# Patient Record
Sex: FEMALE | Race: WHITE | NOT HISPANIC OR LATINO | Employment: OTHER | URBAN - METROPOLITAN AREA
[De-identification: names, ages, dates, MRNs, and addresses within clinical notes are randomized per-mention and may not be internally consistent; named-entity substitution may affect disease eponyms.]

---

## 2017-01-02 ENCOUNTER — RADIANT APPOINTMENT (OUTPATIENT)
Dept: GENERAL RADIOLOGY | Facility: CLINIC | Age: 47
End: 2017-01-02
Attending: FAMILY MEDICINE
Payer: COMMERCIAL

## 2017-01-02 ENCOUNTER — OFFICE VISIT (OUTPATIENT)
Dept: FAMILY MEDICINE | Facility: CLINIC | Age: 47
End: 2017-01-02
Payer: COMMERCIAL

## 2017-01-02 VITALS
TEMPERATURE: 97.5 F | BODY MASS INDEX: 42.89 KG/M2 | DIASTOLIC BLOOD PRESSURE: 88 MMHG | SYSTOLIC BLOOD PRESSURE: 126 MMHG | HEART RATE: 87 BPM | OXYGEN SATURATION: 98 % | WEIGHT: 250 LBS | RESPIRATION RATE: 16 BRPM

## 2017-01-02 DIAGNOSIS — S99.922A FOOT INJURY, LEFT, INITIAL ENCOUNTER: ICD-10-CM

## 2017-01-02 DIAGNOSIS — S99.922A FOOT INJURY, LEFT, INITIAL ENCOUNTER: Primary | ICD-10-CM

## 2017-01-02 PROCEDURE — 99214 OFFICE O/P EST MOD 30 MIN: CPT | Performed by: FAMILY MEDICINE

## 2017-01-02 PROCEDURE — 73630 X-RAY EXAM OF FOOT: CPT | Mod: LT

## 2017-01-02 RX ORDER — SULFAMETHOXAZOLE/TRIMETHOPRIM 800-160 MG
1 TABLET ORAL 2 TIMES DAILY
Qty: 20 TABLET | Refills: 0 | Status: SHIPPED | OUTPATIENT
Start: 2017-01-02 | End: 2017-06-09

## 2017-01-02 ASSESSMENT — ENCOUNTER SYMPTOMS
NEUROLOGICAL NEGATIVE: 1
CONSTITUTIONAL NEGATIVE: 1

## 2017-01-02 ASSESSMENT — PAIN SCALES - GENERAL: PAINLEVEL: MODERATE PAIN (5)

## 2017-01-02 NOTE — NURSING NOTE
"Chief Complaint   Patient presents with     Musculoskeletal Problem     LEFT FOOT     Initial /88 mmHg  Pulse 87  Temp(Src) 97.5  F (36.4  C) (Oral)  Resp 16  Wt 250 lb (113.399 kg)  SpO2 98% Estimated body mass index is 42.89 kg/(m^2) as calculated from the following:    Height as of 7/22/16: 5' 4\" (1.626 m).    Weight as of this encounter: 250 lb (113.399 kg).  BP completed using cuff size large right arm.    Lisa Magill, CMA    "

## 2017-01-02 NOTE — MR AVS SNAPSHOT
After Visit Summary   1/2/2017    Lyubov Sanchez    MRN: [id number]           Patient Information     Date Of Birth          1970        Visit Information        Provider Department      1/2/2017 8:00 AM Rafi Price MD [facility]        Today's Diagnoses     Foot injury, left, initial encounter    -  1        Follow-ups after your visit        Follow-up notes from your care team     Return in about 1 week (around 1/9/2017).      Who to contact     If you have questions or need follow up information about today's clinic visit or your schedule please contact [facility] directly at [phone].  Normal or non-critical lab and imaging results will be communicated to you by MyChart, letter or phone within 4 business days after the clinic has received the results. If you do not hear from us within 7 days, please contact the clinic through [employer] or phone. If you have a critical or abnormal lab result, we will notify you by phone as soon as possible.  Submit refill requests through [employer] or call your pharmacy and they will forward the refill request to us. Please allow 3 business days for your refill to be completed.          Additional Information About Your Visit        MyChart Information     [employer] gives you secure access to your electronic health record. If you see a primary care provider, you can also send messages to your care team and make appointments. If you have questions, please call your primary care clinic.  If you do not have a primary care provider, please call [phone] and they will assist you.        Your Vitals Were     Pulse Temperature Respirations Pulse Oximetry          87 97.5  F (36.4  C) (Oral) 16 98%         Blood Pressure from Last 3 Encounters:   01/02/17 126/88   12/22/16 116/74   11/10/16 142/94    Weight from Last 3 Encounters:   01/02/17 250 lb (113.399 kg)   12/22/16 247 lb 14.4 oz (112.447 kg)   11/10/16 250 lb  (113.399 kg)                 Today's Medication Changes          These changes are accurate as of: 1/2/17  9:02 AM.  If you have any questions, ask your nurse or doctor.               Start taking these medicines.        Dose/Directions    sulfamethoxazole-trimethoprim 800-160 MG per tablet   Commonly known as:  BACTRIM DS/SEPTRA DS   Used for:  Foot injury, left, initial encounter   Started by:  Rafi Price MD        Dose:  1 tablet   Take 1 tablet by mouth 2 times daily   Quantity:  20 tablet   Refills:  0            Where to get your medicines      These medications were sent to [facility]/pharmacy #[id number] - [place], [place] - [address]  [address], [facility] [address]     Phone:  [phone]    - sulfamethoxazole-trimethoprim 800-160 MG per tablet             Primary Care Provider Office Phone # Fax #    Yolie Delarosa [phone]       [facility] [address]   [facility] [address]        Thank you!     Thank you for choosing [facility]  for your care. Our goal is always to provide you with excellent care. Hearing back from our patients is one way we can continue to improve our services. Please take a few minutes to complete the written survey that you may receive in the mail after your visit with us. Thank you!             Your Updated Medication List - Protect others around you: Learn how to safely use, store and throw away your medicines at www.disposemymeds.org.          This list is accurate as of: 1/2/17  9:02 AM.  Always use your most recent med list.                   Brand Name Dispense Instructions for use    amLODIPine 5 MG tablet    NORVASC    90 tablet    Take 1 tablet (5 mg) by mouth daily       buPROPion 150 MG 24 hr tablet    WELLBUTRIN XL    90 tablet    Take 1 tablet (150 mg) by mouth every morning       citalopram 20 MG tablet    celeXA    90 tablet    Take 1 tablet (20 mg) by mouth daily       ketorolac 30 MG/ML  injection    TORADOL    1 mL    Inject 1 mL (30 mg) into the muscle once       levonorgestrel 20 MCG/24HR IUD    MIRENA    1 each        lisinopril-hydrochlorothiazide 20-12.5 MG per tablet    PRINZIDE/ZESTORETIC    90 tablet    Take 1 tablet by mouth daily       phentermine 37.5 MG capsule     30 capsule    Take 1 capsule (37.5 mg) by mouth every morning       sulfamethoxazole-trimethoprim 800-160 MG per tablet    BACTRIM DS/SEPTRA DS    20 tablet    Take 1 tablet by mouth 2 times daily

## 2017-01-05 ENCOUNTER — MYC MEDICAL ADVICE (OUTPATIENT)
Dept: FAMILY MEDICINE | Facility: CLINIC | Age: 47
End: 2017-01-05

## 2017-01-05 DIAGNOSIS — M25.562 PAIN IN BOTH KNEES, UNSPECIFIED CHRONICITY: Primary | ICD-10-CM

## 2017-01-05 DIAGNOSIS — M25.561 PAIN IN BOTH KNEES, UNSPECIFIED CHRONICITY: Primary | ICD-10-CM

## 2017-01-10 ENCOUNTER — RADIANT APPOINTMENT (OUTPATIENT)
Dept: GENERAL RADIOLOGY | Facility: CLINIC | Age: 47
End: 2017-01-10
Attending: FAMILY MEDICINE
Payer: COMMERCIAL

## 2017-01-10 ENCOUNTER — OFFICE VISIT (OUTPATIENT)
Dept: ORTHOPEDICS | Facility: CLINIC | Age: 47
End: 2017-01-10
Payer: COMMERCIAL

## 2017-01-10 VITALS
DIASTOLIC BLOOD PRESSURE: 88 MMHG | SYSTOLIC BLOOD PRESSURE: 138 MMHG | HEIGHT: 64 IN | WEIGHT: 250 LBS | BODY MASS INDEX: 42.68 KG/M2

## 2017-01-10 DIAGNOSIS — M22.40 CHONDROMALACIA OF PATELLA, UNSPECIFIED LATERALITY: ICD-10-CM

## 2017-01-10 DIAGNOSIS — M25.561 PAIN IN BOTH KNEES, UNSPECIFIED CHRONICITY: ICD-10-CM

## 2017-01-10 DIAGNOSIS — M17.0 PRIMARY OSTEOARTHRITIS OF BOTH KNEES: ICD-10-CM

## 2017-01-10 DIAGNOSIS — G89.29 CHRONIC PAIN OF BOTH KNEES: Primary | ICD-10-CM

## 2017-01-10 DIAGNOSIS — M25.562 PAIN IN BOTH KNEES, UNSPECIFIED CHRONICITY: ICD-10-CM

## 2017-01-10 DIAGNOSIS — M25.561 CHRONIC PAIN OF BOTH KNEES: Primary | ICD-10-CM

## 2017-01-10 DIAGNOSIS — M25.562 CHRONIC PAIN OF BOTH KNEES: Primary | ICD-10-CM

## 2017-01-10 PROCEDURE — 99203 OFFICE O/P NEW LOW 30 MIN: CPT | Performed by: FAMILY MEDICINE

## 2017-01-10 PROCEDURE — 73562 X-RAY EXAM OF KNEE 3: CPT | Mod: RT

## 2017-01-10 NOTE — MR AVS SNAPSHOT
After Visit Summary   1/10/2017    Lyubov Sanchez    MRN: [id number]           Patient Information     Date Of Birth          1970        Visit Information        Provider Department      1/10/2017 6:00 PM Juan M Kelly DO [facility]        Today's Diagnoses     Chronic pain of both knees    -  1     Chondromalacia of patella, unspecified laterality         Primary osteoarthritis of both knees           Care Instructions    Thank you for allowing us to participate in your care today.  Please find below your visit diagnosis and the plan going forward.    1. Pain in both knees, unspecified chronicity    2. Chondromalacia of patella, unspecified laterality    3. Primary osteoarthritis of both knees      Explained source / reason for pain which is related to tracking of your knee cap  Also discussed the arthritis in the big joint of your knee and the progressive nature of arthritis  Activity modification as discussed- stairs, lunges/squats and sit-to-stand will bother you until we address the biomechanics in physical therapy  Physical therapy: [facility] - [phone]  If not improving could consider cortisone injections, Synvisc or PRP.  PRP would be an out-of-pocket expense.    Discussed using Turmeric and I would recommend the brand Pure Encapsulations and the products is called CurcumaSorb 180.  Pure Encapsulations is of high quality and free of additives/allergens.  Please take 2 tablets a day between meals.    For additional information about the brand, here is their website: [email or web address]  You can also see if the brand is available at your local vitamin shop or [employer].    Follow up after 4-6 PT sessions or sooner if needed; call direct clinic number [[phone]] at any time with questions or concerns)    Juan M Kelly DO [facility]  Website: [email or web address]  Twitter:  [email or web address]          Follow-ups after your visit        Additional Services     K[facility] PT, HAND, AND CHIROPRACTIC REFERRAL       **This order will print in the [facility] Scheduling Office**    Physical Therapy, Hand Therapy and Chiropractic Care are available through:    *[facility]  *[facility]  *[facility]    Call one number to schedule at any of the above locations: [phone].    Your provider has referred you to: Physical Therapy at [facility] or [facility]    Indication/Reason for Referral: B/L knee pain - chondromalacia/OA  Onset of Illness: chronic  Therapy Orders: Evaluate and Treat  Special Programs: None  Special Request: kinesioptaping    Phillip Fisher      Additional Comments for the Therapist or Chiropractor: Formal physical therapy - exercises to include neuromuscular/proprioceptive control and VMO strengthening. Please also include pain-free closed chain/isometric/isotonic strengthening with use of modalities (including kinesioptaping) as needed/deemed appropriate with home exercise prescription.    Please be aware that coverage of these services is subject to the terms and limitations of your health insurance plan.  Call member services at your health plan with any benefit or coverage questions.      Please bring the following to your appointment:    *Your personal calendar for scheduling future appointments  *Comfortable clothing                  Who to contact     If you have questions or need follow up information about today's clinic visit or your schedule please contact [facility] directly at [phone].  Normal or non-critical lab and imaging results will be communicated to you by MyChart, letter or phone within 4 business days after the clinic has received the results. If you do not hear from us within 7 days, please contact the clinic through MyChart or phone. If you have a critical or abnormal lab result, we will notify you  "by phone as soon as possible.  Submit refill requests through [employer] or call your pharmacy and they will forward the refill request to us. Please allow 3 business days for your refill to be completed.          Additional Information About Your Visit        [employer] Information     [employer] gives you secure access to your electronic health record. If you see a primary care provider, you can also send messages to your care team and make appointments. If you have questions, please call your primary care clinic.  If you do not have a primary care provider, please call [phone] and they will assist you.        Care EveryWhere ID     This is your Care EveryWhere ID. This could be used by other organizations to access your [facility] medical records  [id number]        Your Vitals Were     Height BMI (Body Mass Index)                5' 4\" (1.626 m) 42.89 kg/m2           Blood Pressure from Last 3 Encounters:   01/10/17 138/88   01/02/17 126/88   12/22/16 116/74    Weight from Last 3 Encounters:   01/10/17 250 lb (113.399 kg)   01/02/17 250 lb (113.399 kg)   12/22/16 247 lb 14.4 oz (112.447 kg)              We Performed the Following     KARMEN PT, HAND, AND CHIROPRACTIC REFERRAL        Primary Care Provider Office Phone # Fax #    Yolie Delarosa [phone]       [place] [address]  [address]   [facility] [address]        Thank you!     Thank you for choosing [facility]  for your care. Our goal is always to provide you with excellent care. Hearing back from our patients is one way we can continue to improve our services. Please take a few minutes to complete the written survey that you may receive in the mail after your visit with us. Thank you!             Your Updated Medication List - Protect others around you: Learn how to safely use, store and throw away your medicines at www.disposemymeds.org.          This list is accurate as of: 1/10/17  6:52 PM.  Always use " your most recent med list.                   Brand Name Dispense Instructions for use    amLODIPine 5 MG tablet    NORVASC    90 tablet    Take 1 tablet (5 mg) by mouth daily       buPROPion 150 MG 24 hr tablet    WELLBUTRIN XL    90 tablet    Take 1 tablet (150 mg) by mouth every morning       citalopram 20 MG tablet    celeXA    90 tablet    Take 1 tablet (20 mg) by mouth daily       ketorolac 30 MG/ML injection    TORADOL    1 mL    Inject 1 mL (30 mg) into the muscle once       levonorgestrel 20 MCG/24HR IUD    MIRENA    1 each        lisinopril-hydrochlorothiazide 20-12.5 MG per tablet    PRINZIDE/ZESTORETIC    90 tablet    Take 1 tablet by mouth daily       phentermine 37.5 MG capsule     30 capsule    Take 1 capsule (37.5 mg) by mouth every morning       sulfamethoxazole-trimethoprim 800-160 MG per tablet    BACTRIM DS/SEPTRA DS    20 tablet    Take 1 tablet by mouth 2 times daily

## 2017-01-11 NOTE — NURSING NOTE
"Chief Complaint   Patient presents with     Musculoskeletal Problem       Initial /88 mmHg  Ht 5' 4\" (1.626 m)  Wt 250 lb (113.399 kg)  BMI 42.89 kg/m2 Estimated body mass index is 42.89 kg/(m^2) as calculated from the following:    Height as of this encounter: 5' 4\" (1.626 m).    Weight as of this encounter: 250 lb (113.399 kg).  BP completed using cuff size: amber Prasad ATC    "

## 2017-01-11 NOTE — PROGRESS NOTES
ASSESSMENT & PLAN    ICD-10-CM    1. Chronic pain of both knees M25.561 XR Knee Bilateral 3 Views    M25.562     G89.29    2. Chondromalacia of patella, unspecified laterality M22.40     bilateral   3. Primary osteoarthritis of both knees M17.0    Discussed history, exam and xray's. Etiology of pain is likely coming from retropatellar region and associated with PFPS.  Also with associated tibiofemoral OA. Explained the role of activity modification, physical therapy and use of oral NSAIDs.  We discussed pain control, correcting the body mechanics (physical therapy) and addressing the overuse (activity modification).   Activity modification as discussed  Physical therapy: [facility] - [phone]  If not improving could consider intra-articular cortisone, Synvisc vs PRP injections  Discussed using Turmeric and I would recommend the brand Pure Encapsulations    Follow up after 4-6 PT sessions or sooner if needed; call direct clinic number [[phone]] at any time with questions or concerns) Instructed to call the office if the condition evolves or worsens.    -----    SUBJECTIVE  Lyubov Sanchez is a/an 46 year old female who is seen in consultation at the request of Dr. Delarosa for evaluation of bilateral anterior knee pain just under the knee cap. The patient is seen by themselves.    Onset: 7 years(s) ago with increased pain recently. Reports insidious onset without acute precipitating event. She reports possible left leg injury playing catch with her daughter (in a squatted position). She reports a similar mechanism in the right knee (about 3 years ago).  Worsened by: sitting on knees, pressure on kneecaps  Better with: weight loss  Quality: dull, achy with sharp pain with pressure or re-injury  Pain Scale (maximum/current)/10: 7/10 / 4/10  Treatments tried: recent treatment: motrin daily, ice, heat, no recent imaging - Previously she has had an MRI of the left knee and a cortisone injection  "(good relief for about 2 months), NO PT  Orthopedic history: NO  Relevant surgical history: NO  Patient Social History: works - owns own     Patient's past medical, surgical, social, and family histories were reviewed today and no changes are noted.    REVIEW OF SYSTEMS:  10 point ROS is negative other than symptoms noted above in HPI, Past Medical History or as stated below  Constitutional: NEGATIVE for fever, chills, change in weight  Skin: NEGATIVE for worrisome rashes, moles or lesions  GI/: NEGATIVE for bowel or bladder changes  Neuro: NEGATIVE for weakness, dizziness or paresthesias    OBJECTIVE:  /88 mmHg  Ht 5' 4\" (1.626 m)  Wt 250 lb (113.399 kg)  BMI 42.89 kg/m2   General: healthy, alert and in no distress  HEENT: no scleral icterus or conjunctival erythema  Skin: no suspicious lesions or rash. No jaundice.  CV: no pedal edema  Resp: normal respiratory effort without conversational dyspnea   Psych: normal mood and affect  Gait: normal steady gait with appropriate coordination and balance  Neuro: Motor strength as noted below  MSK:  BILATERAL KNEE  Inspection:    normal alignment  Palpation:    Tender about the lateral patellar facet, medial patellar facet, inferior pole patella, lateral joint line and medial joint line. Remainder of bony and ligamentous landmarks are nontender.    Difficult to appreciate effusion given body habitus    Patellofemoral crepitus is Present  Range of Motion:     00 extension to 1350 flexion  Strength:    Quadriceps 5-/5, painful    Extensor mechanism intact  Special Tests:    Positive: Patellar grind    Negative: MCL/valgus stress (0 & 30 deg), LCL/varus stress (0 & 30 deg), anterior drawer, posterior drawer, Gregory's    Independent visualization of the below image:  Advanced medial compartment narrowing bilaterally with sclerosis and osteophytes  Patellofemoral spurring and sclerosis bilaterally  Final radiology read pending     MR LEFT LOWER EXTREMITY " JOINT WITHOUT CONTRAST  Jun 16, 2010 7:32 PM     HISTORY: Left knee pain.      IMPRESSION:  1. Subcentimeter region of full-thickness articular cartilage  fissuring if not a carlito defect in the posterior weight-bearing aspect  of the medial femoral condyle, adjacent to the posterior horn of the  medial meniscus. Grade 1-2 chondromalacia is also noted at the  inferior aspect of the femoral trochlea.  2. Mild thickening of the medial collateral ligament which could be  related to grade 1 sprain, age-indeterminate.  3. Moderate joint effusion.  4. No meniscal or cruciate ligament tear.  5. Inferior margin of an intramedullary lesion within the distal  femoral diaphysis is seen on the edge of the study. This correlates  with chondroid calcification seen on radiographs of 6/9/2010 which are  typical in appearance for a benign enchondroma. There is a probable  small several millimeter enchondroma adjacent to the proximal tibial  physis within the posterior aspect of the intramedullary space as  well.    Patient's conditions were thoroughly discussed during today's visit with greater than 50% of the visit spent counseling the patient with total time spent face-to-face with the patient being 20 minutes.    Juan M Kelly DO [facility]

## 2017-01-11 NOTE — PATIENT INSTRUCTIONS
Thank you for allowing us to participate in your care today.  Please find below your visit diagnosis and the plan going forward.    1. Pain in both knees, unspecified chronicity    2. Chondromalacia of patella, unspecified laterality    3. Primary osteoarthritis of both knees      Explained source / reason for pain which is related to tracking of your knee cap  Also discussed the arthritis in the big joint of your knee and the progressive nature of arthritis  Activity modification as discussed- stairs, lunges/squats and sit-to-stand will bother you until we address the biomechanics in physical therapy  Physical therapy: [place] [facility] - [phone]  If not improving could consider cortisone injections, Synvisc or PRP.  PRP would be an out-of-pocket expense.    Discussed using Turmeric and I would recommend the brand Pure Encapsulations and the products is called CurcumaSorb 180.  Pure Encapsulations is of high quality and free of additives/allergens.  Please take 2 tablets a day between meals.    For additional information about the brand, here is their website: http://www.pureencapsulations.com  You can also see if the brand is available at your local vitamin shop or [employer].    Follow up after 4-6 PT sessions or sooner if needed; call direct clinic number [[phone]] at any time with questions or concerns)    Juan M Kelly DO [facility]  Website: [email or web address]  Twitter: [email or web address]

## 2017-01-12 ENCOUNTER — MYC MEDICAL ADVICE (OUTPATIENT)
Dept: FAMILY MEDICINE | Facility: CLINIC | Age: 47
End: 2017-01-12

## 2017-01-13 NOTE — TELEPHONE ENCOUNTER
I recommend ES tylenol, 2 tabs , 3-4 times per day. Using heat atleast 2 times per day for 15 min will also help. Consider taking glucosamine as well. A knee brace should be used during the day.  I agree with the PHYSICAL THERAPY , this is the best first line treatment. Follow up appointment with me if not improving

## 2017-01-31 ENCOUNTER — THERAPY VISIT (OUTPATIENT)
Dept: PHYSICAL THERAPY | Facility: CLINIC | Age: 47
End: 2017-01-31
Payer: COMMERCIAL

## 2017-01-31 DIAGNOSIS — M25.562 CHRONIC PAIN OF BOTH KNEES: Primary | ICD-10-CM

## 2017-01-31 DIAGNOSIS — G89.29 CHRONIC PAIN OF BOTH KNEES: Primary | ICD-10-CM

## 2017-01-31 DIAGNOSIS — M22.40 CHONDROMALACIA OF PATELLA, UNSPECIFIED LATERALITY: ICD-10-CM

## 2017-01-31 DIAGNOSIS — M25.561 CHRONIC PAIN OF BOTH KNEES: Primary | ICD-10-CM

## 2017-01-31 PROCEDURE — 97161 PT EVAL LOW COMPLEX 20 MIN: CPT | Mod: GP | Performed by: PHYSICAL THERAPIST

## 2017-01-31 PROCEDURE — 97110 THERAPEUTIC EXERCISES: CPT | Mod: GP | Performed by: PHYSICAL THERAPIST

## 2017-01-31 ASSESSMENT — ACTIVITIES OF DAILY LIVING (ADL)
AS_A_RESULT_OF_YOUR_KNEE_INJURY,_HOW_WOULD_YOU_RATE_YOUR_CURRENT_LEVEL_OF_DAILY_ACTIVITY?: SEVERELY ABNORMAL
STAND: ACTIVITY IS VERY DIFFICULT
HOW_WOULD_YOU_RATE_THE_CURRENT_FUNCTION_OF_YOUR_KNEE_DURING_YOUR_USUAL_DAILY_ACTIVITIES_ON_A_SCALE_FROM_0_TO_100_WITH_100_BEING_YOUR_LEVEL_OF_KNEE_FUNCTION_PRIOR_TO_YOUR_INJURY_AND_0_BEING_THE_INABILITY_TO_PERFORM_ANY_OF_YOUR_USUAL_DAILY_ACTIVITIES?: 20
STIFFNESS: THE SYMPTOM AFFECTS MY ACTIVITY SEVERELY
WEAKNESS: THE SYMPTOM AFFECTS MY ACTIVITY SEVERELY
LIMPING: THE SYMPTOM AFFECTS MY ACTIVITY SEVERELY
RAW_SCORE: 26
RISE FROM A CHAIR: ACTIVITY IS MINIMALLY DIFFICULT
WALK: ACTIVITY IS VERY DIFFICULT
SWELLING: THE SYMPTOM AFFECTS MY ACTIVITY SEVERELY
GIVING WAY, BUCKLING OR SHIFTING OF KNEE: THE SYMPTOM AFFECTS MY ACTIVITY SEVERELY
GO UP STAIRS: ACTIVITY IS MINIMALLY DIFFICULT
SIT WITH YOUR KNEE BENT: ACTIVITY IS NOT DIFFICULT
KNEE_ACTIVITY_OF_DAILY_LIVING_SCORE: 37.14
KNEE_ACTIVITY_OF_DAILY_LIVING_SUM: 26
GO DOWN STAIRS: ACTIVITY IS VERY DIFFICULT
KNEEL ON THE FRONT OF YOUR KNEE: I AM UNABLE TO DO THE ACTIVITY
SQUAT: ACTIVITY IS MINIMALLY DIFFICULT
HOW_WOULD_YOU_RATE_THE_OVERALL_FUNCTION_OF_YOUR_KNEE_DURING_YOUR_USUAL_DAILY_ACTIVITIES?: SEVERELY ABNORMAL
PAIN: THE SYMPTOM AFFECTS MY ACTIVITY SEVERELY

## 2017-01-31 NOTE — PROGRESS NOTES
"Subjective:    Lyubov Sanchez is a 46 year old female with a bilateral knees condition.  Condition occurred with:  Insidious onset and degenerative joint disease.  Condition occurred: for unknown reasons.  This is a chronic condition  PT reports having bilateral anterior knee pain that has been present for \"years\".  MD appt on 1/10/17  .    Patient reports pain:  Anterior.  Radiates to:  Knee.  Pain is described as aching and is intermittent and reported as 5/10.  Associated symptoms:  Loss of motion/stiffness and loss of strength. Pain is worse during the day.  Symptoms are exacerbated by activity, weight bearing and standing and relieved by rest and ice.  Since onset symptoms are gradually worsening.  Special tests:  X-ray (patellar arthritis).      General health as reported by patient is good.  Pertinent medical history includes:  High blood pressure, overweight and sleep disorder/apnea.  Medical allergies: no.  Other surgeries include:  None reported.  Current medications:  Anti-inflammatory, anti-depressants and high blood pressure medication.  Current occupation is  provider  .  Patient is working in normal job without restrictions.  Primary job tasks include:  Prolonged standing, lifting and repetitive tasks.    Barriers include:  None as reported by the patient.    Red flags:  None as reported by the patient.                      Objective:    System                                                Knee Evaluation:  ROM:  AROM: normal  PROM: normal            Strength:     Extension:  Left: 3/5    Pain:+      Right: 3/5    Pain:+  Flexion:  Left: 3/5    Pain:+/-      Right: 3/5    Pain:+/-    Quad Set Left: Fair    Pain:   Quad Set Right: Fair    Pain:      Palpation:  Normal      Edema:  Normal    Mobility Testing:      Patellofemoral Medial:  Left: normal    Right: normal  Patellofemoral Lateral:  Left: normal    Right: normal            General     ROS    Assessment/Plan:      Patient is a 46 year " old female with both sides knee complaints.    Patient has the following significant findings with corresponding treatment plan.                Diagnosis 1:  Knee pain  Pain -  hot/cold therapy, self management, education, directional preference exercise and home program  Decreased ROM/flexibility - manual therapy and therapeutic exercise    Therapy Evaluation Codes:   1) History comprised of:   Personal factors that impact the plan of care:      None.    Comorbidity factors that impact the plan of care are:      High blood pressure and Overweight.     Medications impacting care: None.  2) Examination of Body Systems comprised of:   Body structures and functions that impact the plan of care:      Knee.   Activity limitations that impact the plan of care are:      Bending, Stairs, Standing and Walking.  3) Clinical presentation characteristics are:   Stable/Uncomplicated.  4) Decision-Making    Low complexity using standardized patient assessment instrument and/or measureable assessment of functional outcome.  Cumulative Therapy Evaluation is: Low complexity.    Previous and current functional limitations:  (See Goal Flow Sheet for this information)    Short term and Long term goals: (See Goal Flow Sheet for this information)     Communication ability:  Patient appears to be able to clearly communicate and understand verbal and written communication and follow directions correctly.  Treatment Explanation - The following has been discussed with the patient:   RX ordered/plan of care  Anticipated outcomes  Possible risks and side effects  This patient would benefit from PT intervention to resume normal activities.   Rehab potential is fair.    Frequency:  1 X week, once daily  Duration:  for 6 weeks  Discharge Plan:  Achieve all LTG.  Independent in home treatment program.  Reach maximal therapeutic benefit.    Please refer to the daily flowsheet for treatment today, total treatment time and time spent performing 1:1  timed codes.

## 2017-02-02 PROBLEM — M25.562 CHRONIC PAIN OF BOTH KNEES: Status: ACTIVE | Noted: 2017-02-02

## 2017-02-02 PROBLEM — G89.29 CHRONIC PAIN OF BOTH KNEES: Status: ACTIVE | Noted: 2017-02-02

## 2017-02-02 PROBLEM — M25.561 CHRONIC PAIN OF BOTH KNEES: Status: ACTIVE | Noted: 2017-02-02

## 2017-02-07 ENCOUNTER — THERAPY VISIT (OUTPATIENT)
Dept: PHYSICAL THERAPY | Facility: CLINIC | Age: 47
End: 2017-02-07
Payer: COMMERCIAL

## 2017-02-07 DIAGNOSIS — G89.29 CHRONIC PAIN OF BOTH KNEES: Primary | ICD-10-CM

## 2017-02-07 DIAGNOSIS — M22.40 CHONDROMALACIA OF PATELLA, UNSPECIFIED LATERALITY: ICD-10-CM

## 2017-02-07 DIAGNOSIS — M25.562 CHRONIC PAIN OF BOTH KNEES: Primary | ICD-10-CM

## 2017-02-07 DIAGNOSIS — M25.561 CHRONIC PAIN OF BOTH KNEES: Primary | ICD-10-CM

## 2017-02-07 PROCEDURE — 29530 STRAPPING OF KNEE: CPT | Mod: GP | Performed by: PHYSICAL THERAPIST

## 2017-02-07 PROCEDURE — 97112 NEUROMUSCULAR REEDUCATION: CPT | Mod: GP | Performed by: PHYSICAL THERAPIST

## 2017-02-07 PROCEDURE — 97110 THERAPEUTIC EXERCISES: CPT | Mod: GP | Performed by: PHYSICAL THERAPIST

## 2017-02-20 DIAGNOSIS — F33.0 MAJOR DEPRESSIVE DISORDER, RECURRENT EPISODE, MILD (H): ICD-10-CM

## 2017-02-20 RX ORDER — CITALOPRAM HYDROBROMIDE 20 MG/1
20 TABLET ORAL DAILY
Qty: 90 TABLET | Refills: 0 | Status: SHIPPED | OUTPATIENT
Start: 2017-02-20 | End: 2017-06-09

## 2017-02-20 NOTE — TELEPHONE ENCOUNTER
citalopram (CELEXA) 20 MG tablet     Last Written Prescription Date: 12/21/2016  Last Fill Quantity: 90, # refills: 0  Last Office Visit with G primary care provider:  12/22/2016        Last PHQ-9 score on record=   PHQ-9 SCORE 12/22/2016   Total Score -   Total Score 0

## 2017-02-28 ENCOUNTER — THERAPY VISIT (OUTPATIENT)
Dept: PHYSICAL THERAPY | Facility: CLINIC | Age: 47
End: 2017-02-28
Payer: COMMERCIAL

## 2017-02-28 DIAGNOSIS — M25.562 CHRONIC PAIN OF BOTH KNEES: ICD-10-CM

## 2017-02-28 DIAGNOSIS — M22.40 CHONDROMALACIA OF PATELLA, UNSPECIFIED LATERALITY: ICD-10-CM

## 2017-02-28 DIAGNOSIS — G89.29 CHRONIC PAIN OF BOTH KNEES: ICD-10-CM

## 2017-02-28 DIAGNOSIS — M25.561 CHRONIC PAIN OF BOTH KNEES: ICD-10-CM

## 2017-02-28 PROCEDURE — 29530 STRAPPING OF KNEE: CPT | Mod: GP | Performed by: PHYSICAL THERAPIST

## 2017-02-28 PROCEDURE — 97112 NEUROMUSCULAR REEDUCATION: CPT | Mod: GP | Performed by: PHYSICAL THERAPIST

## 2017-02-28 PROCEDURE — 97110 THERAPEUTIC EXERCISES: CPT | Mod: GP | Performed by: PHYSICAL THERAPIST

## 2017-02-28 NOTE — MR AVS SNAPSHOT
After Visit Summary   2/28/2017    Lyubov Sanchez    MRN: [id number]           Patient Information     Date Of Birth          1970        Visit Information        Provider Department      2/28/2017 5:00 PM Enrique Khan, SIENNA [facility] Physical Therapy        Today's Diagnoses     Chronic pain of both knees        Chondromalacia of patella, unspecified laterality           Follow-ups after your visit        Your next 10 appointments already scheduled     Apr 11, 2017  5:00 PM CDT   KARMEN Extremity with Enrique Khan PT   [facility] Physical Therapy ([facility])    [address]  [facility] [id number] [phone]              Who to contact     If you have questions or need follow up information about today's clinic visit or your schedule please contact [facility] ATHLETIC [facility] PHYSICAL THERAPY directly at [phone].  Normal or non-critical lab and imaging results will be communicated to you by Slantrangehart, letter or phone within 4 business days after the clinic has received the results. If you do not hear from us within 7 days, please contact the clinic through Slantrangehart or phone. If you have a critical or abnormal lab result, we will notify you by phone as soon as possible.  Submit refill requests through Asseta or call your pharmacy and they will forward the refill request to us. Please allow 3 business days for your refill to be completed.          Additional Information About Your Visit        MyChart Information     Asseta gives you secure access to your electronic health record. If you see a primary care provider, you can also send messages to your care team and make appointments. If you have questions, please call your primary care clinic.  If you do not have a primary care provider, please call [phone] and they will assist you.        Care EveryWhere ID     This is  your Care EveryWhere ID. This could be used by other organizations to access your [facility] medical records  [id number]         Blood Pressure from Last 3 Encounters:   01/10/17 138/88   01/02/17 126/88   12/22/16 116/74    Weight from Last 3 Encounters:   01/10/17 113.4 kg (250 lb)   01/02/17 113.4 kg (250 lb)   12/22/16 112.4 kg (247 lb 14.4 oz)              We Performed the Following     NEUROMUSCULAR RE-EDUCATION     Strapping Knee     THERAPEUTIC EXERCISES        Primary Care Provider Office Phone # Fax #    Yoile Nidhi Delarosa [phone]       [place] [address]  [address]   [facility] [address]        Thank you!     Thank you for choosing [facility]LETIC [facility] PHYSICAL THERAPY  for your care. Our goal is always to provide you with excellent care. Hearing back from our patients is one way we can continue to improve our services. Please take a few minutes to complete the written survey that you may receive in the mail after your visit with us. Thank you!             Your Updated Medication List - Protect others around you: Learn how to safely use, store and throw away your medicines at www.disposemymeds.org.          This list is accurate as of: 2/28/17 11:59 PM.  Always use your most recent med list.                   Brand Name Dispense Instructions for use    amLODIPine 5 MG tablet    NORVASC    90 tablet    Take 1 tablet (5 mg) by mouth daily       buPROPion 150 MG 24 hr tablet    WELLBUTRIN XL    90 tablet    Take 1 tablet (150 mg) by mouth every morning       citalopram 20 MG tablet    celeXA    90 tablet    Take 1 tablet (20 mg) by mouth daily       ketorolac 30 MG/ML injection    TORADOL    1 mL    Inject 1 mL (30 mg) into the muscle once       levonorgestrel 20 MCG/24HR IUD    MIRENA    1 each        lisinopril-hydrochlorothiazide 20-12.5 MG per tablet    PRINZIDE/ZESTORETIC    90 tablet    Take 1 tablet by mouth daily       phentermine 37.5 MG  capsule     30 capsule    Take 1 capsule (37.5 mg) by mouth every morning       sulfamethoxazole-trimethoprim 800-160 MG per tablet    BACTRIM DS/SEPTRA DS    20 tablet    Take 1 tablet by mouth 2 times daily

## 2017-03-17 DIAGNOSIS — E66.01 MORBID OBESITY DUE TO EXCESS CALORIES (H): ICD-10-CM

## 2017-03-17 DIAGNOSIS — F41.9 ANXIETY: ICD-10-CM

## 2017-03-17 DIAGNOSIS — I10 ESSENTIAL HYPERTENSION, BENIGN: ICD-10-CM

## 2017-03-17 RX ORDER — AMLODIPINE BESYLATE 5 MG/1
5 TABLET ORAL DAILY
Qty: 90 TABLET | Refills: 0 | Status: SHIPPED | OUTPATIENT
Start: 2017-03-17 | End: 2017-06-09

## 2017-03-17 RX ORDER — BUPROPION HYDROCHLORIDE 150 MG/1
150 TABLET ORAL EVERY MORNING
Qty: 90 TABLET | Refills: 1 | Status: SHIPPED | OUTPATIENT
Start: 2017-03-17 | End: 2017-06-09

## 2017-03-17 RX ORDER — LISINOPRIL AND HYDROCHLOROTHIAZIDE 12.5; 2 MG/1; MG/1
1 TABLET ORAL DAILY
Qty: 90 TABLET | Refills: 0 | Status: SHIPPED | OUTPATIENT
Start: 2017-03-17 | End: 2017-06-09

## 2017-03-17 NOTE — TELEPHONE ENCOUNTER
amLODIPine (NORVASC) 5 MG tablet      Last Written Prescription Date: 7/22/16  Last Fill Quantity: 90, # refills: 1  Last Office Visit with [facility] or  Health prescribing provider: 1/2/2017     lisinopril-hydrochlorothiazide (PRINZIDE,ZESTORETIC) 20-12.5 MG per tablet  Last Written Prescription Date: 7/22/16  Last Fill Quantity: 90,  # refills: 1   Last Office Visit with [facility] or  Health prescribing provider:  1/2/2017              Potassium   Date Value Ref Range Status   07/22/2016 4.0 3.4 - 5.3 mmol/L Final     Creatinine   Date Value Ref Range Status   07/22/2016 0.65 0.52 - 1.04 mg/dL Final     BP Readings from Last 3 Encounters:   01/10/17 138/88   01/02/17 126/88   12/22/16 116/74     ________________________________________________________________________________  buPROPion (WELLBUTRIN XL) 150 MG 24 hr tablet       Last Written Prescription Date: 9/8/16  Last Fill Quantity: 90; # refills: 1  Last Office Visit with [facility] or  Health prescribing provider:  1/2/2017        Last PHQ-9 score on record=   PHQ-9 SCORE 12/22/2016   Total Score -   Total Score 0       Lab Results   Component Value Date    AST 20 07/22/2016     Lab Results   Component Value Date    ALT 34 07/22/2016         JOSE Wagner

## 2017-03-17 NOTE — LETTER
[address]  March 21, 2017      [place] [address]           Phone :  [phone]          Fax:  [phone]  Lyubovstacy Sanchez  [address] [facility] [address]      Dear Lyubov,    We recently received a call from your pharmacy requesting a refill of your medication.    A review of your chart indicates that an appointment is required with your provider for medication check. Please call the clinic at [phone] to schedule your appointment.    We have authorized one refill of your medication to allow time for you to schedule your appointment.    Taking care of your health is important to us, and ongoing visits with your provider are vital to your care.  We look forward to seeing you in the near future.        Sincerely,        Yolie Delarosa MD / Lexus Oliveros RN

## 2017-03-17 NOTE — TELEPHONE ENCOUNTER
Routing refill request to provider for review/approval because:  Labs not current:  Pt due for Cr and K+, do you want to do any other labs?  Most were done last July.  Can she wait until July to do them all?

## 2017-04-14 PROBLEM — M25.561 CHRONIC PAIN OF BOTH KNEES: Status: RESOLVED | Noted: 2017-02-02 | Resolved: 2017-04-14

## 2017-04-14 PROBLEM — G89.29 CHRONIC PAIN OF BOTH KNEES: Status: RESOLVED | Noted: 2017-02-02 | Resolved: 2017-04-14

## 2017-04-14 PROBLEM — M25.562 CHRONIC PAIN OF BOTH KNEES: Status: RESOLVED | Noted: 2017-02-02 | Resolved: 2017-04-14

## 2017-04-14 NOTE — PROGRESS NOTES
Discharge Note    Progress reporting period is from initial eval to Feb 28, 2017.     Lyubov failed to return for next follow up visit and current status is unknown.  Please see information below for last relevant information on current status.  Patient seen for Rxs Used: 3 visits.  SUBJECTIVE  Subjective changes noted by patient:  Subjective: Fair compliance with HEP.  Slight decrease in sx's.  Much improvemet with trial of tape  .  Current pain level is Current Pain level: 4/10.     Previous pain level was  Initial Pain level: 5/10.   Changes in function:  Yes (See Goal flowsheet attached for changes in current functional level)  Adverse reaction to treatment or activity: None    OBJECTIVE  Changes noted in objective findings: Objective: Hip abd and ext strength 3+/5 bilaterally. fair quad set     ASSESSMENT/PLAN  Diagnosis: bilateral knee pain   DIAGP:  Diagnoses of Chronic pain of both knees and Chondromalacia of patella, unspecified laterality were pertinent to this visit.  Updated problem list and treatment plan:   Decreased strength - HEP  STG/LTGs have been met or progress has been made towards goals:  Yes, please see goal flowsheet for most current information  Assessment of Progress: current status is unknown.  Last current status: Assessment of progress: Pt is progressing as expected   Self Management Plans:  HEP  I have re-evaluated this patient and find that the nature, scope, duration and intensity of the therapy is appropriate for the medical condition of the patient.  Lyubov continues to require the following intervention to meet STG and LTG's:  HEP.    Recommendations:  Discharge with current home program.  Patient to follow up with MD as needed.    Please refer to the daily flowsheet for treatment today, total treatment time and time spent performing 1:1 timed codes.

## 2017-06-05 ENCOUNTER — TELEPHONE (OUTPATIENT)
Dept: FAMILY MEDICINE | Facility: CLINIC | Age: 47
End: 2017-06-05

## 2017-06-05 DIAGNOSIS — F33.0 MAJOR DEPRESSIVE DISORDER, RECURRENT EPISODE, MILD (H): ICD-10-CM

## 2017-06-05 RX ORDER — CITALOPRAM HYDROBROMIDE 20 MG/1
20 TABLET ORAL DAILY
Qty: 90 TABLET | Refills: 0 | Status: CANCELLED | OUTPATIENT
Start: 2017-06-05

## 2017-06-05 NOTE — TELEPHONE ENCOUNTER
CITALOPRAM     Last Written Prescription Date: 02/20/17  Last Fill Quantity: 90, # refills: 0  Last Office Visit with [facility] primary care provider:  01/02/17   Next 5 appointments (look out 90 days)     Jun 09, 2017  1:40 PM CDT   SHORT with Jeremiah Muhammad PA-C   [facility]    [id number] [place] [address], [address] [address] [phone]                   Last PHQ-9 score on record=   PHQ-9 SCORE 12/22/2016   Total Score -   Total Score 0

## 2017-06-07 NOTE — TELEPHONE ENCOUNTER
Routing refill request to provider for review/approval because:  Please advise of refill request. Diagnosis is associated with Depression. Is PHQ-9 needed for this? Please consider updating if this is used for Anxiety.    Sherie Lopez RN, BSN, PHN

## 2017-06-09 ENCOUNTER — VIRTUAL VISIT (OUTPATIENT)
Dept: FAMILY MEDICINE | Facility: CLINIC | Age: 47
End: 2017-06-09
Payer: COMMERCIAL

## 2017-06-09 DIAGNOSIS — E66.01 MORBID OBESITY DUE TO EXCESS CALORIES (H): ICD-10-CM

## 2017-06-09 DIAGNOSIS — F41.9 ANXIETY: ICD-10-CM

## 2017-06-09 DIAGNOSIS — I10 ESSENTIAL HYPERTENSION, BENIGN: ICD-10-CM

## 2017-06-09 DIAGNOSIS — F33.0 MAJOR DEPRESSIVE DISORDER, RECURRENT EPISODE, MILD (H): ICD-10-CM

## 2017-06-09 PROCEDURE — 98966 PH1 ASSMT&MGMT NQHP 5-10: CPT | Performed by: PHYSICIAN ASSISTANT

## 2017-06-09 RX ORDER — AMLODIPINE BESYLATE 5 MG/1
5 TABLET ORAL DAILY
Qty: 90 TABLET | Refills: 0 | Status: SHIPPED | OUTPATIENT
Start: 2017-06-09 | End: 2017-11-02 | Stop reason: ALTCHOICE

## 2017-06-09 RX ORDER — CITALOPRAM HYDROBROMIDE 20 MG/1
20 TABLET ORAL DAILY
Qty: 90 TABLET | Refills: 1 | Status: SHIPPED | OUTPATIENT
Start: 2017-06-09 | End: 2018-08-06

## 2017-06-09 RX ORDER — BUPROPION HYDROCHLORIDE 150 MG/1
150 TABLET ORAL EVERY MORNING
Qty: 90 TABLET | Refills: 1 | Status: SHIPPED | OUTPATIENT
Start: 2017-06-09 | End: 2017-12-12

## 2017-06-09 RX ORDER — LISINOPRIL AND HYDROCHLOROTHIAZIDE 12.5; 2 MG/1; MG/1
1 TABLET ORAL DAILY
Qty: 90 TABLET | Refills: 0 | Status: SHIPPED | OUTPATIENT
Start: 2017-06-09 | End: 2017-10-27

## 2017-06-09 ASSESSMENT — ANXIETY QUESTIONNAIRES
5. BEING SO RESTLESS THAT IT IS HARD TO SIT STILL: NOT AT ALL
2. NOT BEING ABLE TO STOP OR CONTROL WORRYING: NOT AT ALL
3. WORRYING TOO MUCH ABOUT DIFFERENT THINGS: NOT AT ALL
GAD7 TOTAL SCORE: 0
6. BECOMING EASILY ANNOYED OR IRRITABLE: NOT AT ALL
IF YOU CHECKED OFF ANY PROBLEMS ON THIS QUESTIONNAIRE, HOW DIFFICULT HAVE THESE PROBLEMS MADE IT FOR YOU TO DO YOUR WORK, TAKE CARE OF THINGS AT HOME, OR GET ALONG WITH OTHER PEOPLE: NOT DIFFICULT AT ALL
1. FEELING NERVOUS, ANXIOUS, OR ON EDGE: NOT AT ALL
7. FEELING AFRAID AS IF SOMETHING AWFUL MIGHT HAPPEN: NOT AT ALL

## 2017-06-09 ASSESSMENT — PATIENT HEALTH QUESTIONNAIRE - PHQ9: 5. POOR APPETITE OR OVEREATING: NOT AT ALL

## 2017-06-09 NOTE — PROGRESS NOTES
"Lyubov Sanchez is a 46 year old female who is being evaluated via a telephone visit.      The patient has been notified of following:     \"This telephone visit will be conducted via a call between you and your physician/provider. We have found that certain health care needs can be provided without the need for a physical exam.  This service lets us provide the care you need with a short phone conversation.  If a prescription is necessary we can send it directly to your pharmacy.  If lab work is needed we can place an order for that and you can then stop by our lab to have the test done at a later time.    We will bill your insurance company for this service.  Please check with your medical insurance if this type of visit is covered. You may be responsible for the cost of this type of visit if insurance coverage is denied.  The typical cost is $30 (10min), $59 (11-20min) and $85 (21-30min).  Most often these visits are shorter than 10 minutes.    If during the course of the call the physician/provider feels a telephone visit is not appropriate, you will not be charged for this service.\"       Consent has been obtained for this service by 2 care team members: yes. See the scanned image in the medical record.    Lyubov Sanchez complains of  Telephone; Recheck Medication; and Depression      I have reviewed and updated the patient's Past Medical History, Social History, Family History and Medication List.    ALLERGIES  Ibuprofen and Penicillins    Naomi Hummel MA   (MA signature)    Additional provider notes: Lyubov is calling for phone visit follow up today of blood pressure meds and depression meds.  Check BP's at home which run about 120/75-88.  Today's PHQ and PINKY are both at zero.  Mood is very well controlled with Celexa and Wellbutrin.  She has not been in to the clinic for about 6 months for recheck on either of these issues.  Last labs done were last summer.  PAP up to date.  Needs tdap update.    BP Readings " from Last 6 Encounters:   01/10/17 138/88   01/02/17 126/88   12/22/16 116/74   11/10/16 (!) 142/94   09/08/16 116/80   07/22/16 124/80     Wt Readings from Last 4 Encounters:   01/10/17 250 lb (113.4 kg)   01/02/17 250 lb (113.4 kg)   12/22/16 247 lb 14.4 oz (112.4 kg)   11/10/16 250 lb (113.4 kg)     PHQ-9 SCORE 12/21/2016 12/22/2016 6/9/2017   Total Score - - -   Total Score 0 0 0     PINKY-7 SCORE 6/3/2016 12/22/2016 6/9/2017   Total Score - - -   Total Score 6 10 0       Assessment/Plan:  1. Major depressive disorder, recurrent episode, mild (H)  Refilled x 6 months.  - citalopram (CELEXA) 20 MG tablet; Take 1 tablet (20 mg) by mouth daily  Dispense: 90 tablet; Refill: 1    2. Essential hypertension, benign  Refilled.  She will make a lab appointment for labs.  - amLODIPine (NORVASC) 5 MG tablet; Take 1 tablet (5 mg) by mouth daily  Dispense: 90 tablet; Refill: 0  - lisinopril-hydrochlorothiazide (PRINZIDE/ZESTORETIC) 20-12.5 MG per tablet; Take 1 tablet by mouth daily  Dispense: 90 tablet; Refill: 0  - **Lipid panel reflex to direct LDL FUTURE 2mo; Future  - **Basic metabolic panel FUTURE 2mo; Future  - **TSH with free T4 reflex FUTURE 2mo; Future    3. Morbid obesity due to excess calories (H)  Refilled she will make a lab appointment for labs.  - buPROPion (WELLBUTRIN XL) 150 MG 24 hr tablet; Take 1 tablet (150 mg) by mouth every morning  Dispense: 90 tablet; Refill: 1  - **Lipid panel reflex to direct LDL FUTURE 2mo; Future  - **A1C FUTURE anytime; Future  - **TSH with free T4 reflex FUTURE 2mo; Future    4. Anxiety  Refilled for 6 months.  - buPROPion (WELLBUTRIN XL) 150 MG 24 hr tablet; Take 1 tablet (150 mg) by mouth every morning  Dispense: 90 tablet; Refill: 1      *she will also make appointment to have Tdap updated.      I have reviewed the note as documented above.  This accurately captures the substance of my conversation with the patient,  Jeremiah Muhammad PA-C    Total time of call between  patient and provider was 10 minutes

## 2017-06-09 NOTE — MR AVS SNAPSHOT
After Visit Summary   6/9/2017    Lyubov Sanchez    MRN: [id number]           Patient Information     Date Of Birth          1970        Visit Information        Provider Department      6/9/2017 1:40 PM Jeremiah Muhammad PA-C [facility]        Today's Diagnoses     Major depressive disorder, recurrent episode, mild (H)        Essential hypertension, benign        Morbid obesity due to excess calories (H)        Anxiety           Follow-ups after your visit        Follow-up notes from your care team     Return in about 1 month (around 7/9/2017) for Lab Work.      Future tests that were ordered for you today     Open Future Orders        Priority Expected Expires Ordered    **Lipid panel reflex to direct LDL FUTURE 2mo Routine 8/8/2017 10/7/2017 6/9/2017    **Basic metabolic panel FUTURE 2mo Routine 8/8/2017 10/7/2017 6/9/2017    **A1C FUTURE anytime Routine 6/9/2017 6/9/2018 6/9/2017    **TSH with free T4 reflex FUTURE 2mo Routine 8/8/2017 10/7/2017 6/9/2017            Who to contact     If you have questions or need follow up information about today's clinic visit or your schedule please contact [facility] directly at [phone].  Normal or non-critical lab and imaging results will be communicated to you by Lanyrdhart, letter or phone within 4 business days after the clinic has received the results. If you do not hear from us within 7 days, please contact the clinic through Lanyrdhart or phone. If you have a critical or abnormal lab result, we will notify you by phone as soon as possible.  Submit refill requests through Unified Office or call your pharmacy and they will forward the refill request to us. Please allow 3 business days for your refill to be completed.          Additional Information About Your Visit        MyChart Information     Unified Office gives you secure access to your electronic health record. If you see a primary care provider, you can also send messages  to your care team and make appointments. If you have questions, please call your primary care clinic.  If you do not have a primary care provider, please call [phone] and they will assist you.        Care EveryWhere ID     This is your Care EveryWhere ID. This could be used by other organizations to access your [facility] medical records  [id number]         Blood Pressure from Last 3 Encounters:   01/10/17 138/88   01/02/17 126/88   12/22/16 116/74    Weight from Last 3 Encounters:   01/10/17 250 lb (113.4 kg)   01/02/17 250 lb (113.4 kg)   12/22/16 247 lb 14.4 oz (112.4 kg)                 Where to get your medicines      These medications were sent to [facility]pharmacy #[id number] - [place], [place] - [address]  [address]  [address] [facility] [address]     Phone:  [phone]     amLODIPine 5 MG tablet    buPROPion 150 MG 24 hr tablet    citalopram 20 MG tablet    lisinopril-hydrochlorothiazide 20-12.5 MG per tablet          Primary Care Provider Office Phone # Fax #    Yolie Delarosa [phone]       [facility] [address]  [address]   [facility] [address]        Thank you!     Thank you for choosing [facility]  for your care. Our goal is always to provide you with excellent care. Hearing back from our patients is one way we can continue to improve our services. Please take a few minutes to complete the written survey that you may receive in the mail after your visit with us. Thank you!             Your Updated Medication List - Protect others around you: Learn how to safely use, store and throw away your medicines at www.disposemymeds.org.          This list is accurate as of: 6/9/17  1:56 PM.  Always use your most recent med list.                   Brand Name Dispense Instructions for use    amLODIPine 5 MG tablet    NORVASC    90 tablet    Take 1 tablet (5 mg) by mouth daily       buPROPion 150 MG 24 hr tablet    WELLBUTRIN XL    90 tablet    Take 1 tablet  (150 mg) by mouth every morning       citalopram 20 MG tablet    celeXA    90 tablet    Take 1 tablet (20 mg) by mouth daily       ketorolac 30 MG/ML injection    TORADOL    1 mL    Inject 1 mL (30 mg) into the muscle once       levonorgestrel 20 MCG/24HR IUD    MIRENA    1 each        lisinopril-hydrochlorothiazide 20-12.5 MG per tablet    PRINZIDE/ZESTORETIC    90 tablet    Take 1 tablet by mouth daily

## 2017-06-10 ASSESSMENT — ANXIETY QUESTIONNAIRES: GAD7 TOTAL SCORE: 0

## 2017-06-10 ASSESSMENT — PATIENT HEALTH QUESTIONNAIRE - PHQ9: SUM OF ALL RESPONSES TO PHQ QUESTIONS 1-9: 0

## 2017-07-05 ENCOUNTER — OFFICE VISIT (OUTPATIENT)
Dept: FAMILY MEDICINE | Facility: CLINIC | Age: 47
End: 2017-07-05
Payer: COMMERCIAL

## 2017-07-05 VITALS
SYSTOLIC BLOOD PRESSURE: 118 MMHG | TEMPERATURE: 97.7 F | RESPIRATION RATE: 18 BRPM | BODY MASS INDEX: 44.97 KG/M2 | DIASTOLIC BLOOD PRESSURE: 74 MMHG | HEART RATE: 86 BPM | WEIGHT: 262 LBS

## 2017-07-05 DIAGNOSIS — I10 ESSENTIAL HYPERTENSION WITH GOAL BLOOD PRESSURE LESS THAN 140/90: ICD-10-CM

## 2017-07-05 DIAGNOSIS — E66.01 MORBID OBESITY DUE TO EXCESS CALORIES (H): Primary | ICD-10-CM

## 2017-07-05 LAB
ANION GAP SERPL CALCULATED.3IONS-SCNC: 7 MMOL/L (ref 3–14)
BUN SERPL-MCNC: 13 MG/DL (ref 7–30)
CALCIUM SERPL-MCNC: 8.8 MG/DL (ref 8.5–10.1)
CHLORIDE SERPL-SCNC: 104 MMOL/L (ref 94–109)
CHOLEST SERPL-MCNC: 155 MG/DL
CO2 SERPL-SCNC: 28 MMOL/L (ref 20–32)
CREAT SERPL-MCNC: 0.63 MG/DL (ref 0.52–1.04)
GFR SERPL CREATININE-BSD FRML MDRD: ABNORMAL ML/MIN/1.7M2
GLUCOSE SERPL-MCNC: 103 MG/DL (ref 70–99)
HBA1C MFR BLD: 5.2 % (ref 4.3–6)
HDLC SERPL-MCNC: 36 MG/DL
LDLC SERPL CALC-MCNC: 68 MG/DL
NONHDLC SERPL-MCNC: 119 MG/DL
POTASSIUM SERPL-SCNC: 3.9 MMOL/L (ref 3.4–5.3)
SODIUM SERPL-SCNC: 139 MMOL/L (ref 133–144)
TRIGL SERPL-MCNC: 256 MG/DL
TSH SERPL DL<=0.005 MIU/L-ACNC: 0.75 MU/L (ref 0.4–4)

## 2017-07-05 PROCEDURE — 36415 COLL VENOUS BLD VENIPUNCTURE: CPT | Performed by: PHYSICIAN ASSISTANT

## 2017-07-05 PROCEDURE — 83036 HEMOGLOBIN GLYCOSYLATED A1C: CPT | Performed by: PHYSICIAN ASSISTANT

## 2017-07-05 PROCEDURE — 84443 ASSAY THYROID STIM HORMONE: CPT | Performed by: PHYSICIAN ASSISTANT

## 2017-07-05 PROCEDURE — 99213 OFFICE O/P EST LOW 20 MIN: CPT | Performed by: FAMILY MEDICINE

## 2017-07-05 PROCEDURE — 80061 LIPID PANEL: CPT | Performed by: PHYSICIAN ASSISTANT

## 2017-07-05 PROCEDURE — 80048 BASIC METABOLIC PNL TOTAL CA: CPT | Performed by: PHYSICIAN ASSISTANT

## 2017-07-05 RX ORDER — PHENTERMINE HYDROCHLORIDE 37.5 MG/1
37.5 CAPSULE ORAL EVERY MORNING
Qty: 30 CAPSULE | Refills: 0 | Status: SHIPPED | OUTPATIENT
Start: 2017-07-05 | End: 2017-11-02

## 2017-07-05 NOTE — NURSING NOTE
"Chief Complaint   Patient presents with     Medication Request     phentermine       Initial /88 (BP Location: Right arm, Cuff Size: Adult Large)  Pulse 86  Temp 97.7  F (36.5  C) (Oral)  Resp 18  Wt 262 lb (118.8 kg)  BMI 44.97 kg/m2 Estimated body mass index is 44.97 kg/(m^2) as calculated from the following:    Height as of 1/10/17: 5' 4\" (1.626 m).    Weight as of this encounter: 262 lb (118.8 kg).  Medication Reconciliation: complete   Marleni Connors MA    "

## 2017-07-05 NOTE — PROGRESS NOTES
HPI    SUBJECTIVE:                                                    Lyubov Sanchez is a 46 year old female who presents to clinic today for the following health issues:      Patient states she use to take phentermine last Fall for weight loss. She would like to start taking it again.     She felt increased energy when taking phenteramine and did achieve weight loss, but she was unable to manage the monthly BP checks, so she quit after a month.  She did notice some constipation with the medication in the past, but she has increased her fruit and vegetable consumption and feels this would not be a problem at present    She feels she would have greater success at present because her  is also starting the diet and they will support each other.  Also she recently got 2 puppies and will need to walk the dogs,  Motivating her to walk more than usual         Past Medical History:   Diagnosis Date     Depressive disorder      DYSPLASIA OF CERVIX 3/6/2003     Dysplasia of cervix (uteri) 2003    Rx cryotherapy Nov 03, and 2005     Hypertension      Migraine      Plantar fasciitis     bilat     Unspecified sleep apnea     CPAP       ALLERGIES:  Ibuprofen and Penicillins      Current Outpatient Prescriptions on File Prior to Visit:  citalopram (CELEXA) 20 MG tablet Take 1 tablet (20 mg) by mouth daily   amLODIPine (NORVASC) 5 MG tablet Take 1 tablet (5 mg) by mouth daily   buPROPion (WELLBUTRIN XL) 150 MG 24 hr tablet Take 1 tablet (150 mg) by mouth every morning   lisinopril-hydrochlorothiazide (PRINZIDE/ZESTORETIC) 20-12.5 MG per tablet Take 1 tablet by mouth daily   ketorolac (TORADOL) 30 MG/ML injection Inject 1 mL (30 mg) into the muscle once   levonorgestrel (MIRENA) 20 MCG/24HR IUD      No current facility-administered medications on file prior to visit.     Social History   Substance Use Topics     Smoking status: Former Smoker     Packs/day: 0.50     Years: 5.00     Types: Cigarettes     Quit date: 4/1/2007      Smokeless tobacco: Never Used     Alcohol use Yes      Comment: occ       Family History   Problem Relation Age of Onset     Adopted: Yes     Unknown/Adopted Other      pt is adopted and has no access to health history     Unknown/Adopted Mother      Unknown/Adopted Father      Unknown/Adopted Maternal Grandmother      Unknown/Adopted Maternal Grandfather      Unknown/Adopted Paternal Grandmother          ROS:  INTEGUMENTARY/SKIN: NEGATIVE for worrisome rashes, moles or lesions  EYES: NEGATIVE for vision changes or irritation  ENT/MOUTH: NEGATIVE for ear, mouth and throat problems    OBJECTIVE:  /74  Pulse 86  Temp 97.7  F (36.5  C) (Oral)  Resp 18  Wt 262 lb (118.8 kg)  BMI 44.97 kg/m2  GENERAL APPEARANCE: healthy, alert and no distress  NEURO: Normal strength and tone, sensory exam grossly normal,  normal speech and mentation  SKIN: no suspicious lesions or rashes        Results for orders placed or performed in visit on 07/05/17   **A1C FUTURE anytime   Result Value Ref Range    Hemoglobin A1C 5.2 4.3 - 6.0 %     BP Readings from Last 6 Encounters:   07/05/17 118/74   01/10/17 138/88   01/02/17 126/88   12/22/16 116/74   11/10/16 (!) 142/94   09/08/16 116/80       Wt Readings from Last 3 Encounters:   07/05/17 262 lb (118.8 kg)   01/10/17 250 lb (113.4 kg)   01/02/17 250 lb (113.4 kg)        Labs:  Had annual screening labs done previously ordered by primary care    Results for orders placed or performed in visit on 07/05/17   **A1C FUTURE anytime   Result Value Ref Range    Hemoglobin A1C 5.2 4.3 - 6.0 %             ASSESSMENT:  Morbid obesity due to excess calories (H)     - phentermine 37.5 MG capsule; Take 1 capsule (37.5 mg) by mouth every morning  - **Lipid panel reflex to direct LDL FUTURE 2mo  - **A1C FUTURE anytime  - **TSH with free T4 reflex FUTURE 2mo    Return in 1 month for re-check  We discussed that the Wellbutrin she is taking is sometimes used as an adjunct in weight loss, though at  higher dose-  A dose increase is something that may be discussed with primary care in the future- although increase BP could also occur.    Essential hypertension with goal blood pressure less than 140/90    Today has blood pressure under good control  Taking  Lisinopril- HCTZ   20-12.5  Will return for the BP monitoring on phentermine          .    ROS      Physical Exam

## 2017-07-05 NOTE — MR AVS SNAPSHOT
After Visit Summary   7/5/2017    Lyubov Sanchez    MRN: [id number]           Patient Information     Date Of Birth          1970        Visit Information        Provider Department      7/5/2017 9:15 AM Emilia Donnelly MD [facility]        Today's Diagnoses     Morbid obesity due to excess calories (H)    -  1    Essential hypertension with goal blood pressure less than 140/90           Follow-ups after your visit        Who to contact     If you have questions or need follow up information about today's clinic visit or your schedule please contact [facility] directly at [phone].  Normal or non-critical lab and imaging results will be communicated to you by MyChart, letter or phone within 4 business days after the clinic has received the results. If you do not hear from us within 7 days, please contact the clinic through [employer]ity[employer] or phone. If you have a critical or abnormal lab result, we will notify you by phone as soon as possible.  Submit refill requests through MeUnd[employer] or call your pharmacy and they will forward the refill request to us. Please allow 3 business days for your refill to be completed.          Additional Information About Your Visit        MyChart Information     MeUndies gives you secure access to your electronic health record. If you see a primary care provider, you can also send messages to your care team and make appointments. If you have questions, please call your primary care clinic.  If you do not have a primary care provider, please call [phone] and they will assist you.        Care EveryWhere ID     This is your Care EveryWhere ID. This could be used by other organizations to access your [place] medical records  [id number]        Your Vitals Were     Pulse Temperature Respirations BMI (Body Mass Index)          86 97.7  F (36.5  C) (Oral) 18 44.97 kg/m2         Blood Pressure from Last 3 Encounters:   07/05/17 118/74    01/10/17 138/88   01/02/17 126/88    Weight from Last 3 Encounters:   07/05/17 262 lb (118.8 kg)   01/10/17 250 lb (113.4 kg)   01/02/17 250 lb (113.4 kg)              We Performed the Following     **A1C FUTURE anytime     **Basic metabolic panel FUTURE 2mo     **Lipid panel reflex to direct LDL FUTURE 2mo     **TSH with free T4 reflex FUTURE 2mo          Today's Medication Changes          These changes are accurate as of: 7/5/17 10:44 AM.  If you have any questions, ask your nurse or doctor.               Start taking these medicines.        Dose/Directions    phentermine 37.5 MG capsule   Used for:  Morbid obesity due to excess calories (H)   Started by:  Emilia Donnelly MD        Dose:  37.5 mg   Take 1 capsule (37.5 mg) by mouth every morning   Quantity:  30 capsule   Refills:  0            Where to get your medicines      Some of these will need a paper prescription and others can be bought over the counter.  Ask your nurse if you have questions.     Bring a paper prescription for each of these medications     phentermine 37.5 MG capsule                Primary Care Provider Office Phone # Fax #    Yolie Nidhi Delarosa [phone]       [facility] [address]  [address]   [facility] [address]        Equal Access to Services     MAGEN CARRILLO AH: Hadii dirk ku hadasho Soomaali, waaxda luqadaha, qaybta kaalmada adeegyada, pippa hahnin hayalexandran paige lr. So [facility] [phone].    ATENCIÓN: Si habla español, tiene a grimes disposición servicios gratuitos de asistencia lingüística. Llame al [phone].    We comply with applicable federal civil rights laws and [place] laws. We do not discriminate on the basis of race, color, national origin, age, disability sex, sexual orientation or gender identity.            Thank you!     Thank you for choosing [facility]  for your care. Our goal is always to provide you with excellent care. Hearing back from our patients  is one way we can continue to improve our services. Please take a few minutes to complete the written survey that you may receive in the mail after your visit with us. Thank you!             Your Updated Medication List - Protect others around you: Learn how to safely use, store and throw away your medicines at www.disposemymeds.org.          This list is accurate as of: 7/5/17 10:44 AM.  Always use your most recent med list.                   Brand Name Dispense Instructions for use Diagnosis    amLODIPine 5 MG tablet    NORVASC    90 tablet    Take 1 tablet (5 mg) by mouth daily    Essential hypertension, benign       buPROPion 150 MG 24 hr tablet    WELLBUTRIN XL    90 tablet    Take 1 tablet (150 mg) by mouth every morning    Morbid obesity due to excess calories (H), Anxiety       citalopram 20 MG tablet    celeXA    90 tablet    Take 1 tablet (20 mg) by mouth daily    Major depressive disorder, recurrent episode, mild (H)       ketorolac 30 MG/ML injection    TORADOL    1 mL    Inject 1 mL (30 mg) into the muscle once    Headache(784.0), Intractable migraine without status migrainosus, unspecified migraine type       levonorgestrel 20 MCG/24HR IUD    MIRENA    1 each     Contraception       lisinopril-hydrochlorothiazide 20-12.5 MG per tablet    PRINZIDE/ZESTORETIC    90 tablet    Take 1 tablet by mouth daily    Essential hypertension, benign       phentermine 37.5 MG capsule     30 capsule    Take 1 capsule (37.5 mg) by mouth every morning    Morbid obesity due to excess calories (H)

## 2017-07-05 NOTE — LETTER
Clinics-[address]  July 12, 2017       [place] [address]           Phone :  [phone]          Fax:  [phone]  Lyubov WALLER Daniel  [address] [facility] [address]          Kin Mcarthur- thank you for coming in to get your labs done.  I have been away on vacation and unable to comment on them for you.      Thyroid test normal.  Liver, kidney, electrolyte tests are normal.  Your blood sugar was a few points high but the A1C test for diabetes was normal.    Your lipid panel is showing elevated triglycerides. Exercise will help and some dietary tips:  Limit your intake of fatty red meat, poultry skin, butter, lard, high-fat dairy products and shellfish.  Limit simple carbohydrates, such as sugar and foods made with white flour.  Limit alcohol, even small amounts of alcohol can raise triglyceride levels.  Use olive, peanut and canola oils.  Substitute fish high in omega-3 fatty acids - such as mackerel and salmon.      Can you please come back in one month so we can check your blood pressure and weight again after starting on phentermine.    Thanks!    Jeremiah Muhammad PA-C/maryuri

## 2017-08-02 DIAGNOSIS — I10 ESSENTIAL HYPERTENSION, BENIGN: ICD-10-CM

## 2017-08-02 RX ORDER — AMLODIPINE BESYLATE 5 MG/1
5 TABLET ORAL DAILY
Qty: 90 TABLET | Refills: 0 | Status: SHIPPED | OUTPATIENT
Start: 2017-08-02 | End: 2017-11-02

## 2017-08-02 NOTE — TELEPHONE ENCOUNTER
Prescription approved per [facility] Refill Protocol  Rafia Santos RN BS

## 2017-08-02 NOTE — TELEPHONE ENCOUNTER
amLODIPine (NORVASC) 5 MG tablet      Last Written Prescription Date: 6/9/17  Last Fill Quantity: 90, # refills: 0  Last Office Visit with G, P or [facility] prescribing provider: 7/5/2017         Potassium   Date Value Ref Range Status   07/05/2017 3.9 3.4 - 5.3 mmol/L Final     Creatinine   Date Value Ref Range Status   07/05/2017 0.63 0.52 - 1.04 mg/dL Final     BP Readings from Last 3 Encounters:   07/05/17 118/74   01/10/17 138/88   01/02/17 126/88         JOSE Wagner  August 2, 2017  8:27 AM

## 2017-10-12 ENCOUNTER — HOSPITAL ENCOUNTER (OUTPATIENT)
Dept: LAB | Facility: CLINIC | Age: 47
End: 2017-10-12
Attending: PATHOLOGY
Payer: COMMERCIAL

## 2017-10-12 ENCOUNTER — HOSPITAL LABORATORY (OUTPATIENT)
Dept: OTHER | Facility: CLINIC | Age: 47
End: 2017-10-12

## 2017-10-12 LAB — HGB BLD-MCNC: 14.7 G/DL (ref 11.7–15.7)

## 2017-10-13 ENCOUNTER — TRANSFERRED RECORDS (OUTPATIENT)
Dept: HEALTH INFORMATION MANAGEMENT | Facility: CLINIC | Age: 47
End: 2017-10-13

## 2017-10-27 DIAGNOSIS — I10 ESSENTIAL HYPERTENSION, BENIGN: ICD-10-CM

## 2017-10-27 RX ORDER — LISINOPRIL AND HYDROCHLOROTHIAZIDE 12.5; 2 MG/1; MG/1
TABLET ORAL
Qty: 90 TABLET | Refills: 2 | Status: SHIPPED | OUTPATIENT
Start: 2017-10-27 | End: 2018-06-11

## 2017-10-27 NOTE — TELEPHONE ENCOUNTER
Last Office Visit with [facility], [facility] or [facility] prescribing provider: 7/5/2017    Next 5 appointments (look out 90 days)     Nov 02, 2017  3:40 PM CDT   Hao Arizmendi with Yolie Delarosa MD   [facility]    [address] [place] [address], [address] [address] [phone]

## 2017-11-02 ENCOUNTER — OFFICE VISIT (OUTPATIENT)
Dept: FAMILY MEDICINE | Facility: CLINIC | Age: 47
End: 2017-11-02
Payer: COMMERCIAL

## 2017-11-02 VITALS
OXYGEN SATURATION: 97 % | HEART RATE: 89 BPM | DIASTOLIC BLOOD PRESSURE: 88 MMHG | SYSTOLIC BLOOD PRESSURE: 130 MMHG | BODY MASS INDEX: 46.07 KG/M2 | TEMPERATURE: 97.8 F | RESPIRATION RATE: 20 BRPM | WEIGHT: 268.4 LBS

## 2017-11-02 DIAGNOSIS — M17.11 OSTEOARTHRITIS OF RIGHT KNEE, UNSPECIFIED OSTEOARTHRITIS TYPE: ICD-10-CM

## 2017-11-02 DIAGNOSIS — G47.33 OSA (OBSTRUCTIVE SLEEP APNEA): ICD-10-CM

## 2017-11-02 DIAGNOSIS — I10 HTN, GOAL BELOW 140/90: ICD-10-CM

## 2017-11-02 DIAGNOSIS — Z01.818 PREOP GENERAL PHYSICAL EXAM: Primary | ICD-10-CM

## 2017-11-02 DIAGNOSIS — Z23 NEED FOR PROPHYLACTIC VACCINATION AND INOCULATION AGAINST INFLUENZA: ICD-10-CM

## 2017-11-02 DIAGNOSIS — Z23 NEED FOR PROPHYLACTIC VACCINATION WITH TETANUS-DIPHTHERIA (TD): ICD-10-CM

## 2017-11-02 PROCEDURE — 99214 OFFICE O/P EST MOD 30 MIN: CPT | Mod: 25 | Performed by: FAMILY MEDICINE

## 2017-11-02 PROCEDURE — 90471 IMMUNIZATION ADMIN: CPT | Performed by: FAMILY MEDICINE

## 2017-11-02 PROCEDURE — 90686 IIV4 VACC NO PRSV 0.5 ML IM: CPT | Performed by: FAMILY MEDICINE

## 2017-11-02 PROCEDURE — 90472 IMMUNIZATION ADMIN EACH ADD: CPT | Performed by: FAMILY MEDICINE

## 2017-11-02 PROCEDURE — 93000 ELECTROCARDIOGRAM COMPLETE: CPT | Performed by: FAMILY MEDICINE

## 2017-11-02 PROCEDURE — 90715 TDAP VACCINE 7 YRS/> IM: CPT | Performed by: FAMILY MEDICINE

## 2017-11-02 RX ORDER — AMLODIPINE BESYLATE 5 MG/1
5 TABLET ORAL DAILY
Qty: 90 TABLET | Refills: 1 | Status: SHIPPED | OUTPATIENT
Start: 2017-11-02 | End: 2018-06-11 | Stop reason: ALTCHOICE

## 2017-11-02 RX ORDER — TRAMADOL HYDROCHLORIDE 50 MG/1
50-100 TABLET ORAL EVERY 6 HOURS PRN
Qty: 20 TABLET | Refills: 0 | Status: SHIPPED | OUTPATIENT
Start: 2017-11-02 | End: 2018-02-26

## 2017-11-02 ASSESSMENT — ANXIETY QUESTIONNAIRES
5. BEING SO RESTLESS THAT IT IS HARD TO SIT STILL: NOT AT ALL
6. BECOMING EASILY ANNOYED OR IRRITABLE: SEVERAL DAYS
3. WORRYING TOO MUCH ABOUT DIFFERENT THINGS: SEVERAL DAYS
2. NOT BEING ABLE TO STOP OR CONTROL WORRYING: SEVERAL DAYS
1. FEELING NERVOUS, ANXIOUS, OR ON EDGE: MORE THAN HALF THE DAYS
7. FEELING AFRAID AS IF SOMETHING AWFUL MIGHT HAPPEN: NOT AT ALL
GAD7 TOTAL SCORE: 7

## 2017-11-02 ASSESSMENT — PATIENT HEALTH QUESTIONNAIRE - PHQ9
SUM OF ALL RESPONSES TO PHQ QUESTIONS 1-9: 6
5. POOR APPETITE OR OVEREATING: MORE THAN HALF THE DAYS

## 2017-11-02 ASSESSMENT — PAIN SCALES - GENERAL: PAINLEVEL: NO PAIN (0)

## 2017-11-02 NOTE — PROGRESS NOTES
[address], [address]  [facility] [address]  [phone]  Dept: [phone]    PRE-OP EVALUATION:  Today's date: 2017    Lyubov Sanchez (: 1970) presents for pre-operative evaluation assessment as requested by Dr. Hatch.  She requires evaluation and anesthesia risk assessment prior to undergoing surgery/procedure for treatment of right knee pain/rubbing bone on bone.    Proposed procedure: right knee replacement     Date of Surgery/ Procedure: 17  Time of Surgery/ Procedure: 7:30am  Hospital/Surgical Facility: [facility]   Primary Physician: Yolie Delarosa  Type of Anesthesia Anticipated: to be determined    Patient has a Health Care Directive or Living Will:  NO    Preop Questions 2017   1.  Do you have a history of heart attack, stroke, stent, bypass or surgery on an artery in the head, neck, heart or legs? No   2.  Do you ever have any pain or discomfort in your chest? No   3.  Do you have a history of  Heart Failure? No   4.   Are you troubled by shortness of breath when:  walking on a level surface, or up a slight hill, or at night? No   5.  Do you currently have a cold, bronchitis or other respiratory infection? No   6.  Do you have a cough, shortness of breath, or wheezing? No   7.  Do you sometimes get pains in the calves of your legs when you walk? No   8. Do you or anyone in your family have previous history of blood clots? No   9.  Do you or does anyone in your family have a serious bleeding problem such as prolonged bleeding following surgeries or cuts? No   10. Have you ever had problems with anemia or been told to take iron pills? No   11. Have you had any abnormal blood loss such as black, tarry or bloody stools, or abnormal vaginal bleeding? No   12. Have you ever had a blood transfusion? No   13. Have you or any of your relatives ever had problems with anesthesia? No   14. Do you have sleep apnea, excessive  snoring or daytime drowsiness? YES - SLEEP APNEA   15. Do you have any prosthetic heart valves? No   16. Do you have prosthetic joints? No   17. Is there any chance that you may be pregnant? No     HPI:                                                      Brief HPI related to upcoming procedure: right total arthroplasty for treatment of right knee pain     Patient saw Dr. Kelly from sports medicine who diagnosed her with osteoarthritis in the knees bilaterally. She states that the osteoarthritis is actually worse in the left knee but reports that she experiences more right knee pain. She has had a steroid injection in the past that did not alleviate pain. Dr. Lee will perform the surgery. She was given tramadol a month ago for knee pain but is running low. Requested Rx.     Sleep apnea  Patient reports that the surgeon suggested she have her CPAP checked and she did just recently. She will be stay overnight for 2-3 days after the surgery.     Patient reports that her adopted father  after a major heart surgery. He had complications with anestehsia due to sleep apnea. Patient admits that she is nervous about the surgery.     Hypertension  She has only been taking prinzide because she forgot about amlodipine. Her BP was slightly elevated today, 130/88.     Weight loss  She started phentermine again but reports that it made her very jittery this time around. Patient admits that she does drink a lot of caffeine.     Other problems to add on..  -Patient reports that her allergies are better controlled now. They were bad before it got very cold.     HYPERTENSION - Patient has longstanding history of mod-severe HTN , currently denies any symptoms referable to elevated blood pressure. Specifically denies chest pain, palpitations, dyspnea, orthopnea, PND or peripheral edema. Blood pressure readings have been in normal range. Current medication regimen is as listed below. Patient denies any side effects of  medication.                                                                                                                                                                                          .    MEDICAL HISTORY:                                                    Patient Active Problem List    Diagnosis Date Noted     KRISTYN (obstructive sleep apnea) 2017     Priority: Medium     Morbid obesity due to excess calories (H) 2016     Priority: Medium     Anxiety 2016     Priority: Medium     Health Care Home 2015     Priority: Medium        Status:  Closed   Care Coordinator:  Caroline Snow [phone]   See Letters for [facility] Emergency Care Plan  Date:  July 3, 2015               Migraine without aura 2015     Priority: Medium     Problem list name updated by automated process. Provider to review       HTN, goal below 140/90 10/21/2014     Priority: Medium     Fibroid uterus 2014     Priority: Medium     Insertion of mirena IUD 14     Priority: Medium     Remove 2019       Migraine 2012     Priority: Medium     CARDIOVASCULAR SCREENING; LDL GOAL LESS THAN 160 10/31/2010     Priority: Medium      Past Medical History:   Diagnosis Date     Depressive disorder      DYSPLASIA OF CERVIX 3/6/2003     Dysplasia of cervix (uteri)     Rx cryotherapy , and      Hypertension      Migraine      Plantar fasciitis     bilat     Unspecified sleep apnea     CPAP     Past Surgical History:   Procedure Laterality Date     C  DELIVERY ONLY  ,    , Low Cervical     ENT SURGERY       HC COLP VAGINA W CERVIX IF PRES W BIOPSY      [facility] for ASCUS     Current Outpatient Prescriptions   Medication Sig Dispense Refill     amLODIPine (NORVASC) 5 MG tablet Take 1 tablet (5 mg) by mouth daily 90 tablet 1     traMADol (ULTRAM) 50 MG tablet Take 1-2 tablets ( mg) by mouth every 6 hours as needed for pain 20 tablet 0      lisinopril-hydrochlorothiazide (PRINZIDE/ZESTORETIC) 20-12.5 MG per tablet TAKE 1 TABLET BY MOUTH DAILY 90 tablet 2     citalopram (CELEXA) 20 MG tablet Take 1 tablet (20 mg) by mouth daily 90 tablet 1     buPROPion (WELLBUTRIN XL) 150 MG 24 hr tablet Take 1 tablet (150 mg) by mouth every morning 90 tablet 1     levonorgestrel (MIRENA) 20 MCG/24HR IUD  1 each 0     [DISCONTINUED] amLODIPine (NORVASC) 5 MG tablet Take 1 tablet (5 mg) by mouth daily (Patient not taking: Reported on 11/2/2017) 90 tablet 0     [DISCONTINUED] amLODIPine (NORVASC) 5 MG tablet Take 1 tablet (5 mg) by mouth daily 90 tablet 0     OTC products: None, except as noted above    Allergies   Allergen Reactions     Ibuprofen Other (See Comments)     Aches. No problems with Toradol.         Penicillins Hives     hives      Latex Allergy: NO    Social History   Substance Use Topics     Smoking status: Former Smoker     Packs/day: 0.50     Years: 5.00     Types: Cigarettes     Quit date: 4/1/2007     Smokeless tobacco: Never Used     Alcohol use Yes      Comment: occ     History   Drug Use No     REVIEW OF SYSTEMS:                                                    C: NEGATIVE for fever, chills, change in weight  I: NEGATIVE for worrisome rashes, moles or lesions  E: NEGATIVE for vision changes or irritation  E/M: NEGATIVE for ear, mouth and throat problems  R: NEGATIVE for significant cough or SOB  B: NEGATIVE for masses, tenderness or discharge  CV: NEGATIVE for chest pain, palpitations or peripheral edema  GI: NEGATIVE for nausea, abdominal pain, heartburn, or change in bowel habits  : NEGATIVE for frequency, dysuria, or hematuria  M: POSITIVE for bilateral knee pain NEGATIVE for significant arthralgias or myalgia  N: NEGATIVE for weakness, dizziness or paresthesias  E: NEGATIVE for temperature intolerance, skin/hair changes  H: NEGATIVE for bleeding problems  P: NEGATIVE for changes in mood or affect    This document serves as a record of the  services and decisions personally performed and made by Yolie Delarosa MD. It was created on her behalf by Nadege Rand, a trained medical scribe. The creation of this document is based on the provider's statements to the medical scribe.  Nadege Rand November 2, 2017 3:59 PM     EXAM:                                                    /88 (BP Location: Right arm, Patient Position: Chair, Cuff Size: Adult Large)  Pulse 89  Temp 97.8  F (36.6  C) (Oral)  Resp 20  Wt 121.7 kg (268 lb 6.4 oz)  SpO2 97%  BMI 46.07 kg/m2    GENERAL APPEARANCE: healthy, alert and no distress     EYES: EOMI, PERRL     HENT: ear canals and TM's normal and nose and mouth without ulcers or lesions     NECK: no adenopathy, no asymmetry, masses, or scars and thyroid normal to palpation     RESP: lungs clear to auscultation - no rales, rhonchi or wheezes     CV: regular rates and rhythm, normal S1 S2, no S3 or S4 and no murmur, click or rub     ABDOMEN:  soft, nontender, no HSM or masses and bowel sounds normal     MS: extremities normal- no gross deformities noted, no evidence of inflammation in joints, FROM in all extremities.     SKIN: no suspicious lesions or rashes     NEURO: Normal strength and tone, sensory exam grossly normal, mentation intact and speech normal     PSYCH: mentation appears normal. and affect normal/bright     LYMPHATICS: No axillary, cervical, or supraclavicular nodes    DIAGNOSTICS:                                                    EKG: appears normal, NSR, normal axis, normal intervals, no acute ST/T changes c/w ischemia, no LVH by voltage criteria, nonspecific intraventricular conduction delay, unchanged from previous tracings    Recent Labs   Lab Test  10/12/17   1444  07/05/17   0945  07/22/16   0814  06/21/15   1335   03/26/12   1845   HGB  14.7   --   14.3  13.9   < >  14.1   PLT   --    --   176  177   < >  192   INR   --    --    --   1.00   --   1.71*   NA   --   139  137  136   < >  142    POTASSIUM   --   3.9  4.0  3.8   < >  4.3   CR   --   0.63  0.65  0.75   < >  0.65   A1C   --   5.2  5.8   --    --    --     < > = values in this interval not displayed.      IMPRESSION:                                                    Reason for surgery/procedure: right total knee arthroplasty   Diagnosis/reason for consult: Clearance for surgery    The proposed surgical procedure is considered LOW risk.    REVISED CARDIAC RISK INDEX  The patient has the following serious cardiovascular risks for perioperative complications such as (MI, PE, VFib and 3  AV Block):  No serious cardiac risks  INTERPRETATION: 0 risks: Class I (very low risk - 0.4% complication rate)    The patient has the following additional risks for perioperative complications:  No identified additional risks      ICD-10-CM    1. Preop general physical exam Z01.818 EKG 12-lead complete w/read - Clinics   2. Osteoarthritis of right knee, unspecified osteoarthritis type M17.11 traMADol (ULTRAM) 50 MG tablet   3. HTN, goal below 140/90 I10    4. KRISTYN (obstructive sleep apnea) G47.33    5. Need for prophylactic vaccination and inoculation against influenza Z23 HC FLU VAC PRESRV FREE QUAD SPLIT VIR 3+YRS IM  [60773]          ADMIN VACCINE, FIRST [79405]          ADMIN VACCINE, ADDL [34874]   6. Need for prophylactic vaccination with tetanus-diphtheria (TD) Z23 TDAP VACCINE (ADACEL)       RECOMMENDATIONS:                                                          --Patient is to take all scheduled medications on the day of surgery EXCEPT for modifications listed below.  Stop lisinopril the day before    APPROVAL GIVEN to proceed with proposed procedure, without further diagnostic evaluation    No lisinopril day before or day of surgery. Okay to take all other medications day before including amlodipine. Okay to take tylenol- no Aleve or ibuprofen. Suggested weight loss clinic or another weight loss medication for weight loss. Suggested avoiding  phentermine due to side effects and BP.      The information in this document, created by the medical scribe for me, accurately reflects the services I personally performed and the decisions made by me. I have reviewed and approved this document for accuracy prior to leaving the patient care area.  November 2, 2017 3:58 PM    Signed Electronically by: Yolie Delarosa MD    Copy of this evaluation report is provided to requesting physician.    [place] Preop Guidelines  Injectable Influenza Immunization Documentation    1.  Is the person to be vaccinated sick today?   No    2. Does the person to be vaccinated have an allergy to a component   of the vaccine?   No  Egg Allergy Algorithm Link    3. Has the person to be vaccinated ever had a serious reaction   to influenza vaccine in the past?   No    4. Has the person to be vaccinated ever had Guillain-Barré syndrome?   No    Form completed by patient.

## 2017-11-02 NOTE — PATIENT INSTRUCTIONS
Before Your Surgery      Call your surgeon if there is any change in your health. This includes signs of a cold or flu (such as a sore throat, runny nose, cough, rash or fever).    Do not smoke, drink alcohol or take over the counter medicine (unless your surgeon or primary care doctor tells you to) for the 24 hours before and after surgery.    If you take prescribed drugs: Follow your doctor s orders about which medicines to take and which to stop until after surgery.    Eating and drinking prior to surgery: follow the instructions from your surgeon    Take a shower or bath the night before surgery. Use the soap your surgeon gave you to gently clean your skin. If you do not have soap from your surgeon, use your regular soap. Do not shave or scrub the surgery site.  Wear clean pajamas and have clean sheets on your bed.     STOP lisinopril day before surgery.

## 2017-11-02 NOTE — NURSING NOTE
"Chief Complaint   Patient presents with     Pre-Op Exam     Flu Shot     Imm/Inj     Tdap     Health Maintenance     PHQ-9     Initial /88 (BP Location: Right arm, Patient Position: Chair, Cuff Size: Adult Large)  Pulse 89  Temp 97.8  F (36.6  C) (Oral)  Resp 20  Wt 268 lb 6.4 oz (121.7 kg)  SpO2 97%  BMI 46.07 kg/m2 Estimated body mass index is 46.07 kg/(m^2) as calculated from the following:    Height as of 1/10/17: 5' 4\" (1.626 m).    Weight as of this encounter: 268 lb 6.4 oz (121.7 kg).  BP completed using cuff size large RIGHT arm.    Lisa Magill, CMA    "

## 2017-11-02 NOTE — PROGRESS NOTES
1.  Has the patient received the information for the influenza vaccine?  ***  2.  Does the patient have any of the following contraindications?  ***       Allergy to eggs?  ***       Allergic reaction to previous influenza vaccines?  ***       Paralyzed by Guillain-Poway syndrome?  ***       Currently have moderate or severe illness?  ***       Allergy to contact lens solution/thimerosol?  ***  3.  The vaccine has been administered and the patient was instructed to        wait 15 minutes before leaving the building in the event of an allergic        reaction:  ***    Vaccination given by Lisa Magill, CMA

## 2017-11-02 NOTE — PROGRESS NOTES
Screening Questionnaire for Adult Immunization    Are you sick today?   No   Do you have allergies to medications, food, a vaccine component or latex?   No   Have you ever had a serious reaction after receiving a vaccination?   No   Do you have a long-term health problem with heart disease, lung disease, asthma, kidney disease, metabolic disease (e.g. diabetes), anemia, or other blood disorder?   No   Do you have cancer, leukemia, HIV/AIDS, or any other immune system problem?   No   In the past 3 months, have you taken medications that affect  your immune system, such as prednisone, other steroids, or anticancer drugs; drugs for the treatment of rheumatoid arthritis, Crohn s disease, or psoriasis; or have you had radiation treatments?   No   Have you had a seizure, or a brain or other nervous system problem?   No   During the past year, have you received a transfusion of blood or blood     products, or been given immune (gamma) globulin or antiviral drug?   No   For women: Are you pregnant or is there a chance you could become        pregnant during the next month?   No   Have you received any vaccinations in the past 4 weeks?   No     Immunization questionnaire answers were all negative.        Per orders of Dr. Delarosa, injection of Tdap given by Lisa A. Magill. Patient instructed to remain in clinic for 15 minutes afterwards, and to report any adverse reaction to me immediately.       Screening performed by Lisa A. Magill on 11/2/2017 at 4:47 PM.

## 2017-11-02 NOTE — MR AVS SNAPSHOT
After Visit Summary   11/2/2017    Lyubov Sanchez    MRN: [id number]           Patient Information     Date Of Birth          1970        Visit Information        Provider Department      11/2/2017 3:40 PM Yolie Delarosa MD [facility]        Today's Diagnoses     Preop general physical exam    -  1    Osteoarthritis of right knee, unspecified osteoarthritis type        HTN, goal below 140/90        KRISTYN (obstructive sleep apnea)        Need for prophylactic vaccination and inoculation against influenza        Need for prophylactic vaccination with tetanus-diphtheria (TD)          Care Instructions      Before Your Surgery      Call your surgeon if there is any change in your health. This includes signs of a cold or flu (such as a sore throat, runny nose, cough, rash or fever).    Do not smoke, drink alcohol or take over the counter medicine (unless your surgeon or primary care doctor tells you to) for the 24 hours before and after surgery.    If you take prescribed drugs: Follow your doctor s orders about which medicines to take and which to stop until after surgery.    Eating and drinking prior to surgery: follow the instructions from your surgeon    Take a shower or bath the night before surgery. Use the soap your surgeon gave you to gently clean your skin. If you do not have soap from your surgeon, use your regular soap. Do not shave or scrub the surgery site.  Wear clean pajamas and have clean sheets on your bed.     STOP lisinopril day before surgery.          Follow-ups after your visit        Your next 10 appointments already scheduled     Nov 17, 2017   Procedure with Hebetr Lee MD   [facility] (--)    [address] [facility] [id number] [phone]              Who to contact     If you have questions or need follow up information about today's clinic visit or your schedule please contact [facility]  directly at [phone].  Normal or non-critical lab and imaging results will be communicated to you by MyChart, letter or phone within 4 business days after the clinic has received the results. If you do not hear from us within 7 days, please contact the clinic through [employer] or phone. If you have a critical or abnormal lab result, we will notify you by phone as soon as possible.  Submit refill requests through [employer] or call your pharmacy and they will forward the refill request to us. Please allow 3 business days for your refill to be completed.          Additional Information About Your Visit        [employer] Information     [employer] gives you secure access to your electronic health record. If you see a primary care provider, you can also send messages to your care team and make appointments. If you have questions, please call your primary care clinic.  If you do not have a primary care provider, please call [phone] and they will assist you.        Care EveryWhere ID     This is your Care EveryWhere ID. This could be used by other organizations to access your [place] medical records  [id number]        Your Vitals Were     Pulse Temperature Respirations Pulse Oximetry BMI (Body Mass Index)       89 97.8  F (36.6  C) (Oral) 20 97% 46.07 kg/m2        Blood Pressure from Last 3 Encounters:   11/02/17 130/88   07/05/17 118/74   01/10/17 138/88    Weight from Last 3 Encounters:   11/02/17 268 lb 6.4 oz (121.7 kg)   07/05/17 262 lb (118.8 kg)   01/10/17 250 lb (113.4 kg)              We Performed the Following          ADMIN VACCINE, ADDL [34646]          ADMIN VACCINE, FIRST [86837]     EKG 12-lead complete w/read - Clinics     HC FLU VAC PRESRV FREE QUAD SPLIT VIR 3+YRS IM  [66517]     TDAP VACCINE (ADACEL)          Today's Medication Changes          These changes are accurate as of: 11/2/17  4:13 PM.  If you have any questions, ask your nurse or doctor.               Start taking these medicines.         Dose/Directions    traMADol 50 MG tablet   Commonly known as:  ULTRAM   Used for:  Osteoarthritis of right knee, unspecified osteoarthritis type   Started by:  Yolie Delarosa MD        Dose:   mg   Take 1-2 tablets ( mg) by mouth every 6 hours as needed for pain   Quantity:  20 tablet   Refills:  0         These medicines have changed or have updated prescriptions.        Dose/Directions    amLODIPine 5 MG tablet   Commonly known as:  NORVASC   This may have changed:  Another medication with the same name was removed. Continue taking this medication, and follow the directions you see here.   Changed by:  Yolie Delarosa MD        Dose:  5 mg   Take 1 tablet (5 mg) by mouth daily   Quantity:  90 tablet   Refills:  1            Where to get your medicines      These medications were sent to [facility]/pharmacy #[id number] - [place], [place] - [address]  [address], [facility] [address]     Phone:  [phone]     amLODIPine 5 MG tablet         Some of these will need a paper prescription and others can be bought over the counter.  Ask your nurse if you have questions.     Bring a paper prescription for each of these medications     traMADol 50 MG tablet                Primary Care Provider Office Phone # Fax #    Yolie Delarosa [phone]       [id number]   [facility] [address]        Equal Access to Services     MAGEN CARRILLO AH: Hadii dirk del castillo hadasho Soomaali, waaxda luqadaha, qaybta kaalmada adeegyada, waxay idiin hayaan paige khkeri lr. So [facility] [phone].    ATENCIÓN: Si habla español, tiene a grimes disposición servicios gratuitos de asistencia lingüística. Llame al [phone].    We comply with applicable federal civil rights laws and [place] laws. We do not discriminate on the basis of race, color, national origin, age, disability, sex, sexual orientation, or gender identity.            Thank you!     Thank you for choosing [facility]  for your care. Our goal is always to provide you with excellent care. Hearing back from our patients is one way we can continue to improve our services. Please take a few minutes to complete the written survey that you may receive in the mail after your visit with us. Thank you!             Your Updated Medication List - Protect others around you: Learn how to safely use, store and throw away your medicines at www.disposemymeds.org.          This list is accurate as of: 11/2/17  4:13 PM.  Always use your most recent med list.                   Brand Name Dispense Instructions for use Diagnosis    amLODIPine 5 MG tablet    NORVASC    90 tablet    Take 1 tablet (5 mg) by mouth daily        buPROPion 150 MG 24 hr tablet    WELLBUTRIN XL    90 tablet    Take 1 tablet (150 mg) by mouth every morning    Morbid obesity due to excess calories (H), Anxiety       citalopram 20 MG tablet    celeXA    90 tablet    Take 1 tablet (20 mg) by mouth daily    Major depressive disorder, recurrent episode, mild (H)       levonorgestrel 20 MCG/24HR IUD    MIRENA    1 each     Contraception       lisinopril-hydrochlorothiazide 20-12.5 MG per tablet    PRINZIDE/ZESTORETIC    90 tablet    TAKE 1 TABLET BY MOUTH DAILY    Essential hypertension, benign       traMADol 50 MG tablet    ULTRAM    20 tablet    Take 1-2 tablets ( mg) by mouth every 6 hours as needed for pain    Osteoarthritis of right knee, unspecified osteoarthritis type

## 2017-11-03 ASSESSMENT — ANXIETY QUESTIONNAIRES: GAD7 TOTAL SCORE: 7

## 2017-11-07 ENCOUNTER — HOSPITAL ENCOUNTER (OUTPATIENT)
Dept: LAB | Facility: CLINIC | Age: 47
Discharge: HOME | End: 2017-11-07
Attending: ORTHOPAEDIC SURGERY | Admitting: SURGERY
Payer: COMMERCIAL

## 2017-11-07 DIAGNOSIS — Z01.812 PRE-OPERATIVE LABORATORY EXAMINATION: ICD-10-CM

## 2017-11-07 LAB
MRSA DNA SPEC QL NAA+PROBE: NEGATIVE
SPECIMEN SOURCE: NORMAL

## 2017-11-07 PROCEDURE — 87641 MR-STAPH DNA AMP PROBE: CPT | Performed by: SURGERY

## 2017-11-07 PROCEDURE — 40000829 ZZHCL STATISTIC STAPH AUREUS SUSCEPT SCREEN PCR: Performed by: SURGERY

## 2017-11-07 PROCEDURE — 87640 STAPH A DNA AMP PROBE: CPT | Performed by: SURGERY

## 2017-11-07 PROCEDURE — 40000830 ZZHCL STATISTIC STAPH AUREUS METH RESIST SCREEN PCR: Performed by: SURGERY

## 2017-11-08 NOTE — PROVIDER NOTIFICATION
Dr Lee notified that pt had positive Nasal screen for MSSA, and Staph Screening protocol instituted.

## 2017-11-08 NOTE — PHARMACY-ADMISSION MEDICATION HISTORY
Admission medication history interview status for this patient is complete. See Psychiatric admission navigator for allergy information, prior to admission medications and immunization status.     PTA med list completed by pre-admitting nurse,   Jazmin Spears RN Mon Nov 6, 2017  2:29 PM       Prior to Admission medications    Medication Sig Last Dose Taking? Auth Provider   Naproxen Sodium (ALEVE PO) Take 220 mg by mouth 2 times daily as needed for moderate pain Will stop `1 week pre op per primary Dr.  Yes Reported, Patient   amLODIPine (NORVASC) 5 MG tablet Take 1 tablet (5 mg) by mouth daily  Yes Yolie Delarosa MD   traMADol (ULTRAM) 50 MG tablet Take 1-2 tablets ( mg) by mouth every 6 hours as needed for pain  Yes Yolie Delarosa MD   lisinopril-hydrochlorothiazide (PRINZIDE/ZESTORETIC) 20-12.5 MG per tablet TAKE 1 TABLET BY MOUTH DAILY  Yes Jeremiah Muhammad PA-C   citalopram (CELEXA) 20 MG tablet Take 1 tablet (20 mg) by mouth daily  Yes Jeremiah Muhammad PA-C   buPROPion (WELLBUTRIN XL) 150 MG 24 hr tablet Take 1 tablet (150 mg) by mouth every morning  Yes Jeremiah Muhammad PA-C   levonorgestrel (MIRENA) 20 MCG/24HR IUD   Yes Yolie Delarosa MD

## 2017-11-10 ENCOUNTER — VIRTUAL VISIT (OUTPATIENT)
Dept: FAMILY MEDICINE | Facility: CLINIC | Age: 47
End: 2017-11-10
Payer: COMMERCIAL

## 2017-11-10 DIAGNOSIS — I10 HTN, GOAL BELOW 140/90: Primary | ICD-10-CM

## 2017-11-10 DIAGNOSIS — I45.10 INCOMPLETE RIGHT BUNDLE BRANCH BLOCK: ICD-10-CM

## 2017-11-10 PROCEDURE — 99441 ZZC PHYSICIAN TELEPHONE EVALUATION 5-10 MIN: CPT | Performed by: FAMILY MEDICINE

## 2017-11-10 NOTE — MR AVS SNAPSHOT
After Visit Summary   11/10/2017    Lyubov Sanchez    MRN: [id number]           Patient Information     Date Of Birth          1970        Visit Information        Provider Department      11/10/2017 9:00 AM Yolie Delarosa MD [facility]        Today's Diagnoses     HTN, goal below 140/90    -  1    Incomplete right bundle branch block           Follow-ups after your visit        Your next 10 appointments already scheduled     Nov 17, 2017   Procedure with Hebert Lee MD   [facility] PeriOp Services (--)    [address] [facility] [address] [phone]              Who to contact     If you have questions or need follow up information about today's clinic visit or your schedule please contact [facility] directly at [phone].  Normal or non-critical lab and imaging results will be communicated to you by MyChart, letter or phone within 4 business days after the clinic has received the results. If you do not hear from us within 7 days, please contact the clinic through MyChart or phone. If you have a critical or abnormal lab result, we will notify you by phone as soon as possible.  Submit refill requests through [employer] or call your pharmacy and they will forward the refill request to us. Please allow 3 business days for your refill to be completed.          Additional Information About Your Visit        MyChart Information     [employer] gives you secure access to your electronic health record. If you see a primary care provider, you can also send messages to your care team and make appointments. If you have questions, please call your primary care clinic.  If you do not have a primary care provider, please call [phone] and they will assist you.        Care EveryWhere ID     This is your Care EveryWhere ID. This could be used by other organizations to access your [place] medical records  [id number]         Blood  Pressure from Last 3 Encounters:   11/02/17 130/88   07/05/17 118/74   01/10/17 138/88    Weight from Last 3 Encounters:   11/02/17 268 lb 6.4 oz (121.7 kg)   11/06/17 265 lb (120.2 kg)   07/05/17 262 lb (118.8 kg)              Today, you had the following     No orders found for display       Primary Care Provider Office Phone # Fax #    Yolie Nidhi Delarosa [phone] [id number]   [facility] [address]        Equal Access to Services     [facility]: Hadii dirk del castillo hadasho Sodeanna, waaxda luqadaha, qaybta kaalmada adeegyada, pippa olmos . So [facility] [phone].    ATENCIÓN: Si habla español, tiene a grimes disposición servicios gratuitos de asistencia lingüística. [facility] [place] [facility] [phone].    We comply with applicable federal civil rights laws and [place] laws. We do not discriminate on the basis of race, color, national origin, age, disability, sex, sexual orientation, or gender identity.            Thank you!     Thank you for choosing [facility]  for your care. Our goal is always to provide you with excellent care. Hearing back from our patients is one way we can continue to improve our services. Please take a few minutes to complete the written survey that you may receive in the mail after your visit with us. Thank you!             Your Updated Medication List - Protect others around you: Learn how to safely use, store and throw away your medicines at www.disposemymeds.org.          This list is accurate as of: 11/10/17  9:15 AM.  Always use your most recent med list.                   Brand Name Dispense Instructions for use Diagnosis    ALEVE PO      Take 220 mg by mouth 2 times daily as needed for moderate pain Will stop `1 week pre op per primary DrMary Lou        amLODIPine 5 MG tablet    NORVASC    90 tablet    Take 1 tablet (5 mg) by mouth daily        buPROPion 150 MG 24 hr tablet    WELLBUTRIN XL    90 tablet    Take 1 tablet (150  mg) by mouth every morning    Morbid obesity due to excess calories (H), Anxiety       citalopram 20 MG tablet    celeXA    90 tablet    Take 1 tablet (20 mg) by mouth daily    Major depressive disorder, recurrent episode, mild (H)       levonorgestrel 20 MCG/24HR IUD    MIRENA    1 each     Contraception       lisinopril-hydrochlorothiazide 20-12.5 MG per tablet    PRINZIDE/ZESTORETIC    90 tablet    TAKE 1 TABLET BY MOUTH DAILY    Essential hypertension, benign       mupirocin 2 % nasal ointment    BACTROBAN     Apply 1 g into each nare 2 times daily Apply small amount top each nostril twice daily for 7 days prior to surgery.        traMADol 50 MG tablet    ULTRAM    20 tablet    Take 1-2 tablets ( mg) by mouth every 6 hours as needed for pain    Osteoarthritis of right knee, unspecified osteoarthritis type

## 2017-11-10 NOTE — PROGRESS NOTES
"Lyubov Sanchez is a 47 year old female who is being evaluated via a telephone visit.      The patient has been notified of following:     \"This telephone visit will be conducted via a call between you and your physician/provider. We have found that certain health care needs can be provided without the need for a physical exam.  This service lets us provide the care you need with a short phone conversation.  If a prescription is necessary we can send it directly to your pharmacy.  If lab work is needed we can place an order for that and you can then stop by our lab to have the test done at a later time.    We will bill your insurance company for this service.  Please check with your medical insurance if this type of visit is covered. You may be responsible for the cost of this type of visit if insurance coverage is denied.  The typical cost is $30 (10min), $59 (11-20min) and $85 (21-30min).  Most often these visits are shorter than 10 minutes.    If during the course of the call the physician/provider feels a telephone visit is not appropriate, you will not be charged for this service.\"       Consent has been obtained for this service by 2 care team members: yes. See the scanned image in the medical record.    Lyubov Sanchez complains of  Results (Discuss EKG results)      I have reviewed and updated the patient's Past Medical History, Social History, Family History and Medication List.    ALLERGIES  Penicillins    Lisa Magill, CMA  (MA signature)    Additional provider notes: discussed BP and minor EKG findings that have not changed since 2008 when reviewed. Discuss incomplete RBBB is not concerning unless she already had heart issues. Pt is having no sx and she asked good questions. This does not increase her risk for surgery. Her goal is to keep her BP well controlled with medication, weight loss, and exercise. Pt understands  Recent norvas as added to her med list and she is taking it without side effects , "     Assessment/Plan:  (I10) HTN, goal below 140/90  (primary encounter diagnosis)  Comment: cont same, needs Nurse only BP check  Plan: pt will come in next week    (I45.10) Incomplete right bundle branch block  Comment: reassurance given to patient.  Plan: no signs or sx of heart disease.         I have reviewed the note as documented above.  This accurately captures the substance of my conversation with the patient,  [facility], MD          Total time of call between patient and provider was 10 minutes

## 2017-11-16 NOTE — H&P (VIEW-ONLY)
[address], [address]  [facility] [address]  [phone]  Dept: [phone]    PRE-OP EVALUATION:  Today's date: 2017    Lyubov Sanchez (: 1970) presents for pre-operative evaluation assessment as requested by Dr. Hatch.  She requires evaluation and anesthesia risk assessment prior to undergoing surgery/procedure for treatment of right knee pain/rubbing bone on bone.    Proposed procedure: right knee replacement     Date of Surgery/ Procedure: 17  Time of Surgery/ Procedure: 7:30am  Hospital/Surgical Facility: [facility]   Primary Physician: Yolie Delarosa  Type of Anesthesia Anticipated: to be determined    Patient has a Health Care Directive or Living Will:  NO    Preop Questions 2017   1.  Do you have a history of heart attack, stroke, stent, bypass or surgery on an artery in the head, neck, heart or legs? No   2.  Do you ever have any pain or discomfort in your chest? No   3.  Do you have a history of  Heart Failure? No   4.   Are you troubled by shortness of breath when:  walking on a level surface, or up a slight hill, or at night? No   5.  Do you currently have a cold, bronchitis or other respiratory infection? No   6.  Do you have a cough, shortness of breath, or wheezing? No   7.  Do you sometimes get pains in the calves of your legs when you walk? No   8. Do you or anyone in your family have previous history of blood clots? No   9.  Do you or does anyone in your family have a serious bleeding problem such as prolonged bleeding following surgeries or cuts? No   10. Have you ever had problems with anemia or been told to take iron pills? No   11. Have you had any abnormal blood loss such as black, tarry or bloody stools, or abnormal vaginal bleeding? No   12. Have you ever had a blood transfusion? No   13. Have you or any of your relatives ever had problems with anesthesia? No   14. Do you have sleep apnea, excessive  snoring or daytime drowsiness? YES - SLEEP APNEA   15. Do you have any prosthetic heart valves? No   16. Do you have prosthetic joints? No   17. Is there any chance that you may be pregnant? No     HPI:                                                      Brief HPI related to upcoming procedure: right total arthroplasty for treatment of right knee pain     Patient saw Dr. Kelly from sports medicine who diagnosed her with osteoarthritis in the knees bilaterally. She states that the osteoarthritis is actually worse in the left knee but reports that she experiences more right knee pain. She has had a steroid injection in the past that did not alleviate pain. Dr. Lee will perform the surgery. She was given tramadol a month ago for knee pain but is running low. Requested Rx.     Sleep apnea  Patient reports that the surgeon suggested she have her CPAP checked and she did just recently. She will be stay overnight for 2-3 days after the surgery.     Patient reports that her adopted father  after a major heart surgery. He had complications with anestehsia due to sleep apnea. Patient admits that she is nervous about the surgery.     Hypertension  She has only been taking prinzide because she forgot about amlodipine. Her BP was slightly elevated today, 130/88.     Weight loss  She started phentermine again but reports that it made her very jittery this time around. Patient admits that she does drink a lot of caffeine.     Other problems to add on..  -Patient reports that her allergies are better controlled now. They were bad before it got very cold.     HYPERTENSION - Patient has longstanding history of mod-severe HTN , currently denies any symptoms referable to elevated blood pressure. Specifically denies chest pain, palpitations, dyspnea, orthopnea, PND or peripheral edema. Blood pressure readings have been in normal range. Current medication regimen is as listed below. Patient denies any side effects of  medication.                                                                                                                                                                                          .    MEDICAL HISTORY:                                                    Patient Active Problem List    Diagnosis Date Noted     KRISTYN (obstructive sleep apnea) 2017     Priority: Medium     Morbid obesity due to excess calories (H) 2016     Priority: Medium     Anxiety 2016     Priority: Medium     Health Care Home 2015     Priority: Medium        Status:  Closed   Care Coordinator:  Caroline Snow [phone]   See Letters for [facility] Emergency Care Plan  Date:  July 3, 2015               Migraine without aura 2015     Priority: Medium     Problem list name updated by automated process. Provider to review       HTN, goal below 140/90 10/21/2014     Priority: Medium     Fibroid uterus 2014     Priority: Medium     Insertion of mirena IUD 14     Priority: Medium     Remove 2019       Migraine 2012     Priority: Medium     CARDIOVASCULAR SCREENING; LDL GOAL LESS THAN 160 10/31/2010     Priority: Medium      Past Medical History:   Diagnosis Date     Depressive disorder      DYSPLASIA OF CERVIX 3/6/2003     Dysplasia of cervix (uteri)     Rx cryotherapy , and      Hypertension      Migraine      Plantar fasciitis     bilat     Unspecified sleep apnea     CPAP     Past Surgical History:   Procedure Laterality Date     C  DELIVERY ONLY  ,    , Low Cervical     ENT SURGERY       HC COLP VAGINA W CERVIX IF PRES W BIOPSY      [facility] for ASCUS     Current Outpatient Prescriptions   Medication Sig Dispense Refill     amLODIPine (NORVASC) 5 MG tablet Take 1 tablet (5 mg) by mouth daily 90 tablet 1     traMADol (ULTRAM) 50 MG tablet Take 1-2 tablets ( mg) by mouth every 6 hours as needed for pain 20 tablet 0      lisinopril-hydrochlorothiazide (PRINZIDE/ZESTORETIC) 20-12.5 MG per tablet TAKE 1 TABLET BY MOUTH DAILY 90 tablet 2     citalopram (CELEXA) 20 MG tablet Take 1 tablet (20 mg) by mouth daily 90 tablet 1     buPROPion (WELLBUTRIN XL) 150 MG 24 hr tablet Take 1 tablet (150 mg) by mouth every morning 90 tablet 1     levonorgestrel (MIRENA) 20 MCG/24HR IUD  1 each 0     [DISCONTINUED] amLODIPine (NORVASC) 5 MG tablet Take 1 tablet (5 mg) by mouth daily (Patient not taking: Reported on 11/2/2017) 90 tablet 0     [DISCONTINUED] amLODIPine (NORVASC) 5 MG tablet Take 1 tablet (5 mg) by mouth daily 90 tablet 0     OTC products: None, except as noted above    Allergies   Allergen Reactions     Ibuprofen Other (See Comments)     Aches. No problems with Toradol.         Penicillins Hives     hives      Latex Allergy: NO    Social History   Substance Use Topics     Smoking status: Former Smoker     Packs/day: 0.50     Years: 5.00     Types: Cigarettes     Quit date: 4/1/2007     Smokeless tobacco: Never Used     Alcohol use Yes      Comment: occ     History   Drug Use No     REVIEW OF SYSTEMS:                                                    C: NEGATIVE for fever, chills, change in weight  I: NEGATIVE for worrisome rashes, moles or lesions  E: NEGATIVE for vision changes or irritation  E/M: NEGATIVE for ear, mouth and throat problems  R: NEGATIVE for significant cough or SOB  B: NEGATIVE for masses, tenderness or discharge  CV: NEGATIVE for chest pain, palpitations or peripheral edema  GI: NEGATIVE for nausea, abdominal pain, heartburn, or change in bowel habits  : NEGATIVE for frequency, dysuria, or hematuria  M: POSITIVE for bilateral knee pain NEGATIVE for significant arthralgias or myalgia  N: NEGATIVE for weakness, dizziness or paresthesias  E: NEGATIVE for temperature intolerance, skin/hair changes  H: NEGATIVE for bleeding problems  P: NEGATIVE for changes in mood or affect    This document serves as a record of the  services and decisions personally performed and made by Yolie Delarosa MD. It was created on her behalf by Nadege Rand, a trained medical scribe. The creation of this document is based on the provider's statements to the medical scribe.  Nadege Rand November 2, 2017 3:59 PM     EXAM:                                                    /88 (BP Location: Right arm, Patient Position: Chair, Cuff Size: Adult Large)  Pulse 89  Temp 97.8  F (36.6  C) (Oral)  Resp 20  Wt 121.7 kg (268 lb 6.4 oz)  SpO2 97%  BMI 46.07 kg/m2    GENERAL APPEARANCE: healthy, alert and no distress     EYES: EOMI, PERRL     HENT: ear canals and TM's normal and nose and mouth without ulcers or lesions     NECK: no adenopathy, no asymmetry, masses, or scars and thyroid normal to palpation     RESP: lungs clear to auscultation - no rales, rhonchi or wheezes     CV: regular rates and rhythm, normal S1 S2, no S3 or S4 and no murmur, click or rub     ABDOMEN:  soft, nontender, no HSM or masses and bowel sounds normal     MS: extremities normal- no gross deformities noted, no evidence of inflammation in joints, FROM in all extremities.     SKIN: no suspicious lesions or rashes     NEURO: Normal strength and tone, sensory exam grossly normal, mentation intact and speech normal     PSYCH: mentation appears normal. and affect normal/bright     LYMPHATICS: No axillary, cervical, or supraclavicular nodes    DIAGNOSTICS:                                                    EKG: appears normal, NSR, normal axis, normal intervals, no acute ST/T changes c/w ischemia, no LVH by voltage criteria, nonspecific intraventricular conduction delay, unchanged from previous tracings    Recent Labs   Lab Test  10/12/17   1444  07/05/17   0945  07/22/16   0814  06/21/15   1335   03/26/12   1845   HGB  14.7   --   14.3  13.9   < >  14.1   PLT   --    --   176  177   < >  192   INR   --    --    --   1.00   --   1.71*   NA   --   139  137  136   < >  142    POTASSIUM   --   3.9  4.0  3.8   < >  4.3   CR   --   0.63  0.65  0.75   < >  0.65   A1C   --   5.2  5.8   --    --    --     < > = values in this interval not displayed.      IMPRESSION:                                                    Reason for surgery/procedure: right total knee arthroplasty   Diagnosis/reason for consult: Clearance for surgery    The proposed surgical procedure is considered LOW risk.    REVISED CARDIAC RISK INDEX  The patient has the following serious cardiovascular risks for perioperative complications such as (MI, PE, VFib and 3  AV Block):  No serious cardiac risks  INTERPRETATION: 0 risks: Class I (very low risk - 0.4% complication rate)    The patient has the following additional risks for perioperative complications:  No identified additional risks      ICD-10-CM    1. Preop general physical exam Z01.818 EKG 12-lead complete w/read - Clinics   2. Osteoarthritis of right knee, unspecified osteoarthritis type M17.11 traMADol (ULTRAM) 50 MG tablet   3. HTN, goal below 140/90 I10    4. KRISTYN (obstructive sleep apnea) G47.33    5. Need for prophylactic vaccination and inoculation against influenza Z23 HC FLU VAC PRESRV FREE QUAD SPLIT VIR 3+YRS IM  [69384]          ADMIN VACCINE, FIRST [95990]          ADMIN VACCINE, ADDL [74749]   6. Need for prophylactic vaccination with tetanus-diphtheria (TD) Z23 TDAP VACCINE (ADACEL)       RECOMMENDATIONS:                                                          --Patient is to take all scheduled medications on the day of surgery EXCEPT for modifications listed below.  Stop lisinopril the day before    APPROVAL GIVEN to proceed with proposed procedure, without further diagnostic evaluation    No lisinopril day before or day of surgery. Okay to take all other medications day before including amlodipine. Okay to take tylenol- no Aleve or ibuprofen. Suggested weight loss clinic or another weight loss medication for weight loss. Suggested avoiding  phentermine due to side effects and BP.      The information in this document, created by the medical scribe for me, accurately reflects the services I personally performed and the decisions made by me. I have reviewed and approved this document for accuracy prior to leaving the patient care area.  November 2, 2017 3:58 PM    Signed Electronically by: Yolie Delarosa MD    Copy of this evaluation report is provided to requesting physician.    [facility] Preop Guidelines  Injectable Influenza Immunization Documentation    1.  Is the person to be vaccinated sick today?   No    2. Does the person to be vaccinated have an allergy to a component   of the vaccine?   No  Egg Allergy Algorithm Link    3. Has the person to be vaccinated ever had a serious reaction   to influenza vaccine in the past?   No    4. Has the person to be vaccinated ever had Guillain-Barré syndrome?   No    Form completed by patient.

## 2017-11-17 ENCOUNTER — HOSPITAL ENCOUNTER (INPATIENT)
Facility: CLINIC | Age: 47
LOS: 2 days | Discharge: HOME | DRG: 470 | End: 2017-11-19
Attending: ORTHOPAEDIC SURGERY | Admitting: ORTHOPAEDIC SURGERY
Payer: COMMERCIAL

## 2017-11-17 ENCOUNTER — ANESTHESIA (OUTPATIENT)
Dept: SURGERY | Facility: CLINIC | Age: 47
DRG: 470 | End: 2017-11-17
Payer: COMMERCIAL

## 2017-11-17 ENCOUNTER — APPOINTMENT (OUTPATIENT)
Dept: PHYSICAL THERAPY | Facility: CLINIC | Age: 47
DRG: 470 | End: 2017-11-17
Attending: ORTHOPAEDIC SURGERY
Payer: COMMERCIAL

## 2017-11-17 ENCOUNTER — APPOINTMENT (OUTPATIENT)
Dept: GENERAL RADIOLOGY | Facility: CLINIC | Age: 47
DRG: 470 | End: 2017-11-17
Attending: ORTHOPAEDIC SURGERY
Payer: COMMERCIAL

## 2017-11-17 ENCOUNTER — ANESTHESIA EVENT (OUTPATIENT)
Dept: SURGERY | Facility: CLINIC | Age: 47
DRG: 470 | End: 2017-11-17
Payer: COMMERCIAL

## 2017-11-17 DIAGNOSIS — Z96.651 S/P TOTAL KNEE ARTHROPLASTY, RIGHT: Primary | ICD-10-CM

## 2017-11-17 LAB
ABO + RH BLD: NORMAL
ABO + RH BLD: NORMAL
BLD GP AB SCN SERPL QL: NORMAL
BLOOD BANK CMNT PATIENT-IMP: NORMAL
CREAT SERPL-MCNC: 0.7 MG/DL (ref 0.52–1.04)
CREAT SERPL-MCNC: 0.71 MG/DL (ref 0.52–1.04)
GFR SERPL CREATININE-BSD FRML MDRD: 88 ML/MIN/1.7M2
GFR SERPL CREATININE-BSD FRML MDRD: >90 ML/MIN/1.7M2
HCG UR QL: NEGATIVE
HGB BLD-MCNC: 13.9 G/DL (ref 11.7–15.7)
PLATELET # BLD AUTO: 184 10E9/L (ref 150–450)
POTASSIUM SERPL-SCNC: 3.7 MMOL/L (ref 3.4–5.3)
SPECIMEN EXP DATE BLD: NORMAL

## 2017-11-17 PROCEDURE — 25000128 H RX IP 250 OP 636: Performed by: PHYSICIAN ASSISTANT

## 2017-11-17 PROCEDURE — 36415 COLL VENOUS BLD VENIPUNCTURE: CPT | Performed by: ORTHOPAEDIC SURGERY

## 2017-11-17 PROCEDURE — 97116 GAIT TRAINING THERAPY: CPT | Mod: GP | Performed by: PHYSICAL THERAPIST

## 2017-11-17 PROCEDURE — 37000008 ZZH ANESTHESIA TECHNICAL FEE, 1ST 30 MIN: Performed by: ORTHOPAEDIC SURGERY

## 2017-11-17 PROCEDURE — 0SRC0J9 REPLACEMENT OF RIGHT KNEE JOINT WITH SYNTHETIC SUBSTITUTE, CEMENTED, OPEN APPROACH: ICD-10-PCS | Performed by: ORTHOPAEDIC SURGERY

## 2017-11-17 PROCEDURE — 82565 ASSAY OF CREATININE: CPT | Performed by: ANESTHESIOLOGY

## 2017-11-17 PROCEDURE — 81025 URINE PREGNANCY TEST: CPT | Performed by: ANESTHESIOLOGY

## 2017-11-17 PROCEDURE — C1776 JOINT DEVICE (IMPLANTABLE): HCPCS | Performed by: ORTHOPAEDIC SURGERY

## 2017-11-17 PROCEDURE — 25000128 H RX IP 250 OP 636: Performed by: ANESTHESIOLOGY

## 2017-11-17 PROCEDURE — 84132 ASSAY OF SERUM POTASSIUM: CPT | Performed by: ANESTHESIOLOGY

## 2017-11-17 PROCEDURE — 86901 BLOOD TYPING SEROLOGIC RH(D): CPT | Performed by: ANESTHESIOLOGY

## 2017-11-17 PROCEDURE — 36000093 ZZH SURGERY LEVEL 4 1ST 30 MIN: Performed by: ORTHOPAEDIC SURGERY

## 2017-11-17 PROCEDURE — 25000125 ZZHC RX 250: Performed by: PHYSICIAN ASSISTANT

## 2017-11-17 PROCEDURE — 40000985 XR KNEE PORT RT 1/2 VW: Mod: RT

## 2017-11-17 PROCEDURE — 25000128 H RX IP 250 OP 636: Performed by: NURSE ANESTHETIST, CERTIFIED REGISTERED

## 2017-11-17 PROCEDURE — 25000132 ZZH RX MED GY IP 250 OP 250 PS 637: Performed by: ORTHOPAEDIC SURGERY

## 2017-11-17 PROCEDURE — 97530 THERAPEUTIC ACTIVITIES: CPT | Mod: GP | Performed by: PHYSICAL THERAPIST

## 2017-11-17 PROCEDURE — 36415 COLL VENOUS BLD VENIPUNCTURE: CPT | Performed by: ANESTHESIOLOGY

## 2017-11-17 PROCEDURE — 97110 THERAPEUTIC EXERCISES: CPT | Mod: GP | Performed by: PHYSICAL THERAPIST

## 2017-11-17 PROCEDURE — 71000013 ZZH RECOVERY PHASE 1 LEVEL 1 EA ADDTL HR: Performed by: ORTHOPAEDIC SURGERY

## 2017-11-17 PROCEDURE — C1713 ANCHOR/SCREW BN/BN,TIS/BN: HCPCS | Performed by: ORTHOPAEDIC SURGERY

## 2017-11-17 PROCEDURE — 25000132 ZZH RX MED GY IP 250 OP 250 PS 637: Performed by: PHYSICIAN ASSISTANT

## 2017-11-17 PROCEDURE — 36000063 ZZH SURGERY LEVEL 4 EA 15 ADDTL MIN: Performed by: ORTHOPAEDIC SURGERY

## 2017-11-17 PROCEDURE — 97161 PT EVAL LOW COMPLEX 20 MIN: CPT | Mod: GP | Performed by: PHYSICAL THERAPIST

## 2017-11-17 PROCEDURE — 12000000 ZZH R&B MED SURG/OB

## 2017-11-17 PROCEDURE — 27210794 ZZH OR GENERAL SUPPLY STERILE: Performed by: ORTHOPAEDIC SURGERY

## 2017-11-17 PROCEDURE — 40000193 ZZH STATISTIC PT WARD VISIT: Performed by: PHYSICAL THERAPIST

## 2017-11-17 PROCEDURE — 85049 AUTOMATED PLATELET COUNT: CPT | Performed by: ORTHOPAEDIC SURGERY

## 2017-11-17 PROCEDURE — 82565 ASSAY OF CREATININE: CPT | Performed by: ORTHOPAEDIC SURGERY

## 2017-11-17 PROCEDURE — 71000012 ZZH RECOVERY PHASE 1 LEVEL 1 FIRST HR: Performed by: ORTHOPAEDIC SURGERY

## 2017-11-17 PROCEDURE — 25800025 ZZH RX 258: Performed by: ORTHOPAEDIC SURGERY

## 2017-11-17 PROCEDURE — 27810169 ZZH OR IMPLANT GENERAL: Performed by: ORTHOPAEDIC SURGERY

## 2017-11-17 PROCEDURE — 86900 BLOOD TYPING SEROLOGIC ABO: CPT | Performed by: ANESTHESIOLOGY

## 2017-11-17 PROCEDURE — 37000009 ZZH ANESTHESIA TECHNICAL FEE, EACH ADDTL 15 MIN: Performed by: ORTHOPAEDIC SURGERY

## 2017-11-17 PROCEDURE — 25000128 H RX IP 250 OP 636: Performed by: ORTHOPAEDIC SURGERY

## 2017-11-17 PROCEDURE — 40000171 ZZH STATISTIC PRE-PROCEDURE ASSESSMENT III: Performed by: ORTHOPAEDIC SURGERY

## 2017-11-17 PROCEDURE — 86850 RBC ANTIBODY SCREEN: CPT | Performed by: ANESTHESIOLOGY

## 2017-11-17 PROCEDURE — 85018 HEMOGLOBIN: CPT | Performed by: ANESTHESIOLOGY

## 2017-11-17 PROCEDURE — 25000566 ZZH SEVOFLURANE, EA 15 MIN: Performed by: ORTHOPAEDIC SURGERY

## 2017-11-17 DEVICE — IMP COMP TKA IBALANCE MOD TIBIAL TRAY SZ 3 AR-513-T3: Type: IMPLANTABLE DEVICE | Site: KNEE | Status: FUNCTIONAL

## 2017-11-17 DEVICE — IMPLANTABLE DEVICE: Type: IMPLANTABLE DEVICE | Site: KNEE | Status: FUNCTIONAL

## 2017-11-17 DEVICE — BONE CEMENT SIMPLEX W/TOBRAMYCIN 6197-9-001: Type: IMPLANTABLE DEVICE | Site: KNEE | Status: FUNCTIONAL

## 2017-11-17 RX ORDER — ACETAMINOPHEN 325 MG/1
650 TABLET ORAL EVERY 4 HOURS PRN
Status: DISCONTINUED | OUTPATIENT
Start: 2017-11-20 | End: 2017-11-17

## 2017-11-17 RX ORDER — ONDANSETRON 2 MG/ML
4 INJECTION INTRAMUSCULAR; INTRAVENOUS EVERY 30 MIN PRN
Status: DISCONTINUED | OUTPATIENT
Start: 2017-11-17 | End: 2017-11-17 | Stop reason: HOSPADM

## 2017-11-17 RX ORDER — ACETAMINOPHEN 325 MG/1
650 TABLET ORAL EVERY 4 HOURS PRN
Status: DISCONTINUED | OUTPATIENT
Start: 2017-11-20 | End: 2017-11-19 | Stop reason: HOSPADM

## 2017-11-17 RX ORDER — CELECOXIB 200 MG/1
400 CAPSULE ORAL ONCE
Status: COMPLETED | OUTPATIENT
Start: 2017-11-17 | End: 2017-11-17

## 2017-11-17 RX ORDER — OXYCODONE HYDROCHLORIDE 5 MG/1
5-10 TABLET ORAL
Status: DISCONTINUED | OUTPATIENT
Start: 2017-11-17 | End: 2017-11-17

## 2017-11-17 RX ORDER — SODIUM CHLORIDE, SODIUM LACTATE, POTASSIUM CHLORIDE, CALCIUM CHLORIDE 600; 310; 30; 20 MG/100ML; MG/100ML; MG/100ML; MG/100ML
INJECTION, SOLUTION INTRAVENOUS CONTINUOUS
Status: DISCONTINUED | OUTPATIENT
Start: 2017-11-17 | End: 2017-11-17 | Stop reason: HOSPADM

## 2017-11-17 RX ORDER — HYDROMORPHONE HYDROCHLORIDE 1 MG/ML
.3-.5 INJECTION, SOLUTION INTRAMUSCULAR; INTRAVENOUS; SUBCUTANEOUS
Status: DISCONTINUED | OUTPATIENT
Start: 2017-11-17 | End: 2017-11-19 | Stop reason: HOSPADM

## 2017-11-17 RX ORDER — ONDANSETRON 2 MG/ML
4 INJECTION INTRAMUSCULAR; INTRAVENOUS EVERY 6 HOURS PRN
Status: DISCONTINUED | OUTPATIENT
Start: 2017-11-17 | End: 2017-11-19 | Stop reason: HOSPADM

## 2017-11-17 RX ORDER — HYDROCODONE BITARTRATE AND ACETAMINOPHEN 5; 325 MG/1; MG/1
1-2 TABLET ORAL EVERY 4 HOURS PRN
Status: DISCONTINUED | OUTPATIENT
Start: 2017-11-17 | End: 2017-11-19 | Stop reason: HOSPADM

## 2017-11-17 RX ORDER — LISINOPRIL AND HYDROCHLOROTHIAZIDE 12.5; 2 MG/1; MG/1
1 TABLET ORAL DAILY
Status: DISCONTINUED | OUTPATIENT
Start: 2017-11-17 | End: 2017-11-19 | Stop reason: HOSPADM

## 2017-11-17 RX ORDER — CEFAZOLIN SODIUM 1 G/50ML
2 SOLUTION INTRAVENOUS
Status: DISCONTINUED | OUTPATIENT
Start: 2017-11-17 | End: 2017-11-17

## 2017-11-17 RX ORDER — ACETAMINOPHEN 325 MG/1
975 TABLET ORAL EVERY 8 HOURS
Status: DISCONTINUED | OUTPATIENT
Start: 2017-11-17 | End: 2017-11-17

## 2017-11-17 RX ORDER — LABETALOL HYDROCHLORIDE 5 MG/ML
10 INJECTION, SOLUTION INTRAVENOUS
Status: DISCONTINUED | OUTPATIENT
Start: 2017-11-17 | End: 2017-11-17 | Stop reason: HOSPADM

## 2017-11-17 RX ORDER — HYDRALAZINE HYDROCHLORIDE 20 MG/ML
2.5-5 INJECTION INTRAMUSCULAR; INTRAVENOUS EVERY 10 MIN PRN
Status: DISCONTINUED | OUTPATIENT
Start: 2017-11-17 | End: 2017-11-17 | Stop reason: HOSPADM

## 2017-11-17 RX ORDER — ZOLPIDEM TARTRATE 5 MG/1
5 TABLET ORAL
Status: DISCONTINUED | OUTPATIENT
Start: 2017-11-18 | End: 2017-11-19 | Stop reason: HOSPADM

## 2017-11-17 RX ORDER — AMLODIPINE BESYLATE 5 MG/1
5 TABLET ORAL DAILY
Status: DISCONTINUED | OUTPATIENT
Start: 2017-11-18 | End: 2017-11-19 | Stop reason: HOSPADM

## 2017-11-17 RX ORDER — HYDROMORPHONE HYDROCHLORIDE 1 MG/ML
.3-.5 INJECTION, SOLUTION INTRAMUSCULAR; INTRAVENOUS; SUBCUTANEOUS EVERY 5 MIN PRN
Status: DISCONTINUED | OUTPATIENT
Start: 2017-11-17 | End: 2017-11-17 | Stop reason: HOSPADM

## 2017-11-17 RX ORDER — FENTANYL CITRATE 50 UG/ML
INJECTION, SOLUTION INTRAMUSCULAR; INTRAVENOUS PRN
Status: DISCONTINUED | OUTPATIENT
Start: 2017-11-17 | End: 2017-11-17

## 2017-11-17 RX ORDER — ONDANSETRON 4 MG/1
4 TABLET, ORALLY DISINTEGRATING ORAL EVERY 30 MIN PRN
Status: DISCONTINUED | OUTPATIENT
Start: 2017-11-17 | End: 2017-11-17 | Stop reason: HOSPADM

## 2017-11-17 RX ORDER — CEFAZOLIN SODIUM 1 G/50ML
3 SOLUTION INTRAVENOUS
Status: DISCONTINUED | OUTPATIENT
Start: 2017-11-17 | End: 2017-11-17

## 2017-11-17 RX ORDER — ONDANSETRON 2 MG/ML
INJECTION INTRAMUSCULAR; INTRAVENOUS PRN
Status: DISCONTINUED | OUTPATIENT
Start: 2017-11-17 | End: 2017-11-17

## 2017-11-17 RX ORDER — CEFAZOLIN SODIUM 1 G/50ML
3 SOLUTION INTRAVENOUS
Status: COMPLETED | OUTPATIENT
Start: 2017-11-17 | End: 2017-11-17

## 2017-11-17 RX ORDER — ONDANSETRON 4 MG/1
4 TABLET, ORALLY DISINTEGRATING ORAL EVERY 6 HOURS PRN
Status: DISCONTINUED | OUTPATIENT
Start: 2017-11-17 | End: 2017-11-19 | Stop reason: HOSPADM

## 2017-11-17 RX ORDER — NALOXONE HYDROCHLORIDE 0.4 MG/ML
.1-.4 INJECTION, SOLUTION INTRAMUSCULAR; INTRAVENOUS; SUBCUTANEOUS
Status: DISCONTINUED | OUTPATIENT
Start: 2017-11-17 | End: 2017-11-19 | Stop reason: HOSPADM

## 2017-11-17 RX ORDER — FENTANYL CITRATE 50 UG/ML
25-50 INJECTION, SOLUTION INTRAMUSCULAR; INTRAVENOUS
Status: DISCONTINUED | OUTPATIENT
Start: 2017-11-17 | End: 2017-11-17 | Stop reason: HOSPADM

## 2017-11-17 RX ORDER — ONDANSETRON 2 MG/ML
4 INJECTION INTRAMUSCULAR; INTRAVENOUS EVERY 6 HOURS
Status: DISPENSED | OUTPATIENT
Start: 2017-11-17 | End: 2017-11-18

## 2017-11-17 RX ORDER — CITALOPRAM HYDROBROMIDE 20 MG/1
20 TABLET ORAL DAILY
Status: DISCONTINUED | OUTPATIENT
Start: 2017-11-17 | End: 2017-11-19 | Stop reason: HOSPADM

## 2017-11-17 RX ORDER — SODIUM CHLORIDE 9 MG/ML
INJECTION, SOLUTION INTRAVENOUS CONTINUOUS
Status: ACTIVE | OUTPATIENT
Start: 2017-11-17 | End: 2017-11-18

## 2017-11-17 RX ORDER — ACETAMINOPHEN 325 MG/1
975 TABLET ORAL EVERY 8 HOURS
Status: DISCONTINUED | OUTPATIENT
Start: 2017-11-17 | End: 2017-11-19 | Stop reason: HOSPADM

## 2017-11-17 RX ORDER — LIDOCAINE 40 MG/G
CREAM TOPICAL
Status: DISCONTINUED | OUTPATIENT
Start: 2017-11-17 | End: 2017-11-17 | Stop reason: HOSPADM

## 2017-11-17 RX ORDER — CEFAZOLIN SODIUM 2 G/100ML
2 INJECTION, SOLUTION INTRAVENOUS EVERY 8 HOURS
Status: COMPLETED | OUTPATIENT
Start: 2017-11-17 | End: 2017-11-18

## 2017-11-17 RX ORDER — AMOXICILLIN 250 MG
1-2 CAPSULE ORAL 2 TIMES DAILY
Status: DISCONTINUED | OUTPATIENT
Start: 2017-11-17 | End: 2017-11-19 | Stop reason: HOSPADM

## 2017-11-17 RX ORDER — PANTOPRAZOLE SODIUM 40 MG/1
40 TABLET, DELAYED RELEASE ORAL ONCE
Status: COMPLETED | OUTPATIENT
Start: 2017-11-17 | End: 2017-11-17

## 2017-11-17 RX ORDER — LIDOCAINE 40 MG/G
CREAM TOPICAL
Status: DISCONTINUED | OUTPATIENT
Start: 2017-11-17 | End: 2017-11-17

## 2017-11-17 RX ORDER — GLYCINE 1.5 G/100ML
SOLUTION IRRIGATION PRN
Status: DISCONTINUED | OUTPATIENT
Start: 2017-11-17 | End: 2017-11-17 | Stop reason: HOSPADM

## 2017-11-17 RX ORDER — BUPROPION HYDROCHLORIDE 150 MG/1
150 TABLET ORAL EVERY MORNING
Status: DISCONTINUED | OUTPATIENT
Start: 2017-11-18 | End: 2017-11-19 | Stop reason: HOSPADM

## 2017-11-17 RX ORDER — PROPOFOL 10 MG/ML
INJECTION, EMULSION INTRAVENOUS PRN
Status: DISCONTINUED | OUTPATIENT
Start: 2017-11-17 | End: 2017-11-17

## 2017-11-17 RX ORDER — OXYCODONE AND ACETAMINOPHEN 5; 325 MG/1; MG/1
1-2 TABLET ORAL EVERY 4 HOURS PRN
Qty: 75 TABLET | Refills: 0 | Status: SHIPPED | OUTPATIENT
Start: 2017-11-17 | End: 2017-11-19

## 2017-11-17 RX ORDER — CELECOXIB 200 MG/1
200 CAPSULE ORAL 2 TIMES DAILY
Status: DISCONTINUED | OUTPATIENT
Start: 2017-11-17 | End: 2017-11-19 | Stop reason: HOSPADM

## 2017-11-17 RX ORDER — HYDROXYZINE HYDROCHLORIDE 25 MG/1
25 TABLET, FILM COATED ORAL EVERY 6 HOURS PRN
Status: DISCONTINUED | OUTPATIENT
Start: 2017-11-17 | End: 2017-11-19

## 2017-11-17 RX ORDER — PROCHLORPERAZINE MALEATE 5 MG
10 TABLET ORAL EVERY 6 HOURS PRN
Status: DISCONTINUED | OUTPATIENT
Start: 2017-11-17 | End: 2017-11-19 | Stop reason: HOSPADM

## 2017-11-17 RX ORDER — DEXAMETHASONE SODIUM PHOSPHATE 4 MG/ML
INJECTION, SOLUTION INTRA-ARTICULAR; INTRALESIONAL; INTRAMUSCULAR; INTRAVENOUS; SOFT TISSUE PRN
Status: DISCONTINUED | OUTPATIENT
Start: 2017-11-17 | End: 2017-11-17

## 2017-11-17 RX ORDER — HYDROXYZINE HYDROCHLORIDE 25 MG/1
25 TABLET, FILM COATED ORAL 3 TIMES DAILY PRN
Status: DISCONTINUED | OUTPATIENT
Start: 2017-11-17 | End: 2017-11-18

## 2017-11-17 RX ORDER — CEFAZOLIN SODIUM 1 G/3ML
1 INJECTION, POWDER, FOR SOLUTION INTRAMUSCULAR; INTRAVENOUS SEE ADMIN INSTRUCTIONS
Status: DISCONTINUED | OUTPATIENT
Start: 2017-11-17 | End: 2017-11-17 | Stop reason: HOSPADM

## 2017-11-17 RX ADMIN — DEXAMETHASONE SODIUM PHOSPHATE 4 MG: 4 INJECTION, SOLUTION INTRA-ARTICULAR; INTRALESIONAL; INTRAMUSCULAR; INTRAVENOUS; SOFT TISSUE at 07:41

## 2017-11-17 RX ADMIN — SODIUM CHLORIDE: 9 INJECTION, SOLUTION INTRAVENOUS at 14:54

## 2017-11-17 RX ADMIN — ONDANSETRON 4 MG: 2 INJECTION INTRAMUSCULAR; INTRAVENOUS at 09:18

## 2017-11-17 RX ADMIN — HYDROMORPHONE HYDROCHLORIDE 0.3 MG: 1 INJECTION, SOLUTION INTRAMUSCULAR; INTRAVENOUS; SUBCUTANEOUS at 11:24

## 2017-11-17 RX ADMIN — CEFAZOLIN SODIUM 2 G: 2 INJECTION, SOLUTION INTRAVENOUS at 16:46

## 2017-11-17 RX ADMIN — SODIUM CHLORIDE, POTASSIUM CHLORIDE, SODIUM LACTATE AND CALCIUM CHLORIDE: 600; 310; 30; 20 INJECTION, SOLUTION INTRAVENOUS at 08:07

## 2017-11-17 RX ADMIN — ACETAMINOPHEN 975 MG: 325 TABLET, FILM COATED ORAL at 13:09

## 2017-11-17 RX ADMIN — SENNOSIDES AND DOCUSATE SODIUM 1 TABLET: 8.6; 5 TABLET ORAL at 20:00

## 2017-11-17 RX ADMIN — HYDROMORPHONE HYDROCHLORIDE 1 MG: 1 INJECTION, SOLUTION INTRAMUSCULAR; INTRAVENOUS; SUBCUTANEOUS at 09:35

## 2017-11-17 RX ADMIN — PROPOFOL 200 MG: 10 INJECTION, EMULSION INTRAVENOUS at 07:41

## 2017-11-17 RX ADMIN — Medication 1 G: at 07:47

## 2017-11-17 RX ADMIN — Medication 3 G: at 07:36

## 2017-11-17 RX ADMIN — HYDROCODONE BITARTRATE AND ACETAMINOPHEN 1 TABLET: 5; 325 TABLET ORAL at 20:01

## 2017-11-17 RX ADMIN — PHENYLEPHRINE HYDROCHLORIDE 100 MCG: 10 INJECTION, SOLUTION INTRAMUSCULAR; INTRAVENOUS; SUBCUTANEOUS at 08:12

## 2017-11-17 RX ADMIN — CELECOXIB 400 MG: 200 CAPSULE ORAL at 06:35

## 2017-11-17 RX ADMIN — SODIUM CHLORIDE 1000 ML: 9 INJECTION, SOLUTION INTRAVENOUS at 12:44

## 2017-11-17 RX ADMIN — ONDANSETRON 4 MG: 2 INJECTION INTRAMUSCULAR; INTRAVENOUS at 20:00

## 2017-11-17 RX ADMIN — SODIUM CHLORIDE, POTASSIUM CHLORIDE, SODIUM LACTATE AND CALCIUM CHLORIDE: 600; 310; 30; 20 INJECTION, SOLUTION INTRAVENOUS at 09:30

## 2017-11-17 RX ADMIN — ROCURONIUM BROMIDE 50 MG: 10 INJECTION INTRAVENOUS at 07:41

## 2017-11-17 RX ADMIN — FENTANYL CITRATE 100 MCG: 50 INJECTION, SOLUTION INTRAMUSCULAR; INTRAVENOUS at 08:05

## 2017-11-17 RX ADMIN — RANITIDINE HYDROCHLORIDE 150 MG: 150 TABLET, FILM COATED ORAL at 20:00

## 2017-11-17 RX ADMIN — PANTOPRAZOLE SODIUM 40 MG: 40 TABLET, DELAYED RELEASE ORAL at 06:35

## 2017-11-17 RX ADMIN — HYDROXYZINE HYDROCHLORIDE 25 MG: 25 TABLET ORAL at 15:07

## 2017-11-17 RX ADMIN — ACETAMINOPHEN 975 MG: 325 TABLET, FILM COATED ORAL at 21:32

## 2017-11-17 RX ADMIN — LISINOPRIL AND HYDROCHLOROTHIAZIDE 1 TABLET: 12.5; 2 TABLET ORAL at 13:09

## 2017-11-17 RX ADMIN — MIDAZOLAM HYDROCHLORIDE 2 MG: 1 INJECTION, SOLUTION INTRAMUSCULAR; INTRAVENOUS at 07:35

## 2017-11-17 RX ADMIN — HYDROCODONE BITARTRATE AND ACETAMINOPHEN 1 TABLET: 5; 325 TABLET ORAL at 15:35

## 2017-11-17 RX ADMIN — Medication 1 G: at 09:41

## 2017-11-17 RX ADMIN — ONDANSETRON 4 MG: 2 INJECTION INTRAMUSCULAR; INTRAVENOUS at 13:10

## 2017-11-17 RX ADMIN — FENTANYL CITRATE 150 MCG: 50 INJECTION, SOLUTION INTRAMUSCULAR; INTRAVENOUS at 07:41

## 2017-11-17 RX ADMIN — SODIUM CHLORIDE, POTASSIUM CHLORIDE, SODIUM LACTATE AND CALCIUM CHLORIDE: 600; 310; 30; 20 INJECTION, SOLUTION INTRAVENOUS at 06:38

## 2017-11-17 RX ADMIN — CELECOXIB 200 MG: 200 CAPSULE ORAL at 20:00

## 2017-11-17 ASSESSMENT — LIFESTYLE VARIABLES: TOBACCO_USE: 1

## 2017-11-17 NOTE — ANESTHESIA PREPROCEDURE EVALUATION
Anesthesia Evaluation     . Pt has had prior anesthetic.     No history of anesthetic complications          ROS/MED HX    ENT/Pulmonary:     (+)sleep apnea, tobacco use, Past use uses CPAP , . .    Neurologic:     (+)migraines,     Cardiovascular:     (+) hypertension----. : . . . :. .       METS/Exercise Tolerance:     Hematologic:         Musculoskeletal:   (+) arthritis, , , -       GI/Hepatic:         Renal/Genitourinary:         Endo:     (+) Obesity, .      Psychiatric:     (+) psychiatric history anxiety      Infectious Disease:         Malignancy:         Other:                     Physical Exam  Normal systems: cardiovascular, pulmonary and dental    Airway   Mallampati: II  TM distance: >3 FB  Neck ROM: full    Dental     Cardiovascular       Pulmonary                     Anesthesia Plan      History & Physical Review  History and physical reviewed and following examination; no interval change.    ASA Status:  3 .    NPO Status:  > 8 hours    Plan for General, ETT and Periph. Nerve Block for postop pain with Intravenous and Propofol induction. Maintenance will be Balanced.    PONV prophylaxis:  Ondansetron (or other 5HT-3) and Dexamethasone or Solumedrol       Postoperative Care  Postoperative pain management:  IV analgesics and Peripheral nerve block (Single Shot).      Consents  Anesthetic plan, risks, benefits and alternatives discussed with:  Patient.  Use of blood products discussed: Yes.   Use of blood products discussed with Patient.  Consented to blood products.  .                          .

## 2017-11-17 NOTE — IP AVS SNAPSHOT
MRN:[id number]                      After Visit Summary   11/17/2017    Lyubov Sanchez    MRN: [id number]           Thank you!     Thank you for choosing [facility] for your care. Our goal is always to provide you with excellent care. Hearing back from our patients is one way we can continue to improve our services. Please take a few minutes to complete the written survey that you may receive in the mail after you visit. If you would like to speak to someone directly about your visit please contact Patient Relations at [phone]. Thank you!          Patient Information     Date Of Birth          1970        Designated Caregiver       Most Recent Value    Caregiver    Will someone help with your care after discharge? yes    Name of designated caregiver -Vlad    Phone number of caregiver [phone]    Caregiver address [place], [place]      About your hospital stay     You were admitted on:  November 17, 2017 You last received care in the:  [facility]    You were discharged on:  November 19, 2017        Reason for your hospital stay       S/p R TKA                  Who to Call     For medical emergencies, please call 911.  For non-urgent questions about your medical care, please call your primary care provider or clinic, [phone]  For questions related to your surgery, please call your surgery clinic        Attending Provider     Provider Specialty    Hebert Lee MD Orthopaedic Surgery       Primary Care Provider Office Phone # Fax #    Yolie Delarosa [phone]      After Care Instructions     Activity       Your activity upon discharge: activity as tolerated, but no driving until off of daytime narcotics, and able to slam on brake pedal.            Diet       Follow this diet upon discharge: regular            Wound care and dressings       Instructions to care for your wound at home: Leave aquacel dressing in  "place until post-op follow-up.  If it becomes loose or soiled then remove and replace with daily dry dressings.                  Follow-up Appointments     Follow-up and recommended labs and tests        Follow up with Bernadette Patrick PA-C within 12-16 days from surgery.  If you do not already have a follow up appointment scheduled, please call our [place] office: [phone].  No follow up labs or test are needed.                  Additional Services     Physical Therapy Referral       *This therapy referral will be filtered to a centralized scheduling office at [facility] and the patient will receive a call to schedule an appointment at a [facility] location most convenient for them. *     [facility] provides Physical Therapy evaluation and treatment and many specialty services across the [facility] system.  If requesting a specialty program, please choose from the list below.    If you have not heard from the scheduling office within 2 business days, please call [phone] for all locations, with the exception of [place], please call [phone].  Treatment: Evaluation & Treatment  Special Instructions/Modalities: Knee ROM, quad strengthening, gait training  Special Programs: None    Please be aware that coverage of these services is subject to the terms and limitations of your health insurance plan.  Call member services at your health plan with any benefit or coverage questions.      **Note to Provider:  If you are referring outside of [facility] for the therapy appointment, please list the name of the location in the \"special instructions\" above, print the referral and give to the patient to schedule the appointment.                  Further instructions from your care team         Call Md ([phone]) before or after your follow up appointment if:  1. Increased/persistent temperature chills and/or sweats  2. Increased/uncontrolled pain despite pain medication, " sedated   3. Increased/persistent bleeding, redness, swelling, bruising, drainage (yellow/odorous) or incision would pull apart  4. Calf pain, swollen/hard/warm area, chest pain or shortness of breath  5. Weakness in operative leg, knee buckling, numbness and/or tingling. Or if you Fall  6. Nausea, vomiting, constipation and/or diarrhea  7. Too sleepy  8. Trouble voiding or signs and symptoms of urinary tract infection.  9. Constipation unrelieved by stool softeners (see Other instructions below)    Activity instructions:  1. Do exercises at home as instructed by therapist twice a day. Continue outpatient therapy as ordered by your doctor.  2. Ice knee after exercises and therapy or 20 minutes 3-4 times a day to reduce pain and swelling  3. If your doctor orders a cpm use as tolerated 0-90 degrees   4. Slowly increase activity back to baseline    Diet Information  Drink plenty of fluids  Eat a regular balanced diet    Dressing information:   May shower (leave dressing on ),dressing is water proof. examine around incision daily  for any signs and symptoms of infection.     Wash hands before touching skin surrounding incision  DO NOT change dressing daily leave on until follow up apt.  Inspect surrounding skin daily for s/s of infections.   Do not soak in tub or pool.   If dressing loosens wash hands, tape down edge and call surgeon.  If the dressing comes off completely then place sterile gauze over incision and tape around all edges and call surgeon for further direction.    Other instructions:  1. Notify your dentist of your implant so you can get antibiotics before any dental work or before any invasive procedure  2. Remove anti-embolism stockings (Teds) 2 times a day  3. Take an over the counter stool softener (mirilax or senna) as directed while on narcotics to ensure bowel movements are regular.  Take a laxative (milk of magnesia and/or a suppository )as directed if no bm in 2 days.  4. Keep track of when you  "take all medication, especially pain medication. See bottles for instructions and side effects  5. Use incentive spirometry hourly until you are back to normal activity (helps prevent pneumonia)  6. See medication handouts for medication information and side effects    Follow up information  Call Md to make a follow up appointment with Dr. Lee @ [facility] [phone] 10-14 days from the day of surgery    Thank you for choosing [facility]         Pending Results     No orders found from 11/15/2017 to 11/18/2017.            Statement of Approval     Ordered          11/19/17 1142  I have reviewed and agree with all the recommendations and orders detailed in this document.  EFFECTIVE NOW     Approved and electronically signed by:  Louann Montilla PA-C             Admission Information     Date & Time Provider Department Dept. Phone    11/17/2017 Hebert Lee MD [facility] [phone]      Your Vitals Were     Blood Pressure Pulse Temperature Respirations Height Weight    150/76 101 96.1  F (35.6  C) 16 1.626 m (5' 4\") 121.1 kg (267 lb)    Pulse Oximetry BMI (Body Mass Index)                93% 45.83 kg/m2          MyChart Information     [employer] gives you secure access to your electronic health record. If you see a primary care provider, you can also send messages to your care team and make appointments. If you have questions, please call your primary care clinic.  If you do not have a primary care provider, please call [phone] and they will assist you.        Care EveryWhere ID     This is your Care EveryWhere ID. This could be used by other organizations to access your [place] medical records  [id number]        Equal Access to Services     [facility] : Hadii dirk Bahena, waaxda luqadaha, qaybta kapippa sharma. So [facility] [phone].    ATENCIÓN: Si habla español, tiene a grimes " disposición servicios gratuitos de asistencia lingüística. Chetna gallegos [phone].    We comply with applicable federal civil rights laws and [place] laws. We do not discriminate on the basis of race, color, national origin, age, disability, sex, sexual orientation, or gender identity.               Review of your medicines      UNREVIEWED medicines. Ask your doctor about these medicines        Dose / Directions    mupirocin 2 % nasal ointment   Commonly known as:  BACTROBAN   Ask about: Should I take this medication?        Dose:  1 g   Apply 1 g into each nare 2 times daily Apply small amount top each nostril twice daily for 7 days prior to surgery.   Refills:  0         START taking        Dose / Directions    aspirin  MG EC tablet   Used for:  S/P total knee arthroplasty, right        Take one Aspirin tab twice daily for 5 weeks.   Quantity:  70 tablet   Refills:  0       HYDROcodone-acetaminophen 5-325 MG per tablet   Commonly known as:  NORCO   Used for:  S/P total knee arthroplasty, right   Notes to Patient:  Take if tylenol is not enough to control pain. Take 1 pill for pain 1-4 and 2 pills for pain 5-10.  Take with food to help prevent nausea.  DO NOT DRIVE while taking narcotic pain medications.  Will make drowsy.    DO NOT exceed more than 4000mg of acetaminophen per 24 hours        Dose:  1-2 tablet   Take 1-2 tablets by mouth every 4 hours as needed for moderate to severe pain   Quantity:  50 tablet   Refills:  0       hydrOXYzine 25 MG tablet   Commonly known as:  ATARAX   Used for:  S/P total knee arthroplasty, right        Dose:  25 mg   Take 1 tablet (25 mg) by mouth every 6 hours as needed for other (adjuvant pain)   Quantity:  45 tablet   Refills:  0         CONTINUE these medicines which have NOT CHANGED        Dose / Directions    ALEVE PO   Notes to Patient:  DO NOT take while you are taking aspirin.        Dose:  220 mg   Take 220 mg by mouth 2 times daily as needed for moderate pain  Will stop `1 week pre op per primary DrMary Lou   Refills:  0       amLODIPine 5 MG tablet   Commonly known as:  NORVASC        Dose:  5 mg   Take 1 tablet (5 mg) by mouth daily   Quantity:  90 tablet   Refills:  1       buPROPion 150 MG 24 hr tablet   Commonly known as:  WELLBUTRIN XL   Used for:  Morbid obesity due to excess calories (H), Anxiety        Dose:  150 mg   Take 1 tablet (150 mg) by mouth every morning   Quantity:  90 tablet   Refills:  1       citalopram 20 MG tablet   Commonly known as:  celeXA   Used for:  Major depressive disorder, recurrent episode, mild (H)        Dose:  20 mg   Take 1 tablet (20 mg) by mouth daily   Quantity:  90 tablet   Refills:  1       levonorgestrel 20 MCG/24HR IUD   Commonly known as:  MIRENA   Used for:  Contraception        Quantity:  1 each   Refills:  0       lisinopril-hydrochlorothiazide 20-12.5 MG per tablet   Commonly known as:  PRINZIDE/ZESTORETIC   Used for:  Essential hypertension, benign        TAKE 1 TABLET BY MOUTH DAILY   Quantity:  90 tablet   Refills:  2       traMADol 50 MG tablet   Commonly known as:  ULTRAM   Used for:  Osteoarthritis of right knee, unspecified osteoarthritis type        Dose:   mg   Take 1-2 tablets ( mg) by mouth every 6 hours as needed for pain   Quantity:  20 tablet   Refills:  0            Where to get your medicines      These medications were sent to [facility] [address] [address]     Phone:  [phone]     aspirin  MG EC tablet    hydrOXYzine 25 MG tablet         Some of these will need a paper prescription and others can be bought over the counter. Ask your nurse if you have questions.     Bring a paper prescription for each of these medications     HYDROcodone-acetaminophen 5-325 MG per tablet                Protect others around you: Learn how to safely use, store and throw away your medicines at www.disposemymeds.org.              Medication List: This is a list of all your medications and when to take them. Check marks below indicate your daily home schedule. Keep this list as a reference.      Medications           Morning Afternoon Evening Bedtime As Needed    ALEVE PO   Take 220 mg by mouth 2 times daily as needed for moderate pain Will stop `1 week pre op per primary DrMary Lou   Notes to Patient:  DO NOT take while you are taking aspirin.                                amLODIPine 5 MG tablet   Commonly known as:  NORVASC   Take 1 tablet (5 mg) by mouth daily   Last time this was given:  5 mg on 11/19/2017  8:05 AM   Next Dose Due:  Monday Morning                                   aspirin  MG EC tablet   Take one Aspirin tab twice daily for 5 weeks.   Next Dose Due:  Monday Morning                                      buPROPion 150 MG 24 hr tablet   Commonly known as:  WELLBUTRIN XL   Take 1 tablet (150 mg) by mouth every morning   Last time this was given:  150 mg on 11/19/2017  8:06 AM   Next Dose Due:  Monday Morning                                   citalopram 20 MG tablet   Commonly known as:  celeXA   Take 1 tablet (20 mg) by mouth daily   Last time this was given:  20 mg on 11/19/2017  8:06 AM   Next Dose Due:  Monday Morning                                   HYDROcodone-acetaminophen 5-325 MG per tablet   Commonly known as:  NORCO   Take 1-2 tablets by mouth every 4 hours as needed for moderate to severe pain   Last time this was given:  2 tablets on 11/19/2017  9:16 AM   Next Dose Due:  Today at 1:15pm   Notes to Patient:  Take if tylenol is not enough to control pain. Take 1 pill for pain 1-4 and 2 pills for pain 5-10.  Take with food to help prevent nausea.  DO NOT DRIVE while taking narcotic pain medications.  Will make drowsy.    DO NOT exceed more than 4000mg of acetaminophen per 24 hours                                   hydrOXYzine 25 MG tablet   Commonly known as:  ATARAX   Take 1 tablet (25 mg) by mouth every 6 hours  as needed for other (adjuvant pain)   Last time this was given:  25 mg on 11/19/2017 11:55 AM   Next Dose Due:  Today at 6pm                                   levonorgestrel 20 MCG/24HR IUD   Commonly known as:  MIRENA                                lisinopril-hydrochlorothiazide 20-12.5 MG per tablet   Commonly known as:  PRINZIDE/ZESTORETIC   TAKE 1 TABLET BY MOUTH DAILY   Last time this was given:  1 tablet on 11/19/2017  8:05 AM   Next Dose Due:  Monday Morning                                   traMADol 50 MG tablet   Commonly known as:  ULTRAM   Take 1-2 tablets ( mg) by mouth every 6 hours as needed for pain                                     ASK your doctor about these medications           Morning Afternoon Evening Bedtime As Needed    mupirocin 2 % nasal ointment   Commonly known as:  BACTROBAN   Apply 1 g into each nare 2 times daily Apply small amount top each nostril twice daily for 7 days prior to surgery.   Ask about: Should I take this medication?

## 2017-11-17 NOTE — PLAN OF CARE
Problem: Patient Care Overview  Goal: Plan of Care/Patient Progress Review  PT: Orders received. Evaluation completed and treatment initiated. Pt is POD0 R TKA. Lives in a house with spouse and two dependent children, 1 step to enter and 17 steps to bedroom and bathroom on upper level. Pt has a basement but does not need to access. 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 Pt is currently borrowing a FWW for home use.    Discharge Planner PT   Patient plan for discharge: Home with assist of spouse and dependent children  Current status: Pt completes supine<>sit SBA with use of bedrail, performs sit<>stand CGA and FWW. Amb in room with CGA and FWW. Due to ability to consistently perform R SLR no KI needed. Pt able to complete all R LE supine exercises, tolerates well.   Barriers to return to prior living situation: Stairs, anticipate with practice with PT pt will be able to complete.   Recommendations for discharge: TBD POD2  Rationale for recommendations: TBD POD2       Entered by: Evonne El 11/17/2017 5:15 PM

## 2017-11-17 NOTE — PROGRESS NOTES
11/17/17 1654   Quick Adds   Type of Visit Initial PT Evaluation   Living Environment   Lives With spouse;child(ben), dependent  (2 children)   Living Arrangements house   Home Accessibility bed and bath on same level;bed and bath are not on the first floor;stairs within home;stairs to enter home;stairs (1 railing present)   Number of Stairs to Enter Home 1   Number of Stairs Within Home 17  (to bedroom/bathroom, has basement but does not need to acces)   Stair Railings at Home inside, present on left side  (no railing outside)   Transportation Available family or friend will provide   Living Environment Comment 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 , 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   Self-Care   Dominant Hand left   Usual Activity Tolerance moderate   Current Activity Tolerance fair   Regular Exercise no   Equipment Currently Used at Home none   Activity/Exercise/Self-Care Comment Pt is borrowing a FWW   Functional Level Prior   Ambulation 0-->independent   Transferring 0-->independent   Toileting 0-->independent   Bathing 0-->independent   Dressing 0-->independent   Eating 0-->independent   Communication 0-->understands/communicates without difficulty   Swallowing 0-->swallows foods/liquids without difficulty   Cognition 0 - no cognition issues reported   Fall history within last six months no   Which of the above functional risks had a recent onset or change? ambulation;transferring;toileting;bathing;dressing   General Information   Onset of Illness/Injury or Date of Surgery - Date 11/17/17   Referring Physician Hebert Lee MD   Patient/Family Goals Statement Pt would like to return home   Pertinent History of Current Problem (include personal factors and/or comorbidities that impact the POC) Pt is POD0 R TKA   Precautions/Limitations fall precautions   Weight-Bearing Status - LLE full weight-bearing   Weight-Bearing Status - RLE weight-bearing as tolerated   General Observations Pt is supine in  bed upon PT arrival, agreeable to session.   Cognitive Status Examination   Orientation orientation to person, place and time   Level of Consciousness alert   Follows Commands and Answers Questions 100% of the time   Personal Safety and Judgment intact   Memory intact   Pain Assessment   Patient Currently in Pain Yes, see Vital Sign flowsheet   Integumentary/Edema   Integumentary/Edema Comments ACE bandage intact, unable to visualize dressing at this time   Posture    Posture Forward head position;Protracted shoulders   Range of Motion (ROM)   ROM Comment Decreased R knee ROM secondary to TKA   Strength   Strength Comments Pt able to perform R SLR   Bed Mobility   Bed Mobility Comments supine<>sit SBA   Transfer Skills   Transfer Comments sit<>stand CGA with FWW   Gait   Gait Comments CGA and FWW   Balance   Balance Comments not assessed   Sensory Examination   Sensory Perception Comments Pt denies numbness/tingling at this time, cannot feel coolness of ice pack   Coordination   Coordination no deficits were identified   Muscle Tone   Muscle Tone no deficits were identified   Modality Interventions   Planned Modality Interventions Cryotherapy   General Therapy Interventions   Planned Therapy Interventions bed mobility training;gait training;ROM;strengthening;stretching;transfer training;progressive activity/exercise;home program guidelines   Clinical Impression   Criteria for Skilled Therapeutic Intervention yes, treatment indicated   PT Diagnosis impaired mobility   Influenced by the following impairments pain, decreased ROM secondary to TKA, weakness, deconditioning   Functional limitations due to impairments bed mobility, transfers, amb, stairs   Clinical Presentation Stable/Uncomplicated   Clinical Presentation Rationale Pt is medically stable at this time.   Clinical Decision Making (Complexity) Low complexity   Therapy Frequency` 2 times/day   Predicted Duration of Therapy Intervention (days/wks) 3 days  "  Anticipated Discharge Disposition Home with Assist;Home with Outpatient Therapy   Risk & Benefits of therapy have been explained Yes   Patient, Family & other staff in agreement with plan of care Yes   [facility] TM \"6 Clicks\"   2016, Trustees of [facility], under license to [employer].  All rights reserved.   6 Clicks Short Forms Basic Mobility Inpatient Short Form   [facility] AM-PAC  \"6 Clicks\" V.2 Basic Mobility Inpatient Short Form   1. Turning from your back to your side while in a flat bed without using bedrails? 3 - A Little   2. Moving from lying on your back to sitting on the side of a flat bed without using bedrails? 3 - A Little   3. Moving to and from a bed to a chair (including a wheelchair)? 3 - A Little   4. Standing up from a chair using your arms (e.g., wheelchair, or bedside chair)? 3 - A Little   5. To walk in hospital room? 3 - A Little   6. Climbing 3-5 steps with a railing? 3 - A Little   Basic Mobility Raw Score (Score out of 24.Lower scores equate to lower levels of function) 18   Total Evaluation Time   Total Evaluation Time (Minutes) 10     "

## 2017-11-17 NOTE — BRIEF OP NOTE
[facility] Brief Operative Note    Pre-operative diagnosis: DJD   Post-operative diagnosis same   Procedure: Procedure(s):  Right Total knee Arthroplasty    - Wound Class: I-Clean   Surgeon(s): Surgeon(s) and Role:     * Hebert Lee MD - Primary     * Bernadette Patrick PA-C - Assisting   Estimated blood loss: 7 mL    Specimens: * No specimens in log *   Findings: See dictation

## 2017-11-17 NOTE — ANESTHESIA CARE TRANSFER NOTE
Patient: Lyubov Sanchez    Procedure(s):  Right Total knee Arthroplasty    - Wound Class: I-Clean    Diagnosis: DJD  Diagnosis Additional Information: No value filed.    Anesthesia Type:   General, ETT, Periph. Nerve Block for postop pain     Note:    Patient transferred to:PACU  Comments: Pt spont resps ETT removed to PACU VSS Report to RNHandoff Report: Identifed the Patient, Identified the Reponsible Provider, Reviewed the pertinent medical history, Discussed the surgical course, Reviewed Intra-OP anesthesia mangement and issues during anesthesia, Set expectations for post-procedure period and Allowed opportunity for questions and acknowledgement of understanding      Vitals: (Last set prior to Anesthesia Care Transfer)    CRNA VITALS  11/17/2017 0915 - 11/17/2017 0949      11/17/2017             Pulse: 104    SpO2: (!)  88 %    Resp Rate (observed): 8                Electronically Signed By: BARRINGTON Chaudhry CRNA  November 17, 2017  9:49 AM

## 2017-11-17 NOTE — IP AVS SNAPSHOT
[facility]    [address] [facility] [address]    Phone:  [phone]    Fax:  [phone]                                       After Visit Summary   11/17/2017    Lyubov Sanchez    MRN: [id number]           After Visit Summary Signature Page     I have received my discharge instructions, and my questions have been answered. I have discussed any challenges I see with this plan with the nurse or doctor.    ..........................................................................................................................................  Patient/Patient Representative Signature      ..........................................................................................................................................  Patient Representative Print Name and Relationship to Patient    ..................................................               ................................................  Date                                            Time    ..........................................................................................................................................  Reviewed by Signature/Title    ...................................................              ..............................................  Date                                                            Time

## 2017-11-17 NOTE — ANESTHESIA POSTPROCEDURE EVALUATION
Patient: Lyubov Sanchez    Procedure(s):  Right Total knee Arthroplasty    - Wound Class: I-Clean    Diagnosis:DJD  Diagnosis Additional Information: DJD    Anesthesia Type:  General, ETT, Peripheral Nerve Block for post-op pain at surgeon's request    Note:  Anesthesia Post Evaluation    Patient location during evaluation: PACU  Patient participation: Able to fully participate in evaluation  Level of consciousness: awake and alert  Pain management: adequate  Airway patency: patent  Cardiovascular status: acceptable  Respiratory status: acceptable  Hydration status: acceptable  PONV: none     Anesthetic complications: None          Last vitals:  Vitals:    11/17/17 0720 11/17/17 0725 11/17/17 0947   BP: (!) 153/97 158/86 137/72   Pulse:      Resp: 21 21 21   Temp:   99  F (37.2  C)   SpO2: 94% 93% 97%         Electronically Signed By: Ulises Arredondo MD  November 17, 2017  9:57 AM

## 2017-11-17 NOTE — OP NOTE
DATE OF PROCEDURE:  11/17/2017       PREOPERATIVE DIAGNOSIS:  Right knee varus osteoarthritis.      POSTOPERATIVE DIAGNOSIS:  Right knee varus osteoarthritis.      PROCEDURE:  Right total knee arthroplasty using the Arthrex iBalance total knee system.      SURGEON:  Hebert Lee MD      FIRST ASSISTANT:  Bernadette Patrick PA-C      INDICATIONS:  Ms. Lyubov Sanchez is a very pleasant 47-year-old female with ongoing bilateral knee pain, worse on the right than left.  She has failed conservative management with oral analgesics, activity modifications and injections, and now wishes to proceed with operative intervention.  We had a long discussion of the risks, benefits and alternatives of total knee arthroplasty.  She understands these and wishes to proceed with surgery.      DESCRIPTION OF PROCEDURE:  After thorough preoperative evaluation and proper identification of the patient and extremity to be operated on, Ms. Sanchez was taken to the operating room, where she underwent a general anesthetic, placed supine on the operating table, and her right leg was prepped and draped in the usual sterile manner.  After appropriate surgical pause to confirm the patient's extremity to be operated on and 30 minutes after the patient received 3 grams of Ancef, her right leg was exsanguinated and tourniquet was raised to 300 mmHg on right upper thigh.  We approached her right knee through a midline incision centered over the patella.  Skin and soft tissue were sharply dissected down to the patella.  A medial parapatellar arthrotomy was performed for a pretty sizable medial release in accordance with our preoperative templating.  We everted the patella and removed the infrapatellar fat pad.  We flexed the knee, removed the osteophytes from the distal femur, drilled and placed the intramedullary guide for our distal femoral resection.  We resected the distal femur at 5 degrees of physiologic valgus to the femur, resecting 9 mm of  distal femoral bone.  Once this was done, we then sized the distal femur, sized it to fit a size 3 Arthrex iBalance femoral component.  We pinned our cutting block in 3 degrees external rotation to the posterior condyles in line with the epicondylar axis, perpendicular to Whitesides line.  We then made our anterior, posterior and chamfer resections.  We then proceeded to the tibia.  Here, we resected this perpendicular to the long axis of the tibia using extramedullary guides.  Once this was done, we removed the excess soft tissues from the knee, mainly both cruciate ligaments and both menisci.  Once this was done, we made our box cut for a posterior stabilized femoral component and placed our trial implants.  We used a size 3 femur, size 3 tibia with a 13 mm thick polyethylene insert.  This gave us good stability, full range of motion, and the patella tracked nicely.  At this point, we evaluated the patella.  It measured 20 mm in thickness prior to resection.  We resected this down to 12 mm and placed an 8 mm thick x 30 mm in diameter round tri-peg patellar button in position.  With the patella and button in position, it measured 20 mm and tracked quite nicely.  At this point, we marked our tibial rotation.  We punched for the tibial keel, thoroughly irrigated the knee and prepared to cement in our final components.  Using one batch of Simplex cement with tobramycin, we cemented in a size 3 femur, size 3 tibia and a 30 round tri-peg patellar button.  Once the cement had cured, we removed all the excess cement from the knee.  We thoroughly irrigated the knee with both saline and IrriSept solution and then retrialed our polyethylene inserts and found that a 13 mm PS insert gave us the best stability and full range of motion.  This was then impacted into position.  Once this was done, we closed the arthrotomy with #1 and 0 Vicryl sutures, closed the skin and soft tissue with absorbable sutures and staples in the skin.   The patient was placed in a well-padded postoperative dressing and taken to the recovery room in stable condition.  She tolerated the procedure without difficulty.         PORTILLO VALLADARES MD             D: 2017 10:11   T: 2017 12:21   MT: LEFTY#[id number]      Name:     URMILA MATIAS   MRN:      [id number]        Account:        [id number]   :      1970           Procedure Date: 2017      Document: [id number]

## 2017-11-17 NOTE — PLAN OF CARE
Problem: Knee Arthroplasty (Total, Partial) (Adult)  Goal: Signs and Symptoms of Listed Potential Problems Will be Absent, Minimized or Managed (Knee Arthroplasty)  Signs and symptoms of listed potential problems will be absent, minimized or managed by discharge/transition of care (reference Knee Arthroplasty (Total, Partial) (Adult) CPG).   Outcome: Improving  Day RN  Pt arrived from PACU at 1230 via cart, tranferred to bed via hovermat.  A/O.  VSS, afebrile.  CPAP infusing with 4L oxygen.  Pain managed with scheduled tylenol.  CMS intact.  Acewrap CDI.  Martell patent, draining.  Hemovac in place.  PT scheduled for 1600.  Will continue to monitor.

## 2017-11-17 NOTE — ANESTHESIA PROCEDURE NOTES
Peripheral nerve/Neuraxial procedure note : Femoral (via adductor canal)  Pre-Procedure  Performed by ERICKSON DOYLE  Referred by [facility]  Location: pre-op    Procedure Times:11/17/2017 7:12 AM and 11/17/2017 7:22 AM  Pre-Anesthestic Checklist: patient identified, IV checked, site marked, risks and benefits discussed, informed consent, monitors and equipment checked, pre-op evaluation, at physician/surgeon's request and post-op pain management    Timeout  Correct Patient: Yes   Correct Procedure: Yes   Correct Site: Yes   Correct Laterality: Yes   Correct Position: Yes   Site Marked: Yes   .   Procedure Documentation    .    Procedure:    Femoral (via adductor canal).     Ultrasound used to identify targeted nerve, plexus, or vascular marker and placed a needle adjacent to it., Ultrasound was used to visualize the spread of the anesthetic in close proximity to the above stated nerve.   Patient Prep;mask, sterile gloves, povidone-iodine 7.5% surgical scrub.  .  Needle: insulated Needle Gauge: 22.  Insertion Method: Single Shot.   Catheter: 19 G . .   .  .   .    Assessment/Narrative  Paresthesias: No.  Injection made incrementally with aspirations every 5 mL..  The placement was negative for: blood aspirated, painful injection and site bleeding.  Bolus given via needle..   Secured via.   Complications: none. Test dose of mL at. Test dose negative for signs of intravascular, subdural or intrathecal injection. Comments:  The surgeon has given a verbal order transferring care of this patient to me for the performance of a regional analgesia block for post-op pain control. It is requested of me because I am uniquely trained and qualified to perform this block and the surgeon is neither trained nor qualified to perform this procedure.    15 ml 0.75% ropivacaine and 15 ml 2% lidocaine, with 1:200k epi

## 2017-11-18 ENCOUNTER — APPOINTMENT (OUTPATIENT)
Dept: OCCUPATIONAL THERAPY | Facility: CLINIC | Age: 47
DRG: 470 | End: 2017-11-18
Attending: ORTHOPAEDIC SURGERY
Payer: COMMERCIAL

## 2017-11-18 ENCOUNTER — APPOINTMENT (OUTPATIENT)
Dept: PHYSICAL THERAPY | Facility: CLINIC | Age: 47
DRG: 470 | End: 2017-11-18
Attending: ORTHOPAEDIC SURGERY
Payer: COMMERCIAL

## 2017-11-18 LAB
GLUCOSE SERPL-MCNC: 118 MG/DL (ref 70–99)
HGB BLD-MCNC: 13.1 G/DL (ref 11.7–15.7)

## 2017-11-18 PROCEDURE — 97535 SELF CARE MNGMENT TRAINING: CPT | Mod: GO | Performed by: OCCUPATIONAL THERAPIST

## 2017-11-18 PROCEDURE — 97116 GAIT TRAINING THERAPY: CPT | Mod: GP | Performed by: PHYSICAL THERAPIST

## 2017-11-18 PROCEDURE — 82947 ASSAY GLUCOSE BLOOD QUANT: CPT | Performed by: ORTHOPAEDIC SURGERY

## 2017-11-18 PROCEDURE — 99207 ZZC CONSULT E&M CHANGED TO INITIAL LEVEL: CPT | Performed by: INTERNAL MEDICINE

## 2017-11-18 PROCEDURE — 25000132 ZZH RX MED GY IP 250 OP 250 PS 637: Performed by: PHYSICIAN ASSISTANT

## 2017-11-18 PROCEDURE — 25000128 H RX IP 250 OP 636: Performed by: ORTHOPAEDIC SURGERY

## 2017-11-18 PROCEDURE — 25000128 H RX IP 250 OP 636: Performed by: PHYSICIAN ASSISTANT

## 2017-11-18 PROCEDURE — 97166 OT EVAL MOD COMPLEX 45 MIN: CPT | Mod: GO | Performed by: OCCUPATIONAL THERAPIST

## 2017-11-18 PROCEDURE — 25000132 ZZH RX MED GY IP 250 OP 250 PS 637: Performed by: ORTHOPAEDIC SURGERY

## 2017-11-18 PROCEDURE — 36415 COLL VENOUS BLD VENIPUNCTURE: CPT | Performed by: ORTHOPAEDIC SURGERY

## 2017-11-18 PROCEDURE — 40000133 ZZH STATISTIC OT WARD VISIT: Performed by: OCCUPATIONAL THERAPIST

## 2017-11-18 PROCEDURE — 12000000 ZZH R&B MED SURG/OB

## 2017-11-18 PROCEDURE — 85018 HEMOGLOBIN: CPT | Performed by: ORTHOPAEDIC SURGERY

## 2017-11-18 PROCEDURE — 97530 THERAPEUTIC ACTIVITIES: CPT | Mod: GP | Performed by: PHYSICAL THERAPIST

## 2017-11-18 PROCEDURE — 40000193 ZZH STATISTIC PT WARD VISIT: Performed by: PHYSICAL THERAPIST

## 2017-11-18 PROCEDURE — 99222 1ST HOSP IP/OBS MODERATE 55: CPT | Mod: AI | Performed by: INTERNAL MEDICINE

## 2017-11-18 PROCEDURE — 97110 THERAPEUTIC EXERCISES: CPT | Mod: GP | Performed by: PHYSICAL THERAPIST

## 2017-11-18 RX ADMIN — SODIUM CHLORIDE: 9 INJECTION, SOLUTION INTRAVENOUS at 05:38

## 2017-11-18 RX ADMIN — HYDROCODONE BITARTRATE AND ACETAMINOPHEN 2 TABLET: 5; 325 TABLET ORAL at 23:07

## 2017-11-18 RX ADMIN — RANITIDINE HYDROCHLORIDE 150 MG: 150 TABLET, FILM COATED ORAL at 08:09

## 2017-11-18 RX ADMIN — HYDROCODONE BITARTRATE AND ACETAMINOPHEN 2 TABLET: 5; 325 TABLET ORAL at 10:19

## 2017-11-18 RX ADMIN — AMLODIPINE BESYLATE 5 MG: 5 TABLET ORAL at 08:10

## 2017-11-18 RX ADMIN — CELECOXIB 200 MG: 200 CAPSULE ORAL at 08:09

## 2017-11-18 RX ADMIN — BUPROPION HYDROCHLORIDE 150 MG: 150 TABLET, FILM COATED, EXTENDED RELEASE ORAL at 08:09

## 2017-11-18 RX ADMIN — HYDROCODONE BITARTRATE AND ACETAMINOPHEN 2 TABLET: 5; 325 TABLET ORAL at 19:02

## 2017-11-18 RX ADMIN — RANITIDINE HYDROCHLORIDE 150 MG: 150 TABLET, FILM COATED ORAL at 20:52

## 2017-11-18 RX ADMIN — ENOXAPARIN SODIUM 40 MG: 40 INJECTION SUBCUTANEOUS at 05:40

## 2017-11-18 RX ADMIN — CITALOPRAM HYDROBROMIDE 20 MG: 20 TABLET ORAL at 08:09

## 2017-11-18 RX ADMIN — HYDROXYZINE HYDROCHLORIDE 25 MG: 25 TABLET ORAL at 08:09

## 2017-11-18 RX ADMIN — CEFAZOLIN SODIUM 2 G: 2 INJECTION, SOLUTION INTRAVENOUS at 00:29

## 2017-11-18 RX ADMIN — SENNOSIDES AND DOCUSATE SODIUM 2 TABLET: 8.6; 5 TABLET ORAL at 20:52

## 2017-11-18 RX ADMIN — LISINOPRIL AND HYDROCHLOROTHIAZIDE 1 TABLET: 12.5; 2 TABLET ORAL at 08:09

## 2017-11-18 RX ADMIN — HYDROXYZINE HYDROCHLORIDE 25 MG: 25 TABLET ORAL at 14:30

## 2017-11-18 RX ADMIN — HYDROCODONE BITARTRATE AND ACETAMINOPHEN 1 TABLET: 5; 325 TABLET ORAL at 06:24

## 2017-11-18 RX ADMIN — CELECOXIB 200 MG: 200 CAPSULE ORAL at 20:52

## 2017-11-18 RX ADMIN — HYDROXYZINE HYDROCHLORIDE 25 MG: 25 TABLET ORAL at 20:52

## 2017-11-18 RX ADMIN — HYDROCODONE BITARTRATE AND ACETAMINOPHEN 1 TABLET: 5; 325 TABLET ORAL at 05:39

## 2017-11-18 RX ADMIN — SENNOSIDES AND DOCUSATE SODIUM 2 TABLET: 8.6; 5 TABLET ORAL at 08:09

## 2017-11-18 RX ADMIN — HYDROCODONE BITARTRATE AND ACETAMINOPHEN 2 TABLET: 5; 325 TABLET ORAL at 14:30

## 2017-11-18 ASSESSMENT — PAIN DESCRIPTION - DESCRIPTORS: DESCRIPTORS: OTHER (COMMENT);SORE

## 2017-11-18 ASSESSMENT — ACTIVITIES OF DAILY LIVING (ADL)
PREVIOUS_RESPONSIBILITIES: MEAL PREP;HOUSEKEEPING;LAUNDRY;SHOPPING;MEDICATION MANAGEMENT;FINANCES;DRIVING;WORK;CHILD CARE

## 2017-11-18 NOTE — PLAN OF CARE
Problem: Knee Arthroplasty (Total, Partial) (Adult)  Goal: Signs and Symptoms of Listed Potential Problems Will be Absent, Minimized or Managed (Knee Arthroplasty)  Signs and symptoms of listed potential problems will be absent, minimized or managed by discharge/transition of care (reference Knee Arthroplasty (Total, Partial) (Adult) CPG).   Outcome: Improving  Day RN  A/O.  VSS, afebrile.  Pain managed with Norco and vistaril.  CMS intact.  Acewrap CDI.  Up with A1 and WW, no KI ambulating in room.  Hemovac drain DC'd.  Martell catheter DC'd, pt has voided.  Tolerating regular diet, minimal appetite.  Possible DC to home tomorrow.  Will continue to monitor.

## 2017-11-18 NOTE — PLAN OF CARE
Problem: Patient Care Overview  Goal: Plan of Care/Patient Progress Review  Outcome: Improving  Clear liquid diet, denies nausea vomiting.  Pain managed with PRN norco and scheduled tylenol.  Martell output 1500ml. Hemovac output 10ml. Patient using home CPAP.

## 2017-11-18 NOTE — PROGRESS NOTES
[facility]  Daily Orthopedic Post-Op Note         Assessment and Plan:    Assessment:   Post-operative day #1  Procedure: right TKA   No immediate surgical complications identified.  Pain well-controlled.  Tolerating physical therapy and rehabilitation well.  Pain: 6/10  Reports low apetite but no nausea      Plan:   -Ambulate  -Continue supportive and symptomatic treatment  -Start or continue physical therapy  -Pain control measures  Physical Therapy       Assessment:  Stable post op Right TKA  Plan:  Mobilize  Disposition: Home  Anticipated date of discharge: 11/19/17 or 11/20/17         Interval History:   Doing well.  Continues to improve.  Pain is reasonably-controlled for POD 1. No fevers.                     Physical Exam:   152/83, 97.7, 100, 16  Dressing clean, dry and intact  Calves soft and non tender  Distal neurovascular exam normal           Data:      Hemoglobin: Hemoglobin   Date Value Ref Range Status   11/18/2017 13.1 11.7 - 15.7 g/dL Final   11/17/2017 13.9 11.7 - 15.7 g/dL Final       Louann Montlila PA-C   [phone]

## 2017-11-18 NOTE — PROGRESS NOTES
11/18/17 1255   Quick Adds   Type of Visit Initial Occupational Therapy Evaluation   Living Environment   Lives With spouse;child(ben), dependent   Living Arrangements house   Home Accessibility bed and bath on same level;bed and bath are not on the first floor;stairs within home;stairs to enter home;stairs (1 railing present)   Number of Stairs to Enter Home 1   Number of Stairs Within Home 17   Stair Railings at Home inside, present on left side   Transportation Available family or friend will provide   Living Environment Comment Family to assist with work and setup for IADLs and ADLs   Self-Care   Dominant Hand left   Usual Activity Tolerance moderate   Current Activity Tolerance fair   Regular Exercise no   Equipment Currently Used at Home none   Activity/Exercise/Self-Care Comment Pt. independent at baseline   Functional Level Prior   Ambulation 0-->independent   Transferring 0-->independent   Toileting 0-->independent   Bathing 0-->independent   Dressing 0-->independent   Eating 0-->independent   Communication 0-->understands/communicates without difficulty   Swallowing 0-->swallows foods/liquids without difficulty   Cognition 0 - no cognition issues reported   Fall history within last six months no   Which of the above functional risks had a recent onset or change? ambulation;transferring   General Information   Onset of Illness/Injury or Date of Surgery - Date 11/17/17   Referring Physician [facility]   Patient/Family Goals Statement increase independence with ADLs and IADLs and reduce pain   Additional Occupational Profile Info/Pertinent History of Current Problem R TKA   Precautions/Limitations fall precautions   Weight-Bearing Status - RLE weight-bearing as tolerated   General Observations Pt. seated up in chair and noticing fatigue   Cognitive Status Examination   Orientation orientation to person, place and time   Level of Consciousness alert   Able to Follow Commands WNL/WFL   Personal Safety  (Cognitive) WNL/WFL   Memory intact   Attention No deficits were identified   Organization/Problem Solving No deficits were identified   Executive Function No deficits were identified   Visual Perception   Visual Perception No deficits were identified   Sensory Examination   Sensory Comments no concerns per pt.   Pain Assessment   Patient Currently in Pain Yes, see Vital Sign flowsheet   Range of Motion (ROM)   ROM Comment WFL   Strength   Strength Comments WFL   Mobility   Bed Mobility Comments independent   Transfer Skills   Transfer Comments SBA   Transfer Skill: Toilet Transfer   Level of [place]: Toilet stand-by assist   Physical Assist/Nonphysical Assist: Toilet set-up required   Weight-Bearing Restrictions: Toilet weight-bearing as tolerated   Assistive Device seat riser;grab bars;standard walker   Upper Body Dressing   Level of [place]: Dress Upper Body independent   Toileting   Level of [place]: Toilet independent   Grooming   Level of [place]: Grooming stand-by assist   Eating/Self Feeding   Level of [place]: Eating independent   Instrumental Activities of Daily Living (IADL)   Previous Responsibilities meal prep;housekeeping;laundry;shopping;medication management;finances;driving;work;   Activities of Daily Living Analysis   Impairments Contributing to Impaired Activities of Daily Living pain;post surgical precautions;ROM decreased;strength decreased   General Therapy Interventions   Planned Therapy Interventions ADL retraining;transfer training;home program guidelines;risk factor education   Clinical Impression   Criteria for Skilled Therapeutic Interventions Met yes, treatment indicated   OT Diagnosis impaired independence with ADLs   Influenced by the following impairments see above   Assessment of Occupational Performance 3-5 Performance Deficits   Identified Performance Deficits decreased bending and reaching, decreased standing tolerance, decreased functional  "transfers   Clinical Decision Making (Complexity) Moderate complexity   Therapy Frequency daily   Predicted Duration of Therapy Intervention (days/wks) 3 days   Anticipated Equipment Needs at Discharge shower chair;raised toilet seat;reacher   Anticipated Discharge Disposition Home with Assist   Risks and Benefits of Treatment have been explained. Yes   Patient, Family & other staff in agreement with plan of care Yes   Clinical Impression Comments Pt. is a 46 yo female s/p R TKA who presents with fatigue, pain, and decreased ROM that impairs independence and tolerance for ADLs.  Skilled IP OT necessary for ADL training, functional transfer training, and risk factor education for safe discharge home.   [facility] [employer] TM \"6 Clicks\"   2016, Trustees of [facility], under license to [employer].  All rights reserved.   6 Clicks Short Forms Daily Activity Inpatient Short Form   [facility] AM-PAC  \"6 Clicks\" Daily Activity Inpatient Short Form   1. Putting on and taking off regular lower body clothing? 3 - A Little   2. Bathing (including washing, rinsing, drying)? 3 - A Little   3. Toileting, which includes using toilet, bedpan or urinal? 3 - A Little   4. Putting on and taking off regular upper body clothing? 4 - None   5. Taking care of personal grooming such as brushing teeth? 4 - None   6. Eating meals? 4 - None   Daily Activity Raw Score (Score out of 24.Lower scores equate to lower levels of function) 21   Total Evaluation Time   Total Evaluation Time (Minutes) 10     "

## 2017-11-18 NOTE — PLAN OF CARE
Discharge Planner PT   Patient plan for discharge: Home with assist of spouse and dependent children  Current status: Pt completes supine<>sit SBA with use of bedrail, performs sit<>stand CGA and FWW. Vitals taken, BP slightly elevated, SaO2 WNL. Pt performs lateral weightshift in standing, reports legs feel strong. Amb in hallway with CGA and FWW, no KI needed. Pt performs all supine R LE exercises, tolerates well.  Barriers to return to prior living situation: Stairs, anticipate with practice with PT pt will be able to complete.   Recommendations for discharge: TBD POD2  Rationale for recommendations: TBD POD2       Entered by: Evonne El 11/18/2017 9:58 AM         PM: Pt navigates 4 steps x1 with bilat handrails and CGA. R knee extension/flexion ROM 0-16-75.

## 2017-11-18 NOTE — CONSULTS
[facility]    Hospitalist Consultation    Date of Admission:  11/17/2017  Date of Consult (When I saw the patient): 11/18/17    Assessment & Plan   Lyubov Sanchez is a 47 year old female patient with past medical history of depression, hypertension, arthritis, who underwent right total arthroplasty on 11/17/2017.     1. S/P right total arthroplasty on 11/17/2017. Pain management, DVT prophylaxis, PT/OT per primary team    2. Hypertension: Will continue amlodipine, lisinopril-hydrochlorothiazide. Will monitor blood pressure    3. Depression: Will continue Wellbutrin    DVT Prophylaxis: Defer to primary service  Code Status: Full Code    Disposition: Expected discharge per primary team    Cassie Victor MD    Reason for Consult   Reason for consult: for post-op medical management    Primary Care Physician   Yolie Delarosa    Chief Complaint   Postop medical management    History is obtained from the patient    History of Present Illness   Lyubov Sanchez is a 47 year old female patient with past medical history of depression, hypertension, arthritis, was admitted for elective surgery. She underwent right total arthroplasty on 11/17/2017. She tolerated the procedure well. Patient claims that she has no cough or shortness of breath. She also denies chest pain. She has no fever.   She denies urinary symptoms.     Past Medical History    I have reviewed this patient's medical history and updated it with pertinent information if needed.   Past Medical History:   Diagnosis Date     Arthritis      Depressive disorder      DYSPLASIA OF CERVIX 3/6/2003     Dysplasia of cervix (uteri) 2003    Rx cryotherapy Nov 03, and 2005     Hypertension     NO cardiology     Incomplete right bundle branch block 11/10/2017     Migraine      Other chronic pain     Knee pain for 8 years     Plantar fasciitis     bilat     PONV (postoperative nausea and vomiting)      Unspecified sleep apnea     CPAP will bring on the day  of surgery.       Past Surgical History   I have reviewed this patient's surgical history and updated it with pertinent information if needed.  Past Surgical History:   Procedure Laterality Date     C  DELIVERY ONLY  ,    , Low Cervical     ENT SURGERY       HC COLP VAGINA W CERVIX IF PRES W BIOPSY      [place] for ASCUS     TONSILLECTOMY & ADENOIDECTOMY         Prior to Admission Medications   Prior to Admission Medications   Prescriptions Last Dose Informant Patient Reported? Taking?   Naproxen Sodium (ALEVE PO) Past Week at Unknown time  Yes Yes   Sig: Take 220 mg by mouth 2 times daily as needed for moderate pain Will stop `1 week pre op per primary DrMary Lou   amLODIPine (NORVASC) 5 MG tablet 2017 at Unknown time  No Yes   Sig: Take 1 tablet (5 mg) by mouth daily   buPROPion (WELLBUTRIN XL) 150 MG 24 hr tablet 2017 at Unknown time  No Yes   Sig: Take 1 tablet (150 mg) by mouth every morning   citalopram (CELEXA) 20 MG tablet 2017 at Unknown time  No Yes   Sig: Take 1 tablet (20 mg) by mouth daily   levonorgestrel (MIRENA) 20 MCG/24HR IUD More than a month at Unknown time  Yes No   lisinopril-hydrochlorothiazide (PRINZIDE/ZESTORETIC) 20-12.5 MG per tablet Past Week at Unknown time  No Yes   Sig: TAKE 1 TABLET BY MOUTH DAILY   mupirocin (BACTROBAN) 2 % nasal ointment 2017 at Unknown time  Yes Yes   Sig: Apply 1 g into each nare 2 times daily Apply small amount top each nostril twice daily for 7 days prior to surgery.   traMADol (ULTRAM) 50 MG tablet 2017 at Unknown time  No Yes   Sig: Take 1-2 tablets ( mg) by mouth every 6 hours as needed for pain      Facility-Administered Medications: None     Allergies   Allergies   Allergen Reactions     Penicillins Hives     hives       Social History   I have reviewed this patient's social history and updated it with pertinent information if needed. Lyubov Sanchez  reports that she quit smoking about 10 years ago.  Her smoking use included Cigarettes. She has a 2.50 pack-year smoking history. She has never used smokeless tobacco. She reports that she drinks alcohol. She reports that she does not use illicit drugs.    Family History   I have reviewed this patient's family history and updated it with pertinent information if needed.   Family History   Problem Relation Age of Onset     Adopted: Yes     Unknown/Adopted Other      pt is adopted and has no access to health history     Unknown/Adopted Mother      Unknown/Adopted Father      Unknown/Adopted Maternal Grandmother      Unknown/Adopted Maternal Grandfather      Unknown/Adopted Paternal Grandmother      Unknown/Adopted Other        Review of Systems   The 10 point Review of Systems is negative other than noted in the HPI or here.     Physical Exam   Temp: 97.2  F (36.2  C) Temp src: Oral BP: 153/76   Heart Rate: 94 Resp: 16 SpO2: 96 % O2 Device: None (Room air) Oxygen Delivery: 4 LPM  Vital Signs with Ranges  Temp:  [96.2  F (35.7  C)-98.3  F (36.8  C)] 97.2  F (36.2  C)  Heart Rate:  [88-98] 94  Resp:  [16] 16  BP: (120-153)/(68-96) 153/76  SpO2:  [92 %-99 %] 96 %  267 lbs 0 oz    GEN:  Alert, oriented x 3, appears comfortable, NAD.  HEENT:  Normocephalic/atraumatic, no scleral icterus, no nasal discharge, mouth moist.  CV:  Regular rate and rhythm, no murmur or JVD.  S1 + S2 noted, no S3 or S4.  LUNGS:  Clear to auscultation bilaterally without rales/rhonchi/wheezing/retractions.  Symmetric chest rise on inhalation noted.  ABD:  Active bowel sounds, soft, non-tender/non-distended.  No rebound/guarding/rigidity.  EXT:  No edema or cyanosis.  Hands/feet warm to touch with good signs of peripheral perfusion.  No joint synovitis noted.  SKIN:  Dry to touch, no exanthems noted in the visualized areas.  NEURO:  Symmetric muscle strength, sensation to touch grossly intact.  No new focal deficits appreciated.    Data   -Data reviewed today: All pertinent laboratory and imaging  results from this encounter were reviewed.     Recent Labs  Lab 11/18/17  0734 11/17/17  1356 11/17/17  0630   HGB 13.1  --  13.9   PLT  --  184  --    POTASSIUM  --   --  3.7   CR  --  0.71 0.70   *  --   --        No results found for this or any previous visit (from the past 24 hour(s)).

## 2017-11-18 NOTE — PLAN OF CARE
Problem: Patient Care Overview  Goal: Plan of Care/Patient Progress Review  A/Ox4, slept well with C-pap  Vitals:WNL  Mobility: Ax2 with walker and gait belt.,  Get up from bed, stoop up for 2 minutes and went back to bed.  LS: Clear  BS: audible and clear all quadrant.   CMS: intact  Drsg:CDI  : Martell output 1700ml. Hemovac output 130 ml  Pain: Pain managed with Narco.

## 2017-11-18 NOTE — PLAN OF CARE
Problem: Patient Care Overview  Goal: Plan of Care/Patient Progress Review  OT order received, chart reviewed, and eval completed.  Pt. Is 48 yo female s/p R TKA who presents with pain, decreased ROM, and fatigue impairing independence and tolerance for ADLs.  Pt. Lives in multiple floor home with stairs and has family to assist with  work.    Discharge Planner OT   Patient plan for discharge: return to home with assist  Current status: SBA with toilet and bed transfer, independent with bed mobility, and pt. Able to verbalize EC/WS to assist at home, pt. Unsure about stairs and impact on ADLs  Barriers to return to prior living situation: stairs, fatigue, LE ROM  Recommendations for discharge: discharge home with family to assist  Rationale for recommendations: Pt. Motivated to participate and active with therapies.  Skilled IP OT necessary for further ADL training, DME/AE training, and EC/WS to assist with safe discharge home.       Entered by: Hilda Muhammad 11/18/2017 1:12 PM

## 2017-11-19 ENCOUNTER — APPOINTMENT (OUTPATIENT)
Dept: PHYSICAL THERAPY | Facility: CLINIC | Age: 47
DRG: 470 | End: 2017-11-19
Attending: ORTHOPAEDIC SURGERY
Payer: COMMERCIAL

## 2017-11-19 VITALS
RESPIRATION RATE: 16 BRPM | BODY MASS INDEX: 45.58 KG/M2 | WEIGHT: 267 LBS | SYSTOLIC BLOOD PRESSURE: 150 MMHG | OXYGEN SATURATION: 93 % | HEART RATE: 101 BPM | TEMPERATURE: 96.1 F | DIASTOLIC BLOOD PRESSURE: 76 MMHG | HEIGHT: 64 IN

## 2017-11-19 LAB
GLUCOSE SERPL-MCNC: 112 MG/DL (ref 70–99)
HGB BLD-MCNC: 12.7 G/DL (ref 11.7–15.7)

## 2017-11-19 PROCEDURE — 40000193 ZZH STATISTIC PT WARD VISIT

## 2017-11-19 PROCEDURE — 97116 GAIT TRAINING THERAPY: CPT | Mod: GP

## 2017-11-19 PROCEDURE — 97530 THERAPEUTIC ACTIVITIES: CPT | Mod: GP

## 2017-11-19 PROCEDURE — 25000132 ZZH RX MED GY IP 250 OP 250 PS 637: Performed by: PHYSICIAN ASSISTANT

## 2017-11-19 PROCEDURE — 25000128 H RX IP 250 OP 636: Performed by: ORTHOPAEDIC SURGERY

## 2017-11-19 PROCEDURE — 85018 HEMOGLOBIN: CPT | Performed by: ORTHOPAEDIC SURGERY

## 2017-11-19 PROCEDURE — 36415 COLL VENOUS BLD VENIPUNCTURE: CPT | Performed by: ORTHOPAEDIC SURGERY

## 2017-11-19 PROCEDURE — 82947 ASSAY GLUCOSE BLOOD QUANT: CPT | Performed by: ORTHOPAEDIC SURGERY

## 2017-11-19 PROCEDURE — 97110 THERAPEUTIC EXERCISES: CPT | Mod: GP

## 2017-11-19 PROCEDURE — 25000132 ZZH RX MED GY IP 250 OP 250 PS 637: Performed by: ORTHOPAEDIC SURGERY

## 2017-11-19 RX ORDER — HYDROXYZINE HYDROCHLORIDE 25 MG/1
25 TABLET, FILM COATED ORAL EVERY 6 HOURS PRN
Qty: 45 TABLET | Refills: 0 | Status: SHIPPED | OUTPATIENT
Start: 2017-11-19 | End: 2018-02-26

## 2017-11-19 RX ORDER — HYDROXYZINE HYDROCHLORIDE 25 MG/1
25 TABLET, FILM COATED ORAL EVERY 6 HOURS PRN
Status: DISCONTINUED | OUTPATIENT
Start: 2017-11-19 | End: 2017-11-19 | Stop reason: HOSPADM

## 2017-11-19 RX ORDER — HYDROCODONE BITARTRATE AND ACETAMINOPHEN 5; 325 MG/1; MG/1
1-2 TABLET ORAL EVERY 4 HOURS PRN
Qty: 50 TABLET | Refills: 0 | Status: SHIPPED | OUTPATIENT
Start: 2017-11-19 | End: 2018-02-26

## 2017-11-19 RX ADMIN — HYDROXYZINE HYDROCHLORIDE 25 MG: 25 TABLET ORAL at 05:31

## 2017-11-19 RX ADMIN — RANITIDINE HYDROCHLORIDE 150 MG: 150 TABLET, FILM COATED ORAL at 08:06

## 2017-11-19 RX ADMIN — HYDROCODONE BITARTRATE AND ACETAMINOPHEN 2 TABLET: 5; 325 TABLET ORAL at 05:27

## 2017-11-19 RX ADMIN — SENNOSIDES AND DOCUSATE SODIUM 1 TABLET: 8.6; 5 TABLET ORAL at 08:06

## 2017-11-19 RX ADMIN — CITALOPRAM HYDROBROMIDE 20 MG: 20 TABLET ORAL at 08:06

## 2017-11-19 RX ADMIN — BUPROPION HYDROCHLORIDE 150 MG: 150 TABLET, FILM COATED, EXTENDED RELEASE ORAL at 08:06

## 2017-11-19 RX ADMIN — HYDROCODONE BITARTRATE AND ACETAMINOPHEN 2 TABLET: 5; 325 TABLET ORAL at 09:16

## 2017-11-19 RX ADMIN — ENOXAPARIN SODIUM 40 MG: 40 INJECTION SUBCUTANEOUS at 05:27

## 2017-11-19 RX ADMIN — LISINOPRIL AND HYDROCHLOROTHIAZIDE 1 TABLET: 12.5; 2 TABLET ORAL at 08:05

## 2017-11-19 RX ADMIN — HYDROXYZINE HYDROCHLORIDE 25 MG: 25 TABLET ORAL at 11:55

## 2017-11-19 RX ADMIN — AMLODIPINE BESYLATE 5 MG: 5 TABLET ORAL at 08:05

## 2017-11-19 RX ADMIN — CELECOXIB 200 MG: 200 CAPSULE ORAL at 08:05

## 2017-11-19 NOTE — PLAN OF CARE
Problem: Patient Care Overview  Goal: Plan of Care/Patient Progress Review    A/O, Vitals:HRs tachy 90-100s, asymptomatic.  Mobility: Standbyx1 with walker&gaitbelt  LS: clear all fields.  BS:Hpoactive and constipated, passing flatus.  CMS:Intact  Drsg:CDI  :voiding   Pain:Managed with prn Norco,scheduled Tylenol   Plan: to DC home Today.

## 2017-11-19 NOTE — DISCHARGE INSTRUCTIONS
Call Md ([phone]) before or after your follow up appointment if:  1. Increased/persistent temperature chills and/or sweats  2. Increased/uncontrolled pain despite pain medication, sedated   3. Increased/persistent bleeding, redness, swelling, bruising, drainage (yellow/odorous) or incision would pull apart  4. Calf pain, swollen/hard/warm area, chest pain or shortness of breath  5. Weakness in operative leg, knee buckling, numbness and/or tingling. Or if you Fall  6. Nausea, vomiting, constipation and/or diarrhea  7. Too sleepy  8. Trouble voiding or signs and symptoms of urinary tract infection.  9. Constipation unrelieved by stool softeners (see Other instructions below)    Activity instructions:  1. Do exercises at home as instructed by therapist twice a day. Continue outpatient therapy as ordered by your doctor.  2. Ice knee after exercises and therapy or 20 minutes 3-4 times a day to reduce pain and swelling  3. If your doctor orders a cpm use as tolerated 0-90 degrees   4. Slowly increase activity back to baseline    Diet Information  Drink plenty of fluids  Eat a regular balanced diet    Dressing information:   May shower (leave dressing on ),dressing is water proof. examine around incision daily  for any signs and symptoms of infection.     Wash hands before touching skin surrounding incision  DO NOT change dressing daily leave on until follow up apt.  Inspect surrounding skin daily for s/s of infections.   Do not soak in tub or pool.   If dressing loosens wash hands, tape down edge and call surgeon.  If the dressing comes off completely then place sterile gauze over incision and tape around all edges and call surgeon for further direction.    Other instructions:  1. Notify your dentist of your implant so you can get antibiotics before any dental work or before any invasive procedure  2. Remove anti-embolism stockings (Teds) 2 times a day  3. Take an over the counter stool softener (mirilax or senna)  as directed while on narcotics to ensure bowel movements are regular.  Take a laxative (milk of magnesia and/or a suppository )as directed if no bm in 2 days.  4. Keep track of when you take all medication, especially pain medication. See bottles for instructions and side effects  5. Use incentive spirometry hourly until you are back to normal activity (helps prevent pneumonia)  6. See medication handouts for medication information and side effects    Follow up information  Call Md to make a follow up appointment with Dr. Lee @ [facility] [phone] 10-14 days from the day of surgery    Thank you for choosing [facility]

## 2017-11-19 NOTE — PLAN OF CARE
Problem: Knee Arthroplasty (Total, Partial) (Adult)  Goal: Signs and Symptoms of Listed Potential Problems Will be Absent, Minimized or Managed (Knee Arthroplasty)  Signs and symptoms of listed potential problems will be absent, minimized or managed by discharge/transition of care (reference Knee Arthroplasty (Total, Partial) (Adult) CPG).   Outcome: Improving  Day RN  A/O.  VSS, afebrile.  Pain managed with Norco and vistaril.  CMS Intact.  Dressing CDI, acewrap removed.  Up with A1 and WW ambulating in room and leigh.  Voiding adequately.  Tolerating diet.  Reviewed discharge instructions and medications with patient and spouse, answered questions.  Pt sent home with filled Rx's of aspirin, norco and vistaril.  Walked out by NST.

## 2017-11-19 NOTE — PROGRESS NOTES
[facility]  Daily Orthopedic Post-Op Note         Assessment and Plan:    Assessment:   Post-operative day #2  Procedure: right TKA   Doing well.  No immediate surgical complications identified.  Pain well-controlled.  Tolerating physical therapy and rehabilitation well.  Pain: 4/10      Plan:   -Ambulate  -Continue supportive and symptomatic treatment  -Start or continue physical therapy  -Pain control measures  Physical Therapy       Assessment:  Stable post op Right TKA  Plan:  Mobilize  Disposition: Home  Anticipated date of discharge: today         Interval History:   Doing well.  Continues to improve.  Pain is well-controlled.  No fevers.                     Physical Exam:   150/76, 96.1, 101, 16  Dressing clean, dry and intact  Calves soft and non tender  Distal neurovascular exam normal           Data:      Hemoglobin: Hemoglobin   Date Value Ref Range Status   11/19/2017 12.7 11.7 - 15.7 g/dL Final   11/18/2017 13.1 11.7 - 15.7 g/dL Final       Louann Montilla PA-C   [phone]

## 2017-11-19 NOTE — PLAN OF CARE
Problem: Patient Care Overview  Goal: Plan of Care/Patient Progress Review  Discharge Planner PT   Patient plan for discharge: Home  Current status: Pt completing all mobility with SBA, including stairs  Barriers to return to prior living situation: None  Recommendations for discharge: Home with family assist  Rationale for recommendations: Pt is SBA for all mobility with use of WW, able to ascend/descend stairs to access bedroom or pt does verbalize ability to stay on main floor of home as needed while recovering.       Entered by: Tony Terrazas 11/19/2017 11:51 AM     Physical Therapy Discharge Summary    Reason for therapy discharge:    Discharged to home.    Progress towards therapy goal(s). See goals on Care Plan in [facility] electronic health record for goal details.  Goals not met, pt requires SBA for all mobility with WW vs modified independent.   able to provide SBA upon discharge to home.    Therapy recommendation(s):    Continued therapy is recommended.  Rationale/Recommendations:  Home exercise program and outpatient PT.

## 2017-11-19 NOTE — PLAN OF CARE
Problem: Patient Care Overview  Goal: Plan of Care/Patient Progress Review     Occupational Therapy Discharge Summary    Reason for therapy discharge:    Discharged to home.    Progress towards therapy goal(s). See goals on Care Plan in [facility] electronic health record for goal details.  Goals met    Therapy recommendation(s):    No further therapy is recommended.     OT met with pt. Prior to discharge.  Pt. Verbalized understanding of ADL training for toilet, shower, and dressing and declined any needs at this time.

## 2017-11-19 NOTE — PLAN OF CARE
Problem: Knee Arthroplasty (Total, Partial) (Adult)  Goal: Signs and Symptoms of Listed Potential Problems Will be Absent, Minimized or Managed (Knee Arthroplasty)  Signs and symptoms of listed potential problems will be absent, minimized or managed by discharge/transition of care (reference Knee Arthroplasty (Total, Partial) (Adult) CPG).   Outcome: Improving  HRs tachy 90-100s, asymptomatic.  Pain managed with oral pain meds + cold.  No nausea.  CMS+.  Drsg CDI.  Up with SBA belt walker, ambulated room multiple times & hallway.  Voiding.  CPAP on sleeping.  Anticipated DC home tomorrow.

## 2017-11-20 ENCOUNTER — TELEPHONE (OUTPATIENT)
Dept: FAMILY MEDICINE | Facility: CLINIC | Age: 47
End: 2017-11-20

## 2017-11-20 NOTE — TELEPHONE ENCOUNTER
"Hospital/TCU/ED for chronic condition Discharge Protocol    \"Hi, my name is Lexus Oliveros, a registered nurse, and I am calling from [facility].  I am calling to follow up and see how things are going for you after your recent emergency visit/hospital/TCU stay.\"    Tell me how you are doing now that you are home?\" Doing good.  Just getting back from therapy.  Taking medications as prescribed.  Pain under control.      Discharge Instructions    \"Let's review your discharge instructions.  What is/are the follow-up recommendations?  Pt. Response:  f/u with PCP if no improvement or worsening.      \"Has an appointment with your primary care provider been scheduled?\"   Patient has a f/u appt with surgeon.  Will call for f/u with PCP if any problems.    \"When you see the provider, I would recommend that you bring your medications with you.\"    Medications    \"Tell me what changed about your medicines when you discharged?\"    Changes to chronic meds?    0-1    \"What questions do you have about your medications?\"    None     New diagnoses of heart failure, COPD, diabetes, or MI?    No                  Medication reconciliation completed? Yes  Was MTM referral placed (*Make sure to put transitions as reason for referral)?   No    Call Summary    \"What questions or concerns do you have about your recent visit and your follow-up care?\"     none    \"If you have questions or things don't continue to improve, we encourage you contact us through the main clinic number (give number).  Even if the clinic is not open, triage nurses are available 24/7 to help you.     We would like you to know that our clinic has extended hours (provide information).  We also have urgent care (provide details on closest location and hours/contact info)\"      \"Thank you for your time and take care!\"    Lexus Oliveros RN    "

## 2017-11-30 ENCOUNTER — TRANSFERRED RECORDS (OUTPATIENT)
Dept: HEALTH INFORMATION MANAGEMENT | Facility: CLINIC | Age: 47
End: 2017-11-30

## 2017-12-11 NOTE — DISCHARGE SUMMARY
"Discharge Summary    Lyubov Sanchez MRN# [id number]   YOB: 1970 Age: 47 year old     Date of Admission: 11/17/2017    Date of Discharge: 11/19/2017    Reason for Admission: Lyubov Sanchez is an 47 year old female who was admitted to the hospital following surgery with Dr. Hebert Lee.    Preoperative Diagnosis: DJD    Postoperative Diagnosis: DJD    Procedure Completed: right total knee arthroplasty     Hospital Course:  Ms. Sanchez was admitted and underwent the above procedure. The patient tolerated the procedure well. There were no complications. Following surgery she was admitted to the floor.  During her stay she did not require any blood transfusions.  Her last hemoglobin was noted to be   Lab Results   Component Value Date    HGB 12.7 11/19/2017   .  Her pain was controlled with oral pain medication.  During her stay she progressed well in physical therapy and all the therapy goals were met.     Discharge Physical Exam:  /76  Pulse 101  Temp 96.1  F (35.6  C)  Resp 16  Ht 1.626 m (5' 4\")  Wt 121.1 kg (267 lb)  SpO2 93%  BMI 45.83 kg/m2  Neurovascularly intact, distal pulses present bilaterally.  Calves are negative bilaterally, both soft and nontender.  The wound looks good with minimal erythema of the surrounding skin.    Assessment: Ms. Sanchez is stable and doing well status post right total knee arthroplasty on POD #2.    Plan: We will discharge her home on analgesics and deep venous thrombosis prophylaxis.  She will follow-up with Bernadette Patrick PA-C or Dr. Hebert Lee approximately 2 weeks from surgery.  She may call [phone] to schedule an appointment.    Discharge Instructions:  Discharge diet: Regular   Discharge activity: Weight bear as tolerated.  Advance from the walker to a cane as tolerated.  Discontinue the use of cane when comfortable.   Physical therapy: Work on therapy exercises given in the hospital.     Discharge follow-up: Follow up with Bernadette " FIDELIA Patrick in 10-14 days.   Anticoagulation Asprin 325 mg twice daily for 5 weeks.   Wound care: Leave aquacel dressing in place until post-op follow-up.  If it becomes loose or soiled then remove and replace with daily dry dressings.  Keep wound clean and dry.  May get incision area wet in shower but do not soak or scrub.         Meds:   Lyubov Sanchez CHRISTIN   Home Medication Instructions CANDICE:[id number]    Printed on:12/11/17 4521   Medication Information                      amLODIPine (NORVASC) 5 MG tablet  Take 1 tablet (5 mg) by mouth daily             aspirin  MG EC tablet  Take one Aspirin tab twice daily for 5 weeks.             buPROPion (WELLBUTRIN XL) 150 MG 24 hr tablet  Take 1 tablet (150 mg) by mouth every morning             citalopram (CELEXA) 20 MG tablet  Take 1 tablet (20 mg) by mouth daily             HYDROcodone-acetaminophen (NORCO) 5-325 MG per tablet  Take 1-2 tablets by mouth every 4 hours as needed for moderate to severe pain             hydrOXYzine (ATARAX) 25 MG tablet  Take 1 tablet (25 mg) by mouth every 6 hours as needed for other (adjuvant pain)             levonorgestrel (MIRENA) 20 MCG/24HR IUD               lisinopril-hydrochlorothiazide (PRINZIDE/ZESTORETIC) 20-12.5 MG per tablet  TAKE 1 TABLET BY MOUTH DAILY             Naproxen Sodium (ALEVE PO)  Take 220 mg by mouth 2 times daily as needed for moderate pain Will stop `1 week pre op per primary DrMary Lou             traMADol (ULTRAM) 50 MG tablet  Take 1-2 tablets ( mg) by mouth every 6 hours as needed for pain                     Bernadette Patrick PA-C  O: [phone]

## 2017-12-18 ENCOUNTER — TRANSFERRED RECORDS (OUTPATIENT)
Dept: HEALTH INFORMATION MANAGEMENT | Facility: CLINIC | Age: 47
End: 2017-12-18

## 2018-01-29 ENCOUNTER — TRANSFERRED RECORDS (OUTPATIENT)
Dept: HEALTH INFORMATION MANAGEMENT | Facility: CLINIC | Age: 48
End: 2018-01-29

## 2018-02-21 DIAGNOSIS — F41.9 ANXIETY: ICD-10-CM

## 2018-02-21 DIAGNOSIS — E66.01 MORBID OBESITY DUE TO EXCESS CALORIES (H): ICD-10-CM

## 2018-02-21 RX ORDER — BUPROPION HYDROCHLORIDE 150 MG/1
TABLET ORAL
Qty: 90 TABLET | Refills: 1 | Status: SHIPPED | OUTPATIENT
Start: 2018-02-21 | End: 2018-09-04

## 2018-02-21 NOTE — TELEPHONE ENCOUNTER
Prescription approved per [facility] Refill Protocol.  Lexus Oliveros RN

## 2018-02-21 NOTE — TELEPHONE ENCOUNTER
"Requested Prescriptions   Pending Prescriptions Disp Refills     buPROPion (WELLBUTRIN XL) 150 MG 24 hr tablet [Pharmacy Med Name: BUPROPION HCL  MG TABLET] 90 tablet 1    Last Written Prescription Date:  12/12/17  Last Fill Quantity: 90,  # refills: 1   Last Office Visit: 11/10/2017   Future Office Visit:      Sig: TAKE 1 TABLET (150 MG) BY MOUTH EVERY MORNING    SSRIs Protocol Passed    2/21/2018  8:21 AM       Passed - Recent or future visit with authorizing provider    Patient had office visit in the last year or has a visit in the next 30 days with authorizing provider.  See \"Patient Info\" tab in inbasket, or \"Choose Columns\" in Meds & Orders section of the refill encounter.            Passed - Medication is Bupropion    If the medication is Bupropion (Wellbutrin), and the patient is taking for smoking cessation; OK to refill.         Passed - Patient is age 18 or older       Passed - No active pregnancy on record       Passed - No positive pregnancy test in last 12 months          "

## 2018-02-26 ENCOUNTER — OFFICE VISIT (OUTPATIENT)
Dept: FAMILY MEDICINE | Facility: CLINIC | Age: 48
End: 2018-02-26
Payer: COMMERCIAL

## 2018-02-26 VITALS
TEMPERATURE: 98.3 F | BODY MASS INDEX: 45.14 KG/M2 | RESPIRATION RATE: 16 BRPM | SYSTOLIC BLOOD PRESSURE: 118 MMHG | HEART RATE: 88 BPM | WEIGHT: 263 LBS | DIASTOLIC BLOOD PRESSURE: 70 MMHG

## 2018-02-26 DIAGNOSIS — Z01.818 PREOP GENERAL PHYSICAL EXAM: Primary | ICD-10-CM

## 2018-02-26 DIAGNOSIS — M25.562 CHRONIC PAIN OF LEFT KNEE: ICD-10-CM

## 2018-02-26 DIAGNOSIS — I10 HTN, GOAL BELOW 140/90: ICD-10-CM

## 2018-02-26 DIAGNOSIS — G89.29 CHRONIC PAIN OF LEFT KNEE: ICD-10-CM

## 2018-02-26 LAB — HGB BLD-MCNC: 14 G/DL (ref 11.7–15.7)

## 2018-02-26 PROCEDURE — 99214 OFFICE O/P EST MOD 30 MIN: CPT | Performed by: PHYSICIAN ASSISTANT

## 2018-02-26 PROCEDURE — 80048 BASIC METABOLIC PNL TOTAL CA: CPT | Performed by: PHYSICIAN ASSISTANT

## 2018-02-26 PROCEDURE — 84443 ASSAY THYROID STIM HORMONE: CPT | Performed by: PHYSICIAN ASSISTANT

## 2018-02-26 PROCEDURE — 85018 HEMOGLOBIN: CPT | Performed by: PHYSICIAN ASSISTANT

## 2018-02-26 PROCEDURE — 36415 COLL VENOUS BLD VENIPUNCTURE: CPT | Performed by: PHYSICIAN ASSISTANT

## 2018-02-26 RX ORDER — TRAMADOL HYDROCHLORIDE 50 MG/1
50-100 TABLET ORAL EVERY 6 HOURS PRN
Qty: 20 TABLET | Refills: 0 | Status: ON HOLD | OUTPATIENT
Start: 2018-02-26 | End: 2018-03-20

## 2018-02-26 NOTE — MR AVS SNAPSHOT
After Visit Summary   2/26/2018    Lyubov Sanchez    MRN: [id number]           Patient Information     Date Of Birth          1970        Visit Information        Provider Department      2/26/2018 3:40 PM Jeremiah Muhammad PA-C [facility]        Today's Diagnoses     Preop general physical exam    -  1    Chronic pain of left knee        HTN, goal below 140/90          Care Instructions      Before Your Surgery      Call your surgeon if there is any change in your health. This includes signs of a cold or flu (such as a sore throat, runny nose, cough, rash or fever).    Do not smoke, drink alcohol or take over the counter medicine (unless your surgeon or primary care doctor tells you to) for the 24 hours before and after surgery.    If you take prescribed drugs: Follow your doctor s orders about which medicines to take and which to stop until after surgery.    Eating and drinking prior to surgery: follow the instructions from your surgeon    Take a shower or bath the night before surgery. Use the soap your surgeon gave you to gently clean your skin. If you do not have soap from your surgeon, use your regular soap. Do not shave or scrub the surgery site.  Wear clean pajamas and have clean sheets on your bed.       Don't take Lisinopril/HCTZ morning of surgery.          Follow-ups after your visit        Your next 10 appointments already scheduled     Mar 19, 2018   Procedure with Hebert Lee MD   [facility] (--)    [address] [facility] [id number] [phone]              Who to contact     If you have questions or need follow up information about today's clinic visit or your schedule please contact [facility] directly at [phone].  Normal or non-critical lab and imaging results will be communicated to you by MyChart, letter or phone within 4 business days after the clinic has received the results. If you  do not hear from us within 7 days, please contact the clinic through Lynk or phone. If you have a critical or abnormal lab result, we will notify you by phone as soon as possible.  Submit refill requests through Lynk or call your pharmacy and they will forward the refill request to us. Please allow 3 business days for your refill to be completed.          Additional Information About Your Visit        BringMeThatharOversi Information     Lynk gives you secure access to your electronic health record. If you see a primary care provider, you can also send messages to your care team and make appointments. If you have questions, please call your primary care clinic.  If you do not have a primary care provider, please call [phone] and they will assist you.        Care EveryWhere ID     This is your Care EveryWhere ID. This could be used by other organizations to access your [facility] medical records  [id number]        Your Vitals Were     Pulse Temperature Respirations BMI (Body Mass Index)          88 98.3  F (36.8  C) (Oral) 16 45.14 kg/m2         Blood Pressure from Last 3 Encounters:   02/26/18 118/70   11/19/17 150/76   11/02/17 130/88    Weight from Last 3 Encounters:   02/26/18 263 lb (119.3 kg)   11/17/17 267 lb (121.1 kg)   11/02/17 268 lb 6.4 oz (121.7 kg)              We Performed the Following     Basic metabolic panel     Hemoglobin     TSH with free T4 reflex          Where to get your medicines      Some of these will need a paper prescription and others can be bought over the counter.  Ask your nurse if you have questions.     Bring a paper prescription for each of these medications     traMADol 50 MG tablet          Primary Care Provider Office Phone # Fax #    Yolie Delarosa [phone]       [id number]   [facility] [address]        Equal Access to Services     MAGEN CARRILLO AH: sky Mccabe, pippa ho  moises glynnkeri lanadiamiguel ah. So [facility] [phone].    ATENCIÓN: Si aricla marycruz, tiene a grimes disposición servicios gratuitos de asistencia lingüística. Chetna gallegos [phone].    We comply with applicable federal civil rights laws and [place] laws. We do not discriminate on the basis of race, color, national origin, age, disability, sex, sexual orientation, or gender identity.            Thank you!     Thank you for choosing [facility]  for your care. Our goal is always to provide you with excellent care. Hearing back from our patients is one way we can continue to improve our services. Please take a few minutes to complete the written survey that you may receive in the mail after your visit with us. Thank you!             Your Updated Medication List - Protect others around you: Learn how to safely use, store and throw away your medicines at www.disposemymeds.org.          This list is accurate as of 2/26/18  4:22 PM.  Always use your most recent med list.                   Brand Name Dispense Instructions for use Diagnosis    ALEVE PO      Take 220 mg by mouth 2 times daily as needed for moderate pain Will stop `1 week pre op per primary DrMary Lou        amLODIPine 5 MG tablet    NORVASC    90 tablet    Take 1 tablet (5 mg) by mouth daily        buPROPion 150 MG 24 hr tablet    WELLBUTRIN XL    90 tablet    TAKE 1 TABLET (150 MG) BY MOUTH EVERY MORNING    Morbid obesity due to excess calories (H), Anxiety       citalopram 20 MG tablet    celeXA    90 tablet    Take 1 tablet (20 mg) by mouth daily    Major depressive disorder, recurrent episode, mild (H)       levonorgestrel 20 MCG/24HR IUD    MIRENA    1 each     Contraception       lisinopril-hydrochlorothiazide 20-12.5 MG per tablet    PRINZIDE/ZESTORETIC    90 tablet    TAKE 1 TABLET BY MOUTH DAILY    Essential hypertension, benign       traMADol 50 MG tablet    ULTRAM    20 tablet    Take 1-2 tablets ( mg) by mouth every 6 hours as needed  for pain    Chronic pain of left knee

## 2018-02-26 NOTE — PROGRESS NOTES
[address], [address]  [facility] [address]  [phone]  Dept: [phone]    PRE-OP EVALUATION:  Today's date: 2018    Lyubov Sanchez (: 1970) presents for pre-operative evaluation assessment as requested by Dr. Hebert Lee.  She requires evaluation and anesthesia risk assessment prior to undergoing surgery/procedure for treatment of Arthroplasty of the left knee .    Fax number for surgical facility:   Primary Physician: Yolie Delarosa  Type of Anesthesia Anticipated: Choice    Patient has a Health Care Directive or Living Will:  NO    Preop Questions 2018   Who is doing your surgery? dr daigle   What are you having done? knee replacement   Date of Surgery/Procedure: 3-19-18   Facility or Hospital where procedure/surgery will be performed: [facility]   1.  Do you have a history of Heart attack, stroke, stent, coronary bypass surgery, or other heart surgery? No   2.  Do you ever have any pain or discomfort in your chest? No   3.  Do you have a history of  Heart Failure? No   4.   Are you troubled by shortness of breath when:  walking on a level surface, or up a slight hill, or at night? No   5.  Do you currently have a cold, bronchitis or other respiratory infection? No   6.  Do you have a cough, shortness of breath, or wheezing? No   7.  Do you sometimes get pains in the calves of your legs when you walk? No   8. Do you or anyone in your family have previous history of blood clots? UNKNOWN - adopted   9.  Do you or does anyone in your family have a serious bleeding problem such as prolonged bleeding following surgeries or cuts? UNKNOWN - adopted   10. Have you ever had problems with anemia or been told to take iron pills? No   11. Have you had any abnormal blood loss such as black, tarry or bloody stools, or abnormal vaginal bleeding? No   12. Have you ever had a blood transfusion? No   13. Have you or any of your relatives ever  had problems with anesthesia? UNKNOWN - adopted   14. Do you have sleep apnea, excessive snoring or daytime drowsiness? YES - cpap   15. Do you have any prosthetic heart valves? No   16. Do you have prosthetic joints? YES - right knee   17. Is there any chance that you may be pregnant? No         HPI:     HPI related to upcoming procedure: Patient saw Dr. Kelly from sports medicine who diagnosed her with osteoarthritis in the knees bilaterally. She states that the osteoarthritis is worse in the left knee but reports that she also was experiencing right knee pain. She has had a steroid injection in the past that did not alleviate pain. Dr. Lee will perform the surgery on March 19, 2018.  She had past surgery in November on the other knee which went very well.       HYPERTENSION - Patient has longstanding history of HTN, currently denies any symptoms referable to elevated blood pressure. Specifically denies chest pain, palpitations, dyspnea, orthopnea, PND or peripheral edema. Blood pressure readings have been in normal range. Current medication regimen is as listed below. Patient denies any side effects of medication.                                                                                                                                                                                          .  SLEEP PROBLEM - Patient has a longstanding history of snoring of moderate severity. Patient has tried OTC medications with limited success.                                                                                                                                         .    MEDICAL HISTORY:     Patient Active Problem List    Diagnosis Date Noted     S/P total knee arthroplasty 11/17/2017     Priority: Medium     Incomplete right bundle branch block 11/10/2017     Priority: Medium     KRISTYN (obstructive sleep apnea) 11/02/2017     Priority: Medium     Morbid obesity due to excess calories (H) 07/22/2016      Priority: Medium     Anxiety 2016     Priority: Medium     Health Care Home 2015     Priority: Medium        Status:  Closed   Care Coordinator:  Caroline Snow [phone]   See Letters for [facility] Care Plan  Date:  July 3, 2015               Migraine without aura 2015     Priority: Medium     Problem list name updated by automated process. Provider to review       HTN, goal below 140/90 10/21/2014     Priority: Medium     Fibroid uterus 2014     Priority: Medium     Insertion of mirena IUD 14     Priority: Medium     Remove 2019       Migraine 2012     Priority: Medium     CARDIOVASCULAR SCREENING; LDL GOAL LESS THAN 160 10/31/2010     Priority: Medium      Past Medical History:   Diagnosis Date     Arthritis      Depressive disorder      DYSPLASIA OF CERVIX 3/6/2003     Dysplasia of cervix (uteri)     Rx cryotherapy , and      Hypertension     NO cardiology     Incomplete right bundle branch block 11/10/2017     Migraine      Other chronic pain     Knee pain for 8 years     Plantar fasciitis     bilat     PONV (postoperative nausea and vomiting)      Unspecified sleep apnea     CPAP will bring on the day of surgery.     Past Surgical History:   Procedure Laterality Date     ARTHROPLASTY KNEE Right 2017    Procedure: ARTHROPLASTY KNEE;  Right total knee arthroplasty using the [employer] total knee system;  Surgeon: Hebert Lee MD;  Location: [facility] OR     C  DELIVERY ONLY  ,    , Low Cervical     ENT SURGERY       HC COLP VAGINA W CERVIX IF PRES W BIOPSY      [place] for ASCUS     TONSILLECTOMY & ADENOIDECTOMY       Current Outpatient Prescriptions   Medication Sig Dispense Refill     buPROPion (WELLBUTRIN XL) 150 MG 24 hr tablet TAKE 1 TABLET (150 MG) BY MOUTH EVERY MORNING 90 tablet 1     Naproxen Sodium (ALEVE PO) Take 220 mg by mouth 2 times daily as needed for moderate pain Will  stop `1 week pre op per primary        amLODIPine (NORVASC) 5 MG tablet Take 1 tablet (5 mg) by mouth daily 90 tablet 1     lisinopril-hydrochlorothiazide (PRINZIDE/ZESTORETIC) 20-12.5 MG per tablet TAKE 1 TABLET BY MOUTH DAILY 90 tablet 2     citalopram (CELEXA) 20 MG tablet Take 1 tablet (20 mg) by mouth daily 90 tablet 1     levonorgestrel (MIRENA) 20 MCG/24HR IUD  1 each 0     [DISCONTINUED] buPROPion (WELLBUTRIN XL) 150 MG 24 hr tablet TAKE 1 TABLET (150 MG) BY MOUTH EVERY MORNING 90 tablet 1     traMADol (ULTRAM) 50 MG tablet Take 1-2 tablets ( mg) by mouth every 6 hours as needed for pain 20 tablet 0     OTC products: None, except as noted above- will replace Aleve with Tramadol for pain within a week of surgery    Allergies   Allergen Reactions     Penicillins Hives     hives      Latex Allergy: NO    Social History   Substance Use Topics     Smoking status: Former Smoker     Packs/day: 0.50     Years: 5.00     Types: Cigarettes     Quit date: 4/1/2007     Smokeless tobacco: Never Used     Alcohol use Yes      Comment: 3-4 drinks weekly     History   Drug Use No       REVIEW OF SYSTEMS:   Constitutional, HEENT, cardiovascular, pulmonary, gi and gu systems are negative, except as otherwise noted.    EXAM:   There were no vitals taken for this visit.    GENERAL APPEARANCE: healthy, alert and no distress     EYES: EOMI, PERRL     HENT: ear canals and TM's normal and nose and mouth without ulcers or lesions     NECK: no adenopathy, no asymmetry, masses, or scars and thyroid normal to palpation     RESP: lungs clear to auscultation - no rales, rhonchi or wheezes     CV: regular rates and rhythm, normal S1 S2, no S3 or S4 and no murmur, click or rub     ABDOMEN:  soft, nontender, no HSM or masses and bowel sounds normal     MS: extremities normal- no gross deformities noted, no evidence of inflammation in joints, FROM in all extremities.     SKIN: no suspicious lesions or rashes     NEURO: Normal strength  and tone, sensory exam grossly normal, mentation intact and speech normal     PSYCH: mentation appears normal. and affect normal/bright     LYMPHATICS: No cervical adenopathy    DIAGNOSTICS:     Labs Drawn and in Process:   Unresulted Labs Ordered in the Past 30 Days of this Admission     No orders found from 12/28/2017 to 2/27/2018.          Recent Labs   Lab Test  11/19/17   0636  11/18/17   0734  11/17/17   1356  11/17/17   0630   07/05/17   0945  07/22/16   0814  06/21/15   1335   03/26/12   1845   HGB  12.7  13.1   --   13.9   < >   --   14.3  13.9   < >  14.1   PLT   --    --   184   --    --    --   176  177   < >  192   INR   --    --    --    --    --    --    --   1.00   --   1.71*   NA   --    --    --    --    --   139  137  136   < >  142   POTASSIUM   --    --    --   3.7   --   3.9  4.0  3.8   < >  4.3   CR   --    --   0.71  0.70   --   0.63  0.65  0.75   < >  0.65   A1C   --    --    --    --    --   5.2  5.8   --    --    --     < > = values in this interval not displayed.        IMPRESSION:   Reason for surgery/procedure: right knee pain  Diagnosis/reason for consult: pre op exam    The proposed surgical procedure is considered INTERMEDIATE risk.    REVISED CARDIAC RISK INDEX  The patient has the following serious cardiovascular risks for perioperative complications such as (MI, PE, VFib and 3  AV Block):  No serious cardiac risks  INTERPRETATION: 0 risks: Class I (very low risk - 0.4% complication rate)    The patient has the following additional risks for perioperative complications:  No identified additional risks      ICD-10-CM    1. Preop general physical exam Z01.818    2. Osteoarthritis of right knee, unspecified osteoarthritis type M17.11 traMADol (ULTRAM) 50 MG tablet       RECOMMENDATIONS:   --Patient is to take all scheduled medications on the day of surgery EXCEPT for modifications listed below.    ACE Inhibitor or Angiotensin Receptor Blocker (ARB) Use  Ace inhibitor or Angiotensin  Receptor Blocker (ARB) and should HOLD this medication for the 24 hours prior to surgery.  Lisinopril/HCTZ      APPROVAL GIVEN to proceed with proposed procedure, without further diagnostic evaluation       Signed Electronically by: Jeremiah Muhammad PA-C    Copy of this evaluation report is provided to requesting physician.    [place] Preop Guidelines

## 2018-02-26 NOTE — PATIENT INSTRUCTIONS
Before Your Surgery      Call your surgeon if there is any change in your health. This includes signs of a cold or flu (such as a sore throat, runny nose, cough, rash or fever).    Do not smoke, drink alcohol or take over the counter medicine (unless your surgeon or primary care doctor tells you to) for the 24 hours before and after surgery.    If you take prescribed drugs: Follow your doctor s orders about which medicines to take and which to stop until after surgery.    Eating and drinking prior to surgery: follow the instructions from your surgeon    Take a shower or bath the night before surgery. Use the soap your surgeon gave you to gently clean your skin. If you do not have soap from your surgeon, use your regular soap. Do not shave or scrub the surgery site.  Wear clean pajamas and have clean sheets on your bed.       Don't take Lisinopril/HCTZ morning of surgery.

## 2018-02-27 LAB
ANION GAP SERPL CALCULATED.3IONS-SCNC: 7 MMOL/L (ref 3–14)
BUN SERPL-MCNC: 12 MG/DL (ref 7–30)
CALCIUM SERPL-MCNC: 9 MG/DL (ref 8.5–10.1)
CHLORIDE SERPL-SCNC: 103 MMOL/L (ref 94–109)
CO2 SERPL-SCNC: 28 MMOL/L (ref 20–32)
CREAT SERPL-MCNC: 0.66 MG/DL (ref 0.52–1.04)
GFR SERPL CREATININE-BSD FRML MDRD: >90 ML/MIN/1.7M2
GLUCOSE SERPL-MCNC: 122 MG/DL (ref 70–99)
POTASSIUM SERPL-SCNC: 3.7 MMOL/L (ref 3.4–5.3)
SODIUM SERPL-SCNC: 138 MMOL/L (ref 133–144)
TSH SERPL DL<=0.005 MIU/L-ACNC: 0.79 MU/L (ref 0.4–4)

## 2018-03-01 ENCOUNTER — ALLIED HEALTH/NURSE VISIT (OUTPATIENT)
Dept: NURSING | Facility: CLINIC | Age: 48
End: 2018-03-01
Payer: COMMERCIAL

## 2018-03-01 DIAGNOSIS — Z01.812 PRE-OPERATIVE LABORATORY EXAMINATION: ICD-10-CM

## 2018-03-01 PROCEDURE — 87640 STAPH A DNA AMP PROBE: CPT | Mod: 59 | Performed by: ORTHOPAEDIC SURGERY

## 2018-03-01 PROCEDURE — 99207 ZZC NO CHARGE NURSE ONLY: CPT

## 2018-03-01 PROCEDURE — 87641 MR-STAPH DNA AMP PROBE: CPT | Performed by: ORTHOPAEDIC SURGERY

## 2018-03-02 LAB
MRSA DNA SPEC QL NAA+PROBE: NEGATIVE
SPECIMEN SOURCE: NORMAL

## 2018-03-13 NOTE — PHARMACY-ADMISSION MEDICATION HISTORY
Pharmacy reviewed prior to admission med list from pre-admitting rn, BRENDA Junior.        Prior to Admission medications    Medication Sig Last Dose Taking? Auth Provider   traMADol (ULTRAM) 50 MG tablet Take 1-2 tablets ( mg) by mouth every 6 hours as needed for pain  Yes Jeremiah Muhammad PA-C   buPROPion (WELLBUTRIN XL) 150 MG 24 hr tablet TAKE 1 TABLET (150 MG) BY MOUTH EVERY MORNING  Yes Jeremiah Muhammad PA-C   Naproxen Sodium (ALEVE PO) Take 220 mg by mouth 2 times daily as needed for moderate pain Will stop `1 week pre op per primary DrMary Lou  Yes Reported, Patient   amLODIPine (NORVASC) 5 MG tablet Take 1 tablet (5 mg) by mouth daily  Yes Yolie Delarosa MD   lisinopril-hydrochlorothiazide (PRINZIDE/ZESTORETIC) 20-12.5 MG per tablet TAKE 1 TABLET BY MOUTH DAILY  Yes Jeremiah Muhammad PA-C   citalopram (CELEXA) 20 MG tablet Take 1 tablet (20 mg) by mouth daily  Yes Jeremiah Muhammad PA-C   levonorgestrel (MIRENA) 20 MCG/24HR IUD   Yes Yolie Delarosa MD

## 2018-03-14 NOTE — H&P (VIEW-ONLY)
[address], [address]  [facility] [address]  [phone]  Dept: [phone]    PRE-OP EVALUATION:  Today's date: 2018    Lyubov Sanchez (: 1970) presents for pre-operative evaluation assessment as requested by Dr. Hebert Lee.  She requires evaluation and anesthesia risk assessment prior to undergoing surgery/procedure for treatment of Arthroplasty of the left knee .    Fax number for surgical facility:   Primary Physician: Yolie Delarosa  Type of Anesthesia Anticipated: Choice    Patient has a Health Care Directive or Living Will:  NO    Preop Questions 2018   Who is doing your surgery? dr daigle   What are you having done? knee replacement   Date of Surgery/Procedure: 3-19-18   Facility or Hospital where procedure/surgery will be performed: [facility]   1.  Do you have a history of Heart attack, stroke, stent, coronary bypass surgery, or other heart surgery? No   2.  Do you ever have any pain or discomfort in your chest? No   3.  Do you have a history of  Heart Failure? No   4.   Are you troubled by shortness of breath when:  walking on a level surface, or up a slight hill, or at night? No   5.  Do you currently have a cold, bronchitis or other respiratory infection? No   6.  Do you have a cough, shortness of breath, or wheezing? No   7.  Do you sometimes get pains in the calves of your legs when you walk? No   8. Do you or anyone in your family have previous history of blood clots? UNKNOWN - adopted   9.  Do you or does anyone in your family have a serious bleeding problem such as prolonged bleeding following surgeries or cuts? UNKNOWN - adopted   10. Have you ever had problems with anemia or been told to take iron pills? No   11. Have you had any abnormal blood loss such as black, tarry or bloody stools, or abnormal vaginal bleeding? No   12. Have you ever had a blood transfusion? No   13. Have you or any of your relatives ever  had problems with anesthesia? UNKNOWN - adopted   14. Do you have sleep apnea, excessive snoring or daytime drowsiness? YES - cpap   15. Do you have any prosthetic heart valves? No   16. Do you have prosthetic joints? YES - right knee   17. Is there any chance that you may be pregnant? No         HPI:     HPI related to upcoming procedure: Patient saw Dr. Kelly from sports medicine who diagnosed her with osteoarthritis in the knees bilaterally. She states that the osteoarthritis is worse in the left knee but reports that she also was experiencing right knee pain. She has had a steroid injection in the past that did not alleviate pain. Dr. Lee will perform the surgery on March 19, 2018.  She had past surgery in November on the other knee which went very well.       HYPERTENSION - Patient has longstanding history of HTN, currently denies any symptoms referable to elevated blood pressure. Specifically denies chest pain, palpitations, dyspnea, orthopnea, PND or peripheral edema. Blood pressure readings have been in normal range. Current medication regimen is as listed below. Patient denies any side effects of medication.                                                                                                                                                                                          .  SLEEP PROBLEM - Patient has a longstanding history of snoring of moderate severity. Patient has tried OTC medications with limited success.                                                                                                                                         .    MEDICAL HISTORY:     Patient Active Problem List    Diagnosis Date Noted     S/P total knee arthroplasty 11/17/2017     Priority: Medium     Incomplete right bundle branch block 11/10/2017     Priority: Medium     KRISTYN (obstructive sleep apnea) 11/02/2017     Priority: Medium     Morbid obesity due to excess calories (H) 07/22/2016      Priority: Medium     Anxiety 2016     Priority: Medium     Health Care Home 2015     Priority: Medium        Status:  Closed   Care Coordinator:  Caroline Snow [phone]   See Letters for [facility] Emergency Care Plan  Date:  July 3, 2015               Migraine without aura 2015     Priority: Medium     Problem list name updated by automated process. Provider to review       HTN, goal below 140/90 10/21/2014     Priority: Medium     Fibroid uterus 2014     Priority: Medium     Insertion of mirena IUD 14     Priority: Medium     Remove 2019       Migraine 2012     Priority: Medium     CARDIOVASCULAR SCREENING; LDL GOAL LESS THAN 160 10/31/2010     Priority: Medium      Past Medical History:   Diagnosis Date     Arthritis      Depressive disorder      DYSPLASIA OF CERVIX 3/6/2003     Dysplasia of cervix (uteri)     Rx cryotherapy , and      Hypertension     NO cardiology     Incomplete right bundle branch block 11/10/2017     Migraine      Other chronic pain     Knee pain for 8 years     Plantar fasciitis     bilat     PONV (postoperative nausea and vomiting)      Unspecified sleep apnea     CPAP will bring on the day of surgery.     Past Surgical History:   Procedure Laterality Date     ARTHROPLASTY KNEE Right 2017    Procedure: ARTHROPLASTY KNEE;  Right total knee arthroplasty using the [employer] total knee system;  Surgeon: Hebert Lee MD;  Location: [facility] OR     C  DELIVERY ONLY  ,    , Low Cervical     ENT SURGERY       HC COLP VAGINA W CERVIX IF PRES W BIOPSY      [place] for ASCUS     TONSILLECTOMY & ADENOIDECTOMY       Current Outpatient Prescriptions   Medication Sig Dispense Refill     buPROPion (WELLBUTRIN XL) 150 MG 24 hr tablet TAKE 1 TABLET (150 MG) BY MOUTH EVERY MORNING 90 tablet 1     Naproxen Sodium (ALEVE PO) Take 220 mg by mouth 2 times daily as needed for moderate pain Will  stop `1 week pre op per primary        amLODIPine (NORVASC) 5 MG tablet Take 1 tablet (5 mg) by mouth daily 90 tablet 1     lisinopril-hydrochlorothiazide (PRINZIDE/ZESTORETIC) 20-12.5 MG per tablet TAKE 1 TABLET BY MOUTH DAILY 90 tablet 2     citalopram (CELEXA) 20 MG tablet Take 1 tablet (20 mg) by mouth daily 90 tablet 1     levonorgestrel (MIRENA) 20 MCG/24HR IUD  1 each 0     [DISCONTINUED] buPROPion (WELLBUTRIN XL) 150 MG 24 hr tablet TAKE 1 TABLET (150 MG) BY MOUTH EVERY MORNING 90 tablet 1     traMADol (ULTRAM) 50 MG tablet Take 1-2 tablets ( mg) by mouth every 6 hours as needed for pain 20 tablet 0     OTC products: None, except as noted above- will replace Aleve with Tramadol for pain within a week of surgery    Allergies   Allergen Reactions     Penicillins Hives     hives      Latex Allergy: NO    Social History   Substance Use Topics     Smoking status: Former Smoker     Packs/day: 0.50     Years: 5.00     Types: Cigarettes     Quit date: 4/1/2007     Smokeless tobacco: Never Used     Alcohol use Yes      Comment: 3-4 drinks weekly     History   Drug Use No       REVIEW OF SYSTEMS:   Constitutional, HEENT, cardiovascular, pulmonary, gi and gu systems are negative, except as otherwise noted.    EXAM:   There were no vitals taken for this visit.    GENERAL APPEARANCE: healthy, alert and no distress     EYES: EOMI, PERRL     HENT: ear canals and TM's normal and nose and mouth without ulcers or lesions     NECK: no adenopathy, no asymmetry, masses, or scars and thyroid normal to palpation     RESP: lungs clear to auscultation - no rales, rhonchi or wheezes     CV: regular rates and rhythm, normal S1 S2, no S3 or S4 and no murmur, click or rub     ABDOMEN:  soft, nontender, no HSM or masses and bowel sounds normal     MS: extremities normal- no gross deformities noted, no evidence of inflammation in joints, FROM in all extremities.     SKIN: no suspicious lesions or rashes     NEURO: Normal strength  and tone, sensory exam grossly normal, mentation intact and speech normal     PSYCH: mentation appears normal. and affect normal/bright     LYMPHATICS: No cervical adenopathy    DIAGNOSTICS:     Labs Drawn and in Process:   Unresulted Labs Ordered in the Past 30 Days of this Admission     No orders found from 12/28/2017 to 2/27/2018.          Recent Labs   Lab Test  11/19/17   0636  11/18/17   0734  11/17/17   1356  11/17/17   0630   07/05/17   0945  07/22/16   0814  06/21/15   1335   03/26/12   1845   HGB  12.7  13.1   --   13.9   < >   --   14.3  13.9   < >  14.1   PLT   --    --   184   --    --    --   176  177   < >  192   INR   --    --    --    --    --    --    --   1.00   --   1.71*   NA   --    --    --    --    --   139  137  136   < >  142   POTASSIUM   --    --    --   3.7   --   3.9  4.0  3.8   < >  4.3   CR   --    --   0.71  0.70   --   0.63  0.65  0.75   < >  0.65   A1C   --    --    --    --    --   5.2  5.8   --    --    --     < > = values in this interval not displayed.        IMPRESSION:   Reason for surgery/procedure: right knee pain  Diagnosis/reason for consult: pre op exam    The proposed surgical procedure is considered INTERMEDIATE risk.    REVISED CARDIAC RISK INDEX  The patient has the following serious cardiovascular risks for perioperative complications such as (MI, PE, VFib and 3  AV Block):  No serious cardiac risks  INTERPRETATION: 0 risks: Class I (very low risk - 0.4% complication rate)    The patient has the following additional risks for perioperative complications:  No identified additional risks      ICD-10-CM    1. Preop general physical exam Z01.818    2. Osteoarthritis of right knee, unspecified osteoarthritis type M17.11 traMADol (ULTRAM) 50 MG tablet       RECOMMENDATIONS:   --Patient is to take all scheduled medications on the day of surgery EXCEPT for modifications listed below.    ACE Inhibitor or Angiotensin Receptor Blocker (ARB) Use  Ace inhibitor or Angiotensin  Receptor Blocker (ARB) and should HOLD this medication for the 24 hours prior to surgery.  Lisinopril/HCTZ      APPROVAL GIVEN to proceed with proposed procedure, without further diagnostic evaluation       Signed Electronically by: Jeremiah Muhammad PA-C    Copy of this evaluation report is provided to requesting physician.    [place] Preop Guidelines

## 2018-03-19 ENCOUNTER — APPOINTMENT (OUTPATIENT)
Dept: GENERAL RADIOLOGY | Facility: CLINIC | Age: 48
DRG: 470 | End: 2018-03-19
Attending: ORTHOPAEDIC SURGERY
Payer: COMMERCIAL

## 2018-03-19 ENCOUNTER — ANESTHESIA EVENT (OUTPATIENT)
Dept: SURGERY | Facility: CLINIC | Age: 48
DRG: 470 | End: 2018-03-19
Payer: COMMERCIAL

## 2018-03-19 ENCOUNTER — ANESTHESIA (OUTPATIENT)
Dept: SURGERY | Facility: CLINIC | Age: 48
DRG: 470 | End: 2018-03-19
Payer: COMMERCIAL

## 2018-03-19 ENCOUNTER — HOSPITAL ENCOUNTER (INPATIENT)
Facility: CLINIC | Age: 48
LOS: 2 days | Discharge: HOME | DRG: 470 | End: 2018-03-21
Attending: ORTHOPAEDIC SURGERY | Admitting: ORTHOPAEDIC SURGERY
Payer: COMMERCIAL

## 2018-03-19 DIAGNOSIS — Z96.652 STATUS POST TOTAL LEFT KNEE REPLACEMENT: Primary | ICD-10-CM

## 2018-03-19 PROBLEM — Z96.659 S/P TOTAL KNEE ARTHROPLASTY: Status: ACTIVE | Noted: 2017-11-17

## 2018-03-19 LAB
CREAT SERPL-MCNC: 0.55 MG/DL (ref 0.52–1.04)
GFR SERPL CREATININE-BSD FRML MDRD: >90 ML/MIN/1.7M2
HCG UR QL: NEGATIVE
PLATELET # BLD AUTO: 179 10E9/L (ref 150–450)

## 2018-03-19 PROCEDURE — 40000171 ZZH STATISTIC PRE-PROCEDURE ASSESSMENT III: Performed by: ORTHOPAEDIC SURGERY

## 2018-03-19 PROCEDURE — C1776 JOINT DEVICE (IMPLANTABLE): HCPCS | Performed by: ORTHOPAEDIC SURGERY

## 2018-03-19 PROCEDURE — 25000125 ZZHC RX 250: Performed by: NURSE ANESTHETIST, CERTIFIED REGISTERED

## 2018-03-19 PROCEDURE — 25000566 ZZH SEVOFLURANE, EA 15 MIN: Performed by: ORTHOPAEDIC SURGERY

## 2018-03-19 PROCEDURE — 27210794 ZZH OR GENERAL SUPPLY STERILE: Performed by: ORTHOPAEDIC SURGERY

## 2018-03-19 PROCEDURE — 81025 URINE PREGNANCY TEST: CPT | Performed by: ANESTHESIOLOGY

## 2018-03-19 PROCEDURE — 37000008 ZZH ANESTHESIA TECHNICAL FEE, 1ST 30 MIN: Performed by: ORTHOPAEDIC SURGERY

## 2018-03-19 PROCEDURE — 82565 ASSAY OF CREATININE: CPT | Performed by: ORTHOPAEDIC SURGERY

## 2018-03-19 PROCEDURE — 25000125 ZZHC RX 250: Performed by: PHYSICIAN ASSISTANT

## 2018-03-19 PROCEDURE — 25800025 ZZH RX 258: Performed by: ORTHOPAEDIC SURGERY

## 2018-03-19 PROCEDURE — 25000132 ZZH RX MED GY IP 250 OP 250 PS 637: Performed by: PHYSICIAN ASSISTANT

## 2018-03-19 PROCEDURE — 37000009 ZZH ANESTHESIA TECHNICAL FEE, EACH ADDTL 15 MIN: Performed by: ORTHOPAEDIC SURGERY

## 2018-03-19 PROCEDURE — 25000128 H RX IP 250 OP 636: Performed by: PHYSICIAN ASSISTANT

## 2018-03-19 PROCEDURE — 85049 AUTOMATED PLATELET COUNT: CPT | Performed by: ORTHOPAEDIC SURGERY

## 2018-03-19 PROCEDURE — 12000007 ZZH R&B INTERMEDIATE

## 2018-03-19 PROCEDURE — 25000132 ZZH RX MED GY IP 250 OP 250 PS 637: Performed by: ORTHOPAEDIC SURGERY

## 2018-03-19 PROCEDURE — 25000128 H RX IP 250 OP 636: Performed by: ANESTHESIOLOGY

## 2018-03-19 PROCEDURE — 25000125 ZZHC RX 250: Performed by: ANESTHESIOLOGY

## 2018-03-19 PROCEDURE — 73560 X-RAY EXAM OF KNEE 1 OR 2: CPT | Mod: LT

## 2018-03-19 PROCEDURE — 36000093 ZZH SURGERY LEVEL 4 1ST 30 MIN: Performed by: ORTHOPAEDIC SURGERY

## 2018-03-19 PROCEDURE — 71000012 ZZH RECOVERY PHASE 1 LEVEL 1 FIRST HR: Performed by: ORTHOPAEDIC SURGERY

## 2018-03-19 PROCEDURE — 25000128 H RX IP 250 OP 636: Performed by: NURSE ANESTHETIST, CERTIFIED REGISTERED

## 2018-03-19 PROCEDURE — 36415 COLL VENOUS BLD VENIPUNCTURE: CPT | Performed by: ORTHOPAEDIC SURGERY

## 2018-03-19 PROCEDURE — 25000128 H RX IP 250 OP 636: Performed by: ORTHOPAEDIC SURGERY

## 2018-03-19 PROCEDURE — 36000063 ZZH SURGERY LEVEL 4 EA 15 ADDTL MIN: Performed by: ORTHOPAEDIC SURGERY

## 2018-03-19 PROCEDURE — 27810169 ZZH OR IMPLANT GENERAL: Performed by: ORTHOPAEDIC SURGERY

## 2018-03-19 PROCEDURE — 0SRD0J9 REPLACEMENT OF LEFT KNEE JOINT WITH SYNTHETIC SUBSTITUTE, CEMENTED, OPEN APPROACH: ICD-10-PCS | Performed by: ORTHOPAEDIC SURGERY

## 2018-03-19 DEVICE — IMPLANTABLE DEVICE: Type: IMPLANTABLE DEVICE | Site: KNEE | Status: FUNCTIONAL

## 2018-03-19 DEVICE — BONE CEMENT SIMPLEX W/TOBRAMYCIN 6197-9-001: Type: IMPLANTABLE DEVICE | Site: KNEE | Status: FUNCTIONAL

## 2018-03-19 DEVICE — IMP COMP TKA IBALANCE MOD TIBIAL TRAY SZ 3 AR-513-T3: Type: IMPLANTABLE DEVICE | Site: KNEE | Status: FUNCTIONAL

## 2018-03-19 RX ORDER — BUPROPION HYDROCHLORIDE 150 MG/1
150 TABLET ORAL DAILY
Status: DISCONTINUED | OUTPATIENT
Start: 2018-03-20 | End: 2018-03-21 | Stop reason: HOSPADM

## 2018-03-19 RX ORDER — SODIUM CHLORIDE 9 MG/ML
INJECTION, SOLUTION INTRAVENOUS CONTINUOUS
Status: DISCONTINUED | OUTPATIENT
Start: 2018-03-19 | End: 2018-03-21 | Stop reason: HOSPADM

## 2018-03-19 RX ORDER — PROPOFOL 10 MG/ML
INJECTION, EMULSION INTRAVENOUS CONTINUOUS PRN
Status: DISCONTINUED | OUTPATIENT
Start: 2018-03-19 | End: 2018-03-19

## 2018-03-19 RX ORDER — DIMENHYDRINATE 50 MG/ML
25 INJECTION, SOLUTION INTRAMUSCULAR; INTRAVENOUS
Status: DISCONTINUED | OUTPATIENT
Start: 2018-03-19 | End: 2018-03-19 | Stop reason: HOSPADM

## 2018-03-19 RX ORDER — CEFAZOLIN SODIUM 2 G/100ML
2 INJECTION, SOLUTION INTRAVENOUS
Status: COMPLETED | OUTPATIENT
Start: 2018-03-19 | End: 2018-03-19

## 2018-03-19 RX ORDER — AMOXICILLIN 250 MG
1 CAPSULE ORAL 2 TIMES DAILY
Status: DISCONTINUED | OUTPATIENT
Start: 2018-03-19 | End: 2018-03-21 | Stop reason: HOSPADM

## 2018-03-19 RX ORDER — AMLODIPINE BESYLATE 5 MG/1
5 TABLET ORAL DAILY
Status: DISCONTINUED | OUTPATIENT
Start: 2018-03-20 | End: 2018-03-21 | Stop reason: HOSPADM

## 2018-03-19 RX ORDER — NEOSTIGMINE METHYLSULFATE 1 MG/ML
VIAL (ML) INJECTION PRN
Status: DISCONTINUED | OUTPATIENT
Start: 2018-03-19 | End: 2018-03-19

## 2018-03-19 RX ORDER — HYDROMORPHONE HYDROCHLORIDE 1 MG/ML
.3-.5 INJECTION, SOLUTION INTRAMUSCULAR; INTRAVENOUS; SUBCUTANEOUS EVERY 5 MIN PRN
Status: DISCONTINUED | OUTPATIENT
Start: 2018-03-19 | End: 2018-03-19 | Stop reason: HOSPADM

## 2018-03-19 RX ORDER — BUPIVACAINE HYDROCHLORIDE AND EPINEPHRINE 5; 5 MG/ML; UG/ML
INJECTION, SOLUTION PERINEURAL PRN
Status: DISCONTINUED | OUTPATIENT
Start: 2018-03-19 | End: 2018-03-19

## 2018-03-19 RX ORDER — OXYCODONE HYDROCHLORIDE 5 MG/1
5-10 TABLET ORAL
Status: DISCONTINUED | OUTPATIENT
Start: 2018-03-19 | End: 2018-03-20

## 2018-03-19 RX ORDER — GLYCOPYRROLATE 0.2 MG/ML
INJECTION, SOLUTION INTRAMUSCULAR; INTRAVENOUS PRN
Status: DISCONTINUED | OUTPATIENT
Start: 2018-03-19 | End: 2018-03-19

## 2018-03-19 RX ORDER — DEXAMETHASONE SODIUM PHOSPHATE 4 MG/ML
INJECTION, SOLUTION INTRA-ARTICULAR; INTRALESIONAL; INTRAMUSCULAR; INTRAVENOUS; SOFT TISSUE PRN
Status: DISCONTINUED | OUTPATIENT
Start: 2018-03-19 | End: 2018-03-19

## 2018-03-19 RX ORDER — GLYCINE 1.5 G/100ML
SOLUTION IRRIGATION PRN
Status: DISCONTINUED | OUTPATIENT
Start: 2018-03-19 | End: 2018-03-19 | Stop reason: HOSPADM

## 2018-03-19 RX ORDER — PROPOFOL 10 MG/ML
INJECTION, EMULSION INTRAVENOUS PRN
Status: DISCONTINUED | OUTPATIENT
Start: 2018-03-19 | End: 2018-03-19

## 2018-03-19 RX ORDER — ALBUTEROL SULFATE 0.83 MG/ML
2.5 SOLUTION RESPIRATORY (INHALATION) EVERY 4 HOURS PRN
Status: DISCONTINUED | OUTPATIENT
Start: 2018-03-19 | End: 2018-03-19 | Stop reason: HOSPADM

## 2018-03-19 RX ORDER — CEFAZOLIN SODIUM 1 G/3ML
1 INJECTION, POWDER, FOR SOLUTION INTRAMUSCULAR; INTRAVENOUS SEE ADMIN INSTRUCTIONS
Status: DISCONTINUED | OUTPATIENT
Start: 2018-03-19 | End: 2018-03-19 | Stop reason: HOSPADM

## 2018-03-19 RX ORDER — HYDROMORPHONE HYDROCHLORIDE 1 MG/ML
.3-.5 INJECTION, SOLUTION INTRAMUSCULAR; INTRAVENOUS; SUBCUTANEOUS
Status: DISCONTINUED | OUTPATIENT
Start: 2018-03-19 | End: 2018-03-21 | Stop reason: HOSPADM

## 2018-03-19 RX ORDER — ZOLPIDEM TARTRATE 5 MG/1
5 TABLET ORAL
Status: DISCONTINUED | OUTPATIENT
Start: 2018-03-20 | End: 2018-03-21 | Stop reason: HOSPADM

## 2018-03-19 RX ORDER — PANTOPRAZOLE SODIUM 40 MG/1
40 TABLET, DELAYED RELEASE ORAL ONCE
Status: COMPLETED | OUTPATIENT
Start: 2018-03-19 | End: 2018-03-19

## 2018-03-19 RX ORDER — DIAZEPAM 10 MG/2ML
2.5 INJECTION, SOLUTION INTRAMUSCULAR; INTRAVENOUS
Status: DISCONTINUED | OUTPATIENT
Start: 2018-03-19 | End: 2018-03-19 | Stop reason: HOSPADM

## 2018-03-19 RX ORDER — ONDANSETRON 4 MG/1
4 TABLET, ORALLY DISINTEGRATING ORAL EVERY 30 MIN PRN
Status: DISCONTINUED | OUTPATIENT
Start: 2018-03-19 | End: 2018-03-19 | Stop reason: HOSPADM

## 2018-03-19 RX ORDER — LIDOCAINE 40 MG/G
CREAM TOPICAL
Status: DISCONTINUED | OUTPATIENT
Start: 2018-03-19 | End: 2018-03-21 | Stop reason: HOSPADM

## 2018-03-19 RX ORDER — CITALOPRAM HYDROBROMIDE 20 MG/1
20 TABLET ORAL DAILY
Status: DISCONTINUED | OUTPATIENT
Start: 2018-03-20 | End: 2018-03-21 | Stop reason: HOSPADM

## 2018-03-19 RX ORDER — ONDANSETRON 2 MG/ML
INJECTION INTRAMUSCULAR; INTRAVENOUS PRN
Status: DISCONTINUED | OUTPATIENT
Start: 2018-03-19 | End: 2018-03-19

## 2018-03-19 RX ORDER — ONDANSETRON 2 MG/ML
4 INJECTION INTRAMUSCULAR; INTRAVENOUS EVERY 30 MIN PRN
Status: DISCONTINUED | OUTPATIENT
Start: 2018-03-19 | End: 2018-03-19 | Stop reason: HOSPADM

## 2018-03-19 RX ORDER — ACETAMINOPHEN 325 MG/1
650 TABLET ORAL EVERY 4 HOURS PRN
Status: DISCONTINUED | OUTPATIENT
Start: 2018-03-22 | End: 2018-03-21 | Stop reason: HOSPADM

## 2018-03-19 RX ORDER — NALOXONE HYDROCHLORIDE 0.4 MG/ML
.1-.4 INJECTION, SOLUTION INTRAMUSCULAR; INTRAVENOUS; SUBCUTANEOUS
Status: DISCONTINUED | OUTPATIENT
Start: 2018-03-19 | End: 2018-03-21 | Stop reason: HOSPADM

## 2018-03-19 RX ORDER — HYDROCODONE BITARTRATE AND ACETAMINOPHEN 5; 325 MG/1; MG/1
1-2 TABLET ORAL EVERY 4 HOURS PRN
Status: DISCONTINUED | OUTPATIENT
Start: 2018-03-19 | End: 2018-03-19

## 2018-03-19 RX ORDER — CELECOXIB 200 MG/1
200 CAPSULE ORAL 2 TIMES DAILY
Status: COMPLETED | OUTPATIENT
Start: 2018-03-19 | End: 2018-03-21

## 2018-03-19 RX ORDER — LIDOCAINE 40 MG/G
CREAM TOPICAL
Status: DISCONTINUED | OUTPATIENT
Start: 2018-03-19 | End: 2018-03-19 | Stop reason: HOSPADM

## 2018-03-19 RX ORDER — NALOXONE HYDROCHLORIDE 0.4 MG/ML
.1-.4 INJECTION, SOLUTION INTRAMUSCULAR; INTRAVENOUS; SUBCUTANEOUS
Status: ACTIVE | OUTPATIENT
Start: 2018-03-19 | End: 2018-03-20

## 2018-03-19 RX ORDER — LABETALOL HYDROCHLORIDE 5 MG/ML
10 INJECTION, SOLUTION INTRAVENOUS
Status: DISCONTINUED | OUTPATIENT
Start: 2018-03-19 | End: 2018-03-19 | Stop reason: HOSPADM

## 2018-03-19 RX ORDER — HYDROXYZINE HYDROCHLORIDE 25 MG/1
25 TABLET, FILM COATED ORAL 3 TIMES DAILY PRN
Status: DISCONTINUED | OUTPATIENT
Start: 2018-03-19 | End: 2018-03-19

## 2018-03-19 RX ORDER — LISINOPRIL AND HYDROCHLOROTHIAZIDE 12.5; 2 MG/1; MG/1
1 TABLET ORAL DAILY
Status: DISCONTINUED | OUTPATIENT
Start: 2018-03-19 | End: 2018-03-21 | Stop reason: HOSPADM

## 2018-03-19 RX ORDER — ONDANSETRON 4 MG/1
4 TABLET, ORALLY DISINTEGRATING ORAL EVERY 6 HOURS PRN
Status: DISCONTINUED | OUTPATIENT
Start: 2018-03-19 | End: 2018-03-21 | Stop reason: HOSPADM

## 2018-03-19 RX ORDER — METHYLPREDNISOLONE ACETATE 80 MG/ML
INJECTION, SUSPENSION INTRA-ARTICULAR; INTRALESIONAL; INTRAMUSCULAR; SOFT TISSUE PRN
Status: DISCONTINUED | OUTPATIENT
Start: 2018-03-19 | End: 2018-03-19

## 2018-03-19 RX ORDER — ACETAMINOPHEN 10 MG/ML
1000 INJECTION, SOLUTION INTRAVENOUS
Status: COMPLETED | OUTPATIENT
Start: 2018-03-19 | End: 2018-03-19

## 2018-03-19 RX ORDER — ONDANSETRON 2 MG/ML
4 INJECTION INTRAMUSCULAR; INTRAVENOUS EVERY 6 HOURS
Status: DISPENSED | OUTPATIENT
Start: 2018-03-19 | End: 2018-03-20

## 2018-03-19 RX ORDER — FENTANYL CITRATE 50 UG/ML
25-50 INJECTION, SOLUTION INTRAMUSCULAR; INTRAVENOUS
Status: DISCONTINUED | OUTPATIENT
Start: 2018-03-19 | End: 2018-03-19 | Stop reason: HOSPADM

## 2018-03-19 RX ORDER — MEPERIDINE HYDROCHLORIDE 50 MG/ML
12.5 INJECTION INTRAMUSCULAR; INTRAVENOUS; SUBCUTANEOUS EVERY 5 MIN PRN
Status: DISCONTINUED | OUTPATIENT
Start: 2018-03-19 | End: 2018-03-19 | Stop reason: HOSPADM

## 2018-03-19 RX ORDER — ONDANSETRON 2 MG/ML
4 INJECTION INTRAMUSCULAR; INTRAVENOUS EVERY 6 HOURS PRN
Status: DISCONTINUED | OUTPATIENT
Start: 2018-03-19 | End: 2018-03-21 | Stop reason: HOSPADM

## 2018-03-19 RX ORDER — PROCHLORPERAZINE MALEATE 10 MG
10 TABLET ORAL EVERY 6 HOURS PRN
Status: DISCONTINUED | OUTPATIENT
Start: 2018-03-19 | End: 2018-03-21 | Stop reason: HOSPADM

## 2018-03-19 RX ORDER — SODIUM CHLORIDE, SODIUM LACTATE, POTASSIUM CHLORIDE, CALCIUM CHLORIDE 600; 310; 30; 20 MG/100ML; MG/100ML; MG/100ML; MG/100ML
INJECTION, SOLUTION INTRAVENOUS CONTINUOUS
Status: DISCONTINUED | OUTPATIENT
Start: 2018-03-19 | End: 2018-03-19 | Stop reason: HOSPADM

## 2018-03-19 RX ORDER — KETOROLAC TROMETHAMINE 30 MG/ML
INJECTION, SOLUTION INTRAMUSCULAR; INTRAVENOUS PRN
Status: DISCONTINUED | OUTPATIENT
Start: 2018-03-19 | End: 2018-03-19

## 2018-03-19 RX ORDER — FENTANYL CITRATE 50 UG/ML
INJECTION, SOLUTION INTRAMUSCULAR; INTRAVENOUS PRN
Status: DISCONTINUED | OUTPATIENT
Start: 2018-03-19 | End: 2018-03-19

## 2018-03-19 RX ORDER — ACETAMINOPHEN 325 MG/1
975 TABLET ORAL EVERY 8 HOURS
Status: DISCONTINUED | OUTPATIENT
Start: 2018-03-19 | End: 2018-03-20

## 2018-03-19 RX ORDER — HYDROXYZINE HYDROCHLORIDE 25 MG/1
25 TABLET, FILM COATED ORAL EVERY 6 HOURS PRN
Status: DISCONTINUED | OUTPATIENT
Start: 2018-03-19 | End: 2018-03-21 | Stop reason: HOSPADM

## 2018-03-19 RX ORDER — AMOXICILLIN 250 MG
2 CAPSULE ORAL 2 TIMES DAILY
Status: DISCONTINUED | OUTPATIENT
Start: 2018-03-19 | End: 2018-03-21 | Stop reason: HOSPADM

## 2018-03-19 RX ORDER — CEFAZOLIN SODIUM 2 G/100ML
2 INJECTION, SOLUTION INTRAVENOUS EVERY 8 HOURS
Status: COMPLETED | OUTPATIENT
Start: 2018-03-19 | End: 2018-03-20

## 2018-03-19 RX ORDER — CELECOXIB 200 MG/1
400 CAPSULE ORAL ONCE
Status: COMPLETED | OUTPATIENT
Start: 2018-03-19 | End: 2018-03-19

## 2018-03-19 RX ADMIN — ACETAMINOPHEN 1000 MG: 10 INJECTION, SOLUTION INTRAVENOUS at 12:58

## 2018-03-19 RX ADMIN — FENTANYL CITRATE 50 MCG: 50 INJECTION INTRAMUSCULAR; INTRAVENOUS at 13:48

## 2018-03-19 RX ADMIN — MIDAZOLAM 2 MG: 1 INJECTION INTRAMUSCULAR; INTRAVENOUS at 09:08

## 2018-03-19 RX ADMIN — Medication 0.5 MG: at 15:40

## 2018-03-19 RX ADMIN — SODIUM CHLORIDE: 9 INJECTION, SOLUTION INTRAVENOUS at 19:58

## 2018-03-19 RX ADMIN — DEXAMETHASONE SODIUM PHOSPHATE 8 MG: 4 INJECTION, SOLUTION INTRA-ARTICULAR; INTRALESIONAL; INTRAMUSCULAR; INTRAVENOUS; SOFT TISSUE at 10:05

## 2018-03-19 RX ADMIN — PROPOFOL 50 MCG/KG/MIN: 10 INJECTION, EMULSION INTRAVENOUS at 10:29

## 2018-03-19 RX ADMIN — LISINOPRIL AND HYDROCHLOROTHIAZIDE 1 TABLET: 12.5; 2 TABLET ORAL at 20:03

## 2018-03-19 RX ADMIN — FENTANYL CITRATE 50 MCG: 50 INJECTION INTRAMUSCULAR; INTRAVENOUS at 15:02

## 2018-03-19 RX ADMIN — Medication 0.3 MG: at 15:23

## 2018-03-19 RX ADMIN — ONDANSETRON 4 MG: 2 INJECTION INTRAMUSCULAR; INTRAVENOUS at 10:05

## 2018-03-19 RX ADMIN — BUPIVACAINE HYDROCHLORIDE AND EPINEPHRINE BITARTRATE 40 ML: 5; .005 INJECTION, SOLUTION EPIDURAL; INTRACAUDAL; PERINEURAL at 09:08

## 2018-03-19 RX ADMIN — CEFAZOLIN 1 G: 1 INJECTION, POWDER, FOR SOLUTION INTRAMUSCULAR; INTRAVENOUS at 12:14

## 2018-03-19 RX ADMIN — Medication 30 MG: at 10:05

## 2018-03-19 RX ADMIN — CELECOXIB 200 MG: 200 CAPSULE ORAL at 20:03

## 2018-03-19 RX ADMIN — ACETAMINOPHEN 975 MG: 325 TABLET, FILM COATED ORAL at 20:11

## 2018-03-19 RX ADMIN — ONDANSETRON 4 MG: 2 INJECTION INTRAMUSCULAR; INTRAVENOUS at 18:02

## 2018-03-19 RX ADMIN — CEFAZOLIN SODIUM 2 G: 2 INJECTION, SOLUTION INTRAVENOUS at 10:00

## 2018-03-19 RX ADMIN — CELECOXIB 400 MG: 200 CAPSULE ORAL at 08:45

## 2018-03-19 RX ADMIN — SENNOSIDES AND DOCUSATE SODIUM 1 TABLET: 8.6; 5 TABLET ORAL at 20:03

## 2018-03-19 RX ADMIN — ROCURONIUM BROMIDE 30 MG: 10 INJECTION INTRAVENOUS at 10:05

## 2018-03-19 RX ADMIN — GLYCOPYRROLATE 0.2 MG: 0.2 INJECTION, SOLUTION INTRAMUSCULAR; INTRAVENOUS at 12:14

## 2018-03-19 RX ADMIN — Medication 1 G: at 10:14

## 2018-03-19 RX ADMIN — HYDROMORPHONE HYDROCHLORIDE 1 MG: 1 INJECTION, SOLUTION INTRAMUSCULAR; INTRAVENOUS; SUBCUTANEOUS at 10:05

## 2018-03-19 RX ADMIN — Medication 2 MG: at 12:14

## 2018-03-19 RX ADMIN — Medication 0.5 MG: at 20:04

## 2018-03-19 RX ADMIN — KETOROLAC TROMETHAMINE 30 MG: 30 INJECTION, SOLUTION INTRAMUSCULAR at 10:05

## 2018-03-19 RX ADMIN — FENTANYL CITRATE 50 MCG: 50 INJECTION INTRAMUSCULAR; INTRAVENOUS at 16:19

## 2018-03-19 RX ADMIN — PANTOPRAZOLE SODIUM 40 MG: 40 TABLET, DELAYED RELEASE ORAL at 08:46

## 2018-03-19 RX ADMIN — METHYLPREDNISOLONE ACETATE 80 MG: 80 INJECTION, SUSPENSION INTRA-ARTICULAR; INTRALESIONAL; INTRAMUSCULAR; SOFT TISSUE at 09:08

## 2018-03-19 RX ADMIN — Medication 1 G: at 12:08

## 2018-03-19 RX ADMIN — PROPOFOL 200 MG: 10 INJECTION, EMULSION INTRAVENOUS at 10:05

## 2018-03-19 RX ADMIN — OXYCODONE HYDROCHLORIDE 5 MG: 5 TABLET ORAL at 23:01

## 2018-03-19 RX ADMIN — PHENYLEPHRINE HYDROCHLORIDE 100 MCG: 10 INJECTION, SOLUTION INTRAMUSCULAR; INTRAVENOUS; SUBCUTANEOUS at 10:14

## 2018-03-19 RX ADMIN — FENTANYL CITRATE 50 MCG: 50 INJECTION INTRAMUSCULAR; INTRAVENOUS at 14:50

## 2018-03-19 RX ADMIN — SODIUM CHLORIDE, POTASSIUM CHLORIDE, SODIUM LACTATE AND CALCIUM CHLORIDE: 600; 310; 30; 20 INJECTION, SOLUTION INTRAVENOUS at 11:03

## 2018-03-19 RX ADMIN — SODIUM CHLORIDE, POTASSIUM CHLORIDE, SODIUM LACTATE AND CALCIUM CHLORIDE: 600; 310; 30; 20 INJECTION, SOLUTION INTRAVENOUS at 09:07

## 2018-03-19 RX ADMIN — FENTANYL CITRATE 100 MCG: 50 INJECTION, SOLUTION INTRAMUSCULAR; INTRAVENOUS at 09:08

## 2018-03-19 RX ADMIN — FENTANYL CITRATE 100 MCG: 50 INJECTION, SOLUTION INTRAMUSCULAR; INTRAVENOUS at 10:05

## 2018-03-19 RX ADMIN — FENTANYL CITRATE 100 MCG: 50 INJECTION, SOLUTION INTRAMUSCULAR; INTRAVENOUS at 10:37

## 2018-03-19 RX ADMIN — CEFAZOLIN SODIUM 2 G: 2 INJECTION, SOLUTION INTRAVENOUS at 20:00

## 2018-03-19 RX ADMIN — MIDAZOLAM 1 MG: 1 INJECTION INTRAMUSCULAR; INTRAVENOUS at 10:01

## 2018-03-19 RX ADMIN — SODIUM CHLORIDE 1000 ML: 9 INJECTION, SOLUTION INTRAVENOUS at 17:50

## 2018-03-19 ASSESSMENT — LIFESTYLE VARIABLES: TOBACCO_USE: 1

## 2018-03-19 NOTE — IP AVS SNAPSHOT
MRN:[id number]                      After Visit Summary   3/19/2018    Lyubov Sanchez    MRN: [id number]           Thank you!     Thank you for choosing [facility] for your care. Our goal is always to provide you with excellent care. Hearing back from our patients is one way we can continue to improve our services. Please take a few minutes to complete the written survey that you may receive in the mail after you visit. If you would like to speak to someone directly about your visit please contact Patient Relations at [phone]. Thank you!          Patient Information     Date Of Birth          1970        Designated Caregiver       Most Recent Value    Caregiver    Will someone help with your care after discharge? yes    Name of designated caregiver  Vlad Sanchez    Phone number of caregiver [phone]    Caregiver address same      About your hospital stay     You were admitted on:  March 19, 2018 You last received care in the:  [facility]    You were discharged on:  March 21, 2018        Reason for your hospital stay       Following left total knee arthroplasty                  Who to Call     For medical emergencies, please call 911.  For non-urgent questions about your medical care, please call your primary care provider or clinic, [phone]  For questions related to your surgery, please call your surgery clinic        Attending Provider     Provider Specialty    Hebert Lee MD Orthopaedic Surgery       Primary Care Provider Office Phone # Fax #    Yolie Delarosa [phone]      After Care Instructions     Activity       Your activity upon discharge: activity as tolerated but no driving until off of daytime narcotics.            Diet       Follow this diet upon discharge: regular            Wound care and dressings       Instructions to care for your wound at home: Leave aquacel dressing in place until  "post-op follow-up.  If it becomes loose or soiled then remove and replace with daily dry dressings.                  Follow-up Appointments     Follow-up and recommended labs and tests        Follow up with Bernadette Patrick PA-C within 12-16 days from surgery.  If you do not already have a follow up appointment scheduled, please call our [place] office: [phone].  No follow up labs or test are needed.                  Additional Services     Physical Therapy Referral       *This therapy referral will be filtered to a centralized scheduling office at [facility] and the patient will receive a call to schedule an appointment at a [place] location most convenient for them. *     [facility] provides Physical Therapy evaluation and treatment and many specialty services across the [place] system.  If requesting a specialty program, please choose from the list below.    If you have not heard from the scheduling office within 2 business days, please call [phone] for all locations, with the exception of [place], please call [phone] and [facility], please call [phone]  Treatment: Evaluation & Treatment  Special Instructions/Modalities: knee ROM, quad strengthening and gait training  Special Programs: None    Please be aware that coverage of these services is subject to the terms and limitations of your health insurance plan.  Call member services at your health plan with any benefit or coverage questions.      **Note to Provider:  If you are referring outside of [place] for the therapy appointment, please list the name of the location in the \"special instructions\" above, print the referral and give to the patient to schedule the appointment.                  Further instructions from your care team       Call Md ([phone]) before or after your follow up appointment if:  1. Increased/persistent temperature chills and/or sweats  2. Increased/uncontrolled pain " despite pain medication, sedated   3. Increased/persistent bleeding, redness, swelling, bruising, drainage (yellow/odorous) or incision would pull apart  4. Calf pain, swollen/hard/warm area, chest pain or shortness of breath  5. Weakness in operative leg, knee buckling, numbness and/or tingling. Or if you Fall  6. Nausea, vomiting, constipation and/or diarrhea  7. Too sleepy  8. Trouble voiding or signs and symptoms of urinary tract infection.  9. Constipation unrelieved by stool softeners (see Other instructions below)    Activity instructions:  1. Do exercises at home as instructed by therapist twice a day. Continue outpatient therapy as ordered by your doctor.  2. Ice knee after exercises and therapy or 20 minutes 3-4 times a day to reduce pain and swelling  3. If your doctor orders a cpm use as tolerated 0-90 degrees   4. Slowly increase activity back to baseline    Diet Information  Drink plenty of fluids  Eat a regular balanced diet    Dressing information:   May shower (leave dressing on ),dressing is water proof. examine around incision daily  for any signs and symptoms of infection.     Wash hands before touching skin surrounding incision  DO NOT change dressing daily leave on until follow up apt.  Inspect surrounding skin daily for s/s of infections.   Do not soak in tub or pool.   If dressing loosens wash hands, tape down edge and call surgeon.  If the dressing comes off completely then place sterile gauze over incision and tape around all edges and call surgeon for further direction.    Other instructions:  1. Notify your dentist of your implant so you can get antibiotics before any dental work or before any invasive procedure  2. Take an over the counter stool softener (mirilax or senna) as directed while on narcotics to ensure bowel movements are regular.  Take a laxative (milk of magnesia and/or a suppository )as directed if no bm in 2 days.  3. Keep track of when you take all medication, especially  "pain medication. See bottles for instructions and side effects  4. Use incentive spirometry hourly until you are back to normal activity (helps prevent pneumonia)  6. See medication handouts for medication information and side effects    Follow up information  Call Md to make a follow up apt. @ [facility] [phone] 10-14 days from the day of surgery    Thank you for choosing [facility]         Pending Results     No orders found from 3/17/2018 to 3/20/2018.            Statement of Approval     Ordered          03/20/18 0732  I have reviewed and agree with all the recommendations and orders detailed in this document.  EFFECTIVE NOW     Approved and electronically signed by:  Bernadette Patrick PA-C             Admission Information     Date & Time Provider Department Dept. Phone    3/19/2018 Hebert Lee MD [facility] [phone]      Your Vitals Were     Blood Pressure Temperature Respirations Height Weight Pulse Oximetry    146/81 (BP Location: Left arm) 97.2  F (36.2  C) (Oral) 16 1.626 m (5' 4.02\") 117 kg (258 lb) 96%    BMI (Body Mass Index)                   44.26 kg/m2           MyChart Information     [employer] gives you secure access to your electronic health record. If you see a primary care provider, you can also send messages to your care team and make appointments. If you have questions, please call your primary care clinic.  If you do not have a primary care provider, please call [phone] and they will assist you.        Care EveryWhere ID     This is your Care EveryWhere ID. This could be used by other organizations to access your [place] medical records  [id number]        Equal Access to Services     SHEKHAR CARRILLO : Hadromina Bahena, wacarolinada luqadaha, qaybta kaalpippa miguel. So [facility] [phone].    ATENCIÓN: Si habla español, tiene a grimes disposición servicios gratuitos de asistencia " lingüísticaMary Lou Basilio al [phone].    We comply with applicable federal civil rights laws and [place] laws. We do not discriminate on the basis of race, color, national origin, age, disability, sex, sexual orientation, or gender identity.               Review of your medicines      START taking        Dose / Directions    aspirin  MG EC tablet        Take one Aspirin tab twice daily for 5 weeks.   Quantity:  70 tablet   Refills:  0       HYDROcodone-acetaminophen 5-325 MG per tablet   Commonly known as:  NORCO   Notes to Patient:  Next available today at  Do NOT take additional Tylenol         Dose:  1-2 tablet   Take 1-2 tablets by mouth every 4 hours as needed for moderate to severe pain   Quantity:  75 tablet   Refills:  0       hydrOXYzine 25 MG tablet   Commonly known as:  ATARAX   Notes to Patient:  Next available today at 2:45 PM        Dose:  25-50 mg   Take 1-2 tablets (25-50 mg) by mouth every 6 hours as needed for itching   Quantity:  60 tablet   Refills:  0         CONTINUE these medicines which have NOT CHANGED        Dose / Directions    ALEVE PO   Notes to Patient:  Do NOT take until cleared by Dr Lee        Dose:  220 mg   Take 220 mg by mouth 2 times daily as needed for moderate pain Will stop `1 week pre op per primary    Refills:  0       amLODIPine 5 MG tablet   Commonly known as:  NORVASC        Dose:  5 mg   Take 1 tablet (5 mg) by mouth daily   Quantity:  90 tablet   Refills:  1       buPROPion 150 MG 24 hr tablet   Commonly known as:  WELLBUTRIN XL   Used for:  Morbid obesity due to excess calories (H), Anxiety        TAKE 1 TABLET (150 MG) BY MOUTH EVERY MORNING   Quantity:  90 tablet   Refills:  1       citalopram 20 MG tablet   Commonly known as:  celeXA   Used for:  Major depressive disorder, recurrent episode, mild (H)        Dose:  20 mg   Take 1 tablet (20 mg) by mouth daily   Quantity:  90 tablet   Refills:  1       levonorgestrel 20 MCG/24HR IUD   Commonly known as:   MIRENA   Used for:  Contraception   Notes to Patient:  Resume per home schedule        Quantity:  1 each   Refills:  0       lisinopril-hydrochlorothiazide 20-12.5 MG per tablet   Commonly known as:  PRINZIDE/ZESTORETIC   Used for:  Essential hypertension, benign        TAKE 1 TABLET BY MOUTH DAILY   Quantity:  90 tablet   Refills:  2         STOP taking     traMADol 50 MG tablet   Commonly known as:  ULTRAM                Where to get your medicines      These medications were sent to [place], [place] - [address] [address] [facility]  [address] [address] [facility] [address]     Phone:  [phone]     aspirin  MG EC tablet    hydrOXYzine 25 MG tablet         Some of these will need a paper prescription and others can be bought over the counter. Ask your nurse if you have questions.     Bring a paper prescription for each of these medications     HYDROcodone-acetaminophen 5-325 MG per tablet                Protect others around you: Learn how to safely use, store and throw away your medicines at www.disposemymeds.org.        Information about OPIOIDS     PRESCRIPTION OPIOIDS: WHAT YOU NEED TO KNOW    Prescription opioids can be used to help relieve moderate to severe pain and are often prescribed following a surgery or injury, or for certain health conditions. These medications can be an important part of treatment but also come with serious risks. It is important to work with your health care provider to make sure you are getting the safest, most effective care.    WHAT ARE THE RISKS AND SIDE EFFECTS OF OPIOID USE?  Prescription opioids carry serious risks of addiction and overdose, especially with prolonged use. An opioid overdose, often marked by slowed breathing can cause sudden death. The use of prescription opioids can have a number of side effects as well, even when taken as directed:      Tolerance - meaning you might need to take more of a medication for the same pain  relief    Physical dependence - meaning you have symptoms of withdrawal when a medication is stopped    Increased sensitivity to pain    Constipation    Nausea, vomiting, and dry mouth    Sleepiness and dizziness    Confusion    Depression    Low levels of testosterone that can result in lower sex drive, energy, and strength    Itching and sweating    RISKS ARE GREATER WITH:    History of drug misuse, substance use disorder, or overdose    Mental health conditions (such as depression or anxiety)    Sleep apnea    Older age (65 years or older)    Pregnancy    Avoid alcohol while taking prescription opioids.   Also, unless specifically advised by your health care provider, medications to avoid include:    Benzodiazepines (such as Xanax or Valium)    Muscle relaxants (such as Soma or Flexeril)    Hypnotics (such as Ambien or Lunesta)    Other prescription opioids    KNOW YOUR OPTIONS:  Talk to your health care provider about ways to manage your pain that do not involve prescription opioids. Some of these options may actually work better and have fewer risks and side effects:    Pain relievers such as acetaminophen, ibuprofen, and naproxen    Some medications that are also used for depression or seizures    Physical therapy and exercise    Cognitive behavioral therapy, a psychological, goal-directed approach, in which patients learn how to modify physical, behavioral, and emotional triggers of pain and stress    IF YOU ARE PRESCRIBED OPIOIDS FOR PAIN:    Never take opioids in greater amounts or more often than prescribed    Follow up with your primary health care provider and work together to create a plan on how to manage your pain.    Talk about ways to help manage your pain that do not involve prescription opioids    Talk about all concerns and side effects    Help prevent misuse and abuse    Never sell or share prescription opioids    Never use another person's prescription opioids    Store prescription opioids in a  secure place and out of reach of others (this may include visitors, children, friends, and family)    Visit www.cdc.gov/drugoverdose to learn about risks of opioid abuse and overdose    If you believe you may be struggling with addiction, tell your health care provider and ask for guidance or call [facility]'s National Helpline at 4-932-203-HELP    LEARN MORE / www.cdc.gov/drugoverdose/prescribing/guideline.html    Safely dispose of unused prescription opioids: Find your local drug take-back programs and more information about the importance of safe disposal at www.doseofreality.[place].gov             Medication List: This is a list of all your medications and when to take them. Check marks below indicate your daily home schedule. Keep this list as a reference.      Medications           Morning Afternoon Evening Bedtime As Needed    ALEVE PO   Take 220 mg by mouth 2 times daily as needed for moderate pain Will stop `1 week pre op per primary DrMary Lou   Notes to Patient:  Do NOT take until cleared by Dr Lee                                amLODIPine 5 MG tablet   Commonly known as:  NORVASC   Take 1 tablet (5 mg) by mouth daily   Last time this was given:  5 mg on 3/21/2018  8:39 AM   Next Dose Due:  Thursday                                    aspirin  MG EC tablet   Take one Aspirin tab twice daily for 5 weeks.   Next Dose Due:  Thursday morning                                      buPROPion 150 MG 24 hr tablet   Commonly known as:  WELLBUTRIN XL   TAKE 1 TABLET (150 MG) BY MOUTH EVERY MORNING   Last time this was given:  150 mg on 3/21/2018  8:39 AM   Next Dose Due:  Thursday                                    citalopram 20 MG tablet   Commonly known as:  celeXA   Take 1 tablet (20 mg) by mouth daily   Last time this was given:  20 mg on 3/21/2018  8:39 AM   Next Dose Due:  Thursday                                    HYDROcodone-acetaminophen 5-325 MG per tablet   Commonly known as:  NORCO   Take 1-2 tablets by mouth  every 4 hours as needed for moderate to severe pain   Last time this was given:  2 tablets on 3/21/2018  8:39 AM   Notes to Patient:  Next available today at  Do NOT take additional Tylenol                                    hydrOXYzine 25 MG tablet   Commonly known as:  ATARAX   Take 1-2 tablets (25-50 mg) by mouth every 6 hours as needed for itching   Last time this was given:  25 mg on 3/21/2018  8:44 AM   Notes to Patient:  Next available today at 2:45 PM                                   levonorgestrel 20 MCG/24HR IUD   Commonly known as:  MIRENA   Notes to Patient:  Resume per home schedule                                lisinopril-hydrochlorothiazide 20-12.5 MG per tablet   Commonly known as:  PRINZIDE/ZESTORETIC   TAKE 1 TABLET BY MOUTH DAILY   Last time this was given:  1 tablet on 3/21/2018  8:39 AM   Next Dose Due:  Thursday

## 2018-03-19 NOTE — ANESTHESIA PREPROCEDURE EVALUATION
Anesthesia Evaluation     . Pt has had prior anesthetic. Type: General    History of anesthetic complications   - PONV        ROS/MED HX    ENT/Pulmonary:     (+)sleep apnea, tobacco use, Past use uses CPAP , . .    Neurologic:     (+)migraines,     Cardiovascular:     (+) Dyslipidemia, hypertension----. : . . . :. .       METS/Exercise Tolerance:     Hematologic:  - neg hematologic  ROS       Musculoskeletal:   (+) arthritis, , , -       GI/Hepatic:  - neg GI/hepatic ROS       Renal/Genitourinary:  - ROS Renal section negative       Endo: Comment: Class 3+ obesity    (+) Obesity, .      Psychiatric:     (+) psychiatric history anxiety and depression      Infectious Disease:  - neg infectious disease ROS       Malignancy:      - no malignancy   Other:    (+) H/O Chronic Pain,                   Physical Exam  Normal systems: cardiovascular, pulmonary and dental    Airway   Mallampati: III  TM distance: >3 FB  Neck ROM: full    Dental     Cardiovascular   Rhythm and rate: regular and normal      Pulmonary    breath sounds clear to auscultation    Other findings: Lab Test        02/26/18 11/19/17 11/18/17 11/17/17      --          07/22/16     06/21/15     06/19/15      --          03/26/12                       1619          0636          0734          1356           --           0814          1335          1500           --           1845          WBC           --           --           --           --           --          7.1          13.1*        8.4            < >        8.4           HGB          14.0         12.7         13.1          --            < >        14.3         13.9         14.4           < >        14.1          MCV           --           --           --           --           --          90           90           89             < >        92            PLT           --           --           --          184           --          176          177          209            < >        192            INR           --           --           --           --           --           --          1.00          --           --          1.71*          < > = values in this interval not displayed.                  Lab Test        02/26/18 11/19/17 11/18/17 11/17/17 11/17/17 07/05/17 07/22/16                       1619          0636          0734          1356          0630          0945          0814          NA           138           --           --           --           --          139          137           POTASSIUM    3.7           --           --           --          3.7          3.9          4.0           CHLORIDE     103           --           --           --           --          104          102           CO2          28            --           --           --           --          28           27            BUN          12            --           --           --           --          13           12            CR           0.66          --           --          0.71         0.70         0.63         0.65          ANIONGAP     7             --           --           --           --          7            8             CARROL          9.0           --           --           --           --          8.8          9.2           GLC          122*         112*         118*          --           --          103*         102*                   ECG  Sinus  Rhythm   -Incomplete right bundle branch block and left axis -anterior fascicular block.                 Anesthesia Plan      History & Physical Review  History and physical reviewed and following examination; no interval change.    ASA Status:  3 .    NPO Status:  > 8 hours    Plan for General, LMA, ETT and Periph. Nerve Block for postop pain with Intravenous induction. Maintenance will be Balanced.    PONV prophylaxis:  Ondansetron (or other 5HT-3) and Dexamethasone or Solumedrol       Postoperative Care  Postoperative pain management:   IV analgesics, Oral pain medications, Multi-modal analgesia and Peripheral nerve block (Single Shot).      Consents  Anesthetic plan, risks, benefits and alternatives discussed with:  Patient.  Use of blood products discussed: No .   .                          .

## 2018-03-19 NOTE — ANESTHESIA PROCEDURE NOTES
Peripheral nerve/Neuraxial procedure note : Femoral  Pre-Procedure  Performed by DEMETRIO SHETTY  Referred by [facility]  Location: pre-op      Pre-Anesthestic Checklist: patient identified, IV checked, site marked, risks and benefits discussed, informed consent, monitors and equipment checked, pre-op evaluation, at physician/surgeon's request and post-op pain management    Timeout  Correct Patient: Yes   Correct Procedure: Yes   Correct Site: Yes   Correct Laterality: Yes   Correct Position: Yes   Site Marked: Yes   .   Procedure Documentation    .    Procedure:  left  Femoral.  Local skin infiltrated with mL of 1% lidocaine.     Ultrasound used to identify targeted nerve, plexus, or vascular marker and placed a needle adjacent to it., Ultrasound was used to visualize the spread of the anesthetic in close proximity to the above stated nerve.   Patient Prep;sterile gloves, povidone-iodine 7.5% surgical scrub.  Nerve Stim: Initial Level 1 mA.  Lowest motor response 0.5 mA..  Needle: insulated, short bevel Needle Gauge: 22.    Needle Length (Inches) 3.13  Insertion Method: Single Shot.       Assessment/Narrative  Paresthesias: No.  Injection made incrementally with aspirations every 5 mL..  The placement was negative for: blood aspirated, painful injection and site bleeding.  Bolus given via needle..   Secured via.   Complications: none. Test dose of 3 mL at. Test dose negative for signs of intravascular, subdural or intrathecal injection. Comments:  30 cc  The surgeon has given a verbal order transferring care of this patient to me for the performance of a regional analgesia block for post-op pain control. It is requested of me because I am uniquely trained and qualified to perform this block and the surgeon is neither trained nor qualified to perform this procedure.

## 2018-03-19 NOTE — PLAN OF CARE
Problem: Patient Care Overview  Goal: Plan of Care/Patient Progress Review  PT: Patient not to floor at appointment time. Will continue to follow.

## 2018-03-19 NOTE — OP NOTE
Procedure Date: 03/19/2018      DATE OF SURGERY:  03/19/2018      PREOPERATIVE DIAGNOSIS:  Primary varus osteoarthritis, left knee.      POSTOPERATIVE DIAGNOSIS:  Primary varus osteoarthritis, left knee.      PROCEDURE:  Left total knee arthroplasty using an Arthrex iBalance total knee system.      SURGEON:  Hebert Lee MD.        ASSISTANT:  Bernadette Patrick PA-C.        INDICATIONS FOR SURGERY:  Ms. Sanchez is a very pleasant 47-year-old female who has had ongoing left knee pain for some time now, failed conservative management with oral analgesics, activity modifications and injections and now wishes to proceed with operative intervention.  Of note, she has previously undergone a right total knee arthroplasty and done well with this.  She understands the risks, benefits and alternatives to total knee arthroplasty and wishes to proceed with surgery.      NARRATIVE OF EVENTS:  After thorough preoperative evaluation and proper identification of patient's extremity to be operated on, Ms. Sanchez was taken to the operating room where she underwent a general anesthetic, placed supine on the operating table and her left leg was prepped and draped in the usual sterile manner.  After appropriate surgical pause to confirm the patient's extremity to be operated on, and that patient received 2 grams of Ancef, her left leg was exsanguinated and tourniquet was raised to 300 mmHg on the left upper thigh.  We approached her left knee through a midline incision centered over the patella.  Skin and soft tissue were sharply dissected down to the patella where a median parapatellar arthrotomy was performed.  I performed a moderate medial release in accordance with our preoperative templating.  Everted the patella and removed the infrapatellar fat pad, flexed the knee, removed the osteophytes from the distal femur, drilled and placed our intramedullary guide for our distal femoral resection.  We resected the distal femur at 5  degrees physiologic valgus of femur resecting 9 mm of distal femoral bone.  Once this was done, we then sized the distal femur, sized to fit a size 3 Arthrex iBalance femoral component.  We pinned our 4 in 1 cutting block at 3 degrees of external rotation and posterior condyles in line with the epicondylar axis perpendicular to the Whites[place] line.  We made our anterior and posterior and chamfer resections.  We then proceeded with the tibia.  Here we resected this perpendicular to the long axis of the tibia using extramedullary guides.  Once this was done, we then removed the excess soft tissues in the knee, mainly both cruciate ligaments and both meniscus.  We then made our box cut for a posterior stabilized femoral component and placed our trial implants into position, a size 3 femur, a size 3 tibia with a 13 mm thick PS polyethylene insert.  We had good stability, full range of motion and the patella tracked quite nicely.  At this point in time, we then paid our attention to the patella which measured 20-1/2 mm in thickness prior to resection.  We resected this down to 12 mm and placed an 8 mm thick x 30 mm in diameter round tri-peg patellar button in position.  With the patella and button in position, this measured 20.5 mm in thickness and tracked quite nicely.  At this point, we marked our tibial rotation.  We punched for our tibial keel.  We thoroughly irrigated the knee and prepared to cement in our final components.  Using one batch of Simplex SpeedSet cement,  we cemented in a size 3 femur, size 3 tibia, and a 30 round tri-peg patellar button.  Once the cement had cured, we trialed our polyethylene inserts and found that a 13 mm PS insert gave us the best stability and full range of motion.  So after removing all the excess cement from the knee, we thoroughly irrigated with both saline and Irrisept solution.  We implanted the size 13 PS vitamin E polyethylene insert into position.  Once this was done, we  then closed the joint capsule with interrupted and running 0 Vicryl sutures.  We close the skin and soft tissue with absorbable sutures and staples in the skin.  The patient was placed in a well-padded postoperative dressing and taken to the recovery room in stable condition.  She tolerated the procedure without difficulty.         PORTILLO VALLADARES MD             D: 2018   T: 2018   MT: AV      Name:     URMILA MATIAS   MRN:      [id number]        Account:        [id number]   :      1970           Procedure Date: 2018      Document: [id number]

## 2018-03-19 NOTE — ANESTHESIA PROCEDURE NOTES
Peripheral nerve/Neuraxial procedure note : Sciatic  Pre-Procedure  Performed by DEMETRIO SHETTY  Referred by [facility]  Location: pre-op      Pre-Anesthestic Checklist: patient identified, IV checked, site marked, risks and benefits discussed, informed consent, monitors and equipment checked, pre-op evaluation, at physician/surgeon's request and post-op pain management    Timeout  Correct Patient: Yes   Correct Procedure: Yes   Correct Site: Yes   Correct Laterality: Yes   Correct Position: Yes   Site Marked: Yes   .   Procedure Documentation    .    Procedure:  left  Sciatic.  Local skin infiltrated with mL of 1% lidocaine.     Ultrasound used to identify targeted nerve, plexus, or vascular marker and placed a needle adjacent to it., Ultrasound was used to visualize the spread of the anesthetic in close proximity to the above stated nerve.   Patient Prep;sterile gloves, povidone-iodine 7.5% surgical scrub.  .  Needle: insulated, short bevel Needle Gauge: 22.    Needle Length (Inches) 3.13  Insertion Method: Single Shot.       Assessment/Narrative  Paresthesias: No.  Injection made incrementally with aspirations every 3 mL..  The placement was negative for: blood aspirated, painful injection and site bleeding.  Bolus given via needle..   Secured via.   Complications: none. Test dose of 3 mL at. Test dose negative for signs of intravascular, subdural or intrathecal injection. Comments:  10 cc  The surgeon has given a verbal order transferring care of this patient to me for the performance of a regional analgesia block for post-op pain control. It is requested of me because I am uniquely trained and qualified to perform this block and the surgeon is neither trained nor qualified to perform this procedure.

## 2018-03-19 NOTE — ANESTHESIA POSTPROCEDURE EVALUATION
Patient: Lyubov Sanchez    Procedure(s):  Left Total Knee Arthroplasty - Wound Class: I-Clean    Diagnosis:DJD  Diagnosis Additional Information: End stage arthritis of left knee  Dr. Lee    Anesthesia Type:  General, LMA, Periph. Nerve Block for postop pain    Note:  Anesthesia Post Evaluation    Patient location during evaluation: PACU  Patient participation: Able to participate in evaluation but full recovery from regional anesthesia has not yet ocurrred but is anticipated to occur within 48 hours  Level of consciousness: awake  Pain management: adequate  Airway patency: patent  Cardiovascular status: acceptable  Respiratory status: acceptable  Hydration status: acceptable  PONV: none             Last vitals:  Vitals:    03/19/18 1235 03/19/18 1240 03/19/18 1250   BP: (!) 138/91 (!) 145/91 (!) 141/96   Resp: 14  17   Temp:      SpO2: 93%  95%         Electronically Signed By: Teja Sky MD  March 19, 2018  12:53 PM

## 2018-03-19 NOTE — ANESTHESIA CARE TRANSFER NOTE
Patient: Lyubov Sanchez    Procedure(s):  Left Total Knee Arthroplasty - Wound Class: I-Clean    Diagnosis: DJD  Diagnosis Additional Information: End stage arthritis of left knee  Dr. Lee    Anesthesia Type:   General, LMA, Periph. Nerve Block for postop pain     Note:  Airway :Face Mask  Patient transferred to:PACU  Comments: Cosme Report: Identifed the Patient, Identified the Reponsible Provider, Reviewed the pertinent medical history, Discussed the surgical course, Reviewed Intra-OP anesthesia mangement and issues during anesthesia, Set expectations for post-procedure period and Allowed opportunity for questions and acknowledgement of understanding      Vitals: (Last set prior to Anesthesia Care Transfer)    CRNA VITALS  3/19/2018 1149 - 3/19/2018 1228      3/19/2018             Pulse: 111    SpO2: 98 %    Resp Rate (observed): 15                Electronically Signed By: BARRINGTON Scherer CRNA  March 19, 2018  12:28 PM

## 2018-03-19 NOTE — IP AVS SNAPSHOT
[facility]    [address] [facility] [address]    Phone:  [phone]    Fax:  [phone]                                       After Visit Summary   3/19/2018    Lyubov Sanchez    MRN: [id number]           After Visit Summary Signature Page     I have received my discharge instructions, and my questions have been answered. I have discussed any challenges I see with this plan with the nurse or doctor.    ..........................................................................................................................................  Patient/Patient Representative Signature      ..........................................................................................................................................  Patient Representative Print Name and Relationship to Patient    ..................................................               ................................................  Date                                            Time    ..........................................................................................................................................  Reviewed by Signature/Title    ...................................................              ..............................................  Date                                                            Time

## 2018-03-20 ENCOUNTER — APPOINTMENT (OUTPATIENT)
Dept: OCCUPATIONAL THERAPY | Facility: CLINIC | Age: 48
DRG: 470 | End: 2018-03-20
Attending: ORTHOPAEDIC SURGERY
Payer: COMMERCIAL

## 2018-03-20 ENCOUNTER — APPOINTMENT (OUTPATIENT)
Dept: PHYSICAL THERAPY | Facility: CLINIC | Age: 48
DRG: 470 | End: 2018-03-20
Attending: ORTHOPAEDIC SURGERY
Payer: COMMERCIAL

## 2018-03-20 LAB
GLUCOSE SERPL-MCNC: 157 MG/DL (ref 70–99)
HGB BLD-MCNC: 13.4 G/DL (ref 11.7–15.7)

## 2018-03-20 PROCEDURE — 40000133 ZZH STATISTIC OT WARD VISIT

## 2018-03-20 PROCEDURE — 97161 PT EVAL LOW COMPLEX 20 MIN: CPT | Mod: GP

## 2018-03-20 PROCEDURE — 97530 THERAPEUTIC ACTIVITIES: CPT | Mod: GP

## 2018-03-20 PROCEDURE — 82947 ASSAY GLUCOSE BLOOD QUANT: CPT | Performed by: ORTHOPAEDIC SURGERY

## 2018-03-20 PROCEDURE — 25000128 H RX IP 250 OP 636: Performed by: ORTHOPAEDIC SURGERY

## 2018-03-20 PROCEDURE — 99222 1ST HOSP IP/OBS MODERATE 55: CPT | Performed by: HOSPITALIST

## 2018-03-20 PROCEDURE — 97110 THERAPEUTIC EXERCISES: CPT | Mod: GP

## 2018-03-20 PROCEDURE — 85018 HEMOGLOBIN: CPT | Performed by: ORTHOPAEDIC SURGERY

## 2018-03-20 PROCEDURE — 97116 GAIT TRAINING THERAPY: CPT | Mod: GP

## 2018-03-20 PROCEDURE — 25000132 ZZH RX MED GY IP 250 OP 250 PS 637: Performed by: PHYSICIAN ASSISTANT

## 2018-03-20 PROCEDURE — 25000132 ZZH RX MED GY IP 250 OP 250 PS 637: Performed by: ORTHOPAEDIC SURGERY

## 2018-03-20 PROCEDURE — 97165 OT EVAL LOW COMPLEX 30 MIN: CPT | Mod: GO

## 2018-03-20 PROCEDURE — 99207 ZZC CONSULT E&M CHANGED TO INITIAL LEVEL: CPT | Performed by: HOSPITALIST

## 2018-03-20 PROCEDURE — 97535 SELF CARE MNGMENT TRAINING: CPT | Mod: GO

## 2018-03-20 PROCEDURE — 40000193 ZZH STATISTIC PT WARD VISIT

## 2018-03-20 PROCEDURE — 36415 COLL VENOUS BLD VENIPUNCTURE: CPT | Performed by: ORTHOPAEDIC SURGERY

## 2018-03-20 PROCEDURE — 12000000 ZZH R&B MED SURG/OB

## 2018-03-20 RX ORDER — HYDROCODONE BITARTRATE AND ACETAMINOPHEN 5; 325 MG/1; MG/1
1-2 TABLET ORAL EVERY 4 HOURS PRN
Status: DISCONTINUED | OUTPATIENT
Start: 2018-03-20 | End: 2018-03-21 | Stop reason: HOSPADM

## 2018-03-20 RX ORDER — HYDROCODONE BITARTRATE AND ACETAMINOPHEN 5; 325 MG/1; MG/1
1-2 TABLET ORAL EVERY 4 HOURS PRN
Qty: 75 TABLET | Refills: 0 | Status: SHIPPED | OUTPATIENT
Start: 2018-03-20 | End: 2018-06-11

## 2018-03-20 RX ORDER — HYDROCODONE BITARTRATE AND ACETAMINOPHEN 5; 325 MG/1; MG/1
1 TABLET ORAL EVERY 6 HOURS PRN
Status: DISCONTINUED | OUTPATIENT
Start: 2018-03-20 | End: 2018-03-20

## 2018-03-20 RX ADMIN — ONDANSETRON 4 MG: 2 INJECTION INTRAMUSCULAR; INTRAVENOUS at 00:56

## 2018-03-20 RX ADMIN — CELECOXIB 200 MG: 200 CAPSULE ORAL at 20:35

## 2018-03-20 RX ADMIN — LISINOPRIL AND HYDROCHLOROTHIAZIDE 1 TABLET: 12.5; 2 TABLET ORAL at 08:31

## 2018-03-20 RX ADMIN — OXYCODONE HYDROCHLORIDE 10 MG: 5 TABLET ORAL at 04:00

## 2018-03-20 RX ADMIN — AMLODIPINE BESYLATE 5 MG: 5 TABLET ORAL at 08:31

## 2018-03-20 RX ADMIN — HYDROXYZINE HYDROCHLORIDE 25 MG: 25 TABLET ORAL at 12:40

## 2018-03-20 RX ADMIN — CITALOPRAM HYDROBROMIDE 20 MG: 20 TABLET ORAL at 08:31

## 2018-03-20 RX ADMIN — SENNOSIDES AND DOCUSATE SODIUM 2 TABLET: 8.6; 5 TABLET ORAL at 20:35

## 2018-03-20 RX ADMIN — SENNOSIDES AND DOCUSATE SODIUM 2 TABLET: 8.6; 5 TABLET ORAL at 08:32

## 2018-03-20 RX ADMIN — CEFAZOLIN SODIUM 2 G: 2 INJECTION, SOLUTION INTRAVENOUS at 03:58

## 2018-03-20 RX ADMIN — ENOXAPARIN SODIUM 40 MG: 40 INJECTION SUBCUTANEOUS at 11:40

## 2018-03-20 RX ADMIN — CELECOXIB 200 MG: 200 CAPSULE ORAL at 08:32

## 2018-03-20 RX ADMIN — ACETAMINOPHEN 975 MG: 325 TABLET, FILM COATED ORAL at 04:00

## 2018-03-20 RX ADMIN — RANITIDINE 150 MG: 150 TABLET ORAL at 20:35

## 2018-03-20 RX ADMIN — Medication 0.5 MG: at 00:57

## 2018-03-20 RX ADMIN — HYDROXYZINE HYDROCHLORIDE 25 MG: 25 TABLET ORAL at 06:53

## 2018-03-20 RX ADMIN — RANITIDINE 150 MG: 150 TABLET ORAL at 08:31

## 2018-03-20 RX ADMIN — HYDROXYZINE HYDROCHLORIDE 25 MG: 25 TABLET ORAL at 18:57

## 2018-03-20 RX ADMIN — BUPROPION HYDROCHLORIDE 150 MG: 150 TABLET, FILM COATED, EXTENDED RELEASE ORAL at 08:31

## 2018-03-20 RX ADMIN — HYDROCODONE BITARTRATE AND ACETAMINOPHEN 2 TABLET: 5; 325 TABLET ORAL at 22:34

## 2018-03-20 RX ADMIN — HYDROCODONE BITARTRATE AND ACETAMINOPHEN 2 TABLET: 5; 325 TABLET ORAL at 09:21

## 2018-03-20 RX ADMIN — HYDROCODONE BITARTRATE AND ACETAMINOPHEN 2 TABLET: 5; 325 TABLET ORAL at 13:24

## 2018-03-20 RX ADMIN — HYDROCODONE BITARTRATE AND ACETAMINOPHEN 2 TABLET: 5; 325 TABLET ORAL at 18:56

## 2018-03-20 NOTE — PLAN OF CARE
Discharge Planner PT     PT: Orders received and PT eval completed. Pt is 48 yo female POD #1 following L TKA. Pt reports having her R TKA done in November 2017. Pt lives in a house with her  and two teenage girls with 2 steps to enter and 15 steps to the upper level bedroom. She states she plans to stay on the main floor for the first few days. She was independent with all mobility at baseline.     Patient plan for discharge: Home with OP PT and assist from family  Current status: Pt performs supine to sit with CGA and cues for improved technique. Pt transfers sit to/from stand with CGA and cues for safe hand placement. Pt ambulates with fww and CGA with occ cues for improved upright posture and progressing to step-through gait pattern. Vitals stable throughout. SLR is independent, no KI needed.   Barriers to return to prior living situation: Stairs to access home, will trial with PT  Recommendations for discharge: Home with OP PT and assist if needed  Rationale for recommendations: Pt is below their functional baseline and would benefit from continued skilled PT intervention to address their functional limitations and restrictions prior to returning home to maximize their functional potential.       Entered by: Thu Kapoor 03/20/2018 11:00 AM

## 2018-03-20 NOTE — PROGRESS NOTES
"Orthopedic Surgery  3/20/2018  POD#: 1    S: Patient voices no complaints today. Denies calf pain, dizziness, nausea, SOB.    O: Blood pressure 139/74, temperature 97.5  F (36.4  C), temperature source Oral, resp. rate 16, height 1.626 m (5' 4.02\"), weight 117 kg (258 lb), SpO2 94 %, not currently breastfeeding.  Lab Results   Component Value Date    HGB 13.4 03/20/2018     Lab Results   Component Value Date    INR 1.00 06/21/2015        I/O last 3 completed shifts:  In: 4770 [P.O.:700; I.V.:4070]  Out: 4200 [Urine:3950; Drains:240; Blood:10]    Neurovascularly intact.  Calves are negative bilaterally, both soft and nontender.  The wound is C/D/I.  The wound looks good with minimal erythema of the surrounding skin.    A: Ms. Sanchez is doing well status post Procedure(s):  L TKA    P: Hemovac pulled this morning without difficulty.  1. Mobilize and continue physical therapy.   2. Anticoagulation - dc on ASA  3. Pain management - pt prefers norco, had this following her previous TKA  4. Anticipate discharge to home tomorrow.      Bernadette Patrick PA-C  O: [phone]    "

## 2018-03-20 NOTE — PLAN OF CARE
Problem: Patient Care Overview  Goal: Plan of Care/Patient Progress Review  Outcome: Improving  Day RN  VS monitored, cpap when sleeping, tachycardia (low 100's but asymptomatic), drsg C/D/I, CMS+ ex edema L foot, mitchell removed 1200, DTV, up w/SBA and ww, Vistaril/Norco for pain, home tomorrow w/assist from family, will cont to monitor.

## 2018-03-20 NOTE — PLAN OF CARE
Problem: Patient Care Overview  Goal: Plan of Care/Patient Progress Review  Pt arrived on floor at 1730.  A&O, VSS on 2L O2 and capnography.  Capno removed and cpap placed for sleep.  IV infusing 100ml/hr, mitchell patent, hemovac drain in place on L knee with 60cc output this shift.  Pt on scheduled zofran, denies nausea this shift. Pain controlled with scheduled tylenol and prn IV dilaudid.  On IV ancef.  Tolerating water and ice chips, no appetite otherwise.  CMS intact.  Ace wrap to L knee CDI.  Ice applied and pt resting comfortably this shift.

## 2018-03-20 NOTE — PLAN OF CARE
Problem: Patient Care Overview  Goal: Individualization & Mutuality  Outcome: Improving  Pt alert and oriented. Vs 97.6, 18, 112, 139/74, 93% c-pap/ 2lts. Lung sounds clear and bowel sounds present x4 quads.  Pt reports no flatus at this time. Pain 6, given ivp dilaudid and zofran. Pt appears to sleep between cares. Will continue to monitor.

## 2018-03-20 NOTE — PROGRESS NOTES
03/20/18 1233   Quick Adds   Type of Visit Initial Occupational Therapy Evaluation   Living Environment   Lives With spouse   Living Arrangements house   Home Accessibility stairs within home;stairs to enter home;stairs (1 railing present)   Number of Stairs to Enter Home 2   Number of Stairs Within Home 15   Stair Railings at Home inside, present on left side   Transportation Available family or friend will provide   Living Environment Comment Pt will stay on main floor for a few days until able to do stairs to bedroom easily.    Self-Care   Dominant Hand left   Usual Activity Tolerance good   Current Activity Tolerance moderate   Regular Exercise no   Equipment Currently Used at Home none   Functional Level Prior   Ambulation 0-->independent   Transferring 0-->independent   Toileting 0-->independent   Bathing 0-->independent   Dressing 0-->independent   Eating 0-->independent   Communication 0-->understands/communicates without difficulty   Swallowing 0-->swallows foods/liquids without difficulty   Cognition 0 - no cognition issues reported   Fall history within last six months no   Which of the above functional risks had a recent onset or change? 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   General Information   Onset of Illness/Injury or Date of Surgery - Date 03/19/18   Referring Physician Dr. Hebert Lee   Patient/Family Goals Statement return home tomorrow   Additional Occupational Profile Info/Pertinent History of Current Problem POD#1 L TKA (R TKA done Nov. 2017)   Precautions/Limitations no known precautions/limitations   Weight-Bearing Status - LLE weight-bearing as tolerated   Cognitive Status Examination   Orientation orientation to person, place and time   Level of Consciousness alert   Able to Follow Commands WNL/WFL   Personal Safety (Cognitive) WNL/WFL   Memory intact   Visual  Perception   Visual Perception No deficits were identified   Pain Assessment   Patient Currently in Pain Yes, see Vital Sign flowsheet   Range of Motion (ROM)   ROM Quick Adds No deficits were identified   Strength   Manual Muscle Testing Quick Adds No deficits were identified   Mobility   Bed Mobility Comments CGA provided at L knee   Transfer Skills   Transfer Comments SBA with FWW   Transfer Skill: Sit to Stand   Level of [place]: Sit/Stand stand-by assist   Physical Assist/Nonphysical Assist: Sit/Stand supervision;verbal cues   Transfer Skill: Sit to Stand weight-bearing as tolerated   Assistive Device for Transfer: Sit/Stand rolling walker   Transfer Skill: Toilet Transfer   Level of [place]: Toilet stand-by assist   Physical Assist/Nonphysical Assist: Toilet supervision;verbal cues   Weight-Bearing Restrictions: Toilet weight-bearing as tolerated   Assistive Device rolling walker   Upper Body Dressing   Level of [place]: Dress Upper Body independent   Physical Assist/Nonphysical Assist: Dress Upper Body set-up required   Lower Body Dressing   Level of [place]: Dress Lower Body (Indep all except shoes and socks, states spouse will assist)   Toileting   Level of [place]: Toilet stand-by assist   Physical Assist/Nonphysical Assist: Toilet supervision   Instrumental Activities of Daily Living (IADL)   IADL Comments Pt indep all IADL, however, her spouse and two teen daughters will assist with all household tasks   General Therapy Interventions   Planned Therapy Interventions ADL retraining;IADL retraining;transfer training   Clinical Impression   Criteria for Skilled Therapeutic Interventions Met yes, treatment indicated   OT Diagnosis decreased ADL/IADLperformance   Influenced by the following impairments post-op R knee pain, decreased ROM and strength, balance   Assessment of Occupational Performance 1-3 Performance Deficits   Identified Performance Deficits functional mobility,  "household mgmt   Clinical Decision Making (Complexity) Low complexity   Predicted Duration of Therapy Intervention (days/wks) eval and one treatment only   Anticipated Discharge Disposition Home with Assist   Risks and Benefits of Treatment have been explained. Yes   Patient, Family & other staff in agreement with plan of care Yes   [facility] AM-PAC TM \"6 Clicks\"   2016, Trustees of [facility], under license to [employer].  All rights reserved.   6 Clicks Short Forms Daily Activity Inpatient Short Form   Total Evaluation Time   Total Evaluation Time (Minutes) 15     "

## 2018-03-20 NOTE — PROGRESS NOTES
03/20/18 1042   Quick Adds   Type of Visit Initial PT Evaluation   Living Environment   Lives With spouse   Living Arrangements house   Home Accessibility stairs within home;stairs to enter home;stairs (1 railing present)   Number of Stairs to Enter Home 2   Number of Stairs Within Home 15   Stair Railings at Home inside, present on left side   Transportation Available family or friend will provide   Living Environment Comment Pt lives in a house with her  and two teenage girls with 2 steps to enter and 15 steps to the upper level bedroom. She states she plans to stay on the main floor for the first few days. She was independent with all mobility at baseline.    Self-Care   Dominant Hand left   Usual Activity Tolerance good   Current Activity Tolerance moderate   Regular Exercise no   Equipment Currently Used at Home none   Functional Level Prior   Ambulation 0-->independent   Transferring 0-->independent   Toileting 0-->independent   Bathing 0-->independent   Dressing 0-->independent   Eating 0-->independent   Communication 0-->understands/communicates without difficulty   Swallowing 0-->swallows foods/liquids without difficulty   Cognition 0 - no cognition issues reported   Fall history within last six months no   Which of the above functional risks had a recent onset or change? ambulation;transferring   General Information   Onset of Illness/Injury or Date of Surgery - Date 03/19/18   Referring Physician Dr. Hebert Lee   Patient/Family Goals Statement to return home tomorrow   Pertinent History of Current Problem (include personal factors and/or comorbidities that impact the POC) Pt is 46 yo female POD #1 following L TKA. Pt reports having her R TKA done in November 2017.    Precautions/Limitations no known precautions/limitations   Weight-Bearing Status - LLE weight-bearing as tolerated   Weight-Bearing Status - RLE full weight-bearing   General Observations Pt is pleasant and motivated   Cognitive  Status Examination   Orientation orientation to person, place and time   Level of Consciousness alert   Follows Commands and Answers Questions 100% of the time;able to follow multistep instructions   Personal Safety and Judgment intact   Memory intact   Pain Assessment   Patient Currently in Pain Yes, see Vital Sign flowsheet   Integumentary/Edema   Integumentary/Edema Comments Unable to visualize incision due to dressing   Posture    Posture Forward head position   Range of Motion (ROM)   ROM Comment Generally WFL, L knee approx 5-80 degrees   Strength   Strength Comments Generally WFL, L knee full AROM against gravity   Bed Mobility   Bed Mobility Comments Pt performs with CGA   Transfer Skills   Transfer Comments Pt transfers with SBA   Gait   Gait Comments Pt ambulates with fww and CGA with step though gait pattern and good upright posture   Balance   Balance Comments good seated and standing    Sensory Examination   Sensory Perception no deficits were identified   Coordination   Coordination no deficits were identified   Muscle Tone   Muscle Tone no deficits were identified   Modality Interventions   Planned Modality Interventions Cryotherapy   General Therapy Interventions   Planned Therapy Interventions bed mobility training;gait training;neuromuscular re-education;ROM;strengthening;stretching;transfer training;home program guidelines;progressive activity/exercise   Clinical Impression   Criteria for Skilled Therapeutic Intervention yes, treatment indicated   PT Diagnosis Difficulty with gait    Influenced by the following impairments Pt presents with increased L knee pain and weakness, decreased L knee ROM, impaired balance, and decreased functional capacity   Functional limitations due to impairments Pt demonstrates increased difficulty with bed mobility, transfers, and ambulation    Clinical Presentation Stable/Uncomplicated   Clinical Presentation Rationale clinical observation    Clinical Decision Making  "(Complexity) Low complexity   Therapy Frequency` 2 times/day   Predicted Duration of Therapy Intervention (days/wks) 3 days   Anticipated Discharge Disposition Home with Outpatient Therapy;Home with Assist   Risk & Benefits of therapy have been explained Yes   Patient, Family & other staff in agreement with plan of care Yes   [facility] TM \"6 Clicks\"   2016, Trustees of [facility], under license to [employer].  All rights reserved.   6 Clicks Short Forms Basic Mobility Inpatient Short Form   [facility]  \"6 Clicks\" V.2 Basic Mobility Inpatient Short Form   1. Turning from your back to your side while in a flat bed without using bedrails? 4 - None   2. Moving from lying on your back to sitting on the side of a flat bed without using bedrails? 3 - A Little   3. Moving to and from a bed to a chair (including a wheelchair)? 3 - A Little   4. Standing up from a chair using your arms (e.g., wheelchair, or bedside chair)? 3 - A Little   5. To walk in hospital room? 3 - A Little   6. Climbing 3-5 steps with a railing? 3 - A Little   Basic Mobility Raw Score (Score out of 24.Lower scores equate to lower levels of function) 19   Total Evaluation Time   Total Evaluation Time (Minutes) 10     "

## 2018-03-20 NOTE — PLAN OF CARE
Problem: Patient Care Overview  Goal: Plan of Care/Patient Progress Review  Discharge Planner OT   Patient plan for discharge: home w/family assist  Current status: OT eval and treatment completed POD#1 L TKA. Pt demonstrated indep with total body dressing except required assist with L shoe and sock which spouse can provide. Performed toileting task all components safe manner, amb w/FWW SBA and followed cues to perform safe retrieval of ADL supplies. OT goals met and OT discharged.   Barriers to return to prior living situation: stairs (PT to address)  Recommendations for discharge: home w/family assist  Rationale for recommendations: Pt reports that her spouse and two teen daughters are available to assist, will do all household tasks during her recovery. 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        Entered by: Kimani Dumont 03/20/2018 2:49 PM

## 2018-03-20 NOTE — CONSULTS
Hospitalist Consultation      Lyubov Sanchez MRN# [id number]   YOB: 1970 Age: 47 year old   Date of Admission: 3/19/2018     Requesting Physician:  Dr. Lee  Reason for consult: Post-op medical management           Assessment and Plan:   This patient is a 47 year old female who is POD #1 s/p left TKA.  Overall doing well, no complaints. Pain controlled.    1. left TKA - pain, prophylaxis, diet and PT/OT per ortho. On Lovenox  2. KRISTYN - uses CPAP, continue  3. HTN - on amlodipine and lisinopril/HCTZ, resume with parameters.   4. Depression - resume Wellbutrin and Celexa               History of Present Illness:   This patient is a 47 year old female who is POD #1 s/p left TKA. She has severe DJD due to osteoarthritis of the left knee and has failed conservative outpatient management so presents here for elective left TKA. She tolerated the surgery well, pain is well controlled, has had adequate I&Os, and she is tolerating PO intake. Martell remains in place, she is not passing gas or had a BM yet. She denies fever, chills, chest pain, SOB, cough, abdominal pain, nausea, vomiting, or diarrhea. She also has a PMHx of HTN, KRISTYN and depression.              Past Medical History:     Past Medical History:   Diagnosis Date     Arthritis      Depressive disorder      DYSPLASIA OF CERVIX 3/6/2003     Dysplasia of cervix (uteri) 2003    Rx cryotherapy Nov 03, and 2005     Hypertension     NO cardiology     Incomplete right bundle branch block 11/10/2017     Migraine      Other chronic pain     Knee pain for 8 years     Plantar fasciitis     bilat     PONV (postoperative nausea and vomiting)      Unspecified sleep apnea     CPAP will bring on the day of surgery.               Past Surgical History:     Past Surgical History:   Procedure Laterality Date     ARTHROPLASTY KNEE Right 11/17/2017    Procedure: ARTHROPLASTY KNEE;  Right total knee arthroplasty using the Arthrex iBalance total knee system;  Surgeon:  Hebert Lee MD;  Location: [facility] OR     C  DELIVERY ONLY  ,    , Low Cervical     ENT SURGERY       HC COLP VAGINA W CERVIX IF PRES W BIOPSY      [place] for ASCUS     TONSILLECTOMY & ADENOIDECTOMY                   Social History:     Social History   Substance Use Topics     Smoking status: Former Smoker     Packs/day: 0.50     Years: 5.00     Types: Cigarettes     Quit date: 2007     Smokeless tobacco: Never Used     Alcohol use Yes      Comment: 3-4 drinks weekly                 Family History:   I have reviewed this patient's family history  family history includes Unknown/Adopted in her father, maternal grandfather, maternal grandmother, mother, paternal grandmother, and other family members. She was adopted.            Allergies:     Allergies   Allergen Reactions     Penicillins Hives     hives             Medications:     Prior to Admission medications    Medication Sig Last Dose Taking? Auth Provider   aspirin  MG EC tablet Take one Aspirin tab twice daily for 5 weeks.  Yes Hebert Lee MD   HYDROcodone-acetaminophen (NORCO) 5-325 MG per tablet Take 1-2 tablets by mouth every 4 hours as needed for moderate to severe pain  Yes Hebert Lee MD   buPROPion (WELLBUTRIN XL) 150 MG 24 hr tablet TAKE 1 TABLET (150 MG) BY MOUTH EVERY MORNING 3/19/2018 at Unknown time Yes Jeremiah Muhammad PA-C   Naproxen Sodium (ALEVE PO) Take 220 mg by mouth 2 times daily as needed for moderate pain Will stop `1 week pre op per primary DrMary Lou Past Month at Unknown time Yes Reported, Patient   amLODIPine (NORVASC) 5 MG tablet Take 1 tablet (5 mg) by mouth daily 3/19/2018 at Unknown time Yes Yolie Delarosa MD   lisinopril-hydrochlorothiazide (PRINZIDE/ZESTORETIC) 20-12.5 MG per tablet TAKE 1 TABLET BY MOUTH DAILY 3/18/2018 at Unknown time Yes Jeremiah Muhammad PA-C   citalopram (CELEXA) 20 MG tablet Take 1 tablet (20 mg) by mouth daily  "3/19/2018 at Unknown time Yes Jeremiah Muhammad PA-C   levonorgestrel (MIRENA) 20 MCG/24HR IUD   Yes Yolie Delarosa MD               Review of Systems:   A comprehensive greater than 10 system review of systems was carried out.  Pertinent positives and negatives are noted above.  Otherwise negative for contributory info.            Physical Exam:   Vitals were reviewed  Blood pressure 126/71, temperature 98.1  F (36.7  C), temperature source Oral, resp. rate 16, height 1.626 m (5' 4.02\"), weight 117 kg (258 lb), SpO2 92 %, not currently breastfeeding.  Exam:    GENERAL:  Comfortable.  PSYCH: pleasant, oriented, No acute distress.  HEENT:  Normal conjunctiva, normal hearing, nasal mucosa and Oropharynx are normal.  NECK:  Supple, no neck vein distention  HEART:  Normal S1, S2 with no murmur, no pericardial rub, S3 or S4.  LUNGS:  Clear to auscultation, normal Respiratory effort.  GI:  Soft, normal bowel sounds.  EXTREMITIES:  No pedal edema, +2 pulses bilateral and equal.  Left knee dressing c/d/i  SKIN:  Dry to touch, No rash, wound or ulcerations.  NEUROLOGIC:  Nonfocal with normal cranial nerve and motor power and sensation.            Data:   Past 24 hours labs, studies, and imaging were reviewed.    Recent Labs  Lab 03/20/18  0700 03/19/18  1808   HGB 13.4  --    PLT  --  179       Recent Labs  Lab 03/20/18  0700 03/19/18  1808   *  --    CR  --  0.55   GFRESTIMATED  --  >90   GFRESTBLACK  --  >90       Deirdre Hazel PA-C    Pt discussed with Dr. Bedolla who agrees with the care as discussed above.     "

## 2018-03-21 ENCOUNTER — APPOINTMENT (OUTPATIENT)
Dept: PHYSICAL THERAPY | Facility: CLINIC | Age: 48
DRG: 470 | End: 2018-03-21
Attending: ORTHOPAEDIC SURGERY
Payer: COMMERCIAL

## 2018-03-21 VITALS
HEIGHT: 64 IN | DIASTOLIC BLOOD PRESSURE: 81 MMHG | OXYGEN SATURATION: 96 % | RESPIRATION RATE: 16 BRPM | WEIGHT: 258 LBS | SYSTOLIC BLOOD PRESSURE: 146 MMHG | TEMPERATURE: 97.2 F | BODY MASS INDEX: 44.05 KG/M2

## 2018-03-21 LAB
GLUCOSE SERPL-MCNC: 119 MG/DL (ref 70–99)
HGB BLD-MCNC: 13.5 G/DL (ref 11.7–15.7)

## 2018-03-21 PROCEDURE — 85018 HEMOGLOBIN: CPT | Performed by: ORTHOPAEDIC SURGERY

## 2018-03-21 PROCEDURE — 25000132 ZZH RX MED GY IP 250 OP 250 PS 637: Performed by: PHYSICIAN ASSISTANT

## 2018-03-21 PROCEDURE — 82947 ASSAY GLUCOSE BLOOD QUANT: CPT | Performed by: ORTHOPAEDIC SURGERY

## 2018-03-21 PROCEDURE — 97110 THERAPEUTIC EXERCISES: CPT | Mod: GP

## 2018-03-21 PROCEDURE — 25000128 H RX IP 250 OP 636: Performed by: ORTHOPAEDIC SURGERY

## 2018-03-21 PROCEDURE — 25000132 ZZH RX MED GY IP 250 OP 250 PS 637: Performed by: ORTHOPAEDIC SURGERY

## 2018-03-21 PROCEDURE — 97116 GAIT TRAINING THERAPY: CPT | Mod: GP

## 2018-03-21 PROCEDURE — 97530 THERAPEUTIC ACTIVITIES: CPT | Mod: GP

## 2018-03-21 PROCEDURE — 40000193 ZZH STATISTIC PT WARD VISIT

## 2018-03-21 PROCEDURE — 36415 COLL VENOUS BLD VENIPUNCTURE: CPT | Performed by: ORTHOPAEDIC SURGERY

## 2018-03-21 RX ORDER — HYDROXYZINE HYDROCHLORIDE 25 MG/1
25-50 TABLET, FILM COATED ORAL EVERY 6 HOURS PRN
Qty: 60 TABLET | Refills: 0 | Status: SHIPPED | OUTPATIENT
Start: 2018-03-21 | End: 2018-06-11

## 2018-03-21 RX ADMIN — CELECOXIB 200 MG: 200 CAPSULE ORAL at 08:39

## 2018-03-21 RX ADMIN — LISINOPRIL AND HYDROCHLOROTHIAZIDE 1 TABLET: 12.5; 2 TABLET ORAL at 08:39

## 2018-03-21 RX ADMIN — HYDROCODONE BITARTRATE AND ACETAMINOPHEN 2 TABLET: 5; 325 TABLET ORAL at 13:07

## 2018-03-21 RX ADMIN — RANITIDINE 150 MG: 150 TABLET ORAL at 08:39

## 2018-03-21 RX ADMIN — AMLODIPINE BESYLATE 5 MG: 5 TABLET ORAL at 08:39

## 2018-03-21 RX ADMIN — CITALOPRAM HYDROBROMIDE 20 MG: 20 TABLET ORAL at 08:39

## 2018-03-21 RX ADMIN — HYDROCODONE BITARTRATE AND ACETAMINOPHEN 2 TABLET: 5; 325 TABLET ORAL at 08:39

## 2018-03-21 RX ADMIN — HYDROXYZINE HYDROCHLORIDE 25 MG: 25 TABLET ORAL at 08:44

## 2018-03-21 RX ADMIN — HYDROXYZINE HYDROCHLORIDE 25 MG: 25 TABLET ORAL at 02:57

## 2018-03-21 RX ADMIN — HYDROCODONE BITARTRATE AND ACETAMINOPHEN 2 TABLET: 5; 325 TABLET ORAL at 02:54

## 2018-03-21 RX ADMIN — BUPROPION HYDROCHLORIDE 150 MG: 150 TABLET, FILM COATED, EXTENDED RELEASE ORAL at 08:39

## 2018-03-21 RX ADMIN — SENNOSIDES AND DOCUSATE SODIUM 1 TABLET: 8.6; 5 TABLET ORAL at 08:39

## 2018-03-21 RX ADMIN — ENOXAPARIN SODIUM 40 MG: 40 INJECTION SUBCUTANEOUS at 11:03

## 2018-03-21 NOTE — PROGRESS NOTES
Reviewed discharge instructions and medications with patient and Vlad. Questions answered. Patient discharged to home with spouse driving, discharge instructions, medications (Norco/Aspirin/Atarax), and belongings at this time.

## 2018-03-21 NOTE — PLAN OF CARE
Problem: Patient Care Overview  Goal: Plan of Care/Patient Progress Review  Discharge Planner PT   Patient plan for discharge: Home with OP PT  Current status: Supine to/from sit with mod I. Sit to/from stand from bed, mat with mod I. Ambulates 75' x 2 reps with mod I. Performs 4 stairs with CGA and bilateral rail, an additional 4 with unilateral rail and SBA. Issued HEP.   Barriers to return to prior living situation: None  Recommendations for discharge: Home with OP PT  Rationale for recommendations: IP PT goals met. Appropriate for DC from PT services. Appropriate to continue with OP PT to address L LE strength/AROM deficits.        Entered by: Tamika Zheng 03/21/2018 11:06 AM         Physical Therapy Discharge Summary    Reason for therapy discharge:    All goals and outcomes met, no further needs identified.    Progress towards therapy goal(s). See goals on Care Plan in [facility] electronic health record for goal details.  Goals met    Therapy recommendation(s):    Continued therapy is recommended.  Rationale/Recommendations:  See rationale above.  Continue home exercise program.

## 2018-03-21 NOTE — DISCHARGE INSTRUCTIONS
Call Md ([phone]) before or after your follow up appointment if:  1. Increased/persistent temperature chills and/or sweats  2. Increased/uncontrolled pain despite pain medication, sedated   3. Increased/persistent bleeding, redness, swelling, bruising, drainage (yellow/odorous) or incision would pull apart  4. Calf pain, swollen/hard/warm area, chest pain or shortness of breath  5. Weakness in operative leg, knee buckling, numbness and/or tingling. Or if you Fall  6. Nausea, vomiting, constipation and/or diarrhea  7. Too sleepy  8. Trouble voiding or signs and symptoms of urinary tract infection.  9. Constipation unrelieved by stool softeners (see Other instructions below)    Activity instructions:  1. Do exercises at home as instructed by therapist twice a day. Continue outpatient therapy as ordered by your doctor.  2. Ice knee after exercises and therapy or 20 minutes 3-4 times a day to reduce pain and swelling  3. If your doctor orders a cpm use as tolerated 0-90 degrees   4. Slowly increase activity back to baseline    Diet Information  Drink plenty of fluids  Eat a regular balanced diet    Dressing information:   May shower (leave dressing on ),dressing is water proof. examine around incision daily  for any signs and symptoms of infection.     Wash hands before touching skin surrounding incision  DO NOT change dressing daily leave on until follow up apt.  Inspect surrounding skin daily for s/s of infections.   Do not soak in tub or pool.   If dressing loosens wash hands, tape down edge and call surgeon.  If the dressing comes off completely then place sterile gauze over incision and tape around all edges and call surgeon for further direction.    Other instructions:  1. Notify your dentist of your implant so you can get antibiotics before any dental work or before any invasive procedure  2. Take an over the counter stool softener (mirilax or senna) as directed while on narcotics to ensure bowel movements  are regular.  Take a laxative (milk of magnesia and/or a suppository )as directed if no bm in 2 days.  3. Keep track of when you take all medication, especially pain medication. See bottles for instructions and side effects  4. Use incentive spirometry hourly until you are back to normal activity (helps prevent pneumonia)  6. See medication handouts for medication information and side effects    Follow up information  Call Md to make a follow up apt. @ [facility] [phone] 10-14 days from the day of surgery    Thank you for choosing [facility]

## 2018-03-21 NOTE — PLAN OF CARE
Problem: Patient Care Overview  Goal: Plan of Care/Patient Progress Review  Patient A&Ox4. VSS. LS diminished, using CPAP. +BS/gas. Voiding adequatly Tolerating regular diet. Ambulates SBA w/ walker. Dressing CDI. CMS intact. Will continue to monitor.

## 2018-03-21 NOTE — PLAN OF CARE
Problem: Patient Care Overview  Goal: Plan of Care/Patient Progress Review  Outcome: Improving  [facility] Progress Note       Assessment   Assessment:  Post-operative day #2  Total knee arthoplasty (Left)     CMS: is intact. Calves non-tender bilaterally.  Lungs: are clear, encouraged to use incentive spirometer w/a.  BS: active, + flatus   Urine: voiding adequately    Surgical Site: Dressing is clean dry intact.   Pain: controlled with po pain meds. Ice to incisional area.   Activity: BRPs w/SBA/walker  Slept well.      Joesph Leon RN

## 2018-03-21 NOTE — PROGRESS NOTES
"Orthopedic Surgery  3/21/2018  POD#: 2    S: Patient voices no complaints today. Pain well controlled, and denies calf pain, dizziness, nausea, SOB.    O: Blood pressure 146/81, temperature 97.2  F (36.2  C), temperature source Oral, resp. rate 16, height 1.626 m (5' 4.02\"), weight 117 kg (258 lb), SpO2 96 %, not currently breastfeeding.  Lab Results   Component Value Date    HGB 13.5 03/21/2018     Lab Results   Component Value Date    INR 1.00 06/21/2015        I/O last 3 completed shifts:  In: 1340 [P.O.:1340]  Out: 4330 [Urine:4330]    Neurovascularly intact.  Calves are negative bilaterally, both soft and nontender.  The wound is C/D/I.  The wound looks good with minimal erythema of the surrounding skin.    A: Ms. Sanchez is doing well status post Procedure(s):  L TKA    P:   1. Mobilize and continue physical therapy as outpatient.   2. Anticoagulation - dc on ASA  3. Pain management - doing well with norco and hydroxyzine  4. Anticipate discharge to home today.      Bernadette Patrick PA-C  O: [phone]  "

## 2018-03-22 ENCOUNTER — TELEPHONE (OUTPATIENT)
Dept: FAMILY MEDICINE | Facility: CLINIC | Age: 48
End: 2018-03-22

## 2018-03-23 NOTE — TELEPHONE ENCOUNTER
.ED / Discharge Outreach Protocol    Patient Contact    Attempt # 1    Was call answered?  No.  Left message on voicemail with information to call me back.    Lexus Oliveros RN

## 2018-03-26 NOTE — TELEPHONE ENCOUNTER
"Hospital/TCU/ED for chronic condition Discharge Protocol    \"Hi, my name is Sherie Lopez, a registered nurse, and I am calling from [facility].  I am calling to follow up and see how things are going for you after your recent emergency visit/hospital/TCU stay.\"    Tell me how you are doing now that you are home?\" Doing well. Some pain but getting around ok. Started PT and it is going ok. Incision and bandage looks good      Discharge Instructions    \"Let's review your discharge instructions.  What is/are the follow-up recommendations?  Pt. Response: f/u with Bernadette Patrick PA-C within 12-16 days.     \"Has an appointment with your primary care provider been scheduled?\"   No, patient will f/u with [facility] first    \"When you see the provider, I would recommend that you bring your medications with you.\"    Medications    \"Tell me what changed about your medicines when you discharged?\"    Changes to chronic meds?    0-1    \"What questions do you have about your medications?\"    None     New diagnoses of heart failure, COPD, diabetes, or MI?    No              Medication reconciliation completed? Yes  Was MTM referral placed (*Make sure to put transitions as reason for referral)?   No    Call Summary    \"What questions or concerns do you have about your recent visit and your follow-up care?\"     none    \"If you have questions or things don't continue to improve, we encourage you contact us through the main clinic number (give number).  Even if the clinic is not open, triage nurses are available 24/7 to help you.     We would like you to know that our clinic has extended hours (provide information).  We also have urgent care (provide details on closest location and hours/contact info)\"      \"Thank you for your time and take care!\"         Sherie WATTS RN, BSN, PHN  [facility] RN      "

## 2018-03-30 NOTE — DISCHARGE SUMMARY
"Discharge Summary    Lyubov Sanchez MRN# [id number]   YOB: 1970 Age: 47 year old     Date of Admission: 3/19/2018    Date of Discharge: 3/21/2018    Reason for Admission: Lyubov Sanchez is an 47 year old female who was admitted to the hospital following surgery with Dr. Hebert Lee.    Preoperative Diagnosis: DJD    Postoperative Diagnosis: DJD    Procedure Completed: left total knee arthroplasty     Hospital Course:  Ms. Sanchez was admitted and underwent the above procedure. The patient tolerated the procedure well. There were no complications. Following surgery she was admitted to the floor.  During her stay she did not require any blood transfusions.  Her last hemoglobin was noted to be   Lab Results   Component Value Date    HGB 13.5 03/21/2018   .  Her pain was controlled with oral pain medication.  During her stay she progressed well in physical therapy and all the therapy goals were met.     Discharge Physical Exam:  /81 (BP Location: Left arm)  Temp 97.2  F (36.2  C) (Oral)  Resp 16  Ht 1.626 m (5' 4.02\")  Wt 117 kg (258 lb)  SpO2 96%  BMI 44.26 kg/m2  Neurovascularly intact, distal pulses present bilaterally.  Calves are negative bilaterally, both soft and nontender.  The wound looks good with minimal erythema of the surrounding skin.    Assessment: Ms. Sanchez is stable and doing well status post left total knee arthroplasty on POD #2.    Plan: We will discharge her home on analgesics and deep venous thrombosis prophylaxis.  She will follow-up with Bernadette Patrick PA-C or Dr. Hebert Lee approximately 2 weeks from surgery.  She may call [phone] to schedule an appointment.    Discharge Instructions:  Discharge diet: Regular   Discharge activity: Weight bear as tolerated.  Advance from the walker to a cane as tolerated.  Discontinue the use of cane when comfortable.   Physical therapy: Work on therapy exercises given in the hospital.    Discharge follow-up: Follow up " with Bernadette Patrick PA-C in 10-14 days.   Anticoagulation Asprin 325 mg twice daily for 5 weeks.   Wound care: Leave aquacel dressing in place until post-op follow-up.  If it becomes loose or soiled then remove and replace with daily dry dressings.  Keep wound clean and dry.  May get incision area wet in shower but do not soak or scrub.         Meds:   Lyubov Sanchez   Home Medication Instructions CANDICE:[id number]    Printed on:03/30/18 0711   Medication Information                      amLODIPine (NORVASC) 5 MG tablet  Take 1 tablet (5 mg) by mouth daily             aspirin  MG EC tablet  Take one Aspirin tab twice daily for 5 weeks.             buPROPion (WELLBUTRIN XL) 150 MG 24 hr tablet  TAKE 1 TABLET (150 MG) BY MOUTH EVERY MORNING             citalopram (CELEXA) 20 MG tablet  Take 1 tablet (20 mg) by mouth daily             HYDROcodone-acetaminophen (NORCO) 5-325 MG per tablet  Take 1-2 tablets by mouth every 4 hours as needed for moderate to severe pain             hydrOXYzine (ATARAX) 25 MG tablet  Take 1-2 tablets (25-50 mg) by mouth every 6 hours as needed for itching             levonorgestrel (MIRENA) 20 MCG/24HR IUD               lisinopril-hydrochlorothiazide (PRINZIDE/ZESTORETIC) 20-12.5 MG per tablet  TAKE 1 TABLET BY MOUTH DAILY             Naproxen Sodium (ALEVE PO)  Take 220 mg by mouth 2 times daily as needed for moderate pain Will stop `1 week pre op per primary Dr. Bernadette Patrick PA-C  O: [phone]

## 2018-04-02 ENCOUNTER — TRANSFERRED RECORDS (OUTPATIENT)
Dept: HEALTH INFORMATION MANAGEMENT | Facility: CLINIC | Age: 48
End: 2018-04-02

## 2018-04-30 DIAGNOSIS — Z12.31 VISIT FOR SCREENING MAMMOGRAM: ICD-10-CM

## 2018-04-30 PROCEDURE — 77067 SCR MAMMO BI INCL CAD: CPT | Mod: TC

## 2018-05-07 ENCOUNTER — HOSPITAL ENCOUNTER (OUTPATIENT)
Dept: ULTRASOUND IMAGING | Facility: CLINIC | Age: 48
End: 2018-05-07
Attending: FAMILY MEDICINE
Payer: COMMERCIAL

## 2018-05-07 ENCOUNTER — HOSPITAL ENCOUNTER (OUTPATIENT)
Dept: MAMMOGRAPHY | Facility: CLINIC | Age: 48
Discharge: HOME | End: 2018-05-07
Attending: FAMILY MEDICINE | Admitting: FAMILY MEDICINE
Payer: COMMERCIAL

## 2018-05-07 ENCOUNTER — TRANSFERRED RECORDS (OUTPATIENT)
Dept: HEALTH INFORMATION MANAGEMENT | Facility: CLINIC | Age: 48
End: 2018-05-07

## 2018-05-07 DIAGNOSIS — R92.8 ABNORMAL MAMMOGRAM: ICD-10-CM

## 2018-05-07 PROCEDURE — G0279 TOMOSYNTHESIS, MAMMO: HCPCS

## 2018-05-07 PROCEDURE — 76642 ULTRASOUND BREAST LIMITED: CPT | Mod: LT

## 2018-06-05 DIAGNOSIS — I10 ESSENTIAL HYPERTENSION, BENIGN: ICD-10-CM

## 2018-06-05 NOTE — TELEPHONE ENCOUNTER
"Requested Prescriptions   Pending Prescriptions Disp Refills     amLODIPine (NORVASC) 5 MG tablet [Pharmacy Med Name: AMLODIPINE BESYLATE 5 MG TAB] 90 tablet 0    Last Written Prescription Date:  11/2/17  Last Fill Quantity: 90,  # refills: 1   Last Office Visit: 2/26/2018   Future Office Visit:      Sig: TAKE 1 TABLET (5 MG) BY MOUTH DAILY    Calcium Channel Blockers Protocol  Failed    6/5/2018  8:29 AM       Failed - Blood pressure under 140/90 in past 12 months    BP Readings from Last 3 Encounters:   03/21/18 146/81   02/26/18 118/70   11/19/17 150/76                Passed - Recent (12 mo) or future (30 days) visit within the authorizing provider's specialty    Patient had office visit in the last 12 months or has a visit in the next 30 days with authorizing provider or within the authorizing provider's specialty.  See \"Patient Info\" tab in inbasket, or \"Choose Columns\" in Meds & Orders section of the refill encounter.           Passed - Patient is age 18 or older       Passed - No active pregnancy on record       Passed - Normal serum creatinine on file in past 12 months    Recent Labs   Lab Test  03/19/18   1808   CR  0.55            Passed - No positive pregnancy test in past 12 months          "

## 2018-06-06 RX ORDER — AMLODIPINE BESYLATE 5 MG/1
TABLET ORAL
Qty: 30 TABLET | Refills: 0 | Status: SHIPPED | OUTPATIENT
Start: 2018-06-06 | End: 2018-06-11

## 2018-06-06 NOTE — TELEPHONE ENCOUNTER
Routing refill request to provider for review/approval because:  BP not at goal. Patient also due for annual physical in July.    Ok for limited?    Sherie WATTS RN, BSN, PHN  [employer] RN

## 2018-06-06 NOTE — TELEPHONE ENCOUNTER
Pt. Informed BP visit scheduled. She will call back to schedule annual physical.    Sherie WATTS RN, BSN, PHN  [employer] RN

## 2018-06-06 NOTE — TELEPHONE ENCOUNTER
Will refill, if pt can come in this week for nurse only BP check  Please schedule [facility] appt as well for July, thank you

## 2018-06-11 ENCOUNTER — ALLIED HEALTH/NURSE VISIT (OUTPATIENT)
Dept: NURSING | Facility: CLINIC | Age: 48
End: 2018-06-11
Payer: COMMERCIAL

## 2018-06-11 VITALS — DIASTOLIC BLOOD PRESSURE: 74 MMHG | HEART RATE: 96 BPM | SYSTOLIC BLOOD PRESSURE: 116 MMHG

## 2018-06-11 DIAGNOSIS — I10 ESSENTIAL HYPERTENSION, BENIGN: ICD-10-CM

## 2018-06-11 DIAGNOSIS — I10 HTN, GOAL BELOW 140/90: Primary | ICD-10-CM

## 2018-06-11 PROCEDURE — 99207 ZZC NO CHARGE NURSE ONLY: CPT

## 2018-06-11 RX ORDER — AMLODIPINE BESYLATE 5 MG/1
TABLET ORAL
Qty: 30 TABLET | Refills: 0 | Status: CANCELLED | OUTPATIENT
Start: 2018-06-11

## 2018-06-11 RX ORDER — LISINOPRIL AND HYDROCHLOROTHIAZIDE 12.5; 2 MG/1; MG/1
1 TABLET ORAL DAILY
Qty: 90 TABLET | Refills: 2 | Status: CANCELLED | OUTPATIENT
Start: 2018-06-11

## 2018-06-11 ASSESSMENT — ANXIETY QUESTIONNAIRES
3. WORRYING TOO MUCH ABOUT DIFFERENT THINGS: SEVERAL DAYS
5. BEING SO RESTLESS THAT IT IS HARD TO SIT STILL: NOT AT ALL
GAD7 TOTAL SCORE: 4
2. NOT BEING ABLE TO STOP OR CONTROL WORRYING: NOT AT ALL
1. FEELING NERVOUS, ANXIOUS, OR ON EDGE: SEVERAL DAYS
7. FEELING AFRAID AS IF SOMETHING AWFUL MIGHT HAPPEN: NOT AT ALL
6. BECOMING EASILY ANNOYED OR IRRITABLE: SEVERAL DAYS

## 2018-06-11 ASSESSMENT — PATIENT HEALTH QUESTIONNAIRE - PHQ9: 5. POOR APPETITE OR OVEREATING: SEVERAL DAYS

## 2018-06-11 NOTE — TELEPHONE ENCOUNTER
Patient came in today for a NURSE ONLY B/P check.  BP Readings from Last 3 Encounters:   06/11/18 116/74   03/21/18 146/81   02/26/18 118/70     Patient requesting refills on NORVASC 5 mg AND LISINOPRIL-HCTZ 20-12.5 mg.     Lisa Magill, CMA

## 2018-06-11 NOTE — PROGRESS NOTES
Lyubov Sanchez is a 47 year old patient who comes in today for a Blood Pressure check.  Initial BP:  /74 (BP Location: Right arm, Patient Position: Chair, Cuff Size: Adult Large)  Pulse 96     Disposition: results routed to provider    Lisa Magill, CMA

## 2018-06-11 NOTE — MR AVS SNAPSHOT
After Visit Summary   6/11/2018    Lyubov Sanchez    MRN: [id number]           Patient Information     Date Of Birth          1970        Visit Information        Provider Department      6/11/2018 2:00 PM FM MA/LPN [facility]        Today's Diagnoses     HTN, goal below 140/90    -  1    Essential hypertension, benign           Follow-ups after your visit        Who to contact     If you have questions or need follow up information about today's clinic visit or your schedule please contact [facility] directly at [phone].  Normal or non-critical lab and imaging results will be communicated to you by [employer], letter or phone within 4 business days after the clinic has received the results. If you do not hear from us within 7 days, please contact the clinic through [employer] or phone. If you have a critical or abnormal lab result, we will notify you by phone as soon as possible.  Submit refill requests through [employer] or call your pharmacy and they will forward the refill request to us. Please allow 3 business days for your refill to be completed.          Additional Information About Your Visit        MyChart Information     [employer] gives you secure access to your electronic health record. If you see a primary care provider, you can also send messages to your care team and make appointments. If you have questions, please call your primary care clinic.  If you do not have a primary care provider, please call [phone] and they will assist you.        Care EveryWhere ID     This is your Care EveryWhere ID. This could be used by other organizations to access your [facility] medical records  [id number]        Your Vitals Were     Pulse                   96            Blood Pressure from Last 3 Encounters:   06/11/18 116/74   03/21/18 146/81   02/26/18 118/70    Weight from Last 3 Encounters:   03/19/18 258 lb (117 kg)   02/26/18 263 lb (119.3 kg)   11/17/17 267  lb (121.1 kg)              Today, you had the following     No orders found for display         Today's Medication Changes          These changes are accurate as of 6/11/18  2:19 PM.  If you have any questions, ask your nurse or doctor.               These medicines have changed or have updated prescriptions.        Dose/Directions    amLODIPine 5 MG tablet   Commonly known as:  NORVASC   This may have changed:  Another medication with the same name was removed. Continue taking this medication, and follow the directions you see here.   Used for:  Essential hypertension, benign        TAKE 1 TABLET (5 MG) BY MOUTH DAILY   Quantity:  30 tablet   Refills:  0                Primary Care Provider Office Phone # Fax #    Yolie Nidhi Delarosa [phone]   [facility] [address]        Equal Access to Services     MAGEN CARRILLO : Alyssa borjaso Soomaali, waaxda luqadaha, qaybta kaalmada adeegyada, pippa olmos . So [facility] [phone].    ATENCIÓN: Si habla español, tiene a grimes disposición servicios gratuitos de asistencia lingüística. Llame al [phone].    We comply with applicable federal civil rights laws and [place] laws. We do not discriminate on the basis of race, color, national origin, age, disability, sex, sexual orientation, or gender identity.            Thank you!     Thank you for choosing [facility]  for your care. Our goal is always to provide you with excellent care. Hearing back from our patients is one way we can continue to improve our services. Please take a few minutes to complete the written survey that you may receive in the mail after your visit with us. Thank you!             Your Updated Medication List - Protect others around you: Learn how to safely use, store and throw away your medicines at www.disposemymeds.org.          This list is accurate as of 6/11/18  2:19 PM.  Always use your most recent  med list.                   Brand Name Dispense Instructions for use Diagnosis    ALEVE PO      Take 220 mg by mouth 2 times daily as needed for moderate pain Will stop `1 week pre op per primary DrMary Lou        amLODIPine 5 MG tablet    NORVASC    30 tablet    TAKE 1 TABLET (5 MG) BY MOUTH DAILY    Essential hypertension, benign       buPROPion 150 MG 24 hr tablet    WELLBUTRIN XL    90 tablet    TAKE 1 TABLET (150 MG) BY MOUTH EVERY MORNING    Morbid obesity due to excess calories (H), Anxiety       citalopram 20 MG tablet    celeXA    90 tablet    Take 1 tablet (20 mg) by mouth daily    Major depressive disorder, recurrent episode, mild (H)       levonorgestrel 20 MCG/24HR IUD    MIRENA    1 each     Contraception       lisinopril-hydrochlorothiazide 20-12.5 MG per tablet    PRINZIDE/ZESTORETIC    90 tablet    TAKE 1 TABLET BY MOUTH DAILY    Essential hypertension, benign

## 2018-06-12 RX ORDER — AMLODIPINE BESYLATE 5 MG/1
TABLET ORAL
Qty: 90 TABLET | Refills: 1 | Status: SHIPPED | OUTPATIENT
Start: 2018-06-12 | End: 2018-09-05

## 2018-06-12 RX ORDER — LISINOPRIL AND HYDROCHLOROTHIAZIDE 12.5; 2 MG/1; MG/1
1 TABLET ORAL DAILY
Qty: 90 TABLET | Refills: 1 | Status: SHIPPED | OUTPATIENT
Start: 2018-06-12 | End: 2019-01-22

## 2018-06-12 ASSESSMENT — ANXIETY QUESTIONNAIRES: GAD7 TOTAL SCORE: 4

## 2018-06-12 ASSESSMENT — PATIENT HEALTH QUESTIONNAIRE - PHQ9: SUM OF ALL RESPONSES TO PHQ QUESTIONS 1-9: 4

## 2018-06-12 NOTE — TELEPHONE ENCOUNTER
"Pharm is requesting a 90 day supply.    Requested Prescriptions   Pending Prescriptions Disp Refills     lisinopril-hydrochlorothiazide (PRINZIDE/ZESTORETIC) 20-12.5 MG per tablet 90 tablet 2    Last Written Prescription Date:  10/27/17  Last Fill Quantity: 90,  # refills: 2   Last Office Visit: 2/26/2018   Future Office Visit:      Sig: Take 1 tablet by mouth daily    Diuretics (Including Combos) Protocol Passed    6/11/2018  4:43 PM       Passed - Blood pressure under 140/90 in past 12 months    BP Readings from Last 3 Encounters:   06/11/18 116/74   03/21/18 146/81   02/26/18 118/70                Passed - Recent (12 mo) or future (30 days) visit within the authorizing provider's specialty    Patient had office visit in the last 12 months or has a visit in the next 30 days with authorizing provider or within the authorizing provider's specialty.  See \"Patient Info\" tab in inbasket, or \"Choose Columns\" in Meds & Orders section of the refill encounter.           Passed - Patient is age 18 or older       Passed - No active pregancy on record       Passed - Normal serum creatinine on file in past 12 months    Recent Labs   Lab Test  03/19/18   1808   CR  0.55             Passed - Normal serum potassium on file in past 12 months    Recent Labs   Lab Test  02/26/18   1619   POTASSIUM  3.7                   Passed - Normal serum sodium on file in past 12 months    Recent Labs   Lab Test  02/26/18   1619   NA  138             Passed - No positive pregnancy test in past 12 months   _________________________________________________________________________________________________________________________       amLODIPine (NORVASC) 5 MG tablet  Last Written Prescription Date:  6/6/18  Last Fill Quantity: 30,  # refills: 0   Last Office Visit: 2/26/2018   Future Office Visit:      30 tablet 0    Calcium Channel Blockers Protocol  Passed    6/11/2018  4:43 PM       Passed - Blood pressure under 140/90 in past 12 months    BP " "Readings from Last 3 Encounters:   06/11/18 116/74   03/21/18 146/81   02/26/18 118/70                Passed - Recent (12 mo) or future (30 days) visit within the authorizing provider's specialty    Patient had office visit in the last 12 months or has a visit in the next 30 days with authorizing provider or within the authorizing provider's specialty.  See \"Patient Info\" tab in inbasket, or \"Choose Columns\" in Meds & Orders section of the refill encounter.           Passed - Patient is age 18 or older       Passed - No active pregnancy on record       Passed - Normal serum creatinine on file in past 12 months    Recent Labs   Lab Test  03/19/18   1808   CR  0.55            Passed - No positive pregnancy test in past 12 months          "

## 2018-06-25 ENCOUNTER — TRANSFERRED RECORDS (OUTPATIENT)
Dept: HEALTH INFORMATION MANAGEMENT | Facility: CLINIC | Age: 48
End: 2018-06-25

## 2018-07-04 ENCOUNTER — HOSPITAL ENCOUNTER (EMERGENCY)
Facility: CLINIC | Age: 48
Discharge: HOME | End: 2018-07-05
Attending: EMERGENCY MEDICINE | Admitting: EMERGENCY MEDICINE
Payer: COMMERCIAL

## 2018-07-04 DIAGNOSIS — I71.21 ANEURYSM, ASCENDING AORTA (H): ICD-10-CM

## 2018-07-04 DIAGNOSIS — R07.9 ACUTE CHEST PAIN: ICD-10-CM

## 2018-07-04 DIAGNOSIS — R06.00 DYSPNEA, UNSPECIFIED TYPE: ICD-10-CM

## 2018-07-04 DIAGNOSIS — I10 ESSENTIAL HYPERTENSION: ICD-10-CM

## 2018-07-04 LAB
ANION GAP SERPL CALCULATED.3IONS-SCNC: 6 MMOL/L (ref 3–14)
BASOPHILS # BLD AUTO: 0.1 10E9/L (ref 0–0.2)
BASOPHILS NFR BLD AUTO: 0.7 %
BUN SERPL-MCNC: 10 MG/DL (ref 7–30)
CALCIUM SERPL-MCNC: 8.7 MG/DL (ref 8.5–10.1)
CHLORIDE SERPL-SCNC: 105 MMOL/L (ref 94–109)
CO2 SERPL-SCNC: 29 MMOL/L (ref 20–32)
CREAT SERPL-MCNC: 0.64 MG/DL (ref 0.52–1.04)
D DIMER PPP FEU-MCNC: 0.8 UG/ML FEU (ref 0–0.5)
DIFFERENTIAL METHOD BLD: NORMAL
EOSINOPHIL # BLD AUTO: 0.4 10E9/L (ref 0–0.7)
EOSINOPHIL NFR BLD AUTO: 4.4 %
ERYTHROCYTE [DISTWIDTH] IN BLOOD BY AUTOMATED COUNT: 13 % (ref 10–15)
GFR SERPL CREATININE-BSD FRML MDRD: >90 ML/MIN/1.7M2
GLUCOSE SERPL-MCNC: 101 MG/DL (ref 70–99)
HCT VFR BLD AUTO: 40.8 % (ref 35–47)
HGB BLD-MCNC: 13.7 G/DL (ref 11.7–15.7)
IMM GRANULOCYTES # BLD: 0.1 10E9/L (ref 0–0.4)
IMM GRANULOCYTES NFR BLD: 0.7 %
LYMPHOCYTES # BLD AUTO: 3.1 10E9/L (ref 0.8–5.3)
LYMPHOCYTES NFR BLD AUTO: 35.2 %
MCH RBC QN AUTO: 30 PG (ref 26.5–33)
MCHC RBC AUTO-ENTMCNC: 33.6 G/DL (ref 31.5–36.5)
MCV RBC AUTO: 89 FL (ref 78–100)
MONOCYTES # BLD AUTO: 0.6 10E9/L (ref 0–1.3)
MONOCYTES NFR BLD AUTO: 6.5 %
NEUTROPHILS # BLD AUTO: 4.6 10E9/L (ref 1.6–8.3)
NEUTROPHILS NFR BLD AUTO: 52.5 %
NRBC # BLD AUTO: 0 10*3/UL
NRBC BLD AUTO-RTO: 0 /100
PLATELET # BLD AUTO: 182 10E9/L (ref 150–450)
POTASSIUM SERPL-SCNC: 3.6 MMOL/L (ref 3.4–5.3)
RBC # BLD AUTO: 4.57 10E12/L (ref 3.8–5.2)
SODIUM SERPL-SCNC: 140 MMOL/L (ref 133–144)
TROPONIN I SERPL-MCNC: <0.015 UG/L (ref 0–0.04)
WBC # BLD AUTO: 8.7 10E9/L (ref 4–11)

## 2018-07-04 PROCEDURE — 85025 COMPLETE CBC W/AUTO DIFF WBC: CPT | Performed by: EMERGENCY MEDICINE

## 2018-07-04 PROCEDURE — 99285 EMERGENCY DEPT VISIT HI MDM: CPT | Mod: 25

## 2018-07-04 PROCEDURE — 80048 BASIC METABOLIC PNL TOTAL CA: CPT | Performed by: EMERGENCY MEDICINE

## 2018-07-04 PROCEDURE — 84484 ASSAY OF TROPONIN QUANT: CPT | Performed by: EMERGENCY MEDICINE

## 2018-07-04 PROCEDURE — 93005 ELECTROCARDIOGRAM TRACING: CPT

## 2018-07-04 PROCEDURE — 85379 FIBRIN DEGRADATION QUANT: CPT | Performed by: EMERGENCY MEDICINE

## 2018-07-04 ASSESSMENT — ENCOUNTER SYMPTOMS
COUGH: 0
FEVER: 0
CHEST TIGHTNESS: 1
SHORTNESS OF BREATH: 1

## 2018-07-04 NOTE — ED AVS SNAPSHOT
[facility] Emergency Department    [address]    [facility] [address]    Phone:  [phone]    Fax:  [phone]                                       Lyubov Sanchez   MRN: [id number]    Department:  [facility] Emergency Department   Date of Visit:  7/4/2018           After Visit Summary Signature Page     I have received my discharge instructions, and my questions have been answered. I have discussed any challenges I see with this plan with the nurse or doctor.    ..........................................................................................................................................  Patient/Patient Representative Signature      ..........................................................................................................................................  Patient Representative Print Name and Relationship to Patient    ..................................................               ................................................  Date                                            Time    ..........................................................................................................................................  Reviewed by Signature/Title    ...................................................              ..............................................  Date                                                            Time

## 2018-07-04 NOTE — ED AVS SNAPSHOT
[facility] Emergency Department    [address]    [facility] [address]    Phone:  [phone]    Fax:  [phone]                                       Lyubov Sanchez   MRN: [id number]    Department:  [facility] Emergency Department   Date of Visit:  7/4/2018           Patient Information     Date Of Birth          1970        Your diagnoses for this visit were:     Essential hypertension     Acute chest pain     Dyspnea, unspecified type     Aneurysm, ascending aorta 4.1cm        You were seen by Rafi Soliz MD.      Follow-up Information     Follow up with Yolie Delarosa MD In 2 days.    Specialty:  Family Practice    Why:  for a recheck and discuss your aortic enlargement on CT    Contact information:    [id number]   [facility] [address]  [phone]          Discharge Instructions           Established High Blood Pressure    High blood pressure (hypertension) is a chronic disease. Often, healthcare providers don t know what causes it. But it can be caused by certain health conditions and medicines.  If you have high blood pressure, you may not have any symptoms. If you do have symptoms, they may include headache, dizziness, changes in your vision, chest pain, and shortness of breath. But even without symptoms, high blood pressure that s not treated raises your risk for heart attack, heart failure, and stroke. High blood pressure is a serious health risk and shouldn t be ignored.  Blood pressure measurements are given as 2 numbers. Systolic blood pressure is the upper number. This is the pressure when the heart contracts. Diastolic blood pressure is the lower number. This is the pressure when the heart relaxes between beats. You will see your blood pressure readings written together. For example, a person with a systolic pressure of 118 and a diastolic pressure of 78 will have 118/78 written in the medical record.  Blood pressure is  categorized as normal, elevated, or stage 1 or stage 2 high blood pressure:    Normal blood pressure is systolic of less than 120 and diastolic of less than 80 (120/80)    Elevated blood pressure is systolic of 120 to 129 and diastolic less than 80    Stage 1 high blood pressure is systolic is 130 to 139 or diastolic between 80 to 89    Stage 2 high blood pressure is when systolic is 140 or higher or the diastolic is 90 or higher  Home care  If you have high blood pressure, follow these home care guidelines to help lower your blood pressure. If you are taking medicines for high blood pressure, these methods may reduce or end your need for medicines in the future.    Start a weight-loss program if you are overweight.    Cut back on how much salt you get in your diet. Here s how to do this:  ? Don t eat foods that have a lot of salt. These include olives, pickles, smoked meats, and salted potato chips.  ? Don t add salt to your food at the table.  ? Use only small amounts of salt when cooking.    Start an exercise program. Talk with your healthcare provider about the type of exercise program that would be best for you. It doesn't have to be hard. Even brisk walking for 20 minutes 3 times a week is a good form of exercise.    Don t take medicines that stimulate the heart. This includes many over-the-counter cold and sinus decongestant pills and sprays, as well as diet pills. Check the warnings about high blood pressure on the label. Before buying any over-the-counter medicines or supplements, always ask the pharmacist about the product's potential interaction with your high blood pressure and your high blood pressure medicines.    Stimulants such as amphetamine or cocaine could be deadly for someone with high blood pressure. Never take these.    Limit how much caffeine you get in your diet. Switch to caffeine-free products.    Stop smoking. If you are a long-time smoker, this can be hard. Talk to your healthcare  provider about medicines and nicotine replacement options to help you. Also, enroll in a stop-smoking program to make it more likely that you will quit for good.    Learn how to handle stress. This is an important part of any program to lower blood pressure. Learn about relaxation methods like meditation, yoga, or biofeedback.    If your provider prescribed medicines, take them exactly as directed. Missing doses may cause your blood pressure get out of control.    If you miss a dose or doses, check with your healthcare provider or pharmacist about what to do.    Consider buying an automatic blood pressure machine to check your blood pressure at home. Ask your provider for a recommendation. You can get one of these at most pharmacies.     The American Heart Association recommends the following guidelines for home blood pressure monitoring:    Don't smoke or drink coffee for 30 minutes before taking your blood pressure.    Go to the bathroom before the test.    Relax for 5 minutes before taking the measurement.    Sit with your back supported (don't sit on a couch or soft chair); keep your feet on the floor uncrossed. Place your arm on a solid flat surface (like a table) with the upper part of the arm at heart level. Place the middle of the cuff directly above the bend of the elbow. Check the monitor's instruction manual for an illustration.    Take multiple readings. When you measure, take 2 to 3 readings one minute apart and record all of the results.    Take your blood pressure at the same time every day, or as your healthcare provider recommends.    Record the date, time, and blood pressure reading.    Take the record with you to your next medical appointment. If your blood pressure monitor has a built-in memory, simply take the monitor with you to your next appointment.    Call your provider if you have several high readings. Don't be frightened by a single high blood pressure reading, but if you get several high  readings, check in with your healthcare provider.    Note: When blood pressure reaches a systolic (top number) of 180 or higher OR diastolic (bottom number) of 110 or higher, seek emergency medical treatment.  Follow-up care  You will need to see your healthcare provider regularly. This is to check your blood pressure and to make changes to your medicines. Make a follow-up appointment as directed. Bring the record of your home blood pressure readings to the appointment.  When to seek medical advice  Call your healthcare provider right away if any of these occur:    Blood pressure reaches a systolic (upper number) of 180 or higher OR a diastolic (bottom number) of 110 or higher    Chest pain or shortness of breath    Severe headache    Throbbing or rushing sound in the ears    Nosebleed    Sudden severe pain in your belly (abdomen)    Extreme drowsiness, confusion, or fainting    Dizziness or spinning sensation (vertigo)    Weakness of an arm or leg or one side of the face    You have problems speaking or seeing   Date Last Reviewed: 12/1/2016 2000-2017 [employer]. [address] [place] [address]. All rights reserved. This information is not intended as a substitute for professional medical care. Always follow your healthcare professional's instructions.      Discharge Instructions  Chest Pain    You have been seen today for chest pain or discomfort.  At this time, your provider has found no signs that your chest pain is due to a serious or life-threatening condition, (or you have declined more testing and/or admission to the hospital). However, sometimes there is a serious problem that does not show up right away. Your evaluation today may not be complete and you may need further testing and evaluation.     Generally, every Emergency Department visit should have a follow-up clinic visit with either a primary or a specialty clinic/provider. Please follow-up as instructed by your emergency  provider today.  Return to the Emergency Department if:    Your chest pain changes, gets worse, starts to happen more often, or comes with less activity.    You are newly short of breath.    You get very weak or tired.    You pass out or faint.    You have any new symptoms, like fever, cough, numb legs, or you cough up blood.    You have anything else that worries you.    Until you follow-up with your regular provider, please do the following:    Take one aspirin daily unless you have an allergy or are told not to by your provider.    If a stress test appointment has been made, go to the appointment.    If you have questions, contact your regular provider.    Follow-up with your regular provider/clinic as directed; this is very important.    If you were given a prescription for medicine here today, be sure to read all of the information (including the package insert) that comes with your prescription.  This will include important information about the medicine, its side effects, and any warnings that you need to know about.  The pharmacist who fills the prescription can provide more information and answer questions you may have about the medicine.  If you have questions or concerns that the pharmacist cannot address, please call or return to the Emergency Department.       Remember that you can always come back to the Emergency Department if you are not able to see your regular provider in the amount of time listed above, if you get any new symptoms, or if there is anything that worries you.      24 Hour Appointment Hotline       To make an appointment at any [facility], call [phone] ([phone]). If you don't have a family doctor or clinic, we will help you find one. [facility] are conveniently located to serve the needs of you and your family.             Review of your medicines      Our records show that you are taking the medicines listed below. If these are incorrect, please call your  family doctor or clinic.        Dose / Directions Last dose taken    ALEVE PO   Dose:  220 mg        Take 220 mg by mouth 2 times daily as needed for moderate pain Will stop `1 week pre op per primary DrMary Lou   Refills:  0        amLODIPine 5 MG tablet   Commonly known as:  NORVASC   Quantity:  90 tablet        TAKE 1 TABLET (5 MG) BY MOUTH DAILY   Refills:  1        buPROPion 150 MG 24 hr tablet   Commonly known as:  WELLBUTRIN XL   Quantity:  90 tablet        TAKE 1 TABLET (150 MG) BY MOUTH EVERY MORNING   Refills:  1        citalopram 20 MG tablet   Commonly known as:  celeXA   Dose:  20 mg   Quantity:  90 tablet        Take 1 tablet (20 mg) by mouth daily   Refills:  1        levonorgestrel 20 MCG/24HR IUD   Commonly known as:  MIRENA   Quantity:  1 each        Refills:  0        lisinopril-hydrochlorothiazide 20-12.5 MG per tablet   Commonly known as:  PRINZIDE/ZESTORETIC   Dose:  1 tablet   Quantity:  90 tablet        Take 1 tablet by mouth daily   Refills:  1                Procedures and tests performed during your visit     Basic metabolic panel    CBC with platelets differential    CT Chest Pulmonary Embolism w Contrast    D dimer quantitative    EKG 12 lead    Troponin I      Orders Needing Specimen Collection     None      Pending Results     Date and Time Order Name Status Description    7/4/2018 2255 CT Chest Pulmonary Embolism w Contrast Preliminary     7/4/2018 2118 EKG 12 lead Preliminary             Pending Culture Results     No orders found for last 3 day(s).            Pending Results Instructions     If you had any lab results that were not finalized at the time of your Discharge, you can call the ED Lab Result RN at [phone]. You will be contacted by this team for any positive Lab results or changes in treatment. The nurses are available 7 days a week from 10A to 6:30P.  You can leave a message 24 hours per day and they will return your call.        Test Results From Your Hospital Stay         7/4/2018 11:07 PM      Component Results     Component Value Ref Range & Units Status    Troponin I ES <0.015 0.000 - 0.045 ug/L Final    The 99th percentile for upper reference range is 0.045 ug/L.  Troponin values   in the range of 0.045 - 0.120 ug/L may be associated with risks of adverse   clinical events.           7/4/2018 10:34 PM      Component Results     Component Value Ref Range & Units Status    WBC 8.7 4.0 - 11.0 10e9/L Final    RBC Count 4.57 3.8 - 5.2 10e12/L Final    Hemoglobin 13.7 11.7 - 15.7 g/dL Final    Hematocrit 40.8 35.0 - 47.0 % Final    MCV 89 78 - 100 fl Final    MCH 30.0 26.5 - 33.0 pg Final    MCHC 33.6 31.5 - 36.5 g/dL Final    RDW 13.0 10.0 - 15.0 % Final    Platelet Count 182 150 - 450 10e9/L Final    Diff Method Automated Method  Final    % Neutrophils 52.5 % Final    % Lymphocytes 35.2 % Final    % Monocytes 6.5 % Final    % Eosinophils 4.4 % Final    % Basophils 0.7 % Final    % Immature Granulocytes 0.7 % Final    Nucleated RBCs 0 0 /100 Final    Absolute Neutrophil 4.6 1.6 - 8.3 10e9/L Final    Absolute Lymphocytes 3.1 0.8 - 5.3 10e9/L Final    Absolute Monocytes 0.6 0.0 - 1.3 10e9/L Final    Absolute Eosinophils 0.4 0.0 - 0.7 10e9/L Final    Absolute Basophils 0.1 0.0 - 0.2 10e9/L Final    Abs Immature Granulocytes 0.1 0 - 0.4 10e9/L Final    Absolute Nucleated RBC 0.0  Final         7/4/2018 10:47 PM      Component Results     Component Value Ref Range & Units Status    D Dimer 0.8 (H) 0.0 - 0.50 ug/ml FEU Final    This D-dimer assay is intended for use in conjunction with a clinical pretest   probability assessment model to exclude pulmonary embolism (PE) and deep   venous thrombosis (DVT) in outpatients suspected of PE or DVT. The cut-off   value is 0.5 ug/mL FEU.           7/4/2018 10:56 PM      Component Results     Component Value Ref Range & Units Status    Sodium 140 133 - 144 mmol/L Final    Potassium 3.6 3.4 - 5.3 mmol/L Final    Chloride 105 94 - 109 mmol/L Final     Carbon Dioxide 29 20 - 32 mmol/L Final    Anion Gap 6 3 - 14 mmol/L Final    Glucose 101 (H) 70 - 99 mg/dL Final    Urea Nitrogen 10 7 - 30 mg/dL Final    Creatinine 0.64 0.52 - 1.04 mg/dL Final    GFR Estimate >90 >60 mL/min/1.7m2 Final    Non  GFR Calc    GFR Estimate If Black >90 >60 mL/min/1.7m2 Final    African American GFR Calc    Calcium 8.7 8.5 - 10.1 mg/dL Final         7/5/2018 12:54 AM      Narrative     CT CHEST PULMONARY EMBOLISM W CONTRAST  7/5/2018 12:28 AM     HISTORY: Chest pain/shortness of breath, evaluate for pulmonary  embolus, dyspnea.     TECHNIQUE: Volumetric acquisition of the chest after the  administration of 81 mL Isovue-370 IV contrast. Radiation dose for  this scan was reduced using automated exposure control, adjustment of  the mA and/or kV according to patient size, or iterative  reconstruction technique.     COMPARISON: None.    FINDINGS: No visualized pulmonary embolism. Slightly aneurysmal  ascending aorta measuring 4.1 cm. Borderline cardiomegaly. A few small  areas of patchy lucency in the lung which may be due to air trapping.  Minimal atelectasis in the middle lobe and lingula. No consolidative  infiltrates, pleural effusions or pneumothorax. No enlarged  mediastinal or hilar lymph nodes.        Impression     IMPRESSION:  1. No visualized pulmonary embolism or other acute findings.  2. Slightly aneurysmal ascending aorta, 4.1 cm.                Clinical Quality Measure: Blood Pressure Screening     Your blood pressure was checked while you were in the emergency department today. The last reading we obtained was  BP: 119/72 . Please read the guidelines below about what these numbers mean and what you should do about them.  If your systolic blood pressure (the top number) is less than 120 and your diastolic blood pressure (the bottom number) is less than 80, then your blood pressure is normal. There is nothing more that you need to do about it.  If your systolic  blood pressure (the top number) is 120-139 or your diastolic blood pressure (the bottom number) is 80-89, your blood pressure may be higher than it should be. You should have your blood pressure rechecked within a year by a primary care provider.  If your systolic blood pressure (the top number) is 140 or greater or your diastolic blood pressure (the bottom number) is 90 or greater, you may have high blood pressure. High blood pressure is treatable, but if left untreated over time it can put you at risk for heart attack, stroke, or kidney failure. You should have your blood pressure rechecked by a primary care provider within the next 4 weeks.  If your provider in the emergency department today gave you specific instructions to follow-up with your doctor or provider even sooner than that, you should follow that instruction and not wait for up to 4 weeks for your follow-up visit.        Thank you for choosing [facility]       Thank you for choosing [facility] for your care. Our goal is always to provide you with excellent care. Hearing back from our patients is one way we can continue to improve our services. Please take a few minutes to complete the written survey that you may receive in the mail after you visit with us. Thank you!        [employer] Information     SMR SITE gives you secure access to your electronic health record. If you see a primary care provider, you can also send messages to your care team and make appointments. If you have questions, please call your primary care clinic.  If you do not have a primary care provider, please call [phone] and they will assist you.        Care EveryWhere ID     This is your Care EveryWhere ID. This could be used by other organizations to access your [facility] medical records  [id number]        Equal Access to Services     MAGEN CARRILLO : Alyssa aBhena, sky tyson, pippa ho. So [facility]  [phone].    ATENCIÓN: Si habla español, tiene a grimes disposición servicios gratuitos de asistencia lingüística. Llame al [phone].    We comply with applicable federal civil rights laws and [place] laws. We do not discriminate on the basis of race, color, national origin, age, disability, sex, sexual orientation, or gender identity.            After Visit Summary       This is your record. Keep this with you and show to your community pharmacist(s) and doctor(s) at your next visit.

## 2018-07-05 ENCOUNTER — APPOINTMENT (OUTPATIENT)
Dept: CT IMAGING | Facility: CLINIC | Age: 48
End: 2018-07-05
Attending: EMERGENCY MEDICINE
Payer: COMMERCIAL

## 2018-07-05 VITALS
HEART RATE: 82 BPM | DIASTOLIC BLOOD PRESSURE: 72 MMHG | TEMPERATURE: 97.6 F | BODY MASS INDEX: 45.17 KG/M2 | HEIGHT: 64 IN | SYSTOLIC BLOOD PRESSURE: 125 MMHG | RESPIRATION RATE: 16 BRPM | WEIGHT: 264.55 LBS | OXYGEN SATURATION: 99 %

## 2018-07-05 LAB — INTERPRETATION ECG - MUSE: NORMAL

## 2018-07-05 PROCEDURE — 25000128 H RX IP 250 OP 636: Performed by: EMERGENCY MEDICINE

## 2018-07-05 PROCEDURE — 71260 CT THORAX DX C+: CPT

## 2018-07-05 RX ORDER — IOPAMIDOL 755 MG/ML
500 INJECTION, SOLUTION INTRAVASCULAR ONCE
Status: COMPLETED | OUTPATIENT
Start: 2018-07-05 | End: 2018-07-05

## 2018-07-05 RX ADMIN — IOPAMIDOL 81 ML: 755 INJECTION, SOLUTION INTRAVENOUS at 00:27

## 2018-07-05 RX ADMIN — SODIUM CHLORIDE 90 ML: 900 INJECTION, SOLUTION INTRAVENOUS at 00:27

## 2018-07-05 NOTE — ED TRIAGE NOTES
Pt w/recent high BP reading at home (200+ systolic), associated difficulty w/deep inspiration ('feels like I cant take a deep breath').  Denies CP, denies N/V.

## 2018-07-05 NOTE — ED PROVIDER NOTES
History     Chief Complaint:  Hypertension    HPI   Lyubov Sanchez is a 47 year old female with a history of hypertension and hyperlipidemia who presents to the ED for evaluation of hypertension. The patient reports that about 24 hours ago she developed an onset of intermittent shortness of breath and chest tightness, which worsened with deep breaths. She also noticed increased bilateral leg swelling. The last time she had these symptoms, she had high blood pressure. With this history, she checked her blood pressure with her at home cuff which read 210/110. She states that this cuff is usually fairly accurate. She is asymptomatic here in the ED aside from bilateral leg swelling. She notes a fever last week but that she does work in a  and thus is exposed to germs daily. She denies any recent cough, congestion, sore throat, or any other symptoms. She feels well at this time. Of note, patient had knee replacement surgery in March but denies any more recent surgery.    CARDIAC RISK FACTORS:  Sex:    Female  Tobacco:   Former  Hypertension:   Yes  Hyperlipidemia:  Yes  Diabetes:   No  Family History:  No    PE/DVT RISK FACTORS:  Sex:    Female  Hormones:   No  Tobacco:   Former  Cancer:   No  Travel:   No  Surgery:   No  Other immobilization: No  Personal history:  No  Family history:  No    Allergies:  Penicillins     Medications:    amLODIPine (NORVASC) 5 MG tablet  buPROPion (WELLBUTRIN XL) 150 MG 24 hr tablet  citalopram (CELEXA) 20 MG tablet  levonorgestrel (MIRENA) 20 MCG/24HR IUD  lisinopril-hydrochlorothiazide (PRINZIDE/ZESTORETIC) 20-12.5 MG per tablet  Naproxen Sodium (ALEVE PO)     Past Medical History:    Arthritis   Anxiety  Morbid obesity  KRISTYN  Incomplete right bundle branch block  Depressive disorder   Dysplasia of cervix (uteri)   Hypertension   Incomplete right bundle branch block   Migraine without aura   Other chronic pain   Plantar fasciitis   PONV   Unspecified sleep  "apnea   Hyperlipidemia  Insertion of mirena IUD  Fibroid uterus    Past Surgical History:    Arthroplasty knee, right  Arthroplasty knee, left    ENT surgery  HC colp for ASCUS  T&A    Family History:    History reviewed. No pertinent family history.     Social History:  Smoking status: Former  Alcohol use: Yes  Marital Status:       Review of Systems   Constitutional: Negative for fever.   HENT: Negative.    Respiratory: Positive for chest tightness (resolved) and shortness of breath (resolved). Negative for cough.    Cardiovascular: Positive for leg swelling. Negative for chest pain.   All other systems reviewed and are negative.    Physical Exam   Patient Vitals for the past 24 hrs:   BP Temp Temp src Pulse Heart Rate Resp SpO2 Height Weight   18 2345 - - - - 71 18 - - -   18 2330 - - - - 71 23 - - -   18 2300 - - - - 70 25 - - -   18 2245 - - - - 75 17 - - -   18 2230 - - - - 72 24 - - -   18 2215 - - - - 73 21 96 % - -   18 2200 130/74 - - - 74 (!) 32 96 % - -   18 2145 121/68 - - - 74 28 97 % - -   18 2130 111/58 - - - 75 20 96 % - -   18 2115 - - - - - - 97 % - -   18 150/86 97.6  F (36.4  C) Temporal 82 - 16 95 % 1.626 m (5' 4\") 120 kg (264 lb 8.8 oz)     Physical Exam  General: Patient is alert and interactive when I enter the room  Head:  The scalp, face, and head appear normal  Eyes:  The pupils are equal, round, and reactive to light    Conjunctivae and sclerae are normal  ENT:    External acoustic canals are normal    The oropharynx is normal without erythema.     Uvula is in the midline  Neck:  Normal range of motion  CV:  Regular rate. S1/S2. No murmurs.   Resp:  Lungs are clear without wheezes or rales. No distress  GI:  Abdomen is soft, no rigidity, guarding, or rebound    No distension. No tenderness to palpation in any quadrant.     MS:  Normal tone. Joints grossly normal without effusions.     Mild Bilateral " leg swelling.    No asymmetric leg swelling, calf or thigh tenderness.      Normal motor assessment of all extremities.  Skin:  No rash or lesions noted. Normal capillary refill noted  Neuro:  Speech is normal and fluent. Face is symmetric.     Moving all extremities well.   Psych:  Awake. Alert.  Normal affect.  Appropriate interactions.  Lymph: No anterior cervical lymphadenopathy noted    Emergency Department Course   ECG (21:22:41):  Rate 75 bpm. NC interval 192. QRS duration 98. QT/QTc 428/477. P-R-T axes -3 268 52. Normal sinus rhythm. Right superior axis deviation. Incomplete right bundle branch block. Abnormal ECG. Interpreted at 2123 by Rafi Soliz MD.    Imaging:  Radiographic findings were communicated with the patient who voiced understanding of the findings.    CT-scan Chest Pulmonary Embolism w/ contrast:  IMPRESSION:  1. No visualized pulmonary embolism or other acute findings.  2. Slightly aneurysmal ascending aorta, 4.1 cm.  Result per radiology.     Laboratory:  2217 - Troponin: <0.015  D-dimer: 0.8 (H)  CBC: WNL (WBC 8.7, HGB 13.7, )  BMP: Glucose 101 (H) o/w WNL (Creatinine 0.64)    Emergency Department Course:  Past medical records, nursing notes, and vitals reviewed.  2135: I performed an exam of the patient and obtained history, as documented above. GCS 15.    IV inserted and blood drawn.    2256: I rechecked the patient. Explained findings to patient.    The patient was sent for a CT-scan while in the emergency department, findings above.    0057: I rechecked the patient. Findings and plan explained to the Patient. Patient discharged home with instructions regarding supportive care, medications, and reasons to return. The importance of close follow-up was reviewed.     Impression & Plan      Medical Decision Making:  Lyubov Sanchez is a 47 year old female who presents with chest pain.  The work up in the Emergency Department is negative.  I considered a broad differential  diagnosis in this patient including life-threatening etiologies such as acute coronary syndrome, myocardial infarction, pulmonary embolism, acute aortic dissection, myocarditis, pericarditis, acute valvular insufficiency amongst others.  Other causes considered for this patient included pneumonia, pneumothorax, chest wall source, pericarditis, pleurisy, esophageal spasm, etc.  No serious etiology for the chest pain were detected today during this visit.  Close follow up with primary care is indicated should the pain continue, as further work up may be performed; this was made clear to the patient, who understands. Was hypertensive at home, not now. CT of the chest did not show evidence of a pulmonary embolism, however it did show a slightly aneurysmal ascending aorta measuring 4.1 cm. I discussed with her the importance of follow up regarding this result.    Critical Care time:  none    Diagnosis:    ICD-10-CM   1. Essential hypertension I10   2. Acute chest pain R07.9   3. Dyspnea, unspecified type R06.00   4. Aneurysm, ascending aorta 4.1cm I71.2       Disposition:  discharged to home.      Lissy Singleton  7/4/2018   [facility] EMERGENCY DEPARTMENT  I, Lissy Singleton, am serving as a scribe at 9:35 PM on 7/4/2018 to document services personally performed by Rafi Soliz MD based on my observations and the provider's statements to me.        Rafi Soliz MD  07/05/18 0101

## 2018-07-05 NOTE — DISCHARGE INSTRUCTIONS
Established High Blood Pressure    High blood pressure (hypertension) is a chronic disease. Often, healthcare providers don t know what causes it. But it can be caused by certain health conditions and medicines.  If you have high blood pressure, you may not have any symptoms. If you do have symptoms, they may include headache, dizziness, changes in your vision, chest pain, and shortness of breath. But even without symptoms, high blood pressure that s not treated raises your risk for heart attack, heart failure, and stroke. High blood pressure is a serious health risk and shouldn t be ignored.  Blood pressure measurements are given as 2 numbers. Systolic blood pressure is the upper number. This is the pressure when the heart contracts. Diastolic blood pressure is the lower number. This is the pressure when the heart relaxes between beats. You will see your blood pressure readings written together. For example, a person with a systolic pressure of 118 and a diastolic pressure of 78 will have 118/78 written in the medical record.  Blood pressure is categorized as normal, elevated, or stage 1 or stage 2 high blood pressure:    Normal blood pressure is systolic of less than 120 and diastolic of less than 80 (120/80)    Elevated blood pressure is systolic of 120 to 129 and diastolic less than 80    Stage 1 high blood pressure is systolic is 130 to 139 or diastolic between 80 to 89    Stage 2 high blood pressure is when systolic is 140 or higher or the diastolic is 90 or higher  Home care  If you have high blood pressure, follow these home care guidelines to help lower your blood pressure. If you are taking medicines for high blood pressure, these methods may reduce or end your need for medicines in the future.    Start a weight-loss program if you are overweight.    Cut back on how much salt you get in your diet. Here s how to do this:  ? Don t eat foods that have a lot of salt. These include olives, pickles, smoked  meats, and salted potato chips.  ? Don t add salt to your food at the table.  ? Use only small amounts of salt when cooking.    Start an exercise program. Talk with your healthcare provider about the type of exercise program that would be best for you. It doesn't have to be hard. Even brisk walking for 20 minutes 3 times a week is a good form of exercise.    Don t take medicines that stimulate the heart. This includes many over-the-counter cold and sinus decongestant pills and sprays, as well as diet pills. Check the warnings about high blood pressure on the label. Before buying any over-the-counter medicines or supplements, always ask the pharmacist about the product's potential interaction with your high blood pressure and your high blood pressure medicines.    Stimulants such as amphetamine or cocaine could be deadly for someone with high blood pressure. Never take these.    Limit how much caffeine you get in your diet. Switch to caffeine-free products.    Stop smoking. If you are a long-time smoker, this can be hard. Talk to your healthcare provider about medicines and nicotine replacement options to help you. Also, enroll in a stop-smoking program to make it more likely that you will quit for good.    Learn how to handle stress. This is an important part of any program to lower blood pressure. Learn about relaxation methods like meditation, yoga, or biofeedback.    If your provider prescribed medicines, take them exactly as directed. Missing doses may cause your blood pressure get out of control.    If you miss a dose or doses, check with your healthcare provider or pharmacist about what to do.    Consider buying an automatic blood pressure machine to check your blood pressure at home. Ask your provider for a recommendation. You can get one of these at most pharmacies.     The American Heart Association recommends the following guidelines for home blood pressure monitoring:    Don't smoke or drink coffee for 30  minutes before taking your blood pressure.    Go to the bathroom before the test.    Relax for 5 minutes before taking the measurement.    Sit with your back supported (don't sit on a couch or soft chair); keep your feet on the floor uncrossed. Place your arm on a solid flat surface (like a table) with the upper part of the arm at heart level. Place the middle of the cuff directly above the bend of the elbow. Check the monitor's instruction manual for an illustration.    Take multiple readings. When you measure, take 2 to 3 readings one minute apart and record all of the results.    Take your blood pressure at the same time every day, or as your healthcare provider recommends.    Record the date, time, and blood pressure reading.    Take the record with you to your next medical appointment. If your blood pressure monitor has a built-in memory, simply take the monitor with you to your next appointment.    Call your provider if you have several high readings. Don't be frightened by a single high blood pressure reading, but if you get several high readings, check in with your healthcare provider.    Note: When blood pressure reaches a systolic (top number) of 180 or higher OR diastolic (bottom number) of 110 or higher, seek emergency medical treatment.  Follow-up care  You will need to see your healthcare provider regularly. This is to check your blood pressure and to make changes to your medicines. Make a follow-up appointment as directed. Bring the record of your home blood pressure readings to the appointment.  When to seek medical advice  Call your healthcare provider right away if any of these occur:    Blood pressure reaches a systolic (upper number) of 180 or higher OR a diastolic (bottom number) of 110 or higher    Chest pain or shortness of breath    Severe headache    Throbbing or rushing sound in the ears    Nosebleed    Sudden severe pain in your belly (abdomen)    Extreme drowsiness, confusion, or  fainting    Dizziness or spinning sensation (vertigo)    Weakness of an arm or leg or one side of the face    You have problems speaking or seeing   Date Last Reviewed: 12/1/2016 2000-2017 [employer]. [address] [place] [address], [place] [address]. All rights reserved. This information is not intended as a substitute for professional medical care. Always follow your healthcare professional's instructions.      Discharge Instructions  Chest Pain    You have been seen today for chest pain or discomfort.  At this time, your provider has found no signs that your chest pain is due to a serious or life-threatening condition, (or you have declined more testing and/or admission to the hospital). However, sometimes there is a serious problem that does not show up right away. Your evaluation today may not be complete and you may need further testing and evaluation.     Generally, every Emergency Department visit should have a follow-up clinic visit with either a primary or a specialty clinic/provider. Please follow-up as instructed by your emergency provider today.  Return to the Emergency Department if:    Your chest pain changes, gets worse, starts to happen more often, or comes with less activity.    You are newly short of breath.    You get very weak or tired.    You pass out or faint.    You have any new symptoms, like fever, cough, numb legs, or you cough up blood.    You have anything else that worries you.    Until you follow-up with your regular provider, please do the following:    Take one aspirin daily unless you have an allergy or are told not to by your provider.    If a stress test appointment has been made, go to the appointment.    If you have questions, contact your regular provider.    Follow-up with your regular provider/clinic as directed; this is very important.    If you were given a prescription for medicine here today, be sure to read all of the information (including the package insert)  that comes with your prescription.  This will include important information about the medicine, its side effects, and any warnings that you need to know about.  The pharmacist who fills the prescription can provide more information and answer questions you may have about the medicine.  If you have questions or concerns that the pharmacist cannot address, please call or return to the Emergency Department.       Remember that you can always come back to the Emergency Department if you are not able to see your regular provider in the amount of time listed above, if you get any new symptoms, or if there is anything that worries you.

## 2018-08-06 ENCOUNTER — TRANSFERRED RECORDS (OUTPATIENT)
Dept: HEALTH INFORMATION MANAGEMENT | Facility: CLINIC | Age: 48
End: 2018-08-06

## 2018-08-06 DIAGNOSIS — F33.0 MAJOR DEPRESSIVE DISORDER, RECURRENT EPISODE, MILD (H): ICD-10-CM

## 2018-08-06 NOTE — TELEPHONE ENCOUNTER
"Requested Prescriptions   Pending Prescriptions Disp Refills     citalopram (CELEXA) 20 MG tablet [Pharmacy Med Name: CITALOPRAM HBR 20 MG TABLET] 90 tablet 1    Last Written Prescription Date:  6/9/17  Last Fill Quantity: 90,  # refills: 1   Last Office Visit: 2/26/2018   Future Office Visit:      Sig: TAKE 1 TABLET (20 MG) BY MOUTH DAILY    SSRIs Protocol Passed    8/6/2018  2:07 PM       Passed - PHQ-9 score less than 5 in past 6 months    PHQ-9 SCORE 6/9/2017 11/2/2017 6/11/2018   Total Score - - -   Total Score 0 6 4     PINKY-7 SCORE 6/9/2017 11/2/2017 6/11/2018   Total Score - - -   Total Score 0 7 4              Passed - Patient is age 18 or older       Passed - No active pregnancy on record       Passed - No positive pregnancy test in last 12 months       Passed - Recent (6 mo) or future (30 days) visit within the authorizing provider's specialty    Patient had office visit in the last 6 months or has a visit in the next 30 days with authorizing provider or within the authorizing provider's specialty.  See \"Patient Info\" tab in inbasket, or \"Choose Columns\" in Meds & Orders section of the refill encounter.              "

## 2018-08-08 RX ORDER — CITALOPRAM HYDROBROMIDE 20 MG/1
TABLET ORAL
Qty: 30 TABLET | Refills: 0 | Status: SHIPPED | OUTPATIENT
Start: 2018-08-08 | End: 2018-09-04

## 2018-08-08 NOTE — TELEPHONE ENCOUNTER
Pt notified of refill & informed that she will need at least a phone visit for next refill. No questions/conserns.    Sachin Caldwell   08/08/18 10:51 AM

## 2018-08-08 NOTE — TELEPHONE ENCOUNTER
Was previously given jovanni refill.  Will refill for one month.  Needs to schedule mental health follow up appt for additional refills.  PINKY-7 SCORE 6/9/2017 11/2/2017 6/11/2018   Total Score - - -   Total Score 0 7 4     PHQ-9 SCORE 6/9/2017 11/2/2017 6/11/2018   Total Score - - -   Total Score 0 6 4     Marta Cline CNP

## 2018-08-08 NOTE — TELEPHONE ENCOUNTER
Routing refill request to provider for review/approval because:  Drug interaction warning- New drug interactions with use of NSAIDS.    Patient needing refill today as she is going out of town. She is aware she is due for a medication recheck and annual physical.    Please review and sign if appropriate    Sherie WATTS RN, BSN, PHN  [facility] RN

## 2018-08-11 DIAGNOSIS — I10 ESSENTIAL HYPERTENSION, BENIGN: ICD-10-CM

## 2018-08-13 NOTE — TELEPHONE ENCOUNTER
"Requested Prescriptions   Pending Prescriptions Disp Refills     amLODIPine (NORVASC) 5 MG tablet [Pharmacy Med Name: AMLODIPINE BESYLATE 5 MG TAB] 30 tablet 0    Last Written Prescription Date:  6/12/18  Last Fill Quantity: 90,  # refills: 1   Last Office Visit: 2/26/2018   Future Office Visit:      Sig: TAKE 1 TABLET BY MOUTH EVERY DAY    Calcium Channel Blockers Protocol  Passed    8/11/2018  1:10 AM       Passed - Blood pressure under 140/90 in past 12 months    BP Readings from Last 3 Encounters:   07/05/18 125/72   06/11/18 116/74   03/21/18 146/81                Passed - Recent (12 mo) or future (30 days) visit within the authorizing provider's specialty    Patient had office visit in the last 12 months or has a visit in the next 30 days with authorizing provider or within the authorizing provider's specialty.  See \"Patient Info\" tab in inbasket, or \"Choose Columns\" in Meds & Orders section of the refill encounter.           Passed - Patient is age 18 or older       Passed - No active pregnancy on record       Passed - Normal serum creatinine on file in past 12 months    Recent Labs   Lab Test  07/04/18   2217   CR  0.64            Passed - No positive pregnancy test in past 12 months          "

## 2018-08-14 RX ORDER — AMLODIPINE BESYLATE 5 MG/1
TABLET ORAL
Qty: 30 TABLET | Refills: 0 | OUTPATIENT
Start: 2018-08-14

## 2018-08-14 NOTE — TELEPHONE ENCOUNTER
Duplicate  E-Prescribing Status: Receipt confirmed by pharmacy (6/12/2018  8:44 AM CDT)  Raymundo Hoffmann RN, BSN

## 2018-09-04 DIAGNOSIS — F33.0 MAJOR DEPRESSIVE DISORDER, RECURRENT EPISODE, MILD (H): ICD-10-CM

## 2018-09-04 RX ORDER — CITALOPRAM HYDROBROMIDE 20 MG/1
TABLET ORAL
Qty: 30 TABLET | Refills: 0 | Status: CANCELLED | OUTPATIENT
Start: 2018-09-04

## 2018-09-04 NOTE — TELEPHONE ENCOUNTER
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

## 2018-09-04 NOTE — TELEPHONE ENCOUNTER
"Requested Prescriptions   Pending Prescriptions Disp Refills     citalopram (CELEXA) 20 MG tablet [Pharmacy Med Name: CITALOPRAM HBR 20 MG TABLET] 30 tablet 0    Last Written Prescription Date:  8/8/18  Last Fill Quantity: 30,  # refills: 0   Last Office Visit: 2/26/2018   Future Office Visit:      Sig: TAKE 1 TABLET BY MOUTH EVERY DAY    SSRIs Protocol Failed    9/4/2018  1:20 AM       Failed - Recent (6 mo) or future (30 days) visit within the authorizing provider's specialty    Patient had office visit in the last 6 months or has a visit in the next 30 days with authorizing provider or within the authorizing provider's specialty.  See \"Patient Info\" tab in inbasket, or \"Choose Columns\" in Meds & Orders section of the refill encounter.           Passed - PHQ-9 score less than 5 in past 6 months    PHQ-9 SCORE 6/9/2017 11/2/2017 6/11/2018   Total Score - - -   Total Score 0 6 4     PINKY-7 SCORE 6/9/2017 11/2/2017 6/11/2018   Total Score - - -   Total Score 0 7 4              Passed - Patient is age 18 or older       Passed - No active pregnancy on record       Passed - No positive pregnancy test in last 12 months          "

## 2018-09-05 ENCOUNTER — VIRTUAL VISIT (OUTPATIENT)
Dept: FAMILY MEDICINE | Facility: CLINIC | Age: 48
End: 2018-09-05
Payer: COMMERCIAL

## 2018-09-05 DIAGNOSIS — F33.0 MAJOR DEPRESSIVE DISORDER, RECURRENT EPISODE, MILD (H): ICD-10-CM

## 2018-09-05 DIAGNOSIS — F41.9 ANXIETY: ICD-10-CM

## 2018-09-05 PROCEDURE — 98966 PH1 ASSMT&MGMT NQHP 5-10: CPT | Performed by: NURSE PRACTITIONER

## 2018-09-05 RX ORDER — CITALOPRAM HYDROBROMIDE 20 MG/1
20 TABLET ORAL DAILY
Qty: 90 TABLET | Refills: 1 | Status: SHIPPED | OUTPATIENT
Start: 2018-09-05 | End: 2019-03-02

## 2018-09-05 RX ORDER — BUPROPION HYDROCHLORIDE 150 MG/1
150 TABLET ORAL EVERY MORNING
Qty: 90 TABLET | Refills: 1 | Status: SHIPPED | OUTPATIENT
Start: 2018-09-05 | End: 2019-04-24

## 2018-09-05 ASSESSMENT — PATIENT HEALTH QUESTIONNAIRE - PHQ9: 5. POOR APPETITE OR OVEREATING: NOT AT ALL

## 2018-09-05 ASSESSMENT — ANXIETY QUESTIONNAIRES
6. BECOMING EASILY ANNOYED OR IRRITABLE: SEVERAL DAYS
2. NOT BEING ABLE TO STOP OR CONTROL WORRYING: NOT AT ALL
3. WORRYING TOO MUCH ABOUT DIFFERENT THINGS: NOT AT ALL
IF YOU CHECKED OFF ANY PROBLEMS ON THIS QUESTIONNAIRE, HOW DIFFICULT HAVE THESE PROBLEMS MADE IT FOR YOU TO DO YOUR WORK, TAKE CARE OF THINGS AT HOME, OR GET ALONG WITH OTHER PEOPLE: NOT DIFFICULT AT ALL
7. FEELING AFRAID AS IF SOMETHING AWFUL MIGHT HAPPEN: NOT AT ALL
GAD7 TOTAL SCORE: 1
5. BEING SO RESTLESS THAT IT IS HARD TO SIT STILL: NOT AT ALL
1. FEELING NERVOUS, ANXIOUS, OR ON EDGE: NOT AT ALL

## 2018-09-05 NOTE — MR AVS SNAPSHOT
After Visit Summary   9/5/2018    Lyubov Sanchez    MRN: [id number]           Patient Information     Date Of Birth          1970        Visit Information        Provider Department      9/5/2018 1:40 PM Marta Cline APRN CNP [facility]        Today's Diagnoses     Major depressive disorder, recurrent episode, mild (H)        Anxiety           Follow-ups after your visit        Follow-up notes from your care team     Return in about 6 months (around 3/5/2019) for Mental Health Follow up.      Who to contact     If you have questions or need follow up information about today's clinic visit or your schedule please contact [facility] directly at [phone].  Normal or non-critical lab and imaging results will be communicated to you by X-1hart, letter or phone within 4 business days after the clinic has received the results. If you do not hear from us within 7 days, please contact the clinic through X-1hart or phone. If you have a critical or abnormal lab result, we will notify you by phone as soon as possible.  Submit refill requests through EventTool or call your pharmacy and they will forward the refill request to us. Please allow 3 business days for your refill to be completed.          Additional Information About Your Visit        MyChart Information     EventTool gives you secure access to your electronic health record. If you see a primary care provider, you can also send messages to your care team and make appointments. If you have questions, please call your primary care clinic.  If you do not have a primary care provider, please call [phone] and they will assist you.        Care EveryWhere ID     This is your Care EveryWhere ID. This could be used by other organizations to access your [facility] medical records  [id number]         Blood Pressure from Last 3 Encounters:   07/05/18 125/72   06/11/18 116/74   03/21/18 146/81    Weight from Last 3  Encounters:   07/04/18 264 lb 8.8 oz (120 kg)   03/19/18 258 lb (117 kg)   02/26/18 263 lb (119.3 kg)              Today, you had the following     No orders found for display         Where to get your medicines      These medications were sent to [facility]/pharmacy #[id number] - [place], [place] - [address]  [address], [facility] [address]     Phone:  [phone]     buPROPion 150 MG 24 hr tablet    citalopram 20 MG tablet          Primary Care Provider Office Phone # Fax #    Yolie Delarosa [phone]       [id number]   [facility] [address]        Equal Access to Services     SHEKHAR CARRILLO : Hadii dirk del castillo hadasho Sogurinderali, waaxda luqadaha, qaybta kaalmada adeegyada, pippa olmos . So [facility] [phone].    ATENCIÓN: Si habla español, tiene a grimes disposición servicios gratuitos de asistencia lingüística. Llame al [phone].    We comply with applicable federal civil rights laws and [place] laws. We do not discriminate on the basis of race, color, national origin, age, disability, sex, sexual orientation, or gender identity.            Thank you!     Thank you for choosing [facility]  for your care. Our goal is always to provide you with excellent care. Hearing back from our patients is one way we can continue to improve our services. Please take a few minutes to complete the written survey that you may receive in the mail after your visit with us. Thank you!             Your Updated Medication List - Protect others around you: Learn how to safely use, store and throw away your medicines at www.disposemymeds.org.          This list is accurate as of 9/5/18  2:32 PM.  Always use your most recent med list.                   Brand Name Dispense Instructions for use Diagnosis    amLODIPine 5 MG tablet    NORVASC    90 tablet    TAKE 1 TABLET BY MOUTH EVERY DAY    Essential hypertension, benign       buPROPion 150 MG 24 hr tablet     WELLBUTRIN XL    90 tablet    Take 1 tablet (150 mg) by mouth every morning    Anxiety       citalopram 20 MG tablet    celeXA    90 tablet    Take 1 tablet (20 mg) by mouth daily    Major depressive disorder, recurrent episode, mild (H)       levonorgestrel 20 MCG/24HR IUD    MIRENA    1 each     Contraception       lisinopril-hydrochlorothiazide 20-12.5 MG per tablet    PRINZIDE/ZESTORETIC    90 tablet    Take 1 tablet by mouth daily    Essential hypertension, benign

## 2018-09-05 NOTE — LETTER
My Depression Action Plan  Name: Lyubov Sanchez   Date of Birth 1970  Date: 9/5/2018    My doctor: Yolie Delarosa   My clinic: [address], [address]  [facility] [address] [phone]          GREEN    ZONE   Good Control    What it looks like:     Things are going generally well. You have normal up s and down s. You may even feel depressed from time to time, but bad moods usually last less than a day.   What you need to do:  1. Continue to care for yourself (see self care plan)  2. Check your depression survival kit and update it as needed  3. Follow your physician s recommendations including any medication.  4. Do not stop taking medication unless you consult with your physician first.           YELLOW         ZONE Getting Worse    What it looks like:     Depression is starting to interfere with your life.     It may be hard to get out of bed; you may be starting to isolate yourself from others.    Symptoms of depression are starting to last most all day and this has happened for several days.     You may have suicidal thoughts but they are not constant.   What you need to do:     1. Call your care team, your response to treatment will improve if you keep your care team informed of your progress. Yellow periods are signs an adjustment may need to be made.     2. Continue your self-care, even if you have to fake it!    3. Talk to someone in your support network    4. Open up your depression survival kit           RED    ZONE Medical Alert - Get Help    What it looks like:     Depression is seriously interfering with your life.     You may experience these or other symptoms: You can t get out of bed most days, can t work or engage in other necessary activities, you have trouble taking care of basic hygiene, or basic responsibilities, thoughts of suicide or death that will not go away, self-injurious behavior.     What you need to do:  1. Call  your care team and request a same-day appointment. If they are not available (weekends or after hours) call your local crisis line, emergency room or 911.            Depression Self Care Plan / Survival Kit    Self-Care for Depression  Here s the deal. Your body and mind are really not as separate as most people think.  What you do and think affects how you feel and how you feel influences what you do and think. This means if you do things that people who feel good do, it will help you feel better.  Sometimes this is all it takes.  There is also a place for medication and therapy depending on how severe your depression is, so be sure to consult with your medical provider and/ or Behavioral Health Consultant if your symptoms are worsening or not improving.     In order to better manage my stress, I will:    Exercise  Get some form of exercise, every day. This will help reduce pain and release endorphins, the  feel good  chemicals in your brain. This is almost as good as taking antidepressants!  This is not the same as joining a gym and then never going! (they count on that by the way ) It can be as simple as just going for a walk or doing some gardening, anything that will get you moving.      Hygiene   Maintain good hygiene (Get out of bed in the morning, Make your bed, Brush your teeth, Take a shower, and Get dressed like you were going to work, even if you are unemployed).  If your clothes don't fit try to get ones that do.    Diet  I will strive to eat foods that are good for me, drink plenty of water, and avoid excessive sugar, caffeine, alcohol, and other mood-altering substances.  Some foods that are helpful in depression are: complex carbohydrates, B vitamins, flaxseed, fish or fish oil, fresh fruits and vegetables.    Psychotherapy  I agree to participate in Individual Therapy (if recommended).    Medication  If prescribed medications, I agree to take them.  Missing doses can result in serious side effects.   I understand that drinking alcohol, or other illicit drug use, may cause potential side effects.  I will not stop my medication abruptly without first discussing it with my provider.    Staying Connected With Others  I will stay in touch with my friends, family members, and my primary care provider/team.    Use your imagination  Be creative.  We all have a creative side; it doesn t matter if it s oil painting, sand castles, or mud pies! This will also kick up the endorphins.    Witness Beauty  (AKA stop and smell the roses) Take a look outside, even in mid-winter. Notice colors, textures. Watch the squirrels and birds.     Service to others  Be of service to others.  There is always someone else in need.  By helping others we can  get out of ourselves  and remember the really important things.  This also provides opportunities for practicing all the other parts of the program.    Humor  Laugh and be silly!  Adjust your TV habits for less news and crime-drama and more comedy.    Control your stress  Try breathing deep, massage therapy, biofeedback, and meditation. Find time to relax each day.     My support system    Clinic Contact:  Phone number:    Contact 1:  Phone number:    Contact 2:  Phone number:    Scientologist/:  Phone number:    Therapist:  Phone number:    [facility] crisis center:    Phone number:    Other community support:  Phone number:

## 2018-09-05 NOTE — LETTER
My Depression Action Plan  Name: Lyubov Sanchez   Date of Birth 1970  Date: 9/5/2018    My doctor: Yolie Delarosa   My clinic: [address], [address]  [facility] [address] [phone]          GREEN    ZONE   Good Control    What it looks like:     Things are going generally well. You have normal up s and down s. You may even feel depressed from time to time, but bad moods usually last less than a day.   What you need to do:  1. Continue to care for yourself (see self care plan)  2. Check your depression survival kit and update it as needed  3. Follow your physician s recommendations including any medication.  4. Do not stop taking medication unless you consult with your physician first.           YELLOW         ZONE Getting Worse    What it looks like:     Depression is starting to interfere with your life.     It may be hard to get out of bed; you may be starting to isolate yourself from others.    Symptoms of depression are starting to last most all day and this has happened for several days.     You may have suicidal thoughts but they are not constant.   What you need to do:     1. Call your care team, your response to treatment will improve if you keep your care team informed of your progress. Yellow periods are signs an adjustment may need to be made.     2. Continue your self-care, even if you have to fake it!    3. Talk to someone in your support network    4. Open up your depression survival kit           RED    ZONE Medical Alert - Get Help    What it looks like:     Depression is seriously interfering with your life.     You may experience these or other symptoms: You can t get out of bed most days, can t work or engage in other necessary activities, you have trouble taking care of basic hygiene, or basic responsibilities, thoughts of suicide or death that will not go away, self-injurious behavior.     What you need to do:  1. Call  your care team and request a same-day appointment. If they are not available (weekends or after hours) call your local crisis line, emergency room or 911.            Depression Self Care Plan / Survival Kit    Self-Care for Depression  Here s the deal. Your body and mind are really not as separate as most people think.  What you do and think affects how you feel and how you feel influences what you do and think. This means if you do things that people who feel good do, it will help you feel better.  Sometimes this is all it takes.  There is also a place for medication and therapy depending on how severe your depression is, so be sure to consult with your medical provider and/ or Behavioral Health Consultant if your symptoms are worsening or not improving.     In order to better manage my stress, I will:    Exercise  Get some form of exercise, every day. This will help reduce pain and release endorphins, the  feel good  chemicals in your brain. This is almost as good as taking antidepressants!  This is not the same as joining a gym and then never going! (they count on that by the way ) It can be as simple as just going for a walk or doing some gardening, anything that will get you moving.      Hygiene   Maintain good hygiene (Get out of bed in the morning, Make your bed, Brush your teeth, Take a shower, and Get dressed like you were going to work, even if you are unemployed).  If your clothes don't fit try to get ones that do.    Diet  I will strive to eat foods that are good for me, drink plenty of water, and avoid excessive sugar, caffeine, alcohol, and other mood-altering substances.  Some foods that are helpful in depression are: complex carbohydrates, B vitamins, flaxseed, fish or fish oil, fresh fruits and vegetables.    Psychotherapy  I agree to participate in Individual Therapy (if recommended).    Medication  If prescribed medications, I agree to take them.  Missing doses can result in serious side effects.   I understand that drinking alcohol, or other illicit drug use, may cause potential side effects.  I will not stop my medication abruptly without first discussing it with my provider.    Staying Connected With Others  I will stay in touch with my friends, family members, and my primary care provider/team.    Use your imagination  Be creative.  We all have a creative side; it doesn t matter if it s oil painting, sand castles, or mud pies! This will also kick up the endorphins.    Witness Beauty  (AKA stop and smell the roses) Take a look outside, even in mid-winter. Notice colors, textures. Watch the squirrels and birds.     Service to others  Be of service to others.  There is always someone else in need.  By helping others we can  get out of ourselves  and remember the really important things.  This also provides opportunities for practicing all the other parts of the program.    Humor  Laugh and be silly!  Adjust your TV habits for less news and crime-drama and more comedy.    Control your stress  Try breathing deep, massage therapy, biofeedback, and meditation. Find time to relax each day.     My support system    Clinic Contact:  Phone number:    Contact 1:  Phone number:    Contact 2:  Phone number:    Church/:  Phone number:    Therapist:  Phone number:    [facility] crisis center:    Phone number:    Other community support:  Phone number:

## 2018-09-05 NOTE — TELEPHONE ENCOUNTER
"Requested Prescriptions   Pending Prescriptions Disp Refills     citalopram (CELEXA) 20 MG tablet [Pharmacy Med Name: CITALOPRAM HBR 20 MG TABLET] 30 tablet 0    Last Written Prescription Date:  8/8/18  Last Fill Quantity: 30,  # refills: 0   Last Office Visit: 2/26/2018   Future Office Visit:    Next 5 appointments (look out 90 days)     Sep 05, 2018  1:40 PM CDT   Telephone Visit with BARRINGTON Velasco CNP   [facility]    [address] [place] [address], [address]  [facility] [place] [place] [address] [phone]                  Sig: TAKE 1 TABLET BY MOUTH EVERY DAY    SSRIs Protocol Passed    9/5/2018 11:04 AM       Passed - PHQ-9 score less than 5 in past 6 months    PHQ-9 SCORE 6/9/2017 11/2/2017 6/11/2018   Total Score - - -   Total Score 0 6 4     PINKY-7 SCORE 6/9/2017 11/2/2017 6/11/2018   Total Score - - -   Total Score 0 7 4          Passed - Patient is age 18 or older       Passed - No active pregnancy on record       Passed - No positive pregnancy test in last 12 months       Passed - Recent (6 mo) or future (30 days) visit within the authorizing provider's specialty    Patient had office visit in the last 6 months or has a visit in the next 30 days with authorizing provider or within the authorizing provider's specialty.  See \"Patient Info\" tab in inbasket, or \"Choose Columns\" in Meds & Orders section of the refill encounter.              "

## 2018-09-05 NOTE — PROGRESS NOTES
"Lyubov Sanchez is a 48 year old female who is being evaluated via a telephone visit.      The patient has been notified of following:     \"This telephone visit will be conducted via a call between you and your physician/provider. We have found that certain health care needs can be provided without the need for a physical exam.  This service lets us provide the care you need with a short phone conversation.  If a prescription is necessary we can send it directly to your pharmacy.  If lab work is needed we can place an order for that and you can then stop by our lab to have the test done at a later time.    We will bill your insurance company for this service.  Please check with your medical insurance if this type of visit is covered. You may be responsible for the cost of this type of visit if insurance coverage is denied.  The typical cost is $30 (10min), $59 (11-20min) and $85 (21-30min).  Most often these visits are shorter than 10 minutes.    If during the course of the call the physician/provider feels a telephone visit is not appropriate, you will not be charged for this service.\"       Consent has been obtained for this service by care team member: yes.   See the scanned image in the medical record.    Lyubov Sanchez complains of  Telephone; Anxiety; and Depression      I have reviewed and updated the patient's Past Medical History, Social History, Family History and Medication List.    ALLERGIES  Penicillins    Naomi Hummel MA   (MA signature)    PHQ-9 SCORE 11/2/2017 6/11/2018 9/5/2018   Total Score - - -   Total Score 6 4 2     PINKY-7 SCORE 11/2/2017 6/11/2018 9/5/2018   Total Score - - -   Total Score 7 4 1         Additional provider notes: Anxiety and depression are well controlled on medications.  No SI or substance abuse.  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  Reports overall she has been doing well.  No concerns with sleep.      Assessment/Plan:  (F33.0) Major " depressive disorder, recurrent episode, mild (H)  Comment: Well controlled on medication.  Refilled x 6 mos.   Plan: citalopram (CELEXA) 20 MG tablet            (F41.9) Anxiety  Comment: Well controlled on medicaiton.  Refilled x 6 mos.   Plan: buPROPion (WELLBUTRIN XL) 150 MG 24 hr tablet              I have reviewed the note as documented above.  This accurately captures the substance of my conversation with the patient,  Marta Cline CNP      Total time of call between patient and provider was 5 minutes

## 2018-09-06 RX ORDER — CITALOPRAM HYDROBROMIDE 20 MG/1
TABLET ORAL
Qty: 30 TABLET | Refills: 0 | OUTPATIENT
Start: 2018-09-06

## 2018-09-06 ASSESSMENT — ANXIETY QUESTIONNAIRES: GAD7 TOTAL SCORE: 1

## 2018-09-06 ASSESSMENT — PATIENT HEALTH QUESTIONNAIRE - PHQ9: SUM OF ALL RESPONSES TO PHQ QUESTIONS 1-9: 2

## 2018-10-29 ENCOUNTER — TRANSFERRED RECORDS (OUTPATIENT)
Dept: HEALTH INFORMATION MANAGEMENT | Facility: CLINIC | Age: 48
End: 2018-10-29

## 2019-03-02 DIAGNOSIS — F33.0 MAJOR DEPRESSIVE DISORDER, RECURRENT EPISODE, MILD (H): ICD-10-CM

## 2019-03-04 NOTE — TELEPHONE ENCOUNTER
"Requested Prescriptions   Pending Prescriptions Disp Refills     citalopram (CELEXA) 20 MG tablet [Pharmacy Med Name: CITALOPRAM HBR 20 MG TABLET] 90 tablet 1    Last Written Prescription Date:  09/05/2018  Last Fill Quantity: 90 tablet,  # refills: 1   Last office visit: 9/5/2018 with prescribing provider:  09/05/2018   Future Office Visit:     Sig: TAKE 1 TABLET BY MOUTH EVERY DAY    SSRIs Protocol Passed - 3/2/2019  8:19 AM       Passed - PHQ-9 score less than 5 in past 6 months    Please review last PHQ-9 score.          Passed - Medication is active on med list       Passed - Patient is age 18 or older       Passed - No active pregnancy on record       Passed - No positive pregnancy test in last 12 months       Passed - Recent (6 mo) or future (30 days) visit within the authorizing provider's specialty    Patient had office visit in the last 6 months or has a visit in the next 30 days with authorizing provider or within the authorizing provider's specialty.  See \"Patient Info\" tab in inbasket, or \"Choose Columns\" in Meds & Orders section of the refill encounter.            Teja Tang XRT  "

## 2019-03-05 RX ORDER — CITALOPRAM HYDROBROMIDE 20 MG/1
20 TABLET ORAL DAILY
Qty: 90 TABLET | Refills: 0 | Status: SHIPPED | OUTPATIENT
Start: 2019-03-05 | End: 2019-04-24

## 2019-03-05 NOTE — TELEPHONE ENCOUNTER
Prescription approved per [facility] Refill Protocol.  Given 1x refill with 90 day supply.   Sent [employer] message for appointment to be scheduled.     Jessica Wang RN Flex

## 2019-04-01 ENCOUNTER — OFFICE VISIT (OUTPATIENT)
Dept: URGENT CARE | Facility: URGENT CARE | Age: 49
End: 2019-04-01
Payer: COMMERCIAL

## 2019-04-01 VITALS
TEMPERATURE: 99.5 F | HEART RATE: 107 BPM | DIASTOLIC BLOOD PRESSURE: 80 MMHG | OXYGEN SATURATION: 95 % | SYSTOLIC BLOOD PRESSURE: 122 MMHG | WEIGHT: 272 LBS | RESPIRATION RATE: 16 BRPM | HEIGHT: 64 IN | BODY MASS INDEX: 46.44 KG/M2

## 2019-04-01 DIAGNOSIS — J22 LOWER RESP. TRACT INFECTION: Primary | ICD-10-CM

## 2019-04-01 DIAGNOSIS — R50.9 FEVER, UNSPECIFIED FEVER CAUSE: ICD-10-CM

## 2019-04-01 DIAGNOSIS — I10 HTN, GOAL BELOW 140/90: ICD-10-CM

## 2019-04-01 LAB
DEPRECATED S PYO AG THROAT QL EIA: NORMAL
FLUAV+FLUBV AG SPEC QL: NEGATIVE
FLUAV+FLUBV AG SPEC QL: NEGATIVE
SPECIMEN SOURCE: NORMAL
SPECIMEN SOURCE: NORMAL

## 2019-04-01 PROCEDURE — 99214 OFFICE O/P EST MOD 30 MIN: CPT | Performed by: PHYSICIAN ASSISTANT

## 2019-04-01 PROCEDURE — 87804 INFLUENZA ASSAY W/OPTIC: CPT | Performed by: PHYSICIAN ASSISTANT

## 2019-04-01 PROCEDURE — 87880 STREP A ASSAY W/OPTIC: CPT | Performed by: PHYSICIAN ASSISTANT

## 2019-04-01 PROCEDURE — 87081 CULTURE SCREEN ONLY: CPT | Performed by: PHYSICIAN ASSISTANT

## 2019-04-01 RX ORDER — PREDNISONE 20 MG/1
40 TABLET ORAL DAILY
Qty: 10 TABLET | Refills: 0 | Status: SHIPPED | OUTPATIENT
Start: 2019-04-01 | End: 2019-05-10

## 2019-04-01 RX ORDER — FLUCONAZOLE 150 MG/1
150 TABLET ORAL
Qty: 2 TABLET | Refills: 0 | Status: SHIPPED | OUTPATIENT
Start: 2019-04-01 | End: 2019-04-24

## 2019-04-01 RX ORDER — DOXYCYCLINE 100 MG/1
100 CAPSULE ORAL 2 TIMES DAILY
Qty: 20 CAPSULE | Refills: 0 | Status: SHIPPED | OUTPATIENT
Start: 2019-04-01 | End: 2019-05-10

## 2019-04-01 ASSESSMENT — MIFFLIN-ST. JEOR: SCORE: 1853.78

## 2019-04-01 NOTE — PROGRESS NOTES
SUBJECTIVE:   Lyubov Sanchez is a 48 year old female presenting with a chief complaint of fever up to 101.3 cough and chest congestion and sore from coughing.  Also with a ST and nasal congestion.  Cough is productive.  No SOB or chest pain.  Exposed to flu as runs a .  Hx of bronchitis and pneumonia . Did not have flu shot.    Onset of symptoms was 5 day(s) ago.  Felt like started as a cold   Course of illness is worsening.    Severity moderate  Current and Associated symptoms: negative other than stated above  Treatment measures tried include Tylenol/Ibuprofen, Fluids and Rest.  Predisposing factors include as above.    Past Medical History:   Diagnosis Date     Arthritis      Depressive disorder      DYSPLASIA OF CERVIX 3/6/2003     Dysplasia of cervix (uteri)     Rx cryotherapy , and      Hypertension     NO cardiology     Incomplete right bundle branch block 11/10/2017     Migraine      Other chronic pain     Knee pain for 8 years     Plantar fasciitis     bilat     PONV (postoperative nausea and vomiting)      Unspecified sleep apnea     CPAP will bring on the day of surgery.     Current Outpatient Medications   Medication Sig Dispense Refill     amLODIPine (NORVASC) 5 MG tablet TAKE 1 TABLET BY MOUTH EVERY DAY 90 tablet 1     buPROPion (WELLBUTRIN XL) 150 MG 24 hr tablet Take 1 tablet (150 mg) by mouth every morning 90 tablet 1     citalopram (CELEXA) 20 MG tablet Take 1 tablet (20 mg) by mouth daily * needs appointment before next refill 90 tablet 0     levonorgestrel (MIRENA) 20 MCG/24HR IUD  1 each 0     lisinopril-hydrochlorothiazide (PRINZIDE/ZESTORETIC) 20-12.5 MG tablet TAKE 1 TABLET BY MOUTH EVERY DAY 90 tablet 0     Social History     Tobacco Use     Smoking status: Former Smoker     Packs/day: 0.50     Years: 5.00     Pack years: 2.50     Types: Cigarettes     Last attempt to quit: 2007     Years since quittin.0     Smokeless tobacco: Never Used   Substance Use Topics  "    Alcohol use: Yes     Comment: 3-4 drinks weekly       ROS:  Review of systems negative except as stated above.    OBJECTIVE:  /80 (BP Location: Right arm, Patient Position: Sitting, Cuff Size: Adult Large)   Pulse 107   Temp 99.5  F (37.5  C) (Oral)   Resp 16   Ht 1.626 m (5' 4\")   Wt 123.4 kg (272 lb)   SpO2 95%   Breastfeeding? No   BMI 46.69 kg/m    GENERAL APPEARANCE: healthy, alert and no distress  EYES: EOMI,  PERRL, conjunctiva clear  HENT: ear canals and TM's normal.  Nose and mouth without ulcers, erythema or lesions  NECK: supple, nontender, no lymphadenopathy  RESP: crackles RLL.  Very mild  Wheezing late expiratory phase. Normal effort.  CV: regular rates and rhythm, normal S1 S2, no murmur noted  NEURO: Normal strength and tone, sensory exam grossly normal,  normal speech and mentation  SKIN: no suspicious lesions or rashes    Results for orders placed or performed in visit on 04/01/19   Influenza A/B antigen   Result Value Ref Range    Influenza A/B Agn Specimen Nasal     Influenza A Negative NEG^Negative    Influenza B Negative NEG^Negative   Rapid strep screen   Result Value Ref Range    Specimen Description Throat     Rapid Strep A Screen       NEGATIVE: No Group A streptococcal antigen detected by immunoassay, await culture report.   Beta strep group A culture   Result Value Ref Range    Specimen Description Throat     Culture Micro No beta hemolytic Streptococcus Group A isolated        assessment/plan:  (J22) Lower resp. tract infection  (primary encounter diagnosis)  Comment:   Plan: doxycycline hyclate (VIBRAMYCIN) 100 MG         capsule, predniSONE (DELTASONE) 20 MG tablet,         fluconazole (DIFLUCAN) 150 MG tablet          Will treat based on exam findings and clinical concern for early pneumonia.  Hold x-ray for now and Doxy as directed and small course of Prednisone.   OTC med for sx relief.  To Follow-up with PCP as needed if sx worsen or new sx develop/  Red flag " signs discussed .     (I10) HTN, goal below 140/90  Comment: =  Plan: controlled in clinic and continue to monitor

## 2019-04-02 LAB
BACTERIA SPEC CULT: NORMAL
SPECIMEN SOURCE: NORMAL

## 2019-04-03 DIAGNOSIS — R05.9 COUGH: Primary | ICD-10-CM

## 2019-04-03 RX ORDER — BENZONATATE 200 MG/1
200 CAPSULE ORAL 3 TIMES DAILY PRN
Qty: 21 CAPSULE | Refills: 0 | Status: SHIPPED | OUTPATIENT
Start: 2019-04-03 | End: 2019-04-24

## 2019-04-04 NOTE — PROGRESS NOTES
Patient brought her daughter into [place] urgent care today for evaluation of cough.  She reports she was seen at the urgent care in [place] 3 days ago 4/1/2019 and diagnosed with pneumonia. She is currently on antibiotic and prednisone.  She is requesting a Rx cough medication.  Tessalon Perles sent to the pharmacy for her.

## 2019-04-24 ENCOUNTER — OFFICE VISIT (OUTPATIENT)
Dept: FAMILY MEDICINE | Facility: CLINIC | Age: 49
End: 2019-04-24
Payer: COMMERCIAL

## 2019-04-24 VITALS
TEMPERATURE: 97.9 F | HEART RATE: 70 BPM | WEIGHT: 261 LBS | SYSTOLIC BLOOD PRESSURE: 118 MMHG | BODY MASS INDEX: 44.8 KG/M2 | DIASTOLIC BLOOD PRESSURE: 78 MMHG | RESPIRATION RATE: 16 BRPM

## 2019-04-24 DIAGNOSIS — H35.61 RETINAL HEMORRHAGE OF RIGHT EYE: Primary | ICD-10-CM

## 2019-04-24 DIAGNOSIS — I10 ESSENTIAL HYPERTENSION, BENIGN: ICD-10-CM

## 2019-04-24 DIAGNOSIS — F41.9 ANXIETY: ICD-10-CM

## 2019-04-24 DIAGNOSIS — R73.9 ELEVATED BLOOD SUGAR: ICD-10-CM

## 2019-04-24 DIAGNOSIS — E66.01 MORBID OBESITY DUE TO EXCESS CALORIES (H): ICD-10-CM

## 2019-04-24 DIAGNOSIS — F33.0 MAJOR DEPRESSIVE DISORDER, RECURRENT EPISODE, MILD (H): ICD-10-CM

## 2019-04-24 LAB
ERYTHROCYTE [DISTWIDTH] IN BLOOD BY AUTOMATED COUNT: 12.8 % (ref 10–15)
HBA1C MFR BLD: 5.1 % (ref 0–5.6)
HCT VFR BLD AUTO: 43.4 % (ref 35–47)
HGB BLD-MCNC: 15.1 G/DL (ref 11.7–15.7)
MCH RBC QN AUTO: 31.9 PG (ref 26.5–33)
MCHC RBC AUTO-ENTMCNC: 34.8 G/DL (ref 31.5–36.5)
MCV RBC AUTO: 92 FL (ref 78–100)
PLATELET # BLD AUTO: 180 10E9/L (ref 150–450)
RBC # BLD AUTO: 4.74 10E12/L (ref 3.8–5.2)
WBC # BLD AUTO: 5.7 10E9/L (ref 4–11)

## 2019-04-24 PROCEDURE — 85027 COMPLETE CBC AUTOMATED: CPT | Performed by: PHYSICIAN ASSISTANT

## 2019-04-24 PROCEDURE — 99214 OFFICE O/P EST MOD 30 MIN: CPT | Performed by: PHYSICIAN ASSISTANT

## 2019-04-24 PROCEDURE — 83036 HEMOGLOBIN GLYCOSYLATED A1C: CPT | Performed by: PHYSICIAN ASSISTANT

## 2019-04-24 PROCEDURE — 36415 COLL VENOUS BLD VENIPUNCTURE: CPT | Performed by: PHYSICIAN ASSISTANT

## 2019-04-24 RX ORDER — LISINOPRIL AND HYDROCHLOROTHIAZIDE 12.5; 2 MG/1; MG/1
1 TABLET ORAL DAILY
Qty: 90 TABLET | Refills: 1 | Status: SHIPPED | OUTPATIENT
Start: 2019-04-24 | End: 2019-12-03

## 2019-04-24 RX ORDER — AMLODIPINE BESYLATE 5 MG/1
5 TABLET ORAL DAILY
Qty: 90 TABLET | Refills: 1 | Status: SHIPPED | OUTPATIENT
Start: 2019-04-24 | End: 2020-03-16

## 2019-04-24 RX ORDER — CITALOPRAM HYDROBROMIDE 20 MG/1
20 TABLET ORAL DAILY
Qty: 90 TABLET | Refills: 1 | Status: SHIPPED | OUTPATIENT
Start: 2019-04-24 | End: 2019-12-06

## 2019-04-24 RX ORDER — BUPROPION HYDROCHLORIDE 300 MG/1
300 TABLET ORAL EVERY MORNING
Qty: 90 TABLET | Refills: 1 | Status: SHIPPED | OUTPATIENT
Start: 2019-04-24 | End: 2020-01-24

## 2019-04-24 ASSESSMENT — ANXIETY QUESTIONNAIRES
1. FEELING NERVOUS, ANXIOUS, OR ON EDGE: SEVERAL DAYS
7. FEELING AFRAID AS IF SOMETHING AWFUL MIGHT HAPPEN: NOT AT ALL
GAD7 TOTAL SCORE: 3
6. BECOMING EASILY ANNOYED OR IRRITABLE: SEVERAL DAYS
5. BEING SO RESTLESS THAT IT IS HARD TO SIT STILL: NOT AT ALL
2. NOT BEING ABLE TO STOP OR CONTROL WORRYING: SEVERAL DAYS
3. WORRYING TOO MUCH ABOUT DIFFERENT THINGS: NOT AT ALL

## 2019-04-24 ASSESSMENT — PATIENT HEALTH QUESTIONNAIRE - PHQ9
5. POOR APPETITE OR OVEREATING: NOT AT ALL
SUM OF ALL RESPONSES TO PHQ QUESTIONS 1-9: 7

## 2019-04-24 NOTE — PROGRESS NOTES
"  SUBJECTIVE:   Lyubov Sanchez is a 48 year old female who presents to clinic today for the following   health issues:    Patient states that she see eye doc recently and he noted a hemorrhage behind the right eye so he suggested her see PCP and have some testing done. CBC, and A1C.     The following are the notes from the [facility] visit in March 2019:  \"Your blood pressure was too high today at  BP Readings from Last 1 Encounters:   03/21/19   142/79   Please see your primary care provider to discuss this within 1-2 weeks.    Routine eye evaluations are an important part of good medical care. It is recommended that you be seen for routine care at 1-2 year intervals or as advised by your eye doctor. Wear sun protection when outdoors and safety glasses when engaging in at risk activities.    I gave a prescription for glasses. I gave you the option of bifocals or just reading glasses.    You have some retinal hemorrhage in the right eye. I would like you to get a CBC an A1c testing done for this.     We should recheck this in a month.\"      Adopted so not sure of family history  Joined weight watchers after the eye doctor told her this (she looked up that the hemorrhage could mean diabetes) and has lost 15lbs already so far.  The eye exam was a routine exam, no eye symptoms.      Depression Followup    Status since last visit: mood stable- notes some anxiety and also noted libido issues    See PHQ-9 for current symptoms.  Other associated symptoms: None    Complicating factors:   Significant life event:  Yes-  Daughter graduating HS   Current substance abuse:  None  Anxiety or Panic symptoms:  Yes-  Mild, no panic    PHQ 11/2/2017 6/11/2018 9/5/2018   PHQ-9 Total Score 6 4 2   Q9: Thoughts of better off dead/self-harm past 2 weeks Not at all Not at all Not at all       PHQ-9  English  PHQ-9   Any Language  Suicide Assessment Five-step Evaluation and Treatment (SAFE-T)    Mood stable, daughter graduating HS so little " increase in anxiety. No sex drive.      Also mentions the need for upcoming PAP and IUD replacement.  Thinks she is on 5 years now, just got a period for the first time in about 5 years.    HTN-     BP Readings from Last 6 Encounters:   04/24/19 118/78   04/01/19 122/80   07/05/18 125/72   06/11/18 116/74   03/21/18 146/81   02/26/18 118/70       Additional history: as documented    Reviewed  and updated as needed this visit by clinical staff  Tobacco  Allergies  Meds  Med Hx  Surg Hx  Fam Hx  Soc Hx        Reviewed and updated as needed this visit by Provider         Labs reviewed in EPIC    ROS:  Constitutional, HEENT, cardiovascular, pulmonary, gi and gu systems are negative, except as otherwise noted.    OBJECTIVE:     /78 (BP Location: Right arm, Patient Position: Sitting, Cuff Size: Adult Large)   Pulse 70   Temp 97.9  F (36.6  C) (Oral)   Resp 16   Wt 118.4 kg (261 lb)   BMI 44.80 kg/m    Body mass index is 44.8 kg/m .  GENERAL: healthy, alert and no distress  MS: no gross musculoskeletal defects noted, no edema  SKIN: no suspicious lesions or rashes  NEURO: Normal strength and tone, mentation intact and speech normal  PSYCH: mentation appears normal, affect normal/bright    Diagnostic Test Results:  Results for orders placed or performed in visit on 04/24/19 (from the past 24 hour(s))   Hemoglobin A1c   Result Value Ref Range    Hemoglobin A1C 5.1 0 - 5.6 %   CBC with platelets   Result Value Ref Range    WBC 5.7 4.0 - 11.0 10e9/L    RBC Count 4.74 3.8 - 5.2 10e12/L    Hemoglobin 15.1 11.7 - 15.7 g/dL    Hematocrit 43.4 35.0 - 47.0 %    MCV 92 78 - 100 fl    MCH 31.9 26.5 - 33.0 pg    MCHC 34.8 31.5 - 36.5 g/dL    RDW 12.8 10.0 - 15.0 %    Platelet Count 180 150 - 450 10e9/L       ASSESSMENT/PLAN:   1. Retinal hemorrhage of right eye  Perhaps HTN related.  She will return to see her Optometrist.  - Hemoglobin A1c  - CBC with platelets    2. Elevated blood sugar  It is curious with her many  elevated glucose readings that her A1C remains down in the lower 5 range.  Will need to keep a close eye on this.  - Hemoglobin A1c    3. Morbid obesity due to excess calories (H)  - she has joined weight watchers and is doing well.    4. Major depressive disorder, recurrent episode, mild (H)  Refilled today.  Could be causing some low libido.  It she stablizes on increase in Wellbutrin we can consider 10mg Celexa.  - citalopram (CELEXA) 20 MG tablet; Take 1 tablet (20 mg) by mouth daily  Dispense: 90 tablet; Refill: 1    5. Essential hypertension, benign  She does note sometimes forgetting to take her meds.  I stressed how important it is to keep taking these daily.  - amLODIPine (NORVASC) 5 MG tablet; Take 1 tablet (5 mg) by mouth daily  Dispense: 90 tablet; Refill: 1  - lisinopril-hydrochlorothiazide (PRINZIDE/ZESTORETIC) 20-12.5 MG tablet; Take 1 tablet by mouth daily  Dispense: 90 tablet; Refill: 1    6. Anxiety  Refilled and will increase dose to 300mg.  - buPROPion (WELLBUTRIN XL) 300 MG 24 hr tablet; Take 1 tablet (300 mg) by mouth every morning  Dispense: 90 tablet; Refill: 1        Jeremiah Muhammad PA-C  [facility]

## 2019-04-25 ASSESSMENT — ANXIETY QUESTIONNAIRES: GAD7 TOTAL SCORE: 3

## 2019-05-10 ENCOUNTER — OFFICE VISIT (OUTPATIENT)
Dept: FAMILY MEDICINE | Facility: CLINIC | Age: 49
End: 2019-05-10
Payer: COMMERCIAL

## 2019-05-10 VITALS
SYSTOLIC BLOOD PRESSURE: 108 MMHG | BODY MASS INDEX: 43.6 KG/M2 | DIASTOLIC BLOOD PRESSURE: 70 MMHG | RESPIRATION RATE: 16 BRPM | WEIGHT: 254 LBS | HEART RATE: 76 BPM | TEMPERATURE: 98.5 F

## 2019-05-10 DIAGNOSIS — L91.8 SKIN TAG: Primary | ICD-10-CM

## 2019-05-10 PROCEDURE — 17110 DESTRUCTION B9 LES UP TO 14: CPT | Performed by: PHYSICIAN ASSISTANT

## 2019-05-10 PROCEDURE — 99207 ZZC DROP WITH A PROCEDURE: CPT | Performed by: PHYSICIAN ASSISTANT

## 2019-05-10 NOTE — PROGRESS NOTES
Subjective: Lyubov Sanchez a 48 year old female who presents today for lesion removal. The lesion(s) is/are located on the face and neck, number 5 and measures 0.1cm to 0.4cm in size.  She The patient reports the lesion is asymptomatic but the larger one on her neck is getting caught on clothing and necklaces and denies other significant symptoms on ROS. Medications reviewed.    Pause for the cause has been completed prior to the prceedure.   1. Lyubov was identified by both name and date of birth   2. The correct site was identified   3. Site was marked by provider    4. Written informed consent correct and signed or verbal authorization  to proceed was obtained   5. Verifed necessary supplies, equipment, and diagnostics are available    6. Time out was performed immediately prior to procedure    Objective: The lesion(s) is/are of the above mentioned size and location and is/are typical. The area was prepped and appropriately anesthetized. Using the usual technique, cryotherapy with liquid nitrogen x 3 on larger tag on neck and small lesion on face, and removal with forceps and scissors on 3 smaller skin tags was performed. An appropriate dressing was applied. The procedure was well tolerated and without complications.     Assessment: skin tag    Plan: Follow up: The patient may return prn.. Wound care instructions provided.  Patient was instructed to be alert for any signs of cutaneous infection.

## 2019-06-08 ENCOUNTER — OFFICE VISIT (OUTPATIENT)
Dept: URGENT CARE | Facility: URGENT CARE | Age: 49
End: 2019-06-08
Payer: COMMERCIAL

## 2019-06-08 VITALS
BODY MASS INDEX: 41.66 KG/M2 | TEMPERATURE: 98.6 F | WEIGHT: 244 LBS | HEIGHT: 64 IN | DIASTOLIC BLOOD PRESSURE: 67 MMHG | HEART RATE: 92 BPM | SYSTOLIC BLOOD PRESSURE: 103 MMHG | OXYGEN SATURATION: 95 %

## 2019-06-08 DIAGNOSIS — H65.92 OME (OTITIS MEDIA WITH EFFUSION), LEFT: Primary | ICD-10-CM

## 2019-06-08 PROCEDURE — 99213 OFFICE O/P EST LOW 20 MIN: CPT | Performed by: PHYSICIAN ASSISTANT

## 2019-06-08 RX ORDER — AZITHROMYCIN 250 MG/1
TABLET, FILM COATED ORAL
Qty: 6 TABLET | Refills: 0 | Status: SHIPPED | OUTPATIENT
Start: 2019-06-08 | End: 2019-12-02

## 2019-06-08 ASSESSMENT — MIFFLIN-ST. JEOR: SCORE: 1726.78

## 2019-06-09 NOTE — PROGRESS NOTES
SUBJECTIVE:   Lyubov Sanchez is a 48 year old female presenting with a chief complaint of cough and cold sx with nasal congestion that is getting worse and now with left ear pain and pressure.  No fevers that aware of.  Did have some mild GI sx but improved.  .  Onset of symptoms was 3 week(s) ago.  Course of illness is worsening.    Severity moderate  Current and Associated symptoms: no ST, fevers or rashes noted  Treatment measures tried include Tylenol/Ibuprofen, Fluids and OTC med.  Predisposing factors include None.    Past Medical History:   Diagnosis Date     Arthritis      Depressive disorder      DYSPLASIA OF CERVIX 3/6/2003     Dysplasia of cervix (uteri)     Rx cryotherapy , and      Hypertension     NO cardiology     Incomplete right bundle branch block 11/10/2017     Migraine      Other chronic pain     Knee pain for 8 years     Plantar fasciitis     bilat     PONV (postoperative nausea and vomiting)      Unspecified sleep apnea     CPAP will bring on the day of surgery.     Current Outpatient Medications   Medication Sig Dispense Refill     amLODIPine (NORVASC) 5 MG tablet Take 1 tablet (5 mg) by mouth daily 90 tablet 1     buPROPion (WELLBUTRIN XL) 300 MG 24 hr tablet Take 1 tablet (300 mg) by mouth every morning 90 tablet 1     citalopram (CELEXA) 20 MG tablet Take 1 tablet (20 mg) by mouth daily 90 tablet 1     levonorgestrel (MIRENA) 20 MCG/24HR IUD  1 each 0     lisinopril-hydrochlorothiazide (PRINZIDE/ZESTORETIC) 20-12.5 MG tablet Take 1 tablet by mouth daily 90 tablet 1     Social History     Tobacco Use     Smoking status: Former Smoker     Packs/day: 0.50     Years: 5.00     Pack years: 2.50     Types: Cigarettes     Last attempt to quit: 2007     Years since quittin.1     Smokeless tobacco: Never Used   Substance Use Topics     Alcohol use: Yes     Comment: 3-4 drinks weekly       ROS:  Review of systems negative except as stated above.    OBJECTIVE:  /67 (BP  "Location: Right arm, Cuff Size: Adult Large)   Pulse 92   Temp 98.6  F (37  C) (Oral)   Ht 1.626 m (5' 4\")   Wt 110.7 kg (244 lb)   SpO2 95%   BMI 41.88 kg/m    GENERAL APPEARANCE: healthy, alert and no distress  EYES: EOMI,  PERRL, conjunctiva clear  HENT: nasal congestion and mild sinus pressure.  Right TM clear.  Left TM erythematous and bulging.  Canals clear. Oral mucosa moist  NECK: supple, nontender, no lymphadenopathy  RESP: lungs clear to auscultation - no rales, rhonchi or wheezes  CV: regular rates and rhythm, normal S1 S2, no murmur noted  NEURO: Normal strength and tone, sensory exam grossly normal,  normal speech and mentation  SKIN: no suspicious lesions or rashes    assessment/plan:  (H65.92) OME (otitis media with effusion), left  (primary encounter diagnosis)  Comment:   Plan: azithromycin (ZITHROMAX) 250 MG tablet         Med as directed and OTC med for sx relief and to Follow-up with PCP as needed if sx not improved       "

## 2019-06-14 ENCOUNTER — ANCILLARY PROCEDURE (OUTPATIENT)
Dept: MAMMOGRAPHY | Facility: CLINIC | Age: 49
End: 2019-06-14
Attending: FAMILY MEDICINE
Payer: COMMERCIAL

## 2019-06-14 DIAGNOSIS — Z12.31 VISIT FOR SCREENING MAMMOGRAM: ICD-10-CM

## 2019-06-14 PROCEDURE — 77067 SCR MAMMO BI INCL CAD: CPT | Mod: TC

## 2019-09-19 ENCOUNTER — ALLIED HEALTH/NURSE VISIT (OUTPATIENT)
Dept: NURSING | Facility: CLINIC | Age: 49
End: 2019-09-19
Payer: COMMERCIAL

## 2019-09-19 DIAGNOSIS — Z23 NEED FOR PROPHYLACTIC VACCINATION AND INOCULATION AGAINST INFLUENZA: Primary | ICD-10-CM

## 2019-09-19 PROCEDURE — 90471 IMMUNIZATION ADMIN: CPT

## 2019-09-19 PROCEDURE — 99207 ZZC NO CHARGE NURSE ONLY: CPT

## 2019-09-19 PROCEDURE — 90686 IIV4 VACC NO PRSV 0.5 ML IM: CPT

## 2019-10-31 ENCOUNTER — OFFICE VISIT (OUTPATIENT)
Dept: FAMILY MEDICINE | Facility: CLINIC | Age: 49
End: 2019-10-31
Payer: COMMERCIAL

## 2019-10-31 VITALS
HEART RATE: 69 BPM | BODY MASS INDEX: 37.73 KG/M2 | TEMPERATURE: 97.5 F | SYSTOLIC BLOOD PRESSURE: 110 MMHG | HEIGHT: 64 IN | DIASTOLIC BLOOD PRESSURE: 70 MMHG | WEIGHT: 221 LBS | RESPIRATION RATE: 14 BRPM

## 2019-10-31 DIAGNOSIS — Z00.00 ROUTINE GENERAL MEDICAL EXAMINATION AT A HEALTH CARE FACILITY: Primary | ICD-10-CM

## 2019-10-31 DIAGNOSIS — Z30.431 SURVEILLANCE OF INTRAUTERINE CONTRACEPTIVE DEVICE: ICD-10-CM

## 2019-10-31 DIAGNOSIS — I71.21 ANEURYSM, ASCENDING AORTA (H): ICD-10-CM

## 2019-10-31 DIAGNOSIS — E66.01 MORBID OBESITY DUE TO EXCESS CALORIES (H): ICD-10-CM

## 2019-10-31 DIAGNOSIS — I10 HTN, GOAL BELOW 140/90: ICD-10-CM

## 2019-10-31 DIAGNOSIS — N92.1 IRREGULAR INTERMENSTRUAL BLEEDING: ICD-10-CM

## 2019-10-31 LAB — HCG UR QL: NEGATIVE

## 2019-10-31 PROCEDURE — 81025 URINE PREGNANCY TEST: CPT | Performed by: FAMILY MEDICINE

## 2019-10-31 PROCEDURE — 99396 PREV VISIT EST AGE 40-64: CPT | Performed by: FAMILY MEDICINE

## 2019-10-31 PROCEDURE — G0145 SCR C/V CYTO,THINLAYER,RESCR: HCPCS | Performed by: FAMILY MEDICINE

## 2019-10-31 PROCEDURE — 87624 HPV HI-RISK TYP POOLED RSLT: CPT | Performed by: FAMILY MEDICINE

## 2019-10-31 ASSESSMENT — ENCOUNTER SYMPTOMS
FEVER: 0
HEARTBURN: 0
SORE THROAT: 0
EYE PAIN: 0
HEMATOCHEZIA: 0
WEAKNESS: 0
CHILLS: 0
DIARRHEA: 0
DYSURIA: 0
ARTHRALGIAS: 0
ABDOMINAL PAIN: 1
COUGH: 0
PARESTHESIAS: 0
HEADACHES: 0
NERVOUS/ANXIOUS: 0
MYALGIAS: 0
BREAST MASS: 0
FREQUENCY: 0
SHORTNESS OF BREATH: 0
DIZZINESS: 0
NAUSEA: 0
HEMATURIA: 0
JOINT SWELLING: 0
PALPITATIONS: 0
CONSTIPATION: 0

## 2019-10-31 ASSESSMENT — MIFFLIN-ST. JEOR: SCORE: 1613.48

## 2019-10-31 NOTE — PROGRESS NOTES
SUBJECTIVE:   CC: Lyubov Sanchez is an 49 year old woman who presents for preventive health visit.     Healthy Habits:     Getting at least 3 servings of Calcium per day:  Yes    Bi-annual eye exam:  Yes    Dental care twice a year:  Yes    Sleep apnea or symptoms of sleep apnea:  Daytime drowsiness, Excessive snoring and Sleep apnea    Diet:  Regular (no restrictions)    Frequency of exercise:  4-5 days/week    Duration of exercise:  30-45 minutes    Taking medications regularly:  Yes    Medication side effects:  Not applicable    PHQ-2 Total Score: 0    Additional concerns today:  No    Contraceptives  IUD mirena was inserted 1/23/2014. She would like to have it removed and replaced today. Notes that she's started to develop pain in the pelvic region (over ovaries, right worse than left) on a monthly basis. She did not have periods with the IUD until April, then she was having them monthly. However, over the past few weeks she's been bleeding on a weekly basis.     Hypertension    BP Readings from Last 3 Encounters:   10/31/19 110/70   06/08/19 103/67   05/10/19 108/70     BP stable with amlodipine 5 mg and lisinopril 20 mg+hydrochlorothiazide 12.5 mg. Denies any light headedness or dizziness.     Weight    Wt Readings from Last 3 Encounters:   10/31/19 100.2 kg (221 lb)   06/08/19 110.7 kg (244 lb)   05/10/19 115.2 kg (254 lb)     She's proud she's lost weight. She's joined [employer] and is going to the gym 3-4x a week.         Today's PHQ-2 Score:   PHQ-2 ( 1999 [employer]) 10/31/2019   Q1: Little interest or pleasure in doing things 0   Q2: Feeling down, depressed or hopeless 0   PHQ-2 Score 0   Q1: Little interest or pleasure in doing things Not at all   Q2: Feeling down, depressed or hopeless Not at all   PHQ-2 Score 0     Abuse: Current or Past(Physical, Sexual or Emotional)- No  Do you feel safe in your environment? Yes    Social History     Tobacco Use     Smoking status: Former Smoker     Packs/day:  0.50     Years: 5.00     Pack years: 2.50     Types: Cigarettes     Last attempt to quit: 2007     Years since quittin.5     Smokeless tobacco: Never Used   Substance Use Topics     Alcohol use: Yes     Comment: 0-1 weekly       Alcohol Use 10/31/2019   Prescreen: >3 drinks/day or >7 drinks/week? No   Prescreen: >3 drinks/day or >7 drinks/week? -     Reviewed orders with patient.  Reviewed health maintenance and updated orders accordingly - Yes  Labs reviewed in EPIC  BP Readings from Last 3 Encounters:   10/31/19 110/70   19 103/67   05/10/19 108/70    Wt Readings from Last 3 Encounters:   10/31/19 100.2 kg (221 lb)   19 110.7 kg (244 lb)   05/10/19 115.2 kg (254 lb)          Recent Labs   Lab Test 19  0813 18  2217 18  1808 18  1619  17  0945 16  0814 06/21/15  1335  10/06/14  1136  11  1737   A1C 5.1  --   --   --   --  5.2 5.8  --   --   --   --   --    LDL  --   --   --   --   --  68 87  --   --   --   --  118   HDL  --   --   --   --   --  36* 32*  --   --   --   --  36*   TRIG  --   --   --   --   --  256* 261*  --   --   --   --   --    ALT  --   --   --   --   --   --  34 38  --  47   < > 42   CR  --  0.64 0.55 0.66   < > 0.63 0.65 0.75   < > 0.65   < > 0.67   GFRESTIMATED  --  >90 >90 >90   < > >90  Non  GFR Calc   >90  Non  GFR Calc   84   < > >90  Non  GFR Calc     < > >90   GFRESTBLACK  --  >90 >90 >90   < > >90   GFR Calc   >90   GFR Calc   >90   GFR Calc     < > >90   GFR Calc     < > >90   POTASSIUM  --  3.6  --  3.7   < > 3.9 4.0 3.8   < > 4.1   < > 4.4   TSH  --   --   --  0.79  --  0.75 1.00  --   --   --    < > 0.72    < > = values in this interval not displayed.      Mammogram Screening: Patient under age 50, mutual decision reflected in health maintenance. Last mammogram was on 2019--negative.       Pertinent  mammograms are reviewed under the imaging tab.  History of abnormal Pap smear: NO - age 30- 65 PAP every 3 years recommended  PAP / HPV Latest Ref Rng & Units 7/22/2016 2/24/2015 1/23/2014   PAP - NIL OTHER-NIL, See Result NIL   HPV 16 DNA NEG Negative - -   HPV 18 DNA NEG Negative - -   OTHER HR HPV NEG Negative - -     Reviewed and updated as needed this visit by clinical staff  Tobacco  Allergies  Meds  Med Hx  Surg Hx  Fam Hx  Soc Hx        Reviewed and updated as needed this visit by Provider          Review of Systems   Constitutional: Negative for chills and fever.   HENT: Negative for congestion, ear pain, hearing loss and sore throat.    Eyes: Negative for pain and visual disturbance.   Respiratory: Negative for cough and shortness of breath.    Cardiovascular: Negative for chest pain, palpitations and peripheral edema.   Gastrointestinal: Positive for abdominal pain. Negative for constipation, diarrhea, heartburn, hematochezia and nausea.   Breasts:  Negative for tenderness, breast mass and discharge.   Genitourinary: Positive for pelvic pain, vaginal bleeding and vaginal discharge. Negative for dysuria, frequency, genital sores, hematuria and urgency.   Musculoskeletal: Negative for arthralgias, joint swelling and myalgias.   Skin: Negative for rash.   Neurological: Negative for dizziness, weakness, headaches and paresthesias.   Psychiatric/Behavioral: Negative for mood changes. The patient is not nervous/anxious.      This document serves as a record of the services and decisions personally performed and made by Yolie Delarosa MD. It was created on her behalf by Nadege Rand, a trained medical scribe. The creation of this document is based on the provider's statements to the medical scribe.  Nadege Rand October 31, 2019 4:16 PM      OBJECTIVE:   /70 (BP Location: Right arm, Patient Position: Sitting, Cuff Size: Adult Regular)   Pulse 69   Temp 97.5  F (36.4  C) (Oral)   Resp 14   Ht  "1.619 m (5' 3.75\")   Wt 100.2 kg (221 lb)   BMI 38.23 kg/m       Physical Exam  GENERAL: healthy, alert and no distress  EYES: Eyes grossly normal to inspection, and conjunctivae and sclerae normal  HENT: ear canals and TM's normal, nose and mouth without ulcers or lesions  NECK: no adenopathy, no asymmetry, masses, or scars and thyroid normal to palpation  RESP: lungs clear to auscultation - no rales, rhonchi or wheezes  BREAST: normal without masses, tenderness or nipple discharge and no palpable axillary masses or adenopathy  CV: regular rate and rhythm, normal S1 S2, no S3 or S4, no murmur, click or rub, no peripheral edema and peripheral pulses strong  ABDOMEN: soft, nontender, no hepatosplenomegaly, no masses and bowel sounds normal   (female): normal female external genitalia, normal urethral meatus, vaginal mucosa pink, moist, well rugated, and normal cervix/adnexa/uterus without masses or discharge. IUD strings not visualized  10 min spent with endocervical speculum , IUD hook and was unsuccesfull in removing or ever seeing the iud or strings  MS: no gross musculoskeletal defects noted, no edema  SKIN: no suspicious lesions or rashes  NEURO: Normal strength and tone, mentation intact and speech normal  PSYCH: mentation appears normal, affect normal/bright    Diagnostic Test Results:  Labs reviewed in Epic  Results for orders placed or performed in visit on 10/31/19 (from the past 24 hour(s))   HCG Qual, Urine ([id number])   Result Value Ref Range    HCG Qual Urine Negative NEG^Negative       ASSESSMENT/PLAN:   (Z00.00) Routine general medical examination at a health care facility  (primary encounter diagnosis)  Comment: Fasting labs today  Plan: Lipid panel reflex to direct LDL Fasting,         **Comprehensive metabolic panel FUTURE anytime,        **CBC with platelets FUTURE anytime          (Z30.431) Surveillance of intrauterine contraceptive device  Comment: IUD removal not successful today, IUD strings " "were not visualized. Pelvic US ordered and referred patient to OBGYN.   Plan: HCG Qual, Urine ([id number]), US Pelvic Complete w        Transvaginal          (I10) HTN, goal below 140/90  Comment: BP stable and she's been losing weight. Decrease lisinopril+HCTZ to half a tab, continue amlodipine. Patient will let me know if experiencing any light headedness or dizziness with further weight loss.     (E66.01) Morbid obesity due to excess calories (H)  Comment: She's been losing weight. Continue with weight watchers and regular exercise.   Plan: Lipid panel reflex to direct LDL Fasting          (N92.1) Irregular intermenstrual bleeding  Comment: Could not replace IUD today, see above      COUNSELING:  Reviewed preventive health counseling, as reflected in patient instructions       Regular exercise       Healthy diet/nutrition       Contraception    Estimated body mass index is 38.23 kg/m  as calculated from the following:    Height as of this encounter: 1.619 m (5' 3.75\").    Weight as of this encounter: 100.2 kg (221 lb).  Weight management plan: Discussed healthy diet and exercise guidelines   reports that she quit smoking about 12 years ago. Her smoking use included cigarettes. She has a 2.50 pack-year smoking history. She has never used smokeless tobacco.      Counseling Resources:  ATP IV Guidelines  Pooled Cohorts Equation Calculator  Breast Cancer Risk Calculator  FRAX Risk Assessment  ICSI Preventive Guidelines  Dietary Guidelines for Americans, 2010  USDA's MyPlate  ASA Prophylaxis  Lung CA Screening      The information in this document, created by the medical scribe for me, accurately reflects the services I personally performed and the decisions made by me. I have reviewed and approved this document for accuracy prior to leaving the patient care area.  October 31, 2019 4:25 PM    Yolie Delarosa MD  [facility]  "

## 2019-11-05 LAB
COPATH REPORT: NORMAL
PAP: NORMAL

## 2019-11-07 ENCOUNTER — TELEPHONE (OUTPATIENT)
Dept: FAMILY MEDICINE | Facility: CLINIC | Age: 49
End: 2019-11-07

## 2019-11-07 ENCOUNTER — ANCILLARY PROCEDURE (OUTPATIENT)
Dept: ULTRASOUND IMAGING | Facility: CLINIC | Age: 49
End: 2019-11-07
Attending: FAMILY MEDICINE
Payer: COMMERCIAL

## 2019-11-07 DIAGNOSIS — D25.9 UTERINE LEIOMYOMA, UNSPECIFIED LOCATION: ICD-10-CM

## 2019-11-07 DIAGNOSIS — Z30.431 SURVEILLANCE OF INTRAUTERINE CONTRACEPTIVE DEVICE: Primary | ICD-10-CM

## 2019-11-07 DIAGNOSIS — Z30.431 SURVEILLANCE OF INTRAUTERINE CONTRACEPTIVE DEVICE: ICD-10-CM

## 2019-11-07 LAB
FINAL DIAGNOSIS: NORMAL
HPV HR 12 DNA CVX QL NAA+PROBE: NEGATIVE
HPV16 DNA SPEC QL NAA+PROBE: NEGATIVE
HPV18 DNA SPEC QL NAA+PROBE: NEGATIVE
SPECIMEN DESCRIPTION: NORMAL
SPECIMEN SOURCE CVX/VAG CYTO: NORMAL

## 2019-11-07 PROCEDURE — 76856 US EXAM PELVIC COMPLETE: CPT | Performed by: OBSTETRICS & GYNECOLOGY

## 2019-11-07 PROCEDURE — 76830 TRANSVAGINAL US NON-OB: CPT | Performed by: OBSTETRICS & GYNECOLOGY

## 2019-11-07 NOTE — TELEPHONE ENCOUNTER
Called pt with large fibroid and IUD located in uterus. String was not seen at last exam and she is due for IUD removal.  Will place referral to GYN, pt prefers [facility]

## 2019-12-02 ENCOUNTER — OFFICE VISIT (OUTPATIENT)
Dept: OBGYN | Facility: CLINIC | Age: 49
End: 2019-12-02
Payer: COMMERCIAL

## 2019-12-02 VITALS — WEIGHT: 218.3 LBS | SYSTOLIC BLOOD PRESSURE: 114 MMHG | BODY MASS INDEX: 37.77 KG/M2 | DIASTOLIC BLOOD PRESSURE: 60 MMHG

## 2019-12-02 DIAGNOSIS — Z11.3 SCREEN FOR STD (SEXUALLY TRANSMITTED DISEASE): ICD-10-CM

## 2019-12-02 DIAGNOSIS — N92.1 MENORRHAGIA WITH IRREGULAR CYCLE: ICD-10-CM

## 2019-12-02 DIAGNOSIS — T83.32XA INTRAUTERINE CONTRACEPTIVE DEVICE THREADS LOST, INITIAL ENCOUNTER: Primary | ICD-10-CM

## 2019-12-02 DIAGNOSIS — K42.9 UMBILICAL HERNIA WITHOUT OBSTRUCTION AND WITHOUT GANGRENE: ICD-10-CM

## 2019-12-02 DIAGNOSIS — D25.1 INTRAMURAL LEIOMYOMA OF UTERUS: ICD-10-CM

## 2019-12-02 LAB
ERYTHROCYTE [DISTWIDTH] IN BLOOD BY AUTOMATED COUNT: 13 % (ref 10–15)
HCT VFR BLD AUTO: 42.8 % (ref 35–47)
HGB BLD-MCNC: 14.2 G/DL (ref 11.7–15.7)
MCH RBC QN AUTO: 30.9 PG (ref 26.5–33)
MCHC RBC AUTO-ENTMCNC: 33.2 G/DL (ref 31.5–36.5)
MCV RBC AUTO: 93 FL (ref 78–100)
PLATELET # BLD AUTO: 161 10E9/L (ref 150–450)
RBC # BLD AUTO: 4.59 10E12/L (ref 3.8–5.2)
WBC # BLD AUTO: 7.1 10E9/L (ref 4–11)

## 2019-12-02 PROCEDURE — 99204 OFFICE O/P NEW MOD 45 MIN: CPT | Performed by: OBSTETRICS & GYNECOLOGY

## 2019-12-02 PROCEDURE — 87491 CHLMYD TRACH DNA AMP PROBE: CPT | Performed by: OBSTETRICS & GYNECOLOGY

## 2019-12-02 PROCEDURE — 36415 COLL VENOUS BLD VENIPUNCTURE: CPT | Performed by: OBSTETRICS & GYNECOLOGY

## 2019-12-02 PROCEDURE — 87591 N.GONORRHOEAE DNA AMP PROB: CPT | Performed by: OBSTETRICS & GYNECOLOGY

## 2019-12-02 PROCEDURE — 82728 ASSAY OF FERRITIN: CPT | Performed by: OBSTETRICS & GYNECOLOGY

## 2019-12-02 PROCEDURE — 85027 COMPLETE CBC AUTOMATED: CPT | Performed by: OBSTETRICS & GYNECOLOGY

## 2019-12-02 ASSESSMENT — ENCOUNTER SYMPTOMS
ABDOMINAL PAIN: 0
LIGHT-HEADEDNESS: 0
NAUSEA: 0
DYSURIA: 0
CONSTIPATION: 0
UNEXPECTED WEIGHT CHANGE: 0
VOMITING: 0
DIARRHEA: 0
CHILLS: 0
FREQUENCY: 0
FEVER: 0
DIZZINESS: 0

## 2019-12-02 NOTE — NURSING NOTE
"Chief Complaint   Patient presents with     Consult     for fibroid and IUD removal. Patient tried to have IUD removed and they were unable to remove due to fibroids.  Pt is here to discuss the next step.        Initial /60 (BP Location: Right arm, Patient Position: Chair, Cuff Size: Adult Large)   Wt 99 kg (218 lb 4.8 oz)   BMI 37.77 kg/m   Estimated body mass index is 37.77 kg/m  as calculated from the following:    Height as of 10/31/19: 1.619 m (5' 3.75\").    Weight as of this encounter: 99 kg (218 lb 4.8 oz).  BP completed using cuff size: large    Questioned patient about current smoking habits.  Pt. quit smoking some time ago.          The following HM Due: NONE      Timothy Chan CMA             "

## 2019-12-02 NOTE — PROGRESS NOTES
SUBJECTIVE:                                                   Lyubov Sanchez is a 49 year old female who presents to clinic today with the Chief Complaint of:  Patient presents with:  Consult: for fibroid and IUD removal. Patient tried to have IUD removed and they were unable to remove due to fibroids.  Pt is here to discuss the next step.     I was asked to see this Pt in consultation by Yolie Delarosa MD for lost IUD strings and large myoma in uterus.      Concern - Lost  IUD strings and was unsure about plan to replace with new one  Onset: 10/31/19 at annual exam    Description:   Had routine annual exam w/ PCP Dr. Delarosa, and has an  Mirena IUD that was originally placed 14 to assist with menorrhagia because Pt's  has had a vasectomy.  Pt had good results with amenorrhea until 3/2019 when menses returned and has since been an issue (see below).  Dr. Delarosa was going to remove the IUD and replace it with a new one but the strings were not visible and not retrievable.  A pelvic U/S done 19 showed an enlarged uterus with at least two 7.3 cm myomas, and the IUD appears in the correct position in the uterine cavity.  However it was not certain if this would affect the IUD removal and/or reinsertion.    Intensity: No pain    Progression of Symptoms:  same    Accompanying Signs & Symptoms:  Heavy and irregular menses (see below)    Previous history of similar problem:   No prior IUD strings lost    Precipitating factors:   Worsened by: Nothing    Alleviating factors:  Improved by: Nothing    Therapies Tried and outcome: None      Vaginal Bleeding (Menometrorrhagia)  Onset: 3/2019  Description: Periods are worsening and progressively heavier  Duration of bleeding episodes: Almost daily  Frequency between periods:  Almost daily  Describe bleeding/flow: Flooding  Clots: YES  Number of hours between maxipads/supertampons: 1  Cramping: None    Accompanying Signs & Symptoms:  Weakness:  no  Lightheadedness: no  Hot flashes: no  Nosebleeds/Easy bruising: no  Vaginal Discharge: no    History:  No LMP recorded. (Menstrual status: IUD).  Previous normal periods: no - had been amenorrheic with a Mirena IUD in place  Contraceptive use: vasectomy--current partner  Possibility of Pregnancy: no  Any bleeding after intercourse: no  Abnormal PAP Smears: no - Pap WNL, HPV Neg 10/31/19    Precipitating factors:   None  Alleviating factors:  None    Therapies Tried and outcome: Mirena IUD worked until last March as it approached the end of its effectiveness.      Review of pertinent old records reveals:  Had prior U/S in 2014 which showed uterus w/ 4.6 cm myoma    No LMP recorded. (Menstrual status: IUD)..   Patient is sexually active, .  Using vasectomy for contraception.    reports that she quit smoking about 12 years ago. Her smoking use included cigarettes. She has a 2.50 pack-year smoking history. She has never used smokeless tobacco.    Problem list and histories reviewed & adjusted, as indicated.  Additional history: as documented.    Patient Active Problem List   Diagnosis     CARDIOVASCULAR SCREENING; LDL GOAL LESS THAN 160     Migraine     Insertion of mirena IUD 14     Fibroid uterus     HTN, goal below 140/90     Migraine without aura     Health Care Home     Anxiety     Morbid obesity due to excess calories (H)     KRISTYN (obstructive sleep apnea)     Incomplete right bundle branch block     S/P total knee arthroplasty     Major depressive disorder, recurrent episode, mild (H)     Surveillance of intrauterine contraceptive device     Aneurysm, ascending aorta (H)     Menorrhagia with irregular cycle     Umbilical hernia without obstruction and without gangrene     Past Surgical History:   Procedure Laterality Date     ARTHROPLASTY KNEE Right 2017    Procedure: ARTHROPLASTY KNEE;  Right total knee arthroplasty using the Arthrex iBalance total knee system;  Surgeon: Hebert Lee  MD Fernando;  Location: [facility] OR     ARTHROPLASTY KNEE Left 3/19/2018    Procedure: ARTHROPLASTY KNEE;  Left total knee arthroplasty using an Arthrex Syncloguelance total knee system;  Surgeon: Hebert Lee MD;  Location: [facility] OR     C  DELIVERY ONLY  ,    , Low Cervical     ENT SURGERY       HC COLP VAGINA W CERVIX IF PRES W BIOPSY      [place] for ASCUS     TONSILLECTOMY & ADENOIDECTOMY        Social History     Tobacco Use     Smoking status: Former Smoker     Packs/day: 0.50     Years: 5.00     Pack years: 2.50     Types: Cigarettes     Last attempt to quit: 2007     Years since quittin.6     Smokeless tobacco: Never Used   Substance Use Topics     Alcohol use: Yes     Comment: 0-1 weekly      *Patient is Adopted       Problem (# of Occurrences) Relation (Name,Age of Onset)    Unknown/Adopted (7) Other: pt is adopted and has no access to health history, Mother (n/a), Father (N/a), Maternal Grandmother (n/a), Maternal Grandfather (n/a), Paternal Grandmother (n/a), Other (n/a)            Current Outpatient Medications   Medication Sig     amLODIPine (NORVASC) 5 MG tablet Take 1 tablet (5 mg) by mouth daily     buPROPion (WELLBUTRIN XL) 300 MG 24 hr tablet Take 1 tablet (300 mg) by mouth every morning     citalopram (CELEXA) 20 MG tablet Take 1 tablet (20 mg) by mouth daily     levonorgestrel (MIRENA) 20 MCG/24HR IUD      lisinopril-hydrochlorothiazide (PRINZIDE/ZESTORETIC) 20-12.5 MG tablet Take 1 tablet by mouth daily     No current facility-administered medications for this visit.      Allergies   Allergen Reactions     Penicillins Hives     hives       ROS:  Review of Systems   Constitutional: Negative for chills, fever and unexpected weight change.   Gastrointestinal: Negative for abdominal pain, constipation, diarrhea, nausea and vomiting.   Genitourinary: Positive for menstrual problem and vaginal bleeding. Negative for dysuria, frequency, pelvic pain and vaginal  discharge.   Neurological: Negative for dizziness and light-headedness.   All other systems reviewed and are negative.        OBJECTIVE:     /60 (BP Location: Right arm, Patient Position: Chair, Cuff Size: Adult Large)   Wt 99 kg (218 lb 4.8 oz)   BMI 37.77 kg/m    Body mass index is 37.77 kg/m .    Exam:  Physical Exam  Constitutional:       General: She is not in acute distress.     Appearance: She is obese. She is not ill-appearing.   HENT:      Head: Normocephalic.   Pulmonary:      Effort: Pulmonary effort is normal.   Skin:     General: Skin is warm.   Neurological:      Mental Status: She is alert.   Psychiatric:         Mood and Affect: Mood normal.     Abdomen- Abdomen soft, non-tender. BS normal. No masses, organomegaly, positive findings: umbilical hernia noted, obese  Pelvic- Exam chaperoned by nurse, External genitalia normal, Bartholin's glands normal, [place]'s glands normal, Urethral meatus normal, Urethra normal, Bladder normal, Vagina with normal rugae, no abnormal lesions, no abnormal discharge, Normal cervix without lesions or mucopus, no cervical motion tenderness, IUD strings not seen at os and unable to be retrieved with cytobrush nor Valentino stone forceps, Uterus 14 weeks-size, irreg shape and contour c/w large myomas, non-tender, Adnexa difficult to palpate but without overt masses or tenderness bilaterally, Anus normal, Pelvic exam limited by obesity, GC/Chlam probe was Done      Prior Pertinent Radiology Images Visualized by Me Today:  Results for orders placed or performed in visit on 11/07/19   US Pelvic Complete w Transvaginal    Narrative    [facility]  Obstetrics & Gynecology  [address] [facility]  [place] [address]  Tel: [phone]     ULTRASOUND - PELVIC GYN     Referring MD: Yolie Delarosa  Primary Clinic: [facility]     ===================================     CLINICAL INFORMATION     Indications for ultrasound: Check IUD     LMP: NA     Hormones: IUD     Measurements:  Uterus: 13.4 x 10.2 x 10.7 cm.     Position is anteverted.  Contour is irreg w myomata:   1) mid 7.3 x 5.4 cm, 2) mid 3.5 x 3.2 cm,   3) fundal 7.3 x 6.0 cm.     Endo cav: 12.1 mm         Distorted  Cervix: Wnl     Right ovary: Nv  Left ovary: Nv     Cul de sac: no free fluid     ===================================  Complete pelvic ultrasound using realtime   transabdominal and transvaginal scanning.  Bladder appears normal.    Uterine fibroids, which are affecting the uterine cavity  No free fluid in the cul de sac   The IUD appears to be within the endometrial cavity, but visualization is   limited by the largest, anterior fibroid, which displaces the endometrium   and the IUD posteriorly.    Consider gynecologic consultation regarding these ultrasound findings. The   patient may need hysteroscopic examination for IUD removal.    Tyra Kimble MD   Obstetrics and Gynecology  [facility]            ASSESSMENT:                                                      Encounter Diagnoses   Name Primary?     Intrauterine contraceptive device threads lost, initial encounter Yes     Intramural leiomyoma of uterus      Menorrhagia with irregular cycle      Screen for STD (sexually transmitted disease)      Umbilical hernia without obstruction and without gangrene          PLAN:                                                      1)  CBC, Ferritin to r/o anemia due to menometrorrhagia  2)  GC/Chlam sent  3)  CT Abd/pelvis to assess uterus and reconfirm IUD in cavity, as well as to assess umbilical hernia for evidence of abd contents protruding through it and possible risk of adhesions that may interfere with attempt at robotic-assisted laparoscopic hysterectomy.  4)  Gen surgery consult for umbilical hernia.  5)  I reviewed options given her situation.  Since her myoma is enlarged compared to 5 years ago significantly, I do think she is a candidate for hysterectomy.  Ideally, a  robotic-assisted TLH, Bilateral salpingectomy could be done, with powered morcellation in the Pneumoliner bag, however I am concerned that umbilical hernia may contain abd viscera that would be adherent and obscure the operative field for a hysterectomy.  Therefore if Gen surgery thinks a hernia repair would be beneficial, I would encourage Pt to undergo that first, and instead we can attempt (either well-before or well-after such a hernia repair) an office hysteroscopy to remove the  IUD, and replace it with a new Mirena to hopefully make her menometrorrhagia better.  6)  If Gen surgery doesn't think the hernia would not pose a hindrance to a robotic hysterectomy, then she would prefer to get scheduled for this outright.  The IUD would be removed with the uterus.  She understands that it may require conversion to an open AUDREY if significant adhesions or the size of the uterus dictated this.  Will await Gen surgery opinion on the umb hernia before proceeding.      Venancio Bartlett MD  [facility]

## 2019-12-03 DIAGNOSIS — I10 ESSENTIAL HYPERTENSION, BENIGN: ICD-10-CM

## 2019-12-03 LAB — FERRITIN SERPL-MCNC: 71 NG/ML (ref 8–252)

## 2019-12-03 NOTE — TELEPHONE ENCOUNTER
"Requested Prescriptions   Pending Prescriptions Disp Refills     lisinopril-hydrochlorothiazide (PRINZIDE/ZESTORETIC) 20-12.5 MG tablet [Pharmacy Med Name: LISINOPRIL-HCTZ 20-12.5 MG TAB]    Last Written Prescription Date:  4/24/19  Last Fill Quantity: 90 tablet,  # refills: 1   Last office visit: 10/31/2019 with prescribing provider:  Isaura     Future Office Visit:     90 tablet 1     Sig: TAKE 1 TABLET BY MOUTH EVERY DAY       Diuretics (Including Combos) Protocol Failed - 12/3/2019  1:43 AM        Failed - Normal serum creatinine on file in past 12 months     Recent Labs   Lab Test 07/04/18  2217   CR 0.64              Failed - Normal serum potassium on file in past 12 months     Recent Labs   Lab Test 07/04/18  2217   POTASSIUM 3.6                    Failed - Normal serum sodium on file in past 12 months     Recent Labs   Lab Test 07/04/18  2217                 Passed - Blood pressure under 140/90 in past 12 months     BP Readings from Last 3 Encounters:   12/02/19 114/60   10/31/19 110/70   06/08/19 103/67                 Passed - Recent (12 mo) or future (30 days) visit within the authorizing provider's specialty     Patient has had an office visit with the authorizing provider or a provider within the authorizing providers department within the previous 12 mos or has a future within next 30 days. See \"Patient Info\" tab in inbasket, or \"Choose Columns\" in Meds & Orders section of the refill encounter.              Passed - Medication is active on med list        Passed - Patient is age 18 or older        Passed - No active pregancy on record        Passed - No positive pregnancy test in past 12 months        "

## 2019-12-03 NOTE — LETTER
[address], [address]  [facility] [address]  Phone: [phone]  Fax: [phone]       December 4, 2019         Lyubov Sanchez  [address] [facility] [address]            Dear Lyubov:    We are concerned about your health care.  We recently provided you with medication refills.  Many medications require routine follow-up with your doctor. You are due for fasting labs.     Your prescription(s) have been refilled for 30 DAYS so you may have time for the above noted follow-up. Please call to schedule soon so we can assure you have an appointment before your next refills are needed.    Thank you,      Yolie Delarosa MD

## 2019-12-04 LAB
C TRACH DNA SPEC QL NAA+PROBE: NEGATIVE
N GONORRHOEA DNA SPEC QL NAA+PROBE: NEGATIVE
SPECIMEN SOURCE: NORMAL
SPECIMEN SOURCE: NORMAL

## 2019-12-04 RX ORDER — LISINOPRIL AND HYDROCHLOROTHIAZIDE 12.5; 2 MG/1; MG/1
TABLET ORAL
Qty: 90 TABLET | Refills: 0 | Status: SHIPPED | OUTPATIENT
Start: 2019-12-04 | End: 2020-01-24

## 2019-12-04 NOTE — TELEPHONE ENCOUNTER
Medication is being filled for 1 time refill only due to:  Future labs ordered fasting lipid, CBC, CMP.  Letter mailed

## 2019-12-05 ENCOUNTER — TELEPHONE (OUTPATIENT)
Dept: FAMILY MEDICINE | Facility: CLINIC | Age: 49
End: 2019-12-05

## 2019-12-05 ENCOUNTER — HOSPITAL ENCOUNTER (OUTPATIENT)
Dept: CT IMAGING | Facility: CLINIC | Age: 49
Discharge: HOME | End: 2019-12-05
Attending: OBSTETRICS & GYNECOLOGY | Admitting: OBSTETRICS & GYNECOLOGY
Payer: COMMERCIAL

## 2019-12-05 DIAGNOSIS — K42.9 UMBILICAL HERNIA WITHOUT OBSTRUCTION AND WITHOUT GANGRENE: ICD-10-CM

## 2019-12-05 DIAGNOSIS — D25.1 INTRAMURAL LEIOMYOMA OF UTERUS: ICD-10-CM

## 2019-12-05 PROCEDURE — 25000125 ZZHC RX 250: Performed by: OBSTETRICS & GYNECOLOGY

## 2019-12-05 PROCEDURE — 74177 CT ABD & PELVIS W/CONTRAST: CPT

## 2019-12-05 PROCEDURE — 25000128 H RX IP 250 OP 636: Performed by: OBSTETRICS & GYNECOLOGY

## 2019-12-05 RX ORDER — IOPAMIDOL 755 MG/ML
500 INJECTION, SOLUTION INTRAVASCULAR ONCE
Status: COMPLETED | OUTPATIENT
Start: 2019-12-05 | End: 2019-12-05

## 2019-12-05 RX ADMIN — IOPAMIDOL 100 ML: 755 INJECTION, SOLUTION INTRAVENOUS at 14:25

## 2019-12-05 RX ADMIN — SODIUM CHLORIDE 65 ML: 9 INJECTION, SOLUTION INTRAVENOUS at 14:25

## 2019-12-05 NOTE — TELEPHONE ENCOUNTER
Patient was looking through her MyChart and ran across an order for an Echocardiogram and was unaware of this or anything about an aortic aneurysm.    I reviewed patients chart and it looks like it was ordered by Dr. Delarosa at her last visit on 10/31/2019 but there are no office visit notes related to the order.      I do see that on 7/4/2018 patient was seen in the ED and had a CT scan of the chest and it was noted that the patient had:    Slightly aneurysmal ascending aorta, 4.1 cm.    It states in the ED report that this was discussed with the patient and the importance of follow up but the patient states that she was never informed of it.    Pt is now wondering the importance of the follow up of this is because she is now in the process of scheduling surgery for a hysterectomy soon.  Will this interfere with her getting this done.    Please advise.    Lexus Oliveros RN

## 2019-12-06 NOTE — TELEPHONE ENCOUNTER
It shouldn't.  Normal is up to 4 cm, so this is only very mild.  More of a long-term management issue.  However, this does put her in a higher risk cardiovascular category and we may need to review her labs and medications soon.    Rafi Price MD

## 2019-12-06 NOTE — TELEPHONE ENCOUNTER
Patient notified.  She would like to get the echocardiogram still done sometime soon.    I am trying to contact [facility] ([phone]) to see why nobody contacted her to get this done after the order was placed 10/31/2019.  Is this the correct order?  If not, what is the correct order?    Tried calling [facility] again (for the 12th time) and still no answer-want me to leave a message.  I finally left a message and asked them to call me back.    Lexus Oliveros RN

## 2019-12-09 NOTE — PROGRESS NOTES
[facility]  New Patient Office Visit    Assessment:   Lyubov Sanchez is a 49 year old female with primary, reducible umbilical hernia .    Plan:    We will schedule an open umbilical hernia repair with mesh at the patient's convenience.  She would like to undergo her hysterectomy first since her fibroids are quite symptomatic. I have discussed her with Dr. Bartlett, who would be performing her hysterectomy, and it sound like his port sites will be well away from the hernia. The hernia should not impede her hysterectomy.     Will need preop H&P by her PCP (Yolie Delarosa MD).    We have discussed observation, reduction techniques and importance, incarceration and strangulation signs, symptoms and importance as well as need to seek emergency treatment.      We have had a detailed discussion regarding the nature of umbilical hernias, and that watchful waiting for small asymptomatic hernias is acceptable, but that in general they do tend to enlarge or become symptomatic over time. The patient has been experiencing bothersome symptoms and wishes to proceed with repair eventually. She would like to proceed with hysterectomy first.  Surgery, indications, alternatives, risks, benefits, incisions, scarring, anesthesia, recovery, mesh, infection, bleeding, numbness, hernia recurrence, lifting and activity limitations after surgery.  All questions have been answered to the best of my ability.    Recommended time off work postop:  1 wks  Recommended time off lifting 20 lb:   4 wks  She has been given literature to review.     She is seen in consultation for umbilical hernia, at the request of Venancio Bartlett MD.    HPI:  Lyubov Sanchez is a 49 year old female who presents for evaluation of a lump in the umbilical region.   She first noticed it many ago. It is not typically painful for her. She does feel some pressure in the umbilical area if she is very active/lifting things.     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     Past Medical History:  Past Medical History:   Diagnosis Date     Arthritis      Depressive disorder      DYSPLASIA OF CERVIX 3/6/2003     Dysplasia of cervix (uteri)     Rx cryotherapy , and      Hypertension     NO cardiology     Incomplete right bundle branch block 11/10/2017     Migraine      Other chronic pain     Knee pain for 8 years     Plantar fasciitis     bilat     PONV (postoperative nausea and vomiting)      Unspecified sleep apnea     CPAP will bring on the day of surgery.       Current Outpatient Medications   Medication     amLODIPine (NORVASC) 5 MG tablet     buPROPion (WELLBUTRIN XL) 300 MG 24 hr tablet     citalopram (CELEXA) 20 MG tablet     levonorgestrel (MIRENA) 20 MCG/24HR IUD     lisinopril-hydrochlorothiazide (PRINZIDE/ZESTORETIC) 20-12.5 MG tablet     No current facility-administered medications for this visit.         Past Surgical History:  Past Surgical History:   Procedure Laterality Date     ARTHROPLASTY KNEE Right 2017    Procedure: ARTHROPLASTY KNEE;  Right total knee arthroplasty using the Arthrex iBalance total knee system;  Surgeon: Hebert Lee MD;  Location: [facility] OR     ARTHROPLASTY KNEE Left 3/19/2018    Procedure: ARTHROPLASTY KNEE;  Left total knee arthroplasty using an Arthrex iBalance total knee system;  Surgeon: Hebert Lee MD;  Location: [facility] OR     C  DELIVERY ONLY  ,    , Low Cervical     ENT SURGERY       HC COLP VAGINA W CERVIX IF PRES W BIOPSY      [place] for ASCUS     TONSILLECTOMY & ADENOIDECTOMY          Social History:  Social History     Tobacco Use     Smoking status: Former Smoker     Packs/day: 0.50     Years: 5.00     Pack years: 2.50     Types:  "Cigarettes     Last attempt to quit: 2007     Years since quittin.7     Smokeless tobacco: Never Used   Substance Use Topics     Alcohol use: Yes     Comment: 0-1 weekly     Drug use: No      Lives in [place] with her . 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     Family History:  Family History   Adopted: Yes   Problem Relation Age of Onset     Unknown/Adopted Other         pt is adopted and has no access to health history     Unknown/Adopted Mother      Unknown/Adopted Father      Unknown/Adopted Maternal Grandmother      Unknown/Adopted Maternal Grandfather      Unknown/Adopted Paternal Grandmother      Unknown/Adopted Other      No Family history of bleeding or clotting disorders or reactions to anesthesia    ROS:  The 10 point review of systems is negative other than noted in the HPI and above.    PE:    Vitals - /74   Pulse 55   Resp 16   Ht 1.619 m (5' 3.75\")   Wt 99 kg (218 lb 4.8 oz)   SpO2 98%   BMI 37.77 kg/m    BMI - Body mass index is 37.77 kg/m .  General - Well-developed, obese patient able to get up on table without difficulty.  HEENT - Mucous membranes moist.  Sclera are nonicteric.  Lymph -  No inguinal lymphadenopathy or masses   Respiratory - regular and non labored  CV - regular pulse  Abdomen - abdomen is soft without significant tenderness, masses, organomegaly or guarding,   Hernia - Upon standing there is an obvious bulge in the umbilical region which is present spontaneously. The hernia is manually reducible. There is no overlying skin change. The defect feels to be ~ 1.5 cm.  Extremities - without edema  Psych - mood and affect are appropriate  Neurologic - alert, speech is clear, moves all extremities with good strength  Integument - without lesions, rashes, or jaundice    This note may have been created using voice recognition software. Undetected word substitutions or other errors may have occurred.     Time spent with the patient with greater that 50% of the time in " discussion was 25 minutes.     Violetta Lazaro MD  12/09/19 3:25 PM     Please route or send letter to:  Primary Care Provider (PCP) and Referring Provider

## 2019-12-10 ENCOUNTER — PREP FOR PROCEDURE (OUTPATIENT)
Dept: SURGERY | Facility: CLINIC | Age: 49
End: 2019-12-10

## 2019-12-10 ENCOUNTER — TELEPHONE (OUTPATIENT)
Dept: OBGYN | Facility: CLINIC | Age: 49
End: 2019-12-10

## 2019-12-10 ENCOUNTER — OFFICE VISIT (OUTPATIENT)
Dept: SURGERY | Facility: CLINIC | Age: 49
End: 2019-12-10
Payer: COMMERCIAL

## 2019-12-10 VITALS
OXYGEN SATURATION: 98 % | BODY MASS INDEX: 37.27 KG/M2 | SYSTOLIC BLOOD PRESSURE: 112 MMHG | HEART RATE: 55 BPM | WEIGHT: 218.3 LBS | RESPIRATION RATE: 16 BRPM | HEIGHT: 64 IN | DIASTOLIC BLOOD PRESSURE: 74 MMHG

## 2019-12-10 DIAGNOSIS — K42.9 UMBILICAL HERNIA WITHOUT OBSTRUCTION AND WITHOUT GANGRENE: Primary | ICD-10-CM

## 2019-12-10 DIAGNOSIS — D25.1 INTRAMURAL, SUBMUCOUS, AND SUBSEROUS LEIOMYOMA OF UTERUS: ICD-10-CM

## 2019-12-10 DIAGNOSIS — D25.2 INTRAMURAL, SUBMUCOUS, AND SUBSEROUS LEIOMYOMA OF UTERUS: ICD-10-CM

## 2019-12-10 DIAGNOSIS — N92.1 MENORRHAGIA WITH IRREGULAR CYCLE: Primary | ICD-10-CM

## 2019-12-10 DIAGNOSIS — D25.0 INTRAMURAL, SUBMUCOUS, AND SUBSEROUS LEIOMYOMA OF UTERUS: ICD-10-CM

## 2019-12-10 PROCEDURE — 99204 OFFICE O/P NEW MOD 45 MIN: CPT | Performed by: SURGERY

## 2019-12-10 ASSESSMENT — MIFFLIN-ST. JEOR: SCORE: 1601.23

## 2019-12-10 NOTE — LETTER
December 10, 2019       Re: Lyubov Sanchez - 1970    Lyubov Sanchez is a 49 year old female with primary, reducible umbilical hernia .     Plan:    We will schedule an open umbilical hernia repair with mesh at the patient's convenience.  She would like to undergo her hysterectomy first since her fibroids are quite symptomatic. I have discussed her with Dr. Bartlett, who would be performing her hysterectomy, and it sound like his port sites will be well away from the hernia. The hernia should not impede her hysterectomy.      Will need preop H&P by her PCP (Yolie Delarosa MD).     We have discussed observation, reduction techniques and importance, incarceration and strangulation signs, symptoms and importance as well as need to seek emergency treatment.       We have had a detailed discussion regarding the nature of umbilical hernias, and that watchful waiting for small asymptomatic hernias is acceptable, but that in general they do tend to enlarge or become symptomatic over time. The patient has been experiencing bothersome symptoms and wishes to proceed with repair eventually. She would like to proceed with hysterectomy first.  Surgery, indications, alternatives, risks, benefits, incisions, scarring, anesthesia, recovery, mesh, infection, bleeding, numbness, hernia recurrence, lifting and activity limitations after surgery.  All questions have been answered to the best of my ability.     Recommended time off work postop:  1 wks  Recommended time off lifting 20 lb:   4 wks  She has been given literature to review.      She is seen in consultation for umbilical hernia, at the request of Venancio Bartlett MD.     HPI:  Lyubov Sanchez is a 49 year old female who presents for evaluation of a lump in the umbilical region. She first noticed it many ago. It is not typically painful for her. She does feel some pressure in the umbilical area if she is very active/lifting things.      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   No Family history of bleeding or clotting disorders or reactions to anesthesia     ROS:  The 10 point review of systems is negative other than noted in the HPI and above.     PE:    Vitals - /74   Pulse 55   Resp 16   Ht 1.619 m (5' 3.75\")   Wt 99 kg (218 lb 4.8 oz)   SpO2 98%   BMI 37.77 kg/m    BMI - Body mass index is 37.77 kg/m .  General - Well-developed, obese patient able to get up on table without difficulty.  HEENT - Mucous membranes moist.  Sclera are nonicteric.  Lymph -  No inguinal lymphadenopathy or masses   Respiratory - regular and non labored  CV - regular pulse  Abdomen - abdomen is soft without significant tenderness, masses, organomegaly or guarding,   Hernia - Upon standing there is an obvious bulge in the umbilical region which is present spontaneously. The hernia is manually reducible. There is no overlying skin change. The defect feels to be ~ 1.5 cm.  Extremities - without edema  Psych - mood and affect are appropriate  Neurologic - alert, speech is clear, moves all extremities with good strength  Integument - without lesions, rashes, or jaundice         Violetta Lazaro MD    "

## 2019-12-10 NOTE — TELEPHONE ENCOUNTER
Please order echo, I believe you have ordered those in the past for me, please help schedule.  Thank you

## 2019-12-10 NOTE — TELEPHONE ENCOUNTER
Spoke to cardio team ([phone]) and the order that was placed on 10/31/19 is sufficient and can be used for patient at this time.     Cardiology is going to call patient now and schedule. Next available appointment is 2nd week in January.     Jessica Wang RN Flex

## 2019-12-10 NOTE — TELEPHONE ENCOUNTER
Patient met with Gen Surg today and they ok'd her to schedule hyst before hernia surg.  Please review OV 12/10 and send orders so we can get her scheduled.  Patient states she is open any Tuesday-she just wants the soonest available.  No scheduling restrictions.  Advised more than likely looking into the first of the year.

## 2019-12-11 ENCOUNTER — PREP FOR PROCEDURE (OUTPATIENT)
Dept: OBGYN | Facility: CLINIC | Age: 49
End: 2019-12-11

## 2019-12-11 DIAGNOSIS — D25.1 INTRAMURAL LEIOMYOMA OF UTERUS: ICD-10-CM

## 2019-12-11 DIAGNOSIS — Z30.432 ENCOUNTER FOR REMOVAL OF INTRAUTERINE CONTRACEPTIVE DEVICE: ICD-10-CM

## 2019-12-11 DIAGNOSIS — N92.1 MENOMETRORRHAGIA: Primary | ICD-10-CM

## 2019-12-11 RX ORDER — CLINDAMYCIN PHOSPHATE 900 MG/50ML
900 INJECTION, SOLUTION INTRAVENOUS
Status: CANCELLED | OUTPATIENT
Start: 2019-12-11

## 2019-12-11 RX ORDER — CLINDAMYCIN PHOSPHATE 900 MG/50ML
900 INJECTION, SOLUTION INTRAVENOUS SEE ADMIN INSTRUCTIONS
Status: CANCELLED | OUTPATIENT
Start: 2019-12-11

## 2019-12-11 NOTE — TELEPHONE ENCOUNTER
Question Answer Comment   Procedure name(s) - multi select DaVinci robotic-assisted total laparoscopic hysterectomy, Bilateral salpingectomy    Is this a multi surgeon case? Yes    Comments (who/departments/details) Can have Rupal Kim assist (preferred), or else Dr. Katz or Christa    Laterality Bilateral    Reason for procedure Menometrorrhagia, Uterine fibroids, Retained intrauterine device    Urgency of Surgery Patient Choice/Next Available    Location of Case: [facility]    Surgeon Procedure Time (incision to closure) in minutes (per procedure as applicable) 180    Note:  Surgical Case Time Needed (in minutes)   Patient Class (for admit prior to surgery, specify number of days in comments): Same day (hospital outpatient)    Why can t this outpatient surgery be done at the [facility] or  ASC? Not applicable    Anesthesia General    H&P To Be Completed By: PCP    Post-Op Appointment 6 weeks    Procedure will be performed or supervised by: Dhruv    Surgical Assistant Rupal Kim (preferred) or else Dr. aKtz or Christa    Vendor Needed? No

## 2019-12-11 NOTE — TELEPHONE ENCOUNTER
Type of surgery: DaVinci robotic-assisted total robotic hysterectomy, bilateral salpingectomy  Location of surgery: [facility]  Date and time of surgery: 1/31/20 @ 745  Surgeon: Dr. Venancio Bartlett  Pre-Op Appt Date: 1/24-Isaura  Post-Op Appt Date: 3/27 (8 wks, sched conflict)   Packet sent out: Yes  Pre-cert/Authorization completed:  Not Applicable  Date: 12/11

## 2019-12-18 ENCOUNTER — TELEPHONE (OUTPATIENT)
Dept: OBGYN | Facility: CLINIC | Age: 49
End: 2019-12-18

## 2019-12-18 NOTE — TELEPHONE ENCOUNTER
Form received from: Pts Spouse for pt; Lyubov Sanchez     Form requesting following info/need: FMLA for [facility] [employer] [place] [employer]     LORENE needed?: NA     Location of form: Basket above Sara's desk     When completed the route for return: Per spouse pt would like us to call her to  forms once completed

## 2019-12-21 ENCOUNTER — OFFICE VISIT (OUTPATIENT)
Dept: URGENT CARE | Facility: URGENT CARE | Age: 49
End: 2019-12-21
Payer: COMMERCIAL

## 2019-12-21 VITALS
RESPIRATION RATE: 16 BRPM | TEMPERATURE: 98.4 F | HEART RATE: 60 BPM | OXYGEN SATURATION: 97 % | WEIGHT: 218 LBS | DIASTOLIC BLOOD PRESSURE: 78 MMHG | BODY MASS INDEX: 37.71 KG/M2 | SYSTOLIC BLOOD PRESSURE: 116 MMHG

## 2019-12-21 DIAGNOSIS — H66.91 RIGHT ACUTE OTITIS MEDIA: Primary | ICD-10-CM

## 2019-12-21 DIAGNOSIS — J01.90 ACUTE SINUSITIS WITH SYMPTOMS > 10 DAYS: ICD-10-CM

## 2019-12-21 PROCEDURE — 99213 OFFICE O/P EST LOW 20 MIN: CPT | Performed by: PHYSICIAN ASSISTANT

## 2019-12-21 RX ORDER — CEFDINIR 300 MG/1
300 CAPSULE ORAL 2 TIMES DAILY
Qty: 20 CAPSULE | Refills: 0 | Status: ON HOLD | OUTPATIENT
Start: 2019-12-21 | End: 2020-01-31

## 2019-12-21 ASSESSMENT — ENCOUNTER SYMPTOMS
VOMITING: 0
NAUSEA: 0
SINUS PAIN: 1
HEADACHES: 0
FOCAL WEAKNESS: 0
DIARRHEA: 0
SHORTNESS OF BREATH: 0
CHILLS: 0
FEVER: 0
COUGH: 1
ABDOMINAL PAIN: 0

## 2019-12-21 NOTE — PROGRESS NOTES
HPI  2019    HPI: Lyubov Sanchez is a 49 year old female who complains of moderate R ear pain onset last night. Pt has had dry cough, congestion, and sinus pressure for 2 weeks. Symptoms are constant in duration. No treatments tried. Denies fever/chills, HA, CP, SOB, abd pain, N/V/D, rash, or any other symptoms. Patient denies sick contacts.    Past Medical History:   Diagnosis Date     Arthritis      Depressive disorder      DYSPLASIA OF CERVIX 3/6/2003     Dysplasia of cervix (uteri)     Rx cryotherapy , and      Hypertension     NO cardiology     Incomplete right bundle branch block 11/10/2017     Migraine      Other chronic pain     Knee pain for 8 years     Plantar fasciitis     bilat     PONV (postoperative nausea and vomiting)      Unspecified sleep apnea     CPAP will bring on the day of surgery.     Past Surgical History:   Procedure Laterality Date     ARTHROPLASTY KNEE Right 2017    Procedure: ARTHROPLASTY KNEE;  Right total knee arthroplasty using the Arthrex iBalance total knee system;  Surgeon: Hebert Lee MD;  Location: [facility] OR     ARTHROPLASTY KNEE Left 3/19/2018    Procedure: ARTHROPLASTY KNEE;  Left total knee arthroplasty using an Arthrex iBalance total knee system;  Surgeon: Hebert Lee MD;  Location: [facility] OR     C  DELIVERY ONLY  ,    , Low Cervical     ENT SURGERY       HC COLP VAGINA W CERVIX IF PRES W BIOPSY      [place] for ASCUS     TONSILLECTOMY & ADENOIDECTOMY       Social History     Tobacco Use     Smoking status: Former Smoker     Packs/day: 0.50     Years: 5.00     Pack years: 2.50     Types: Cigarettes     Last attempt to quit: 2007     Years since quittin.7     Smokeless tobacco: Never Used   Substance Use Topics     Alcohol use: Yes     Comment: 0-1 weekly     Drug use: No     Patient Active Problem List   Diagnosis     Hyperlipidemia LDL goal <100     Migraine     Insertion of mirena  IUD 01/23/14     Fibroid uterus     HTN, goal below 140/90     Migraine without aura     Health Care Home     Anxiety     Morbid obesity due to excess calories (H)     KRISTYN (obstructive sleep apnea)     Incomplete right bundle branch block     S/P total knee arthroplasty     Major depressive disorder, recurrent episode, mild (H)     Surveillance of intrauterine contraceptive device     Aneurysm, ascending aorta (H)     Menorrhagia with irregular cycle     Umbilical hernia without obstruction and without gangrene     Menometrorrhagia     Intramural leiomyoma of uterus     Encounter for removal of intrauterine contraceptive device     Family History   Adopted: Yes   Problem Relation Age of Onset     Unknown/Adopted Other         pt is adopted and has no access to health history     Unknown/Adopted Mother      Unknown/Adopted Father      Unknown/Adopted Maternal Grandmother      Unknown/Adopted Maternal Grandfather      Unknown/Adopted Paternal Grandmother      Unknown/Adopted Other         Problem list, Medication list, Allergies, and Medical/Social/Surgical histories reviewed in [facility] and updated as appropriate.      Review of Systems   Constitutional: Negative for chills and fever.   HENT: Positive for congestion, ear pain and sinus pain.    Respiratory: Positive for cough. Negative for shortness of breath.    Cardiovascular: Negative for chest pain.   Gastrointestinal: Negative for abdominal pain, diarrhea, nausea and vomiting.   Skin: Negative for rash.   Neurological: Negative for focal weakness and headaches.   All other systems reviewed and are negative.        Physical Exam  Vitals signs and nursing note reviewed.   HENT:      Head: Normocephalic and atraumatic.      Right Ear: External ear normal. No mastoid tenderness. Tympanic membrane is erythematous.      Left Ear: Tympanic membrane and external ear normal.      Nose: Mucosal edema present.      Comments: No sinus tenderness     Mouth/Throat:      Mouth:  Mucous membranes are moist.      Pharynx: Oropharynx is clear.   Cardiovascular:      Rate and Rhythm: Normal rate and regular rhythm.      Heart sounds: Normal heart sounds.   Pulmonary:      Effort: Pulmonary effort is normal.      Breath sounds: Normal breath sounds.   Musculoskeletal: Normal range of motion.   Skin:     General: Skin is warm and dry.   Neurological:      Mental Status: She is alert and oriented to person, place, and time.       Vital Signs  /78   Pulse 60   Temp 98.4  F (36.9  C) (Oral)   Resp 16   Wt 98.9 kg (218 lb)   SpO2 97%   BMI 37.71 kg/m       Diagnostic Test Results:  none     ASSESSMENT/PLAN      ICD-10-CM    1. Right acute otitis media H66.91 cefdinir (OMNICEF) 300 MG capsule   2. Acute sinusitis with symptoms > 10 days J01.90 cefdinir (OMNICEF) 300 MG capsule        Rx cefdinir for right AOM and acute sinusitis. Supportive measures discussed.    I have discussed any lab or imaging results, the patient's diagnosis, and my plan of treatment with the patient and/or family. Patient is aware to come back in if with worsening symptoms or if no relief despite treatment plan.  Patient voiced understanding and had no further questions.       Follow Up: Return in about 2 weeks (around 1/4/2020) for Follow up w/ primary care provider if not better.    KEVIN Ann, PAKostasC  [facility]

## 2019-12-27 NOTE — TELEPHONE ENCOUNTER
Sent fax, notified patient. Patient wishes to  forms.    Edis Diego [facility]

## 2020-01-06 ENCOUNTER — HOSPITAL ENCOUNTER (OUTPATIENT)
Dept: CARDIOLOGY | Facility: CLINIC | Age: 50
Discharge: HOME | End: 2020-01-06
Attending: FAMILY MEDICINE | Admitting: FAMILY MEDICINE
Payer: COMMERCIAL

## 2020-01-06 DIAGNOSIS — I71.21 ANEURYSM, ASCENDING AORTA (H): ICD-10-CM

## 2020-01-06 PROCEDURE — 40000264 ECHOCARDIOGRAM COMPLETE

## 2020-01-06 PROCEDURE — 25500064 ZZH RX 255 OP 636: Performed by: FAMILY MEDICINE

## 2020-01-06 PROCEDURE — 93306 TTE W/DOPPLER COMPLETE: CPT | Mod: 26 | Performed by: INTERNAL MEDICINE

## 2020-01-06 RX ADMIN — HUMAN ALBUMIN MICROSPHERES AND PERFLUTREN 3 ML: 10; .22 INJECTION, SOLUTION INTRAVENOUS at 08:18

## 2020-01-09 ENCOUNTER — TELEPHONE (OUTPATIENT)
Dept: FAMILY MEDICINE | Facility: CLINIC | Age: 50
End: 2020-01-09

## 2020-01-09 DIAGNOSIS — Q21.12 PATENT FORAMEN OVALE: ICD-10-CM

## 2020-01-09 DIAGNOSIS — I71.21 ANEURYSM, ASCENDING AORTA (H): Primary | ICD-10-CM

## 2020-01-09 DIAGNOSIS — I27.20 PULMONARY HYPERTENSION (H): ICD-10-CM

## 2020-01-09 NOTE — TELEPHONE ENCOUNTER
Kin Mcarthur  Here is the echo that we discussed on the phone this morning. You have borderline ascending aorta dilation, it was 4.1cm on the chest CT on 7/18 and now it is 3.9cm on this echo, so this is not changing or getting worse which is good news.  There is this small shunt and the finding of mild pulmonary HTN.  Due to these findings, which are all small or borderline changes, I will refer you to cardiology. This will give us an idea of what we need to do now, if anything, and how often you will need to have this rechecked.    Please call the clinic for more information if you have any questions.  [facility], MD    Discussed findings and placed cardiology referral. Pt understands we need to keep her BP well controlled. This will not likely change her timing of hysterectomy at the end of this month. She will see cardiology and call us back if they do not call her to schedule an appt

## 2020-01-22 ENCOUNTER — OFFICE VISIT (OUTPATIENT)
Dept: CARDIOLOGY | Facility: CLINIC | Age: 50
End: 2020-01-22
Attending: FAMILY MEDICINE
Payer: COMMERCIAL

## 2020-01-22 VITALS
SYSTOLIC BLOOD PRESSURE: 126 MMHG | HEIGHT: 64 IN | BODY MASS INDEX: 37.22 KG/M2 | HEART RATE: 54 BPM | DIASTOLIC BLOOD PRESSURE: 84 MMHG | WEIGHT: 218 LBS

## 2020-01-22 DIAGNOSIS — R00.2 PALPITATIONS: Primary | ICD-10-CM

## 2020-01-22 PROCEDURE — 99203 OFFICE O/P NEW LOW 30 MIN: CPT | Performed by: INTERNAL MEDICINE

## 2020-01-22 PROCEDURE — 93000 ELECTROCARDIOGRAM COMPLETE: CPT | Performed by: INTERNAL MEDICINE

## 2020-01-22 ASSESSMENT — MIFFLIN-ST. JEOR: SCORE: 1599.71

## 2020-01-22 NOTE — LETTER
1/22/2020    Yolie Delarosa MD  [address]  [facility] [address]    RE: Lyubov Sanchez       Dear Colleague,    I had the pleasure of seeing Lyubov Sanchez in the [facility].      Cardiology Consultation     Assessment & Plan     1.  Possible PFO or small ASD noted on transthoracic echocardiogram, no bubble study was performed by color Doppler flow turbulence  2.  Obesity  3.  Hyperlipidemia  4.  Obstructive sleep apnea  5.  Mildly dilated proximal ascending aorta  6.  Mild pulmonary hypertension  7.  Patient is scheduled for hysterectomy  8.  HTN      Recommendations    1.  Possible PFO or small ASD: We will get a CONCEPCION in 3 months time to allow sufficient recovery for her hysterectomy which she will have next week.  Patient has no contraindications to anesthesia and has full neck mobility with no loose teeth.  We have also advised her that this certainly could be artifact and it is not clearly seen on the transthoracic echocardiogram.  If she has a PFO this can be managed conservatively, however if there is an ASD we will need to discuss potential closure.    2.  Proximal ascending aortic dilatation: Stable we will recommend surveillance echocardiogram in 2 to 3 years.  We will also get measurement on the CONCEPCION as well    3.  Hypertension: Stable    4.  Patient return to clinic in approximately 3 to 4 months to go over findings of the CONCEPCION.    Corby Hodges MD      History of present illness    Patient is a pleasant 49-year-old female who is accompanied by her .  She has a cardiac history notable for mildly dilated proximal ascending aorta of 4.1 cm on a chest CT.  A recent echocardiogram was performed to follow-up on this and it demonstrated a dimension of 3.9 cm.  There appeared to be some turbulence across the interatrial septum only one view was obtained and no bubble study was performed.  There is a suggestion of either a PFO or small ASD with  left-to-right shunt possibly noted.  This is not clearly seen on the study.  She has mild pulmonary hypertension which has been typically attributable to her obstructive sleep apnea and body habitus.  She does not report of any cardiac specific symptoms.  She reports of no chest pain, PND orthopnea palpitations syncope or any other cardiac related concerns.  She is going to have a hysterectomy performed next week.  Here to go over findings of her echocardiogram and plan for surveillance and other testing if appropriate.  She also carries a diagnosis of hypertension and is on a regimen of Prinzide as well as amlodipine.  She is losing weight and is hopeful to decrease the amount of blood pressure medications in the future.    Primary Care Physician   Yolie Delarosa      Patient Active Problem List   Diagnosis     Hyperlipidemia LDL goal <100     Migraine     Insertion of mirena IUD 01/23/14     Fibroid uterus     HTN, goal below 140/90     Migraine without aura     Health Care Home     Anxiety     Morbid obesity due to excess calories (H)     KRISTYN (obstructive sleep apnea)     Incomplete right bundle branch block     S/P total knee arthroplasty     Major depressive disorder, recurrent episode, mild (H)     Surveillance of intrauterine contraceptive device     Aneurysm, ascending aorta (H)     Menorrhagia with irregular cycle     Umbilical hernia without obstruction and without gangrene     Menometrorrhagia     Intramural leiomyoma of uterus     Encounter for removal of intrauterine contraceptive device     Patent foramen ovale     Pulmonary hypertension (H)       Past Medical History   I have reviewed this patient's medical history and updated it with pertinent information if needed.   Past Medical History:   Diagnosis Date     Arthritis      Depressive disorder      DYSPLASIA OF CERVIX 3/6/2003     Dysplasia of cervix (uteri) 2003    Rx cryotherapy Nov 03, and 2005     Hypertension     NO cardiology      Incomplete right bundle branch block 11/10/2017     Migraine      Other chronic pain     Knee pain for 8 years     Plantar fasciitis     bilat     PONV (postoperative nausea and vomiting)      Unspecified sleep apnea     CPAP will bring on the day of surgery.       Past Surgical History   I have reviewed this patient's surgical history and updated it with pertinent information if needed.  Past Surgical History:   Procedure Laterality Date     ARTHROPLASTY KNEE Right 2017    Procedure: ARTHROPLASTY KNEE;  Right total knee arthroplasty using the Arthrex iBalance total knee system;  Surgeon: Hebert Lee MD;  Location: [facility] OR     ARTHROPLASTY KNEE Left 3/19/2018    Procedure: ARTHROPLASTY KNEE;  Left total knee arthroplasty using an Arthrex iBalance total knee system;  Surgeon: Hebert Lee MD;  Location: [facility] OR     C  DELIVERY ONLY  ,    , Low Cervical     ENT SURGERY       HC COLP VAGINA W CERVIX IF PRES W BIOPSY      [place] for ASCUS     TONSILLECTOMY & ADENOIDECTOMY         Prior to Admission Medications   Cannot display prior to admission medications because the patient has not been admitted in this contact.     [unfilled]  [unfilled]  Allergies   Allergies   Allergen Reactions     Penicillins Hives     hives       Social History    reports that she quit smoking about 12 years ago. Her smoking use included cigarettes. She has a 2.50 pack-year smoking history. She has never used smokeless tobacco. She reports current alcohol use. She reports that she does not use drugs.    Family History   Family History   Adopted: Yes   Problem Relation Age of Onset     Unknown/Adopted Other         pt is adopted and has no access to health history     Unknown/Adopted Mother      Unknown/Adopted Father      Unknown/Adopted Maternal Grandmother      Unknown/Adopted Maternal Grandfather      Unknown/Adopted Paternal Grandmother      Unknown/Adopted Other        Review  "of Systems   The comprehensive 10 point Review of Systems is negative other than noted in the HPI or here.     Physical Exam   Vital Signs with Ranges     Wt Readings from Last 4 Encounters:   12/21/19 98.9 kg (218 lb)   12/10/19 99 kg (218 lb 4.8 oz)   12/02/19 99 kg (218 lb 4.8 oz)   10/31/19 100.2 kg (221 lb)     [unfilled]      Vitals:  Blood pressure 126/84, pulse 54, height 1.619 m (5' 3.74\"), weight 98.9 kg (218 lb), not currently breastfeeding.    Constitutional:   awake, alert, cooperative, no apparent distress, and appears stated age     Neck:   Supple, symmetrical, trachea midline, no adenopathy, thyroid symmetric, not enlarged and no tenderness, skin normal     Back:   Symmetric, no curvature, spinous processes are non-tender on palpation, paraspinous muscles are non-tender on palpation, no costal vertebral tenderness     Lungs:   No increased work of breathing, good air exchange, clear to auscultation bilaterally, no crackles or wheezing     Cardiovascular:   Normal apical impulse, regular rate and rhythm, normal S1 and S2, no S3 or S4, and no murmur noted       Neurologic:   Awake, alert, oriented to name, place and time.  Cranial nerves II-XII are grossly intact.  Motor is 5 out of 5 bilaterally.  Cerebellar finger to nose, heel to shin intact.  Sensory is intact.  Babinski down going, Romberg negative, and gait is normal.       @LABRCNTIPR(tropi:5,troponinies:5)@    @LABRCNTIPR(wbc:3,hgb:3,mcv:3,plt:3,inr:3,na:3,potassium:3,chloride:3,co2:3,bun:3,cr:3,gfrestimated:3,gfrestblack:3,aniongap:3,bob:3,glc:3,albumin:2,prottotal:2,bilitotal:2,alkphos:2,alt:2,ast:2,lipase:2,tropi:3)@  Recent Labs   Lab Test 07/05/17  0945 07/22/16  0814   CHOL 155 171   HDL 36* 32*   LDL 68 87   TRIG 256* 261* "     @LABRCNTIP(wbc:3,hgb:3,hct:3,mcv:3,plt:3,iron:3,ironsat:3,reticabsct:3,retp:3,feb:3,sigifredo:3,b12:3,folic:3,epoe:3,morph:3)@  @LABRCNTIP(PH:3,PHV:3,PO2:3,PO2V:3,sat:3,PCO2:3,PCO2V:3,HCO3:3,HCO3V:3)@  @LABRCNTIP(NTBNPI:3,NTBNP:3)@  @LABRCNTIP(DD:1)@  @LABRCNTIP(sed:3,crp:3)@  @LABRCNTIP(PLT:3)@  @LABRCNTIP(TSH:3)@  @LABRCNTIP(color:1,appearance:1,urineg,urinebili:1,urineketone:1,s,ubld:1,urineph:1,protein:1,urobilinogen:1,nitrite:1,leukest:1,rbcu:1,wbcu:1)@    Imaging:  No results found for this or any previous visit (from the past 48 hour(s)).    Echo:  No results found for this or any previous visit (from the past 4320 hour(s)).         Thank you for allowing me to participate in the care of your patient.    Sincerely,     Corby Hodges MD     [facility]

## 2020-01-22 NOTE — PROGRESS NOTES
Cardiology Consultation     Assessment & Plan     1.  Possible PFO or small ASD noted on transthoracic echocardiogram, no bubble study was performed by color Doppler flow turbulence  2.  Obesity  3.  Hyperlipidemia  4.  Obstructive sleep apnea  5.  Mildly dilated proximal ascending aorta  6.  Mild pulmonary hypertension  7.  Patient is scheduled for hysterectomy  8.  HTN      Recommendations    1.  Possible PFO or small ASD: We will get a CONCEPCION in 3 months time to allow sufficient recovery for her hysterectomy which she will have next week.  Patient has no contraindications to anesthesia and has full neck mobility with no loose teeth.  We have also advised her that this certainly could be artifact and it is not clearly seen on the transthoracic echocardiogram.  If she has a PFO this can be managed conservatively, however if there is an ASD we will need to discuss potential closure.    2.  Proximal ascending aortic dilatation: Stable we will recommend surveillance echocardiogram in 2 to 3 years.  We will also get measurement on the CONCEPCION as well    3.  Hypertension: Stable    4.  Patient return to clinic in approximately 3 to 4 months to go over findings of the CONCEPCION.    Corby Hogdes MD      History of present illness    Patient is a pleasant 49-year-old female who is accompanied by her .  She has a cardiac history notable for mildly dilated proximal ascending aorta of 4.1 cm on a chest CT.  A recent echocardiogram was performed to follow-up on this and it demonstrated a dimension of 3.9 cm.  There appeared to be some turbulence across the interatrial septum only one view was obtained and no bubble study was performed.  There is a suggestion of either a PFO or small ASD with left-to-right shunt possibly noted.  This is not clearly seen on the study.  She has mild pulmonary hypertension which has been typically attributable to her obstructive sleep apnea and body habitus.  She does not report of any  cardiac specific symptoms.  She reports of no chest pain, PND orthopnea palpitations syncope or any other cardiac related concerns.  She is going to have a hysterectomy performed next week.  Here to go over findings of her echocardiogram and plan for surveillance and other testing if appropriate.  She also carries a diagnosis of hypertension and is on a regimen of Prinzide as well as amlodipine.  She is losing weight and is hopeful to decrease the amount of blood pressure medications in the future.    Primary Care Physician   Yolie Delarosa      Patient Active Problem List   Diagnosis     Hyperlipidemia LDL goal <100     Migraine     Insertion of mirena IUD 01/23/14     Fibroid uterus     HTN, goal below 140/90     Migraine without aura     Health Care Home     Anxiety     Morbid obesity due to excess calories (H)     KRISTYN (obstructive sleep apnea)     Incomplete right bundle branch block     S/P total knee arthroplasty     Major depressive disorder, recurrent episode, mild (H)     Surveillance of intrauterine contraceptive device     Aneurysm, ascending aorta (H)     Menorrhagia with irregular cycle     Umbilical hernia without obstruction and without gangrene     Menometrorrhagia     Intramural leiomyoma of uterus     Encounter for removal of intrauterine contraceptive device     Patent foramen ovale     Pulmonary hypertension (H)       Past Medical History   I have reviewed this patient's medical history and updated it with pertinent information if needed.   Past Medical History:   Diagnosis Date     Arthritis      Depressive disorder      DYSPLASIA OF CERVIX 3/6/2003     Dysplasia of cervix (uteri) 2003    Rx cryotherapy Nov 03, and 2005     Hypertension     NO cardiology     Incomplete right bundle branch block 11/10/2017     Migraine      Other chronic pain     Knee pain for 8 years     Plantar fasciitis     bilat     PONV (postoperative nausea and vomiting)      Unspecified sleep apnea     CPAP will  bring on the day of surgery.       Past Surgical History   I have reviewed this patient's surgical history and updated it with pertinent information if needed.  Past Surgical History:   Procedure Laterality Date     ARTHROPLASTY KNEE Right 2017    Procedure: ARTHROPLASTY KNEE;  Right total knee arthroplasty using the Arthrex iBalance total knee system;  Surgeon: Hebert Lee MD;  Location: [facility] OR     ARTHROPLASTY KNEE Left 3/19/2018    Procedure: ARTHROPLASTY KNEE;  Left total knee arthroplasty using an Arthrex iBalance total knee system;  Surgeon: Hebert Lee MD;  Location: [facility] OR     C  DELIVERY ONLY  ,    , Low Cervical     ENT SURGERY       HC COLP VAGINA W CERVIX IF PRES W BIOPSY      [place] for ASCUS     TONSILLECTOMY & ADENOIDECTOMY         Prior to Admission Medications   Cannot display prior to admission medications because the patient has not been admitted in this contact.     [unfilled]  [unfilled]  Allergies   Allergies   Allergen Reactions     Penicillins Hives     hives       Social History    reports that she quit smoking about 12 years ago. Her smoking use included cigarettes. She has a 2.50 pack-year smoking history. She has never used smokeless tobacco. She reports current alcohol use. She reports that she does not use drugs.    Family History   Family History   Adopted: Yes   Problem Relation Age of Onset     Unknown/Adopted Other         pt is adopted and has no access to health history     Unknown/Adopted Mother      Unknown/Adopted Father      Unknown/Adopted Maternal Grandmother      Unknown/Adopted Maternal Grandfather      Unknown/Adopted Paternal Grandmother      Unknown/Adopted Other        Review of Systems   The comprehensive 10 point Review of Systems is negative other than noted in the HPI or here.     Physical Exam   Vital Signs with Ranges     Wt Readings from Last 4 Encounters:   19 98.9 kg (218 lb)   12/10/19  "99 kg (218 lb 4.8 oz)   12/02/19 99 kg (218 lb 4.8 oz)   10/31/19 100.2 kg (221 lb)     [unfilled]      Vitals:  Blood pressure 126/84, pulse 54, height 1.619 m (5' 3.74\"), weight 98.9 kg (218 lb), not currently breastfeeding.    Constitutional:   awake, alert, cooperative, no apparent distress, and appears stated age     Neck:   Supple, symmetrical, trachea midline, no adenopathy, thyroid symmetric, not enlarged and no tenderness, skin normal     Back:   Symmetric, no curvature, spinous processes are non-tender on palpation, paraspinous muscles are non-tender on palpation, no costal vertebral tenderness     Lungs:   No increased work of breathing, good air exchange, clear to auscultation bilaterally, no crackles or wheezing     Cardiovascular:   Normal apical impulse, regular rate and rhythm, normal S1 and S2, no S3 or S4, and no murmur noted       Neurologic:   Awake, alert, oriented to name, place and time.  Cranial nerves II-XII are grossly intact.  Motor is 5 out of 5 bilaterally.  Cerebellar finger to nose, heel to shin intact.  Sensory is intact.  Babinski down going, Romberg negative, and gait is normal.       @LABRCNTIPR(tropi:5,troponinies:5)@    @LABRCNTIPR(wbc:3,hgb:3,mcv:3,plt:3,inr:3,na:3,potassium:3,chloride:3,co2:3,bun:3,cr:3,gfrestimated:3,gfrestblack:3,aniongap:3,bob:3,glc:3,albumin:2,prottotal:2,bilitotal:2,alkphos:2,alt:2,ast:2,lipase:2,tropi:3)@  Recent Labs   Lab Test 07/05/17  0945 07/22/16  0814   CHOL 155 171   HDL 36* 32*   LDL 68 87   TRIG 256* 261* "     @LABRCNTIP(wbc:3,hgb:3,hct:3,mcv:3,plt:3,iron:3,ironsat:3,reticabsct:3,retp:3,feb:3,sigifredo:3,b12:3,folic:3,epoe:3,morph:3)@  @LABRCNTIP(PH:3,PHV:3,PO2:3,PO2V:3,sat:3,PCO2:3,PCO2V:3,HCO3:3,HCO3V:3)@  @LABRCNTIP(NTBNPI:3,NTBNP:3)@  @LABRCNTIP(DD:1)@  @LABRCNTIP(sed:3,crp:3)@  @LABRCNTIP(PLT:3)@  @LABRCNTIP(TSH:3)@  @LABRCNTIP(color:1,appearance:1,urineg,urinebili:1,urineketone:1,s,ubld:1,urineph:1,protein:1,urobilinogen:1,nitrite:1,leukest:1,rbcu:1,wbcu:1)@    Imaging:  No results found for this or any previous visit (from the past 48 hour(s)).    Echo:  No results found for this or any previous visit (from the past 4320 hour(s)).

## 2020-01-24 ENCOUNTER — OFFICE VISIT (OUTPATIENT)
Dept: FAMILY MEDICINE | Facility: CLINIC | Age: 50
End: 2020-01-24
Payer: COMMERCIAL

## 2020-01-24 ENCOUNTER — OFFICE VISIT (OUTPATIENT)
Dept: OBGYN | Facility: CLINIC | Age: 50
End: 2020-01-24
Payer: COMMERCIAL

## 2020-01-24 VITALS — SYSTOLIC BLOOD PRESSURE: 116 MMHG | DIASTOLIC BLOOD PRESSURE: 76 MMHG | WEIGHT: 219 LBS | BODY MASS INDEX: 37.9 KG/M2

## 2020-01-24 VITALS
WEIGHT: 216 LBS | HEIGHT: 64 IN | HEART RATE: 57 BPM | TEMPERATURE: 97.3 F | BODY MASS INDEX: 36.88 KG/M2 | RESPIRATION RATE: 16 BRPM | OXYGEN SATURATION: 99 % | DIASTOLIC BLOOD PRESSURE: 74 MMHG | SYSTOLIC BLOOD PRESSURE: 110 MMHG

## 2020-01-24 DIAGNOSIS — N92.1 MENORRHAGIA WITH IRREGULAR CYCLE: Primary | ICD-10-CM

## 2020-01-24 DIAGNOSIS — D25.1 INTRAMURAL LEIOMYOMA OF UTERUS: ICD-10-CM

## 2020-01-24 DIAGNOSIS — F41.9 ANXIETY: ICD-10-CM

## 2020-01-24 DIAGNOSIS — D25.2 INTRAMURAL, SUBMUCOUS, AND SUBSEROUS LEIOMYOMA OF UTERUS: ICD-10-CM

## 2020-01-24 DIAGNOSIS — Q21.12 PATENT FORAMEN OVALE: ICD-10-CM

## 2020-01-24 DIAGNOSIS — I71.21 ANEURYSM, ASCENDING AORTA (H): ICD-10-CM

## 2020-01-24 DIAGNOSIS — I27.20 PULMONARY HYPERTENSION (H): ICD-10-CM

## 2020-01-24 DIAGNOSIS — Z00.00 ROUTINE GENERAL MEDICAL EXAMINATION AT A HEALTH CARE FACILITY: ICD-10-CM

## 2020-01-24 DIAGNOSIS — D25.0 INTRAMURAL, SUBMUCOUS, AND SUBSEROUS LEIOMYOMA OF UTERUS: ICD-10-CM

## 2020-01-24 DIAGNOSIS — F33.0 MAJOR DEPRESSIVE DISORDER, RECURRENT EPISODE, MILD (H): ICD-10-CM

## 2020-01-24 DIAGNOSIS — Z79.2 PROPHYLACTIC ANTIBIOTIC: ICD-10-CM

## 2020-01-24 DIAGNOSIS — N92.1 MENOMETRORRHAGIA: ICD-10-CM

## 2020-01-24 DIAGNOSIS — D25.1 INTRAMURAL, SUBMUCOUS, AND SUBSEROUS LEIOMYOMA OF UTERUS: ICD-10-CM

## 2020-01-24 DIAGNOSIS — Z01.818 PREOP GENERAL PHYSICAL EXAM: Primary | ICD-10-CM

## 2020-01-24 DIAGNOSIS — E66.01 MORBID OBESITY DUE TO EXCESS CALORIES (H): ICD-10-CM

## 2020-01-24 DIAGNOSIS — G47.30 SLEEP APNEA, UNSPECIFIED TYPE: ICD-10-CM

## 2020-01-24 DIAGNOSIS — I10 HTN, GOAL BELOW 140/90: ICD-10-CM

## 2020-01-24 LAB
ALBUMIN SERPL-MCNC: 4.1 G/DL (ref 3.4–5)
ALP SERPL-CCNC: 44 U/L (ref 40–150)
ALT SERPL W P-5'-P-CCNC: 29 U/L (ref 0–50)
ANION GAP SERPL CALCULATED.3IONS-SCNC: 4 MMOL/L (ref 3–14)
AST SERPL W P-5'-P-CCNC: 17 U/L (ref 0–45)
BILIRUB SERPL-MCNC: 1.8 MG/DL (ref 0.2–1.3)
BUN SERPL-MCNC: 16 MG/DL (ref 7–30)
CALCIUM SERPL-MCNC: 9.3 MG/DL (ref 8.5–10.1)
CHLORIDE SERPL-SCNC: 105 MMOL/L (ref 94–109)
CHOLEST SERPL-MCNC: 182 MG/DL
CO2 SERPL-SCNC: 30 MMOL/L (ref 20–32)
CREAT SERPL-MCNC: 0.64 MG/DL (ref 0.52–1.04)
ERYTHROCYTE [DISTWIDTH] IN BLOOD BY AUTOMATED COUNT: 12.8 % (ref 10–15)
GFR SERPL CREATININE-BSD FRML MDRD: >90 ML/MIN/{1.73_M2}
GLUCOSE SERPL-MCNC: 85 MG/DL (ref 70–99)
HCT VFR BLD AUTO: 44 % (ref 35–47)
HDLC SERPL-MCNC: 41 MG/DL
HGB BLD-MCNC: 14.5 G/DL (ref 11.7–15.7)
LDLC SERPL CALC-MCNC: 117 MG/DL
MCH RBC QN AUTO: 30.4 PG (ref 26.5–33)
MCHC RBC AUTO-ENTMCNC: 33 G/DL (ref 31.5–36.5)
MCV RBC AUTO: 92 FL (ref 78–100)
NONHDLC SERPL-MCNC: 141 MG/DL
PLATELET # BLD AUTO: 188 10E9/L (ref 150–450)
POTASSIUM SERPL-SCNC: 4.2 MMOL/L (ref 3.4–5.3)
PROT SERPL-MCNC: 7.2 G/DL (ref 6.8–8.8)
RBC # BLD AUTO: 4.77 10E12/L (ref 3.8–5.2)
SODIUM SERPL-SCNC: 139 MMOL/L (ref 133–144)
TRIGL SERPL-MCNC: 119 MG/DL
WBC # BLD AUTO: 7.4 10E9/L (ref 4–11)

## 2020-01-24 PROCEDURE — 99213 OFFICE O/P EST LOW 20 MIN: CPT | Performed by: OBSTETRICS & GYNECOLOGY

## 2020-01-24 PROCEDURE — 80061 LIPID PANEL: CPT | Performed by: FAMILY MEDICINE

## 2020-01-24 PROCEDURE — 80053 COMPREHEN METABOLIC PANEL: CPT | Performed by: FAMILY MEDICINE

## 2020-01-24 PROCEDURE — 99215 OFFICE O/P EST HI 40 MIN: CPT | Performed by: FAMILY MEDICINE

## 2020-01-24 PROCEDURE — 36415 COLL VENOUS BLD VENIPUNCTURE: CPT | Performed by: FAMILY MEDICINE

## 2020-01-24 PROCEDURE — 85027 COMPLETE CBC AUTOMATED: CPT | Performed by: FAMILY MEDICINE

## 2020-01-24 RX ORDER — CLINDAMYCIN PHOSPHATE 20 MG/G
1 CREAM VAGINAL AT BEDTIME
Qty: 40 G | Refills: 0 | Status: ON HOLD | OUTPATIENT
Start: 2020-01-24 | End: 2020-01-31

## 2020-01-24 RX ORDER — LISINOPRIL AND HYDROCHLOROTHIAZIDE 12.5; 2 MG/1; MG/1
TABLET ORAL DAILY
COMMUNITY
Start: 2019-11-29 | End: 2020-04-29

## 2020-01-24 ASSESSMENT — ANXIETY QUESTIONNAIRES
2. NOT BEING ABLE TO STOP OR CONTROL WORRYING: NOT AT ALL
6. BECOMING EASILY ANNOYED OR IRRITABLE: SEVERAL DAYS
5. BEING SO RESTLESS THAT IT IS HARD TO SIT STILL: NOT AT ALL
7. FEELING AFRAID AS IF SOMETHING AWFUL MIGHT HAPPEN: NOT AT ALL
GAD7 TOTAL SCORE: 4
7. FEELING AFRAID AS IF SOMETHING AWFUL MIGHT HAPPEN: NOT AT ALL
GAD7 TOTAL SCORE: 4
4. TROUBLE RELAXING: SEVERAL DAYS
GAD7 TOTAL SCORE: 4
3. WORRYING TOO MUCH ABOUT DIFFERENT THINGS: SEVERAL DAYS
1. FEELING NERVOUS, ANXIOUS, OR ON EDGE: SEVERAL DAYS

## 2020-01-24 ASSESSMENT — ENCOUNTER SYMPTOMS
DIARRHEA: 0
NAUSEA: 0
DYSURIA: 0
CONSTIPATION: 0
FREQUENCY: 0
VOMITING: 0
CHILLS: 0
FEVER: 0

## 2020-01-24 ASSESSMENT — PATIENT HEALTH QUESTIONNAIRE - PHQ9
SUM OF ALL RESPONSES TO PHQ QUESTIONS 1-9: 3
SUM OF ALL RESPONSES TO PHQ QUESTIONS 1-9: 3
10. IF YOU CHECKED OFF ANY PROBLEMS, HOW DIFFICULT HAVE THESE PROBLEMS MADE IT FOR YOU TO DO YOUR WORK, TAKE CARE OF THINGS AT HOME, OR GET ALONG WITH OTHER PEOPLE: NOT DIFFICULT AT ALL

## 2020-01-24 ASSESSMENT — MIFFLIN-ST. JEOR: SCORE: 1590.64

## 2020-01-24 NOTE — PROGRESS NOTES
[address], [address]  [facility] [address]  [phone]  Dept: [phone]    PRE-OP EVALUATION:  Today's date: 2020    Lyubov Sanchez (: 1970) presents for pre-operative evaluation assessment as requested by Dr. Venancio Stone.  She requires evaluation and anesthesia risk assessment prior to undergoing surgery/procedure for treatment of menometrorrhagia .      Fax number for surgical facility: available electronically  Primary Physician: Yolie Delarosa  Type of Anesthesia Anticipated: General    Patient has a Health Care Directive or Living Will:  NO    Preop Questions 2020   Who is doing your surgery? dr. stone   What are you having done? hysterectomy   Date of Surgery/Procedure: 20   Facility or Hospital where procedure/surgery will be performed: [facility]   1.  Do you have a history of Heart attack, stroke, stent, coronary bypass surgery, or other heart surgery? no   2.  Do you ever have any pain or discomfort in your chest? no   3.  Do you have a history of  Heart Failure? no   4.   Are you troubled by shortness of breath when:  walking on a level surface, or up a slight hill, or at night? no   5.  Do you currently have a cold, bronchitis or other respiratory infection? no   6.  Do you have a cough, shortness of breath, or wheezing? no   7.  Do you sometimes get pains in the calves of your legs when you walk? no   8. Do you or anyone in your family have previous history of blood clots? unknown   9.  Do you or does anyone in your family have a serious bleeding problem such as prolonged bleeding following surgeries or cuts? unknown   10. Have you ever had problems with anemia or been told to take iron pills? no   11. Have you had any abnormal blood loss such as black, tarry or bloody stools, or abnormal vaginal bleeding? no   12. Have you ever had a blood transfusion? no   13. Have you or any of your relatives ever had  problems with anesthesia? unknown   14. Do you have sleep apnea, excessive snoring or daytime drowsiness? Yes - Hx of sleep apnea. She wears the CPAP nightly.    15. Do you have any prosthetic heart valves? no   16. Do you have prosthetic joints? Yes - Bilateral Knees    17. Is there any chance that you may be pregnant? no       Answers for HPI/ROS submitted by the patient on 1/24/2020   If you checked off any problems, how difficult have these problems made it for you to do your work, take care of things at home, or get along with other people?: Not difficult at all  PHQ9 TOTAL SCORE: 3    HPI:     HPI related to upcoming procedure: Patient undergoing DaVinci robotic assisted total laparoscopic hysterectomy and bilateral salpingectomy due to menometrorrhagia and intramural leiomyoma of uterus.     DEPRESSION - Patient has a long history of Depression of moderate severity requiring medication for control with recent symptoms being gradually improving..Current symptoms of depression include none.     HYPERLIPIDEMIA - Patient has a long history of significant Hyperlipidemia requiring medication for treatment with recent good control. Patient reports no problems or side effects with the medication.     HYPERTENSION - Patient has longstanding history of HTN , currently denies any symptoms referable to elevated blood pressure. Specifically denies chest pain, palpitations, dyspnea, orthopnea, PND or peripheral edema. Blood pressure readings have been in normal range. Current medication regimen is as listed below. Patient denies any side effects of medication.     MEDICAL HISTORY:     Patient Active Problem List    Diagnosis Date Noted     Patent foramen ovale 01/09/2020     Priority: Medium     Pulmonary hypertension (H) 01/09/2020     Priority: Medium     Menometrorrhagia 12/11/2019     Priority: Medium     Added automatically from request for surgery [id number]       Intramural leiomyoma of uterus 12/11/2019     Priority:  Medium     Added automatically from request for surgery [id number]       Encounter for removal of intrauterine contraceptive device 12/11/2019     Priority: Medium     Added automatically from request for surgery [id number]       Menorrhagia with irregular cycle 12/02/2019     Priority: Medium     Umbilical hernia without obstruction and without gangrene 12/02/2019     Priority: Medium     Surveillance of intrauterine contraceptive device 10/31/2019     Priority: Medium     Aneurysm, ascending aorta (H) 10/31/2019     Priority: Medium     Major depressive disorder, recurrent episode, mild (H) 04/24/2019     Priority: Medium     S/P total knee arthroplasty 11/17/2017     Priority: Medium     Incomplete right bundle branch block 11/10/2017     Priority: Medium     KRISTYN (obstructive sleep apnea) 11/02/2017     Priority: Medium     Morbid obesity due to excess calories (H) 07/22/2016     Priority: Medium     Anxiety 06/03/2016     Priority: Medium     Health Care Home 06/23/2015     Priority: Medium        Status:  Closed   Care Coordinator:  Caroline Snow [phone]   See Letters for [facility] Emergency Care Plan  Date:  July 3, 2015               Migraine without aura 03/13/2015     Priority: Medium     Problem list name updated by automated process. Provider to review       HTN, goal below 140/90 10/21/2014     Priority: Medium     Fibroid uterus 06/03/2014     Priority: Medium     Insertion of mirena IUD 01/23/14 05/30/2014     Priority: Medium     Remove 01/23/2019       Migraine 01/13/2012     Priority: Medium     Hyperlipidemia LDL goal <100 10/31/2010     Priority: Medium      Past Medical History:   Diagnosis Date     Arthritis      Depressive disorder      DYSPLASIA OF CERVIX 3/6/2003     Dysplasia of cervix (uteri) 2003    Rx cryotherapy Nov 03, and 2005     Hypertension     NO cardiology     Incomplete right bundle branch block 11/10/2017     Migraine      Other chronic pain     Knee pain for 8 years     Plantar  fasciitis     bilat     PONV (postoperative nausea and vomiting)      Unspecified sleep apnea     CPAP will bring on the day of surgery.     Past Surgical History:   Procedure Laterality Date     ARTHROPLASTY KNEE Right 2017    Procedure: ARTHROPLASTY KNEE;  Right total knee arthroplasty using the Arthrex iBalance total knee system;  Surgeon: Hebert Lee MD;  Location: [facility] OR     ARTHROPLASTY KNEE Left 3/19/2018    Procedure: ARTHROPLASTY KNEE;  Left total knee arthroplasty using an Arthrex iBalance total knee system;  Surgeon: Hebert Lee MD;  Location: [facility] OR     C  DELIVERY ONLY  ,    , Low Cervical     ENT SURGERY       HC COLP VAGINA W CERVIX IF PRES W BIOPSY      [place] for ASCUS     TONSILLECTOMY & ADENOIDECTOMY       Current Outpatient Medications   Medication Sig Dispense Refill     amLODIPine (NORVASC) 5 MG tablet Take 1 tablet (5 mg) by mouth daily 90 tablet 1     citalopram (CELEXA) 20 MG tablet TAKE 1 TABLET BY MOUTH EVERY DAY 90 tablet 0     levonorgestrel (MIRENA) 20 MCG/24HR IUD  1 each 0     OTC products: None    Allergies   Allergen Reactions     Penicillins Hives     hives      Latex Allergy: NO    Social History     Tobacco Use     Smoking status: Former Smoker     Packs/day: 0.50     Years: 5.00     Pack years: 2.50     Types: Cigarettes     Last attempt to quit: 2007     Years since quittin.8     Smokeless tobacco: Never Used   Substance Use Topics     Alcohol use: Yes     Comment: 0-1 weekly     History   Drug Use No       REVIEW OF SYSTEMS:   Constitutional, HEENT, cardiovascular, pulmonary, gi and gu systems are negative, except as otherwise noted.    This document serves as a record of the services and decisions personally performed and made by Yolie Delarosa MD. It was created on her behalf by Lissy Desir, a trained medical scribe. The creation of this document is based on the provider's statements to the medical  "palma  Lissy Desir 9:43 AM January 24, 2020    EXAM:   /74 (BP Location: Right arm, Patient Position: Sitting, Cuff Size: Adult Large)   Pulse 57   Temp 97.3  F (36.3  C) (Oral)   Resp 16   Ht 1.619 m (5' 3.74\")   Wt 98 kg (216 lb)   SpO2 99%   BMI 37.38 kg/m      GENERAL APPEARANCE: healthy, alert and no distress     EYES: EOMI, PERRL     HENT: ear canals and TM's normal and nose and mouth without ulcers or lesions     NECK: no adenopathy, no asymmetry, masses, or scars and thyroid normal to palpation     RESP: lungs clear to auscultation - no rales, rhonchi or wheezes     CV: regular rates and rhythm, normal S1 S2, no S3 or S4 and no murmur, click or rub     ABDOMEN:  soft, nontender, no HSM or masses and bowel sounds normal     MS: extremities normal- no gross deformities noted, no evidence of inflammation in joints, FROM in all extremities.     SKIN: no suspicious lesions or rashes     NEURO: Normal strength and tone, sensory exam grossly normal, mentation intact and speech normal     PSYCH: mentation appears normal. and affect normal/bright     LYMPHATICS: No cervical adenopathy    DIAGNOSTICS:     EKG: appears normal, NSR, normal axis, normal intervals, no acute ST/T changes c/w ischemia, no LVH by voltage criteria, nonspecific ST-T changes, see tracing, was not ordered in this clinic  Hemoglobin (indicated for history of anemia or procedure with significant blood loss such as tonsillectomy, major intraperitoneal surgery, vascular surgery, major spine surgery, total joint replacement)  Labs Drawn and in Process:   Unresulted Labs Ordered in the Past 30 Days of this Admission     Date and Time Order Name Status Description    1/24/2020 0907 LIPID REFLEX TO DIRECT LDL PANEL In process     1/24/2020 0907 COMPREHENSIVE METABOLIC PANEL In process           Recent Labs   Lab Test 12/02/19  1633 04/24/19  0813 07/04/18  2217  03/19/18  1808 02/26/18  1619  07/05/17  0945  06/21/15  1335  " 03/26/12  1845   HGB 14.2 15.1 13.7   < >  --  14.0   < >  --    < > 13.9   < > 14.1    180 182  --  179  --    < >  --    < > 177   < > 192   INR  --   --   --   --   --   --   --   --   --  1.00  --  1.71*   NA  --   --  140  --   --  138  --  139   < > 136   < > 142   POTASSIUM  --   --  3.6  --   --  3.7   < > 3.9   < > 3.8   < > 4.3   CR  --   --  0.64  --  0.55 0.66   < > 0.63   < > 0.75   < > 0.65   A1C  --  5.1  --   --   --   --   --  5.2   < >  --   --   --     < > = values in this interval not displayed.     IMPRESSION:   Reason for surgery/procedure: Menometrorrhagia and intramural leiomyoma of uterus  Diagnosis/reason for consult: preoperative evaluation for assessment of cardiovascular and respiratory disease as well as overall risk assessment and perioperative medical management.    The proposed surgical procedure is considered INTERMEDIATE risk.    REVISED CARDIAC RISK INDEX  The patient has the following serious cardiovascular risks for perioperative complications such as (MI, PE, VFib and 3  AV Block):  No serious cardiac risks  INTERPRETATION: 0 risks: Class I (very low risk - 0.4% complication rate)    The patient has the following additional risks for perioperative complications:  Morbid obesity      ICD-10-CM    1. Preop general physical exam Z01.818    2. Intramural, submucous, and subserous leiomyoma of uterus D25.1     D25.0     D25.2    3. Menometrorrhagia N92.1    4. Major depressive disorder, recurrent episode, mild (H) F33.0    5. Pulmonary hypertension (H) I27.20    6. Morbid obesity due to excess calories (H) E66.01    7. HTN, goal below 140/90 I10    8. Anxiety F41.9    9. Sleep apnea, unspecified type G47.30    10. Aneurysm, ascending aorta (H) I71.2    11. Patent foramen ovale Q21.1        RECOMMENDATIONS:       Cardiovascular Risk  Performs 4 METs exercise without symptoms (Climb a flight of stairs and Walk on level ground at 15 minutes per mile (4 miles/hour)) .        Obstructive Sleep Apnea (or suspected sleep apnea)  Patient is to bring their home CPAP with them on the day of surgery      Anemia  Hx of anemia and does not require treatment prior to surgery.  Monitor Hemoglobin postoperatively.    --Patient is to take all scheduled medications on the day of surgery EXCEPT for modifications listed below.    ACE Inhibitor or Angiotensin Receptor Blocker (ARB) Use  Ace inhibitor or Angiotensin Receptor Blocker (ARB) and should HOLD this medication for the 24 hours prior to surgery.      APPROVAL GIVEN to proceed with proposed procedure, without further diagnostic evaluation     The information in this document, created by the medical scribe for me, accurately reflects the services I personally performed and the decisions made by me. I have reviewed and approved this document for accuracy prior to leaving the patient care area.  January 24, 2020 9:59 AM    Signed Electronically by: Yolie Delarosa MD    Copy of this evaluation report is provided to requesting physician.    [facility] Preop Guidelines    Revised Cardiac Risk Index

## 2020-01-24 NOTE — NURSING NOTE
"Chief Complaint   Patient presents with     Consult     check in prior to hysterectomy 20       Initial /76 (BP Location: Right arm, Cuff Size: Adult Regular)   Wt 99.3 kg (219 lb)   BMI 37.90 kg/m   Estimated body mass index is 37.9 kg/m  as calculated from the following:    Height as of an earlier encounter on 20: 1.619 m (5' 3.74\").    Weight as of this encounter: 99.3 kg (219 lb).  BP completed using cuff size: regular long    Questioned patient about current smoking habits.  Pt. quit smoking some time ago.          The following HM Due: NONE  Katelyn Lobo CMA         "

## 2020-01-24 NOTE — PROGRESS NOTES
SUBJECTIVE:                                                   Lyubov Sanchez is a 49 year old female who presents to clinic today with the Chief Complaint of:  Patient presents with:  Consult: check in prior to hysterectomy 20      Pt here to discuss upcoming Robotic TLH, Bilat salpingectomy for menometrorrhagia despite Mirena IUD, prob due to large myomatous uterus, which is scheduled for 2020.  Preop done today and was normal.      No LMP recorded. (Menstrual status: IUD)..   Patient is sexually active, .  Using IUD for contraception.    reports that she quit smoking about 12 years ago. Her smoking use included cigarettes. She has a 2.50 pack-year smoking history. She has never used smokeless tobacco.    Problem list and histories reviewed & adjusted, as indicated.  Additional history: as documented.    Patient Active Problem List   Diagnosis     Hyperlipidemia LDL goal <100     Migraine     Insertion of mirena IUD 14     Fibroid uterus     HTN, goal below 140/90     Migraine without aura     Health Care Home     Anxiety     Morbid obesity due to excess calories (H)     KRISTYN (obstructive sleep apnea)     Incomplete right bundle branch block     S/P total knee arthroplasty     Major depressive disorder, recurrent episode, mild (H)     Surveillance of intrauterine contraceptive device     Aneurysm, ascending aorta (H)     Menorrhagia with irregular cycle     Umbilical hernia without obstruction and without gangrene     Menometrorrhagia     Intramural leiomyoma of uterus     Encounter for removal of intrauterine contraceptive device     Patent foramen ovale     Pulmonary hypertension (H)     Past Surgical History:   Procedure Laterality Date     ARTHROPLASTY KNEE Right 2017    Procedure: ARTHROPLASTY KNEE;  Right total knee arthroplasty using the Arthrex [employer] total knee system;  Surgeon: Hebert Lee MD;  Location: [facility] OR     ARTHROPLASTY KNEE Left 3/19/2018    Procedure:  ARTHROPLASTY KNEE;  Left total knee arthroplasty using an Arthrex [employer]ance total knee system;  Surgeon: Hebert Lee MD;  Location: [facility] OR     C  DELIVERY ONLY  ,    , Low Cervical     ENT SURGERY       HC COLP VAGINA W CERVIX IF PRES W BIOPSY      [place] for ASCUS     TONSILLECTOMY & ADENOIDECTOMY        Social History     Tobacco Use     Smoking status: Former Smoker     Packs/day: 0.50     Years: 5.00     Pack years: 2.50     Types: Cigarettes     Last attempt to quit: 2007     Years since quittin.8     Smokeless tobacco: Never Used   Substance Use Topics     Alcohol use: Yes     Comment: 0-1 weekly      *Patient is Adopted       Problem (# of Occurrences) Relation (Name,Age of Onset)    Unknown/Adopted (7) Other: pt is adopted and has no access to health history, Mother (n/a), Father (N/a), Maternal Grandmother (n/a), Maternal Grandfather (n/a), Paternal Grandmother (n/a), Other (n/a)            Current Outpatient Medications   Medication Sig     amLODIPine (NORVASC) 5 MG tablet Take 1 tablet (5 mg) by mouth daily     citalopram (CELEXA) 20 MG tablet TAKE 1 TABLET BY MOUTH EVERY DAY     clindamycin (CLEOCIN) 2 % vaginal cream Place 1 applicator vaginally At Bedtime for 5 days Use for 5 nights prior to surgery date.     levonorgestrel (MIRENA) 20 MCG/24HR IUD      lisinopril-hydrochlorothiazide (PRINZIDE/ZESTORETIC) 20-12.5 MG tablet      No current facility-administered medications for this visit.      Allergies   Allergen Reactions     Penicillins Hives     hives       ROS:  Review of Systems   Constitutional: Negative for chills and fever.   Gastrointestinal: Negative for constipation, diarrhea, nausea and vomiting.   Genitourinary: Positive for menstrual problem. Negative for dysuria, frequency, urgency and vaginal discharge.         OBJECTIVE:     /76 (BP Location: Right arm, Cuff Size: Adult Regular)   Wt 99.3 kg (219 lb)   BMI 37.90 kg/m    Body  mass index is 37.9 kg/m .    Exam:  Physical Exam  Constitutional:       General: She is not in acute distress.     Appearance: She is obese. She is not ill-appearing.   HENT:      Head: Normocephalic.   Pulmonary:      Effort: Pulmonary effort is normal.   Abdominal:      General: There is no distension.      Palpations: Abdomen is soft. There is no mass.      Tenderness: There is no abdominal tenderness. There is no guarding or rebound.      Hernia: A hernia is present.      Comments: A small umbilical hernia is noted that will be repaired by Gen Surgery at a later date after hysterectomy recovery is complete.   Neurological:      Mental Status: She is alert.            In-Clinic Test Results:  Results for orders placed or performed in visit on 01/24/20 (from the past 24 hour(s))   **CBC with platelets FUTURE anytime   Result Value Ref Range    WBC 7.4 4.0 - 11.0 10e9/L    RBC Count 4.77 3.8 - 5.2 10e12/L    Hemoglobin 14.5 11.7 - 15.7 g/dL    Hematocrit 44.0 35.0 - 47.0 %    MCV 92 78 - 100 fl    MCH 30.4 26.5 - 33.0 pg    MCHC 33.0 31.5 - 36.5 g/dL    RDW 12.8 10.0 - 15.0 %    Platelet Count 188 150 - 450 10e9/L           ASSESSMENT:                                                      Encounter Diagnoses   Name Primary?     Menorrhagia with irregular cycle Yes     Intramural leiomyoma of uterus      Prophylactic antibiotic          PLAN:                                                      1)  Will proceed with Robotic TLH, Bilat Salpingectomy. Will possibly require powered morcellation in Pneumoliner bag due to enlarged uterine fibroid.  Will retain ovaries if normal, but if any cyst noted may perform cystectomy or aspiration, as well as oophorectomy if needed.  She understands the indications/risks/benefits/alternatives of the proposed procedure and has given informed consent.    2)  Rx Clinda cream given.    3)  Bowel prep info given.    4)  I spent most of today's visit counseling Pt re: the above issues  and plan, as well as medication management.    5)  I spent 15 minutes counseling time out of a total visit time of 15 minutes in direct face-to-face counseling and discussion of the above issues with the patient.        Venancio Bartlett MD  [facility]

## 2020-01-25 ASSESSMENT — ANXIETY QUESTIONNAIRES: GAD7 TOTAL SCORE: 4

## 2020-01-30 ENCOUNTER — HOSPITAL ENCOUNTER (OUTPATIENT)
Dept: LAB | Facility: CLINIC | Age: 50
Discharge: HOME | End: 2020-01-30
Attending: OBSTETRICS & GYNECOLOGY | Admitting: OBSTETRICS & GYNECOLOGY
Payer: COMMERCIAL

## 2020-01-30 DIAGNOSIS — Z01.812 PRE-OPERATIVE LABORATORY EXAMINATION: ICD-10-CM

## 2020-01-30 LAB
ABO + RH BLD: NORMAL
ABO + RH BLD: NORMAL
BLD GP AB SCN SERPL QL: NORMAL
BLOOD BANK CMNT PATIENT-IMP: NORMAL
SPECIMEN EXP DATE BLD: NORMAL

## 2020-01-30 PROCEDURE — 86901 BLOOD TYPING SEROLOGIC RH(D): CPT | Performed by: OBSTETRICS & GYNECOLOGY

## 2020-01-30 PROCEDURE — 86900 BLOOD TYPING SEROLOGIC ABO: CPT | Performed by: OBSTETRICS & GYNECOLOGY

## 2020-01-30 PROCEDURE — 36415 COLL VENOUS BLD VENIPUNCTURE: CPT | Performed by: OBSTETRICS & GYNECOLOGY

## 2020-01-30 PROCEDURE — 86850 RBC ANTIBODY SCREEN: CPT | Performed by: OBSTETRICS & GYNECOLOGY

## 2020-01-31 ENCOUNTER — HOSPITAL ENCOUNTER (OUTPATIENT)
Facility: CLINIC | Age: 50
Discharge: HOME | End: 2020-01-31
Attending: OBSTETRICS & GYNECOLOGY | Admitting: OBSTETRICS & GYNECOLOGY
Payer: COMMERCIAL

## 2020-01-31 ENCOUNTER — ANESTHESIA (OUTPATIENT)
Dept: SURGERY | Facility: CLINIC | Age: 50
End: 2020-01-31
Payer: COMMERCIAL

## 2020-01-31 ENCOUNTER — ANESTHESIA EVENT (OUTPATIENT)
Dept: SURGERY | Facility: CLINIC | Age: 50
End: 2020-01-31
Payer: COMMERCIAL

## 2020-01-31 VITALS
BODY MASS INDEX: 36.54 KG/M2 | RESPIRATION RATE: 16 BRPM | OXYGEN SATURATION: 94 % | WEIGHT: 214 LBS | HEIGHT: 64 IN | DIASTOLIC BLOOD PRESSURE: 78 MMHG | SYSTOLIC BLOOD PRESSURE: 129 MMHG | TEMPERATURE: 98.7 F | HEART RATE: 68 BPM

## 2020-01-31 DIAGNOSIS — N92.1 MENOMETRORRHAGIA: ICD-10-CM

## 2020-01-31 DIAGNOSIS — Z30.432 ENCOUNTER FOR REMOVAL OF INTRAUTERINE CONTRACEPTIVE DEVICE: ICD-10-CM

## 2020-01-31 DIAGNOSIS — D25.1 INTRAMURAL LEIOMYOMA OF UTERUS: Primary | ICD-10-CM

## 2020-01-31 DIAGNOSIS — D25.1 INTRAMURAL LEIOMYOMA OF UTERUS: ICD-10-CM

## 2020-01-31 DIAGNOSIS — G89.18 POSTOPERATIVE PAIN: ICD-10-CM

## 2020-01-31 LAB — HCG UR QL: NEGATIVE

## 2020-01-31 PROCEDURE — 25800030 ZZH RX IP 258 OP 636: Performed by: NURSE ANESTHETIST, CERTIFIED REGISTERED

## 2020-01-31 PROCEDURE — 25000128 H RX IP 250 OP 636: Performed by: ANESTHESIOLOGY

## 2020-01-31 PROCEDURE — 71000027 ZZH RECOVERY PHASE 2 EACH 15 MINS: Performed by: OBSTETRICS & GYNECOLOGY

## 2020-01-31 PROCEDURE — 25000125 ZZHC RX 250: Performed by: ANESTHESIOLOGY

## 2020-01-31 PROCEDURE — 25800030 ZZH RX IP 258 OP 636: Performed by: ANESTHESIOLOGY

## 2020-01-31 PROCEDURE — 37000008 ZZH ANESTHESIA TECHNICAL FEE, 1ST 30 MIN: Performed by: OBSTETRICS & GYNECOLOGY

## 2020-01-31 PROCEDURE — 88307 TISSUE EXAM BY PATHOLOGIST: CPT | Mod: 26 | Performed by: OBSTETRICS & GYNECOLOGY

## 2020-01-31 PROCEDURE — 25000128 H RX IP 250 OP 636: Performed by: OBSTETRICS & GYNECOLOGY

## 2020-01-31 PROCEDURE — 81025 URINE PREGNANCY TEST: CPT | Performed by: OBSTETRICS & GYNECOLOGY

## 2020-01-31 PROCEDURE — 25000125 ZZHC RX 250: Performed by: NURSE ANESTHETIST, CERTIFIED REGISTERED

## 2020-01-31 PROCEDURE — 25800025 ZZH RX 258: Performed by: OBSTETRICS & GYNECOLOGY

## 2020-01-31 PROCEDURE — 71000014 ZZH RECOVERY PHASE 1 LEVEL 2 FIRST HR: Performed by: OBSTETRICS & GYNECOLOGY

## 2020-01-31 PROCEDURE — 36000087 ZZH SURGERY LEVEL 8 EA 15 ADDTL MIN: Performed by: OBSTETRICS & GYNECOLOGY

## 2020-01-31 PROCEDURE — 58573 TLH W/T/O UTERUS OVER 250 G: CPT | Performed by: OBSTETRICS & GYNECOLOGY

## 2020-01-31 PROCEDURE — 25000128 H RX IP 250 OP 636: Performed by: NURSE ANESTHETIST, CERTIFIED REGISTERED

## 2020-01-31 PROCEDURE — 37000009 ZZH ANESTHESIA TECHNICAL FEE, EACH ADDTL 15 MIN: Performed by: OBSTETRICS & GYNECOLOGY

## 2020-01-31 PROCEDURE — 36000085 ZZH SURGERY LEVEL 8 1ST 30 MIN: Performed by: OBSTETRICS & GYNECOLOGY

## 2020-01-31 PROCEDURE — 25000132 ZZH RX MED GY IP 250 OP 250 PS 637: Performed by: OBSTETRICS & GYNECOLOGY

## 2020-01-31 PROCEDURE — 88307 TISSUE EXAM BY PATHOLOGIST: CPT | Performed by: OBSTETRICS & GYNECOLOGY

## 2020-01-31 PROCEDURE — C1765 ADHESION BARRIER: HCPCS | Performed by: OBSTETRICS & GYNECOLOGY

## 2020-01-31 PROCEDURE — 40000305 ZZH STATISTIC PRE PROC ASSESS I: Performed by: OBSTETRICS & GYNECOLOGY

## 2020-01-31 PROCEDURE — 27210794 ZZH OR GENERAL SUPPLY STERILE: Performed by: OBSTETRICS & GYNECOLOGY

## 2020-01-31 RX ORDER — NEOSTIGMINE METHYLSULFATE 1 MG/ML
VIAL (ML) INJECTION PRN
Status: DISCONTINUED | OUTPATIENT
Start: 2020-01-31 | End: 2020-01-31

## 2020-01-31 RX ORDER — PROPOFOL 10 MG/ML
INJECTION, EMULSION INTRAVENOUS PRN
Status: DISCONTINUED | OUTPATIENT
Start: 2020-01-31 | End: 2020-01-31

## 2020-01-31 RX ORDER — EPHEDRINE SULFATE 50 MG/ML
INJECTION, SOLUTION INTRAMUSCULAR; INTRAVENOUS; SUBCUTANEOUS PRN
Status: DISCONTINUED | OUTPATIENT
Start: 2020-01-31 | End: 2020-01-31

## 2020-01-31 RX ORDER — GLYCOPYRROLATE 0.2 MG/ML
INJECTION, SOLUTION INTRAMUSCULAR; INTRAVENOUS PRN
Status: DISCONTINUED | OUTPATIENT
Start: 2020-01-31 | End: 2020-01-31

## 2020-01-31 RX ORDER — ACETAMINOPHEN 325 MG/1
975 TABLET ORAL ONCE
Status: DISCONTINUED | OUTPATIENT
Start: 2020-01-31 | End: 2020-01-31 | Stop reason: HOSPADM

## 2020-01-31 RX ORDER — CEFAZOLIN SODIUM 2 G/100ML
2 INJECTION, SOLUTION INTRAVENOUS
Status: COMPLETED | OUTPATIENT
Start: 2020-01-31 | End: 2020-01-31

## 2020-01-31 RX ORDER — ONDANSETRON 2 MG/ML
INJECTION INTRAMUSCULAR; INTRAVENOUS PRN
Status: DISCONTINUED | OUTPATIENT
Start: 2020-01-31 | End: 2020-01-31

## 2020-01-31 RX ORDER — IBUPROFEN 600 MG/1
600 TABLET, FILM COATED ORAL
Status: DISCONTINUED | OUTPATIENT
Start: 2020-01-31 | End: 2020-01-31 | Stop reason: HOSPADM

## 2020-01-31 RX ORDER — OXYCODONE HYDROCHLORIDE 5 MG/1
2.5-5 TABLET ORAL EVERY 6 HOURS PRN
Qty: 12 TABLET | Refills: 0 | Status: SHIPPED | OUTPATIENT
Start: 2020-01-31 | End: 2020-03-30

## 2020-01-31 RX ORDER — SODIUM CHLORIDE, SODIUM LACTATE, POTASSIUM CHLORIDE, CALCIUM CHLORIDE 600; 310; 30; 20 MG/100ML; MG/100ML; MG/100ML; MG/100ML
INJECTION, SOLUTION INTRAVENOUS CONTINUOUS
Status: DISCONTINUED | OUTPATIENT
Start: 2020-01-31 | End: 2020-01-31 | Stop reason: HOSPADM

## 2020-01-31 RX ORDER — BUPIVACAINE HYDROCHLORIDE 5 MG/ML
INJECTION, SOLUTION EPIDURAL; INTRACAUDAL PRN
Status: DISCONTINUED | OUTPATIENT
Start: 2020-01-31 | End: 2020-01-31 | Stop reason: HOSPADM

## 2020-01-31 RX ORDER — IBUPROFEN 600 MG/1
600 TABLET, FILM COATED ORAL EVERY 6 HOURS PRN
Qty: 30 TABLET | Refills: 0 | Status: SHIPPED | OUTPATIENT
Start: 2020-01-31 | End: 2020-03-30

## 2020-01-31 RX ORDER — MEPERIDINE HYDROCHLORIDE 25 MG/ML
12.5 INJECTION INTRAMUSCULAR; INTRAVENOUS; SUBCUTANEOUS
Status: DISCONTINUED | OUTPATIENT
Start: 2020-01-31 | End: 2020-01-31 | Stop reason: HOSPADM

## 2020-01-31 RX ORDER — ONDANSETRON 4 MG/1
4 TABLET, ORALLY DISINTEGRATING ORAL
Status: DISCONTINUED | OUTPATIENT
Start: 2020-01-31 | End: 2020-01-31 | Stop reason: HOSPADM

## 2020-01-31 RX ORDER — NALOXONE HYDROCHLORIDE 0.4 MG/ML
.1-.4 INJECTION, SOLUTION INTRAMUSCULAR; INTRAVENOUS; SUBCUTANEOUS
Status: DISCONTINUED | OUTPATIENT
Start: 2020-01-31 | End: 2020-01-31 | Stop reason: HOSPADM

## 2020-01-31 RX ORDER — FENTANYL CITRATE 50 UG/ML
INJECTION, SOLUTION INTRAMUSCULAR; INTRAVENOUS PRN
Status: DISCONTINUED | OUTPATIENT
Start: 2020-01-31 | End: 2020-01-31

## 2020-01-31 RX ORDER — ACETAMINOPHEN 325 MG/1
975 TABLET ORAL ONCE
Status: COMPLETED | OUTPATIENT
Start: 2020-01-31 | End: 2020-01-31

## 2020-01-31 RX ORDER — KETOROLAC TROMETHAMINE 30 MG/ML
INJECTION, SOLUTION INTRAMUSCULAR; INTRAVENOUS PRN
Status: DISCONTINUED | OUTPATIENT
Start: 2020-01-31 | End: 2020-01-31

## 2020-01-31 RX ORDER — DEXAMETHASONE SODIUM PHOSPHATE 4 MG/ML
INJECTION, SOLUTION INTRA-ARTICULAR; INTRALESIONAL; INTRAMUSCULAR; INTRAVENOUS; SOFT TISSUE PRN
Status: DISCONTINUED | OUTPATIENT
Start: 2020-01-31 | End: 2020-01-31

## 2020-01-31 RX ORDER — PROPOFOL 10 MG/ML
INJECTION, EMULSION INTRAVENOUS CONTINUOUS PRN
Status: DISCONTINUED | OUTPATIENT
Start: 2020-01-31 | End: 2020-01-31

## 2020-01-31 RX ORDER — CEFAZOLIN SODIUM 1 G/3ML
1 INJECTION, POWDER, FOR SOLUTION INTRAMUSCULAR; INTRAVENOUS SEE ADMIN INSTRUCTIONS
Status: DISCONTINUED | OUTPATIENT
Start: 2020-01-31 | End: 2020-01-31 | Stop reason: HOSPADM

## 2020-01-31 RX ORDER — ONDANSETRON 2 MG/ML
4 INJECTION INTRAMUSCULAR; INTRAVENOUS EVERY 30 MIN PRN
Status: DISCONTINUED | OUTPATIENT
Start: 2020-01-31 | End: 2020-01-31 | Stop reason: HOSPADM

## 2020-01-31 RX ORDER — FENTANYL CITRATE 50 UG/ML
25-50 INJECTION, SOLUTION INTRAMUSCULAR; INTRAVENOUS
Status: DISCONTINUED | OUTPATIENT
Start: 2020-01-31 | End: 2020-01-31 | Stop reason: HOSPADM

## 2020-01-31 RX ORDER — OXYCODONE HYDROCHLORIDE 5 MG/1
5 TABLET ORAL
Status: COMPLETED | OUTPATIENT
Start: 2020-01-31 | End: 2020-01-31

## 2020-01-31 RX ORDER — ONDANSETRON 4 MG/1
4 TABLET, ORALLY DISINTEGRATING ORAL EVERY 30 MIN PRN
Status: DISCONTINUED | OUTPATIENT
Start: 2020-01-31 | End: 2020-01-31 | Stop reason: HOSPADM

## 2020-01-31 RX ORDER — PHENAZOPYRIDINE HYDROCHLORIDE 100 MG/1
100 TABLET, FILM COATED ORAL ONCE
Status: COMPLETED | OUTPATIENT
Start: 2020-01-31 | End: 2020-01-31

## 2020-01-31 RX ORDER — SCOLOPAMINE TRANSDERMAL SYSTEM 1 MG/1
1 PATCH, EXTENDED RELEASE TRANSDERMAL
Status: DISCONTINUED | OUTPATIENT
Start: 2020-01-31 | End: 2020-01-31 | Stop reason: HOSPADM

## 2020-01-31 RX ORDER — LIDOCAINE 40 MG/G
CREAM TOPICAL
Status: DISCONTINUED | OUTPATIENT
Start: 2020-01-31 | End: 2020-01-31 | Stop reason: HOSPADM

## 2020-01-31 RX ORDER — HYDROXYZINE HYDROCHLORIDE 25 MG/1
25 TABLET, FILM COATED ORAL
Status: DISCONTINUED | OUTPATIENT
Start: 2020-01-31 | End: 2020-01-31 | Stop reason: HOSPADM

## 2020-01-31 RX ORDER — KETAMINE HYDROCHLORIDE 10 MG/ML
INJECTION, SOLUTION INTRAMUSCULAR; INTRAVENOUS PRN
Status: DISCONTINUED | OUTPATIENT
Start: 2020-01-31 | End: 2020-01-31

## 2020-01-31 RX ADMIN — SODIUM CHLORIDE, POTASSIUM CHLORIDE, SODIUM LACTATE AND CALCIUM CHLORIDE: 600; 310; 30; 20 INJECTION, SOLUTION INTRAVENOUS at 09:14

## 2020-01-31 RX ADMIN — ACETAMINOPHEN 975 MG: 325 TABLET, FILM COATED ORAL at 07:12

## 2020-01-31 RX ADMIN — PROPOFOL 200 MG: 10 INJECTION, EMULSION INTRAVENOUS at 07:50

## 2020-01-31 RX ADMIN — PHENYLEPHRINE HYDROCHLORIDE 200 MCG: 10 INJECTION INTRAVENOUS at 08:01

## 2020-01-31 RX ADMIN — PHENYLEPHRINE HYDROCHLORIDE 100 MCG: 10 INJECTION INTRAVENOUS at 11:38

## 2020-01-31 RX ADMIN — PHENAZOPYRIDINE HYDROCHLORIDE 100 MG: 100 TABLET ORAL at 07:12

## 2020-01-31 RX ADMIN — ROCURONIUM BROMIDE 10 MG: 10 INJECTION INTRAVENOUS at 09:32

## 2020-01-31 RX ADMIN — Medication 4 MG: at 11:35

## 2020-01-31 RX ADMIN — KETOROLAC TROMETHAMINE 30 MG: 30 INJECTION, SOLUTION INTRAMUSCULAR at 11:37

## 2020-01-31 RX ADMIN — FENTANYL CITRATE 50 MCG: 50 INJECTION, SOLUTION INTRAMUSCULAR; INTRAVENOUS at 10:56

## 2020-01-31 RX ADMIN — CEFAZOLIN SODIUM 1 G: 2 INJECTION, SOLUTION INTRAVENOUS at 10:00

## 2020-01-31 RX ADMIN — KETAMINE HYDROCHLORIDE 50 MG: 10 INJECTION, SOLUTION INTRAMUSCULAR; INTRAVENOUS at 08:16

## 2020-01-31 RX ADMIN — HYDROMORPHONE HYDROCHLORIDE 1 MG: 1 INJECTION, SOLUTION INTRAMUSCULAR; INTRAVENOUS; SUBCUTANEOUS at 08:17

## 2020-01-31 RX ADMIN — Medication 1 MG: at 11:54

## 2020-01-31 RX ADMIN — FENTANYL CITRATE 100 MCG: 50 INJECTION, SOLUTION INTRAMUSCULAR; INTRAVENOUS at 07:50

## 2020-01-31 RX ADMIN — FENTANYL CITRATE 50 MCG: 50 INJECTION, SOLUTION INTRAMUSCULAR; INTRAVENOUS at 08:21

## 2020-01-31 RX ADMIN — SODIUM CHLORIDE, POTASSIUM CHLORIDE, SODIUM LACTATE AND CALCIUM CHLORIDE: 600; 310; 30; 20 INJECTION, SOLUTION INTRAVENOUS at 07:42

## 2020-01-31 RX ADMIN — HYDROMORPHONE HYDROCHLORIDE 0.5 MG: 1 INJECTION, SOLUTION INTRAMUSCULAR; INTRAVENOUS; SUBCUTANEOUS at 12:41

## 2020-01-31 RX ADMIN — CEFAZOLIN SODIUM 2 G: 2 INJECTION, SOLUTION INTRAVENOUS at 08:07

## 2020-01-31 RX ADMIN — ONDANSETRON HYDROCHLORIDE 4 MG: 2 INJECTION, SOLUTION INTRAVENOUS at 11:35

## 2020-01-31 RX ADMIN — MIDAZOLAM 2 MG: 1 INJECTION INTRAMUSCULAR; INTRAVENOUS at 07:44

## 2020-01-31 RX ADMIN — OXYCODONE HYDROCHLORIDE 5 MG: 5 TABLET ORAL at 12:46

## 2020-01-31 RX ADMIN — PHENYLEPHRINE HYDROCHLORIDE 200 MCG: 10 INJECTION INTRAVENOUS at 08:07

## 2020-01-31 RX ADMIN — ROCURONIUM BROMIDE 10 MG: 10 INJECTION INTRAVENOUS at 10:24

## 2020-01-31 RX ADMIN — ROCURONIUM BROMIDE 25 MG: 10 INJECTION INTRAVENOUS at 08:43

## 2020-01-31 RX ADMIN — LIDOCAINE HYDROCHLORIDE 30 MG: 10 INJECTION, SOLUTION EPIDURAL; INFILTRATION; INTRACAUDAL; PERINEURAL at 07:50

## 2020-01-31 RX ADMIN — Medication 10 MG: at 08:12

## 2020-01-31 RX ADMIN — ROCURONIUM BROMIDE 15 MG: 10 INJECTION INTRAVENOUS at 09:05

## 2020-01-31 RX ADMIN — GLYCOPYRROLATE 0.6 MG: 0.2 INJECTION, SOLUTION INTRAMUSCULAR; INTRAVENOUS at 11:35

## 2020-01-31 RX ADMIN — ROCURONIUM BROMIDE 10 MG: 10 INJECTION INTRAVENOUS at 10:04

## 2020-01-31 RX ADMIN — GLYCOPYRROLATE 0.2 MG: 0.2 INJECTION, SOLUTION INTRAMUSCULAR; INTRAVENOUS at 07:50

## 2020-01-31 RX ADMIN — DEXAMETHASONE SODIUM PHOSPHATE 4 MG: 4 INJECTION, SOLUTION INTRA-ARTICULAR; INTRALESIONAL; INTRAMUSCULAR; INTRAVENOUS; SOFT TISSUE at 07:51

## 2020-01-31 RX ADMIN — ROCURONIUM BROMIDE 50 MG: 10 INJECTION INTRAVENOUS at 07:51

## 2020-01-31 RX ADMIN — PHENYLEPHRINE HYDROCHLORIDE 100 MCG: 10 INJECTION INTRAVENOUS at 08:30

## 2020-01-31 RX ADMIN — SCOPALAMINE 1 PATCH: 1 PATCH, EXTENDED RELEASE TRANSDERMAL at 07:14

## 2020-01-31 RX ADMIN — PROPOFOL 50 MCG/KG/MIN: 10 INJECTION, EMULSION INTRAVENOUS at 07:54

## 2020-01-31 RX ADMIN — GLYCOPYRROLATE 0.2 MG: 0.2 INJECTION, SOLUTION INTRAMUSCULAR; INTRAVENOUS at 11:54

## 2020-01-31 ASSESSMENT — MIFFLIN-ST. JEOR: SCORE: 1585.7

## 2020-01-31 NOTE — ANESTHESIA CARE TRANSFER NOTE
Patient: Lyubov Sanchez    Procedure(s):  DaVinci robotic-assisted total laparoscopic hysterectomy, bilateral salpingectomy    Diagnosis: Menometrorrhagia [N92.1]  Intramural leiomyoma of uterus [D25.1]  Encounter for removal of intrauterine contraceptive device [Z30.432]  Diagnosis Additional Information: No value filed.    Anesthesia Type:   General, ETT     Note:  Airway :Face Mask  Patient transferred to:PACU  Comments: Pt transferred to PACU; VSS; Report to RN; No questions or complaints at this time. Handoff Report: Identifed the Patient, Identified the Reponsible Provider, Reviewed the pertinent medical history, Discussed the surgical course, Reviewed Intra-OP anesthesia mangement and issues during anesthesia, Set expectations for post-procedure period and Allowed opportunity for questions and acknowledgement of understanding      Vitals: (Last set prior to Anesthesia Care Transfer)    CRNA VITALS  1/31/2020 1126 - 1/31/2020 1203      1/31/2020             Pulse:  87    SpO2:  97 %    Resp Rate (observed):  20                Electronically Signed By: BARRINGTON Wan CRNA  January 31, 2020  12:03 PM

## 2020-01-31 NOTE — ANESTHESIA POSTPROCEDURE EVALUATION
Patient: Lyubov Sanchez    Procedure(s):  DaVinci robotic-assisted total laparoscopic hysterectomy, bilateral salpingectomy    Diagnosis:Menometrorrhagia [N92.1]  Intramural leiomyoma of uterus [D25.1]  Encounter for removal of intrauterine contraceptive device [Z30.432]  Diagnosis Additional Information: No value filed.    Anesthesia Type:  General, ETT    Note:  Anesthesia Post Evaluation    Patient location during evaluation: PACU  Patient participation: Able to fully participate in evaluation  Level of consciousness: awake and alert  Pain management: adequate  multimodal analgesia used between 6 hours prior to anesthesia start to PACU dischargeAirway patency: patent  Cardiovascular status: acceptable  Respiratory status: acceptable  two or more mitigation strategies used for obstructive sleep apneaHydration status: acceptable  PONV: none     Anesthetic complications: None          Last vitals:  Vitals:    01/31/20 1300 01/31/20 1328 01/31/20 1349   BP: 121/70 127/78 129/78   Pulse:      Resp: 18 18 16   Temp: 97.9  F (36.6  C) 97.3  F (36.3  C) 98.7  F (37.1  C)   SpO2: 94% 93% 94%         Electronically Signed By: Ulises Arredondo MD  January 31, 2020  4:17 PM

## 2020-01-31 NOTE — ANESTHESIA PREPROCEDURE EVALUATION
Anesthesia Pre-Procedure Evaluation    Patient: Lyubov Sanchez   MRN: [id number] : 1970          Preoperative Diagnosis: Menometrorrhagia [N92.1]  Intramural leiomyoma of uterus [D25.1]  Encounter for removal of intrauterine contraceptive device [Z30.432]    Procedure(s):  DaVinci robotic-assisted total laparoscopic hysterectomy, bilateral salpingectomy    Past Medical History:   Diagnosis Date     Arthritis      Depressive disorder      DYSPLASIA OF CERVIX 3/6/2003     Dysplasia of cervix (uteri)     Rx cryotherapy , and      Hypertension     NO cardiology     Incomplete right bundle branch block 11/10/2017     Migraine      Other chronic pain     Knee pain for 8 years     Plantar fasciitis     bilat     Unspecified sleep apnea     CPAP will bring on the day of surgery.     Past Surgical History:   Procedure Laterality Date     ARTHROPLASTY KNEE Right 2017    Procedure: ARTHROPLASTY KNEE;  Right total knee arthroplasty using the Arthrex iBalance total knee system;  Surgeon: Hebert Lee MD;  Location: [facility] OR     ARTHROPLASTY KNEE Left 3/19/2018    Procedure: ARTHROPLASTY KNEE;  Left total knee arthroplasty using an Arthrex iBalance total knee system;  Surgeon: Hebert Lee MD;  Location: [facility] OR     C  DELIVERY ONLY  ,    , Low Cervical     ENT SURGERY       HC COLP VAGINA W CERVIX IF PRES W BIOPSY      [place] for ASCUS     TONSILLECTOMY & ADENOIDECTOMY       Anesthesia Evaluation     . Pt has had prior anesthetic. Type: General    No history of anesthetic complications          ROS/MED HX    ENT/Pulmonary:     (+)sleep apnea, uses CPAP , . .    Neurologic:  - neg neurologic ROS     Cardiovascular: Comment: PFO vs. ASD vs. artifact    (+) hypertension----. : . . . :. . pulmonary hypertension, Previous cardiac testing Echodate:results:Left ventricular systolic function is normal.  The visual ejection fraction is estimated at  55-60%.  There is mild (1+) tricuspid regurgitation.  Right ventricular systolic pressure is elevated, consistent with mild  pulmonary hypertension.  The right ventricular systolic pressure is approximated at 36mmHg plus the  right atrial pressure.  The ascending aorta is Borderline dilated.(3.9cm)  Left to right atrial shunt, small. This suggests small patent foramane ovale  vs small atrial septal defect.  The study was technically adequate. There is no comparison study available.date: results: date: results: date: results:          METS/Exercise Tolerance:  4 - Raking leaves, gardening   Hematologic:  - neg hematologic  ROS       Musculoskeletal:  - neg musculoskeletal ROS       GI/Hepatic:  - neg GI/hepatic ROS       Renal/Genitourinary:  - ROS Renal section negative       Endo:     (+) Obesity, .      Psychiatric:  - neg psychiatric ROS       Infectious Disease:  - neg infectious disease ROS       Malignancy:         Other:    (+) H/O Chronic Pain,                        Physical Exam  Normal systems: cardiovascular, pulmonary and dental    Airway   Mallampati: II  TM distance: >3 FB  Neck ROM: full    Dental     Cardiovascular       Pulmonary             Lab Results   Component Value Date    WBC 7.4 01/24/2020    HGB 14.5 01/24/2020    HCT 44.0 01/24/2020     01/24/2020    SED 6 10/06/2014     01/24/2020    POTASSIUM 4.2 01/24/2020    CHLORIDE 105 01/24/2020    CO2 30 01/24/2020    BUN 16 01/24/2020    CR 0.64 01/24/2020    GLC 85 01/24/2020    CARROL 9.3 01/24/2020    MAG 1.4 (L) 06/21/2015    ALBUMIN 4.1 01/24/2020    PROTTOTAL 7.2 01/24/2020    ALT 29 01/24/2020    AST 17 01/24/2020    ALKPHOS 44 01/24/2020    BILITOTAL 1.8 (H) 01/24/2020    INR 1.00 06/21/2015    TSH 0.79 02/26/2018    HCG Negative 10/31/2019    HCGS Negative 06/19/2015       Preop Vitals  BP Readings from Last 3 Encounters:   01/31/20 127/83   01/24/20 116/76   01/24/20 110/74    Pulse Readings from Last 3 Encounters:   01/24/20  "57   01/22/20 54   12/21/19 60      Resp Readings from Last 3 Encounters:   01/31/20 17   01/24/20 16   12/21/19 16    SpO2 Readings from Last 3 Encounters:   01/31/20 95%   01/24/20 99%   12/21/19 97%      Temp Readings from Last 1 Encounters:   01/31/20 98  F (36.7  C) (Temporal)    Ht Readings from Last 1 Encounters:   01/31/20 1.626 m (5' 4\")      Wt Readings from Last 1 Encounters:   01/31/20 97.1 kg (214 lb)    Estimated body mass index is 36.73 kg/m  as calculated from the following:    Height as of this encounter: 1.626 m (5' 4\").    Weight as of this encounter: 97.1 kg (214 lb).       Anesthesia Plan      History & Physical Review  History and physical reviewed and following examination; no interval change.    ASA Status:  3 .    NPO Status:  > 8 hours    Plan for General and ETT with Intravenous induction. Maintenance will be Balanced.    PONV prophylaxis:  Ondansetron (or other 5HT-3), Dexamethasone or Solumedrol and Scopolamine patch       Postoperative Care  Postoperative pain management:  Oral pain medications, IV analgesics and Multi-modal analgesia.      Consents  Anesthetic plan, risks, benefits and alternatives discussed with:  Patient..                 Ulises Arredondo MD                    .  "

## 2020-01-31 NOTE — DISCHARGE INSTRUCTIONS
DR. NAYELY SHERIFF M.D.   CLINIC PHONE NUMBER [phone].  [facility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aximum acetaminophen (Tylenol) dose from all sources should not exceed 4 grams (4000 mg) per day.  You received 975 mg of tylenol at 0715 this am today.      Transderm Scop (Scopolamine) Patch    The Transderm Scop patch placed behind your ear helps to prevent nausea and vomiting associated with the use of anesthesia and certain analgesics used during or after many types surgery.  You will need to remove this patch after 3 days.  However, it may be removed sooner if you are no longer concerned about nausea or vomiting.      The most common side effects:  ?  dryness of the mouth  ?  drowsiness  ?  blurred vision  ?  dilation of pupils  ?  disorientation, memory disturbances and/or confusion  ?  dizziness  ?  restlessness  ?  hallucinations  ?  difficulty urinating  ?  skin rash  ?  red or painful eyes    Avoid drinking alcohol while using Transderm Scop patch.  Be careful about driving or operating any machinery while using this medication since it may make you drowsy.  In the unlikely event that you experience pain in the eye, which may be accompanied by widening of the pupil, eye redness and blurred vision, remove the Transderm Scop Patch immediately and consult your doctor.    Removal of Transderm Scop Patch:  To remove the patch simply peel it off your skin.  Be sure to wash your hands and the area behind your ear thoroughly with soap and water.  The Transderm Scop Patch will continue to have some active ingredient after use.  Therefore, to avoid accidental contact or ingestion by children or pets, fold the used patch in half with the sticky side together and dispose in the trash out of reach of children and pets.  If you wear contact lens, be sure to wash your hands first before handling contact lens.      Removal date:_____2/3/2020  Monday_________________.      GENERAL ANESTHESIA OR SEDATION ADULT DISCHARGE INSTRUCTIONS   SPECIAL PRECAUTIONS FOR 24 HOURS AFTER  SURGERY    IT IS NOT UNUSUAL TO FEEL LIGHT-HEADED OR FAINT, UP TO 24 HOURS AFTER SURGERY OR WHILE TAKING PAIN MEDICATION.  IF YOU HAVE THESE SYMPTOMS; SIT FOR A FEW MINUTES BEFORE STANDING AND HAVE SOMEONE ASSIST YOU WHEN YOU GET UP TO WALK OR USE THE BATHROOM.    YOU SHOULD REST AND RELAX FOR THE NEXT 24 HOURS AND YOU MUST MAKE ARRANGEMENTS TO HAVE SOMEONE STAY WITH YOU FOR AT LEAST 24 HOURS AFTER YOUR DISCHARGE.  AVOID HAZARDOUS AND STRENUOUS ACTIVITIES.  DO NOT MAKE IMPORTANT DECISIONS FOR 24 HOURS.    DO NOT DRIVE ANY VEHICLE OR OPERATE MECHANICAL EQUIPMENT FOR 24 HOURS FOLLOWING THE END OF YOUR SURGERY.  EVEN THOUGH YOU MAY FEEL NORMAL, YOUR REACTIONS MAY BE AFFECTED BY THE MEDICATION YOU HAVE RECEIVED.    DO NOT DRINK ALCOHOLIC BEVERAGES FOR 24 HOURS FOLLOWING YOUR SURGERY.    DRINK CLEAR LIQUIDS (APPLE JUICE, GINGER ALE, 7-UP, BROTH, ETC.).  PROGRESS TO YOUR REGULAR DIET AS YOU FEEL ABLE.    YOU MAY HAVE A DRY MOUTH, A SORE THROAT, MUSCLES ACHES OR TROUBLE SLEEPING.  THESE SHOULD GO AWAY AFTER 24 HOURS.    CALL YOUR DOCTOR FOR ANY OF THE FOLLOWING:  SIGNS OF INFECTION (FEVER, GROWING TENDERNESS AT THE SURGERY SITE, A LARGE AMOUNT OF DRAINAGE OR BLEEDING, SEVERE PAIN, FOUL-SMELLING DRAINAGE, REDNESS OR SWELLING.    IT HAS BEEN OVER 8 TO 10 HOURS SINCE SURGERY AND YOU ARE STILL NOT ABLE TO URINATE (PASS WATER).       You received Toradol, an IV form of ibuprofen (Motrin) at 11:30.  Do not take any ibuprofen products until  5:30.

## 2020-01-31 NOTE — OP NOTE
Operative Report    PREOPERATIVE DIAGNOSIS:  Menometrorrhagia, Uterine fibroids, Intrauterine device removal   POSTOPERATIVE DIAGNOSES:  Same  PROCEDURE: Robotic-assisted laparoscopic total hysterectomy, Bilateral salpingectomy, Removal of intrauterine device in situ  SURGEON: Venancio Bartlett MD   ASSISTANT: Audrey Bravo [facility]   ANESTHESIA: General endotracheal anesthesia.   FLUIDS: 1900 cc of lactated Ringers, Ancef 2 gram(s)  IV preop, plus 1 gram x 1 intraop.   ESTIMATED BLOOD LOSS: 50 cc.   DRAINS: None.   INDICATIONS FOR PROCEDURE:  Patient  is a 49 year old woman , who has had menometrorrhagia due to a large fibroids uterus that has not improved despite a Mirena intrauterine device.  Therefore she desires definitive therapy via hysterectomy.  As to the route of the procedure, she wants the robotic-assisted approach as given her enlarged uterus and limited descent due to the uterine size.  This will maximize her chances of avoiding abdominal hysterectomy.  To reduce the risk of possible tubal sources of ovarian cancers, a bilateral salpingectomy will also be done.  The ovaries will be preserved assuming they are normal. The intrauterine device will be removed in the process of the hysterectomy or just prior to the commencement of the surgery if strings are visible at the os. She also understands that due to the large fibroid uterus, the uterus and tubes will be removed via powered morcellation in a Pneumoliner bag.   She understands that the risks of the procedure includes bleeding, infection, injury to the bowel, bladder, ureters, ovaries, nerves, blood vessels, and any other intra-abdominal or pelvic organs and the risks of general anesthesia including aspiration, malignant hypothermia and death.  She accepts all these risks and factors in the decisionmaking process of proceeding with this procedure and she has given informed consent.   FINDINGS: On pelvic exam under anesthesia, the uterus is  approximately 14-16 weeks size irregular globular contour due to large fundal myomata. The adnexa are without masses. The IUD strings were not seen at the external os. On robotic-assisted laparoscopy, the uterus was noted to be 14-16 weeks size with a large 8 cm intramural fundal myoma and a 5 cm right cornual subserosal myoma.  The ovaries and tubes appeared normal bilaterally. The cul-de-sac appeared normal.  There were 2 small omental adhesions to the anterior abdominal wall.  There was no endometriosis.  The upper abdomen was normal.  During morcellation of the uterus in the bag, the Mirena IUD was found in the bag and removed intact. After the uterus was detached from the vagina and after the cuff was closed, a small 3 cm rent was noted in the colonic mesenteric peritoneum which did not enter the colon itself and was hemostatic.  It was felt to possibly have been due to a laparoscopic instrument, possibly the suction , during its use as a retractor to move the colon out of the pelvis.  Dr. Timmy Delong of General Surgery was able to provide intraoperative evaluation to confirm that no further intervention was needed for this small peritoneal defect.  SPECIMENS: Uterus and cervix, both tubes.   Total weight was 684 g.  PROCEDURE IN DETAIL:   After proper patient identification and consent for procedure was obtained, the patient was taken to the operating room where adequate general endotracheal anesthesia was obtained. The patient was placed in dorsal lithotomy position with legs in William stirrups and positioned appropriately for robotic-assisted hysterectomy. She was prepped and draped in the usual sterile manner for this procedure.   A Martell catheter was placed. A single arm speculum was placed in the vagina to visualize the cervix.  The IUD strings were not visible at the os and had retracted into the uterine cavity. The cervix was then grasped at 12 o'clock with a single-tooth tenaculum for  downward traction of the uterus. The cervix was gently dilated up to a #8 Hegar dilator. The large V-Care was inserted through the endocervical canal into the uterine cavity, and the balloon was inflated with saline. The tenaculum and speculum were removed.  The V-Care cup was advanced to the fornices, and the locking cup was advanced to lock the cup in place.  Attention was then turned to the abdomen.   With abdominal wall tenting, a Veress needle was inserted through the umbilicus into the peritoneal cavity.  Low flow insufflation with CO2 was begun with opening pressure of 5 mmHg.  After 1 liter was insufflated with low flow, high flow insufflation completed the process for a total of 2.5 L,  The Veress needle was removed.  A 5 mm skin incision was made with the knife in the right upper quadrant for the assistant port.   A 5 mm AirSeal port was inserted under direct visualization with the laparoscope without trauma to underlying viscera.  The trocar was removed and the scope reinserted confirming atraumatic placement.  The initial assessment revealed the above noted findings.  Another incision was placed approximately 3 cm above the umbilicus in the midline transversely and then a 8-mm robotic camera port was inserted under direct visualization with the 5 mm laparoscope into the peritoneal cavity without difficulty. There were no intraabdominal adhesions underneath this port site as well as no injury to the abdominal viscera.  The scope was placed through the camera port.  The left and right 8 mm robotic ports were then placed after determining proper position for each port. An 8 mm skin incision was made with a knife bilaterally and the ports were placed under direct visualization with the laparoscope.  Once all ports were placed correctly as per the [employer] specifications, the [employer] surgical robot was then moved into position and docked to the camera and robotic arm ports. The camera was then placed into the  central port and a fenestrated bipolar grasper was placed in the left port and a monopolar scissor was placed in the right port.   At this point I assumed my position at the console, and the ureters were identified bilaterally and avoided during the procedure.  The right mesosalpinx was sealed and divided using the fenestrated bipolar and monopolar scissors. The uterus was always elevated cranially with one of the assistants elevating the V-Care to keep the ureters out of harm's way.  Attention was then turned to the contralateral side where a similar process was performed sealing and dividing the mesosalpinx. The round ligaments were likewise sealed and divided bilaterally.  This allowed opening of the broad ligament and access to the retroperitoneal space.  The utero-ovarian ligaments were sealed and divided.  The anterior leaf of the broad ligament was then divided by combination of the fenestrated bipolar and/or monopolar cautery with the scissors to the level of the bladder flap. The 2 sides were connected in the central portion and the bladder flap was developed with sharp dissection and also using monopolar cautery keeping it well away from the actual bladder.   Once the bladder was well below the V-Care cup assuring that the bladder and ureters were out of harm's way, the uterine vessels were then sealed and divided along the uterine sidewall. This was done on both sides using the fenestrated bipolar and monopolar scissors.  A colpotomy was then performed using scissors on pure cutting current posteriorly using the V-Care cup as a guide, and the vaginal cuff was incised bilaterally until near the uterine vessels, still preserving some minimal vaginal bleeding from the cuff edges to maintain good vascularization of the tissues.  The anterior colpotomy was likewise performed.  These transection sites were merged to the anterior and posterior colpotomies resulting in amputation of the cervix from the vaginal  vault.  The uterus and tubes were brought out by the assistant into the vagina and removed.  A packing was placed in the vagina to maintain pneumoperitoneum.   The vaginal cuff was then closed using a 2-0 V-Loc 180 suture in a running horizontal manner starting from the right apex and going across to the left apex and over-sewing it back re-approximating the endopelvic fascial layer the full width of the cuff to make a full 2-layer closure.  The stitch continued back again for 2 throws to anchor it.  The pelvis was irrigated with normal saline and cleared of any clots and debris. All pedicles and the vaginal cuff were noted to be hemostatic after depressurizing the abdomen to 8 mmHg. A swatch of Interceed was placed over the vaginal cuff, covering the barbed suture line as well as surrounding tissues.   At this point, all pedicles were still hemostatic and so all the robot ports were undocked, and the robot was moved away from the operative field.   The central camera port was removed and the incision enlarged to 4 cm to allow for the TriPort.  The subcutaneous tissues and the fascial puncture site were enlarged with the bovie.  Two O-Vicryl stay-sutures were placed into the fascia and tagged for fascial closure at the end.  The TriPort sleeve was introduced.  The boot was placed and the pneumoperitoneum was reinsufflated.  The scope was inserted and the Pneumoliner was also deployed into the abdomen.  Using graspers, the specimen was guided into the bag and the wire was drawn up cinching the bag edges and retaining the specimen in it.  The boot was removed and the bag edges were opened.  The boot was replaced.  The other ports and instruments were removed and the Pneumoliner was insufflated.  The scope was re-inserted and the specimen was confirmed in the bag, and the bag was intact.  Using the Deirdre power morcellator, the specimen was morcellated and removed in pieces until the remnants were small enough to remove  in the bag. During morcellation, the IUD was identified and removed intact and discarded. The boot was then removed, and the bag withdrawn.  The specimen was sent to pathology.  The O-Vicryl fascial stay sutures were tied, closing the fascia.  The pneumoperitoneum was released from the abdomen and all ports were removed. All skin incisions were reapproximated with 4-0 Vicryl subcuticular simple sutures. 0.5% Marcaine was infiltrated into all port sites for postop analgesia. Steri-Strips were placed.   The patient tolerated the procedure well. Sponge, instrument and needle counts were correct x3. The patient was taken to the recovery room and extubated in stable condition.     Venancio Bartlett MD

## 2020-02-03 LAB — COPATH REPORT: NORMAL

## 2020-02-04 ENCOUNTER — VIRTUAL VISIT (OUTPATIENT)
Dept: FAMILY MEDICINE | Facility: OTHER | Age: 50
End: 2020-02-04

## 2020-02-04 ENCOUNTER — TELEPHONE (OUTPATIENT)
Dept: OBGYN | Facility: CLINIC | Age: 50
End: 2020-02-04

## 2020-02-04 DIAGNOSIS — B37.31 YEAST INFECTION OF THE VAGINA: Primary | ICD-10-CM

## 2020-02-04 RX ORDER — FLUCONAZOLE 150 MG/1
TABLET ORAL
Qty: 2 TABLET | Refills: 0 | Status: SHIPPED | OUTPATIENT
Start: 2020-02-04 | End: 2020-03-30

## 2020-02-04 NOTE — PROGRESS NOTES
"Date: 2020 10:50:14  Clinician: Bertrand Rivera  Clinician NPI: [id number]  Patient: Lyubov Sanchez  Patient : 1970  Patient Address: [address] [place] [id number]  Patient Phone: [phone]  Visit Protocol: Yeast infection  Patient Summary:  Lyubov is a 49 year old ( : 1970 ) female who initiated a Visit for a presumed vaginal yeast infection. When asked the question \"Please sign me up to receive news, health information and promotions from [employer].\", Lyubov responded \"Yes\".    Lyubov began noticing vaginal burning, vaginal irritation, and vaginal pruritus today.   She denies having abdominal pain, blisters, open sores, and vaginal discharge. She also denies feeling feverish.   She has not tried to treat her current symptoms with any medication.   Precipitating events  Lyubov denies having a sexually transmitted disease.   Pertinent medical history  Lyubov has a history of vaginal yeast infections. She has had zero (0) occurrences in the past year and the current symptoms are similar to previous yeast infections. She has used fluconazole (Diflucan) to treat previous yeast infections. 2 doses of fluconazole (Diflucan) has typically been needed for symptoms to resolve in the past.  She prefers a pill. She is currently taking or has taken antibiotics in the past 2 weeks.   She does NOT smoke or use smokeless tobacco.   She denies pregnancy and denies breastfeeding. She has menstruated in the past month.      MEDICATIONS: amlodipine besylate (bulk), lisinopril-hydrochlorothiazide oral, citalopram oral, ALLERGIES: Penicillins  Clinician Response:  Dear Lyubov,  Based on the information you have provided, you likely have a vaginal yeast infection which is a common infection of the vagina caused by a fungus. Yeast are a type of fungus.  The most common symptom of a yeast infection is itching in and around the vagina. Other signs and symptoms include burning, redness, or a thick, white vaginal " discharge that looks like cottage cheese and does not have a bad smell.  Medication information  I am prescribing:     Terconazole 80 mg vaginal suppository. Insert 1 suppository into vagina 1 time per day at bedtime for 3 days. There are no refills with this prescription.   Self care  Steps you can take to prevent symptoms of future vaginal yeast infection:     Avoid irritants such as scented bath products, tampons, pads, or vaginal sprays and powders    Avoid douching    Wear cotton underwear and if you are comfortable doing so, do not wear underwear to bed    Avoid hot tubs and whirlpool spas     When to seek care  Most women notice improvement in their symptoms within 1-2 days after starting treatment with complete clearing in 5-7 days.  Please make an appointment to be seen in a clinic or urgent care if any of the following occur:     Your symptoms have not improved in 3 days or not resolved in 10 days    You develop new symptoms or your symptoms become worse    If you think you may be at risk for an STD      Diagnosis: Candida vulvovaginitis  Diagnosis ICD: B37.3  Prescription: terconazole 80 mg vaginal suppository 1 3 suppository box, 3 days supply. Insert 1 suppository into vagina 1 time per day at bedtime for 3 days. Refills: 0, Refill as needed: no, Allow substitutions: yes  Addendum created: February 04 11:06:51, 2020 created by: Bertrand Rivera body: Unfortunately, do to drug to drug interactions with her current medications, on line care cannot prescribe diflucan .  She would need to get this from her GYN or PCP

## 2020-02-17 ENCOUNTER — TELEPHONE (OUTPATIENT)
Dept: OBGYN | Facility: CLINIC | Age: 50
End: 2020-02-17

## 2020-02-17 DIAGNOSIS — B37.31 YEAST INFECTION OF THE VAGINA: Primary | ICD-10-CM

## 2020-02-17 RX ORDER — FLUCONAZOLE 150 MG/1
TABLET ORAL
Qty: 2 TABLET | Refills: 0 | Status: SHIPPED | OUTPATIENT
Start: 2020-02-17 | End: 2020-03-30

## 2020-02-17 NOTE — TELEPHONE ENCOUNTER
Patient calling:  [facility] on 1/31/20  1.Has a golf ball sized squishy lump above umbilicus incision  Noticed on Friday. Not red or painful.  Has not changed in size.  No fever.  2. Took last of 3 doses of Diflucan on 2/13/20 and symptoms improved, but not returning.  Vaginal itching.    Please advise.  Leticia Jacobo RN

## 2020-02-17 NOTE — TELEPHONE ENCOUNTER
Advise Pt that I would like her to come in for an exam to make sure everything is healing okay, and to see if her vaginal itching is due to yeast recurrence or other cause.  I have a couple openings on Wed in [place].  In the meantime, I'll have her repeat the Diflucan course one more time.  Rx sent to her pharm.

## 2020-03-13 DIAGNOSIS — F33.0 MAJOR DEPRESSIVE DISORDER, RECURRENT EPISODE, MILD (H): ICD-10-CM

## 2020-03-13 DIAGNOSIS — I10 ESSENTIAL HYPERTENSION, BENIGN: ICD-10-CM

## 2020-03-13 RX ORDER — CITALOPRAM HYDROBROMIDE 20 MG/1
TABLET ORAL
Qty: 90 TABLET | Refills: 0 | Status: SHIPPED | OUTPATIENT
Start: 2020-03-13 | End: 2020-04-29

## 2020-03-13 NOTE — TELEPHONE ENCOUNTER
Routing refill request to provider for review/approval because:  Drug interaction warning  Raymundo Hoffmann RN, BSN

## 2020-03-13 NOTE — TELEPHONE ENCOUNTER
Routing refill request to provider for review/approval because:  A break in medication  Last prescribed for 6 months on 4/24/2019. Pt would have run out in October 2019 and was just recently seen in clinic  Raymundo Hoffmann RN, BSN

## 2020-03-16 RX ORDER — AMLODIPINE BESYLATE 5 MG/1
TABLET ORAL
Qty: 90 TABLET | Refills: 1 | Status: SHIPPED | OUTPATIENT
Start: 2020-03-16 | End: 2020-04-29

## 2020-03-27 ENCOUNTER — TELEPHONE (OUTPATIENT)
Dept: OBGYN | Facility: CLINIC | Age: 50
End: 2020-03-27

## 2020-03-27 ENCOUNTER — VIRTUAL VISIT (OUTPATIENT)
Dept: OBGYN | Facility: CLINIC | Age: 50
End: 2020-03-27
Payer: COMMERCIAL

## 2020-03-27 VITALS — BODY MASS INDEX: 37.22 KG/M2 | HEIGHT: 64 IN | WEIGHT: 218 LBS

## 2020-03-27 DIAGNOSIS — Z98.890 POSTOPERATIVE STATE: Primary | ICD-10-CM

## 2020-03-27 PROCEDURE — 99024 POSTOP FOLLOW-UP VISIT: CPT | Performed by: OBSTETRICS & GYNECOLOGY

## 2020-03-27 ASSESSMENT — MIFFLIN-ST. JEOR: SCORE: 1603.84

## 2020-03-27 NOTE — Clinical Note
Please contact the patient and assist her in arranging an office postop office visit with Dr. Bartlett following her surgery

## 2020-03-27 NOTE — PROGRESS NOTES
"Lyubov Sanchez is a 49 year old female who is being evaluated via a billable telephone visit.      The patient has been notified of following:     \"This telephone visit will be conducted via a call between you and your physician/provider. We have found that certain health care needs can be provided without the need for a physical exam.  This service lets us provide the care you need with a short phone conversation.  If a prescription is necessary we can send it directly to your pharmacy.  If lab work is needed we can place an order for that and you can then stop by our lab to have the test done at a later time.    If during the course of the call the physician/provider feels a telephone visit is not appropriate, you will not be charged for this service.\"     Lyubov Sanchez presents today for a phone visit following her surgery  Chief Complaint   Patient presents with     Post-op Visit     2020     Patient  is a 49 year old woman , who has had menometrorrhagia due to a large fibroids uterus that has not improved despite a Mirena intrauterine device.  Therefore she desires definitive therapy via hysterectomy.  On 2020 the patient underwent the following procedure  Robotic-assisted laparoscopic total hysterectomy, Bilateral salpingectomy, Removal of intrauterine device in situ     The patient has not been seen postoperatively by a physician.  The pathology report showed the following    FINAL DIAGNOSIS:   Uterus, cervix, and bilateral fallopian tubes, morcellated hysterectomy   and salpingectomies.   - Inactive appearing endometrium with features consistent with exogenous   hormonal/IUD effect without evidence   of hyperplasia.   - Cervix without evidence of dysplasia.   - Cystic paratubal [place] nests.   - Negative for malignancy.    She said that she is doing well.  She still having a small amount of vaginal drainage but her pain is in good control with normal bowel bladder function.  I did review " with her that because of her surgical state that she should be seen for a final postop visit and I will have my office staff contact her to arrange this with Dr. Bartlett in the near future  I have reviewed and updated the patient's Past Medical History, Social History, Family History and Medication List.    ALLERGIES  Penicillins    (Z98.890) Postoperative state  (primary encounter diagnosis)  Comment: Patient is doing well but has not seen a physician after surgery   Plan: please contact the patient to arrange an office visit With her surgeon to document complete healing

## 2020-03-27 NOTE — PATIENT INSTRUCTIONS
You can reach your [facility] Care Team any time of the day by calling [phone]. This number will put you in touch with the 24 hour nurse line if the clinic is closed.    To contact your OB/GYN Station Coordinator/Surgery Scheduler please call [phone]. This is a direct number for your care team between 8 a.m. and 4 p.m. Monday through Friday.    [facility] is open for your convenience:  Monday through Friday 8 a.m. to 6 p.m.  Closed weekends and all major holidays.

## 2020-03-30 ENCOUNTER — OFFICE VISIT (OUTPATIENT)
Dept: OBGYN | Facility: CLINIC | Age: 50
End: 2020-03-30
Payer: COMMERCIAL

## 2020-03-30 VITALS — SYSTOLIC BLOOD PRESSURE: 102 MMHG | DIASTOLIC BLOOD PRESSURE: 68 MMHG | BODY MASS INDEX: 38.93 KG/M2 | WEIGHT: 226.8 LBS

## 2020-03-30 DIAGNOSIS — R19.00 ABDOMINAL WALL BULGE: ICD-10-CM

## 2020-03-30 DIAGNOSIS — Z09 POSTOP CHECK: Primary | ICD-10-CM

## 2020-03-30 PROCEDURE — 99024 POSTOP FOLLOW-UP VISIT: CPT | Performed by: OBSTETRICS & GYNECOLOGY

## 2020-03-30 NOTE — PROGRESS NOTES
CC:  Here for post-op check-up.      HPI:  Procedure: Robotic TLH, Bilateral salpingectomy  Date of procedure: 1/31/2020  Post-op course: Did well for 2-3 weeks postop but then developed a bulge under the mid upper incision site where the powered morcellation in a bag was done. This was bigger than it is now, it did decrease in size but about 2 weeks ago it has remained unchanged.  No pain at the site.  Tolerating regular diet.  Normal bowel and bladder habits.  No vaginal bleeding or drainage.  Of note, she has a known umbilical hernia that she was planning on getting repaired with Dr. Lazaro in near future, but with the COVID-19 outbreak she will hold off for now.    Pathology:  Benign fibroids    OBJECTIVE:   /68 (BP Location: Right arm, Patient Position: Chair, Cuff Size: Adult Regular)   Wt 102.9 kg (226 lb 12.8 oz)   LMP  (LMP Unknown)   BMI 38.93 kg/m    Abdomen:  Obese, ND, soft, NT.  Incisions: Well-healed laparoscopy scars x 4, and the midline upper 4 cm incision is flat with no bulge while she is laying supine, and palpation reveals no masses or obvious fluid collection, but when she valsalvas or sits up, the area has a visible soft bulge.  Pelvic:  Vaginal cuff healing well with no erythema, induration, or drainage.    ASSESSMENT:   Encounter Diagnoses   Name Primary?     Postop check Yes     Abdominal wall bulge          PLAN:      1)  CT Abd to assess abdominal wall bulge as well as existing umbilical hernia.  2)  I advised Pt that the bulge could be a new second hernia where the fascia at that site may have dehisced, or it could be a seroma, or possibly just diastasis recti.  If it is a hernia, this could be addressed ideally by Dr. Lazaro when her umbilical hernia is repaired.  If it is a seroma, this could possibly be drained by IR.  3)  No further Paps needed as no Hx Cx CA or HGSIL and no Cx remains.  Can do annual bimanual exams with PCP with physicals.      Venancio Bartlett MD  [place]  [facility]

## 2020-04-01 ENCOUNTER — TELEPHONE (OUTPATIENT)
Dept: CARDIOLOGY | Facility: CLINIC | Age: 50
End: 2020-04-01

## 2020-04-01 NOTE — TELEPHONE ENCOUNTER
Called and spoke with Lyubov in regards to her upcoming apts . She will keep her apt for CONCEPCION and we will change her follow up with Dr Hodges on the 4/22 to a phone visit to review the findings on the CONCEPCION.Nora Storey RN

## 2020-04-15 ENCOUNTER — HOSPITAL ENCOUNTER (OUTPATIENT)
Dept: CT IMAGING | Facility: CLINIC | Age: 50
Discharge: HOME | End: 2020-04-15
Attending: OBSTETRICS & GYNECOLOGY | Admitting: OBSTETRICS & GYNECOLOGY
Payer: COMMERCIAL

## 2020-04-15 DIAGNOSIS — R19.00 ABDOMINAL WALL BULGE: ICD-10-CM

## 2020-04-15 PROBLEM — Z30.431 SURVEILLANCE OF INTRAUTERINE CONTRACEPTIVE DEVICE: Status: RESOLVED | Noted: 2019-10-31 | Resolved: 2020-04-15

## 2020-04-15 PROBLEM — Z30.432 ENCOUNTER FOR REMOVAL OF INTRAUTERINE CONTRACEPTIVE DEVICE: Status: RESOLVED | Noted: 2019-12-11 | Resolved: 2020-04-15

## 2020-04-15 PROBLEM — N92.1 MENORRHAGIA WITH IRREGULAR CYCLE: Status: RESOLVED | Noted: 2019-12-02 | Resolved: 2020-04-15

## 2020-04-15 PROBLEM — N92.1 MENOMETRORRHAGIA: Status: RESOLVED | Noted: 2019-12-11 | Resolved: 2020-04-15

## 2020-04-15 PROBLEM — K43.9 VENTRAL HERNIA WITHOUT OBSTRUCTION OR GANGRENE: Status: ACTIVE | Noted: 2020-04-15

## 2020-04-15 PROBLEM — D25.1 INTRAMURAL LEIOMYOMA OF UTERUS: Status: RESOLVED | Noted: 2019-12-11 | Resolved: 2020-04-15

## 2020-04-15 PROBLEM — D25.1 INTRAMURAL LEIOMYOMA OF UTERUS: Status: ACTIVE | Noted: 2019-12-11

## 2020-04-15 PROCEDURE — 74160 CT ABDOMEN W/CONTRAST: CPT

## 2020-04-15 PROCEDURE — 25000125 ZZHC RX 250: Performed by: RADIOLOGY

## 2020-04-15 PROCEDURE — 25000128 H RX IP 250 OP 636: Performed by: RADIOLOGY

## 2020-04-15 RX ORDER — IOPAMIDOL 755 MG/ML
500 INJECTION, SOLUTION INTRAVASCULAR ONCE
Status: COMPLETED | OUTPATIENT
Start: 2020-04-15 | End: 2020-04-15

## 2020-04-15 RX ADMIN — IOPAMIDOL 100 ML: 755 INJECTION, SOLUTION INTRAVENOUS at 09:59

## 2020-04-15 RX ADMIN — SODIUM CHLORIDE 65 ML: 9 INJECTION, SOLUTION INTRAVENOUS at 10:06

## 2020-04-17 ENCOUNTER — DOCUMENTATION ONLY (OUTPATIENT)
Dept: CARDIOLOGY | Facility: CLINIC | Age: 50
End: 2020-04-17

## 2020-04-17 NOTE — PROGRESS NOTES
Called and left  with Lyubov going over the instructions for her upcoming CONCEPCION. Told her to report to [facility] at 0930 for a CONCEPCION time of 1130. She will remain NPO after midnight and take her am pills with a small sip of water. I left her my number to call if she has any questions.Nora Storey RN

## 2020-04-20 ENCOUNTER — DOCUMENTATION ONLY (OUTPATIENT)
Dept: CARDIOLOGY | Facility: CLINIC | Age: 50
End: 2020-04-20

## 2020-04-20 ENCOUNTER — HOSPITAL ENCOUNTER (OUTPATIENT)
Dept: CARDIOLOGY | Facility: CLINIC | Age: 50
Discharge: HOME | End: 2020-04-20
Attending: INTERNAL MEDICINE | Admitting: INTERNAL MEDICINE
Payer: COMMERCIAL

## 2020-04-20 VITALS
DIASTOLIC BLOOD PRESSURE: 83 MMHG | SYSTOLIC BLOOD PRESSURE: 133 MMHG | RESPIRATION RATE: 16 BRPM | OXYGEN SATURATION: 94 %

## 2020-04-20 DIAGNOSIS — R00.2 PALPITATIONS: ICD-10-CM

## 2020-04-20 PROCEDURE — 93312 ECHO TRANSESOPHAGEAL: CPT | Mod: 26 | Performed by: INTERNAL MEDICINE

## 2020-04-20 PROCEDURE — 99152 MOD SED SAME PHYS/QHP 5/>YRS: CPT | Performed by: INTERNAL MEDICINE

## 2020-04-20 PROCEDURE — 93312 ECHO TRANSESOPHAGEAL: CPT

## 2020-04-20 PROCEDURE — 25000128 H RX IP 250 OP 636: Performed by: INTERNAL MEDICINE

## 2020-04-20 PROCEDURE — 93320 DOPPLER ECHO COMPLETE: CPT | Mod: 26 | Performed by: INTERNAL MEDICINE

## 2020-04-20 PROCEDURE — 93325 DOPPLER ECHO COLOR FLOW MAPG: CPT | Mod: 26 | Performed by: INTERNAL MEDICINE

## 2020-04-20 PROCEDURE — 25000125 ZZHC RX 250: Performed by: INTERNAL MEDICINE

## 2020-04-20 PROCEDURE — 25000125 ZZHC RX 250

## 2020-04-20 RX ORDER — GLYCOPYRROLATE 0.2 MG/ML
INJECTION INTRAMUSCULAR; INTRAVENOUS
Status: DISPENSED
Start: 2020-04-20 | End: 2020-04-20

## 2020-04-20 RX ORDER — LIDOCAINE 50 MG/G
OINTMENT TOPICAL ONCE
Status: DISCONTINUED | OUTPATIENT
Start: 2020-04-20 | End: 2020-04-21 | Stop reason: HOSPADM

## 2020-04-20 RX ORDER — FENTANYL CITRATE 50 UG/ML
INJECTION, SOLUTION INTRAMUSCULAR; INTRAVENOUS
Status: DISPENSED
Start: 2020-04-20 | End: 2020-04-20

## 2020-04-20 RX ORDER — NALOXONE HYDROCHLORIDE 0.4 MG/ML
INJECTION, SOLUTION INTRAMUSCULAR; INTRAVENOUS; SUBCUTANEOUS
Status: DISCONTINUED
Start: 2020-04-20 | End: 2020-04-20 | Stop reason: WASHOUT

## 2020-04-20 RX ORDER — FENTANYL CITRATE 50 UG/ML
50 INJECTION, SOLUTION INTRAMUSCULAR; INTRAVENOUS ONCE
Status: COMPLETED | OUTPATIENT
Start: 2020-04-20 | End: 2020-04-20

## 2020-04-20 RX ORDER — LIDOCAINE 40 MG/G
CREAM TOPICAL
Status: DISCONTINUED | OUTPATIENT
Start: 2020-04-20 | End: 2020-04-21 | Stop reason: HOSPADM

## 2020-04-20 RX ORDER — GLYCOPYRROLATE 0.2 MG/ML
0.1 INJECTION, SOLUTION INTRAMUSCULAR; INTRAVENOUS ONCE
Status: COMPLETED | OUTPATIENT
Start: 2020-04-20 | End: 2020-04-20

## 2020-04-20 RX ORDER — FENTANYL CITRATE 50 UG/ML
25 INJECTION, SOLUTION INTRAMUSCULAR; INTRAVENOUS
Status: DISCONTINUED | OUTPATIENT
Start: 2020-04-20 | End: 2020-04-21 | Stop reason: HOSPADM

## 2020-04-20 RX ORDER — SODIUM CHLORIDE 9 MG/ML
INJECTION, SOLUTION INTRAVENOUS CONTINUOUS PRN
Status: DISCONTINUED | OUTPATIENT
Start: 2020-04-20 | End: 2020-04-21 | Stop reason: HOSPADM

## 2020-04-20 RX ORDER — DEXTROSE MONOHYDRATE 25 G/50ML
9.5 INJECTION, SOLUTION INTRAVENOUS
Status: DISCONTINUED | OUTPATIENT
Start: 2020-04-20 | End: 2020-04-21 | Stop reason: HOSPADM

## 2020-04-20 RX ORDER — LIDOCAINE HYDROCHLORIDE 20 MG/ML
SOLUTION OROPHARYNGEAL
Status: COMPLETED
Start: 2020-04-20 | End: 2020-04-20

## 2020-04-20 RX ORDER — LIDOCAINE HYDROCHLORIDE 40 MG/ML
1.5 SOLUTION TOPICAL ONCE
Status: DISCONTINUED | OUTPATIENT
Start: 2020-04-20 | End: 2020-04-21 | Stop reason: HOSPADM

## 2020-04-20 RX ORDER — FLUMAZENIL 0.1 MG/ML
INJECTION, SOLUTION INTRAVENOUS
Status: DISCONTINUED
Start: 2020-04-20 | End: 2020-04-20 | Stop reason: WASHOUT

## 2020-04-20 RX ORDER — NALOXONE HYDROCHLORIDE 0.4 MG/ML
.1-.4 INJECTION, SOLUTION INTRAMUSCULAR; INTRAVENOUS; SUBCUTANEOUS
Status: DISCONTINUED | OUTPATIENT
Start: 2020-04-20 | End: 2020-04-21 | Stop reason: HOSPADM

## 2020-04-20 RX ORDER — FLUMAZENIL 0.1 MG/ML
0.2 INJECTION, SOLUTION INTRAVENOUS
Status: DISCONTINUED | OUTPATIENT
Start: 2020-04-20 | End: 2020-04-21 | Stop reason: HOSPADM

## 2020-04-20 RX ADMIN — LIDOCAINE HYDROCHLORIDE 30 ML: 20 SOLUTION ORAL; TOPICAL at 10:37

## 2020-04-20 RX ADMIN — GLYCOPYRROLATE 0.1 MG: 0.2 INJECTION, SOLUTION INTRAMUSCULAR; INTRAVENOUS at 10:43

## 2020-04-20 RX ADMIN — FENTANYL CITRATE 25 MCG: 50 INJECTION INTRAMUSCULAR; INTRAVENOUS at 11:12

## 2020-04-20 RX ADMIN — FENTANYL CITRATE 50 MCG: 50 INJECTION INTRAMUSCULAR; INTRAVENOUS at 11:11

## 2020-04-20 RX ADMIN — MIDAZOLAM 2 MG: 1 INJECTION INTRAMUSCULAR; INTRAVENOUS at 11:10

## 2020-04-20 RX ADMIN — TOPICAL ANESTHETIC 2 ML: 200 SPRAY DENTAL; PERIODONTAL at 10:40

## 2020-04-20 RX ADMIN — MIDAZOLAM 1 MG: 1 INJECTION INTRAMUSCULAR; INTRAVENOUS at 11:12

## 2020-04-20 NOTE — PROGRESS NOTES
Kin Dsouza.  I've just finished the CONCEPCION on this patient. She has a small but definite secundum ASD. Left to right shunt only but pulmonary hypertension also. I spoke to her  also. Just wanted to give you a heads up and the final report will be in in about an hour or so. Thx

## 2020-04-20 NOTE — DISCHARGE INSTRUCTIONS
* Recovery After Conscious Sedation (Adult)  We gave you medicine by vein to make you sleepy or relaxed during your procedure. This may have included both a pain medicine and sleeping medicine. Most of the effects have worn off. But you may still feel sleepy for the next 6 to 8 hours.  Home care  Follow these guidelines when you get home:    You may feel sleepy and clumsy and have poor balance for the next few hours.    A responsible adult should stay with you for the next 8 hours. This person should make sure your condition doesn t get worse.    Don't drink any alcohol for the next 24 hours.    Don't drive, operate dangerous machinery, make important business or personal decisions or sign legal documents during the next 24 hours.    You may vomit (throw up) if you eat too soon after the procedure. If this happens, drink small amounts of water, juice or clear broth. Wait to try solid food until you no longer have nausea (upset stomach).  Note: Your care team may tell you not to take any medicine by mouth for pain or sleep in the next 4 hours. These medicines may react with the medicines you had in the hospital. This could cause a much stronger response than usual.  Follow-up care  Follow up with your care team if you are not alert and back to your usual level of activity within 12 hours.  When to seek medical advice  Call your care team right away if any of these occur:    You still feel sleepy or clumsy after 12 hours, or your sleepiness gets worse    Weakness or dizziness gets worse    Repeated vomiting    If you can't be woken up and someone is staying with you, they should call 911.  Date Last Reviewed: 10/18/2016    [id number] [employer]. [address] [place] [address], [place] [address]. All rights reserved. This information is not intended as a substitute for professional medical care. Always follow your healthcare professional's instructions.  This information has been modified by your health care  provider with permission from the publisher.

## 2020-04-20 NOTE — PRE-PROCEDURE
GENERAL PRE-PROCEDURE:   Procedure:  CONCEPCION  Date/Time:  4/20/2020 11:10 AM    Written consent obtained?: Yes    Risks and benefits: Risks, benefits and alternatives were discussed    Consent given by:  Patient  Patient states understanding of procedure being performed: Yes    Patient's understanding of procedure matches consent: Yes    Procedure consent matches procedure scheduled: Yes    Expected level of sedation:  Moderate  Appropriately NPO:  Yes  ASA Class:  Class 2- mild systemic disease, no acute problems, no functional limitations  Mallampati  :  Grade 2- soft palate, base of uvula, tonsillar pillars, and portion of posterior pharyngeal wall visible  Lungs:  Lungs clear with good breath sounds bilaterally  Heart:  Normal heart sounds and rate  History & Physical reviewed:  History and physical reviewed and no updates needed  Statement of review:  I have reviewed the lab findings, diagnostic data, medications, and the plan for sedation

## 2020-04-22 ENCOUNTER — VIRTUAL VISIT (OUTPATIENT)
Dept: CARDIOLOGY | Facility: CLINIC | Age: 50
End: 2020-04-22
Attending: INTERNAL MEDICINE
Payer: COMMERCIAL

## 2020-04-22 VITALS — SYSTOLIC BLOOD PRESSURE: 118 MMHG | DIASTOLIC BLOOD PRESSURE: 68 MMHG | WEIGHT: 226 LBS | BODY MASS INDEX: 38.79 KG/M2

## 2020-04-22 DIAGNOSIS — Q21.11 OSTIUM SECUNDUM TYPE ATRIAL SEPTAL DEFECT: Primary | ICD-10-CM

## 2020-04-22 DIAGNOSIS — R00.2 PALPITATIONS: ICD-10-CM

## 2020-04-22 PROCEDURE — 99214 OFFICE O/P EST MOD 30 MIN: CPT | Mod: 95 | Performed by: INTERNAL MEDICINE

## 2020-04-22 NOTE — LETTER
4/22/2020    Yolie Delarosa MD  [address]  [facility] [address]    RE: Lyubov WALLER Daniel       Dear Colleague,    I had the pleasure of seeing Lyubov Sanchez in the [facility].  Patient is a very pleasant 49-year-old woman who I the pleasure meeting earlier this year.  Prior to her preoperative visit she had an echocardiogram that suggested an interatrial shunt.  This was not clarified clearly on the transthoracic study.  She also is in need of preoperative clearance for her hysterectomy which she underwent without difficulty although she does have an umbilical hernia.  She states that they are watching this otherwise her surgery went as planned.  She had a CONCEPCION performed demonstrating a small ASD with diameter of 0.8 mm.  This appears to be secundum in nature and possibly a deficient aortic rim however we will look into this more carefully.       1.  Small ASD noted onTEE  2.  Obesity  3.  Hyperlipidemia  4.  Obstructive sleep apnea  5.  Mildly dilated proximal ascending aorta  6.  Mild pulmonary hypertension  7.  Patient S/P hysterectomy  8.  HTN      We discussed the pathophysiology of ASD and potential therapeutic measures to address this.  We also discussed that we will need to review the transesophageal echo findings to see if a percutaneous approach would be feasible.  We want to make sure we have a sufficient aortic rim for this to occur.  Given the COVID situation and concern of elective surgeries we will arrange for her to have a follow-up visit in July.  Following that we will schedule either percutaneous ASD closure or surgical approach should the aortic rim be deficient.    Video-Visit Details    Type of service:  Video Visit    Video Start Time: 3:37PM  Video End Time: 3:47 PM    Originating Location (pt. Location): Home    Distant Location (provider location):  [facility]     Mode of Communication:  Video Conference  via [employer]    Thank you for allowing me to participate in the care of your patient.    Sincerely,     Corby Hodges MD     [facility]

## 2020-04-22 NOTE — PROGRESS NOTES
"Reviewed: 4/22/20  ALEXIS Jensen  Lyubov Sanchez is a 49 year old female who is being evaluated via a billable video visit.      The patient has been notified of following:     \"This video visit will be conducted via a call between you and your physician/provider. We have found that certain health care needs can be provided without the need for an in-person physical exam.  This service lets us provide the care you need with a video conversation.  If a prescription is necessary we can send it directly to your pharmacy.  If lab work is needed we can place an order for that and you can then stop by our lab to have the test done at a later time.    Video visits are billed at different rates depending on your insurance coverage.  Please reach out to your insurance provider with any questions.    If during the course of the call the physician/provider feels a video visit is not appropriate, you will not be charged for this service.\"    Patient has given verbal consent for Video visit? Yes  4/22/20  - Writer spoke w/pt via phone.  She is expecting the video visit w/Dr. Hodges  - Pt did not check pulse at home.  - BP: 11/68  - wt. 226.0Lbs    ALEXIS Jensen    How would you like to obtain your AVS? MyChart    Patient would like the video invitation sent by: Text to cell phone: [phone]    Will anyone else be joining your video visit? No        Patient is a very pleasant 49-year-old woman who I the pleasure meeting earlier this year.  Prior to her preoperative visit she had an echocardiogram that suggested an interatrial shunt.  This was not clarified clearly on the transthoracic study.  She also is in need of preoperative clearance for her hysterectomy which she underwent without difficulty although she does have an umbilical hernia.  She states that they are watching this otherwise her surgery went as planned.  She had a CONCEPCION performed demonstrating a small ASD with diameter of 0.8 mm.  This appears to be secundum in nature " and possibly a deficient aortic rim however we will look into this more carefully.       1.  Small ASD noted onTEE  2.  Obesity  3.  Hyperlipidemia  4.  Obstructive sleep apnea  5.  Mildly dilated proximal ascending aorta  6.  Mild pulmonary hypertension  7.  Patient S/P hysterectomy  8.  HTN      We discussed the pathophysiology of ASD and potential therapeutic measures to address this.  We also discussed that we will need to review the transesophageal echo findings to see if a percutaneous approach would be feasible.  We want to make sure we have a sufficient aortic rim for this to occur.  Given the COVID situation and concern of elective surgeries we will arrange for her to have a follow-up visit in July.  Following that we will schedule either percutaneous ASD closure or surgical approach should the aortic rim be deficient.    Video-Visit Details    Type of service:  Video Visit    Video Start Time: 3:37PM  Video End Time: 3:47 PM    Originating Location (pt. Location): Home    Distant Location (provider location):  [facility]     Mode of Communication:  Video Conference via [facility]      Corby Hodges MD

## 2020-04-28 NOTE — PROGRESS NOTES
"Lyubov Sanchez is a 49 year old female who is being evaluated via a billable video visit.      The patient has been notified of following:     \"This video visit will be conducted via a call between you and your physician/provider. We have found that certain health care needs can be provided without the need for an in-person physical exam.  This service lets us provide the care you need with a video conversation.  If a prescription is necessary we can send it directly to your pharmacy.  If lab work is needed we can place an order for that and you can then stop by our lab to have the test done at a later time.    Video visits are billed at different rates depending on your insurance coverage.  Please reach out to your insurance provider with any questions.    If during the course of the call the physician/provider feels a video visit is not appropriate, you will not be charged for this service.\"    Patient has given verbal consent for Video visit? Yes    How would you like to obtain your AVS? [employer]    Patient would like the video invitation sent by: Text to cell phone: [phone]    Will anyone else be joining your video visit? No      Subjective     Lyubov Sanchez is a 49 year old female who presents to clinic today for the following health issues:    HPI  Hypertension Follow-up      Do you check your blood pressure regularly outside of the clinic? Yes    Are you following a low salt diet? Yes    Are your blood pressures ever more than 140 on the top number (systolic) OR more   than 90 on the bottom number (diastolic), for example 140/90? No      How many servings of fruits and vegetables do you eat daily?  2-3    On average, how many sweetened beverages do you drink each day (Examples: soda, juice, sweet tea, etc.  Do NOT count diet or artificially sweetened beverages)?   0    How many days per week do you exercise enough to make your heart beat faster? 3 or less    How many minutes a day do you exercise enough " "to make your heart beat faster? 30 - 60    How many days per week do you miss taking your medication? 0        hysterectomy went well, umbilical hernia before and now has another one, abit higher. Is hopeing to get this fixed, noticed on post op. Ordered CT scan, verified it was a hernia.   Checks her BP at home, has the wrist style. Has had it checked at her post-op at the card , all BP has been good.  No need refills. Cut the dose in half, the amlodpine, and after the surgery, stopped it. And still keeping her BP under good control. Weight loss has helped this. Has gained some back.    Mood is doing well on celexa, may need refill    Video Start Time: 9:23    PROBLEMS TO ADD ON...    BP Readings from Last 3 Encounters:   04/29/20 113/63   04/22/20 118/68   04/20/20 133/83    Wt Readings from Last 3 Encounters:   04/29/20 98.9 kg (218 lb)   04/22/20 102.5 kg (226 lb)   03/30/20 102.9 kg (226 lb 12.8 oz)                    Reviewed and updated as needed this visit by Provider  Tobacco  Allergies  Meds  Problems  Med Hx  Surg Hx  Fam Hx         Review of Systems   ROS COMP: Constitutional, HEENT, cardiovascular, pulmonary, gi and gu systems are negative, except as otherwise noted.      Objective    /63 (BP Location: Right arm, Patient Position: Chair, Cuff Size: Adult Regular)   Wt 98.9 kg (218 lb)   LMP  (LMP Unknown)   BMI 37.42 kg/m    Estimated body mass index is 37.42 kg/m  as calculated from the following:    Height as of 3/27/20: 1.626 m (5' 4\").    Weight as of this encounter: 98.9 kg (218 lb).  Physical Exam     GENERAL: healthy, alert and no distress  EYES: Eyes grossly normal to inspection, conjunctivae and sclerae normal  RESP: no audible wheeze, cough, or visible cyanosis.  No visible retractions or increased work of breathing.  Able to speak fully in complete sentences.  NEURO: Cranial nerves grossly intact, mentation intact and speech normal  PSYCH: mentation appears normal, affect " "normal/bright, judgement and insight intact, normal speech and appearance well-groomed      Diagnostic Test Results:  Labs reviewed in Epic        Assessment & Plan     1. HTN, goal below 140/90  well controlled, off norvasc, cont lisinipril/HCTZ 1/2 tab    2. Incisional hernia, without obstruction or gangrene  Referral placed, no pain  - GENERAL SURG ADULT REFERRAL    3. Umbilical hernia without obstruction and without gangrene  See above  - GENERAL SURG ADULT REFERRAL    4. Major depressive disorder, recurrent episode, mild (H)  Well controlled  PHQ 4/24/2019 1/24/2020 4/29/2020   PHQ-9 Total Score 7 3 4   Q9: Thoughts of better off dead/self-harm past 2 weeks Not at all Not at all Not at all            BMI:   Estimated body mass index is 37.42 kg/m  as calculated from the following:    Height as of 3/27/20: 1.626 m (5' 4\").    Weight as of this encounter: 98.9 kg (218 lb).   Weight management plan: Discussed healthy diet and exercise guidelines        Work on weight loss  Regular exercise    Return in about 3 months (around 7/29/2020) for preop.    Yolie Delarosa MD  [facility]      Video-Visit Details    Type of service:  Video Visit    Video End Time:9:39 AM    Originating Location (pt. Location): Home    Distant Location (provider location):  home    Mode of Communication:  Video Conference via [employer]    Return in about 3 months (around 7/29/2020) for preop.       Yolie Delarosa MD        Pre-Visit Planning     Future Appointments   Date Time Provider Department Center   4/29/2020  9:15 AM Yolie Delarosa MD [facility]     Arrival Time for this Appointment:  9:15 AM   Appointment Notes for this encounter:   -LVM 04//20/2020 NEEDS TO SWITCH TO VIDEO VISIT.CC /please have pt check BP prior to appt    Questionnaires Reviewed/Assigned  No additional questionnaires are needed        Patient preferred phone number: [phone]    Spoke to patient via phone. Patient does not " have additional questions or concerns.        Visit is not preventive.    Health Maintenance Due   Topic Date Due     ANNUAL REVIEW OF HM ORDERS  1970     Patient is due for:  annual review  No appointment needed.    MyChart  Patient is active on MyChart.    Questionnaire Review   N/A      Izzy Gomes, RN, BSN

## 2020-04-29 ENCOUNTER — VIRTUAL VISIT (OUTPATIENT)
Dept: FAMILY MEDICINE | Facility: CLINIC | Age: 50
End: 2020-04-29
Payer: COMMERCIAL

## 2020-04-29 VITALS — BODY MASS INDEX: 37.42 KG/M2 | WEIGHT: 218 LBS | DIASTOLIC BLOOD PRESSURE: 63 MMHG | SYSTOLIC BLOOD PRESSURE: 113 MMHG

## 2020-04-29 DIAGNOSIS — K43.2 INCISIONAL HERNIA, WITHOUT OBSTRUCTION OR GANGRENE: ICD-10-CM

## 2020-04-29 DIAGNOSIS — F33.0 MAJOR DEPRESSIVE DISORDER, RECURRENT EPISODE, MILD (H): ICD-10-CM

## 2020-04-29 DIAGNOSIS — K42.9 UMBILICAL HERNIA WITHOUT OBSTRUCTION AND WITHOUT GANGRENE: ICD-10-CM

## 2020-04-29 DIAGNOSIS — I10 HTN, GOAL BELOW 140/90: Primary | ICD-10-CM

## 2020-04-29 PROCEDURE — 96127 BRIEF EMOTIONAL/BEHAV ASSMT: CPT | Mod: GT | Performed by: FAMILY MEDICINE

## 2020-04-29 PROCEDURE — 99214 OFFICE O/P EST MOD 30 MIN: CPT | Mod: GT | Performed by: FAMILY MEDICINE

## 2020-04-29 RX ORDER — LISINOPRIL AND HYDROCHLOROTHIAZIDE 12.5; 2 MG/1; MG/1
TABLET ORAL
Qty: 45 TABLET | Refills: 0 | Status: SHIPPED | OUTPATIENT
Start: 2020-04-29 | End: 2020-08-05

## 2020-04-29 RX ORDER — CITALOPRAM HYDROBROMIDE 20 MG/1
20 TABLET ORAL DAILY
Qty: 90 TABLET | Refills: 0 | Status: SHIPPED | OUTPATIENT
Start: 2020-04-29 | End: 2020-08-17

## 2020-04-29 ASSESSMENT — ANXIETY QUESTIONNAIRES
5. BEING SO RESTLESS THAT IT IS HARD TO SIT STILL: NOT AT ALL
GAD7 TOTAL SCORE: 4
1. FEELING NERVOUS, ANXIOUS, OR ON EDGE: SEVERAL DAYS
6. BECOMING EASILY ANNOYED OR IRRITABLE: SEVERAL DAYS
2. NOT BEING ABLE TO STOP OR CONTROL WORRYING: SEVERAL DAYS
7. FEELING AFRAID AS IF SOMETHING AWFUL MIGHT HAPPEN: NOT AT ALL
3. WORRYING TOO MUCH ABOUT DIFFERENT THINGS: SEVERAL DAYS
IF YOU CHECKED OFF ANY PROBLEMS ON THIS QUESTIONNAIRE, HOW DIFFICULT HAVE THESE PROBLEMS MADE IT FOR YOU TO DO YOUR WORK, TAKE CARE OF THINGS AT HOME, OR GET ALONG WITH OTHER PEOPLE: NOT DIFFICULT AT ALL

## 2020-04-29 ASSESSMENT — PATIENT HEALTH QUESTIONNAIRE - PHQ9
5. POOR APPETITE OR OVEREATING: NOT AT ALL
SUM OF ALL RESPONSES TO PHQ QUESTIONS 1-9: 4

## 2020-04-30 ASSESSMENT — ANXIETY QUESTIONNAIRES: GAD7 TOTAL SCORE: 4

## 2020-05-04 DIAGNOSIS — I35.0 AORTIC VALVE STENOSIS, ETIOLOGY OF CARDIAC VALVE DISEASE UNSPECIFIED: Primary | ICD-10-CM

## 2020-06-18 ENCOUNTER — OFFICE VISIT (OUTPATIENT)
Dept: SURGERY | Facility: CLINIC | Age: 50
End: 2020-06-18
Payer: COMMERCIAL

## 2020-06-18 VITALS
WEIGHT: 240 LBS | DIASTOLIC BLOOD PRESSURE: 72 MMHG | BODY MASS INDEX: 40.97 KG/M2 | OXYGEN SATURATION: 100 % | HEIGHT: 64 IN | SYSTOLIC BLOOD PRESSURE: 114 MMHG | HEART RATE: 75 BPM | RESPIRATION RATE: 16 BRPM

## 2020-06-18 DIAGNOSIS — K43.2 INCISIONAL HERNIA, WITHOUT OBSTRUCTION OR GANGRENE: Primary | ICD-10-CM

## 2020-06-18 PROCEDURE — 99213 OFFICE O/P EST LOW 20 MIN: CPT | Performed by: SURGERY

## 2020-06-18 ASSESSMENT — MIFFLIN-ST. JEOR: SCORE: 1703.63

## 2020-06-27 DIAGNOSIS — I10 HTN, GOAL BELOW 140/90: ICD-10-CM

## 2020-06-29 RX ORDER — LISINOPRIL AND HYDROCHLOROTHIAZIDE 12.5; 2 MG/1; MG/1
TABLET ORAL
Qty: 90 TABLET | Refills: 0 | OUTPATIENT
Start: 2020-06-29

## 2020-06-30 NOTE — PROGRESS NOTES
[facility]  Established Patient Office Visit    Assessment:   Lyubov Sanchez is a 49 year old female with primary, reducible umbilical hernia in addition to an incisional hernia.    Plan:    We will schedule a robotic-assisted hernia repair with mesh at the patient's convenience.  The two hernias are ~ 3 cm apart from one another and should be able to be repaired with one piece of mesh.     She would like to wait until later in the fall to repair these hernias.     We have discussed observation, reduction techniques and importance, incarceration and strangulation signs, symptoms and importance as well as need to seek emergency treatment.      We have had a detailed discussion regarding the nature of umbilical hernias, and that watchful waiting for small asymptomatic hernias is acceptable, but that in general they do tend to enlarge or become symptomatic over time. The patient has been experiencing bothersome symptoms and wishes to proceed with repair eventually. She would like to proceed with hysterectomy first.  Surgery, indications, alternatives, risks, benefits, incisions, scarring, anesthesia, recovery, mesh, infection, bleeding, numbness, hernia recurrence, lifting and activity limitations after surgery.  All questions have been answered to the best of my ability.    Recommended time off work postop:  1 wks  Recommended time off lifting 20 lb:   4 wks  She has been given literature to review.       HPI:  Lyubov Sanchez is a 49 year old female who presents for evaluation of a lump in the umbilical region. She first noticed it many ago. It is not typically painful for her. She does feel some pressure in the umbilical area if she is very active/lifting things. She recently underwent a hysterectomy and shortly thereafter noticed a bulge under one of her incisions.     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     Past Medical History:  Past Medical History:   Diagnosis Date     Arthritis      Depressive disorder      DYSPLASIA OF CERVIX 3/6/2003     Dysplasia of cervix (uteri)     Rx cryotherapy , and      Hypertension     NO cardiology     Incomplete right bundle branch block 11/10/2017     Migraine      Other chronic pain     Knee pain for 8 years     Plantar fasciitis     bilat     Unspecified sleep apnea     CPAP will bring on the day of surgery.     Ventral hernia without obstruction or gangrene 4/15/2020       Current Outpatient Medications   Medication     citalopram (CELEXA) 20 MG tablet     lisinopril-hydrochlorothiazide (ZESTORETIC) 20-12.5 MG tablet     No current facility-administered medications for this visit.         Past Surgical History:  Past Surgical History:   Procedure Laterality Date     ARTHROPLASTY KNEE Right 2017    Procedure: ARTHROPLASTY KNEE;  Right total knee arthroplasty using the ArthBISON iBalance total knee system;  Surgeon: Hebert Lee MD;  Location: [facility] OR     ARTHROPLASTY KNEE Left 3/19/2018    Procedure: ARTHROPLASTY KNEE;  Left total knee arthroplasty using an Arthrex iBalance total knee system;  Surgeon: Hebert Lee MD;  Location: [facility] OR     C  DELIVERY ONLY  ,    , Low Cervical     DAVINCI HYSTERECTOMY TOTAL, BILATERAL SALPINGO-OOPHORECTOMY, COMBINED Bilateral 2020    Procedure: DaVinci robotic-assisted total laparoscopic hysterectomy, bilateral salpingectomy;  Surgeon: Venancio Bartlett MD;  Location: [facility] OR - Path all benign     DAVINCI HYSTERECTOMY TOTAL, SALPINGECTOMY BILATERAL Bilateral 2020    Fibroid uterus, Menometrorrhagia - Robotic TLH, Bilateral salpingectomy - Path all benign     ENT SURGERY       HC COLP VAGINA W CERVIX IF PRES W BIOPSY      [place] for ASCUS      "TONSILLECTOMY & ADENOIDECTOMY          Social History:  Social History     Tobacco Use     Smoking status: Former Smoker     Packs/day: 0.50     Years: 5.00     Pack years: 2.50     Types: Cigarettes     Last attempt to quit: 2007     Years since quittin.2     Smokeless tobacco: Never Used   Substance Use Topics     Alcohol use: Yes     Comment: 0-1 weekly     Drug use: No      Lives in [place] with her . 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     Family History:  Family History   Adopted: Yes   Problem Relation Age of Onset     Unknown/Adopted Other         pt is adopted and has no access to health history     Unknown/Adopted Mother      Unknown/Adopted Father      Unknown/Adopted Maternal Grandmother      Unknown/Adopted Maternal Grandfather      Unknown/Adopted Paternal Grandmother      Unknown/Adopted Other      No Family history of bleeding or clotting disorders or reactions to anesthesia    ROS:  The 10 point review of systems is negative other than noted in the HPI and above.    PE:    Vitals - /72   Pulse 75   Resp 16   Ht 1.626 m (5' 4\")   Wt 108.9 kg (240 lb)   LMP  (LMP Unknown)   SpO2 100%   BMI 41.20 kg/m    BMI - Body mass index is 41.2 kg/m .  General - Well-developed, obese patient able to get up on table without difficulty.  HEENT - Mucous membranes moist.  Sclera are nonicteric.  Lymph -  No inguinal lymphadenopathy or masses   Respiratory - regular and non labored  CV - regular pulse  Abdomen - abdomen is soft without significant tenderness, masses, organomegaly or guarding,   Hernia - Upon standing there are two obvious bulges in the umbilical and supraumbilical regions which are present spontaneously. Both hernias are manually reducible. There is no overlying skin change. The defect of the umbilical hernia feels to be ~ 1.5 cm. The defect of the supraumbilical hernia is ~ 2.5 cm.   Extremities - without edema  Psych - mood and affect are appropriate  Neurologic - alert, speech " is clear, moves all extremities with good strength  Integument - without lesions, rashes, or jaundice    This note may have been created using voice recognition software. Undetected word substitutions or other errors may have occurred.     Time spent with the patient with greater that 50% of the time in discussion was 25 minutes.     Violetta Lazaro MD  12/09/19 3:25 PM     Please route or send letter to:  Primary Care Provider (PCP) and Referring Provider

## 2020-07-11 ENCOUNTER — VIRTUAL VISIT (OUTPATIENT)
Dept: FAMILY MEDICINE | Facility: OTHER | Age: 50
End: 2020-07-11

## 2020-07-11 NOTE — PROGRESS NOTES
"Date: 2020 14:20:04  Clinician: Marvin Field  Clinician NPI: [id number]  Patient: Lyubov Sanchez  Patient : 1970  Patient Address: [address] [place] [id number]  Patient Phone: [phone]  Visit Protocol: CASSANDRAI  Patient Summary:  Lyubov is a 49 year old ( : 1970 ) female who initiated a Visit for COVID-19 (Coronavirus) evaluation and screening. When asked the question \"Please sign me up to receive news, health information and promotions. \", Lyubov responded \"No\".    Lyubov states her symptoms started gradually 3-4 days ago.   Her symptoms consist of malaise, myalgia, facial pain or pressure, a cough, and nasal congestion. Lyubov also feels feverish.   Symptom details     Nasal secretions: The color of her mucus is clear.    Cough: Lyubov coughs a few times an hour and her cough is more bothersome at night. Phlegm does not come into her throat when she coughs. She believes her cough is caused by post-nasal drip.     Temperature: Her current temperature is 100.0 degrees Fahrenheit.     Facial pain or pressure: The facial pain or pressure does not feel worse when bending or leaning forward.      Lyubov denies having wheezing, nausea, teeth pain, ageusia, diarrhea, vomiting, rhinitis, ear pain, headache, chills, sore throat, and anosmia. She also denies having recent facial or sinus surgery in the past 60 days, taking antibiotic medication in the past month, having a sinus infection within the past year, and double sickening (worsening symptoms after initial improvement). She is not experiencing dyspnea.   Precipitating events  She has not recently been exposed to someone with influenza. Lyubov has been in close contact with the following high risk individuals: children under the age of 5.   Pertinent COVID-19 (Coronavirus) information  In the past 14 days, Lyubov has not worked in a congregate living setting.   She does not work or volunteer as healthcare worker or a  and does not " work or volunteer in a healthcare facility.   Lyubov also has not lived in a congregate living setting in the past 14 days. She does not live with a healthcare worker.   Lyubov has not had a close contact with a laboratory-confirmed COVID-19 patient within 14 days of symptom onset.   Pertinent medical history  Lyubov typically gets a yeast infection when she takes antibiotics. She has used fluconazole (Diflucan) to treat previous yeast infections. 1 dose of fluconazole (Diflucan) has typically been sufficient for symptoms to resolve in the past.   Lyubov does not need a return to work/school note.   Weight: 240 lbs   Lyubov does not smoke or use smokeless tobacco.   She denies pregnancy and denies breastfeeding. She does not menstruate.   Additional information as reported by the patient (free text): I have pressure feeling in my chest   Weight: 240 lbs    MEDICATIONS: amlodipine besylate (bulk), lisinopril-hydrochlorothiazide oral, citalopram oral, ALLERGIES: Penicillins  Clinician Response:  Dear Lyubov,   Your symptoms show that you may have coronavirus (COVID-19). This illness can cause fever, cough and trouble breathing. Many people get a mild case and get better on their own. Some people can get very sick.  What should I do?  We would like to test you for this virus.   1. Please call [phone] to schedule your visit. Explain that you were referred by [facility] to have a COVID-19 test. Be ready to share your [facility] visit ID number.  The following will serve as your written order for this COVID Test, ordered by me, for the indication of suspected COVID [Z20.828]: The test will be ordered in [employer], our electronic health record, after you are scheduled. It will show as ordered and authorized by Jomar Pichardo MD.  Order: COVID-19 (Coronavirus) PCR for SYMPTOMATIC testing from [facility].      2. When it's time for your COVID test:  Stay at least 6 feet away from others. (If someone will drive you to your test, stay in the backseat,  "as far away from the  as you can.)   Cover your mouth and nose with a mask, tissue or washcloth.  Go straight to the testing site. Don't make any stops on the way there or back.      3.Starting now: Stay home and away from others (self-isolate) until:   You've had no fever---and no medicine that reduces fever---for 3 full days (72 hours). And...   Your other symptoms have gotten better. For example, your cough or breathing has improved. And...   At least 10 days have passed since your symptoms started.       During this time, don't leave the house except for testing or medical care.   Stay in your own room, even for meals. Use your own bathroom if you can.   Stay away from others in your home. No hugging, kissing or shaking hands. No visitors.  Don't go to work, school or anywhere else.    Clean \"high touch\" surfaces often (doorknobs, counters, handles, etc.). Use a household cleaning spray or wipes. You'll find a full list of  on the EPA website: www.epa.gov/pesticide-registration/list-n-disinfectants-use-against-sars-cov-2.   Cover your mouth and nose with a mask, tissue or washcloth to avoid spreading germs.  Wash your hands and face often. Use soap and water.  Caregivers in these groups are at risk for severe illness due to COVID-19:  o People 65 years and older  o People who live in a nursing home or long-term care facility  o People with chronic disease (lung, heart, cancer, diabetes, kidney, liver, immunologic)  o People who have a weakened immune system, including those who:   Are in cancer treatment  Take medicine that weakens the immune system, such as corticosteroids  Had a bone marrow or organ transplant  Have an immune deficiency  Have poorly controlled HIV or AIDS  Are obese (body mass index of 40 or higher)  Smoke regularly   o Caregivers should wear gloves while washing dishes, handling laundry and cleaning bedrooms and bathrooms.  o Use caution when washing and drying laundry: Don't " shake dirty laundry, and use the warmest water setting that you can.  o For more tips, go to www.cdc.gov/coronavirus/2019-ncov/downloads/10Things.pdf.    4.Sign up for [employer]. We know it's scary to hear that you might have COVID-19. We want to track your symptoms to make sure you're okay over the next 2 weeks. Please look for an email from [employer]inda [employer]s---this is a free, online program that we'll use to keep in touch. To sign up, follow the link in the email. Learn more at [email or web address]  How can I take care of myself?   Get lots of rest. Drink extra fluids (unless a doctor has told you not to).   Take Tylenol (acetaminophen) for fever or pain. If you have liver or kidney problems, ask your family doctor if it's okay to take Tylenol.   Adults can take either:    650 mg (two 325 mg pills) every 4 to 6 hours, or...   1,000 mg (two 500 mg pills) every 8 hours as needed.    Note: Don't take more than 3,000 mg in one day. Acetaminophen is found in many medicines (both prescribed and over-the-counter medicines). Read all labels to be sure you don't take too much.   For children, check the Tylenol bottle for the right dose. The dose is based on the child's age or weight.    If you have other health problems (like cancer, heart failure, an organ transplant or severe kidney disease): Call your specialty clinic if you don't feel better in the next 2 days.       Know when to call 911. Emergency warning signs include:    Trouble breathing or shortness of breath Pain or pressure in the chest that doesn't go away Feeling confused like you haven't felt before, or not being able to wake up Bluish-colored lips or face.  Where can I get more information?    [employer] -- About COVID-19: [email or web address]   CDC -- What to Do If You're Sick: www.cdc.gov/coronavirus/2019-ncov/about/steps-when-sick.html   CDC -- Ending Home Isolation:  www.cdc.gov/coronavirus/2019-ncov/hcp/disposition-in-home-patients.html   [facility] -- Caring for Someone: www.cdc.gov/coronavirus/2019-ncov/if-you-are-sick/care-for-someone.html   [facility] -- Interim Guidance for Hospital Discharge to Home: www.[facility].[place]/diseases/coronavirus/hcp/hospdischarge.pdf   [facility] clinical trials (COVID-19 research studies): clinicalaffairs.[facility]-clinical-trials    Below are the COVID-19 hotlines at the [place] Department of Health ([facility]). Interpreters are available.    For health questions: Call 208-212-8853 or 1-515.105.9668 (7 a.m. to 7 p.m.) For questions about schools and childcare: Call 157-098-2098 or 1-754.590.8530 (7 a.m. to 7 p.m.)    Diagnosis: Cough  Diagnosis ICD: R05

## 2020-07-17 ENCOUNTER — TELEPHONE (OUTPATIENT)
Dept: CARDIOLOGY | Facility: CLINIC | Age: 50
End: 2020-07-17

## 2020-07-20 DIAGNOSIS — Q21.10 ASD (ATRIAL SEPTAL DEFECT): Primary | ICD-10-CM

## 2020-07-20 NOTE — PROGRESS NOTES
Per Dr. Hodges, he would like for patient to have cardiac MRI prior to OV with him to discuss ASD closure. He believes her anatomy is suitable to closure, but would like this additional imaging prior. He states he would likely schedule closure in August with Dr. Branch.     I called the patient to explain these recommendations. She was disappointed to push back her appointment. When I explained that she could likely have the percutaneous closure done in August, she was more understanding. She was under the impression there was a possibility she may have surgery and was anxious to speak to Dr. Hodges about this. Cardiac MRI scheduled for 7/27 with OV with Dr. Hodges rescheduled to 7/29/20. Will send paper prep/reminder to patient.    Elyssa Alcala RN  [facility]

## 2020-07-24 ENCOUNTER — TELEPHONE (OUTPATIENT)
Dept: CARDIOLOGY | Facility: CLINIC | Age: 50
End: 2020-07-24

## 2020-07-24 NOTE — TELEPHONE ENCOUNTER
PATIENT WELLNESS TELEPHONE SCREENING     Step 1 Screening Questions    In the past 3 weeks, have you been exposed to someone with a suspected or known illness?  COVID-19? No  Chickenpox? No   Measles? No  Pertussis? No    In the past 2 weeks, have you had any of the following symptoms?   Fever/Chills? No   Cough? No   Shortness of breath? No   New loss of taste or smell? No  Sore throat? No  Muscle or body aches? No  Headaches? No  Fatigue? No  Vomiting or diarrhea? No    Step 2 Screening Results (Skip if the patient is negative for symptoms)    If the patient is positive for new or worsening symptoms, contact the ordering provider to determine if the procedure is deemed necessary. Determine if patient can be re-scheduled when the patient is symptom free or has a negative COVID test.     If ordering provider deems the procedure is necessary, notify your manager/supervisor. Provide the patient with the procedural department phone number and inform the patient to call the procedural department upon arrival.  The patient will be registered over the phone.    Step 3 Review Visitor Policy  Patient informed of the updated visitor policy   1 visitor allowed per patient   Visitor must screen negative for COVID symptoms   Visitor must wear a mask    Lissy Bland RN

## 2020-07-27 ENCOUNTER — HOSPITAL ENCOUNTER (OUTPATIENT)
Dept: CARDIOLOGY | Facility: CLINIC | Age: 50
Discharge: HOME | End: 2020-07-27
Attending: INTERNAL MEDICINE | Admitting: INTERNAL MEDICINE
Payer: COMMERCIAL

## 2020-07-27 DIAGNOSIS — Q21.10 ASD (ATRIAL SEPTAL DEFECT): ICD-10-CM

## 2020-07-27 PROCEDURE — 71555 MRI ANGIO CHEST W OR W/O DYE: CPT

## 2020-07-27 PROCEDURE — 75565 CARD MRI VELOC FLOW MAPPING: CPT

## 2020-07-27 PROCEDURE — 75565 CARD MRI VELOC FLOW MAPPING: CPT | Mod: 26 | Performed by: INTERNAL MEDICINE

## 2020-07-27 PROCEDURE — 71555 MRI ANGIO CHEST W OR W/O DYE: CPT | Mod: 26 | Performed by: INTERNAL MEDICINE

## 2020-07-27 PROCEDURE — A9585 GADOBUTROL INJECTION: HCPCS | Performed by: INTERNAL MEDICINE

## 2020-07-27 PROCEDURE — 25500064 ZZH RX 255 OP 636: Performed by: INTERNAL MEDICINE

## 2020-07-27 PROCEDURE — 75561 CARDIAC MRI FOR MORPH W/DYE: CPT | Mod: 26 | Performed by: INTERNAL MEDICINE

## 2020-07-27 RX ORDER — GADOBUTROL 604.72 MG/ML
30 INJECTION INTRAVENOUS ONCE
Status: COMPLETED | OUTPATIENT
Start: 2020-07-27 | End: 2020-07-27

## 2020-07-27 RX ADMIN — GADOBUTROL 30 ML: 604.72 INJECTION INTRAVENOUS at 14:49

## 2020-07-28 ENCOUNTER — TELEPHONE (OUTPATIENT)
Dept: CARDIOLOGY | Facility: CLINIC | Age: 50
End: 2020-07-28

## 2020-07-28 NOTE — TELEPHONE ENCOUNTER

## 2020-07-29 ENCOUNTER — OFFICE VISIT (OUTPATIENT)
Dept: CARDIOLOGY | Facility: CLINIC | Age: 50
End: 2020-07-29
Attending: INTERNAL MEDICINE
Payer: COMMERCIAL

## 2020-07-29 VITALS
HEART RATE: 96 BPM | DIASTOLIC BLOOD PRESSURE: 78 MMHG | BODY MASS INDEX: 43 KG/M2 | WEIGHT: 250.5 LBS | SYSTOLIC BLOOD PRESSURE: 129 MMHG

## 2020-07-29 DIAGNOSIS — Q21.11 OSTIUM SECUNDUM TYPE ATRIAL SEPTAL DEFECT: Primary | ICD-10-CM

## 2020-07-29 DIAGNOSIS — I35.0 AORTIC VALVE STENOSIS, ETIOLOGY OF CARDIAC VALVE DISEASE UNSPECIFIED: ICD-10-CM

## 2020-07-29 PROCEDURE — 99214 OFFICE O/P EST MOD 30 MIN: CPT | Performed by: INTERNAL MEDICINE

## 2020-07-29 RX ORDER — AMLODIPINE BESYLATE 5 MG/1
5 TABLET ORAL EVERY MORNING
COMMUNITY
Start: 2020-06-23 | End: 2020-09-15

## 2020-07-29 NOTE — PROGRESS NOTES
Cardiology Clinic (Structurtal - ASD)    Assessment & Plan       1.  Small ASD noted on CONCEPCION and Cardiac MRI  2.  Obesity  3.  Hyperlipidemia  4.  Obstructive sleep apnea  5.  Mildly dilated proximal ascending aorta  6.  Mild pulmonary hypertension from # 1  7.  Patient S/P hysterectomy  8.  HTN    Recommendations    We discussed the pathophysiology of ASD and potential therapeutic measures to address this.  Cardiac MRI suggests that the rim is sufficient for ASD closure.  This is a small ASD with no other congenital malformations.  Patient is agreeable to have this performed.  I am not in the lab for PFO procedures for the next 2 to 3 months and will ask my colleagues Ray Tripp in [facility] who are scheduled for PFO closure on August 26, 2020 to perform the procedure.  Risk benefits and complications were discussed with patient and she is willing to proceed.  She has no contraindications to general anesthesia and or CONCEPCION.  Would use the GORE device.    Thank you kindly for consult      Corby Hodges MD      HPI:    Patient is a very pleasant 49-year-old woman who I the pleasure meeting earlier this year preoperative clearance for hysterectomy which she underwent without difficulty..  Prior to her preoperative visit she had an echocardiogram that suggested an interatrial shunt.  This was not clarified clearly on the transthoracic study.  She had a CONCEPCION performed demonstrating a small ASD with diameter of 8 mm.  This appears to be secundum in nature and possibly a deficient aortic rim however we will look into this more carefully.  She recently underwent a cardiac MRI with the findings as listed below.  Again this demonstrates a small ASD 7.7 mm in diameter with no adverse features for consideration of percutaneous closure.  She has no additional congenital abnormalities commented upon.  Here to discuss and plan for ASD closure percutaneously    Cardiac MRI:    Normal left ventricular size and systolic  function. Quantitative LVEF 71%.   Mildly dilated right ventricle with normal RV systolic function. Quantitative RVEF 65%.   There is a small secundum atrial septal defect, located in the superior aspect of the interatrial septum,  measures 7.7mm. The superior rim measures approximately 2.6mm.  By phase contrast imaging, Qp/Qs is 1.27.   Pulmonary vein angiogram reveals no evidence of anomalous venous return.   Dilated main pulmonary artery (3.8cm).       Primary Care Physician   Yolie Delarosa      Patient Active Problem List   Diagnosis     Hyperlipidemia LDL goal <100     Migraine     HTN, goal below 140/90     Migraine without aura     Health Care Home     Anxiety     Morbid obesity due to excess calories (H)     KRISTYN (obstructive sleep apnea)     Incomplete right bundle branch block     S/P total knee arthroplasty     Major depressive disorder, recurrent episode, mild (H)     Aneurysm, ascending aorta (H)     Umbilical hernia without obstruction and without gangrene     Patent foramen ovale     Pulmonary hypertension (H)     Ventral hernia without obstruction or gangrene       Past Medical History   I have reviewed this patient's medical history and updated it with pertinent information if needed.   Past Medical History:   Diagnosis Date     Arthritis      Depressive disorder      DYSPLASIA OF CERVIX 3/6/2003     Dysplasia of cervix (uteri) 2003    Rx cryotherapy Nov 03, and 2005     Hypertension     NO cardiology     Incomplete right bundle branch block 11/10/2017     Migraine      Other chronic pain     Knee pain for 8 years     Plantar fasciitis     bilat     Unspecified sleep apnea     CPAP will bring on the day of surgery.     Ventral hernia without obstruction or gangrene 4/15/2020       Past Surgical History   I have reviewed this patient's surgical history and updated it with pertinent information if needed.  Past Surgical History:   Procedure Laterality Date     ARTHROPLASTY KNEE Right 11/17/2017     Procedure: ARTHROPLASTY KNEE;  Right total knee arthroplasty using the Arthrex iBalance total knee system;  Surgeon: Hebert Lee MD;  Location: [facility] OR     ARTHROPLASTY KNEE Left 3/19/2018    Procedure: ARTHROPLASTY KNEE;  Left total knee arthroplasty using an Arthrex iBalance total knee system;  Surgeon: Hebert Lee MD;  Location: [facility] OR     C  DELIVERY ONLY  ,    , Low Cervical     DAVINCI HYSTERECTOMY TOTAL, BILATERAL SALPINGO-OOPHORECTOMY, COMBINED Bilateral 2020    Procedure: DaVinci robotic-assisted total laparoscopic hysterectomy, bilateral salpingectomy;  Surgeon: Venancio Bartlett MD;  Location: [facility] OR - Path all benign     DAVINCI HYSTERECTOMY TOTAL, SALPINGECTOMY BILATERAL Bilateral 2020    Fibroid uterus, Menometrorrhagia - Robotic TLH, Bilateral salpingectomy - Path all benign     ENT SURGERY       HC COLP VAGINA W CERVIX IF PRES W BIOPSY      [place] for ASCUS     TONSILLECTOMY & ADENOIDECTOMY         Prior to Admission Medications   Cannot display prior to admission medications because the patient has not been admitted in this contact.     [unfilled]  [unfilled]  Allergies   Allergies   Allergen Reactions     Penicillins Hives     hives       Social History    reports that she quit smoking about 13 years ago. Her smoking use included cigarettes. She has a 2.50 pack-year smoking history. She has never used smokeless tobacco. She reports current alcohol use. She reports that she does not use drugs.    Family History   Family History   Adopted: Yes   Problem Relation Age of Onset     Unknown/Adopted Other         pt is adopted and has no access to health history     Unknown/Adopted Mother      Unknown/Adopted Father      Unknown/Adopted Maternal Grandmother      Unknown/Adopted Maternal Grandfather      Unknown/Adopted Paternal Grandmother      Unknown/Adopted Other        Review of Systems   The comprehensive 10 point Review of  Systems is negative other than noted in the HPI or here.     Physical Exam   Vital Signs with Ranges     Wt Readings from Last 4 Encounters:   20 108.9 kg (240 lb)   20 98.9 kg (218 lb)   20 102.5 kg (226 lb)   20 102.9 kg (226 lb 12.8 oz)     [unfilled]      Vitals: LMP  (LMP Unknown)     GENERAL: Healthy, alert and no distress  EYES: Eyes grossly normal to inspection.  No discharge or erythema, or obvious scleral/conjunctival abnormalities.  RESP: No audible wheeze, cough, or visible cyanosis.  No visible retractions or increased work of breathing.    SKIN: Visible skin clear. No significant rash, abnormal pigmentation or lesions.  NEURO: Cranial nerves grossly intact.  Mentation and speech appropriate for age.  PSYCH: Mentation appears normal, affect normal/bright, judgement and insight intact, normal speech and appearance well-groomed.    @LABRCNTIPR(tropi:5,troponinies:5)@    @LABRCNTIPR(wbc:3,hgb:3,mcv:3,plt:3,inr:3,na:3,potassium:3,chloride:3,co2:3,bun:3,cr:3,gfrestimated:3,gfrestblack:3,aniongap:3,bob:3,glc:3,albumin:2,prottotal:2,bilitotal:2,alkphos:2,alt:2,ast:2,lipase:2,tropi:3)@  Recent Labs   Lab Test 20  0902 17  0945   CHOL 182 155   HDL 41* 36*   * 68   TRIG 119 256*     @LABRCNTIP(wbc:3,hgb:3,hct:3,mcv:3,plt:3,iron:3,ironsat:3,reticabsct:3,retp:3,feb:3,sigifredo:3,b12:3,folic:3,epoe:3,morph:3)@  @LABRCNTIP(PH:3,PHV:3,PO2:3,PO2V:3,sat:3,PCO2:3,PCO2V:3,HCO3:3,HCO3V:3)@  @LABRCNTIP(NTBNPI:3,NTBNP:3)@  @LABRCNTIP(DD:1)@  @LABRCNTIP(sed:3,crp:3)@  @LABRCNTIP(PLT:3)@  @LABRCNTIP(TSH:3)@  @LABRCNTIP(color:1,appearance:1,urineg,urinebili:1,urineketone:1,s,ubld:1,urineph:1,protein:1,urobilinogen:1,nitrite:1,leukest:1,rbcu:1,wbcu:1)@    Imaging:  No results found for this or any previous visit (from the past 48 hour(s)).    Echo:  No results found for this or any previous visit (from the past 4320 hour(s)).

## 2020-07-29 NOTE — LETTER
7/29/2020    Yolie Delarosa MD  [address]  [facility] [address]    RE: Lyubov Sanchez       Dear Colleague,    I had the pleasure of seeing Lyubov Sanchez in the [facility].      Cardiology Clinic (Structurtal - ASD)    Assessment & Plan       1.  Small ASD noted on CONCEPCION and Cardiac MRI  2.  Obesity  3.  Hyperlipidemia  4.  Obstructive sleep apnea  5.  Mildly dilated proximal ascending aorta  6.  Mild pulmonary hypertension from # 1  7.  Patient S/P hysterectomy  8.  HTN    Recommendations    We discussed the pathophysiology of ASD and potential therapeutic measures to address this.  Cardiac MRI suggests that the rim is sufficient for ASD closure.  This is a small ASD with no other congenital malformations.  Patient is agreeable to have this performed.  I am not in the lab for PFO procedures for the next 2 to 3 months and will ask my colleagues Ray Tripp in [facility] who are scheduled for PFO closure on August 26, 2020 to perform the procedure.  Risk benefits and complications were discussed with patient and she is willing to proceed.  She has no contraindications to general anesthesia and or CONCEPCION.  Would use the GORE device.    Thank you kindly for consult      Corby Hodges MD      HPI:    Patient is a very pleasant 49-year-old woman who I the pleasure meeting earlier this year preoperative clearance for hysterectomy which she underwent without difficulty..  Prior to her preoperative visit she had an echocardiogram that suggested an interatrial shunt.  This was not clarified clearly on the transthoracic study.  She had a CONCEPCION performed demonstrating a small ASD with diameter of 8 mm.  This appears to be secundum in nature and possibly a deficient aortic rim however we will look into this more carefully.  She recently underwent a cardiac MRI with the findings as listed below.  Again this demonstrates a small ASD 7.7 mm in diameter with no adverse features for  consideration of percutaneous closure.  She has no additional congenital abnormalities commented upon.  Here to discuss and plan for ASD closure percutaneously    Cardiac MRI:    Normal left ventricular size and systolic function. Quantitative LVEF 71%.   Mildly dilated right ventricle with normal RV systolic function. Quantitative RVEF 65%.   There is a small secundum atrial septal defect, located in the superior aspect of the interatrial septum,  measures 7.7mm. The superior rim measures approximately 2.6mm.  By phase contrast imaging, Qp/Qs is 1.27.   Pulmonary vein angiogram reveals no evidence of anomalous venous return.   Dilated main pulmonary artery (3.8cm).       Primary Care Physician   Yolie Delarosa      Patient Active Problem List   Diagnosis     Hyperlipidemia LDL goal <100     Migraine     HTN, goal below 140/90     Migraine without aura     Health Care Home     Anxiety     Morbid obesity due to excess calories (H)     KRISTYN (obstructive sleep apnea)     Incomplete right bundle branch block     S/P total knee arthroplasty     Major depressive disorder, recurrent episode, mild (H)     Aneurysm, ascending aorta (H)     Umbilical hernia without obstruction and without gangrene     Patent foramen ovale     Pulmonary hypertension (H)     Ventral hernia without obstruction or gangrene       Past Medical History   I have reviewed this patient's medical history and updated it with pertinent information if needed.   Past Medical History:   Diagnosis Date     Arthritis      Depressive disorder      DYSPLASIA OF CERVIX 3/6/2003     Dysplasia of cervix (uteri) 2003    Rx cryotherapy Nov 03, and 2005     Hypertension     NO cardiology     Incomplete right bundle branch block 11/10/2017     Migraine      Other chronic pain     Knee pain for 8 years     Plantar fasciitis     bilat     Unspecified sleep apnea     CPAP will bring on the day of surgery.     Ventral hernia without obstruction or gangrene 4/15/2020        Past Surgical History   I have reviewed this patient's surgical history and updated it with pertinent information if needed.  Past Surgical History:   Procedure Laterality Date     ARTHROPLASTY KNEE Right 2017    Procedure: ARTHROPLASTY KNEE;  Right total knee arthroplasty using the Arthrex iBalance total knee system;  Surgeon: Hebert Lee MD;  Location: [facility] OR     ARTHROPLASTY KNEE Left 3/19/2018    Procedure: ARTHROPLASTY KNEE;  Left total knee arthroplasty using an Arthrex iBalance total knee system;  Surgeon: Hebert Lee MD;  Location: [facility] OR     C  DELIVERY ONLY  ,    , Low Cervical     DAVINCI HYSTERECTOMY TOTAL, BILATERAL SALPINGO-OOPHORECTOMY, COMBINED Bilateral 2020    Procedure: DaVinci robotic-assisted total laparoscopic hysterectomy, bilateral salpingectomy;  Surgeon: Venancio Bartlett MD;  Location: [facility] OR - Path all benign     DAVINCI HYSTERECTOMY TOTAL, SALPINGECTOMY BILATERAL Bilateral 2020    Fibroid uterus, Menometrorrhagia - Robotic TLH, Bilateral salpingectomy - Path all benign     ENT SURGERY       HC COLP VAGINA W CERVIX IF PRES W BIOPSY      [place] for ASCUS     TONSILLECTOMY & ADENOIDECTOMY         Prior to Admission Medications   Cannot display prior to admission medications because the patient has not been admitted in this contact.     [unfilled]  [unfilled]  Allergies   Allergies   Allergen Reactions     Penicillins Hives     hives       Social History    reports that she quit smoking about 13 years ago. Her smoking use included cigarettes. She has a 2.50 pack-year smoking history. She has never used smokeless tobacco. She reports current alcohol use. She reports that she does not use drugs.    Family History   Family History   Adopted: Yes   Problem Relation Age of Onset     Unknown/Adopted Other         pt is adopted and has no access to health history     Unknown/Adopted Mother      Unknown/Adopted Father       Unknown/Adopted Maternal Grandmother      Unknown/Adopted Maternal Grandfather      Unknown/Adopted Paternal Grandmother      Unknown/Adopted Other        Review of Systems   The comprehensive 10 point Review of Systems is negative other than noted in the HPI or here.     Physical Exam   Vital Signs with Ranges     Wt Readings from Last 4 Encounters:   06/18/20 108.9 kg (240 lb)   04/29/20 98.9 kg (218 lb)   04/22/20 102.5 kg (226 lb)   03/30/20 102.9 kg (226 lb 12.8 oz)     [unfilled]      Vitals: LMP  (LMP Unknown)     GENERAL: Healthy, alert and no distress  EYES: Eyes grossly normal to inspection.  No discharge or erythema, or obvious scleral/conjunctival abnormalities.  RESP: No audible wheeze, cough, or visible cyanosis.  No visible retractions or increased work of breathing.    SKIN: Visible skin clear. No significant rash, abnormal pigmentation or lesions.  NEURO: Cranial nerves grossly intact.  Mentation and speech appropriate for age.  PSYCH: Mentation appears normal, affect normal/bright, judgement and insight intact, normal speech and appearance well-groomed.    @LABRCNTIPR(tropi:5,troponinies:5)@    @LABRCNTIPR(wbc:3,hgb:3,mcv:3,plt:3,inr:3,na:3,potassium:3,chloride:3,co2:3,bun:3,cr:3,gfrestimated:3,gfrestblack:3,aniongap:3,bob:3,glc:3,albumin:2,prottotal:2,bilitotal:2,alkphos:2,alt:2,ast:2,lipase:2,tropi:3)@  Recent Labs   Lab Test 01/24/20  0902 07/05/17  0945   CHOL 182 155   HDL 41* 36*   * 68   TRIG 119 256*      @LABRCNTIP(wbc:3,hgb:3,hct:3,mcv:3,plt:3,iron:3,ironsat:3,reticabsct:3,retp:3,feb:3,sigifredo:3,b12:3,folic:3,epoe:3,morph:3)@  @LABRCNTIP(PH:3,PHV:3,PO2:3,PO2V:3,sat:3,PCO2:3,PCO2V:3,HCO3:3,HCO3V:3)@  @LABRCNTIP(NTBNPI:3,NTBNP:3)@  @LABRCNTIP(DD:1)@  @LABRCNTIP(sed:3,crp:3)@  @LABRCNTIP(PLT:3)@  @LABRCNTIP(TSH:3)@  @LABRCNTIP(color:1,appearance:1,urineg,urinebili:1,urineketone:1,s,ubld:1,urineph:1,protein:1,urobilinogen:1,nitrite:1,leukest:1,rbcu:1,wbcu:1)@    Imaging:  No results found for this or any previous visit (from the past 48 hour(s)).    Echo:  No results found for this or any previous visit (from the past 4320 hour(s)).             Thank you for allowing me to participate in the care of your patient.      Sincerely,     Corby Hodges MD     [facility]    cc:   Yolie Delarosa MD  [id number]  [place] [address]

## 2020-07-29 NOTE — LETTER
7/29/2020    Yolie Delarosa MD  [address]  [facility] [address]    RE: Lyubov Sanchez       Dear Colleague,    I had the pleasure of seeing Lyubov Sanchez in the [facility].      Cardiology Clinic (Structurtal - ASD)    Assessment & Plan       1.  Small ASD noted on CONCEPCION and Cardiac MRI  2.  Obesity  3.  Hyperlipidemia  4.  Obstructive sleep apnea  5.  Mildly dilated proximal ascending aorta  6.  Mild pulmonary hypertension from # 1  7.  Patient S/P hysterectomy  8.  HTN    Recommendations    We discussed the pathophysiology of ASD and potential therapeutic measures to address this.  Cardiac MRI suggests that the rim is sufficient for ASD closure.  This is a small ASD with no other congenital malformations.  Patient is agreeable to have this performed.  I am not in the lab for PFO procedures for the next 2 to 3 months and will ask my colleagues Ray Tripp in [facility] who are scheduled for PFO closure on August 26, 2020 to perform the procedure.  Risk benefits and complications were discussed with patient and she is willing to proceed.  She has no contraindications to general anesthesia and or CONCEPCION.  Would use the GORE device.    Thank you kindly for consult      Corby Hodges MD      HPI:    Patient is a very pleasant 49-year-old woman who I the pleasure meeting earlier this year preoperative clearance for hysterectomy which she underwent without difficulty..  Prior to her preoperative visit she had an echocardiogram that suggested an interatrial shunt.  This was not clarified clearly on the transthoracic study.  She had a CONCEPCION performed demonstrating a small ASD with diameter of 8 mm.  This appears to be secundum in nature and possibly a deficient aortic rim however we will look into this more carefully.  She recently underwent a cardiac MRI with the findings as listed below.  Again this demonstrates a small ASD 7.7 mm in diameter with no adverse features for  consideration of percutaneous closure.  She has no additional congenital abnormalities commented upon.  Here to discuss and plan for ASD closure percutaneously    Cardiac MRI:    Normal left ventricular size and systolic function. Quantitative LVEF 71%.   Mildly dilated right ventricle with normal RV systolic function. Quantitative RVEF 65%.   There is a small secundum atrial septal defect, located in the superior aspect of the interatrial septum,  measures 7.7mm. The superior rim measures approximately 2.6mm.  By phase contrast imaging, Qp/Qs is 1.27.   Pulmonary vein angiogram reveals no evidence of anomalous venous return.   Dilated main pulmonary artery (3.8cm).       Primary Care Physician   Yolie Delarosa      Patient Active Problem List   Diagnosis     Hyperlipidemia LDL goal <100     Migraine     HTN, goal below 140/90     Migraine without aura     Health Care Home     Anxiety     Morbid obesity due to excess calories (H)     KRISTYN (obstructive sleep apnea)     Incomplete right bundle branch block     S/P total knee arthroplasty     Major depressive disorder, recurrent episode, mild (H)     Aneurysm, ascending aorta (H)     Umbilical hernia without obstruction and without gangrene     Patent foramen ovale     Pulmonary hypertension (H)     Ventral hernia without obstruction or gangrene       Past Medical History   I have reviewed this patient's medical history and updated it with pertinent information if needed.   Past Medical History:   Diagnosis Date     Arthritis      Depressive disorder      DYSPLASIA OF CERVIX 3/6/2003     Dysplasia of cervix (uteri) 2003    Rx cryotherapy Nov 03, and 2005     Hypertension     NO cardiology     Incomplete right bundle branch block 11/10/2017     Migraine      Other chronic pain     Knee pain for 8 years     Plantar fasciitis     bilat     Unspecified sleep apnea     CPAP will bring on the day of surgery.     Ventral hernia without obstruction or gangrene 4/15/2020        Past Surgical History   I have reviewed this patient's surgical history and updated it with pertinent information if needed.  Past Surgical History:   Procedure Laterality Date     ARTHROPLASTY KNEE Right 2017    Procedure: ARTHROPLASTY KNEE;  Right total knee arthroplasty using the Arthrex iBalance total knee system;  Surgeon: Hebert Lee MD;  Location: [facility] OR     ARTHROPLASTY KNEE Left 3/19/2018    Procedure: ARTHROPLASTY KNEE;  Left total knee arthroplasty using an Arthrex iBalance total knee system;  Surgeon: Hebert Lee MD;  Location: [facility] OR     C  DELIVERY ONLY  ,    , Low Cervical     DAVINCI HYSTERECTOMY TOTAL, BILATERAL SALPINGO-OOPHORECTOMY, COMBINED Bilateral 2020    Procedure: DaVinci robotic-assisted total laparoscopic hysterectomy, bilateral salpingectomy;  Surgeon: Venancio Bartlett MD;  Location: [facility] OR - Path all benign     DAVINCI HYSTERECTOMY TOTAL, SALPINGECTOMY BILATERAL Bilateral 2020    Fibroid uterus, Menometrorrhagia - Robotic TLH, Bilateral salpingectomy - Path all benign     ENT SURGERY       HC COLP VAGINA W CERVIX IF PRES W BIOPSY      [place] for ASCUS     TONSILLECTOMY & ADENOIDECTOMY         Prior to Admission Medications   Cannot display prior to admission medications because the patient has not been admitted in this contact.     [unfilled]  [unfilled]  Allergies   Allergies   Allergen Reactions     Penicillins Hives     hives       Social History    reports that she quit smoking about 13 years ago. Her smoking use included cigarettes. She has a 2.50 pack-year smoking history. She has never used smokeless tobacco. She reports current alcohol use. She reports that she does not use drugs.    Family History   Family History   Adopted: Yes   Problem Relation Age of Onset     Unknown/Adopted Other         pt is adopted and has no access to health history     Unknown/Adopted Mother      Unknown/Adopted Father       Unknown/Adopted Maternal Grandmother      Unknown/Adopted Maternal Grandfather      Unknown/Adopted Paternal Grandmother      Unknown/Adopted Other        Review of Systems   The comprehensive 10 point Review of Systems is negative other than noted in the HPI or here.     Physical Exam   Vital Signs with Ranges     Wt Readings from Last 4 Encounters:   06/18/20 108.9 kg (240 lb)   04/29/20 98.9 kg (218 lb)   04/22/20 102.5 kg (226 lb)   03/30/20 102.9 kg (226 lb 12.8 oz)     [unfilled]      Vitals: LMP  (LMP Unknown)     GENERAL: Healthy, alert and no distress  EYES: Eyes grossly normal to inspection.  No discharge or erythema, or obvious scleral/conjunctival abnormalities.  RESP: No audible wheeze, cough, or visible cyanosis.  No visible retractions or increased work of breathing.    SKIN: Visible skin clear. No significant rash, abnormal pigmentation or lesions.  NEURO: Cranial nerves grossly intact.  Mentation and speech appropriate for age.  PSYCH: Mentation appears normal, affect normal/bright, judgement and insight intact, normal speech and appearance well-groomed.    @LABRCNTIPR(tropi:5,troponinies:5)@    @LABRCNTIPR(wbc:3,hgb:3,mcv:3,plt:3,inr:3,na:3,potassium:3,chloride:3,co2:3,bun:3,cr:3,gfrestimated:3,gfrestblack:3,aniongap:3,bob:3,glc:3,albumin:2,prottotal:2,bilitotal:2,alkphos:2,alt:2,ast:2,lipase:2,tropi:3)@  Recent Labs   Lab Test 01/24/20  0902 07/05/17  0945   CHOL 182 155   HDL 41* 36*   * 68   TRIG 119 256*      @LABRCNTIP(wbc:3,hgb:3,hct:3,mcv:3,plt:3,iron:3,ironsat:3,reticabsct:3,retp:3,feb:3,sigifredo:3,b12:3,folic:3,epoe:3,morph:3)@  @LABRCNTIP(PH:3,PHV:3,PO2:3,PO2V:3,sat:3,PCO2:3,PCO2V:3,HCO3:3,HCO3V:3)@  @LABRCNTIP(NTBNPI:3,NTBNP:3)@  @LABRCNTIP(DD:1)@  @LABRCNTIP(sed:3,crp:3)@  @LABRCNTIP(PLT:3)@  @LABRCNTIP(TSH:3)@  @LABRCNTIP(color:1,appearance:1,urineg,urinebili:1,urineketone:1,s,ubld:1,urineph:1,protein:1,urobilinogen:1,nitrite:1,leukest:1,rbcu:1,wbcu:1)@    Imaging:  No results found for this or any previous visit (from the past 48 hour(s)).    Echo:  No results found for this or any previous visit (from the past 4320 hour(s)).      Thank you for allowing me to participate in the care of your patient.    Sincerely,     Corby Hodges MD     [facility]

## 2020-07-30 DIAGNOSIS — Q21.11 OSTIUM SECUNDUM TYPE ATRIAL SEPTAL DEFECT: Primary | ICD-10-CM

## 2020-07-30 DIAGNOSIS — Z11.59 ENCOUNTER FOR SCREENING FOR OTHER VIRAL DISEASES: Primary | ICD-10-CM

## 2020-08-05 ENCOUNTER — MYC REFILL (OUTPATIENT)
Dept: FAMILY MEDICINE | Facility: CLINIC | Age: 50
End: 2020-08-05

## 2020-08-05 DIAGNOSIS — I10 HTN, GOAL BELOW 140/90: ICD-10-CM

## 2020-08-05 RX ORDER — LISINOPRIL AND HYDROCHLOROTHIAZIDE 12.5; 2 MG/1; MG/1
TABLET ORAL
Qty: 45 TABLET | Refills: 0 | Status: SHIPPED | OUTPATIENT
Start: 2020-08-05 | End: 2020-10-02

## 2020-08-05 NOTE — TELEPHONE ENCOUNTER
Prescription approved per [facility] Refill Protocol.    Sarbjit ANSARI RN, BSN

## 2020-08-15 DIAGNOSIS — F33.0 MAJOR DEPRESSIVE DISORDER, RECURRENT EPISODE, MILD (H): ICD-10-CM

## 2020-08-17 RX ORDER — CITALOPRAM HYDROBROMIDE 20 MG/1
TABLET ORAL
Qty: 90 TABLET | Refills: 0 | Status: SHIPPED | OUTPATIENT
Start: 2020-08-17 | End: 2021-03-03

## 2020-08-17 NOTE — TELEPHONE ENCOUNTER
Prescription approved per [facility] Refill Protocol.  Jazmin Dill RN, BSN  Message handled by CLINIC NURSE.

## 2020-08-18 DIAGNOSIS — Q21.11 OSTIUM SECUNDUM TYPE ATRIAL SEPTAL DEFECT: Primary | ICD-10-CM

## 2020-08-20 ENCOUNTER — OFFICE VISIT (OUTPATIENT)
Dept: CARDIOLOGY | Facility: CLINIC | Age: 50
End: 2020-08-20
Attending: INTERNAL MEDICINE
Payer: COMMERCIAL

## 2020-08-20 VITALS
HEIGHT: 64 IN | WEIGHT: 253.6 LBS | BODY MASS INDEX: 43.29 KG/M2 | SYSTOLIC BLOOD PRESSURE: 128 MMHG | HEART RATE: 79 BPM | OXYGEN SATURATION: 100 % | DIASTOLIC BLOOD PRESSURE: 84 MMHG

## 2020-08-20 DIAGNOSIS — Q21.11 OSTIUM SECUNDUM TYPE ATRIAL SEPTAL DEFECT: Primary | ICD-10-CM

## 2020-08-20 PROCEDURE — 99215 OFFICE O/P EST HI 40 MIN: CPT | Performed by: NURSE PRACTITIONER

## 2020-08-20 RX ORDER — CLOPIDOGREL BISULFATE 75 MG/1
TABLET ORAL
Qty: 90 TABLET | Refills: 3 | Status: ON HOLD | OUTPATIENT
Start: 2020-08-20 | End: 2020-08-27

## 2020-08-20 ASSESSMENT — MIFFLIN-ST. JEOR: SCORE: 1760.32

## 2020-08-20 NOTE — PROGRESS NOTES
History of Present Illness:    Lyubov Sanchez is a 50 year old female followed here by Dr. Hodges who is here today for cardiac assessment prior to ASD closure being done by Dr Frias/Jose Carlos.    Upon clearance for a hysterectomy earlier in the year she was found to have a secundum ASD measuring 7.7 cm. On CONCEPCION it was felt she may have deficient aortic rim. She went on for cardiac MRI showing a mildly dilated right ventricle with normal right ventricular function. There is a 7.7 cm secundum ASD with normal pulmonary venous return but on the report, there is a comment of one single left pulmonary vein. We will have the MRI overread to see if this is two pulmonary veins draining into one or only on single vein. Dr. Branch has spoken to Dr. Delgado who will over read the MRI and call us back. Qp/Qs ratio was 1.27. Main pulmonary is dilated to 3.8cm. Moderate ELVA is present. CONCEPCION noted moderate left to right shunt. RVSP on transthoracic echo was 35.8 plus RAP but on CONCEPCION 52 plus RAP.    Anatomical findings were reviewed by Dr. Hodges with Dr. Branch/Rodriguez and rim tissue appeared adequate for closure. Dr. Hodges's note indicates to use the GORE device.    Other medical history includes obesity, dyslipidemia, obstructive sleep apnea, and hypertension. Aortic root appeared to measure 3.5cm on CONCEPCION;ascending aorta on chest CT in 2018 was 4.1cm. She has lower extremity edema likely multifactorial with venous changes, bilateral knee replacements and amlodipine    She has a ventral hernia which developed after her hysterectomy in January    LIpids: total cholesterol 182 HDL 41  1-  Sodium 139 potassium 4.2 BUN 16 Creatinine 0.64 1-    She states she has sob with exertion for most of her life but felt this was just her normal. She is adopted and has no access to her family history. She has two daughters who I suggested should be screened for congenital heart disease.    Impression/Plan:     1. Secundum ASD  RV  mildly dilated  RVSP on transthoracic echo 35, on CONCEPCION 52 plus RAP  I have reviewed the percutaneous closure in detail including general anesthesia, staying overnight in the hosptial  -lifting restrictions and to avoid dental work for 6 months after  -we have discussed that pulmonary pressure will be measured prior to closing and general anesthesia with a right heart catheterization  -I have ordered a separate right heart cath to be done before she has her general anesthesia for her closure  -will await Dr. Delgado's over read on the pulmonary vein anatomy  -She will start Aspirin 81mg daily on 8-21 with a dose the am of the procedure  -she will load with Plavix 300mg (4 pills) on 8-25, then 75mg daily including the day of the procedure  -she is PCN allergic so we will use alternate antibiotics  -I have reviewed the risks of closure including infection, bleeding, device migration, risk to anesthesia, throat and dental trauma from CONCEPCION, stroke, cardiac trauma and tamponade    -I have told her that if her PA are too high, there is a small chance we will not be able to close    Addendum: call from Dr. Branch; the left pulmonary veins are two merging into one; safe to proceed with closure attempt  Cath lab notified about RHC before general anesthesia and closure is attempted and orders placed    2. HTN  Controlled  Continue Amlodpine 5mg daily  Continue Lisinopril/HCT  Given her edema and dilated aorta, consideration could be given to changing amlodipine to coreg or bystolic    3. Dyslipidemia  HDL low LDL reasonable    4. Mildly dilated ascending aorta  Stable at 4.1 by CT; 3.9 by echo    5. She was informed that she will need COVID testing done and proven negative before we can proceed.    It has been a pleasure seeing Lyubov Sanchez in follow up today  Greater than 50% of this 45 minute visit was spent in counseling    Eve Orantes, MSN, APRN-BC, CNP  Cardiology    No orders of the defined types were placed in  this encounter.    Orders Placed This Encounter   Medications     clopidogrel (PLAVIX) 75 MG tablet     Si pill once on  pre procedure then one pill daily thereafter     Dispense:  90 tablet     Refill:  3     I WANT THAT LOADING DOSE     aspirin (ASA) 81 MG EC tablet     Sig: Take 1 tablet (81 mg) by mouth daily     Dispense:        There are no discontinued medications.      Encounter Diagnosis   Name Primary?     Ostium secundum type atrial septal defect        CURRENT MEDICATIONS:  Current Outpatient Medications   Medication Sig Dispense Refill     amLODIPine (NORVASC) 5 MG tablet 5 mg daily       aspirin (ASA) 81 MG EC tablet Take 1 tablet (81 mg) by mouth daily       citalopram (CELEXA) 20 MG tablet TAKE 1 TABLET BY MOUTH EVERY DAY 90 tablet 0     clopidogrel (PLAVIX) 75 MG tablet 4 pill once on  pre procedure then one pill daily thereafter 90 tablet 3     lisinopril-hydrochlorothiazide (ZESTORETIC) 20-12.5 MG tablet Take 1/2 tab once daily/ 45 tablet 0       ALLERGIES     Allergies   Allergen Reactions     Penicillins Hives     hives       PAST MEDICAL HISTORY:  Past Medical History:   Diagnosis Date     Arthritis      Depressive disorder      DYSPLASIA OF CERVIX 3/6/2003     Dysplasia of cervix (uteri)     Rx cryotherapy , and      Hypertension     NO cardiology     Incomplete right bundle branch block 11/10/2017     Migraine      Other chronic pain     Knee pain for 8 years     Plantar fasciitis     bilat     Unspecified sleep apnea     CPAP will bring on the day of surgery.     Ventral hernia without obstruction or gangrene 4/15/2020       PAST SURGICAL HISTORY:  Past Surgical History:   Procedure Laterality Date     ARTHROPLASTY KNEE Right 2017    Procedure: ARTHROPLASTY KNEE;  Right total knee arthroplasty using the Arthrex SeeControllance total knee system;  Surgeon: Hebert Lee MD;  Location: [facility] OR     ARTHROPLASTY KNEE Left 3/19/2018    Procedure: ARTHROPLASTY  KNEE;  Left total knee arthroplasty using an Arthrex iBalance total knee system;  Surgeon: Hebert Lee MD;  Location: [facility] OR     C  DELIVERY ONLY  ,    , Low Cervical     DAVINCI HYSTERECTOMY TOTAL, BILATERAL SALPINGO-OOPHORECTOMY, COMBINED Bilateral 2020    Procedure: DaVinci robotic-assisted total laparoscopic hysterectomy, bilateral salpingectomy;  Surgeon: Venancio Bartlett MD;  Location: [facility] OR - Path all benign     DAVINCI HYSTERECTOMY TOTAL, SALPINGECTOMY BILATERAL Bilateral 2020    Fibroid uterus, Menometrorrhagia - Robotic TLH, Bilateral salpingectomy - Path all benign     ENT SURGERY       HC COLP VAGINA W CERVIX IF PRES W BIOPSY      [place] for ASCUS     TONSILLECTOMY & ADENOIDECTOMY         FAMILY HISTORY:  Family History   Adopted: Yes   Problem Relation Age of Onset     Unknown/Adopted Other         pt is adopted and has no access to health history     Unknown/Adopted Mother      Unknown/Adopted Father      Unknown/Adopted Maternal Grandmother      Unknown/Adopted Maternal Grandfather      Unknown/Adopted Paternal Grandmother      Unknown/Adopted Other        SOCIAL HISTORY:  Social History     Socioeconomic History     Marital status:      Spouse name: Saúl     Number of children: 2     Years of education: None     Highest education level: None   Occupational History     Occupation:  at home   Social Needs     Financial resource strain: None     Food insecurity     Worry: None     Inability: None     Transportation needs     Medical: None     Non-medical: None   Tobacco Use     Smoking status: Former Smoker     Packs/day: 0.50     Years: 5.00     Pack years: 2.50     Types: Cigarettes     Last attempt to quit: 2007     Years since quittin.3     Smokeless tobacco: Never Used   Substance and Sexual Activity     Alcohol use: Yes     Comment: 0-1 weekly     Drug use: No     Sexual activity: Yes     Partners: Male     Birth  "control/protection: Female Surgical, Male Surgical     Comment: Hysterectomy 2020/ vasectomy    Lifestyle     Physical activity     Days per week: None     Minutes per session: None     Stress: None   Relationships     Social connections     Talks on phone: None     Gets together: None     Attends [employer] service: None     Active member of club or organization: None     Attends meetings of clubs or organizations: None     Relationship status: None     Intimate partner violence     Fear of current or ex partner: None     Emotionally abused: None     Physically abused: None     Forced sexual activity: None   Other Topics Concern      Service No     Blood Transfusions No     Caffeine Concern Yes     Comment: 20 oz pepsi     Occupational Exposure Not Asked     Hobby Hazards Not Asked     Sleep Concern Not Asked     Stress Concern Not Asked     Weight Concern Not Asked     Special Diet Yes     Comment: 2-3 dairy per day     Back Care Not Asked     Exercise No     Comment: active with kids, walking puppy     Bike Helmet Not Asked     Seat Belt Yes     Self-Exams No     Parent/sibling w/ CABG, MI or angioplasty before 65F 55M? No     Comment: No family history - Adopted   Social History Narrative     None       Review of Systems:  Skin:  Negative       Eyes:  Negative      ENT:  Negative      Respiratory:  Negative       Cardiovascular:    palpitations;Positive for    Gastroenterology: Negative      Genitourinary:  Negative      Musculoskeletal:  Negative      Neurologic:  Negative      Psychiatric:  Negative      Heme/Lymph/Imm:  Negative      Endocrine:  Negative        Physical Exam:  Vitals: /84 (BP Location: Left arm, Patient Position: Sitting, Cuff Size: Adult Regular)   Pulse 79   Ht 1.626 m (5' 4\")   Wt 115 kg (253 lb 9.6 oz)   LMP  (LMP Unknown)   SpO2 100%   BMI 43.53 kg/m      Constitutional:  cooperative, alert and oriented, well developed, well nourished, in no acute distress obese "      Skin:  warm and dry to the touch, no apparent skin lesions or masses noted          Head:  normocephalic, no masses or lesions        Eyes:  pupils equal and round, conjunctivae and lids unremarkable, sclera white, no xanthalasma, EOMS intact, no nystagmus        Lymph:      ENT:  no pallor or cyanosis, dentition good        Neck:  carotid pulses are full and equal bilaterally, JVP normal, no carotid bruit        Respiratory:  normal breath sounds, clear to auscultation, normal A-P diameter, normal symmetry, normal respiratory excursion, no use of accessory muscles         Cardiac: regular rhythm   fixed split S2   grade 2;early systolic murmur;RUSB        not assessed this visit                                        GI:  abdomen soft, non-tender, BS normoactive, no mass, no HSM, no bruits   ventral hernia    Extremities and Muscular Skeletal:      telangiectasia RLE edema;pitting;trace LLE edema;pitting;trace      Neurological:  no gross motor deficits        Psych:  Alert and Oriented x 3      Recent Lab Results:  LIPID RESULTS:  Lab Results   Component Value Date    CHOL 182 01/24/2020    HDL 41 (L) 01/24/2020     (H) 01/24/2020    TRIG 119 01/24/2020    CHOLHDLRATIO 4.2 04/25/2007       LIVER ENZYME RESULTS:  Lab Results   Component Value Date    AST 17 01/24/2020    ALT 29 01/24/2020       CBC RESULTS:  Lab Results   Component Value Date    WBC 7.4 01/24/2020    RBC 4.77 01/24/2020    HGB 14.5 01/24/2020    HCT 44.0 01/24/2020    MCV 92 01/24/2020    MCH 30.4 01/24/2020    MCHC 33.0 01/24/2020    RDW 12.8 01/24/2020     01/24/2020       BMP RESULTS:  Lab Results   Component Value Date     01/24/2020    POTASSIUM 4.2 01/24/2020    CHLORIDE 105 01/24/2020    CO2 30 01/24/2020    ANIONGAP 4 01/24/2020    GLC 85 01/24/2020    BUN 16 01/24/2020    CR 0.64 01/24/2020    GFRESTIMATED >90 01/24/2020    GFRESTBLACK >90 01/24/2020    CARROL 9.3 01/24/2020        A1C RESULTS:  Lab Results    Component Value Date    A1C 5.1 04/24/2019       INR RESULTS:  Lab Results   Component Value Date    INR 1.00 06/21/2015    INR 1.71 (H) 03/26/2012           CC  Yolie Delarosa [place]  [id number]  [id number] [address]

## 2020-08-20 NOTE — LETTER
8/20/2020    Yolie Delarosa MD  [address]  [facility] [address]    RE: Lyubov Sanchez       Dear Colleague,    I had the pleasure of seeing Lyubov Sanchez in the [facility].    History of Present Illness:    Lyubov Sanchez is a 50 year old female followed here by Dr. Hodges who is here today for cardiac assessment prior to ASD closure being done by Dr Frias/Jose Carlos.    Upon clearance for a hysterectomy earlier in the year she was found to have a secundum ASD measuring 7.7 cm. On CONCEPCION it was felt she may have deficient aortic rim. She went on for cardiac MRI showing a mildly dilated right ventricle with normal right ventricular function. There is a 7.7 cm secundum ASD with normal pulmonary venous return but on the report, there is a comment of one single left pulmonary vein. We will have the MRI overread to see if this is two pulmonary veins draining into one or only on single vein. Dr. Branch has spoken to Dr. Delgado who will over read the MRI and call us back. Qp/Qs ratio was 1.27. Main pulmonary is dilated to 3.8cm. Moderate ELVA is present. CONCEPCION noted moderate left to right shunt. RVSP on transthoracic echo was 35.8 plus RAP but on CONCEPCION 52 plus RAP.    Anatomical findings were reviewed by Dr. Hodges with Dr. Branch/Rodriguez and rim tissue appeared adequate for closure. Dr. Hodges's note indicates to use the GORE device.    Other medical history includes obesity, dyslipidemia, obstructive sleep apnea, and hypertension. Aortic root appeared to measure 3.5cm on CONCEPCION;ascending aorta on chest CT in 2018 was 4.1cm. She has lower extremity edema likely multifactorial with venous changes, bilateral knee replacements and amlodipine    She has a ventral hernia which developed after her hysterectomy in January    LIpids: total cholesterol 182 HDL 41  1-  Sodium 139 potassium 4.2 BUN 16 Creatinine 0.64 1-    She states she has sob with exertion for most of her  life but felt this was just her normal. She is adopted and has no access to her family history. She has two daughters who I suggested should be screened for congenital heart disease.    Impression/Plan:     1. Secundum ASD  RV mildly dilated  RVSP on transthoracic echo 35, on CONCEPCION 52 plus RAP  I have reviewed the percutaneous closure in detail including general anesthesia, staying overnight in the hosptial  -lifting restrictions and to avoid dental work for 6 months after  -we have discussed that pulmonary pressure will be measured prior to closing and general anesthesia with a right heart catheterization  -I have ordered a separate right heart cath to be done before she has her general anesthesia for her closure  -will await Dr. Delgado's over read on the pulmonary vein anatomy  -She will start Aspirin 81mg daily on 8-21 with a dose the am of the procedure  -she will load with Plavix 300mg (4 pills) on 8-25, then 75mg daily including the day of the procedure  -she is PCN allergic so we will use alternate antibiotics  -I have reviewed the risks of closure including infection, bleeding, device migration, risk to anesthesia, throat and dental trauma from CONCEPCION, stroke, cardiac trauma and tamponade    -I have told her that if her PA are too high, there is a small chance we will not be able to close    Addendum: call from Dr. Branch; the left pulmonary veins are two merging into one; safe to proceed with closure attempt  Cath lab notified about RHC before general anesthesia and closure is attempted and orders placed    2. HTN  Controlled  Continue Amlodpine 5mg daily  Continue Lisinopril/HCT  Given her edema and dilated aorta, consideration could be given to changing amlodipine to coreg or bystolic    3. Dyslipidemia  HDL low LDL reasonable    4. Mildly dilated ascending aorta  Stable at 4.1 by CT; 3.9 by echo    5. She was informed that she will need COVID testing done and proven negative before we can proceed.    It has  been a pleasure seeing Lyubov Sanchez in follow up today  Greater than 50% of this 45 minute visit was spent in counseling    Eve Orantes, MSN, APRN-BC, CNP  Cardiology    No orders of the defined types were placed in this encounter.    Orders Placed This Encounter   Medications     clopidogrel (PLAVIX) 75 MG tablet     Si pill once on  pre procedure then one pill daily thereafter     Dispense:  90 tablet     Refill:  3     I WANT THAT LOADING DOSE     aspirin (ASA) 81 MG EC tablet     Sig: Take 1 tablet (81 mg) by mouth daily     Dispense:        There are no discontinued medications.      Encounter Diagnosis   Name Primary?     Ostium secundum type atrial septal defect        CURRENT MEDICATIONS:  Current Outpatient Medications   Medication Sig Dispense Refill     amLODIPine (NORVASC) 5 MG tablet 5 mg daily       aspirin (ASA) 81 MG EC tablet Take 1 tablet (81 mg) by mouth daily       citalopram (CELEXA) 20 MG tablet TAKE 1 TABLET BY MOUTH EVERY DAY 90 tablet 0     clopidogrel (PLAVIX) 75 MG tablet 4 pill once on  pre procedure then one pill daily thereafter 90 tablet 3     lisinopril-hydrochlorothiazide (ZESTORETIC) 20-12.5 MG tablet Take 1/2 tab once daily/ 45 tablet 0       ALLERGIES     Allergies   Allergen Reactions     Penicillins Hives     hives       PAST MEDICAL HISTORY:  Past Medical History:   Diagnosis Date     Arthritis      Depressive disorder      DYSPLASIA OF CERVIX 3/6/2003     Dysplasia of cervix (uteri)     Rx cryotherapy , and      Hypertension     NO cardiology     Incomplete right bundle branch block 11/10/2017     Migraine      Other chronic pain     Knee pain for 8 years     Plantar fasciitis     bilat     Unspecified sleep apnea     CPAP will bring on the day of surgery.     Ventral hernia without obstruction or gangrene 4/15/2020       PAST SURGICAL HISTORY:  Past Surgical History:   Procedure Laterality Date     ARTHROPLASTY KNEE Right 2017     Procedure: ARTHROPLASTY KNEE;  Right total knee arthroplasty using the Arthrex iBalance total knee system;  Surgeon: Hebert Lee MD;  Location: [facility] OR     ARTHROPLASTY KNEE Left 3/19/2018    Procedure: ARTHROPLASTY KNEE;  Left total knee arthroplasty using an Arthrex iBalance total knee system;  Surgeon: Hebert Lee MD;  Location: [facility] OR     C  DELIVERY ONLY  ,    , Low Cervical     DAVINCI HYSTERECTOMY TOTAL, BILATERAL SALPINGO-OOPHORECTOMY, COMBINED Bilateral 2020    Procedure: DaVinci robotic-assisted total laparoscopic hysterectomy, bilateral salpingectomy;  Surgeon: Venancio Bartlett MD;  Location: [facility] OR - Path all benign     DAVINCI HYSTERECTOMY TOTAL, SALPINGECTOMY BILATERAL Bilateral 2020    Fibroid uterus, Menometrorrhagia - Robotic TLH, Bilateral salpingectomy - Path all benign     ENT SURGERY       HC COLP VAGINA W CERVIX IF PRES W BIOPSY      [place] for ASCUS     TONSILLECTOMY & ADENOIDECTOMY         FAMILY HISTORY:  Family History   Adopted: Yes   Problem Relation Age of Onset     Unknown/Adopted Other         pt is adopted and has no access to health history     Unknown/Adopted Mother      Unknown/Adopted Father      Unknown/Adopted Maternal Grandmother      Unknown/Adopted Maternal Grandfather      Unknown/Adopted Paternal Grandmother      Unknown/Adopted Other        SOCIAL HISTORY:  Social History     Socioeconomic History     Marital status:      Spouse name: Saúl     Number of children: 2     Years of education: None     Highest education level: None   Occupational History     Occupation:  at home   Social Needs     Financial resource strain: None     Food insecurity     Worry: None     Inability: None     Transportation needs     Medical: None     Non-medical: None   Tobacco Use     Smoking status: Former Smoker     Packs/day: 0.50     Years: 5.00     Pack years: 2.50     Types: Cigarettes     Last attempt to  quit: 2007     Years since quittin.3     Smokeless tobacco: Never Used   Substance and Sexual Activity     Alcohol use: Yes     Comment: 0-1 weekly     Drug use: No     Sexual activity: Yes     Partners: Male     Birth control/protection: Female Surgical, Male Surgical     Comment: Hysterectomy 2020/ vasectomy    Lifestyle     Physical activity     Days per week: None     Minutes per session: None     Stress: None   Relationships     Social connections     Talks on phone: None     Gets together: None     Attends [employer] service: None     Active member of club or organization: None     Attends meetings of clubs or organizations: None     Relationship status: None     Intimate partner violence     Fear of current or ex partner: None     Emotionally abused: None     Physically abused: None     Forced sexual activity: None   Other Topics Concern      Service No     Blood Transfusions No     Caffeine Concern Yes     Comment: 20 oz pepsi     Occupational Exposure Not Asked     Hobby Hazards Not Asked     Sleep Concern Not Asked     Stress Concern Not Asked     Weight Concern Not Asked     Special Diet Yes     Comment: 2-3 dairy per day     Back Care Not Asked     Exercise No     Comment: active with kids, walking puppy     Bike Helmet Not Asked     Seat Belt Yes     Self-Exams No     Parent/sibling w/ CABG, MI or angioplasty before 65F 55M? No     Comment: No family history - Adopted   Social History Narrative     None       Review of Systems:  Skin:  Negative       Eyes:  Negative      ENT:  Negative      Respiratory:  Negative       Cardiovascular:    palpitations;Positive for    Gastroenterology: Negative      Genitourinary:  Negative      Musculoskeletal:  Negative      Neurologic:  Negative      Psychiatric:  Negative      Heme/Lymph/Imm:  Negative      Endocrine:  Negative        Physical Exam:  Vitals: /84 (BP Location: Left arm, Patient Position: Sitting, Cuff Size: Adult Regular)   " Pulse 79   Ht 1.626 m (5' 4\")   Wt 115 kg (253 lb 9.6 oz)   LMP  (LMP Unknown)   SpO2 100%   BMI 43.53 kg/m      Constitutional:  cooperative, alert and oriented, well developed, well nourished, in no acute distress obese      Skin:  warm and dry to the touch, no apparent skin lesions or masses noted          Head:  normocephalic, no masses or lesions        Eyes:  pupils equal and round, conjunctivae and lids unremarkable, sclera white, no xanthalasma, EOMS intact, no nystagmus        Lymph:      ENT:  no pallor or cyanosis, dentition good        Neck:  carotid pulses are full and equal bilaterally, JVP normal, no carotid bruit        Respiratory:  normal breath sounds, clear to auscultation, normal A-P diameter, normal symmetry, normal respiratory excursion, no use of accessory muscles         Cardiac: regular rhythm   fixed split S2   grade 2;early systolic murmur;RUSB        not assessed this visit                                        GI:  abdomen soft, non-tender, BS normoactive, no mass, no HSM, no bruits   ventral hernia    Extremities and Muscular Skeletal:      telangiectasia RLE edema;pitting;trace LLE edema;pitting;trace      Neurological:  no gross motor deficits        Psych:  Alert and Oriented x 3      Recent Lab Results:  LIPID RESULTS:  Lab Results   Component Value Date    CHOL 182 01/24/2020    HDL 41 (L) 01/24/2020     (H) 01/24/2020    TRIG 119 01/24/2020    CHOLHDLRATIO 4.2 04/25/2007       LIVER ENZYME RESULTS:  Lab Results   Component Value Date    AST 17 01/24/2020    ALT 29 01/24/2020       CBC RESULTS:  Lab Results   Component Value Date    WBC 7.4 01/24/2020    RBC 4.77 01/24/2020    HGB 14.5 01/24/2020    HCT 44.0 01/24/2020    MCV 92 01/24/2020    MCH 30.4 01/24/2020    MCHC 33.0 01/24/2020    RDW 12.8 01/24/2020     01/24/2020       BMP RESULTS:  Lab Results   Component Value Date     01/24/2020    POTASSIUM 4.2 01/24/2020    CHLORIDE 105 01/24/2020    CO2 " 30 01/24/2020    ANIONGAP 4 01/24/2020    GLC 85 01/24/2020    BUN 16 01/24/2020    CR 0.64 01/24/2020    GFRESTIMATED >90 01/24/2020    GFRESTBLACK >90 01/24/2020    CARROL 9.3 01/24/2020        A1C RESULTS:  Lab Results   Component Value Date    A1C 5.1 04/24/2019       INR RESULTS:  Lab Results   Component Value Date    INR 1.00 06/21/2015    INR 1.71 (H) 03/26/2012           CC  Yolie Delarosa MD  [id number]  [place] [address]                  Thank you for allowing me to participate in the care of your patient.      Sincerely,     BARRINGTON Crowell CNP     [facility]    cc:   Yolie Delarosa MD  [id number]  [place] [address]

## 2020-08-20 NOTE — LETTER
8/20/2020    Yolie Delarosa MD  [address]  [facility] [address]    RE: Lyubov Sanchez       Dear Colleague,    I had the pleasure of seeing Lyubov Sanchez in the [facility].    History of Present Illness:    Lyubov Sanchez is a 50 year old female followed here by Dr. Hodges who is here today for cardiac assessment prior to ASD closure being done by Dr Frias/Jose Carlos.    Upon clearance for a hysterectomy earlier in the year she was found to have a secundum ASD measuring 7.7 cm. On CNOCEPCION it was felt she may have deficient aortic rim. She went on for cardiac MRI showing a mildly dilated right ventricle with normal right ventricular function. There is a 7.7 cm secundum ASD with normal pulmonary venous return but on the report, there is a comment of one single left pulmonary vein. We will have the MRI overread to see if this is two pulmonary veins draining into one or only on single vein. Dr. Branch has spoken to Dr. Delgado who will over read the MRI and call us back. Qp/Qs ratio was 1.27. Main pulmonary is dilated to 3.8cm. Moderate ELVA is present. CONCEPCION noted moderate left to right shunt. RVSP on transthoracic echo was 35.8 plus RAP but on CONCEPCION 52 plus RAP.    Anatomical findings were reviewed by Dr. Hodges with Dr. Branch/Rodriguez and rim tissue appeared adequate for closure. Dr. Hodges's note indicates to use the GORE device.    Other medical history includes obesity, dyslipidemia, obstructive sleep apnea, and hypertension. Aortic root appeared to measure 3.5cm on CONCEPCION;ascending aorta on chest CT in 2018 was 4.1cm. She has lower extremity edema likely multifactorial with venous changes, bilateral knee replacements and amlodipine    She has a ventral hernia which developed after her hysterectomy in January    LIpids: total cholesterol 182 HDL 41  1-  Sodium 139 potassium 4.2 BUN 16 Creatinine 0.64 1-    She states she has sob with exertion for most of her  life but felt this was just her normal. She is adopted and has no access to her family history. She has two daughters who I suggested should be screened for congenital heart disease.    Impression/Plan:     1. Secundum ASD  RV mildly dilated  RVSP on transthoracic echo 35, on CONCEPCION 52 plus RAP  I have reviewed the percutaneous closure in detail including general anesthesia, staying overnight in the hosptial  -lifting restrictions and to avoid dental work for 6 months after  -we have discussed that pulmonary pressure will be measured prior to closing and general anesthesia with a right heart catheterization  -I have ordered a separate right heart cath to be done before she has her general anesthesia for her closure  -will await Dr. Delgado's over read on the pulmonary vein anatomy  -She will start Aspirin 81mg daily on 8-21 with a dose the am of the procedure  -she will load with Plavix 300mg (4 pills) on 8-25, then 75mg daily including the day of the procedure  -she is PCN allergic so we will use alternate antibiotics  -I have reviewed the risks of closure including infection, bleeding, device migration, risk to anesthesia, throat and dental trauma from CONCEPCION, stroke, cardiac trauma and tamponade    -I have told her that if her PA are too high, there is a small chance we will not be able to close    Addendum: call from Dr. Branch; the left pulmonary veins are two merging into one; safe to proceed with closure attempt  Cath lab notified about RHC before general anesthesia and closure is attempted and orders placed    2. HTN  Controlled  Continue Amlodpine 5mg daily  Continue Lisinopril/HCT  Given her edema and dilated aorta, consideration could be given to changing amlodipine to coreg or bystolic    3. Dyslipidemia  HDL low LDL reasonable    4. Mildly dilated ascending aorta  Stable at 4.1 by CT; 3.9 by echo    5. She was informed that she will need COVID testing done and proven negative before we can proceed.    It has  been a pleasure seeing Lyubov Sanchez in follow up today  Greater than 50% of this 45 minute visit was spent in counseling    Eve Orantes, MSN, APRN-BC, CNP  Cardiology    No orders of the defined types were placed in this encounter.    Orders Placed This Encounter   Medications     clopidogrel (PLAVIX) 75 MG tablet     Si pill once on  pre procedure then one pill daily thereafter     Dispense:  90 tablet     Refill:  3     I WANT THAT LOADING DOSE     aspirin (ASA) 81 MG EC tablet     Sig: Take 1 tablet (81 mg) by mouth daily     Dispense:        There are no discontinued medications.      Encounter Diagnosis   Name Primary?     Ostium secundum type atrial septal defect        CURRENT MEDICATIONS:  Current Outpatient Medications   Medication Sig Dispense Refill     amLODIPine (NORVASC) 5 MG tablet 5 mg daily       aspirin (ASA) 81 MG EC tablet Take 1 tablet (81 mg) by mouth daily       citalopram (CELEXA) 20 MG tablet TAKE 1 TABLET BY MOUTH EVERY DAY 90 tablet 0     clopidogrel (PLAVIX) 75 MG tablet 4 pill once on  pre procedure then one pill daily thereafter 90 tablet 3     lisinopril-hydrochlorothiazide (ZESTORETIC) 20-12.5 MG tablet Take 1/2 tab once daily/ 45 tablet 0       ALLERGIES     Allergies   Allergen Reactions     Penicillins Hives     hives       PAST MEDICAL HISTORY:  Past Medical History:   Diagnosis Date     Arthritis      Depressive disorder      DYSPLASIA OF CERVIX 3/6/2003     Dysplasia of cervix (uteri)     Rx cryotherapy , and      Hypertension     NO cardiology     Incomplete right bundle branch block 11/10/2017     Migraine      Other chronic pain     Knee pain for 8 years     Plantar fasciitis     bilat     Unspecified sleep apnea     CPAP will bring on the day of surgery.     Ventral hernia without obstruction or gangrene 4/15/2020       PAST SURGICAL HISTORY:  Past Surgical History:   Procedure Laterality Date     ARTHROPLASTY KNEE Right 2017     Procedure: ARTHROPLASTY KNEE;  Right total knee arthroplasty using the Arthrex iBalance total knee system;  Surgeon: Hebert Lee MD;  Location: [facility] OR     ARTHROPLASTY KNEE Left 3/19/2018    Procedure: ARTHROPLASTY KNEE;  Left total knee arthroplasty using an Arthrex iBalance total knee system;  Surgeon: Hebert Lee MD;  Location: [facility] OR     C  DELIVERY ONLY  ,    , Low Cervical     DAVINCI HYSTERECTOMY TOTAL, BILATERAL SALPINGO-OOPHORECTOMY, COMBINED Bilateral 2020    Procedure: DaVinci robotic-assisted total laparoscopic hysterectomy, bilateral salpingectomy;  Surgeon: Venancio Bartlett MD;  Location: [facility] OR - Path all benign     DAVINCI HYSTERECTOMY TOTAL, SALPINGECTOMY BILATERAL Bilateral 2020    Fibroid uterus, Menometrorrhagia - Robotic TLH, Bilateral salpingectomy - Path all benign     ENT SURGERY       HC COLP VAGINA W CERVIX IF PRES W BIOPSY      [place] for ASCUS     TONSILLECTOMY & ADENOIDECTOMY         FAMILY HISTORY:  Family History   Adopted: Yes   Problem Relation Age of Onset     Unknown/Adopted Other         pt is adopted and has no access to health history     Unknown/Adopted Mother      Unknown/Adopted Father      Unknown/Adopted Maternal Grandmother      Unknown/Adopted Maternal Grandfather      Unknown/Adopted Paternal Grandmother      Unknown/Adopted Other        SOCIAL HISTORY:  Social History     Socioeconomic History     Marital status:      Spouse name: Saúl     Number of children: 2     Years of education: None     Highest education level: None   Occupational History     Occupation:  at home   Social Needs     Financial resource strain: None     Food insecurity     Worry: None     Inability: None     Transportation needs     Medical: None     Non-medical: None   Tobacco Use     Smoking status: Former Smoker     Packs/day: 0.50     Years: 5.00     Pack years: 2.50     Types: Cigarettes     Last attempt to  quit: 2007     Years since quittin.3     Smokeless tobacco: Never Used   Substance and Sexual Activity     Alcohol use: Yes     Comment: 0-1 weekly     Drug use: No     Sexual activity: Yes     Partners: Male     Birth control/protection: Female Surgical, Male Surgical     Comment: Hysterectomy 2020/ vasectomy    Lifestyle     Physical activity     Days per week: None     Minutes per session: None     Stress: None   Relationships     Social connections     Talks on phone: None     Gets together: None     Attends Restorationist service: None     Active member of club or organization: None     Attends meetings of clubs or organizations: None     Relationship status: None     Intimate partner violence     Fear of current or ex partner: None     Emotionally abused: None     Physically abused: None     Forced sexual activity: None   Other Topics Concern      Service No     Blood Transfusions No     Caffeine Concern Yes     Comment: 20 oz pepsi     Occupational Exposure Not Asked     Hobby Hazards Not Asked     Sleep Concern Not Asked     Stress Concern Not Asked     Weight Concern Not Asked     Special Diet Yes     Comment: 2-3 dairy per day     Back Care Not Asked     Exercise No     Comment: active with kids, walking puppy     Bike Helmet Not Asked     Seat Belt Yes     Self-Exams No     Parent/sibling w/ CABG, MI or angioplasty before 65F 55M? No     Comment: No family history - Adopted   Social History Narrative     None       Review of Systems:  Skin:  Negative       Eyes:  Negative      ENT:  Negative      Respiratory:  Negative       Cardiovascular:    palpitations;Positive for    Gastroenterology: Negative      Genitourinary:  Negative      Musculoskeletal:  Negative      Neurologic:  Negative      Psychiatric:  Negative      Heme/Lymph/Imm:  Negative      Endocrine:  Negative        Physical Exam:  Vitals: /84 (BP Location: Left arm, Patient Position: Sitting, Cuff Size: Adult Regular)   " Pulse 79   Ht 1.626 m (5' 4\")   Wt 115 kg (253 lb 9.6 oz)   LMP  (LMP Unknown)   SpO2 100%   BMI 43.53 kg/m      Constitutional:  cooperative, alert and oriented, well developed, well nourished, in no acute distress obese      Skin:  warm and dry to the touch, no apparent skin lesions or masses noted          Head:  normocephalic, no masses or lesions        Eyes:  pupils equal and round, conjunctivae and lids unremarkable, sclera white, no xanthalasma, EOMS intact, no nystagmus        Lymph:      ENT:  no pallor or cyanosis, dentition good        Neck:  carotid pulses are full and equal bilaterally, JVP normal, no carotid bruit        Respiratory:  normal breath sounds, clear to auscultation, normal A-P diameter, normal symmetry, normal respiratory excursion, no use of accessory muscles         Cardiac: regular rhythm   fixed split S2   grade 2;early systolic murmur;RUSB        not assessed this visit                                        GI:  abdomen soft, non-tender, BS normoactive, no mass, no HSM, no bruits   ventral hernia    Extremities and Muscular Skeletal:      telangiectasia RLE edema;pitting;trace LLE edema;pitting;trace      Neurological:  no gross motor deficits        Psych:  Alert and Oriented x 3      Recent Lab Results:  LIPID RESULTS:  Lab Results   Component Value Date    CHOL 182 01/24/2020    HDL 41 (L) 01/24/2020     (H) 01/24/2020    TRIG 119 01/24/2020    CHOLHDLRATIO 4.2 04/25/2007       LIVER ENZYME RESULTS:  Lab Results   Component Value Date    AST 17 01/24/2020    ALT 29 01/24/2020       CBC RESULTS:  Lab Results   Component Value Date    WBC 7.4 01/24/2020    RBC 4.77 01/24/2020    HGB 14.5 01/24/2020    HCT 44.0 01/24/2020    MCV 92 01/24/2020    MCH 30.4 01/24/2020    MCHC 33.0 01/24/2020    RDW 12.8 01/24/2020     01/24/2020       BMP RESULTS:  Lab Results   Component Value Date     01/24/2020    POTASSIUM 4.2 01/24/2020    CHLORIDE 105 01/24/2020    CO2 " 30 01/24/2020    ANIONGAP 4 01/24/2020    GLC 85 01/24/2020    BUN 16 01/24/2020    CR 0.64 01/24/2020    GFRESTIMATED >90 01/24/2020    GFRESTBLACK >90 01/24/2020    CARROL 9.3 01/24/2020        A1C RESULTS:  Lab Results   Component Value Date    A1C 5.1 04/24/2019       INR RESULTS:  Lab Results   Component Value Date    INR 1.00 06/21/2015    INR 1.71 (H) 03/26/2012         Thank you for allowing me to participate in the care of your patient.    Sincerely,     BARRINGTON Crowell [facility]

## 2020-08-23 DIAGNOSIS — Z11.59 ENCOUNTER FOR SCREENING FOR OTHER VIRAL DISEASES: ICD-10-CM

## 2020-08-23 PROCEDURE — U0003 INFECTIOUS AGENT DETECTION BY NUCLEIC ACID (DNA OR RNA); SEVERE ACUTE RESPIRATORY SYNDROME CORONAVIRUS 2 (SARS-COV-2) (CORONAVIRUS DISEASE [COVID-19]), AMPLIFIED PROBE TECHNIQUE, MAKING USE OF HIGH THROUGHPUT TECHNOLOGIES AS DESCRIBED BY CMS-2020-01-R: HCPCS | Performed by: INTERNAL MEDICINE

## 2020-08-24 ENCOUNTER — CARE COORDINATION (OUTPATIENT)
Dept: CARDIOLOGY | Facility: CLINIC | Age: 50
End: 2020-08-24

## 2020-08-24 LAB
SARS-COV-2 RNA SPEC QL NAA+PROBE: NOT DETECTED
SPECIMEN SOURCE: NORMAL

## 2020-08-24 NOTE — PROGRESS NOTES
Received call from pt asking to review what procedure she was going to have done on 8/26/20 to determine if PFO closure is possible. She also inquired if she had to come in earlier for procedure prior to closure.  Reviewed with pt that plan is to do right heart cath under conscious sedation to measure heart pressures and determine if PFO closure is possible and once that is determined, will procedue with PFO closure under general anesthesia if able to do so. Advised pt to still arrive at 8:30am for 10:30am procedure start. She verbalized understanding and denied any further questions/concerns at this time. Advised pt to callback if anymore questions/concerns arise.     SHELIA Zuniga 10:56 AM 8/24/2020

## 2020-08-25 RX ORDER — FLUTICASONE PROPIONATE 50 MCG
1 SPRAY, SUSPENSION (ML) NASAL DAILY PRN
COMMUNITY
End: 2022-09-19

## 2020-08-25 RX ORDER — ACETAMINOPHEN 325 MG/1
325-650 TABLET ORAL 4 TIMES DAILY PRN
Status: ON HOLD | COMMUNITY
End: 2020-08-27

## 2020-08-25 RX ORDER — CLOPIDOGREL BISULFATE 75 MG/1
75 TABLET ORAL DAILY
COMMUNITY
End: 2020-12-09

## 2020-08-25 NOTE — PROGRESS NOTES
PTA medications updated by Medication Scribe prior to surgery via phone call with patient      -LAST DOSES ENTERED BY NURSE-    Comments:    Medication history sources: Patient, Surescripts and H&P  Medication history source reliability: Good  Adherence assessment: N/A Not Observed    Significant changes made to the medication list:  None      Additional medication history information:   None        Prior to Admission medications    Medication Sig Last Dose Taking? Auth Provider   acetaminophen (TYLENOL) 325 MG tablet Take 325-650 mg by mouth 4 times daily as needed for mild pain  at PRN Yes Reported, Patient   amLODIPine (NORVASC) 5 MG tablet Take 5 mg by mouth every morning   at AM Yes Reported, Patient   aspirin (ASA) 81 MG EC tablet Take 1 tablet (81 mg) by mouth daily  at AM Yes Eve Orantes APRN CNP   citalopram (CELEXA) 20 MG tablet TAKE 1 TABLET BY MOUTH EVERY DAY  Patient taking differently: Take 20 mg by mouth every morning   at AM Yes Yolie Delarosa MD   clopidogrel (PLAVIX) 75 MG tablet Take 75 mg by mouth daily  at AM Yes Reported, Patient   fluticasone (FLONASE) 50 MCG/ACT nasal spray Spray 1 spray into both nostrils daily as needed for rhinitis or allergies MORE THAN 1 WEEK at PRN Yes Reported, Patient   lisinopril-hydrochlorothiazide (ZESTORETIC) 20-12.5 MG tablet Take 1/2 tab once daily/  Patient taking differently: Take 0.5 tablets by mouth daily Take 1/2 tab once daily/  at AM Yes Yolie Delarosa MD   clopidogrel (PLAVIX) 75 MG tablet 4 pill once on 8-25 pre procedure then one pill daily thereafter  Patient taking differently: Take 300 mg by mouth once 4 pill once on 8-25 pre procedure then one pill daily thereafter 8/25/2020  Eve Orantes APRN CNP

## 2020-08-26 ENCOUNTER — HOSPITAL ENCOUNTER (OUTPATIENT)
Dept: CARDIOLOGY | Facility: CLINIC | Age: 50
DRG: 229 | End: 2020-08-26
Attending: INTERNAL MEDICINE | Admitting: INTERNAL MEDICINE
Payer: COMMERCIAL

## 2020-08-26 ENCOUNTER — HOSPITAL ENCOUNTER (INPATIENT)
Facility: CLINIC | Age: 50
LOS: 1 days | Discharge: HOME | DRG: 229 | End: 2020-08-27
Attending: INTERNAL MEDICINE | Admitting: INTERNAL MEDICINE
Payer: COMMERCIAL

## 2020-08-26 ENCOUNTER — ANESTHESIA EVENT (OUTPATIENT)
Dept: CARDIOLOGY | Facility: CLINIC | Age: 50
DRG: 229 | End: 2020-08-26
Payer: COMMERCIAL

## 2020-08-26 ENCOUNTER — ANESTHESIA (OUTPATIENT)
Dept: CARDIOLOGY | Facility: CLINIC | Age: 50
DRG: 229 | End: 2020-08-26
Payer: COMMERCIAL

## 2020-08-26 DIAGNOSIS — Q21.11 OSTIUM SECUNDUM TYPE ATRIAL SEPTAL DEFECT: ICD-10-CM

## 2020-08-26 PROBLEM — Q21.10 ASD (ATRIAL SEPTAL DEFECT): Status: ACTIVE | Noted: 2020-08-26

## 2020-08-26 LAB
ABO + RH BLD: NORMAL
ABO + RH BLD: NORMAL
ANION GAP SERPL CALCULATED.3IONS-SCNC: 4 MMOL/L (ref 3–14)
APTT PPP: 31 SEC (ref 22–37)
BLD GP AB SCN SERPL QL: NORMAL
BLOOD BANK CMNT PATIENT-IMP: NORMAL
CHLORIDE SERPL-SCNC: 106 MMOL/L (ref 94–109)
CO2 BLDCOV-SCNC: 26 MMOL/L (ref 21–28)
CO2 BLDCOV-SCNC: 28 MMOL/L (ref 21–28)
CO2 SERPL-SCNC: 29 MMOL/L (ref 20–32)
CREAT SERPL-MCNC: 0.63 MG/DL (ref 0.52–1.04)
ERYTHROCYTE [DISTWIDTH] IN BLOOD BY AUTOMATED COUNT: 13.2 % (ref 10–15)
GFR SERPL CREATININE-BSD FRML MDRD: >90 ML/MIN/{1.73_M2}
HCT VFR BLD AUTO: 43 % (ref 35–47)
HGB BLD-MCNC: 14.5 G/DL (ref 11.7–15.7)
INR PPP: 1.02 (ref 0.86–1.14)
MCH RBC QN AUTO: 30.8 PG (ref 26.5–33)
MCHC RBC AUTO-ENTMCNC: 33.7 G/DL (ref 31.5–36.5)
MCV RBC AUTO: 91 FL (ref 78–100)
PCO2 BLDV: 45 MM HG (ref 40–50)
PCO2 BLDV: 48 MM HG (ref 40–50)
PCO2 BLDV: 49 MM HG (ref 40–50)
PCO2 BLDV: 50 MM HG (ref 40–50)
PH BLDV: 7.36 PH (ref 7.32–7.43)
PH BLDV: 7.36 PH (ref 7.32–7.43)
PH BLDV: 7.37 PH (ref 7.32–7.43)
PH BLDV: 7.37 PH (ref 7.32–7.43)
PLATELET # BLD AUTO: 195 10E9/L (ref 150–450)
PO2 BLDV: 31 MM HG (ref 25–47)
PO2 BLDV: 38 MM HG (ref 25–47)
PO2 BLDV: 38 MM HG (ref 25–47)
PO2 BLDV: 70 MM HG (ref 25–47)
POTASSIUM SERPL-SCNC: 4.1 MMOL/L (ref 3.4–5.3)
RBC # BLD AUTO: 4.71 10E12/L (ref 3.8–5.2)
SAO2 % BLDV FROM PO2: 58 %
SAO2 % BLDV FROM PO2: 68 %
SAO2 % BLDV FROM PO2: 69 %
SAO2 % BLDV FROM PO2: 93 %
SODIUM SERPL-SCNC: 139 MMOL/L (ref 133–144)
SPECIMEN EXP DATE BLD: NORMAL
WBC # BLD AUTO: 6.9 10E9/L (ref 4–11)

## 2020-08-26 PROCEDURE — 86901 BLOOD TYPING SEROLOGIC RH(D): CPT | Performed by: ANESTHESIOLOGY

## 2020-08-26 PROCEDURE — 25800030 ZZH RX IP 258 OP 636: Performed by: NURSE PRACTITIONER

## 2020-08-26 PROCEDURE — 93451 RIGHT HEART CATH: CPT | Performed by: INTERNAL MEDICINE

## 2020-08-26 PROCEDURE — 4A023N6 MEASUREMENT OF CARDIAC SAMPLING AND PRESSURE, RIGHT HEART, PERCUTANEOUS APPROACH: ICD-10-PCS | Performed by: INTERNAL MEDICINE

## 2020-08-26 PROCEDURE — 93010 ELECTROCARDIOGRAM REPORT: CPT | Performed by: INTERNAL MEDICINE

## 2020-08-26 PROCEDURE — 93321 DOPPLER ECHO F-UP/LMTD STD: CPT | Mod: 26 | Performed by: INTERNAL MEDICINE

## 2020-08-26 PROCEDURE — 85027 COMPLETE CBC AUTOMATED: CPT | Performed by: NURSE PRACTITIONER

## 2020-08-26 PROCEDURE — 02Q53ZZ REPAIR ATRIAL SEPTUM, PERCUTANEOUS APPROACH: ICD-10-PCS | Performed by: INTERNAL MEDICINE

## 2020-08-26 PROCEDURE — 93325 DOPPLER ECHO COLOR FLOW MAPG: CPT | Mod: 26 | Performed by: INTERNAL MEDICINE

## 2020-08-26 PROCEDURE — 93005 ELECTROCARDIOGRAM TRACING: CPT

## 2020-08-26 PROCEDURE — 37000008 ZZH ANESTHESIA TECHNICAL FEE, 1ST 30 MIN: Performed by: INTERNAL MEDICINE

## 2020-08-26 PROCEDURE — 25000125 ZZHC RX 250: Performed by: NURSE ANESTHETIST, CERTIFIED REGISTERED

## 2020-08-26 PROCEDURE — 85730 THROMBOPLASTIN TIME PARTIAL: CPT | Performed by: NURSE PRACTITIONER

## 2020-08-26 PROCEDURE — 82565 ASSAY OF CREATININE: CPT | Performed by: NURSE PRACTITIONER

## 2020-08-26 PROCEDURE — 21000001 ZZH R&B HEART CARE

## 2020-08-26 PROCEDURE — 82803 BLOOD GASES ANY COMBINATION: CPT

## 2020-08-26 PROCEDURE — 25000125 ZZHC RX 250: Performed by: INTERNAL MEDICINE

## 2020-08-26 PROCEDURE — 86850 RBC ANTIBODY SCREEN: CPT | Performed by: ANESTHESIOLOGY

## 2020-08-26 PROCEDURE — 25000128 H RX IP 250 OP 636: Performed by: INTERNAL MEDICINE

## 2020-08-26 PROCEDURE — 85610 PROTHROMBIN TIME: CPT | Performed by: NURSE PRACTITIONER

## 2020-08-26 PROCEDURE — 93312 ECHO TRANSESOPHAGEAL: CPT | Mod: 26 | Performed by: INTERNAL MEDICINE

## 2020-08-26 PROCEDURE — 25000128 H RX IP 250 OP 636: Performed by: NURSE ANESTHETIST, CERTIFIED REGISTERED

## 2020-08-26 PROCEDURE — 36415 COLL VENOUS BLD VENIPUNCTURE: CPT | Performed by: ANESTHESIOLOGY

## 2020-08-26 PROCEDURE — 25000566 ZZH SEVOFLURANE, EA 15 MIN: Performed by: INTERNAL MEDICINE

## 2020-08-26 PROCEDURE — C1887 CATHETER, GUIDING: HCPCS | Performed by: INTERNAL MEDICINE

## 2020-08-26 PROCEDURE — 25000132 ZZH RX MED GY IP 250 OP 250 PS 637: Performed by: INTERNAL MEDICINE

## 2020-08-26 PROCEDURE — 93580 TRANSCATH CLOSURE OF ASD: CPT | Performed by: INTERNAL MEDICINE

## 2020-08-26 PROCEDURE — 80051 ELECTROLYTE PANEL: CPT | Performed by: NURSE PRACTITIONER

## 2020-08-26 PROCEDURE — 25000125 ZZHC RX 250: Performed by: NURSE PRACTITIONER

## 2020-08-26 PROCEDURE — 25800030 ZZH RX IP 258 OP 636: Performed by: NURSE ANESTHETIST, CERTIFIED REGISTERED

## 2020-08-26 PROCEDURE — 27210794 ZZH OR GENERAL SUPPLY STERILE: Performed by: INTERNAL MEDICINE

## 2020-08-26 PROCEDURE — 86900 BLOOD TYPING SEROLOGIC ABO: CPT | Performed by: ANESTHESIOLOGY

## 2020-08-26 PROCEDURE — 27810169 ZZH OR IMPLANT GENERAL: Performed by: INTERNAL MEDICINE

## 2020-08-26 PROCEDURE — C1725 CATH, TRANSLUMIN NON-LASER: HCPCS | Performed by: INTERNAL MEDICINE

## 2020-08-26 PROCEDURE — 37000009 ZZH ANESTHESIA TECHNICAL FEE, EACH ADDTL 15 MIN: Performed by: INTERNAL MEDICINE

## 2020-08-26 PROCEDURE — 85347 COAGULATION TIME ACTIVATED: CPT

## 2020-08-26 PROCEDURE — C1769 GUIDE WIRE: HCPCS | Performed by: INTERNAL MEDICINE

## 2020-08-26 PROCEDURE — 93325 DOPPLER ECHO COLOR FLOW MAPG: CPT

## 2020-08-26 DEVICE — OCCLUDER CARDIOFORM SEPTAL 30MM: Type: IMPLANTABLE DEVICE | Status: FUNCTIONAL

## 2020-08-26 RX ORDER — SODIUM CHLORIDE 9 MG/ML
INJECTION, SOLUTION INTRAVENOUS CONTINUOUS
Status: ACTIVE | OUTPATIENT
Start: 2020-08-26 | End: 2020-08-26

## 2020-08-26 RX ORDER — ASPIRIN 81 MG/1
81 TABLET, CHEWABLE ORAL ONCE
Status: DISCONTINUED | OUTPATIENT
Start: 2020-08-26 | End: 2020-08-26 | Stop reason: HOSPADM

## 2020-08-26 RX ORDER — LIDOCAINE HYDROCHLORIDE 20 MG/ML
5 SOLUTION OROPHARYNGEAL
Status: DISCONTINUED | OUTPATIENT
Start: 2020-08-26 | End: 2020-08-27

## 2020-08-26 RX ORDER — LIDOCAINE 40 MG/G
CREAM TOPICAL
Status: DISCONTINUED | OUTPATIENT
Start: 2020-08-26 | End: 2020-08-26 | Stop reason: HOSPADM

## 2020-08-26 RX ORDER — FENTANYL CITRATE 50 UG/ML
25-50 INJECTION, SOLUTION INTRAMUSCULAR; INTRAVENOUS
Status: DISCONTINUED | OUTPATIENT
Start: 2020-08-26 | End: 2020-08-26

## 2020-08-26 RX ORDER — CLINDAMYCIN PHOSPHATE 900 MG/50ML
900 INJECTION, SOLUTION INTRAVENOUS
Status: COMPLETED | OUTPATIENT
Start: 2020-08-26 | End: 2020-08-26

## 2020-08-26 RX ORDER — NITROGLYCERIN 0.4 MG/1
0.4 TABLET SUBLINGUAL EVERY 5 MIN PRN
Status: DISCONTINUED | OUTPATIENT
Start: 2020-08-26 | End: 2020-08-27

## 2020-08-26 RX ORDER — CLOPIDOGREL BISULFATE 75 MG/1
75 TABLET ORAL DAILY
Status: DISCONTINUED | OUTPATIENT
Start: 2020-08-27 | End: 2020-08-27 | Stop reason: HOSPADM

## 2020-08-26 RX ORDER — ONDANSETRON 2 MG/ML
4 INJECTION INTRAMUSCULAR; INTRAVENOUS EVERY 30 MIN PRN
Status: DISCONTINUED | OUTPATIENT
Start: 2020-08-26 | End: 2020-08-26

## 2020-08-26 RX ORDER — DEXAMETHASONE SODIUM PHOSPHATE 4 MG/ML
INJECTION, SOLUTION INTRA-ARTICULAR; INTRALESIONAL; INTRAMUSCULAR; INTRAVENOUS; SOFT TISSUE PRN
Status: DISCONTINUED | OUTPATIENT
Start: 2020-08-26 | End: 2020-08-26

## 2020-08-26 RX ORDER — SODIUM CHLORIDE, SODIUM LACTATE, POTASSIUM CHLORIDE, CALCIUM CHLORIDE 600; 310; 30; 20 MG/100ML; MG/100ML; MG/100ML; MG/100ML
INJECTION, SOLUTION INTRAVENOUS CONTINUOUS
Status: DISCONTINUED | OUTPATIENT
Start: 2020-08-26 | End: 2020-08-26

## 2020-08-26 RX ORDER — LIDOCAINE HYDROCHLORIDE 20 MG/ML
INJECTION, SOLUTION INFILTRATION; PERINEURAL PRN
Status: DISCONTINUED | OUTPATIENT
Start: 2020-08-26 | End: 2020-08-26

## 2020-08-26 RX ORDER — HEPARIN SODIUM 1000 [USP'U]/ML
INJECTION, SOLUTION INTRAVENOUS; SUBCUTANEOUS
Status: DISCONTINUED | OUTPATIENT
Start: 2020-08-26 | End: 2020-08-26 | Stop reason: HOSPADM

## 2020-08-26 RX ORDER — PROPOFOL 10 MG/ML
INJECTION, EMULSION INTRAVENOUS PRN
Status: DISCONTINUED | OUTPATIENT
Start: 2020-08-26 | End: 2020-08-26

## 2020-08-26 RX ORDER — NALOXONE HYDROCHLORIDE 0.4 MG/ML
.1-.4 INJECTION, SOLUTION INTRAMUSCULAR; INTRAVENOUS; SUBCUTANEOUS
Status: DISCONTINUED | OUTPATIENT
Start: 2020-08-26 | End: 2020-08-26

## 2020-08-26 RX ORDER — LIDOCAINE 40 MG/G
CREAM TOPICAL
Status: DISCONTINUED | OUTPATIENT
Start: 2020-08-26 | End: 2020-08-27

## 2020-08-26 RX ORDER — ASPIRIN 81 MG/1
324 TABLET, CHEWABLE ORAL ONCE
Status: COMPLETED | OUTPATIENT
Start: 2020-08-26 | End: 2020-08-26

## 2020-08-26 RX ORDER — SODIUM CHLORIDE 9 MG/ML
INJECTION, SOLUTION INTRAVENOUS CONTINUOUS
Status: DISCONTINUED | OUTPATIENT
Start: 2020-08-26 | End: 2020-08-26 | Stop reason: HOSPADM

## 2020-08-26 RX ORDER — IOPAMIDOL 755 MG/ML
INJECTION, SOLUTION INTRAVASCULAR
Status: DISCONTINUED | OUTPATIENT
Start: 2020-08-26 | End: 2020-08-26 | Stop reason: HOSPADM

## 2020-08-26 RX ORDER — EPHEDRINE SULFATE 50 MG/ML
INJECTION, SOLUTION INTRAMUSCULAR; INTRAVENOUS; SUBCUTANEOUS PRN
Status: DISCONTINUED | OUTPATIENT
Start: 2020-08-26 | End: 2020-08-26

## 2020-08-26 RX ORDER — ONDANSETRON 4 MG/1
4 TABLET, ORALLY DISINTEGRATING ORAL EVERY 30 MIN PRN
Status: DISCONTINUED | OUTPATIENT
Start: 2020-08-26 | End: 2020-08-26

## 2020-08-26 RX ORDER — HYDROMORPHONE HYDROCHLORIDE 1 MG/ML
.3-.5 INJECTION, SOLUTION INTRAMUSCULAR; INTRAVENOUS; SUBCUTANEOUS EVERY 5 MIN PRN
Status: DISCONTINUED | OUTPATIENT
Start: 2020-08-26 | End: 2020-08-26

## 2020-08-26 RX ORDER — ERYTHROMYCIN 250 MG/1
500 TABLET, COATED ORAL
Status: DISCONTINUED | OUTPATIENT
Start: 2020-08-26 | End: 2020-08-26 | Stop reason: CLARIF

## 2020-08-26 RX ORDER — ONDANSETRON 2 MG/ML
INJECTION INTRAMUSCULAR; INTRAVENOUS PRN
Status: DISCONTINUED | OUTPATIENT
Start: 2020-08-26 | End: 2020-08-26

## 2020-08-26 RX ORDER — ERYTHROMYCIN 250 MG/1
500 TABLET, DELAYED RELEASE ORAL
Status: DISCONTINUED | OUTPATIENT
Start: 2020-08-26 | End: 2020-08-27 | Stop reason: HOSPADM

## 2020-08-26 RX ORDER — FENTANYL CITRATE 50 UG/ML
INJECTION, SOLUTION INTRAMUSCULAR; INTRAVENOUS PRN
Status: DISCONTINUED | OUTPATIENT
Start: 2020-08-26 | End: 2020-08-26

## 2020-08-26 RX ORDER — ACETAMINOPHEN 325 MG/1
650 TABLET ORAL EVERY 4 HOURS PRN
Status: DISCONTINUED | OUTPATIENT
Start: 2020-08-26 | End: 2020-08-27

## 2020-08-26 RX ORDER — ASPIRIN 81 MG/1
81 TABLET ORAL DAILY
Status: DISCONTINUED | OUTPATIENT
Start: 2020-08-27 | End: 2020-08-27 | Stop reason: HOSPADM

## 2020-08-26 RX ORDER — ACETAMINOPHEN 650 MG/1
650 SUPPOSITORY RECTAL EVERY 4 HOURS PRN
Status: DISCONTINUED | OUTPATIENT
Start: 2020-08-26 | End: 2020-08-27

## 2020-08-26 RX ORDER — FENTANYL CITRATE 50 UG/ML
INJECTION, SOLUTION INTRAMUSCULAR; INTRAVENOUS
Status: DISCONTINUED | OUTPATIENT
Start: 2020-08-26 | End: 2020-08-26 | Stop reason: HOSPADM

## 2020-08-26 RX ORDER — ALUMINA, MAGNESIA, AND SIMETHICONE 2400; 2400; 240 MG/30ML; MG/30ML; MG/30ML
30 SUSPENSION ORAL EVERY 4 HOURS PRN
Status: DISCONTINUED | OUTPATIENT
Start: 2020-08-26 | End: 2020-08-27

## 2020-08-26 RX ORDER — CLOPIDOGREL BISULFATE 75 MG/1
75 TABLET ORAL DAILY
Status: DISCONTINUED | OUTPATIENT
Start: 2020-08-26 | End: 2020-08-26 | Stop reason: HOSPADM

## 2020-08-26 RX ADMIN — SODIUM CHLORIDE: 9 INJECTION, SOLUTION INTRAVENOUS at 13:28

## 2020-08-26 RX ADMIN — DEXAMETHASONE SODIUM PHOSPHATE 4 MG: 4 INJECTION, SOLUTION INTRA-ARTICULAR; INTRALESIONAL; INTRAMUSCULAR; INTRAVENOUS; SOFT TISSUE at 12:36

## 2020-08-26 RX ADMIN — SODIUM CHLORIDE: 9 INJECTION, SOLUTION INTRAVENOUS at 09:20

## 2020-08-26 RX ADMIN — PHENYLEPHRINE HYDROCHLORIDE 0.25 MCG/KG/MIN: 10 INJECTION INTRAVENOUS at 12:39

## 2020-08-26 RX ADMIN — CLINDAMYCIN PHOSPHATE 900 MG: 900 INJECTION, SOLUTION INTRAVENOUS at 12:31

## 2020-08-26 RX ADMIN — SUGAMMADEX 300 MG: 100 INJECTION, SOLUTION INTRAVENOUS at 13:18

## 2020-08-26 RX ADMIN — ROCURONIUM BROMIDE 50 MG: 10 INJECTION INTRAVENOUS at 12:24

## 2020-08-26 RX ADMIN — Medication 5 MG: at 12:42

## 2020-08-26 RX ADMIN — PHENYLEPHRINE HYDROCHLORIDE 100 MCG: 10 INJECTION INTRAVENOUS at 12:39

## 2020-08-26 RX ADMIN — FENTANYL CITRATE 50 MCG: 50 INJECTION, SOLUTION INTRAMUSCULAR; INTRAVENOUS at 12:28

## 2020-08-26 RX ADMIN — ERYTHROMYCIN 500 MG: 250 TABLET, DELAYED RELEASE ORAL at 21:48

## 2020-08-26 RX ADMIN — ROCURONIUM BROMIDE 10 MG: 10 INJECTION INTRAVENOUS at 13:03

## 2020-08-26 RX ADMIN — PROPOFOL 150 MG: 10 INJECTION, EMULSION INTRAVENOUS at 12:24

## 2020-08-26 RX ADMIN — ONDANSETRON 4 MG: 2 INJECTION INTRAMUSCULAR; INTRAVENOUS at 12:36

## 2020-08-26 RX ADMIN — ASPIRIN 81 MG 324 MG: 81 TABLET ORAL at 21:48

## 2020-08-26 RX ADMIN — LIDOCAINE HYDROCHLORIDE 60 MG: 20 INJECTION, SOLUTION INFILTRATION; PERINEURAL at 12:24

## 2020-08-26 RX ADMIN — PHENYLEPHRINE HYDROCHLORIDE 100 MCG: 10 INJECTION INTRAVENOUS at 12:36

## 2020-08-26 ASSESSMENT — ACTIVITIES OF DAILY LIVING (ADL)
ADLS_ACUITY_SCORE: 11
ADLS_ACUITY_SCORE: 8

## 2020-08-26 NOTE — ANESTHESIA PREPROCEDURE EVALUATION
Anesthesia Pre-Procedure Evaluation    Patient: Lyubov Sanchez   MRN: [id number] : 1970          Preoperative Diagnosis: heart condition    Procedure(s):  PFO Closure    Past Medical History:   Diagnosis Date     Aortic aneurysm (H)     Pt reports its small and is currently being monitored.     Arthritis      Depressive disorder      DYSPLASIA OF CERVIX 3/6/2003     Dysplasia of cervix (uteri)     Rx cryotherapy Nov , and      Hypertension     NO cardiology     Incomplete right bundle branch block 11/10/2017     Migraine      Other chronic pain     Knee pain for 8 years     Plantar fasciitis     bilat     Unspecified sleep apnea     CPAP will bring on the day of surgery.     Ventral hernia without obstruction or gangrene 4/15/2020     Past Surgical History:   Procedure Laterality Date     ARTHROPLASTY KNEE Right 2017    Procedure: ARTHROPLASTY KNEE;  Right total knee arthroplasty using the Arthrex iBalance total knee system;  Surgeon: Hebert Lee MD;  Location: [facility] OR     ARTHROPLASTY KNEE Left 3/19/2018    Procedure: ARTHROPLASTY KNEE;  Left total knee arthroplasty using an Arthrex iBalance total knee system;  Surgeon: Hebert Lee MD;  Location: [facility] OR     C  DELIVERY ONLY      , Low Cervical     DAVINCI HYSTERECTOMY TOTAL, BILATERAL SALPINGO-OOPHORECTOMY, COMBINED Bilateral 2020    Procedure: DaVinci robotic-assisted total laparoscopic hysterectomy, bilateral salpingectomy;  Surgeon: Venancio Bartlett MD;  Location: [facility] OR - Path all benign     DAVINCI HYSTERECTOMY TOTAL, SALPINGECTOMY BILATERAL Bilateral 2020    Fibroid uterus, Menometrorrhagia - Robotic TLH, Bilateral salpingectomy - Path all benign     ENT SURGERY       HC COLP VAGINA W CERVIX IF PRES W BIOPSY      [place] for ASCUS     TONSILLECTOMY & ADENOIDECTOMY         Anesthesia Evaluation     .             ROS/MED HX    ENT/Pulmonary:     (+)sleep apnea, , . .   "  Neurologic:     (+)migraines,     Cardiovascular: Comment: RBBB    (+) hypertension----. : . . . :. valvular problems/murmurs asd:. pulmonary hypertension,       METS/Exercise Tolerance:     Hematologic:         Musculoskeletal:         GI/Hepatic:        (-) GERD   Renal/Genitourinary:         Endo:     (+) Obesity, .      Psychiatric:         Infectious Disease:         Malignancy:         Other:                          Physical Exam  Normal systems: dental    Airway   Mallampati: III  TM distance: >3 FB  Neck ROM: full    Dental     Cardiovascular   Rhythm and rate: regular      Pulmonary    breath sounds clear to auscultation            Lab Results   Component Value Date    WBC 6.9 08/26/2020    HGB 14.5 08/26/2020    HCT 43.0 08/26/2020     08/26/2020    SED 6 10/06/2014     08/26/2020    POTASSIUM 4.1 08/26/2020    CHLORIDE 106 08/26/2020    CO2 29 08/26/2020    BUN 16 01/24/2020    CR 0.63 08/26/2020    GLC 85 01/24/2020    CARROL 9.3 01/24/2020    MAG 1.4 (L) 06/21/2015    ALBUMIN 4.1 01/24/2020    PROTTOTAL 7.2 01/24/2020    ALT 29 01/24/2020    AST 17 01/24/2020    ALKPHOS 44 01/24/2020    BILITOTAL 1.8 (H) 01/24/2020    PTT 31 08/26/2020    INR 1.02 08/26/2020    TSH 0.79 02/26/2018    HCG Negative 01/31/2020    HCGS Negative 06/19/2015       Preop Vitals  BP Readings from Last 3 Encounters:   08/26/20 125/74   08/20/20 128/84   07/29/20 129/78    Pulse Readings from Last 3 Encounters:   08/26/20 70   08/20/20 79   07/29/20 96      Resp Readings from Last 3 Encounters:   08/26/20 16   06/18/20 16   04/20/20 16    SpO2 Readings from Last 3 Encounters:   08/26/20 97%   08/20/20 100%   06/18/20 100%      Temp Readings from Last 1 Encounters:   08/26/20 36.8  C (98.3  F) (Temporal)    Ht Readings from Last 1 Encounters:   08/20/20 1.626 m (5' 4\")      Wt Readings from Last 1 Encounters:   08/20/20 115 kg (253 lb 9.6 oz)    Estimated body mass index is 43.53 kg/m  as calculated from the " "following:    Height as of 8/20/20: 1.626 m (5' 4\").    Weight as of 8/20/20: 115 kg (253 lb 9.6 oz).       Anesthesia Plan      History & Physical Review  History and physical reviewed and following examination; no interval change.    ASA Status:  3 .    NPO Status:  > 8 hours    Plan for General   PONV prophylaxis:  Ondansetron (or other 5HT-3) and Dexamethasone or Solumedrol  Additional equipment: Videolaryngoscope        Postoperative Care      Consents  Anesthetic plan, risks, benefits and alternatives discussed with:  Patient..                 Itzel Lynn  "

## 2020-08-26 NOTE — PROGRESS NOTES
Pt seen and examined in par  I have reviewed labs martine and tte  Will do rhc due to some pulm htn  If ok will proceed with asd closure  Some limited rim but per mri they felt adequate   No anomalous pvr  Risk/benefit discussed

## 2020-08-26 NOTE — ANESTHESIA POSTPROCEDURE EVALUATION
Patient: Lyubov Sanchez    Procedure(s):  ASD Closure  Right Heart Cath    Diagnosis:heart condition  Diagnosis Additional Information: No value filed.    Anesthesia Type:  General    Note:  Anesthesia Post Evaluation    Patient location during evaluation: PACU  Patient participation: Able to fully participate in evaluation  Level of consciousness: awake, awake and alert and responsive to verbal stimuli  Pain management: adequate  Airway patency: patent  Cardiovascular status: acceptable  Respiratory status: acceptable  Hydration status: acceptable  PONV: none     Anesthetic complications: None          Last vitals:  Vitals:    08/26/20 1510 08/26/20 1520 08/26/20 1545   BP: 116/68 116/68 121/70   Pulse: 75 75    Resp: 18 19 18   Temp:      SpO2: 93% 92%          Electronically Signed By: Itzel Lynn  August 26, 2020  3:54 PM

## 2020-08-26 NOTE — PLAN OF CARE
Pt A/O. VSS. On bedrest until 1945. Tele shows SR. Pt denies any CP or SOB. Rt groin site CDI. CMS intact. Pt has no new complaints.    Blood pressure 112/67, pulse 74, temperature 98.3  F (36.8  C), temperature source Oral, resp. rate 18, SpO2 94 %, not currently breastfeeding.

## 2020-08-26 NOTE — PROGRESS NOTES
Pt seen in par  Dr colbert was lead on case  Pt awake  vss  Groin bandaged no bleed  Called  with result

## 2020-08-26 NOTE — ANESTHESIA CARE TRANSFER NOTE
Patient: Lyubov Sanchez    Procedure(s):  ASD Closure  Right Heart Cath    Diagnosis: heart condition  Diagnosis Additional Information: No value filed.    Anesthesia Type:   General     Note:  Airway :Face Mask  Patient transferred to:PACU  Comments: Neuromuscular blockade reversed after TOF 2/4, spontaneous respirations, adequate tidal volumes, followed commands to voice, oropharynx suctioned with soft flexible catheter, extubated atraumatically, airway patent after extubation.  Oxygen via facemask at 6 liters per minute to PACU. Oxygen tubing connected to wall O2 in PACU, SpO2, NiBP, and EKG monitors and alarms on and functioning, report on patient's clinical status given to PACU RN, RN questions answered.   Handoff Report: Identifed the Patient, Identified the Reponsible Provider, Reviewed the pertinent medical history, Discussed the surgical course, Reviewed Intra-OP anesthesia mangement and issues during anesthesia, Set expectations for post-procedure period and Allowed opportunity for questions and acknowledgement of understanding      Vitals: (Last set prior to Anesthesia Care Transfer)    CRNA VITALS  8/26/2020 1253 - 8/26/2020 1334      8/26/2020             Pulse:  81    SpO2:  93 %    Resp Rate (set):  10                Electronically Signed By: BARRINGTON Huston CRNA  August 26, 2020  1:34 PM

## 2020-08-27 ENCOUNTER — APPOINTMENT (OUTPATIENT)
Dept: CARDIOLOGY | Facility: CLINIC | Age: 50
DRG: 229 | End: 2020-08-27
Attending: INTERNAL MEDICINE
Payer: COMMERCIAL

## 2020-08-27 ENCOUNTER — APPOINTMENT (OUTPATIENT)
Dept: GENERAL RADIOLOGY | Facility: CLINIC | Age: 50
DRG: 229 | End: 2020-08-27
Attending: INTERNAL MEDICINE
Payer: COMMERCIAL

## 2020-08-27 ENCOUNTER — CARE COORDINATION (OUTPATIENT)
Dept: CARDIOLOGY | Facility: CLINIC | Age: 50
End: 2020-08-27

## 2020-08-27 VITALS
DIASTOLIC BLOOD PRESSURE: 84 MMHG | WEIGHT: 251 LBS | TEMPERATURE: 97.5 F | SYSTOLIC BLOOD PRESSURE: 139 MMHG | OXYGEN SATURATION: 99 % | RESPIRATION RATE: 16 BRPM | HEART RATE: 65 BPM | BODY MASS INDEX: 43.08 KG/M2

## 2020-08-27 LAB
CO2 BLDCOV-SCNC: 28 MMOL/L (ref 21–28)
CO2 BLDCOV-SCNC: 29 MMOL/L (ref 21–28)
CO2 BLDCOV-SCNC: 30 MMOL/L (ref 21–28)
CO2 BLDCOV-SCNC: 30 MMOL/L (ref 21–28)
KCT BLD-ACNC: 302 SEC (ref 75–150)
PCO2 BLDV: 45 MM HG (ref 40–50)
PCO2 BLDV: 46 MM HG (ref 40–50)
PCO2 BLDV: 48 MM HG (ref 40–50)
PCO2 BLDV: 50 MM HG (ref 40–50)
PH BLDV: 7.36 PH (ref 7.32–7.43)
PH BLDV: 7.41 PH (ref 7.32–7.43)
PH BLDV: 7.41 PH (ref 7.32–7.43)
PH BLDV: 7.43 PH (ref 7.32–7.43)
PO2 BLDV: 38 MM HG (ref 25–47)
PO2 BLDV: 39 MM HG (ref 25–47)
PO2 BLDV: 71 MM HG (ref 25–47)
PO2 BLDV: 73 MM HG (ref 25–47)
SAO2 % BLDV FROM PO2: 69 %
SAO2 % BLDV FROM PO2: 73 %
SAO2 % BLDV FROM PO2: 94 %
SAO2 % BLDV FROM PO2: 95 %

## 2020-08-27 PROCEDURE — 25000128 H RX IP 250 OP 636: Performed by: NURSE PRACTITIONER

## 2020-08-27 PROCEDURE — 93321 DOPPLER ECHO F-UP/LMTD STD: CPT | Mod: 26 | Performed by: INTERNAL MEDICINE

## 2020-08-27 PROCEDURE — 93325 DOPPLER ECHO COLOR FLOW MAPG: CPT | Mod: 26 | Performed by: INTERNAL MEDICINE

## 2020-08-27 PROCEDURE — 93308 TTE F-UP OR LMTD: CPT | Mod: 26 | Performed by: INTERNAL MEDICINE

## 2020-08-27 PROCEDURE — 25000132 ZZH RX MED GY IP 250 OP 250 PS 637: Performed by: INTERNAL MEDICINE

## 2020-08-27 PROCEDURE — 40000986 XR CHEST 2 VW

## 2020-08-27 PROCEDURE — 93321 DOPPLER ECHO F-UP/LMTD STD: CPT

## 2020-08-27 RX ORDER — FUROSEMIDE 10 MG/ML
20 INJECTION INTRAMUSCULAR; INTRAVENOUS ONCE
Status: COMPLETED | OUTPATIENT
Start: 2020-08-27 | End: 2020-08-27

## 2020-08-27 RX ORDER — ERYTHROMYCIN 500 MG/1
500 TABLET, DELAYED RELEASE ORAL
Qty: 9 TABLET | Refills: 0 | Status: SHIPPED | OUTPATIENT
Start: 2020-08-27 | End: 2020-08-30

## 2020-08-27 RX ADMIN — FUROSEMIDE 20 MG: 10 INJECTION, SOLUTION INTRAVENOUS at 10:14

## 2020-08-27 RX ADMIN — ASPIRIN 81 MG: 81 TABLET, COATED ORAL at 08:34

## 2020-08-27 RX ADMIN — CLOPIDOGREL BISULFATE 75 MG: 75 TABLET, FILM COATED ORAL at 08:34

## 2020-08-27 RX ADMIN — ERYTHROMYCIN 500 MG: 250 TABLET, DELAYED RELEASE ORAL at 08:34

## 2020-08-27 ASSESSMENT — ACTIVITIES OF DAILY LIVING (ADL)
ADLS_ACUITY_SCORE: 8

## 2020-08-27 NOTE — DISCHARGE SUMMARY
[facility]    Cardiology Discharge Summary/Progress Note    Date of Service: 08/27/2020     Assessment & Plan   Lyubov Sanchez is a 50 year old female with past medical history of secundum ASD, mildly dilated ascending aorta, bilateral knee replacments, obestiy, KRISTYN, HTN, venous disease.  This patient was admitted on 8/26/2020 for ASD closure      1. Secundum ASD  S/p successful closure with #30mm Cardioform device  Mild pulmonary hypertension (RA 10; PA 43/23 mean 30; LA 10)  Figure of 8 suture removed from right groin  Antibiotics for 3 days, 9 doses  CXR-vascular congestion  No sob but neck veins up  Echo pending  Figure 8 suture removed without incident  PLAN:  Lasix 20mg once IV dicussed with Dr. Frias    -await echo  -ambulate  -home later post diuresis and after ambulationg and post diuresis on antibiotics for 3 days/9 doses  Discharge instructions removed  meds reviewed  Follow up reviewed    Addendum: echo reviewed; device well seated. No effusion  Ok for discharge      2. Mildly dilated ascending aorta    3. Dyslipidemia    4. KRISTYN    5. Venous disease     6. HTN  Restart home meds    Eve Orantes, MSN, APRN, CNP  [facility]    Interval History   Feels great; her sense of orthopnea that she chronically has is gone  Minimal sore throat  Review of Systems:  The Review of Systems is negative other than noted in the HPI    Physical Exam   Temp: 97.5  F (36.4  C) Temp src: Oral BP: 139/84 Pulse: 65   Resp: 16 SpO2: 99 % O2 Device: None (Room air) Oxygen Delivery: 3 LPM  Vitals:    08/27/20 0616   Weight: 113.9 kg (251 lb)     Vital Signs with Ranges  Temp:  [97.5  F (36.4  C)-98.7  F (37.1  C)] 97.5  F (36.4  C)  Pulse:  [] 65  Resp:  [10-22] 16  BP: (106-139)/(63-90) 139/84  SpO2:  [89 %-99 %] 99 %  I/O last 3 completed shifts:  In: 1000 [I.V.:1000]  Out: -     Constitutional     alert and oriented, in no acute distress.     Skin     warm and dry to touch    ENT     no pallor  or cyanosis    Neck    Supple, JVP mildly elevated no carotid bruit    Chest     no tenderness to palpation     Lungs  clear to auscultation     Cardiac  regular rhythm, S1 normal, S2 normal, No S3 or S4, I/VImurmurs, no rubs    Abdomen     abdomen soft, bowel sounds normoactive, no hepatosplenomegaly    Extremities and Back     no clubbing, cyanosis. No edema observed.  Right groin clean with minimal ecchymosis; varicosities      Neurological     no gross motor deficits noted, affect appropriate, oriented to time, person and place.        Medications       aspirin  81 mg Oral Daily     clopidogrel  75 mg Oral Daily     erythromycin  500 mg Oral TID w/meals     furosemide  20 mg Intravenous Once          Data:     ROUTINE IP LABS (Last four results)  BMP  Recent Labs   Lab 20  0908      POTASSIUM 4.1   CHLORIDE 106   CO2 29   CR 0.63     CHOLESTEROL/HEPATICNo lab results found in last 7 days.  CBC  Recent Labs   Lab 20  0908   WBC 6.9   RBC 4.71   HGB 14.5   HCT 43.0   MCV 91   MCH 30.8   MCHC 33.7   RDW 13.2        TROP: No lab results found in last 7 days.   BNP:  No results for input(s): NTBNPI in the last 168 hours.  INR  Recent Labs   Lab 20  0908   INR 1.02     TSH   Date Value Ref Range Status   2018 0.79 0.40 - 4.00 mU/L Final       EKG results:  Reviewed if available     Imaging:  Recent Results (from the past 24 hour(s))   Cardiac Catheterization    Narrative    1.  Atrial septal defect status post successful closure with a 30 mm   CARDIOFORM septal occluder device.  2.  Mild pulmonary hypertension   Transesophageal Echocardiogram    Narrative    [id number]  [id number]  [id number]^BEATRIS^MARIE^KENNY           [facility]  Echocardiography Laboratory  [address] [place] [id number]        Name: URMILA MATIAS  MRN: [id number]  : 1970  Study Date: 2020 10:11 AM  Age: 50 yrs  Gender: Female  Patient Location: [facility]  Reason For  Study: Ostium secundum type atrial septal defect  Ordering Physician: MARIE SPEAR  Referring Physician: MAIRE SPEAR  Performed By: ABDI Okeefe     BSA: 2.2 m2  Height: 64 in  Weight: 253 lb  HR: 84  BP: 125/74 mmHg  _____________________________________________________________________________  __        Procedure  Complete CONCEPCION Adult. Echo Guided PFO Closure Adult.  _____________________________________________________________________________  __        Interpretation Summary     A small secumdum ASD present. The patient underwent successful ASD closure  with a [place] Cardioform 30mm device. Post procedure the device appeared well  seated with no significant residual interatrial shunting.  _____________________________________________________________________________  __        CONCEPCION  Sedation and probe insertion were performed by anesthesia staff.     Left Ventricle  The left ventricle is normal in size. Left ventricular systolic function is  normal. The visual ejection fraction is estimated at 55-60%.     Right Ventricle  The right ventricle is normal size. The right ventricular systolic function is  normal.     Atria  Normal left atrial size. No left atrial mass or thrombus visualized. Right  atrial size is normal. Small atrial septal defect, secundum type.        Mitral Valve  There is trace mitral regurgitation.     Tricuspid Valve  There is trace tricuspid regurgitation.     Aortic Valve  The aortic valve is trileaflet.     Pericardial/Pleural  There is no pericardial effusion.     Rhythm  Sinus rhythm was noted.     _____________________________________________________________________________  __                             Report approved by: Will Gamble 08/26/2020 01:53 PM                       _____________________________________________________________________________  __      X-ray Chest 2 vws*    Narrative    CHEST TWO VIEWS 8/27/2020 8:03 AM     HISTORY: Atrial septal defect.    COMPARISON:  7/5/2018    FINDINGS: Moderate cardiomegaly. A septal occlusion device is noted.  There is vascular congestion but no interstitial thickening or pleural  effusion. No pneumothorax or gross airspace consolidation.       Impression    IMPRESSION: Cardiomegaly with vascular congestion.     ERICKSON MATTHEWS MD     Telemetry:  sinus

## 2020-08-27 NOTE — PLAN OF CARE
VSS on RA.  Tele: SR.  Up ind.  R groin site CDI.  Ambulating in halls without difficulty.  CXR and echo completed.  Echo discussed with Kush and cleared to discharge.  One time does of lasix given with good results.  Pt discharged to home with .  AVS discussed with pt and spouse and all questions answered.  Instructed pt to  medications on her way home.  Pt discharged with CPAP, iphone, clothes, shoes, duffle bag

## 2020-08-27 NOTE — PROGRESS NOTES
ASD post-procedure dental letter mailed to pt's dentist, Dr. Kalepsh Benavides at [facility] in [facility] [place].     SHELIA Zuniga 8:55 AM 8/27/2020

## 2020-08-27 NOTE — PROGRESS NOTES
SPIRITUAL HEALTH SERVICES  SPIRITUAL ASSESSMENT Progress Note  [facility]     REFERRAL SOURCE: Admission Request    Brief encounter with Lyubov who was being discharged when I arrived at the room. She is grateful to be heading home.    PLAN: Nothing further at this time.    Rafia Alcala  Associate   Pager [phone]  Phone [phone]

## 2020-08-27 NOTE — PLAN OF CARE
Neuro- A&Ox4  Most Recent Vitals- Temp: 97.7  F (36.5  C) Temp src: Oral BP: 119/63 Pulse: 66   Resp: 16 SpO2: 98 % O2 Device: None (Room air) Oxygen Delivery: 3 LPM  Tele/Cardiac- SR, denies CP  Resp- Stable on RA, LS clear, denies SOB  Activity- Independent  Pain- denies  Drips- none, SL  Drains/Tubes- PIV  Skin- R groin site-CMS intact and stable *pressure tape remains in place)  GI/- WDL  Aggression Color- Green  Plan- Will have Echo, CXR, and EKG today, if normal will likely discharge  Misc- NA    Jade Arredondo, RN

## 2020-08-28 LAB — INTERPRETATION ECG - MUSE: NORMAL

## 2020-09-09 NOTE — ADDENDUM NOTE
Addendum  created 09/09/20 0832 by Itzel Lynn    Intraprocedure Event edited, Intraprocedure Staff edited

## 2020-09-10 ENCOUNTER — HOSPITAL ENCOUNTER (OUTPATIENT)
Dept: CARDIOLOGY | Facility: CLINIC | Age: 50
Discharge: HOME | End: 2020-09-10
Attending: INTERNAL MEDICINE | Admitting: INTERNAL MEDICINE
Payer: COMMERCIAL

## 2020-09-10 ENCOUNTER — TELEPHONE (OUTPATIENT)
Dept: CARDIOLOGY | Facility: CLINIC | Age: 50
End: 2020-09-10

## 2020-09-10 DIAGNOSIS — Q21.11 OSTIUM SECUNDUM TYPE ATRIAL SEPTAL DEFECT: ICD-10-CM

## 2020-09-10 PROCEDURE — 93321 DOPPLER ECHO F-UP/LMTD STD: CPT | Mod: 26 | Performed by: INTERNAL MEDICINE

## 2020-09-10 PROCEDURE — 93308 TTE F-UP OR LMTD: CPT | Mod: 26 | Performed by: INTERNAL MEDICINE

## 2020-09-10 PROCEDURE — 93325 DOPPLER ECHO COLOR FLOW MAPG: CPT

## 2020-09-10 PROCEDURE — 93325 DOPPLER ECHO COLOR FLOW MAPG: CPT | Mod: 26 | Performed by: INTERNAL MEDICINE

## 2020-09-10 NOTE — TELEPHONE ENCOUNTER
Wellness Screening Tool    Symptom Screening:    Do you have one of the following NEW symptoms:      Fever (subjective or >100.0)?  No    New cough? No    Shortness of breath? No    Chills? No    New loss of taste or smell? No    Generalized body aches? No    New persistent headache? No    New sore throat? No    Nausea, vomiting or diarrhea? No    Within the past 2 weeks, have you been exposed to someone with a known positive illness below?      COVID - 19 (known or suspected) No    Chicken pox?  No    Measles? No    Pertussis? No    Have you had a positive COVID-19 diagnostic test (nasal swab test) in the last 14 days or are you currently   on self-quarantine restrictions (i.e.travel restriction, exposure, etc?) No        Patient notified of visitor restriction: yes  Patient informed to wear a mask: Yes    Patient's appointment status: Patient will be seen in clinic as scheduled on 9/11/2020

## 2020-09-11 ENCOUNTER — OFFICE VISIT (OUTPATIENT)
Dept: CARDIOLOGY | Facility: CLINIC | Age: 50
End: 2020-09-11
Attending: INTERNAL MEDICINE
Payer: COMMERCIAL

## 2020-09-11 VITALS
HEART RATE: 98 BPM | HEIGHT: 64 IN | WEIGHT: 255 LBS | DIASTOLIC BLOOD PRESSURE: 78 MMHG | SYSTOLIC BLOOD PRESSURE: 115 MMHG | BODY MASS INDEX: 43.54 KG/M2

## 2020-09-11 DIAGNOSIS — Q21.11 OSTIUM SECUNDUM TYPE ATRIAL SEPTAL DEFECT: ICD-10-CM

## 2020-09-11 PROCEDURE — 99213 OFFICE O/P EST LOW 20 MIN: CPT | Performed by: NURSE PRACTITIONER

## 2020-09-11 ASSESSMENT — MIFFLIN-ST. JEOR: SCORE: 1766.67

## 2020-09-11 NOTE — LETTER
9/11/2020    Yolie Delarosa MD  [address]  [facility] [address]    RE: Lyubov Sanchez       Dear Colleague,    I had the pleasure of seeing Lyubov Sanchez in the [facility].    History of Present Illness:  Lyubov Sanchez is a 50 year old female followed here by Dr. Hodges who recently underwent percutaneous closure of an ASD. She returns for post procedure follow up.     Upon clearance for a hysterectomy earlier in the year she was found to have a secundum ASD measuring 7.7 cm. She went on for cardiac MRI showing a mildly dilated right ventricle with normal right ventricular function. Qp/Qs ratio was 1.27. Main pulmonary is dilated to 3.8cm. Moderate ELVA is present. CONCEPCION noted moderate left to right shunt. RVSP on transthoracic echo was 35.8 plus RAP but on CONCEPCION 52 plus RAP.        Other medical history includes obesity, dyslipidemia, obstructive sleep apnea, and hypertension. Aortic root appeared to measure 3.5cm on CONCEPCION;ascending aorta on chest CT in 2018 was 4.1cm. She has lower extremity edema likely multifactorial with venous changes, bilateral knee replacements and amlodipine,     She has a ventral hernia which developed after her hysterectomy in January and she hopes to have this surgically repaired in January.     LIpids: total cholesterol 182 HDL 41  1-  Sodium 139 potassium 4.2 BUN 16 Creatinine 0.64 1-    On August 26 of 2020 she underwent successful percutaneous closure with a number 30 mm cardio form septal occluder device.  She had mild pulmonary hypertension with a PA pressure 43/23 mean of 30 right atrial pressure was 10.  She did well postprocedure however her chest x-ray showed pulmonary vascular congestion.  She was given a dose of IV Lasix.    Since her closure she notes that her breathlessness she used to feel when she would try to lie flat has resolved.  She notes a significant improvement in her leg edema.  Her right groin  is healed well with no hum or bruit.  Her limited echo shows the device is well-seated with no effusion the right heart appears to be normal in size and function.     She states she has sob with exertion for most of her life but felt this was just her normal. She is adopted and has no access to her family history. She has two daughters who I suggested should be screened for congenital heart disease.     Exam; ventral hernia  Right groin clean without hum or bruit  Edema markedly improved  She notes more easy bruising but no hematuria, epistaxis or other signs of bleeding.      Impression/Plan:     1. Secundum ASD  S/p successful closure with #30mm Cardioform device  Mild pulmonary hypertension (RA 10; PA 43/23 mean 30; LA 10)  CXR-vascular congestion post procedure which was treated with 1 dose of IV Lasix  Improved edema  Improved breathing  She will continue aspirin and Plavix and have a follow-up echo bubble study in November and a visit back with Dr. Hodges    At that point I would ask him to decide if her Plavix can be discontinued and if she is cleared to proceed with her ventral hernia repair.  She also has an umbilical hernia which they will repair at the same time    We reviewed that she can now lift up to 30 pounds for the next couple of weeks.  No aggressive aerobic activity for 4 weeks no scuba diving, skydiving contact sports for 3 months.            2. Mildly dilated ascending aorta  -Continue good blood pressure control  -This will be reassessed intermittently on her follow-up echocardiograms     3. Dyslipidemia    Lifestyle modifications  Recheck and PMD     4. KRISTYN  Wears CPAP     5. Venous disease      6. HTN  controlled    7. Ventral hernia  She would like to consider surgery in January if possble      It is been a pleasure seeing her in follow-up today I am delighted that she is doing so well.  I have asked her to contact me with any questions.    Eve Orantes, MSN, APRN-BC,  CNP  Cardiology    No orders of the defined types were placed in this encounter.    No orders of the defined types were placed in this encounter.    There are no discontinued medications.      Encounter Diagnosis   Name Primary?     Ostium secundum type atrial septal defect        CURRENT MEDICATIONS:  Current Outpatient Medications   Medication Sig Dispense Refill     amLODIPine (NORVASC) 5 MG tablet Take 5 mg by mouth every morning        aspirin (ASA) 81 MG EC tablet Take 1 tablet (81 mg) by mouth daily       citalopram (CELEXA) 20 MG tablet TAKE 1 TABLET BY MOUTH EVERY DAY (Patient taking differently: Take 20 mg by mouth every morning ) 90 tablet 0     clopidogrel (PLAVIX) 75 MG tablet Take 75 mg by mouth daily       fluticasone (FLONASE) 50 MCG/ACT nasal spray Spray 1 spray into both nostrils daily as needed for rhinitis or allergies       lisinopril-hydrochlorothiazide (ZESTORETIC) 20-12.5 MG tablet Take 1/2 tab once daily/ (Patient taking differently: Take 0.5 tablets by mouth daily Take 1/2 tab once daily/) 45 tablet 0       ALLERGIES     Allergies   Allergen Reactions     Penicillins Hives     hives       PAST MEDICAL HISTORY:  Past Medical History:   Diagnosis Date     Anxiety and depression     Pt reports is on Rx Celexa.     Aortic aneurysm (H)     Pt reports its small and is currently being monitored.     Arthritis      Depressive disorder      DYSPLASIA OF CERVIX 3/6/2003     Dysplasia of cervix (uteri) 2003    Rx cryotherapy Nov 03, and 2005     Hypertension     NO cardiology     Incomplete right bundle branch block 11/10/2017     Migraine      Other chronic pain     Knee pain for 8 years     Plantar fasciitis     bilat     Unspecified sleep apnea     CPAP will bring on the day of surgery.     Ventral hernia without obstruction or gangrene 4/15/2020       PAST SURGICAL HISTORY:  Past Surgical History:   Procedure Laterality Date     ARTHROPLASTY KNEE Right 11/17/2017    Procedure: ARTHROPLASTY KNEE;   Right total knee arthroplasty using the Arthrex iBalance total knee system;  Surgeon: Hebert Lee MD;  Location: [facility] OR     ARTHROPLASTY KNEE Left 3/19/2018    Procedure: ARTHROPLASTY KNEE;  Left total knee arthroplasty using an Arthrex iBalance total knee system;  Surgeon: Hebert Lee MD;  Location: [facility] OR     C  DELIVERY ONLY  ,    , Low Cervical     CV ASD CLOSURE N/A 2020    Procedure: ASD Closure;  Surgeon: Freddie Frias MD;  Location:  HEART CARDIAC CATH LAB     CV RIGHT HEART CATH N/A 2020    Procedure: Right Heart Cath;  Surgeon: Freddie Frisa MD;  Location:  HEART CARDIAC CATH LAB     DAVINCI HYSTERECTOMY TOTAL, BILATERAL SALPINGO-OOPHORECTOMY, COMBINED Bilateral 2020    Procedure: DaVinci robotic-assisted total laparoscopic hysterectomy, bilateral salpingectomy;  Surgeon: Venancio Bartlett MD;  Location:  OR - Path all benign     DAVINCI HYSTERECTOMY TOTAL, SALPINGECTOMY BILATERAL Bilateral 2020    Fibroid uterus, Menometrorrhagia - Robotic TLH, Bilateral salpingectomy - Path all benign     ENT SURGERY       HC COLP VAGINA W CERVIX IF PRES W BIOPSY      [place] for ASCUS     TONSILLECTOMY & ADENOIDECTOMY         FAMILY HISTORY:  Family History   Adopted: Yes   Problem Relation Age of Onset     Unknown/Adopted Other         pt is adopted and has no access to health history     Unknown/Adopted Mother      Unknown/Adopted Father      Unknown/Adopted Maternal Grandmother      Unknown/Adopted Maternal Grandfather      Unknown/Adopted Paternal Grandmother      Unknown/Adopted Other        SOCIAL HISTORY:  Social History     Socioeconomic History     Marital status:      Spouse name: Saúl     Number of children: 2     Years of education: None     Highest education level: None   Occupational History     Occupation:  at home   Social Needs     Financial resource strain: None     Food insecurity     Worry: None      Inability: None     Transportation needs     Medical: None     Non-medical: None   Tobacco Use     Smoking status: Former Smoker     Packs/day: 0.50     Years: 5.00     Pack years: 2.50     Types: Cigarettes     Last attempt to quit: 2007     Years since quittin.4     Smokeless tobacco: Never Used   Substance and Sexual Activity     Alcohol use: Yes     Comment: 0-1 weekly     Drug use: No     Sexual activity: Yes     Partners: Male     Birth control/protection: Female Surgical, Male Surgical     Comment: Hysterectomy 2020/ vasectomy    Lifestyle     Physical activity     Days per week: None     Minutes per session: None     Stress: None   Relationships     Social connections     Talks on phone: None     Gets together: None     Attends Synagogue service: None     Active member of club or organization: None     Attends meetings of clubs or organizations: None     Relationship status: None     Intimate partner violence     Fear of current or ex partner: None     Emotionally abused: None     Physically abused: None     Forced sexual activity: None   Other Topics Concern      Service No     Blood Transfusions No     Caffeine Concern Yes     Comment: 20 oz pepsi     Occupational Exposure Not Asked     Hobby Hazards Not Asked     Sleep Concern Not Asked     Stress Concern Not Asked     Weight Concern Not Asked     Special Diet Yes     Comment: 2-3 dairy per day     Back Care Not Asked     Exercise No     Comment: active with kids, walking puppy     Bike Helmet Not Asked     Seat Belt Yes     Self-Exams No     Parent/sibling w/ CABG, MI or angioplasty before 65F 55M? No     Comment: No family history - Adopted   Social History Narrative     None       Review of Systems:  Skin:  Negative       Eyes:  Negative      ENT:  Negative      Respiratory:  Negative   one SOB episode last week   Cardiovascular:  Negative   no CP, no swelling, no palpitations  Gastroenterology: Negative      Genitourinary:   "Negative      Musculoskeletal:  Negative      Neurologic:  Negative      Psychiatric:  Negative      Heme/Lymph/Imm:  Positive for allergies    Endocrine:  Negative        Physical Exam:  Vitals: /78   Pulse 98   Ht 1.626 m (5' 4\")   Wt 115.7 kg (255 lb)   LMP  (LMP Unknown)   BMI 43.77 kg/m      Constitutional:  cooperative, alert and oriented, well developed, well nourished, in no acute distress obese      Skin:  warm and dry to the touch, no apparent skin lesions or masses noted          Head:  normocephalic, no masses or lesions        Eyes:  pupils equal and round, conjunctivae and lids unremarkable, sclera white, no xanthalasma, EOMS intact, no nystagmus        Lymph:      ENT:  no pallor or cyanosis, dentition good        Neck:  carotid pulses are full and equal bilaterally, JVP normal, no carotid bruit        Respiratory:  normal breath sounds, clear to auscultation, normal A-P diameter, normal symmetry, normal respiratory excursion, no use of accessory muscles         Cardiac: regular rhythm   fixed split S2   early systolic murmur;RUSB;grade 1     murmur hardly audible today  not assessed this visit                                   right femoral vein clean without hum or bruit    GI:  abdomen soft, non-tender, BS normoactive, no mass, no HSM, no bruits   ventral hernia    Extremities and Muscular Skeletal:      telangiectasia RLE edema;trace LLE edema;trace      Neurological:  no gross motor deficits        Psych:  Alert and Oriented x 3      Recent Lab Results:  LIPID RESULTS:  Lab Results   Component Value Date    CHOL 182 01/24/2020    HDL 41 (L) 01/24/2020     (H) 01/24/2020    TRIG 119 01/24/2020    CHOLHDLRATIO 4.2 04/25/2007       LIVER ENZYME RESULTS:  Lab Results   Component Value Date    AST 17 01/24/2020    ALT 29 01/24/2020       CBC RESULTS:  Lab Results   Component Value Date    WBC 6.9 08/26/2020    RBC 4.71 08/26/2020    HGB 14.5 08/26/2020    HCT 43.0 08/26/2020    MCV " 91 08/26/2020    MCH 30.8 08/26/2020    MCHC 33.7 08/26/2020    RDW 13.2 08/26/2020     08/26/2020       BMP RESULTS:  Lab Results   Component Value Date     08/26/2020    POTASSIUM 4.1 08/26/2020    CHLORIDE 106 08/26/2020    CO2 29 08/26/2020    ANIONGAP 4 08/26/2020    GLC 85 01/24/2020    BUN 16 01/24/2020    CR 0.63 08/26/2020    GFRESTIMATED >90 08/26/2020    GFRESTBLACK >90 08/26/2020    CARROL 9.3 01/24/2020        A1C RESULTS:  Lab Results   Component Value Date    A1C 5.1 04/24/2019       INR RESULTS:  Lab Results   Component Value Date    INR 1.02 08/26/2020    INR 1.00 06/21/2015     Thank you for allowing me to participate in the care of your patient.    Sincerely,     BARRINGTON Crowell CNP     [facility]

## 2020-09-11 NOTE — PROGRESS NOTES
History of Present Illness:  Lyubov Sanchez is a 50 year old female followed here by Dr. Hodges who recently underwent percutaneous closure of an ASD. She returns for post procedure follow up.     Upon clearance for a hysterectomy earlier in the year she was found to have a secundum ASD measuring 7.7 cm. She went on for cardiac MRI showing a mildly dilated right ventricle with normal right ventricular function. Qp/Qs ratio was 1.27. Main pulmonary is dilated to 3.8cm. Moderate ELVA is present. CONCEPCION noted moderate left to right shunt. RVSP on transthoracic echo was 35.8 plus RAP but on CONCEPCION 52 plus RAP.        Other medical history includes obesity, dyslipidemia, obstructive sleep apnea, and hypertension. Aortic root appeared to measure 3.5cm on CONCEPCION;ascending aorta on chest CT in 2018 was 4.1cm. She has lower extremity edema likely multifactorial with venous changes, bilateral knee replacements and amlodipine,     She has a ventral hernia which developed after her hysterectomy in January and she hopes to have this surgically repaired in January.     LIpids: total cholesterol 182 HDL 41  1-  Sodium 139 potassium 4.2 BUN 16 Creatinine 0.64 1-    On August 26 of 2020 she underwent successful percutaneous closure with a number 30 mm cardio form septal occluder device.  She had mild pulmonary hypertension with a PA pressure 43/23 mean of 30 right atrial pressure was 10.  She did well postprocedure however her chest x-ray showed pulmonary vascular congestion.  She was given a dose of IV Lasix.    Since her closure she notes that her breathlessness she used to feel when she would try to lie flat has resolved.  She notes a significant improvement in her leg edema.  Her right groin is healed well with no hum or bruit.  Her limited echo shows the device is well-seated with no effusion the right heart appears to be normal in size and function.     She states she has sob with exertion for most of her life but  felt this was just her normal. She is adopted and has no access to her family history. She has two daughters who I suggested should be screened for congenital heart disease.     Exam; ventral hernia  Right groin clean without hum or bruit  Edema markedly improved  She notes more easy bruising but no hematuria, epistaxis or other signs of bleeding.      Impression/Plan:     1. Secundum ASD  S/p successful closure with #30mm Cardioform device  Mild pulmonary hypertension (RA 10; PA 43/23 mean 30; LA 10)  CXR-vascular congestion post procedure which was treated with 1 dose of IV Lasix  Improved edema  Improved breathing  She will continue aspirin and Plavix and have a follow-up echo bubble study in November and a visit back with Dr. Hodges    At that point I would ask him to decide if her Plavix can be discontinued and if she is cleared to proceed with her ventral hernia repair.  She also has an umbilical hernia which they will repair at the same time    We reviewed that she can now lift up to 30 pounds for the next couple of weeks.  No aggressive aerobic activity for 4 weeks no scuba diving, skydiving contact sports for 3 months.            2. Mildly dilated ascending aorta  -Continue good blood pressure control  -This will be reassessed intermittently on her follow-up echocardiograms     3. Dyslipidemia    Lifestyle modifications  Recheck and PMD     4. KRISTYN  Wears CPAP     5. Venous disease      6. HTN  controlled    7. Ventral hernia  She would like to consider surgery in January if possble      It is been a pleasure seeing her in follow-up today I am delighted that she is doing so well.  I have asked her to contact me with any questions.    Eve Orantes, MSN, APRN-BC, CNP  Cardiology    No orders of the defined types were placed in this encounter.    No orders of the defined types were placed in this encounter.    There are no discontinued medications.      Encounter Diagnosis   Name Primary?      Ostium secundum type atrial septal defect        CURRENT MEDICATIONS:  Current Outpatient Medications   Medication Sig Dispense Refill     amLODIPine (NORVASC) 5 MG tablet Take 5 mg by mouth every morning        aspirin (ASA) 81 MG EC tablet Take 1 tablet (81 mg) by mouth daily       citalopram (CELEXA) 20 MG tablet TAKE 1 TABLET BY MOUTH EVERY DAY (Patient taking differently: Take 20 mg by mouth every morning ) 90 tablet 0     clopidogrel (PLAVIX) 75 MG tablet Take 75 mg by mouth daily       fluticasone (FLONASE) 50 MCG/ACT nasal spray Spray 1 spray into both nostrils daily as needed for rhinitis or allergies       lisinopril-hydrochlorothiazide (ZESTORETIC) 20-12.5 MG tablet Take 1/2 tab once daily/ (Patient taking differently: Take 0.5 tablets by mouth daily Take 1/2 tab once daily/) 45 tablet 0       ALLERGIES     Allergies   Allergen Reactions     Penicillins Hives     hives       PAST MEDICAL HISTORY:  Past Medical History:   Diagnosis Date     Anxiety and depression     Pt reports is on Rx Celexa.     Aortic aneurysm (H)     Pt reports its small and is currently being monitored.     Arthritis      Depressive disorder      DYSPLASIA OF CERVIX 3/6/2003     Dysplasia of cervix (uteri) 2003    Rx cryotherapy Nov 03, and 2005     Hypertension     NO cardiology     Incomplete right bundle branch block 11/10/2017     Migraine      Other chronic pain     Knee pain for 8 years     Plantar fasciitis     bilat     Unspecified sleep apnea     CPAP will bring on the day of surgery.     Ventral hernia without obstruction or gangrene 4/15/2020       PAST SURGICAL HISTORY:  Past Surgical History:   Procedure Laterality Date     ARTHROPLASTY KNEE Right 11/17/2017    Procedure: ARTHROPLASTY KNEE;  Right total knee arthroplasty using the [employer] total knee system;  Surgeon: Hebert Lee MD;  Location: [facility] OR     ARTHROPLASTY KNEE Left 3/19/2018    Procedure: ARTHROPLASTY KNEE;  Left total knee arthroplasty  using an [employer]ance total knee system;  Surgeon: Hebert Lee MD;  Location: [facility] OR     C  DELIVERY ONLY  ,    , Low Cervical     CV ASD CLOSURE N/A 2020    Procedure: ASD Closure;  Surgeon: Freddie Frias MD;  Location:  [facility]     CV RIGHT HEART CATH N/A 2020    Procedure: Right Heart Cath;  Surgeon: Freddie Frias MD;  Location:  [facility]     DAVINCI HYSTERECTOMY TOTAL, BILATERAL SALPINGO-OOPHORECTOMY, COMBINED Bilateral 2020    Procedure: DaVinci robotic-assisted total laparoscopic hysterectomy, bilateral salpingectomy;  Surgeon: Venancio Bartlett MD;  Location: [facility] OR - Path all benign     DAVINCI HYSTERECTOMY TOTAL, SALPINGECTOMY BILATERAL Bilateral 2020    Fibroid uterus, Menometrorrhagia - Robotic TLH, Bilateral salpingectomy - Path all benign     ENT SURGERY       HC COLP VAGINA W CERVIX IF PRES W BIOPSY      [place] for ASCUS     TONSILLECTOMY & ADENOIDECTOMY         FAMILY HISTORY:  Family History   Adopted: Yes   Problem Relation Age of Onset     Unknown/Adopted Other         pt is adopted and has no access to health history     Unknown/Adopted Mother      Unknown/Adopted Father      Unknown/Adopted Maternal Grandmother      Unknown/Adopted Maternal Grandfather      Unknown/Adopted Paternal Grandmother      Unknown/Adopted Other        SOCIAL HISTORY:  Social History     Socioeconomic History     Marital status:      Spouse name: Saúl     Number of children: 2     Years of education: None     Highest education level: None   Occupational History     Occupation:  at home   Social Needs     Financial resource strain: None     Food insecurity     Worry: None     Inability: None     Transportation needs     Medical: None     Non-medical: None   Tobacco Use     Smoking status: Former Smoker     Packs/day: 0.50     Years: 5.00     Pack years: 2.50     Types: Cigarettes     Last attempt to  quit: 2007     Years since quittin.4     Smokeless tobacco: Never Used   Substance and Sexual Activity     Alcohol use: Yes     Comment: 0-1 weekly     Drug use: No     Sexual activity: Yes     Partners: Male     Birth control/protection: Female Surgical, Male Surgical     Comment: Hysterectomy 2020/ vasectomy    Lifestyle     Physical activity     Days per week: None     Minutes per session: None     Stress: None   Relationships     Social connections     Talks on phone: None     Gets together: None     Attends Anabaptist service: None     Active member of club or organization: None     Attends meetings of clubs or organizations: None     Relationship status: None     Intimate partner violence     Fear of current or ex partner: None     Emotionally abused: None     Physically abused: None     Forced sexual activity: None   Other Topics Concern      Service No     Blood Transfusions No     Caffeine Concern Yes     Comment: 20 oz pepsi     Occupational Exposure Not Asked     Hobby Hazards Not Asked     Sleep Concern Not Asked     Stress Concern Not Asked     Weight Concern Not Asked     Special Diet Yes     Comment: 2-3 dairy per day     Back Care Not Asked     Exercise No     Comment: active with kids, walking puppy     Bike Helmet Not Asked     Seat Belt Yes     Self-Exams No     Parent/sibling w/ CABG, MI or angioplasty before 65F 55M? No     Comment: No family history - Adopted   Social History Narrative     None       Review of Systems:  Skin:  Negative       Eyes:  Negative      ENT:  Negative      Respiratory:  Negative   one SOB episode last week   Cardiovascular:  Negative   no CP, no swelling, no palpitations  Gastroenterology: Negative      Genitourinary:  Negative      Musculoskeletal:  Negative      Neurologic:  Negative      Psychiatric:  Negative      Heme/Lymph/Imm:  Positive for allergies    Endocrine:  Negative        Physical Exam:  Vitals: /78   Pulse 98   Ht 1.626 m  "(5' 4\")   Wt 115.7 kg (255 lb)   LMP  (LMP Unknown)   BMI 43.77 kg/m      Constitutional:  cooperative, alert and oriented, well developed, well nourished, in no acute distress obese      Skin:  warm and dry to the touch, no apparent skin lesions or masses noted          Head:  normocephalic, no masses or lesions        Eyes:  pupils equal and round, conjunctivae and lids unremarkable, sclera white, no xanthalasma, EOMS intact, no nystagmus        Lymph:      ENT:  no pallor or cyanosis, dentition good        Neck:  carotid pulses are full and equal bilaterally, JVP normal, no carotid bruit        Respiratory:  normal breath sounds, clear to auscultation, normal A-P diameter, normal symmetry, normal respiratory excursion, no use of accessory muscles         Cardiac: regular rhythm   fixed split S2   early systolic murmur;RUSB;grade 1     murmur hardly audible today  not assessed this visit                                   right femoral vein clean without hum or bruit    GI:  abdomen soft, non-tender, BS normoactive, no mass, no HSM, no bruits   ventral hernia    Extremities and Muscular Skeletal:      telangiectasia RLE edema;trace LLE edema;trace      Neurological:  no gross motor deficits        Psych:  Alert and Oriented x 3      Recent Lab Results:  LIPID RESULTS:  Lab Results   Component Value Date    CHOL 182 01/24/2020    HDL 41 (L) 01/24/2020     (H) 01/24/2020    TRIG 119 01/24/2020    CHOLHDLRATIO 4.2 04/25/2007       LIVER ENZYME RESULTS:  Lab Results   Component Value Date    AST 17 01/24/2020    ALT 29 01/24/2020       CBC RESULTS:  Lab Results   Component Value Date    WBC 6.9 08/26/2020    RBC 4.71 08/26/2020    HGB 14.5 08/26/2020    HCT 43.0 08/26/2020    MCV 91 08/26/2020    MCH 30.8 08/26/2020    MCHC 33.7 08/26/2020    RDW 13.2 08/26/2020     08/26/2020       BMP RESULTS:  Lab Results   Component Value Date     08/26/2020    POTASSIUM 4.1 08/26/2020    CHLORIDE 106 " 08/26/2020    CO2 29 08/26/2020    ANIONGAP 4 08/26/2020    GLC 85 01/24/2020    BUN 16 01/24/2020    CR 0.63 08/26/2020    GFRESTIMATED >90 08/26/2020    GFRESTBLACK >90 08/26/2020    CARROL 9.3 01/24/2020        A1C RESULTS:  Lab Results   Component Value Date    A1C 5.1 04/24/2019       INR RESULTS:  Lab Results   Component Value Date    INR 1.02 08/26/2020    INR 1.00 06/21/2015           CC  Yolie Delarosa [place]  [id number]  [id number] [address]

## 2020-09-11 NOTE — LETTER
9/11/2020    Yolie Delarosa MD  [address]  [facility] [address]    RE: Lyubov Sanchez       Dear Colleague,    I had the pleasure of seeing Lyubov Sanchez in the [facility].    History of Present Illness:  Lyubov Sanchez is a 50 year old female followed here by Dr. Hodges who recently underwent percutaneous closure of an ASD. She returns for post procedure follow up.     Upon clearance for a hysterectomy earlier in the year she was found to have a secundum ASD measuring 7.7 cm. She went on for cardiac MRI showing a mildly dilated right ventricle with normal right ventricular function. Qp/Qs ratio was 1.27. Main pulmonary is dilated to 3.8cm. Moderate ELVA is present. CONCEPCION noted moderate left to right shunt. RVSP on transthoracic echo was 35.8 plus RAP but on CONCEPCION 52 plus RAP.        Other medical history includes obesity, dyslipidemia, obstructive sleep apnea, and hypertension. Aortic root appeared to measure 3.5cm on CONCEPCION;ascending aorta on chest CT in 2018 was 4.1cm. She has lower extremity edema likely multifactorial with venous changes, bilateral knee replacements and amlodipine,     She has a ventral hernia which developed after her hysterectomy in January and she hopes to have this surgically repaired in January.     LIpids: total cholesterol 182 HDL 41  1-  Sodium 139 potassium 4.2 BUN 16 Creatinine 0.64 1-    On August 26 of 2020 she underwent successful percutaneous closure with a number 30 mm cardio form septal occluder device.  She had mild pulmonary hypertension with a PA pressure 43/23 mean of 30 right atrial pressure was 10.  She did well postprocedure however her chest x-ray showed pulmonary vascular congestion.  She was given a dose of IV Lasix.    Since her closure she notes that her breathlessness she used to feel when she would try to lie flat has resolved.  She notes a significant improvement in her leg edema.  Her right groin  is healed well with no hum or bruit.  Her limited echo shows the device is well-seated with no effusion the right heart appears to be normal in size and function.     She states she has sob with exertion for most of her life but felt this was just her normal. She is adopted and has no access to her family history. She has two daughters who I suggested should be screened for congenital heart disease.     Exam; ventral hernia  Right groin clean without hum or bruit  Edema markedly improved  She notes more easy bruising but no hematuria, epistaxis or other signs of bleeding.      Impression/Plan:     1. Secundum ASD  S/p successful closure with #30mm Cardioform device  Mild pulmonary hypertension (RA 10; PA 43/23 mean 30; LA 10)  CXR-vascular congestion post procedure which was treated with 1 dose of IV Lasix  Improved edema  Improved breathing  She will continue aspirin and Plavix and have a follow-up echo bubble study in November and a visit back with Dr. Hodges    At that point I would ask him to decide if her Plavix can be discontinued and if she is cleared to proceed with her ventral hernia repair.  She also has an umbilical hernia which they will repair at the same time    We reviewed that she can now lift up to 30 pounds for the next couple of weeks.  No aggressive aerobic activity for 4 weeks no scuba diving, skydiving contact sports for 3 months.            2. Mildly dilated ascending aorta  -Continue good blood pressure control  -This will be reassessed intermittently on her follow-up echocardiograms     3. Dyslipidemia    Lifestyle modifications  Recheck and PMD     4. KRISTYN  Wears CPAP     5. Venous disease      6. HTN  controlled    7. Ventral hernia  She would like to consider surgery in January if possble      It is been a pleasure seeing her in follow-up today I am delighted that she is doing so well.  I have asked her to contact me with any questions.    Eve Orantes, MSN, APRN-BC,  CNP  Cardiology    No orders of the defined types were placed in this encounter.    No orders of the defined types were placed in this encounter.    There are no discontinued medications.      Encounter Diagnosis   Name Primary?     Ostium secundum type atrial septal defect        CURRENT MEDICATIONS:  Current Outpatient Medications   Medication Sig Dispense Refill     amLODIPine (NORVASC) 5 MG tablet Take 5 mg by mouth every morning        aspirin (ASA) 81 MG EC tablet Take 1 tablet (81 mg) by mouth daily       citalopram (CELEXA) 20 MG tablet TAKE 1 TABLET BY MOUTH EVERY DAY (Patient taking differently: Take 20 mg by mouth every morning ) 90 tablet 0     clopidogrel (PLAVIX) 75 MG tablet Take 75 mg by mouth daily       fluticasone (FLONASE) 50 MCG/ACT nasal spray Spray 1 spray into both nostrils daily as needed for rhinitis or allergies       lisinopril-hydrochlorothiazide (ZESTORETIC) 20-12.5 MG tablet Take 1/2 tab once daily/ (Patient taking differently: Take 0.5 tablets by mouth daily Take 1/2 tab once daily/) 45 tablet 0       ALLERGIES     Allergies   Allergen Reactions     Penicillins Hives     hives       PAST MEDICAL HISTORY:  Past Medical History:   Diagnosis Date     Anxiety and depression     Pt reports is on Rx Celexa.     Aortic aneurysm (H)     Pt reports its small and is currently being monitored.     Arthritis      Depressive disorder      DYSPLASIA OF CERVIX 3/6/2003     Dysplasia of cervix (uteri) 2003    Rx cryotherapy Nov 03, and 2005     Hypertension     NO cardiology     Incomplete right bundle branch block 11/10/2017     Migraine      Other chronic pain     Knee pain for 8 years     Plantar fasciitis     bilat     Unspecified sleep apnea     CPAP will bring on the day of surgery.     Ventral hernia without obstruction or gangrene 4/15/2020       PAST SURGICAL HISTORY:  Past Surgical History:   Procedure Laterality Date     ARTHROPLASTY KNEE Right 11/17/2017    Procedure: ARTHROPLASTY KNEE;   Right total knee arthroplasty using the Arthrex iBalance total knee system;  Surgeon: Hebert Lee MD;  Location: [facility] OR     ARTHROPLASTY KNEE Left 3/19/2018    Procedure: ARTHROPLASTY KNEE;  Left total knee arthroplasty using an Arthrex iBalance total knee system;  Surgeon: Hebert Lee MD;  Location: [facility] OR     C  DELIVERY ONLY  ,    , Low Cervical     CV ASD CLOSURE N/A 2020    Procedure: ASD Closure;  Surgeon: Freddie Frias MD;  Location:  HEART CARDIAC CATH LAB     CV RIGHT HEART CATH N/A 2020    Procedure: Right Heart Cath;  Surgeon: Freddie Frias MD;  Location:  HEART CARDIAC CATH LAB     DAVINCI HYSTERECTOMY TOTAL, BILATERAL SALPINGO-OOPHORECTOMY, COMBINED Bilateral 2020    Procedure: DaVinci robotic-assisted total laparoscopic hysterectomy, bilateral salpingectomy;  Surgeon: Venancio Bartlett MD;  Location:  OR - Path all benign     DAVINCI HYSTERECTOMY TOTAL, SALPINGECTOMY BILATERAL Bilateral 2020    Fibroid uterus, Menometrorrhagia - Robotic TLH, Bilateral salpingectomy - Path all benign     ENT SURGERY       HC COLP VAGINA W CERVIX IF PRES W BIOPSY      [place] for ASCUS     TONSILLECTOMY & ADENOIDECTOMY         FAMILY HISTORY:  Family History   Adopted: Yes   Problem Relation Age of Onset     Unknown/Adopted Other         pt is adopted and has no access to health history     Unknown/Adopted Mother      Unknown/Adopted Father      Unknown/Adopted Maternal Grandmother      Unknown/Adopted Maternal Grandfather      Unknown/Adopted Paternal Grandmother      Unknown/Adopted Other        SOCIAL HISTORY:  Social History     Socioeconomic History     Marital status:      Spouse name: Saúl     Number of children: 2     Years of education: None     Highest education level: None   Occupational History     Occupation:  at home   Social Needs     Financial resource strain: None     Food insecurity     Worry: None      Inability: None     Transportation needs     Medical: None     Non-medical: None   Tobacco Use     Smoking status: Former Smoker     Packs/day: 0.50     Years: 5.00     Pack years: 2.50     Types: Cigarettes     Last attempt to quit: 2007     Years since quittin.4     Smokeless tobacco: Never Used   Substance and Sexual Activity     Alcohol use: Yes     Comment: 0-1 weekly     Drug use: No     Sexual activity: Yes     Partners: Male     Birth control/protection: Female Surgical, Male Surgical     Comment: Hysterectomy 2020/ vasectomy    Lifestyle     Physical activity     Days per week: None     Minutes per session: None     Stress: None   Relationships     Social connections     Talks on phone: None     Gets together: None     Attends Buddhist service: None     Active member of club or organization: None     Attends meetings of clubs or organizations: None     Relationship status: None     Intimate partner violence     Fear of current or ex partner: None     Emotionally abused: None     Physically abused: None     Forced sexual activity: None   Other Topics Concern      Service No     Blood Transfusions No     Caffeine Concern Yes     Comment: 20 oz pepsi     Occupational Exposure Not Asked     Hobby Hazards Not Asked     Sleep Concern Not Asked     Stress Concern Not Asked     Weight Concern Not Asked     Special Diet Yes     Comment: 2-3 dairy per day     Back Care Not Asked     Exercise No     Comment: active with kids, walking puppy     Bike Helmet Not Asked     Seat Belt Yes     Self-Exams No     Parent/sibling w/ CABG, MI or angioplasty before 65F 55M? No     Comment: No family history - Adopted   Social History Narrative     None       Review of Systems:  Skin:  Negative       Eyes:  Negative      ENT:  Negative      Respiratory:  Negative   one SOB episode last week   Cardiovascular:  Negative   no CP, no swelling, no palpitations  Gastroenterology: Negative      Genitourinary:   "Negative      Musculoskeletal:  Negative      Neurologic:  Negative      Psychiatric:  Negative      Heme/Lymph/Imm:  Positive for allergies    Endocrine:  Negative        Physical Exam:  Vitals: /78   Pulse 98   Ht 1.626 m (5' 4\")   Wt 115.7 kg (255 lb)   LMP  (LMP Unknown)   BMI 43.77 kg/m      Constitutional:  cooperative, alert and oriented, well developed, well nourished, in no acute distress obese      Skin:  warm and dry to the touch, no apparent skin lesions or masses noted          Head:  normocephalic, no masses or lesions        Eyes:  pupils equal and round, conjunctivae and lids unremarkable, sclera white, no xanthalasma, EOMS intact, no nystagmus        Lymph:      ENT:  no pallor or cyanosis, dentition good        Neck:  carotid pulses are full and equal bilaterally, JVP normal, no carotid bruit        Respiratory:  normal breath sounds, clear to auscultation, normal A-P diameter, normal symmetry, normal respiratory excursion, no use of accessory muscles         Cardiac: regular rhythm   fixed split S2   early systolic murmur;RUSB;grade 1     murmur hardly audible today  not assessed this visit                                   right femoral vein clean without hum or bruit    GI:  abdomen soft, non-tender, BS normoactive, no mass, no HSM, no bruits   ventral hernia    Extremities and Muscular Skeletal:      telangiectasia RLE edema;trace LLE edema;trace      Neurological:  no gross motor deficits        Psych:  Alert and Oriented x 3      Recent Lab Results:  LIPID RESULTS:  Lab Results   Component Value Date    CHOL 182 01/24/2020    HDL 41 (L) 01/24/2020     (H) 01/24/2020    TRIG 119 01/24/2020    CHOLHDLRATIO 4.2 04/25/2007       LIVER ENZYME RESULTS:  Lab Results   Component Value Date    AST 17 01/24/2020    ALT 29 01/24/2020       CBC RESULTS:  Lab Results   Component Value Date    WBC 6.9 08/26/2020    RBC 4.71 08/26/2020    HGB 14.5 08/26/2020    HCT 43.0 08/26/2020    MCV " 91 08/26/2020    MCH 30.8 08/26/2020    MCHC 33.7 08/26/2020    RDW 13.2 08/26/2020     08/26/2020       BMP RESULTS:  Lab Results   Component Value Date     08/26/2020    POTASSIUM 4.1 08/26/2020    CHLORIDE 106 08/26/2020    CO2 29 08/26/2020    ANIONGAP 4 08/26/2020    GLC 85 01/24/2020    BUN 16 01/24/2020    CR 0.63 08/26/2020    GFRESTIMATED >90 08/26/2020    GFRESTBLACK >90 08/26/2020    CARROL 9.3 01/24/2020        A1C RESULTS:  Lab Results   Component Value Date    A1C 5.1 04/24/2019       INR RESULTS:  Lab Results   Component Value Date    INR 1.02 08/26/2020    INR 1.00 06/21/2015           CC  Yolie Delarosa MD  [id number]  [place] [address]                  Thank you for allowing me to participate in the care of your patient.      Sincerely,     BARRINGTON Crowell CNP     [facility]    cc:   Yolie Delarosa MD  [id number]  [place] [address]

## 2020-09-15 ENCOUNTER — CARE COORDINATION (OUTPATIENT)
Dept: CARDIOLOGY | Facility: CLINIC | Age: 50
End: 2020-09-15

## 2020-09-15 ENCOUNTER — APPOINTMENT (OUTPATIENT)
Dept: GENERAL RADIOLOGY | Facility: CLINIC | Age: 50
End: 2020-09-15
Attending: INTERNAL MEDICINE
Payer: COMMERCIAL

## 2020-09-15 ENCOUNTER — VIRTUAL VISIT (OUTPATIENT)
Dept: CARDIOLOGY | Facility: CLINIC | Age: 50
End: 2020-09-15
Payer: COMMERCIAL

## 2020-09-15 ENCOUNTER — HOSPITAL ENCOUNTER (OUTPATIENT)
Facility: CLINIC | Age: 50
Setting detail: OBSERVATION
Discharge: HOME | End: 2020-09-16
Attending: NURSE PRACTITIONER | Admitting: INTERNAL MEDICINE
Payer: COMMERCIAL

## 2020-09-15 DIAGNOSIS — R00.2 PALPITATIONS: Primary | ICD-10-CM

## 2020-09-15 DIAGNOSIS — I48.91 ATRIAL FIBRILLATION WITH RAPID VENTRICULAR RESPONSE (H): Primary | ICD-10-CM

## 2020-09-15 DIAGNOSIS — I50.31 ACUTE HEART FAILURE WITH PRESERVED EJECTION FRACTION (HFPEF) (H): ICD-10-CM

## 2020-09-15 DIAGNOSIS — Q21.11 OSTIUM SECUNDUM TYPE ATRIAL SEPTAL DEFECT: ICD-10-CM

## 2020-09-15 DIAGNOSIS — I48.0 PAROXYSMAL ATRIAL FIBRILLATION (H): Primary | ICD-10-CM

## 2020-09-15 DIAGNOSIS — R00.2 PALPITATIONS: ICD-10-CM

## 2020-09-15 DIAGNOSIS — I10 BENIGN ESSENTIAL HYPERTENSION: ICD-10-CM

## 2020-09-15 DIAGNOSIS — I10 ESSENTIAL (PRIMARY) HYPERTENSION: ICD-10-CM

## 2020-09-15 LAB
ABO + RH BLD: NORMAL
ABO + RH BLD: NORMAL
ALBUMIN SERPL-MCNC: 4.1 G/DL (ref 3.4–5)
ALP SERPL-CCNC: 45 U/L (ref 40–150)
ALT SERPL W P-5'-P-CCNC: 34 U/L (ref 0–50)
ANION GAP SERPL CALCULATED.3IONS-SCNC: 5 MMOL/L (ref 3–14)
APTT PPP: 23 SEC (ref 22–37)
AST SERPL W P-5'-P-CCNC: 23 U/L (ref 0–45)
BASOPHILS # BLD AUTO: 0 10E9/L (ref 0–0.2)
BASOPHILS NFR BLD AUTO: 0.3 %
BILIRUB SERPL-MCNC: 0.8 MG/DL (ref 0.2–1.3)
BLD GP AB SCN SERPL QL: NORMAL
BLOOD BANK CMNT PATIENT-IMP: NORMAL
BUN SERPL-MCNC: 16 MG/DL (ref 7–30)
CALCIUM SERPL-MCNC: 9.2 MG/DL (ref 8.5–10.1)
CHLORIDE SERPL-SCNC: 107 MMOL/L (ref 94–109)
CO2 SERPL-SCNC: 26 MMOL/L (ref 20–32)
CREAT SERPL-MCNC: 0.57 MG/DL (ref 0.52–1.04)
DIFFERENTIAL METHOD BLD: NORMAL
EOSINOPHIL # BLD AUTO: 0.2 10E9/L (ref 0–0.7)
EOSINOPHIL NFR BLD AUTO: 2.4 %
ERYTHROCYTE [DISTWIDTH] IN BLOOD BY AUTOMATED COUNT: 12.7 % (ref 10–15)
GFR SERPL CREATININE-BSD FRML MDRD: >90 ML/MIN/{1.73_M2}
GLUCOSE SERPL-MCNC: 92 MG/DL (ref 70–99)
HCT VFR BLD AUTO: 43.4 % (ref 35–47)
HGB BLD-MCNC: 15.1 G/DL (ref 11.7–15.7)
IMM GRANULOCYTES # BLD: 0 10E9/L (ref 0–0.4)
IMM GRANULOCYTES NFR BLD: 0.3 %
INR PPP: 1.02 (ref 0.86–1.14)
INR PPP: 1.05 (ref 0.86–1.14)
INTERPRETATION ECG - MUSE: NORMAL
INTERPRETATION ECG - MUSE: NORMAL
LABORATORY COMMENT REPORT: NORMAL
LYMPHOCYTES # BLD AUTO: 3.5 10E9/L (ref 0.8–5.3)
LYMPHOCYTES NFR BLD AUTO: 37.7 %
MCH RBC QN AUTO: 32.3 PG (ref 26.5–33)
MCHC RBC AUTO-ENTMCNC: 34.8 G/DL (ref 31.5–36.5)
MCV RBC AUTO: 93 FL (ref 78–100)
MONOCYTES # BLD AUTO: 0.7 10E9/L (ref 0–1.3)
MONOCYTES NFR BLD AUTO: 7.5 %
NEUTROPHILS # BLD AUTO: 4.8 10E9/L (ref 1.6–8.3)
NEUTROPHILS NFR BLD AUTO: 51.8 %
NRBC # BLD AUTO: 0 10*3/UL
NRBC BLD AUTO-RTO: 0 /100
PLATELET # BLD AUTO: 218 10E9/L (ref 150–450)
POTASSIUM SERPL-SCNC: 3.7 MMOL/L (ref 3.4–5.3)
POTASSIUM SERPL-SCNC: 4.4 MMOL/L (ref 3.4–5.3)
PROT SERPL-MCNC: 7.4 G/DL (ref 6.8–8.8)
RBC # BLD AUTO: 4.67 10E12/L (ref 3.8–5.2)
SARS-COV-2 RNA SPEC QL NAA+PROBE: NEGATIVE
SARS-COV-2 RNA SPEC QL NAA+PROBE: NORMAL
SODIUM SERPL-SCNC: 138 MMOL/L (ref 133–144)
SPECIMEN EXP DATE BLD: NORMAL
SPECIMEN SOURCE: NORMAL
SPECIMEN SOURCE: NORMAL
TROPONIN I SERPL-MCNC: <0.015 UG/L (ref 0–0.04)
WBC # BLD AUTO: 9.3 10E9/L (ref 4–11)

## 2020-09-15 PROCEDURE — U0003 INFECTIOUS AGENT DETECTION BY NUCLEIC ACID (DNA OR RNA); SEVERE ACUTE RESPIRATORY SYNDROME CORONAVIRUS 2 (SARS-COV-2) (CORONAVIRUS DISEASE [COVID-19]), AMPLIFIED PROBE TECHNIQUE, MAKING USE OF HIGH THROUGHPUT TECHNOLOGIES AS DESCRIBED BY CMS-2020-01-R: HCPCS | Performed by: NURSE PRACTITIONER

## 2020-09-15 PROCEDURE — 93005 ELECTROCARDIOGRAM TRACING: CPT | Mod: 76

## 2020-09-15 PROCEDURE — 25800030 ZZH RX IP 258 OP 636: Performed by: NURSE PRACTITIONER

## 2020-09-15 PROCEDURE — 93000 ELECTROCARDIOGRAM COMPLETE: CPT | Performed by: NURSE PRACTITIONER

## 2020-09-15 PROCEDURE — 99214 OFFICE O/P EST MOD 30 MIN: CPT | Mod: 95 | Performed by: NURSE PRACTITIONER

## 2020-09-15 PROCEDURE — 99207 ZZC CDG-MDM COMPONENT: MEETS MODERATE - UP CODED: CPT | Performed by: INTERNAL MEDICINE

## 2020-09-15 PROCEDURE — 99214 OFFICE O/P EST MOD 30 MIN: CPT | Performed by: INTERNAL MEDICINE

## 2020-09-15 PROCEDURE — 99220 ZZC INITIAL OBSERVATION CARE,LEVL III: CPT | Performed by: INTERNAL MEDICINE

## 2020-09-15 PROCEDURE — 80053 COMPREHEN METABOLIC PANEL: CPT | Performed by: NURSE PRACTITIONER

## 2020-09-15 PROCEDURE — 93005 ELECTROCARDIOGRAM TRACING: CPT

## 2020-09-15 PROCEDURE — C9803 HOPD COVID-19 SPEC COLLECT: HCPCS

## 2020-09-15 PROCEDURE — 25000128 H RX IP 250 OP 636: Performed by: NURSE PRACTITIONER

## 2020-09-15 PROCEDURE — 85025 COMPLETE CBC W/AUTO DIFF WBC: CPT | Performed by: NURSE PRACTITIONER

## 2020-09-15 PROCEDURE — 84484 ASSAY OF TROPONIN QUANT: CPT | Performed by: INTERNAL MEDICINE

## 2020-09-15 PROCEDURE — 96366 THER/PROPH/DIAG IV INF ADDON: CPT

## 2020-09-15 PROCEDURE — 25000132 ZZH RX MED GY IP 250 OP 250 PS 637: Performed by: INTERNAL MEDICINE

## 2020-09-15 PROCEDURE — 96361 HYDRATE IV INFUSION ADD-ON: CPT

## 2020-09-15 PROCEDURE — 86901 BLOOD TYPING SEROLOGIC RH(D): CPT | Performed by: NURSE PRACTITIONER

## 2020-09-15 PROCEDURE — 85610 PROTHROMBIN TIME: CPT | Performed by: INTERNAL MEDICINE

## 2020-09-15 PROCEDURE — 96365 THER/PROPH/DIAG IV INF INIT: CPT

## 2020-09-15 PROCEDURE — 36415 COLL VENOUS BLD VENIPUNCTURE: CPT | Performed by: INTERNAL MEDICINE

## 2020-09-15 PROCEDURE — 85610 PROTHROMBIN TIME: CPT | Performed by: NURSE PRACTITIONER

## 2020-09-15 PROCEDURE — G0378 HOSPITAL OBSERVATION PER HR: HCPCS

## 2020-09-15 PROCEDURE — 25000128 H RX IP 250 OP 636: Performed by: INTERNAL MEDICINE

## 2020-09-15 PROCEDURE — 71045 X-RAY EXAM CHEST 1 VIEW: CPT

## 2020-09-15 PROCEDURE — 84132 ASSAY OF SERUM POTASSIUM: CPT | Performed by: INTERNAL MEDICINE

## 2020-09-15 PROCEDURE — 86850 RBC ANTIBODY SCREEN: CPT | Performed by: NURSE PRACTITIONER

## 2020-09-15 PROCEDURE — 99291 CRITICAL CARE FIRST HOUR: CPT

## 2020-09-15 PROCEDURE — 25000125 ZZHC RX 250: Performed by: NURSE PRACTITIONER

## 2020-09-15 PROCEDURE — 85730 THROMBOPLASTIN TIME PARTIAL: CPT | Performed by: NURSE PRACTITIONER

## 2020-09-15 PROCEDURE — 86900 BLOOD TYPING SEROLOGIC ABO: CPT | Performed by: NURSE PRACTITIONER

## 2020-09-15 RX ORDER — POTASSIUM CHLORIDE 1500 MG/1
20 TABLET, EXTENDED RELEASE ORAL
Status: COMPLETED | OUTPATIENT
Start: 2020-09-15 | End: 2020-09-16

## 2020-09-15 RX ORDER — POTASSIUM CHLORIDE 1500 MG/1
40 TABLET, EXTENDED RELEASE ORAL
Status: DISCONTINUED | OUTPATIENT
Start: 2020-09-15 | End: 2020-09-16 | Stop reason: HOSPADM

## 2020-09-15 RX ORDER — METOPROLOL SUCCINATE 50 MG/1
50 TABLET, EXTENDED RELEASE ORAL DAILY
Status: DISCONTINUED | OUTPATIENT
Start: 2020-09-16 | End: 2020-09-16 | Stop reason: HOSPADM

## 2020-09-15 RX ORDER — DILTIAZEM HYDROCHLORIDE 5 MG/ML
20 INJECTION INTRAVENOUS ONCE
Status: COMPLETED | OUTPATIENT
Start: 2020-09-15 | End: 2020-09-15

## 2020-09-15 RX ORDER — ONDANSETRON 2 MG/ML
4 INJECTION INTRAMUSCULAR; INTRAVENOUS EVERY 6 HOURS PRN
Status: DISCONTINUED | OUTPATIENT
Start: 2020-09-15 | End: 2020-09-16 | Stop reason: HOSPADM

## 2020-09-15 RX ORDER — CLOPIDOGREL BISULFATE 75 MG/1
75 TABLET ORAL DAILY
Status: DISCONTINUED | OUTPATIENT
Start: 2020-09-16 | End: 2020-09-15

## 2020-09-15 RX ORDER — NALOXONE HYDROCHLORIDE 0.4 MG/ML
.1-.4 INJECTION, SOLUTION INTRAMUSCULAR; INTRAVENOUS; SUBCUTANEOUS
Status: DISCONTINUED | OUTPATIENT
Start: 2020-09-15 | End: 2020-09-16 | Stop reason: HOSPADM

## 2020-09-15 RX ORDER — ACETAMINOPHEN 650 MG/1
650 SUPPOSITORY RECTAL EVERY 4 HOURS PRN
Status: DISCONTINUED | OUTPATIENT
Start: 2020-09-15 | End: 2020-09-16 | Stop reason: HOSPADM

## 2020-09-15 RX ORDER — MAGNESIUM SULFATE HEPTAHYDRATE 40 MG/ML
2 INJECTION, SOLUTION INTRAVENOUS
Status: DISCONTINUED | OUTPATIENT
Start: 2020-09-15 | End: 2020-09-16 | Stop reason: HOSPADM

## 2020-09-15 RX ORDER — CLOPIDOGREL BISULFATE 75 MG/1
75 TABLET ORAL DAILY
Status: DISCONTINUED | OUTPATIENT
Start: 2020-09-16 | End: 2020-09-16 | Stop reason: HOSPADM

## 2020-09-15 RX ORDER — ACETAMINOPHEN 325 MG/1
650 TABLET ORAL EVERY 4 HOURS PRN
Status: DISCONTINUED | OUTPATIENT
Start: 2020-09-15 | End: 2020-09-16 | Stop reason: HOSPADM

## 2020-09-15 RX ORDER — METOPROLOL SUCCINATE 50 MG/1
50 TABLET, EXTENDED RELEASE ORAL DAILY
Qty: 90 TABLET | Refills: 3 | Status: SHIPPED | OUTPATIENT
Start: 2020-09-15 | End: 2020-09-26

## 2020-09-15 RX ORDER — FUROSEMIDE 10 MG/ML
40 INJECTION INTRAMUSCULAR; INTRAVENOUS ONCE
Status: COMPLETED | OUTPATIENT
Start: 2020-09-15 | End: 2020-09-15

## 2020-09-15 RX ORDER — AMLODIPINE BESYLATE 5 MG/1
TABLET ORAL
Qty: 90 TABLET | Refills: 1 | OUTPATIENT
Start: 2020-09-15

## 2020-09-15 RX ORDER — ONDANSETRON 4 MG/1
4 TABLET, ORALLY DISINTEGRATING ORAL EVERY 6 HOURS PRN
Status: DISCONTINUED | OUTPATIENT
Start: 2020-09-15 | End: 2020-09-16 | Stop reason: HOSPADM

## 2020-09-15 RX ORDER — CITALOPRAM HYDROBROMIDE 20 MG/1
20 TABLET ORAL EVERY MORNING
Status: DISCONTINUED | OUTPATIENT
Start: 2020-09-16 | End: 2020-09-16 | Stop reason: HOSPADM

## 2020-09-15 RX ORDER — POLYETHYLENE GLYCOL 3350 17 G/17G
17 POWDER, FOR SOLUTION ORAL DAILY PRN
Status: DISCONTINUED | OUTPATIENT
Start: 2020-09-15 | End: 2020-09-16 | Stop reason: HOSPADM

## 2020-09-15 RX ADMIN — FUROSEMIDE 40 MG: 40 INJECTION, SOLUTION INTRAMUSCULAR; INTRAVENOUS at 14:51

## 2020-09-15 RX ADMIN — DILTIAZEM HYDROCHLORIDE 5 MG/HR: 5 INJECTION INTRAVENOUS at 14:54

## 2020-09-15 RX ADMIN — SODIUM CHLORIDE 500 ML: 9 INJECTION, SOLUTION INTRAVENOUS at 12:50

## 2020-09-15 RX ADMIN — DILTIAZEM HYDROCHLORIDE 20 MG: 5 INJECTION INTRAVENOUS at 14:09

## 2020-09-15 RX ADMIN — DILTIAZEM HYDROCHLORIDE 5 MG/HR: 5 INJECTION INTRAVENOUS at 20:49

## 2020-09-15 RX ADMIN — APIXABAN 5 MG: 5 TABLET, FILM COATED ORAL at 20:41

## 2020-09-15 ASSESSMENT — ENCOUNTER SYMPTOMS
SPEECH DIFFICULTY: 0
WEAKNESS: 0
NUMBNESS: 0
CONFUSION: 0
DIAPHORESIS: 1
LIGHT-HEADEDNESS: 1
PALPITATIONS: 1
FEVER: 0

## 2020-09-15 ASSESSMENT — ACTIVITIES OF DAILY LIVING (ADL): FALL_HISTORY_WITHIN_LAST_SIX_MONTHS: NO

## 2020-09-15 NOTE — PROGRESS NOTES
"Review Of Systems  Skin: negative  Eyes: negative  Ears/Nose/Throat: negative  Respiratory: Shortness of breath-   Cardiovascular: palpitations and tachycardia  Gastrointestinal: negative  Genitourinary: negative  Musculoskeletal: negative  Neurologic: negative  Psychiatric: sleep disturbance and anxiety  Hematologic/Lymphatic/Immunologic: negative  Endocrine: negative    Vitals - Patient Reported  Systolic (Patient Reported): (!) 145  Diastolic (Patient Reported): 62  Weight (Patient Reported): 113.4 kg (250 lb)  Height (Patient Reported): 162.6 cm (5' 4\")  BMI (Based on Pt Reported Ht/Wt): 42.91  Lyubov Sanchez is a 50 year old female who is being evaluated via a billable video visit.      The patient has been notified of following:     \"This video visit will be conducted via a call between you and your physician/provider. We have found that certain health care needs can be provided without the need for an in-person physical exam.  This service lets us provide the care you need with a video conversation.  If a prescription is necessary we can send it directly to your pharmacy.  If lab work is needed we can place an order for that and you can then stop by our lab to have the test done at a later time.    Video visits are billed at different rates depending on your insurance coverage.  Please reach out to your insurance provider with any questions.    If during the course of the call the physician/provider feels a video visit is not appropriate, you will not be charged for this service.\"    Patient has given verbal consent for Video visit? Yes  How would you like to obtain your AVS? MyChart  If you are dropped from the video visit, the video invite should be resent to: Text to cell phone: [phone]  Will anyone else be joining your video visit? No    Reviewed:  ALEXIS Jensen  09/15/20    Video-Visit Details    History of Present Illness:    Lyubov Sanchez is a 50 year old female followed here by Dr. Hodges who " "recently underwent percutaneous closure of an ASD. She awakened during the night after a nightmare and noted her heart rate racing and pounding associated with SOB and she feels \"off\". I had her go to our office and EKG confirms atrial fibrillation with RVR to 162 BPM.     Upon clearance for a hysterectomy earlier in the year she was found to have a secundum ASD measuring 7.7 cm. She went on for cardiac MRI showing a mildly dilated right ventricle with normal right ventricular function. Qp/Qs ratio was 1.27. Main pulmonary is dilated to 3.8cm. Moderate ELVA is present. CONCEPCION noted moderate left to right shunt. RVSP on transthoracic echo was 35.8 plus RAP but on CONCEPCION 52 plus RAP.        Other medical history includes obesity, dyslipidemia, obstructive sleep apnea, and hypertension. Aortic root appeared to measure 3.5cm on CONCEPCION; ascending aorta on chest CT in 2018 was 4.1cm. She has lower extremity edema likely multifactorial with venous changes, bilateral knee replacements and amlodipine,     She has a ventral hernia which developed after her hysterectomy in January and she hopes to have this surgically repaired in January.     LIpids: total cholesterol 182 HDL 41  1-  Sodium 139 potassium 4.2 BUN 16 Creatinine 0.64 1-     On August 26 of 2020 she underwent successful percutaneous closure with a number 30 mm cardio form septal occluder device.  She had mild pulmonary hypertension with a PA pressure 43/23 mean of 30 right atrial pressure was 10.  She did well postprocedure however her chest x-ray showed pulmonary vascular congestion.  She was given a dose of IV Lasix.     Since her closure she notes that her breathlessness she used to feel when she would try to lie flat has resolved.  She notes a significant improvement in her leg edema.  Her right groin is healed well with no hum or bruit.  Her limited echo on her post op follow up showed the device is well-seated with no effusion the right heart " appears to be normal in size and function.     She states she has sob with exertion for most of her life but felt this was just her normal. She is adopted and has no access to her family history. She has two daughters who I suggested should be screened for congenital heart disease.       Edema markedly improved  She notes more easy bruising but no hematuria, epistaxis or other signs of bleeding.    General Appearance:    no distress, normal body habitus, upright.    ENT/Mouth:    membranes moist, no nasal discharge or bleeding gums. Normal head shape, no evidence of injury or laceration.    EYES:    no scleral icterus, normal conjunctivae    Neck:    no evidence of thyromegaly. Supple    Chest/Lungs:    No audible wheezing equal chest wall expansion. Non labored breathing. No cough.    Cardiovascular:    No evidence of elevated jugular venous pressure. No evidence of pitting edema bilaterally  tachycardic    Abdomen:    no evidence of abdominal distention. No observed jaundice.    Extremities:    no cyanosis or clubbing noted.    Skin:    no xanthelasma, normal skin collar. No evidence of facial lacerations.    Neurologic:    Normal arm motion bilateral, no tremors. No evidence of focal defect.    Psychiatric:    alert and oriented x3, mildly anxious        Impression/Plan:      1. Secundum ASD  S/p successful closure with #30mm Cardioform device  Mild pulmonary hypertension (RA 10; PA 43/23 mean 30; LA 10)  CXR-vascular congestion post procedure which was treated with 1 dose of IV Lasix  Improved edema  Improved breathing  She will continue aspirin and Plavix and have a follow-up echo bubble study in November and a visit back with Dr. Hodges   At that point I would ask him to decide if her Plavix can be discontinued and if she is cleared to proceed with her ventral hernia repair.  She also has an umbilical hernia which they will repair at the same time   We reviewed that she can now lift up to 30 pounds for the  next couple of weeks.  No aggressive aerobic activity for 4 weeks no scuba diving, skydiving contact sports for 3 months.     2. Symptomatic, Paroxysmal atrial fibrillation with RVR-NEW post recent percutaneous closure of secundum ASD--higher risk population for this rhythm  -Likely due to post procedure septal swelling  Normal atrial size on recent pre procedure CONCEPCION  Discussed with Dr. Hodges who recommends:    -Stop ASA 81mg daily; continue Plaivix  -Start Eliquis 5mg twice daily-rx sent and she will take one dose prior to ED admission  -Stop amlodipine for now  -Start Metoprolol XL 50mg daily today-she will take one dose en route to ED  -due to symptomatic afib with sob, proceed to [facility] ED for CONCEPCION/DCCV today  Risks reviewed  Will have my nurse call ED            3. Mildly dilated ascending aorta  -Continue good blood pressure control  -This will be reassessed intermittently on her follow-up echocardiograms     4. Dyslipidemia    Lifestyle modifications  Recheck and PMD     5. KRISTYN  Wears CPAP     6. Venous disease      7. HTN  controlled     8. Ventral hernia  She would like to consider surgery in January if possible    I will see her back on Friday at our [facility] Office with an EKG. If BP elevates off of amlodipine, on Metoprolol and Lisinopril/Hydrochlorothiazide, I would add back amlodipine at 2.5mg daily.    Greater than 50% of this 30 minute visit was spent in counseling and collaboration.    CURRENT MEDICATIONS:  Current Outpatient Medications   Medication Sig Dispense Refill     apixaban ANTICOAGULANT (ELIQUIS) 5 MG tablet Take 1 tablet (5 mg) by mouth 2 times daily 60 tablet 11     citalopram (CELEXA) 20 MG tablet TAKE 1 TABLET BY MOUTH EVERY DAY (Patient taking differently: Take 20 mg by mouth every morning ) 90 tablet 0     clopidogrel (PLAVIX) 75 MG tablet Take 75 mg by mouth daily       fluticasone (FLONASE) 50 MCG/ACT nasal spray Spray 1 spray into both nostrils daily as needed for rhinitis or  allergies       lisinopril-hydrochlorothiazide (ZESTORETIC) 20-12.5 MG tablet Take 1/2 tab once daily/ (Patient taking differently: Take 0.5 tablets by mouth daily Take 1/2 tab once daily/) 45 tablet 0     metoprolol succinate ER (TOPROL-XL) 50 MG 24 hr tablet Take 1 tablet (50 mg) by mouth daily 90 tablet 3       ALLERGIES     Allergies   Allergen Reactions     Penicillins Hives     hives       PAST MEDICAL HISTORY:  Past Medical History:   Diagnosis Date     Anxiety and depression     Pt reports is on Rx Celexa.     Aortic aneurysm (H)     Pt reports its small and is currently being monitored.     Arthritis      Depressive disorder      DYSPLASIA OF CERVIX 3/6/2003     Dysplasia of cervix (uteri) 2003    Rx cryotherapy Nov 03, and 2005     Hypertension     NO cardiology     Incomplete right bundle branch block 11/10/2017     Migraine      Other chronic pain     Knee pain for 8 years     Plantar fasciitis     bilat     Unspecified sleep apnea     CPAP will bring on the day of surgery.     Ventral hernia without obstruction or gangrene 4/15/2020           It is been a pleasure seeing her in follow-up today I am delighted that she is doing so well.  I have asked her to contact me with any questions.     Eve Orantes, MSN, APRN-BC, CNP  Cardiology       Type of service:  Video Visit    Video Start Time: 10:10  Video End Time: 1040am    Originating Location (pt. Location): Home    Distant Location (provider location):  [facility]     Platform used for Video Visit: Doxlillie Orantes, APRN CNP

## 2020-09-15 NOTE — TELEPHONE ENCOUNTER
She could do either--just going to clinic is difficult as if she is in afib, I do not have availability to see her unless someone else would    She lives in [place] so if someone (MD OR NP) can see her and to EKG at [facility], do that otherwise ED I think    If we do EKG and we see nothing, then we can arrange an event recoder

## 2020-09-15 NOTE — PROGRESS NOTES
Per GEORGINA Lemus's request, called [facility] ED and informed ED staff that pt has been advised by cardiology to go to ED today for CONCEPCION/DCCV as she was found to be in a-fib today post-PFO closure on 8/26/20.     Also called pt's [facility] pharmacy in [place] and requested they fill Eliquis and Toprol Rx's ASAP, as pt will need to start today. Also informed pharmacy that pt has been advised to stop amlodipine and aspirin but will remain on Plavix and requested they update pt's active med list in their system accordingly.     Also added pt to GEORGINA Lemus's clinic schedule in [place] on 9/18/20 at 1:50pm with EKG in appt note, as requested by GEORGINA Lemus RN 10:50 PM 9/15/2020

## 2020-09-15 NOTE — TELEPHONE ENCOUNTER
Routing refill request to provider for review/approval because:  Drug not active on patient's medication list, discontinued (for now) on 9/15/2020    Randell Ventura RN

## 2020-09-15 NOTE — PROGRESS NOTES
Reviewed with GEORGINA Lemus to have pt get EKG at [facility] in [place] this AM and then have a video visit with GEORGINA Lemus at 10:10am to review EKG results and discuss plan.    Called and reviewed above with pt who agrees to go to [place] [facility] for EKG and then have a video visit with GEORGINA Lemus at 10:10am.     SHELIA Zuniga 9:24 AM 9/15/2020

## 2020-09-15 NOTE — ED NOTES
[facility]  ED Nurse Handoff Report    ED Chief complaint: Palpitations      ED Diagnosis:   Final diagnoses:   None       Code Status: To be addressed by admitting MD.     Allergies:   Allergies   Allergen Reactions     Penicillins Hives     hives       Patient Story: Pt presents to ED for evaluation of palpations. Pt is post op 3wks following a Secundum ASD. Pt woke up in the middle of the night diaphoretic and light headed. Pt has known aortic aneurysm.   Focused Assessment:  Pt in AFIB RVR with rate in the 130s. Pt reports mild SOB, lightheadedness, and sweating from palpations. Plan for CONCEPCION/ cardioversion inpatient. Pt is alert and oriented x4. Cards to put in rate control.     Treatments and/or interventions provided: Cards consult   Patient's response to treatments and/or interventions:     To be done/followed up on inpatient unit:  CONCEPCION/DCCV    Does this patient have any cognitive concerns?: NA    Activity level - Baseline/Home:  Independent  Activity Level - Current:   Stand with Assist    Patient's Preferred language: English   Needed?: No    Isolation: None  Infection: Not Applicable  Bariatric?: No    Vital Signs:   Vitals:    09/15/20 1228 09/15/20 1240 09/15/20 1250   BP: 112/69 117/70    Pulse: 151 154 129   Resp: 18 15 12   Temp: 98.4  F (36.9  C)     TempSrc: Oral     SpO2: 95% 96% 95%       Cardiac Rhythm:Cardiac Rhythm: Atrial fibrillation(AFIB RVR )    Was the PSS-3 completed:   Yes  What interventions are required if any?               Family Comments:  at bedside in ED.   OBS brochure/video discussed/provided to patient/family: N/A              Name of person given brochure if not patient: NA              Relationship to patient: NA    For the majority of the shift this patient's behavior was Green.   Behavioral interventions performed were frequent rounding.    ED NURSE PHONE NUMBER: [id number]

## 2020-09-15 NOTE — PROGRESS NOTES
"Received VM from pt who reports that she had a nightmare during the night and woke up sweating with her heart beat feeling \"off\". Ever since waking up during the night if feels like her heart is beating harder than usual and it also feels like a \"fluttering\" sensation.    Routed to GEORGINA Lemus for review.    SHELIA Zuniga 8:30 AM 9/15/2020    "

## 2020-09-15 NOTE — H&P
[facility]    History and Physical  Hospitalist       Date of Admission:  9/15/2020    Assessment & Plan   Lyubov Sanchez is a 50 year old female with PMH of HTN, HLD, recent PFO closure in 8/2020, anxiety, depression, pulmonary hypertension, who presents with new onset atrial fibrillation with RVR.    New onset atrial fibrillation with RVR: Patient reports onset of palpitations, lightheadedness with ambulation, DAVE, shortness of breath starting last night. Diagnosed with new onset atrial fibrillation this morning in Cardiology clinic, took metoprolol 50mg and eliquis 5mg prior to ED. Echo 9/10/2020 with normal EF, normal left atrial size. In ED, initial , SBP 90s-110s, this quickly improved with initiation of diltiazem drip. Cardiology planning CONCEPCION and cardioversion, however no OR time today so plan this for tomorrow.  - Appreciate Cardiology consult  - Continue metoprolol 50mg XL daily  - Continue Eliquis 5mg BID and Plavix 75mg daily  - Continue cardizem drip goal HR < 110  - Trend troponin x2  - CXR  - Telemetry  - Cardiac diet today, NPO overnight for possible CONCEPCION tomorrow  - Aspirin stopped    Secundum ASD s/p closure 8/26    HTN, HLD: SBPs soft 90s-110s. Hold hydrochlorothiazide and lisinopril pending afib management    KRISTYN: CPAP    Anxiety, depression: Continue PTA citalopram    COVID-19 ASYMPTOMATIC testing: Patient has shortness of breath however this is all acute secondary to her afib. She does not need any COVID precautions and is considered low risk.      DVT Prophylaxis: Eliquis  Code Status: Full Code    Disposition: Expected discharge 1-2 days    Porfirio Duran MD    Primary Care Physician   Yolie Delarosa    Chief Complaint   Palpitations    History is obtained from the patient  Vlad  at bedside updated    History of Present Illness   Lyubov Sanchez is a 50 year old female who presents with palpitations. She woke up around 3AM last night thinking that she  had a nightmare. She had lightehadedness, diaphoresis, palpitations, and a sense of a racing heart. When she got up to walk she felt lightheaded. She endorses DAVE and occasionally even has shortness of breath at rest. She denies any nausea, vomiting, headaches, or chest pain. She feels weak this morning. She was told to come to the ED after being diagnosed with afib by her cardiologist. She denies any constipation or diarrhea. No cough or fevers.    Past Medical History    I have reviewed this patient's medical history and updated it with pertinent information if needed.   Past Medical History:   Diagnosis Date     Anxiety and depression     Pt reports is on Rx Celexa.     Aortic aneurysm (H)     Pt reports its small and is currently being monitored.     Arthritis      Depressive disorder      DYSPLASIA OF CERVIX 3/6/2003     Dysplasia of cervix (uteri)     Rx cryotherapy , and      Hypertension     NO cardiology     Incomplete right bundle branch block 11/10/2017     Migraine      Other chronic pain     Knee pain for 8 years     Plantar fasciitis     bilat     Unspecified sleep apnea     CPAP will bring on the day of surgery.     Ventral hernia without obstruction or gangrene 4/15/2020       Past Surgical History   I have reviewed this patient's surgical history and updated it with pertinent information if needed.  Past Surgical History:   Procedure Laterality Date     ARTHROPLASTY KNEE Right 2017    Procedure: ARTHROPLASTY KNEE;  Right total knee arthroplasty using the Arthrex iBalance total knee system;  Surgeon: Hebert Lee MD;  Location: [facility] OR     ARTHROPLASTY KNEE Left 3/19/2018    Procedure: ARTHROPLASTY KNEE;  Left total knee arthroplasty using an Arthrex iBalance total knee system;  Surgeon: Hebert Lee MD;  Location: [facility] OR     C  DELIVERY ONLY  ,    , Low Cervical     CV ASD CLOSURE N/A 2020    Procedure: ASD Closure;  Surgeon:  Freddie Frias MD;  Location:  HEART CARDIAC CATH LAB     CV RIGHT HEART CATH N/A 8/26/2020    Procedure: Right Heart Cath;  Surgeon: Freddie Frias MD;  Location:  HEART CARDIAC CATH LAB     DAVINCI HYSTERECTOMY TOTAL, BILATERAL SALPINGO-OOPHORECTOMY, COMBINED Bilateral 1/31/2020    Procedure: DaVinci robotic-assisted total laparoscopic hysterectomy, bilateral salpingectomy;  Surgeon: Venancio Bartlett MD;  Location: [facility] OR - Path all benign     DAVINCI HYSTERECTOMY TOTAL, SALPINGECTOMY BILATERAL Bilateral 01/31/2020    Fibroid uterus, Menometrorrhagia - Robotic TLH, Bilateral salpingectomy - Path all benign     ENT SURGERY  1976     HC COLP VAGINA W CERVIX IF PRES W BIOPSY  2005    [place] for ASCUS     TONSILLECTOMY & ADENOIDECTOMY         Prior to Admission Medications   Prior to Admission Medications   Prescriptions Last Dose Informant Patient Reported? Taking?   apixaban ANTICOAGULANT (ELIQUIS) 5 MG tablet 9/15/2020 at Unknown time  No Yes   Sig: Take 1 tablet (5 mg) by mouth 2 times daily   citalopram (CELEXA) 20 MG tablet 9/14/2020 at am Self No Yes   Sig: TAKE 1 TABLET BY MOUTH EVERY DAY   Patient taking differently: Take 20 mg by mouth every morning    clopidogrel (PLAVIX) 75 MG tablet 9/15/2020 at Unknown time Self Yes Yes   Sig: Take 75 mg by mouth daily   fluticasone (FLONASE) 50 MCG/ACT nasal spray Past Week at Unknown time Self Yes Yes   Sig: Spray 1 spray into both nostrils daily as needed for rhinitis or allergies   lisinopril-hydrochlorothiazide (ZESTORETIC) 20-12.5 MG tablet 9/15/2020 at Unknown time Self No Yes   Sig: Take 1/2 tab once daily/   Patient taking differently: Take 0.5 tablets by mouth daily Take 1/2 tab once daily/   metoprolol succinate ER (TOPROL-XL) 50 MG 24 hr tablet 9/15/2020 at Unknown time  No Yes   Sig: Take 1 tablet (50 mg) by mouth daily      Facility-Administered Medications: None     Allergies   Allergies   Allergen Reactions     Penicillins Hives     hives        Social History   I have reviewed this patient's social history and updated it with pertinent information if needed. Lyubov Sanchez  reports that she quit smoking about 13 years ago. Her smoking use included cigarettes. She has a 2.50 pack-year smoking history. She has never used smokeless tobacco. She reports current alcohol use. She reports that she does not use drugs.    Family History   I have reviewed this patient's family history and updated it with pertinent information if needed.   Family History   Adopted: Yes   Problem Relation Age of Onset     Unknown/Adopted Other         pt is adopted and has no access to health history     Unknown/Adopted Mother      Unknown/Adopted Father      Unknown/Adopted Maternal Grandmother      Unknown/Adopted Maternal Grandfather      Unknown/Adopted Paternal Grandmother      Unknown/Adopted Other        Review of Systems   The 10 point Review of Systems is negative other than noted in the HPI or here.     Physical Exam   Temp: 98.4  F (36.9  C) Temp src: Oral BP: 117/70 Pulse: 129   Resp: 12 SpO2: 95 %      Vital Signs with Ranges  Temp:  [98.4  F (36.9  C)] 98.4  F (36.9  C)  Pulse:  [129-154] 129  Resp:  [12-18] 12  BP: (112-117)/(69-70) 117/70  SpO2:  [95 %-96 %] 95 %  0 lbs 0 oz    Constitutional: Female in NAD  Eyes: PERRL, nonicteric, normal ocular movements  HEENT: Normocephalic, atraumatic, oral mucosa moist  Respiratory: CTAB, no wheezing or crackles  Cardiovascular: Irregularly irregular, normal S1/2, no m/r/g  GI: No organomegaly, normoactive bowel sounds, nontender  Vascular: Nonpitting lower extremity edema  Musculoskeletal: Moves all extremities  Neurologic: A&Ox3  Psychiatric: Appropriate affect and mood    Data   Data reviewed today:  I personally reviewed EKG.  Recent Labs   Lab 09/15/20  1240   WBC 9.3   HGB 15.1   MCV 93      INR 1.02      POTASSIUM 4.4   CHLORIDE 107   CO2 26   BUN 16   CR 0.57   ANIONGAP 5   CARROL 9.2   GLC 92   ALBUMIN  4.1   PROTTOTAL 7.4   BILITOTAL 0.8   ALKPHOS 45   ALT 34   AST 23       Imaging:  No results found for this or any previous visit (from the past 24 hour(s)).

## 2020-09-15 NOTE — LETTER
"9/15/2020    Yolie Delarosa MD  [address]  [facility] [address]    RE: Lyubov Sanchez       Dear Colleague,    I had the pleasure of seeing Lyubov Sanchez in the [facility].    Review Of Systems  Skin: negative  Eyes: negative  Ears/Nose/Throat: negative  Respiratory: Shortness of breath-   Cardiovascular: palpitations and tachycardia  Gastrointestinal: negative  Genitourinary: negative  Musculoskeletal: negative  Neurologic: negative  Psychiatric: sleep disturbance and anxiety  Hematologic/Lymphatic/Immunologic: negative  Endocrine: negative    Vitals - Patient Reported  Systolic (Patient Reported): (!) 145  Diastolic (Patient Reported): 62  Weight (Patient Reported): 113.4 kg (250 lb)  Height (Patient Reported): 162.6 cm (5' 4\")  BMI (Based on Pt Reported Ht/Wt): 42.91  Lyubov Sanchez is a 50 year old female who is being evaluated via a billable video visit.      The patient has been notified of following:     \"This video visit will be conducted via a call between you and your physician/provider. We have found that certain health care needs can be provided without the need for an in-person physical exam.  This service lets us provide the care you need with a video conversation.  If a prescription is necessary we can send it directly to your pharmacy.  If lab work is needed we can place an order for that and you can then stop by our lab to have the test done at a later time.    Video visits are billed at different rates depending on your insurance coverage.  Please reach out to your insurance provider with any questions.    If during the course of the call the physician/provider feels a video visit is not appropriate, you will not be charged for this service.\"    Patient has given verbal consent for Video visit? Yes  How would you like to obtain your AVS? MyChart  If you are dropped from the video visit, the video invite should be resent to: Text to cell phone: " "[phone]  Will anyone else be joining your video visit? No    Reviewed:  ALEXIS Jensen  09/15/20    Video-Visit Details    History of Present Illness:    Lyubov Sanchez is a 50 year old female followed here by Dr. Hodges who recently underwent percutaneous closure of an ASD. She awakened during the night after a nightmare and noted her heart rate racing and pounding associated with SOB and she feels \"off\". I had her go to our office and EKG confirms atrial fibrillation with RVR to 162 BPM.     Upon clearance for a hysterectomy earlier in the year she was found to have a secundum ASD measuring 7.7 cm. She went on for cardiac MRI showing a mildly dilated right ventricle with normal right ventricular function. Qp/Qs ratio was 1.27. Main pulmonary is dilated to 3.8cm. Moderate ELVA is present. CONCEPCION noted moderate left to right shunt. RVSP on transthoracic echo was 35.8 plus RAP but on CONCEPCION 52 plus RAP.        Other medical history includes obesity, dyslipidemia, obstructive sleep apnea, and hypertension. Aortic root appeared to measure 3.5cm on CONCEPCION; ascending aorta on chest CT in 2018 was 4.1cm. She has lower extremity edema likely multifactorial with venous changes, bilateral knee replacements and amlodipine,     She has a ventral hernia which developed after her hysterectomy in January and she hopes to have this surgically repaired in January.     LIpids: total cholesterol 182 HDL 41  1-  Sodium 139 potassium 4.2 BUN 16 Creatinine 0.64 1-     On August 26 of 2020 she underwent successful percutaneous closure with a number 30 mm cardio form septal occluder device.  She had mild pulmonary hypertension with a PA pressure 43/23 mean of 30 right atrial pressure was 10.  She did well postprocedure however her chest x-ray showed pulmonary vascular congestion.  She was given a dose of IV Lasix.     Since her closure she notes that her breathlessness she used to feel when she would try to lie flat has " resolved.  She notes a significant improvement in her leg edema.  Her right groin is healed well with no hum or bruit.  Her limited echo on her post op follow up showed the device is well-seated with no effusion the right heart appears to be normal in size and function.     She states she has sob with exertion for most of her life but felt this was just her normal. She is adopted and has no access to her family history. She has two daughters who I suggested should be screened for congenital heart disease.       Edema markedly improved  She notes more easy bruising but no hematuria, epistaxis or other signs of bleeding.    General Appearance:    no distress, normal body habitus, upright.    ENT/Mouth:    membranes moist, no nasal discharge or bleeding gums. Normal head shape, no evidence of injury or laceration.    EYES:    no scleral icterus, normal conjunctivae    Neck:    no evidence of thyromegaly. Supple    Chest/Lungs:    No audible wheezing equal chest wall expansion. Non labored breathing. No cough.    Cardiovascular:    No evidence of elevated jugular venous pressure. No evidence of pitting edema bilaterally  tachycardic    Abdomen:    no evidence of abdominal distention. No observed jaundice.    Extremities:    no cyanosis or clubbing noted.    Skin:    no xanthelasma, normal skin collar. No evidence of facial lacerations.    Neurologic:    Normal arm motion bilateral, no tremors. No evidence of focal defect.    Psychiatric:    alert and oriented x3, mildly anxious        Impression/Plan:      1. Secundum ASD  S/p successful closure with #30mm Cardioform device  Mild pulmonary hypertension (RA 10; PA 43/23 mean 30; LA 10)  CXR-vascular congestion post procedure which was treated with 1 dose of IV Lasix  Improved edema  Improved breathing  She will continue aspirin and Plavix and have a follow-up echo bubble study in November and a visit back with Dr. Hodges   At that point I would ask him to decide if her  Plavix can be discontinued and if she is cleared to proceed with her ventral hernia repair.  She also has an umbilical hernia which they will repair at the same time   We reviewed that she can now lift up to 30 pounds for the next couple of weeks.  No aggressive aerobic activity for 4 weeks no scuba diving, skydiving contact sports for 3 months.     2. Symptomatic, Paroxysmal atrial fibrillation with RVR-NEW post recent percutaneous closure of secundum ASD--higher risk population for this rhythm  -Likely due to post procedure septal swelling  Normal atrial size on recent pre procedure CONCEPCION  Discussed with Dr. Hodges who recommends:    -Stop ASA 81mg daily; continue Plaivix  -Start Eliquis 5mg twice daily-rx sent and she will take one dose prior to ED admission  -Stop amlodipine for now  -Start Metoprolol XL 50mg daily today-she will take one dose en route to ED  -due to symptomatic afib with sob, proceed to [facility] ED for CONCEPCION/DCCV today  Risks reviewed  Will have my nurse call ED            3. Mildly dilated ascending aorta  -Continue good blood pressure control  -This will be reassessed intermittently on her follow-up echocardiograms     4. Dyslipidemia    Lifestyle modifications  Recheck and PMD     5. KRISTYN  Wears CPAP     6. Venous disease      7. HTN  controlled     8. Ventral hernia  She would like to consider surgery in January if possible    I will see her back on Friday at our [facility] Office with an EKG. If BP elevates off of amlodipine, on Metoprolol and Lisinopril/Hydrochlorothiazide, I would add back amlodipine at 2.5mg daily.    Greater than 50% of this 30 minute visit was spent in counseling and collaboration.    CURRENT MEDICATIONS:  Current Outpatient Medications   Medication Sig Dispense Refill     apixaban ANTICOAGULANT (ELIQUIS) 5 MG tablet Take 1 tablet (5 mg) by mouth 2 times daily 60 tablet 11     citalopram (CELEXA) 20 MG tablet TAKE 1 TABLET BY MOUTH EVERY DAY (Patient taking differently:  Take 20 mg by mouth every morning ) 90 tablet 0     clopidogrel (PLAVIX) 75 MG tablet Take 75 mg by mouth daily       fluticasone (FLONASE) 50 MCG/ACT nasal spray Spray 1 spray into both nostrils daily as needed for rhinitis or allergies       lisinopril-hydrochlorothiazide (ZESTORETIC) 20-12.5 MG tablet Take 1/2 tab once daily/ (Patient taking differently: Take 0.5 tablets by mouth daily Take 1/2 tab once daily/) 45 tablet 0     metoprolol succinate ER (TOPROL-XL) 50 MG 24 hr tablet Take 1 tablet (50 mg) by mouth daily 90 tablet 3       ALLERGIES     Allergies   Allergen Reactions     Penicillins Hives     hives       PAST MEDICAL HISTORY:  Past Medical History:   Diagnosis Date     Anxiety and depression     Pt reports is on Rx Celexa.     Aortic aneurysm (H)     Pt reports its small and is currently being monitored.     Arthritis      Depressive disorder      DYSPLASIA OF CERVIX 3/6/2003     Dysplasia of cervix (uteri) 2003    Rx cryotherapy Nov 03, and 2005     Hypertension     NO cardiology     Incomplete right bundle branch block 11/10/2017     Migraine      Other chronic pain     Knee pain for 8 years     Plantar fasciitis     bilat     Unspecified sleep apnea     CPAP will bring on the day of surgery.     Ventral hernia without obstruction or gangrene 4/15/2020          It is been a pleasure seeing her in follow-up today I am delighted that she is doing so well.  I have asked her to contact me with any questions.     Eve Orantes, MSN, APRN-BC, CNP  Cardiology       Type of service:  Video Visit    Video Start Time: 10:10  Video End Time: 1040am    Originating Location (pt. Location): Home    Distant Location (provider location):  [facility]     Platform used for Video Visit: Doximmarcin      Thank you for allowing me to participate in the care of your patient.    Sincerely,     Eve Orantes, APRN CNP     [facility]

## 2020-09-15 NOTE — PLAN OF CARE
A&O x4. Pt denied pain. VSS, on RA. Up w/ IND. Tele: A monae RVR. Had ASD repair 3 weeks ago. Plan for CONCEPCION cardioversion  tomorrow, NPO at midnight. Continue to monitor.

## 2020-09-15 NOTE — CONSULTS
[facility]    Cardiology Consultation     Date of Admission:  9/15/2020    Assessment & Plan   Lyubov Sanchez is a 50 year old female with history of secundum ASD s/p successful closure with a 30 mm cardioform device 3 weeks ago, admitted for CONCEPCION/DCCV after abruptly developing palpitations found to be due to newly discovered AF/RVR.    Atrial Fibrillation with RVR:   - Primary EP:  None  - Diagnosed:  9/15/2020  - Etiology:  Likely 2/2 ASD occluder  - Modifiable risk factors (Hyperthyroid, KRISTYN, caffeine, alcohol, HTN):  KRISTYN, HTN  - Paroxysmal/persistent/permanent:  Paroxysmal  - KVPNI-8-[id number] = 2 (female, HTN).   HAS-BLED = 1  - Rate control: Diltiazem drip, titrate to heart rate less than 110 bpm.   - Rhythm control:  CONCEPCION/DCCV in AM   - Stroke reduction: Eliquis 5 twice daily started today    The risks and benefits of CONCEPCION and DCCV have been discussed with the patient and  in detail include approximate 1 in 5000 risk of serious complications including rare risk of death, esophageal tear, pharyngeal hematoma, methemoglobinemia, GI bleed, arrhythmia, pauses, death, burns, respiratory failure (secondary to conscious sedation/anesthesia), etc. The patient is willing to proceed with it.    Acute HFpEF:  - 2/2 AF/RVR  - Lasix 40 IV x 1  - AF management as above    Secundum ASD s/p successful closure with a 30 mm cardioform device  - Improved dyspnea (prior to AF/RVR episode)  - Change from ASA/Plavix to Eliquis/Plavix    HTN  - Hold lisinopril-hydrochlorothiazide for now while on diltiazem drip.  Restart once in NSR.      Hebert Billy MD    Code Status    Prior    Reason for Consult   Reason for consult: I was asked by Dr. Davis to evaluate this patient for symptomatic AF/RVR.    Primary Care Physician   Yolie Delarosa    Chief Complaint   Palpitations    History is obtained from the patient    History of Present Illness   Lyubov Sanchez is a 50 year old female with history of secundum  ASD s/p successful closure with a 30 mm cardio form device 3 weeks ago, who was seen on a virtual visit with BARRINGTON Orantes earlier today and complained of acute onset palpitations which began last night.  She woke up and was diaphoretic and lightheaded.  She was instructed to come into the office where an EKG was performed and showed atrial fibrillation with RVR at a heart rate of approximately 160 bpm.  Dr. Marino was consulted and he advised the patient to present to the emergency department for urgent CONCEPCION cardioversion, for which the patient is now being admitted.  Currently in ED she feels slightly better, but does remain somewhat dyspneic.  She also notes some swelling in her legs which was not there previously.  No chest pain.  No prior episodes like this.    Past Medical History   I have reviewed this patient's medical history and updated it with pertinent information if needed.   Past Medical History:   Diagnosis Date     Anxiety and depression     Pt reports is on Rx Celexa.     Aortic aneurysm (H)     Pt reports its small and is currently being monitored.     Arthritis      Depressive disorder      DYSPLASIA OF CERVIX 3/6/2003     Dysplasia of cervix (uteri) 2003    Rx cryotherapy Nov 03, and 2005     Hypertension     NO cardiology     Incomplete right bundle branch block 11/10/2017     Migraine      Other chronic pain     Knee pain for 8 years     Plantar fasciitis     bilat     Unspecified sleep apnea     CPAP will bring on the day of surgery.     Ventral hernia without obstruction or gangrene 4/15/2020       Past Surgical History   I have reviewed this patient's surgical history and updated it with pertinent information if needed.  Past Surgical History:   Procedure Laterality Date     ARTHROPLASTY KNEE Right 11/17/2017    Procedure: ARTHROPLASTY KNEE;  Right total knee arthroplasty using the Arthrex iBalance total knee system;  Surgeon: Hebert Lee MD;  Location:  OR      ARTHROPLASTY KNEE Left 3/19/2018    Procedure: ARTHROPLASTY KNEE;  Left total knee arthroplasty using an Arthrex iBalance total knee system;  Surgeon: Hebert Lee MD;  Location: [facility] OR     C  DELIVERY ONLY  ,    , Low Cervical     CV ASD CLOSURE N/A 2020    Procedure: ASD Closure;  Surgeon: Freddie Frias MD;  Location:  HEART CARDIAC CATH LAB     CV RIGHT HEART CATH N/A 2020    Procedure: Right Heart Cath;  Surgeon: Freddie Frias MD;  Location:  HEART CARDIAC CATH LAB     DAVINCI HYSTERECTOMY TOTAL, BILATERAL SALPINGO-OOPHORECTOMY, COMBINED Bilateral 2020    Procedure: DaVinci robotic-assisted total laparoscopic hysterectomy, bilateral salpingectomy;  Surgeon: Venancio Bartlett MD;  Location: [facility] OR - Path all benign     DAVINCI HYSTERECTOMY TOTAL, SALPINGECTOMY BILATERAL Bilateral 2020    Fibroid uterus, Menometrorrhagia - Robotic TLH, Bilateral salpingectomy - Path all benign     ENT SURGERY        COLP VAGINA W CERVIX IF PRES W BIOPSY      [place] for ASCUS     TONSILLECTOMY & ADENOIDECTOMY         Prior to Admission Medications   Prior to Admission Medications   Prescriptions Last Dose Informant Patient Reported? Taking?   apixaban ANTICOAGULANT (ELIQUIS) 5 MG tablet 9/15/2020 at Unknown time  No Yes   Sig: Take 1 tablet (5 mg) by mouth 2 times daily   citalopram (CELEXA) 20 MG tablet 2020 at am Self No Yes   Sig: TAKE 1 TABLET BY MOUTH EVERY DAY   Patient taking differently: Take 20 mg by mouth every morning    clopidogrel (PLAVIX) 75 MG tablet 9/15/2020 at Unknown time Self Yes Yes   Sig: Take 75 mg by mouth daily   fluticasone (FLONASE) 50 MCG/ACT nasal spray Past Week at Unknown time Self Yes Yes   Sig: Spray 1 spray into both nostrils daily as needed for rhinitis or allergies   lisinopril-hydrochlorothiazide (ZESTORETIC) 20-12.5 MG tablet 9/15/2020 at Unknown time Self No Yes   Sig: Take 1/2 tab once daily/   Patient taking  differently: Take 0.5 tablets by mouth daily Take 1/2 tab once daily/   metoprolol succinate ER (TOPROL-XL) 50 MG 24 hr tablet 9/15/2020 at Unknown time  No Yes   Sig: Take 1 tablet (50 mg) by mouth daily      Facility-Administered Medications: None     Allergies   Allergies   Allergen Reactions     Penicillins Hives     hives       Social History   I have reviewed this patient's social history and updated it with pertinent information if needed. Lyubov Sanchez  reports that she quit smoking about 13 years ago. Her smoking use included cigarettes. She has a 2.50 pack-year smoking history. She has never used smokeless tobacco. She reports current alcohol use. She reports that she does not use drugs.    Family History   I have reviewed this patient's family history and updated it with pertinent information if needed.   Family History   Adopted: Yes   Problem Relation Age of Onset     Unknown/Adopted Other         pt is adopted and has no access to health history     Unknown/Adopted Mother      Unknown/Adopted Father      Unknown/Adopted Maternal Grandmother      Unknown/Adopted Maternal Grandfather      Unknown/Adopted Paternal Grandmother      Unknown/Adopted Other        Review of Systems   A 10 point Review of Systems was completed and was negative other than noted in the HPI and the following:  None    Physical Exam   Temp: 98.4  F (36.9  C) Temp src: Oral BP: 117/70 Pulse: 129   Resp: 12 SpO2: 95 %      Vital Signs with Ranges  Temp:  [98.4  F (36.9  C)] 98.4  F (36.9  C)  Pulse:  [129-154] 129  Resp:  [12-18] 12  BP: (112-117)/(69-70) 117/70  SpO2:  [95 %-96 %] 95 %  0 lbs 0 oz    Constitutional:  Awake, alert, no acute distress  Eyes: EOMI, sclera non-icteric  ENT: Trachea midline  Respiratory:  Normal respiratory effort, CTAB  Cardiovascular: Tachycardic, irregularly irregular, no m/r/g.  JVP < 10 cm H2O.  1+ BLE edema.  Normal carotid upstrokes, no carotid bruits.  GI:  Nondistended, nontender.  Skin: Dry, no  significant rash on exposed skin  Musculoskeletal:  Strength 5/5 in upper and lower extremities  Psychiatric: Appropriate affect      Data   Labs  I have personally reviewed the pertinent cardiac labs.    Most Recent 3 CBC's:  Recent Labs   Lab Test 09/15/20  1240 08/26/20  0908 01/24/20  0902   WBC 9.3 6.9 7.4   HGB 15.1 14.5 14.5   MCV 93 91 92    195 188       Most Recent 3 BMP's:  Recent Labs   Lab Test 09/15/20  1240 08/26/20  0908 01/24/20  0902 07/04/18  2217    139 139 140   POTASSIUM 4.4 4.1 4.2 3.6   CHLORIDE 107 106 105 105   CO2 26 29 30 29   BUN 16  --  16 10   CR 0.57 0.63 0.64 0.64   ANIONGAP 5 4 4 6   CARROL 9.2  --  9.3 8.7   GLC 92  --  85 101*       Most Recent 2 LFT's:  Recent Labs   Lab Test 09/15/20  1240 01/24/20  0902   AST 23 17   ALT 34 29   ALKPHOS 45 44   BILITOTAL 0.8 1.8*       Most Recent 3 INR's:  Recent Labs   Lab Test 09/15/20  1240 08/26/20  0908 06/21/15  1335   INR 1.02 1.02 1.00       Most Recent 3 Troponin's:  Recent Labs   Lab Test 07/04/18  2217 03/05/15  1905   TROPI <0.015  --    TROPONIN  --  0.00       Most Recent Cholesterol Panel:  Recent Labs   Lab Test 01/24/20  0902   CHOL 182   *   HDL 41*   TRIG 119       Most Recent Hemoglobin A1c:  Recent Labs   Lab Test 04/24/19  0813   A1C 5.1       Imaging:  EKG (9/15/2020):   AF/RVR @ 157 bpm, RVH    Echocardiogram (9/10/2020):   Successful Closure by device, secundum atrial septal defect  There is no color Doppler evidence of an atrial shunt.  The right ventricle is normal in structure, function and size.  The pericardium appears normal.  The left ventricle is normal in structure, function and size.  The visual ejection fraction is estimated at 55-60%.  Normal left atrial size.  Right atrial size is normal.    ASD Closure, RHC:    1.  Atrial septal defect status post successful closure with a 30 mm CARDIOFORM septal occluder device.  2.  Mild pulmonary hypertension        Hebert Billy,  MD  Interventional Cardiology  September 15, 2020

## 2020-09-15 NOTE — PHARMACY-ADMISSION MEDICATION HISTORY
Pharmacy Medication History  Admission medication history interview status for the 9/15/2020  admission is complete. See EPIC admission navigator for prior to admission medications     Medication history sources: Patient and Care Everywhere  Medication history source reliability: Good  Adherence assessment: Good    Significant changes made to the medication list:    Per cardiology today:   Stop ASA 81mg daily; continue Plaivix  -Start Eliquis 5mg twice daily-rx sent and she will take one dose prior to ED admission  -Stop amlodipine for now  -Start Metoprolol XL 50mg daily today-she will take one dose en route to ED  She did not take ASA 81mg or Amlodipine today dmw      Additional medication history information:       Medication reconciliation completed by provider prior to medication history? No    Time spent in this activity: 15 min      Prior to Admission medications    Medication Sig Last Dose Taking? Auth Provider   apixaban ANTICOAGULANT (ELIQUIS) 5 MG tablet Take 1 tablet (5 mg) by mouth 2 times daily 9/15/2020 at Unknown time Yes Eve Orantes APRN CNP   citalopram (CELEXA) 20 MG tablet TAKE 1 TABLET BY MOUTH EVERY DAY  Patient taking differently: Take 20 mg by mouth every morning  9/14/2020 at am Yes Yolie Delarosa MD   clopidogrel (PLAVIX) 75 MG tablet Take 75 mg by mouth daily 9/15/2020 at Unknown time Yes Reported, Patient   fluticasone (FLONASE) 50 MCG/ACT nasal spray Spray 1 spray into both nostrils daily as needed for rhinitis or allergies Past Week at Unknown time Yes Reported, Patient   lisinopril-hydrochlorothiazide (ZESTORETIC) 20-12.5 MG tablet Take 1/2 tab once daily/  Patient taking differently: Take 0.5 tablets by mouth daily Take 1/2 tab once daily/ 9/15/2020 at Unknown time Yes Yolie Delarosa MD   metoprolol succinate ER (TOPROL-XL) 50 MG 24 hr tablet Take 1 tablet (50 mg) by mouth daily 9/15/2020 at Unknown time Yes Eve Orantes, BARRINGTON CNP

## 2020-09-15 NOTE — ED TRIAGE NOTES
Pt had surgery to fix hole in heart 3 weeks agio. Pt woke up this am and felt heart racing. Pt was told heart was in afib and was told to come to the ED

## 2020-09-15 NOTE — PATIENT INSTRUCTIONS
-stop aspirin  -continue plavix  -start eliquis 5mg twice daily (first dose today)  -start Metoprolol XL 50mg daily-first dose today  -stop amlodipine  -do not eat   -go to [facility] ED and we will do a CONCEPCION with cardioversion to reset rhythm  -see me back 9-18 at [facility] with ekg 1:50pm for face to face visit  -you need someone to drive you home today as you will get sedation.

## 2020-09-16 ENCOUNTER — ANESTHESIA (OUTPATIENT)
Dept: SURGERY | Facility: CLINIC | Age: 50
End: 2020-09-16
Payer: COMMERCIAL

## 2020-09-16 ENCOUNTER — ANESTHESIA EVENT (OUTPATIENT)
Dept: SURGERY | Facility: CLINIC | Age: 50
End: 2020-09-16
Payer: COMMERCIAL

## 2020-09-16 ENCOUNTER — APPOINTMENT (OUTPATIENT)
Dept: CARDIOLOGY | Facility: CLINIC | Age: 50
End: 2020-09-16
Attending: INTERNAL MEDICINE
Payer: COMMERCIAL

## 2020-09-16 VITALS
RESPIRATION RATE: 18 BRPM | DIASTOLIC BLOOD PRESSURE: 82 MMHG | BODY MASS INDEX: 43.31 KG/M2 | OXYGEN SATURATION: 97 % | WEIGHT: 252.3 LBS | SYSTOLIC BLOOD PRESSURE: 118 MMHG | HEART RATE: 81 BPM | TEMPERATURE: 98.1 F

## 2020-09-16 LAB
ANION GAP SERPL CALCULATED.3IONS-SCNC: 4 MMOL/L (ref 3–14)
BUN SERPL-MCNC: 18 MG/DL (ref 7–30)
CALCIUM SERPL-MCNC: 8.9 MG/DL (ref 8.5–10.1)
CHLORIDE SERPL-SCNC: 106 MMOL/L (ref 94–109)
CO2 SERPL-SCNC: 29 MMOL/L (ref 20–32)
CREAT SERPL-MCNC: 0.65 MG/DL (ref 0.52–1.04)
ERYTHROCYTE [DISTWIDTH] IN BLOOD BY AUTOMATED COUNT: 12.6 % (ref 10–15)
GFR SERPL CREATININE-BSD FRML MDRD: >90 ML/MIN/{1.73_M2}
GLUCOSE SERPL-MCNC: 103 MG/DL (ref 70–99)
HCT VFR BLD AUTO: 46 % (ref 35–47)
HGB BLD-MCNC: 15.6 G/DL (ref 11.7–15.7)
MAGNESIUM SERPL-MCNC: 2 MG/DL (ref 1.6–2.3)
MCH RBC QN AUTO: 31.7 PG (ref 26.5–33)
MCHC RBC AUTO-ENTMCNC: 33.9 G/DL (ref 31.5–36.5)
MCV RBC AUTO: 94 FL (ref 78–100)
PLATELET # BLD AUTO: 187 10E9/L (ref 150–450)
POTASSIUM SERPL-SCNC: 4 MMOL/L (ref 3.4–5.3)
RBC # BLD AUTO: 4.92 10E12/L (ref 3.8–5.2)
SODIUM SERPL-SCNC: 139 MMOL/L (ref 133–144)
TROPONIN I SERPL-MCNC: <0.015 UG/L (ref 0–0.04)
WBC # BLD AUTO: 7.7 10E9/L (ref 4–11)

## 2020-09-16 PROCEDURE — 93312 ECHO TRANSESOPHAGEAL: CPT | Mod: 26 | Performed by: INTERNAL MEDICINE

## 2020-09-16 PROCEDURE — 37000008 ZZH ANESTHESIA TECHNICAL FEE, 1ST 30 MIN

## 2020-09-16 PROCEDURE — 93010 ELECTROCARDIOGRAM REPORT: CPT | Performed by: INTERNAL MEDICINE

## 2020-09-16 PROCEDURE — 99217 ZZC OBSERVATION CARE DISCHARGE: CPT | Performed by: INTERNAL MEDICINE

## 2020-09-16 PROCEDURE — 93320 DOPPLER ECHO COMPLETE: CPT

## 2020-09-16 PROCEDURE — 85027 COMPLETE CBC AUTOMATED: CPT | Performed by: INTERNAL MEDICINE

## 2020-09-16 PROCEDURE — 80048 BASIC METABOLIC PNL TOTAL CA: CPT | Performed by: INTERNAL MEDICINE

## 2020-09-16 PROCEDURE — 36415 COLL VENOUS BLD VENIPUNCTURE: CPT | Performed by: INTERNAL MEDICINE

## 2020-09-16 PROCEDURE — 25000128 H RX IP 250 OP 636: Performed by: NURSE ANESTHETIST, CERTIFIED REGISTERED

## 2020-09-16 PROCEDURE — 93325 DOPPLER ECHO COLOR FLOW MAPG: CPT | Mod: 26 | Performed by: INTERNAL MEDICINE

## 2020-09-16 PROCEDURE — 83735 ASSAY OF MAGNESIUM: CPT | Performed by: INTERNAL MEDICINE

## 2020-09-16 PROCEDURE — 93005 ELECTROCARDIOGRAM TRACING: CPT

## 2020-09-16 PROCEDURE — 92960 CARDIOVERSION ELECTRIC EXT: CPT

## 2020-09-16 PROCEDURE — G0378 HOSPITAL OBSERVATION PER HR: HCPCS

## 2020-09-16 PROCEDURE — 96366 THER/PROPH/DIAG IV INF ADDON: CPT | Mod: 59

## 2020-09-16 PROCEDURE — 25000132 ZZH RX MED GY IP 250 OP 250 PS 637: Performed by: INTERNAL MEDICINE

## 2020-09-16 PROCEDURE — 40000857 ZZH STATISTIC TEE INCLUDES SEDATION

## 2020-09-16 PROCEDURE — 99213 OFFICE O/P EST LOW 20 MIN: CPT | Mod: 25 | Performed by: NURSE PRACTITIONER

## 2020-09-16 PROCEDURE — 25800030 ZZH RX IP 258 OP 636: Performed by: INTERNAL MEDICINE

## 2020-09-16 PROCEDURE — 93320 DOPPLER ECHO COMPLETE: CPT | Mod: 26 | Performed by: INTERNAL MEDICINE

## 2020-09-16 PROCEDURE — 92960 CARDIOVERSION ELECTRIC EXT: CPT | Performed by: INTERNAL MEDICINE

## 2020-09-16 PROCEDURE — 25000128 H RX IP 250 OP 636: Performed by: INTERNAL MEDICINE

## 2020-09-16 PROCEDURE — 40000010 ZZH STATISTIC ANES STAT CODE-CRNA PER MINUTE

## 2020-09-16 PROCEDURE — 25000125 ZZHC RX 250: Performed by: INTERNAL MEDICINE

## 2020-09-16 RX ORDER — GLYCOPYRROLATE 0.2 MG/ML
0.1 INJECTION, SOLUTION INTRAMUSCULAR; INTRAVENOUS ONCE
Status: COMPLETED | OUTPATIENT
Start: 2020-09-16 | End: 2020-09-16

## 2020-09-16 RX ORDER — FLUMAZENIL 0.1 MG/ML
0.2 INJECTION, SOLUTION INTRAVENOUS
Status: DISCONTINUED | OUTPATIENT
Start: 2020-09-16 | End: 2020-09-16

## 2020-09-16 RX ORDER — FENTANYL CITRATE 50 UG/ML
50 INJECTION, SOLUTION INTRAMUSCULAR; INTRAVENOUS ONCE
Status: COMPLETED | OUTPATIENT
Start: 2020-09-16 | End: 2020-09-16

## 2020-09-16 RX ORDER — SODIUM CHLORIDE 9 MG/ML
1000 INJECTION, SOLUTION INTRAVENOUS CONTINUOUS
Status: DISCONTINUED | OUTPATIENT
Start: 2020-09-16 | End: 2020-09-16 | Stop reason: HOSPADM

## 2020-09-16 RX ORDER — LIDOCAINE HYDROCHLORIDE 40 MG/ML
1.5 SOLUTION TOPICAL ONCE
Status: COMPLETED | OUTPATIENT
Start: 2020-09-16 | End: 2020-09-16

## 2020-09-16 RX ORDER — ATROPINE SULFATE 0.1 MG/ML
.5-1 INJECTION INTRAVENOUS
Status: DISCONTINUED | OUTPATIENT
Start: 2020-09-16 | End: 2020-09-16

## 2020-09-16 RX ORDER — MAGNESIUM SULFATE HEPTAHYDRATE 40 MG/ML
2 INJECTION, SOLUTION INTRAVENOUS
Status: DISCONTINUED | OUTPATIENT
Start: 2020-09-16 | End: 2020-09-16

## 2020-09-16 RX ORDER — POTASSIUM CHLORIDE 1500 MG/1
20 TABLET, EXTENDED RELEASE ORAL
Status: DISCONTINUED | OUTPATIENT
Start: 2020-09-16 | End: 2020-09-16

## 2020-09-16 RX ORDER — POTASSIUM CHLORIDE 1500 MG/1
40 TABLET, EXTENDED RELEASE ORAL
Status: DISCONTINUED | OUTPATIENT
Start: 2020-09-16 | End: 2020-09-16

## 2020-09-16 RX ORDER — DEXTROSE MONOHYDRATE 25 G/50ML
9.5 INJECTION, SOLUTION INTRAVENOUS
Status: DISCONTINUED | OUTPATIENT
Start: 2020-09-16 | End: 2020-09-16

## 2020-09-16 RX ORDER — FENTANYL CITRATE 50 UG/ML
25 INJECTION, SOLUTION INTRAMUSCULAR; INTRAVENOUS
Status: DISCONTINUED | OUTPATIENT
Start: 2020-09-16 | End: 2020-09-16

## 2020-09-16 RX ORDER — METOPROLOL TARTRATE 1 MG/ML
2.5-5 INJECTION, SOLUTION INTRAVENOUS
Status: DISCONTINUED | OUTPATIENT
Start: 2020-09-16 | End: 2020-09-16

## 2020-09-16 RX ORDER — PROPOFOL 10 MG/ML
INJECTION, EMULSION INTRAVENOUS PRN
Status: DISCONTINUED | OUTPATIENT
Start: 2020-09-16 | End: 2020-09-16

## 2020-09-16 RX ORDER — LIDOCAINE 50 MG/G
OINTMENT TOPICAL ONCE
Status: COMPLETED | OUTPATIENT
Start: 2020-09-16 | End: 2020-09-16

## 2020-09-16 RX ORDER — NALOXONE HYDROCHLORIDE 0.4 MG/ML
.1-.4 INJECTION, SOLUTION INTRAMUSCULAR; INTRAVENOUS; SUBCUTANEOUS
Status: DISCONTINUED | OUTPATIENT
Start: 2020-09-16 | End: 2020-09-16

## 2020-09-16 RX ADMIN — PROPOFOL 20 MG: 10 INJECTION, EMULSION INTRAVENOUS at 10:12

## 2020-09-16 RX ADMIN — LIDOCAINE HYDROCHLORIDE 1.5 ML: 40 SOLUTION TOPICAL at 09:11

## 2020-09-16 RX ADMIN — METOPROLOL SUCCINATE 50 MG: 50 TABLET, EXTENDED RELEASE ORAL at 12:02

## 2020-09-16 RX ADMIN — MIDAZOLAM HYDROCHLORIDE 1 MG: 1 INJECTION, SOLUTION INTRAMUSCULAR; INTRAVENOUS at 09:44

## 2020-09-16 RX ADMIN — CITALOPRAM HYDROBROMIDE 20 MG: 20 TABLET ORAL at 12:02

## 2020-09-16 RX ADMIN — GLYCOPYRROLATE 0.1 MG: 0.2 INJECTION, SOLUTION INTRAMUSCULAR; INTRAVENOUS at 09:13

## 2020-09-16 RX ADMIN — FENTANYL CITRATE 25 MCG: 50 INJECTION, SOLUTION INTRAMUSCULAR; INTRAVENOUS at 09:45

## 2020-09-16 RX ADMIN — POTASSIUM CHLORIDE 20 MEQ: 1500 TABLET, EXTENDED RELEASE ORAL at 08:12

## 2020-09-16 RX ADMIN — APIXABAN 5 MG: 5 TABLET, FILM COATED ORAL at 08:12

## 2020-09-16 RX ADMIN — MIDAZOLAM HYDROCHLORIDE 2 MG: 1 INJECTION, SOLUTION INTRAMUSCULAR; INTRAVENOUS at 09:40

## 2020-09-16 RX ADMIN — FENTANYL CITRATE 50 MCG: 50 INJECTION, SOLUTION INTRAMUSCULAR; INTRAVENOUS at 09:41

## 2020-09-16 RX ADMIN — CLOPIDOGREL BISULFATE 75 MG: 75 TABLET, FILM COATED ORAL at 12:01

## 2020-09-16 RX ADMIN — PROPOFOL 60 MG: 10 INJECTION, EMULSION INTRAVENOUS at 10:11

## 2020-09-16 RX ADMIN — TOPICAL ANESTHETIC 1 ML: 200 SPRAY DENTAL; PERIODONTAL at 09:36

## 2020-09-16 RX ADMIN — SODIUM CHLORIDE 1000 ML: 9 INJECTION, SOLUTION INTRAVENOUS at 08:56

## 2020-09-16 ASSESSMENT — ENCOUNTER SYMPTOMS: DYSRHYTHMIAS: 1

## 2020-09-16 ASSESSMENT — LIFESTYLE VARIABLES: TOBACCO_USE: 0

## 2020-09-16 ASSESSMENT — COPD QUESTIONNAIRES: COPD: 0

## 2020-09-16 NOTE — DISCHARGE SUMMARY
[facility]  Hospitalist Discharge Summary      Date of Admission:  9/15/2020  Date of Discharge:  9/16/2020  Discharging Provider: Jorje Yusuf MD      Discharge Diagnoses   New onset atrial fibrillation with RVR s/p CONCEPCION/cardioversion  Acute HFpEF secondary to A.fib    S/p recent ASD closure on 8/26  HTN  hyperlipdiemia    Follow-ups Needed After Discharge   Follow-up Appointments     Follow-up and recommended labs and tests       Follow up with cardiology as scheduled.             Unresulted Labs Ordered in the Past 30 Days of this Admission     No orders found for last 31 day(s).          Discharge Disposition   Discharged to home  Condition at discharge: Stable      Hospital Course   Lyubov Sanchez is a 50 year old female with PMH of HTN, HLD, recent PFO closure in 8/2020, anxiety, depression, pulmonary hypertension, who presents with new onset atrial fibrillation with RVR. Hospital course as follows by problem.      New onset atrial fibrillation with RVR s/p successful CONCEPCION/cardioversion 9/16  Patient reports onset of palpitations, lightheadedness with ambulation, DAVE, shortness of breath night prior to admission. Diagnosed with new onset atrial fibrillation in Cardiology clinic, took metoprolol 50mg and eliquis 5mg prior to ED presentation.  Echo 9/10/2020 with normal EF, normal left atrial size. She was initially managed with diltiazem drip for rate control and subsequently had successful CONCEPCION/Cardioversion on 9/16  - Continue metoprolol 50mg XL daily  - Continue Eliquis 5mg BID and Plavix 75mg daily  - patient will f/u with cardiology as scheduled    Acute HFpEF secondary to A.fib  Patient has received Lasxi IV x 1 dose on admission due to dyspnea which was felt to be due HFpEF secondary to a.fib with rvr. Symptom have improved.      Secundum ASD s/p closure 8/26   - continue plavix.  ASA was discontinued in favor of Eliquis as above prior to admission.     HTN, HLD:  -resume PTA  hydrochlorothiazide and lisinopril   KRISTYN: CPAP     Anxiety, depression: Continue PTA citalopram     COVID-19 ASYMPTOMATIC testing: Patient has shortness of breath however this is all acute secondary to her afib. She does not need any COVID precautions and is considered low risk.  COVID-19 PCR negative.        Consultations This Hospital Stay   CARDIOLOGY IP CONSULT    Code Status   Full Code    Time Spent on this Encounter   I, Jorje Yusuf MD, personally saw the patient today and spent 25 minutes discharging this patient.       Jorje Yusuf MD  [facility]  ______________________________________________________________________    Physical Exam   Vital Signs: Temp: 98.1  F (36.7  C) Temp src: Oral BP: 118/82 Pulse: 81   Resp: 18 SpO2: 97 % O2 Device: None (Room air) Oxygen Delivery: 3 LPM  Weight: 252 lbs 4.8 oz  See progress note       Primary Care Physician   Yolie Delarosa    Discharge Orders      Reason for your hospital stay    Admitted for atrial fibrillation with rapid hear rate. You had cardioversion to restore the normal sinus rhythm of the heart. You will continue to follow up with your cardiologist as outpatient.     Follow-up and recommended labs and tests     Follow up with cardiology as scheduled.     Activity    Your activity upon discharge: activity as tolerated     Full Code     Diet    Follow this diet upon discharge:regular diet       Significant Results and Procedures   Most Recent 3 CBC's:  Recent Labs   Lab Test 09/16/20  0534 09/15/20  1240 08/26/20  0908   WBC 7.7 9.3 6.9   HGB 15.6 15.1 14.5   MCV 94 93 91    218 195     Most Recent 3 BMP's:  Recent Labs   Lab Test 09/16/20  0534 09/15/20  1941 09/15/20  1240 08/26/20  0908 01/24/20  0902     --  138 139 139   POTASSIUM 4.0 3.7 4.4 4.1 4.2   CHLORIDE 106  --  107 106 105   CO2 29  --  26 29 30   BUN 18  --  16  --  16   CR 0.65  --  0.57 0.63 0.64   ANIONGAP 4  --  5 4 4   CARROL 8.9  --  9.2   --  9.3   [facility] 103*  --  92  --  85   ,   Results for orders placed or performed during the hospital encounter of 09/15/20   XR Chest Port 1 View    Narrative    CHEST ONE VIEW  9/15/2020 7:59 PM     HISTORY: New onset atrial fibrillation.    COMPARISON: 2020      Impression    IMPRESSION: No acute disease.    ROSA LEMON MD   Transesophageal Echocardiogram    Narrative    [id number]  [id number]  [id number]  [id number]^NAS^PORTILLO^[facility]  Echocardiography Laboratory  [address] [place] [id number]        Name: URMILA MATIAS  MRN: [id number]  : 1970  Study Date: 2020 09:45 AM  Age: 50 yrs  Gender: Female  Patient Location: [facility]  Reason For Study: Afib  Ordering Physician: PORTILLO LOVELL  Referring Physician: Yolie Delarosa  Performed By: Thu Alford     HR: 98  _____________________________________________________________________________  __        Procedure  Complete CONCEPCION Adult. CONCEPCION Probe serial #[id number] was used during the procedure.  The heart rate, respiratory rate and response to care were monitored  throughout the procedure with the assistance of the nurse.  _____________________________________________________________________________  __        Interpretation Summary     No thrombus is detected in the left atrial appendage.  _____________________________________________________________________________  __        CONCEPCION  I determined this patient to be an appropriate candidate for the planned  sedation and procedure and have reassessed the patient immediately prior to  sedation and procedure. Total sedation time: 14 Minutes minutes of continuous  bedside 1:1 monitoring. Versed (3mg) was given intravenously. Fentanyl (75mcg)  was given intravenously. Consent to the procedure was obtained prior to  sedation. There were no complications associated with this procedure. The  transesophageal probe was passed without difficulty.     Left  Ventricle  The left ventricle is normal in size. There is normal left ventricular wall  thickness. Left ventricular systolic function is normal. No regional wall  motion abnormalities noted.     Right Ventricle  Normal right ventricle structure and size.     Atria  The left atrium is borderline dilated. No significant spontaneous echo  contrast present. Right atrial size is normal. A contrast injection (Bubble  Study) was performed that was negative for flow across the interatrial septum.  Tiny left to right shunt seen on color doppler along upper edge of device, of  no hemodynamic consequence. No thrombus is detected in the left atrial  appendage. Preserved mechanical function.     Mitral Valve  The mitral valve leaflets appear normal. There is no evidence of stenosis,  fluttering, or prolapse. There is trace to mild mitral regurgitation.        Tricuspid Valve  Normal tricuspid valve.     Aortic Valve  Normal tricuspid aortic valve.     Pulmonic Valve  The pulmonic valve is normal in structure and function.     Vessels  The aortic root is normal size.     Pericardial/Pleural  The pericardium appears normal.        Rhythm  The rhythm was atrial fibrillation.  _____________________________________________________________________________  __                                Report approved by: Will Carrion 09/16/2020 11:14 AM                    _____________________________________________________________________________  __            Discharge Medications   Current Discharge Medication List      CONTINUE these medications which have NOT CHANGED    Details   apixaban ANTICOAGULANT (ELIQUIS) 5 MG tablet Take 1 tablet (5 mg) by mouth 2 times daily  Qty: 60 tablet, Refills: 11    Associated Diagnoses: Paroxysmal atrial fibrillation (H)      citalopram (CELEXA) 20 MG tablet TAKE 1 TABLET BY MOUTH EVERY DAY  Qty: 90 tablet, Refills: 0    Associated Diagnoses: Major depressive disorder, recurrent episode, mild (H)       clopidogrel (PLAVIX) 75 MG tablet Take 75 mg by mouth daily      fluticasone (FLONASE) 50 MCG/ACT nasal spray Spray 1 spray into both nostrils daily as needed for rhinitis or allergies      lisinopril-hydrochlorothiazide (ZESTORETIC) 20-12.5 MG tablet Take 1/2 tab once daily/  Qty: 45 tablet, Refills: 0    Associated Diagnoses: HTN, goal below 140/90      metoprolol succinate ER (TOPROL-XL) 50 MG 24 hr tablet Take 1 tablet (50 mg) by mouth daily  Qty: 90 tablet, Refills: 3    Associated Diagnoses: Paroxysmal atrial fibrillation (H)           Allergies   Allergies   Allergen Reactions     Penicillins Hives     hives

## 2020-09-16 NOTE — PRE-PROCEDURE
GENERAL PRE-PROCEDURE:   Date/Time:  9/16/2020 9:34 AM    Risks and benefits: Risks, benefits and alternatives were discussed    Consent given by:  Patient  Patient states understanding of procedure being performed: Yes    Patient's understanding of procedure matches consent: Yes    Procedure consent matches procedure scheduled: Yes    Appropriately NPO:  Yes  ASA Class:  Class 2- mild systemic disease, no acute problems, no functional limitations  Mallampati  :  Grade 2- soft palate, base of uvula, tonsillar pillars, and portion of posterior pharyngeal wall visible  Lungs:  Lungs clear with good breath sounds bilaterally  Heart:  Normal heart sounds and rate and a-fib  History & Physical reviewed:  History and physical reviewed and no updates needed  Statement of review:  I have reviewed the lab findings, diagnostic data, medications, and the plan for sedation

## 2020-09-16 NOTE — PROGRESS NOTES
[facility]    Hospitalist Progress Note      Assessment & Plan   Lyubov Sanchez is a 50 year old female with PMH of HTN, HLD, recent PFO closure in 8/2020, anxiety, depression, pulmonary hypertension, who presents with new onset atrial fibrillation with RVR.     New onset atrial fibrillation with RVR s/p CONCEPCION/cardioversion  Patient reports onset of palpitations, lightheadedness with ambulation, DAVE, shortness of breath night prior to admission. Diagnosed with new onset atrial fibrillation this morning in Cardiology clinic, took metoprolol 50mg and eliquis 5mg prior to ED presentation.  Echo 9/10/2020 with normal EF, normal left atrial size  - s/p successful CONCEPCION/Cardioversion on 9/16  - Continue metoprolol 50mg XL daily  - Continue Eliquis 5mg BID and Plavix 75mg daily  - cardiology following     Secundum ASD s/p closure 8/26     HTN, HLD:  -anticipate will be able to resume PTA hydrochlorothiazide and lisinopril at discharge     KRISTYN: CPAP     Anxiety, depression: Continue PTA citalopram     COVID-19 ASYMPTOMATIC testing: Patient has shortness of breath however this is all acute secondary to her afib. She does not need any COVID precautions and is considered low risk.         DVT Prophylaxis: apixaban  Code Status: Full Code    Disposition: Expected discharge possible later today once seen by cardiology    Jorje Yusuf MD  Text Page  (7am to 6pm)    Interval History   Had successful CONCEPCION/cardioversion this morning.   Denies chest pain or shortness of breath.     -Data reviewed today: I reviewed all new labs and imaging results over the last 24 hours. I personally reviewed no images or EKG's today.    Physical Exam   Temp: 97.8  F (36.6  C) Temp src: Oral BP: 118/82 Pulse: 81   Resp: 18 SpO2: 97 % O2 Device: None (Room air) Oxygen Delivery: 3 LPM  Vitals:    09/16/20 0656   Weight: 114.4 kg (252 lb 4.8 oz)     Vital Signs with Ranges  Temp:  [97.5  F (36.4  C)-98.4  F (36.9  C)] 97.8  F  (36.6  C)  Pulse:  [] 81  Resp:  [7-23] 18  BP: ()/(54-97) 118/82  SpO2:  [92 %-99 %] 97 %  I/O last 3 completed shifts:  In: 680 [P.O.:180; IV Piggyback:500]  Out: 925 [Urine:925]    Constitutional: Alert, awake and no apparent distress  Respiratory: Clear to ausculation bilaterally, no wheezing  Cardiovascular: regular rate and rhythm  GI: soft and non-tender  Skin/Integumen: warm and dry      Medications     diltiazem (CARDIZEM) infusion ADULT Stopped (09/16/20 1020)     - MEDICATION INSTRUCTIONS -       - MEDICATION INSTRUCTIONS -       sodium chloride 1,000 mL (09/16/20 0856)       apixaban ANTICOAGULANT  5 mg Oral BID     citalopram  20 mg Oral QAM     clopidogrel  75 mg Oral Daily     metoprolol succinate ER  50 mg Oral Daily     sodium chloride (PF)  3 mL Intravenous Q8H       Data   Recent Labs   Lab 09/16/20  0534 09/15/20  2343 09/15/20  1941 09/15/20  1240   WBC 7.7  --   --  9.3   HGB 15.6  --   --  15.1   MCV 94  --   --  93     --   --  218   INR  --   --  1.05 1.02     --   --  138   POTASSIUM 4.0  --  3.7 4.4   CHLORIDE 106  --   --  107   CO2 29  --   --  26   BUN 18  --   --  16   CR 0.65  --   --  0.57   ANIONGAP 4  --   --  5   CARROL 8.9  --   --  9.2   *  --   --  92   ALBUMIN  --   --   --  4.1   PROTTOTAL  --   --   --  7.4   BILITOTAL  --   --   --  0.8   ALKPHOS  --   --   --  45   ALT  --   --   --  34   AST  --   --   --  23   TROPI  --  <0.015 <0.015  --        Recent Results (from the past 24 hour(s))   XR Chest Port 1 View    Narrative    CHEST ONE VIEW  9/15/2020 7:59 PM     HISTORY: New onset atrial fibrillation.    COMPARISON: August 27, 2020      Impression    IMPRESSION: No acute disease.    ROSA LEMON MD   Transesophageal Echocardiogram    Narrative    [id number]  [id number]  [id number]^NAS^PORTILLO^MARTIN           [facility]  Echocardiography Laboratory  [facility] [place] [address]        Name: URMILA MATIAS  M  MRN: [id number]  : 1970  Study Date: 2020 09:45 AM  Age: 50 yrs  Gender: Female  Patient Location: [facility]  Reason For Study: Afib  Ordering Physician: PORTILLO LOVELL  Referring Physician: Yolie Delarosa  Performed By: Thu Alford     HR: 98  _____________________________________________________________________________  __        Procedure  Complete CONCEPCION Adult. CONCEPCION Probe serial #[id number] was used during the procedure.  The heart rate, respiratory rate and response to care were monitored  throughout the procedure with the assistance of the nurse.  _____________________________________________________________________________  __        Interpretation Summary     No thrombus is detected in the left atrial appendage.  _____________________________________________________________________________  __        CONCEPCION  I determined this patient to be an appropriate candidate for the planned  sedation and procedure and have reassessed the patient immediately prior to  sedation and procedure. Total sedation time: 14 Minutes minutes of continuous  bedside 1:1 monitoring. Versed (3mg) was given intravenously. Fentanyl (75mcg)  was given intravenously. Consent to the procedure was obtained prior to  sedation. There were no complications associated with this procedure. The  transesophageal probe was passed without difficulty.     Left Ventricle  The left ventricle is normal in size. There is normal left ventricular wall  thickness. Left ventricular systolic function is normal. No regional wall  motion abnormalities noted.     Right Ventricle  Normal right ventricle structure and size.     Atria  The left atrium is borderline dilated. No significant spontaneous echo  contrast present. Right atrial size is normal. A contrast injection (Bubble  Study) was performed that was negative for flow across the interatrial septum.  Tiny left to right shunt seen on color doppler along upper edge of device, of  no  hemodynamic consequence. No thrombus is detected in the left atrial  appendage. Preserved mechanical function.     Mitral Valve  The mitral valve leaflets appear normal. There is no evidence of stenosis,  fluttering, or prolapse. There is trace to mild mitral regurgitation.        Tricuspid Valve  Normal tricuspid valve.     Aortic Valve  Normal tricuspid aortic valve.     Pulmonic Valve  The pulmonic valve is normal in structure and function.     Vessels  The aortic root is normal size.     Pericardial/Pleural  The pericardium appears normal.        Rhythm  The rhythm was atrial fibrillation.  _____________________________________________________________________________  __                                Report approved by: Will Carrion 09/16/2020 11:14 AM                    _____________________________________________________________________________  __

## 2020-09-16 NOTE — PROVIDER NOTIFICATION
MD Notification    Person notified:hospitalist    Person Name:Dr. Yusuf    Date/Time:09/16/20  1:23 PM      Interaction:page    Purpose of Notification:Pt cleared for discharge per cardio, keep  metoprolol, no need for dilt order.    Orders Received:    Comments:

## 2020-09-16 NOTE — PROGRESS NOTES
Care Suites Procedure Nursing Note    Patient Information  Name: Lyubov Sanchez  Age: 50 year old    Procedure  Procedure: CONCEPCION with possible cardioversion  Procedure start time: 0940  Procedure complete time: 1018  Concerns/abnormal assessment: none at this time.  If abnormal assessment, provider notified: N/A  Plan/Other: Recover per post procedure orders/protocol.    Trice Ventura RN       9131 A/O. Pt denies difficulty swallowing.  Has sleep apnea and uses CPAP at night. No dentures or loose teeth. NPO since 2200. hrs.  All questions & concerns addressed.  waiting in patient's room 268.    CONCEPCION: Pt tolerated porcedure well. VSS. Total sedation given - 3 mg Versed & 75 mcg Fentanyl.     CDV: Pt tolerated well. CDV x 1 @ 120 joules. See rhythm strips. Total sedation given by anesthesia - 80 mg Propofol.    1115 Pt transferred to room 268 per WC. Detailed report called to Viviana Mckinley RN.  Resp even & unlabored upon transfer. Pt  A/O. Pt to be NPO until 1130. Both pt & nurse informed.

## 2020-09-16 NOTE — ANESTHESIA PREPROCEDURE EVALUATION
Anesthesia Pre-Procedure Evaluation    Patient: Lyubov Sanchez   MRN: [id number] : 1970          Preoperative Diagnosis: Cardiac disease [I51.9]    Procedure(s):  ANESTHESIA, FOR CONCEPCION/CARDIOVERSION    Past Medical History:   Diagnosis Date     Anxiety and depression     Pt reports is on Rx Celexa.     Aortic aneurysm (H)     Pt reports its small and is currently being monitored.     Arthritis      Depressive disorder      DYSPLASIA OF CERVIX 3/6/2003     Dysplasia of cervix (uteri)     Rx cryotherapy , and      Hypertension     NO cardiology     Incomplete right bundle branch block 11/10/2017     Migraine      Other chronic pain     Knee pain for 8 years     Plantar fasciitis     bilat     Unspecified sleep apnea     CPAP will bring on the day of surgery.     Ventral hernia without obstruction or gangrene 4/15/2020     Past Surgical History:   Procedure Laterality Date     ARTHROPLASTY KNEE Right 2017    Procedure: ARTHROPLASTY KNEE;  Right total knee arthroplasty using the Arthrex iBalance total knee system;  Surgeon: Hebert Lee MD;  Location: [facility] OR     ARTHROPLASTY KNEE Left 3/19/2018    Procedure: ARTHROPLASTY KNEE;  Left total knee arthroplasty using an Arthrex iBalance total knee system;  Surgeon: Hebert Lee MD;  Location: [facility] OR     C  DELIVERY ONLY      , Low Cervical     CV ASD CLOSURE N/A 2020    Procedure: ASD Closure;  Surgeon: Freddie Frias MD;  Location:  HEART CARDIAC CATH LAB     CV RIGHT HEART CATH N/A 2020    Procedure: Right Heart Cath;  Surgeon: Freddie Frias MD;  Location:  HEART CARDIAC CATH LAB     DAVINCI HYSTERECTOMY TOTAL, BILATERAL SALPINGO-OOPHORECTOMY, COMBINED Bilateral 2020    Procedure: DaVinci robotic-assisted total laparoscopic hysterectomy, bilateral salpingectomy;  Surgeon: Venancio Bartlett MD;  Location:  OR - Path all benign     DAVINCI HYSTERECTOMY TOTAL, SALPINGECTOMY  BILATERAL Bilateral 01/31/2020    Fibroid uterus, Menometrorrhagia - Robotic TLH, Bilateral salpingectomy - Path all benign     ENT SURGERY  1976     HC COLP VAGINA W CERVIX IF PRES W BIOPSY  2005    [place] for ASCUS     TONSILLECTOMY & ADENOIDECTOMY         Anesthesia Evaluation     . Pt has had prior anesthetic. Type: General    No history of anesthetic complications          ROS/MED HX    ENT/Pulmonary:     (+)sleep apnea, uses CPAP , . .   (-) tobacco use, asthma and COPD   Neurologic:      (-) CVA, TIA and Neuropathy   Cardiovascular: Comment: ASD s/p closure device    (+) Dyslipidemia, hypertension----. : . . . :. dysrhythmias a-fib, .      (-) CAD, irregular heartbeat/palpitations and stent   METS/Exercise Tolerance:     Hematologic:        (-) anemia   Musculoskeletal:         GI/Hepatic:        (-) GERD and liver disease   Renal/Genitourinary:      (-) renal disease   Endo:     (+) Obesity, .   (-) Type I DM, Type II DM and thyroid disease   Psychiatric:     (+) psychiatric history anxiety and depression      Infectious Disease:  - neg infectious disease ROS       Malignancy:         Other:                          Physical Exam  Normal systems: cardiovascular, pulmonary and dental    Airway   Mallampati: III  TM distance: >3 FB  Neck ROM: full    Dental     Cardiovascular       Pulmonary             Lab Results   Component Value Date    WBC 7.7 09/16/2020    HGB 15.6 09/16/2020    HCT 46.0 09/16/2020     09/16/2020    SED 6 10/06/2014     09/16/2020    POTASSIUM 4.0 09/16/2020    CHLORIDE 106 09/16/2020    CO2 29 09/16/2020    BUN 18 09/16/2020    CR 0.65 09/16/2020     (H) 09/16/2020    CARROL 8.9 09/16/2020    MAG 2.0 09/16/2020    ALBUMIN 4.1 09/15/2020    PROTTOTAL 7.4 09/15/2020    ALT 34 09/15/2020    AST 23 09/15/2020    ALKPHOS 45 09/15/2020    BILITOTAL 0.8 09/15/2020    PTT 23 09/15/2020    INR 1.05 09/15/2020    TSH 0.79 02/26/2018    HCG Negative 01/31/2020    HCGS Negative  "06/19/2015       Preop Vitals  BP Readings from Last 3 Encounters:   09/16/20 92/57   09/11/20 115/78   08/27/20 139/84    Pulse Readings from Last 3 Encounters:   09/16/20 76   09/11/20 98   08/27/20 65      Resp Readings from Last 3 Encounters:   09/16/20 13   08/27/20 16   06/18/20 16    SpO2 Readings from Last 3 Encounters:   09/16/20 93%   08/27/20 99%   08/20/20 100%      Temp Readings from Last 1 Encounters:   09/16/20 36.6  C (97.8  F) (Oral)    Ht Readings from Last 1 Encounters:   09/11/20 1.626 m (5' 4\")      Wt Readings from Last 1 Encounters:   09/16/20 114.4 kg (252 lb 4.8 oz)    Estimated body mass index is 43.31 kg/m  as calculated from the following:    Height as of 9/11/20: 1.626 m (5' 4\").    Weight as of this encounter: 114.4 kg (252 lb 4.8 oz).       Anesthesia Plan      History & Physical Review  History and physical reviewed and following examination; no interval change.    ASA Status:  3 .    NPO Status:  > 6 hours    Plan for General with Intravenous induction.            Postoperative Care      Consents  Anesthetic plan, risks, benefits and alternatives discussed with:  Patient..                 Alvin Lawson MD  "

## 2020-09-16 NOTE — ANESTHESIA POSTPROCEDURE EVALUATION
Patient: Lyubov Sanchez    Procedure(s):  ANESTHESIA, FOR CONCEPCION/CARDIOVERSION    Diagnosis:Cardiac disease [I51.9]  Diagnosis Additional Information: No value filed.    Anesthesia Type:  General    Note:  Anesthesia Post Evaluation    Patient location during evaluation: Echo Lab  Patient participation: Able to fully participate in evaluation  Level of consciousness: awake  Pain management: adequate  Airway patency: patent  Cardiovascular status: acceptable  Respiratory status: acceptable and nasal cannula  Hydration status: acceptable  PONV: none             Last vitals:  Vitals:    09/16/20 1021 09/16/20 1025 09/16/20 1030   BP: 103/56 98/58 92/57   Pulse: 74 70 76   Resp: 18 16 13   Temp:      SpO2: 97% 94% 93%         Electronically Signed By: Alvin Lawson MD  September 16, 2020  10:39 AM

## 2020-09-16 NOTE — PROGRESS NOTES
Alert and oriented x4. VSS on RA. Up independently in the room. Denies any pain/sob. Tele AF, CVR/RVR. On diltiazem drip at 5mL/hr. NPO since midnight. Plan for CONCEPCION/cardioversion. Will continue to monitor.

## 2020-09-16 NOTE — OP NOTE
Procedure Date: 2020      CARDIOVERSION NOTE      CONCEPCION showed no intracardiac thrombus.  After anesthesia was administered, single synchronized shock 120 joules restored normal sinus rhythm.  The patient tolerated the procedure well.         OSBALDO WILLARD MD, [facility]             D: 2020   T: 2020   MT: [email or web address]      Name:     URMILA MATIAS   MRN:      [id number]        Account:        [id number]   :      1970           Procedure Date: 2020      Document: [id number]

## 2020-09-16 NOTE — PROGRESS NOTES
[facility]  Cardiology Progress Note  Date of Service: 09/16/2020  Primary Cardiologist: Dr. Hodges    Assessment & Plan    Lyubov Sanchez is a 50 year old female with past medical history significant for ASD s/p closure, hypertension, sleep apnea with CPAP use admitted on 9/15/2020 with palpations.     Assessment:   1. Atrial fibrillation with RVR    Paroxysmal     EKG with Afib RVR    Diltiazem gtt discontinued    [id number] = 2 (female, HTN).   HAS-BLED = 1    CONCEPCION/DCCV with successful conversion to SR    eliquis 5 mg BID and metoprolol for rate control     2. Acute HFpEF secondary to Afib    Echo with LVEF 55-60%    I/O -870 mL    Metoprolol     IV lasix 40 mg given 9/15     3. ASD status post successful closure with 30 mm cardioform    Changed from ASA/Plavix to Eliquis/Plivix given AFibb RVR    4. Hypertension    Controlled, 118/82      Plan:  1. Continue with eliquis for anticoagulation and metoprolol for rate control  2. Continue plavix given recent ASD closure  3. Follow up with GEORGINA Prado as scheduled   4. Cardiology will sign off. Please contact for further questions or concerns       BARRINGTON Marte CNP  Pager:  [phone]   Text Page  (7am - 4pm, M-F)      Interval History   Resting in bed. No complaints     Physical Exam   Temp: 98.1  F (36.7  C) Temp src: Oral BP: 118/82 Pulse: 81   Resp: 18 SpO2: 97 % O2 Device: None (Room air) Oxygen Delivery: 3 LPM  Vitals:    09/16/20 0656   Weight: 114.4 kg (252 lb 4.8 oz)       Constitutional:   NAD   Skin:   Warm and dry   Head:   Nontraumatic   Neck:   supple, symmetrical, trachea midline   Lungs:   symmetric, clear to auscultation   Cardiovascular:   regular rate and rhythm, normal S1 and S2 and no murmur noted   Abdomen:   Benign   Extremities and Back:   No edema   Neurological:   Grossly nonfocal       Medications     diltiazem (CARDIZEM) infusion ADULT Stopped (09/16/20 1020)     - MEDICATION INSTRUCTIONS -       - MEDICATION  INSTRUCTIONS -       sodium chloride 1,000 mL (09/16/20 0856)       apixaban ANTICOAGULANT  5 mg Oral BID     citalopram  20 mg Oral QAM     clopidogrel  75 mg Oral Daily     metoprolol succinate ER  50 mg Oral Daily     sodium chloride (PF)  3 mL Intravenous Q8H       Data     Most Recent 3 CBC's:  Recent Labs   Lab Test 09/16/20  0534 09/15/20  1240 08/26/20  0908   WBC 7.7 9.3 6.9   HGB 15.6 15.1 14.5   MCV 94 93 91    218 195     Most Recent 3 BMP's:  Recent Labs   Lab Test 09/16/20  0534 09/15/20  1941 09/15/20  1240 08/26/20  0908 01/24/20  0902     --  138 139 139   POTASSIUM 4.0 3.7 4.4 4.1 4.2   CHLORIDE 106  --  107 106 105   CO2 29  --  26 29 30   BUN 18  --  16  --  16   CR 0.65  --  0.57 0.63 0.64   ANIONGAP 4  --  5 4 4   CARROL 8.9  --  9.2  --  9.3   *  --  92  --  85

## 2020-09-16 NOTE — PROGRESS NOTES
Patient discharged at 2:53 PM   IV was discontinued. Pain at time of discharge was 0/10. Belongings returned to patient.  Discharge instructions and medications reviewed with patient.  Patient verbalized understanding and all questions were answered.  At time of discharge, patient condition was stable and left the unit on foot escorted by RN and spouse.

## 2020-09-16 NOTE — DISCHARGE INSTRUCTIONS
Cardioversion Discharge Instructions    After you go home:       For 24 hours - due to the sedation you received:      Have an adult stay with you for 24 hours.     Relax and take it easy.    Do NOT make any important or legal decisions.    Do NOT drive or operate machines at home or at work.    Do NOT drink alcohol.    Diet:      Start with clear liquids and progress to your normal diet as you feel able.    Medicines:      Take your medications, including blood thinners, unless your provider tells you not to.    If you have stopped any medications, check with your provider about when to restart them.    Follow Up Appointments:      Follow up with your cardiologist at [facility] of patient preference as instructed.    Follow up with your primary care provider as needed.    Post cardioversion:    The skin on your chest or back may feel tender for 48 hours.  If your skin is tender, you may:      Use a cold pack on the site. Never use ice directly on your skin. Use the cold pack for 20 minutes. Remove it for at least 30 minutes before re-using.    Apply 1% hydrocortisone cream to the skin (sold at drug stores)    Take Advil (Ibuprofen) or Tylenol (Acetaminophen) per your provider's recommendations.      Call your provider if you have:      Weakness, dizziness, lightheadedness, or fainting.    Shortness of breath.    Irregular heartbeat, feelings of your heart fluttering or beating fast, hard or palpitations.     More than minor skin discomfort or redness where the cardioversion pads were placed.    Questions or concerns.      Call 911 if you have:      Pain in your chest, arm, shoulder, neck, or upper back.    You have problems speaking or seeing.    Weakness in your arm or leg.    You are unable to move your arm or leg.    You have uncontrolled bleeding.         [facility] [place]:    [phone] [facility] (7 days a week)

## 2020-09-17 ENCOUNTER — DOCUMENTATION ONLY (OUTPATIENT)
Dept: CARDIOLOGY | Facility: CLINIC | Age: 50
End: 2020-09-17

## 2020-09-17 ENCOUNTER — TELEPHONE (OUTPATIENT)
Dept: FAMILY MEDICINE | Facility: CLINIC | Age: 50
End: 2020-09-17

## 2020-09-17 LAB
INTERPRETATION ECG - MUSE: NORMAL
INTERPRETATION ECG - MUSE: NORMAL

## 2020-09-17 NOTE — TELEPHONE ENCOUNTER
"Hospital/TCU/ED for chronic condition Discharge Protocol    \"Hi, my name is Lexus Oliveros RN, a registered nurse, and I am calling from [facility].  I am calling to follow up and see how things are going for you after your recent emergency visit/hospital/TCU stay.\"    Tell me how you are doing now that you are home?\" Doing well.      Discharge Instructions    \"Let's review your discharge instructions.  What is/are the follow-up recommendations?  Pt. Response:  f/u with PCP if no improvement or worsening.      \"Has an appointment with your primary care provider been scheduled?\"   Patient is following very closely with Cardiology.  Will f/u with PCP if needed.    \"When you see the provider, I would recommend that you bring your medications with you.\"    Medications    \"Tell me what changed about your medicines when you discharged?\"    Changes to chronic meds?    0-1    \"What questions do you have about your medications?\"    None     New diagnoses of heart failure, COPD, diabetes, or MI?    No              Post Discharge Medication Reconciliation Status: discharge medications reconciled and changed, per note/orders.    Was MTM referral placed (*Make sure to put transitions as reason for referral)?   No    Call Summary    \"What questions or concerns do you have about your recent visit and your follow-up care?\"     It was recommended that my daughters have a cardiology evaluation as her heart condition is hereditary.. Advice given: Will give referrals for daughters.    \"If you have questions or things don't continue to improve, we encourage you contact us through the main clinic number (give number).  Even if the clinic is not open, triage nurses are available 24/7 to help you.     We would like you to know that our clinic has extended hours (provide information).  We also have urgent care (provide details on closest location and hours/contact info)\"      \"Thank you for your time and take care!\"    Lexus " Domo RN

## 2020-09-17 NOTE — PROGRESS NOTES
Wellness Screening Tool    Symptom Screening:    Do you have one of the following NEW symptoms:      Fever (subjective or >100.0)?  No    New cough? No    Shortness of breath? No    Chills? No    New loss of taste or smell? No    Generalized body aches? No    New persistent headache? No    New sore throat? No    Nausea, vomiting or diarrhea? No    Within the past 2 weeks, have you been exposed to someone with a known positive illness below?      COVID - 19 (known or suspected) No    Chicken pox?  No    Measles? No    Pertussis? No    Have you had a positive COVID-19 diagnostic test (nasal swab test) in the last 14 days or are you currently   on self-quarantine restrictions (i.e.travel restriction, exposure, etc?) No        Patient notified of visitor restriction: Yes  Patient informed to wear a mask: Yes    Patient's appointment status: Patient will be seen in clinic as scheduled on 9/18/20 in [facility] with GEORGINA Lemus    Also discussed with pt that cannot find her 's FMLA paperwork anywhere and requested she bring the paperwork into clinic with her tomorrow so that it can be completed- she agrees with this plan.     Pt also is requesting to know if she can still plan to have hernia repair surgery in January 2021 or if episode of a-fib will delay this. Discussed with pt that will pass this question on to GEORGINA Lemus who can answer at visit tomorrow- she agrees with this plan.    SHELIA Zuniga 11:51 AM 9/17/2020

## 2020-09-18 ENCOUNTER — DOCUMENTATION ONLY (OUTPATIENT)
Dept: CARDIOLOGY | Facility: CLINIC | Age: 50
End: 2020-09-18

## 2020-09-18 ENCOUNTER — OFFICE VISIT (OUTPATIENT)
Dept: CARDIOLOGY | Facility: CLINIC | Age: 50
End: 2020-09-18
Payer: COMMERCIAL

## 2020-09-18 VITALS
SYSTOLIC BLOOD PRESSURE: 100 MMHG | OXYGEN SATURATION: 95 % | BODY MASS INDEX: 44.05 KG/M2 | HEART RATE: 67 BPM | HEIGHT: 64 IN | WEIGHT: 258 LBS | DIASTOLIC BLOOD PRESSURE: 70 MMHG

## 2020-09-18 DIAGNOSIS — I48.0 PAROXYSMAL ATRIAL FIBRILLATION (H): Primary | ICD-10-CM

## 2020-09-18 DIAGNOSIS — Q21.10 ASD (ATRIAL SEPTAL DEFECT): ICD-10-CM

## 2020-09-18 DIAGNOSIS — I71.21 ANEURYSM, ASCENDING AORTA (H): ICD-10-CM

## 2020-09-18 PROCEDURE — 99214 OFFICE O/P EST MOD 30 MIN: CPT | Mod: 25 | Performed by: NURSE PRACTITIONER

## 2020-09-18 PROCEDURE — 93000 ELECTROCARDIOGRAM COMPLETE: CPT | Performed by: NURSE PRACTITIONER

## 2020-09-18 ASSESSMENT — MIFFLIN-ST. JEOR: SCORE: 1780.53

## 2020-09-18 NOTE — LETTER
"9/18/2020    Yolie Delarosa MD  [address]  [facility] [address]    RE: Lyubov Sanchez       Dear Colleague,    I had the pleasure of seeing Lyubov Sanchez in the [facility].    History of Present Illness:     Lyubov Sanchez is a 50 year old female followed here by Dr. Hodges who recently underwent percutaneous closure of an ASD. She awakened during the night on 9-15 after a nightmare and noted her heart rate racing and pounding associated with SOB and she feels \"off\". I had her go to our office and EKG confirms atrial fibrillation with RVR to 162 BPM. She was sent to the ED after talking with Dr. Hodges, placed on Eliquis and Metoprolol. We suggested CONCEPCION/DCCV but anesthesia was not available so she remained overnight. She underwent CONCEPCION the following day revealing NO GRETCHEN thrombus and was cardioverted.    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 She went on for cardiac MRI showing a mildly dilated right ventricle with normal right ventricular function. Qp/Qs ratio was 1.27. Main pulmonary is dilated to 3.8cm. Moderate ELVA is present. CONCEPCION noted moderate left to right shunt. RVSP on transthoracic echo was 35.8 plus RAP but on CONCEPCION 52 plus RAP.        Other medical history includes obesity, dyslipidemia, obstructive sleep apnea, and hypertension. Aortic root appeared to measure 3.5cm on CONCEPCION; ascending aorta on chest CT in 2018 was 4.1cm. She has lower extremity edema likely multifactorial with venous changes, bilateral knee replacements and amlodipine. When we added Metoprolol, we stopped amlodipine and her edema continues to be well controlled.     She has a ventral hernia which developed after her hysterectomy in January and she hopes to have this surgically repaired in " January.       On August 26 of 2020 she underwent successful percutaneous closure with a number 30 mm cardio form septal occluder device.  She had mild pulmonary hypertension with a PA pressure 43/23 mean of 30 right atrial pressure was 10.  She did well postprocedure however her chest x-ray showed pulmonary vascular congestion.  She was given a dose of IV Lasix.     Since her closure she notes that her breathlessness she used to feel when she would try to lie flat has resolved.  She notes a significant improvement in her leg edema.     However when she reverted out of rhythm, she again felt quite dyspneic with the rapid hear rate. She feel much better now.     LIpids: total cholesterol 182 HDL 41  1-  Labs: magnesium 2.0 sodium 139 potassium 4.0 BUN 18 Creatinine 0.65 Hemoglobin 15.6      She notes more easy bruising but no hematuria, epistaxis or other signs of bleeding.      12 lead ekg today shows sinus rhythm.    Impression/Plan:      1. Secundum ASD  S/p successful closure with #30mm Cardioform device  Mild pulmonary hypertension (RA 10; PA 43/23 mean 30; LA 10)  CXR-vascular congestion post procedure which was treated with 1 dose of IV Lasix post closure and another dose IV when she was admitted with her rapid atrial fibrillation  She will continue aspirin and Plavix and have a follow-up echo bubble study in November and a visit back with Dr. Hodges   At that point I would ask him to decide if her Plavix can be discontinued and if she is cleared to proceed with her ventral hernia repair in January.  She also has an umbilical hernia which they will repair at the same time  We reviewed that she can now lift up to 30 pounds for the next couple of weeks.  No aggressive aerobic activity for 4 weeks no scuba diving, skydiving contact sports for 3 months.     2. Symptomatic, Paroxysmal atrial fibrillation with RVR-NEW post recent percutaneous closure of secundum ASD--higher risk population for this  rhythm  -Likely due to post procedure septal swelling  Normal atrial size on recent pre procedure CONCEPCION  S/p CONCEPCION with successful DCCV  -continue Eliquis for likely for the 3 month post closure course  -continue Metoprolol  -if more arrhythmias with symptoms, go back to ED        3. Mildly dilated ascending aorta  -Continue good blood pressure control  -This will be reassessed intermittently on her follow-up echocardiograms     4. Dyslipidemia    Lifestyle modifications  Recheck and PMD     5. KRISTYN  Wears CPAP     6. Venous disease   Would suggest leaving her off of amlodipine and using metoprolol for her antihypertensive     7. HTN  controlled   Would suggest leaving her off of amloidipine and using metoprolol for her antihypertensives now given tendency towards edema, enlarged aorta and higher lifetime risk of atrial arrhythmias    8. Ventral hernia  She would like to consider surgery in January if possible     FMLA papers were completed today and sent for scanning.  Greater than 50% of this 30 minute visit was spent in counseling/paperwork completion.    I will see her via video visit in 2 weeks    Eve Orantes, MSN, APRN-BC, CNP  Cardiology    Orders Placed This Encounter   Procedures     EKG 12-lead complete w/read - Clinics (performed today)     No orders of the defined types were placed in this encounter.    There are no discontinued medications.      Encounter Diagnosis   Name Primary?     Paroxysmal atrial fibrillation (H)        CURRENT MEDICATIONS:  Current Outpatient Medications   Medication Sig Dispense Refill     apixaban ANTICOAGULANT (ELIQUIS) 5 MG tablet Take 1 tablet (5 mg) by mouth 2 times daily 60 tablet 11     citalopram (CELEXA) 20 MG tablet TAKE 1 TABLET BY MOUTH EVERY DAY (Patient taking differently: Take 20 mg by mouth every morning ) 90 tablet 0     clopidogrel (PLAVIX) 75 MG tablet Take 75 mg by mouth daily       fluticasone (FLONASE) 50 MCG/ACT nasal spray Spray 1 spray into  both nostrils daily as needed for rhinitis or allergies       lisinopril-hydrochlorothiazide (ZESTORETIC) 20-12.5 MG tablet Take 1/2 tab once daily/ (Patient taking differently: Take 0.5 tablets by mouth daily Take 1/2 tab once daily/) 45 tablet 0     metoprolol succinate ER (TOPROL-XL) 50 MG 24 hr tablet Take 1 tablet (50 mg) by mouth daily 90 tablet 3       ALLERGIES     Allergies   Allergen Reactions     Penicillins Hives     hives       PAST MEDICAL HISTORY:  Past Medical History:   Diagnosis Date     Anxiety and depression     Pt reports is on Rx Celexa.     Aortic aneurysm (H)     Pt reports its small and is currently being monitored.     Arthritis      Depressive disorder      DYSPLASIA OF CERVIX 3/6/2003     Dysplasia of cervix (uteri)     Rx cryotherapy , and      Hypertension     NO cardiology     Incomplete right bundle branch block 11/10/2017     Migraine      Other chronic pain     Knee pain for 8 years     Plantar fasciitis     bilat     Unspecified sleep apnea     CPAP will bring on the day of surgery.     Ventral hernia without obstruction or gangrene 4/15/2020       PAST SURGICAL HISTORY:  Past Surgical History:   Procedure Laterality Date     ANESTHESIA CARDIOVERSION N/A 2020    Procedure: ANESTHESIA, FOR CONCEPCION/CARDIOVERSION;  Surgeon: GENERIC ANESTHESIA PROVIDER;  Location:  OR     ARTHROPLASTY KNEE Right 2017    Procedure: ARTHROPLASTY KNEE;  Right total knee arthroplasty using the Arthrex iBalance total knee system;  Surgeon: Hebert Lee MD;  Location:  OR     ARTHROPLASTY KNEE Left 3/19/2018    Procedure: ARTHROPLASTY KNEE;  Left total knee arthroplasty using an Arthrex iBalance total knee system;  Surgeon: Hebert Lee MD;  Location: [facility] OR     C  DELIVERY ONLY      , Low Cervical     CV ASD CLOSURE N/A 2020    Procedure: ASD Closure;  Surgeon: Freddie Frias MD;  Location:  HEART CARDIAC CATH LAB     CV  RIGHT HEART CATH N/A 2020    Procedure: Right Heart Cath;  Surgeon: Freddie Frias MD;  Location:  HEART CARDIAC CATH LAB     DAVINCI HYSTERECTOMY TOTAL, BILATERAL SALPINGO-OOPHORECTOMY, COMBINED Bilateral 2020    Procedure: DaVinci robotic-assisted total laparoscopic hysterectomy, bilateral salpingectomy;  Surgeon: Venancio Bartlett MD;  Location: [facility] OR - Path all benign     DAVINCI HYSTERECTOMY TOTAL, SALPINGECTOMY BILATERAL Bilateral 2020    Fibroid uterus, Menometrorrhagia - Robotic TLH, Bilateral salpingectomy - Path all benign     ENT SURGERY       HC COLP VAGINA W CERVIX IF PRES W BIOPSY      [place] for ASCUS     TONSILLECTOMY & ADENOIDECTOMY         FAMILY HISTORY:  Family History   Adopted: Yes   Problem Relation Age of Onset     Unknown/Adopted Other         pt is adopted and has no access to health history     Unknown/Adopted Mother      Unknown/Adopted Father      Unknown/Adopted Maternal Grandmother      Unknown/Adopted Maternal Grandfather      Unknown/Adopted Paternal Grandmother      Unknown/Adopted Other        SOCIAL HISTORY:  Social History     Socioeconomic History     Marital status:      Spouse name: Saúl     Number of children: 2     Years of education: None     Highest education level: None   Occupational History     Occupation:  at home   Social Needs     Financial resource strain: None     Food insecurity     Worry: None     Inability: None     Transportation needs     Medical: None     Non-medical: None   Tobacco Use     Smoking status: Former Smoker     Packs/day: 0.50     Years: 5.00     Pack years: 2.50     Types: Cigarettes     Last attempt to quit: 2007     Years since quittin.4     Smokeless tobacco: Never Used   Substance and Sexual Activity     Alcohol use: Yes     Comment: 0-1 weekly     Drug use: No     Sexual activity: Yes     Partners: Male     Birth control/protection: Female Surgical, Male Surgical     Comment: Hysterectomy  "2020/ vasectomy    Lifestyle     Physical activity     Days per week: None     Minutes per session: None     Stress: None   Relationships     Social connections     Talks on phone: None     Gets together: None     Attends Anabaptism service: None     Active member of club or organization: None     Attends meetings of clubs or organizations: None     Relationship status: None     Intimate partner violence     Fear of current or ex partner: None     Emotionally abused: None     Physically abused: None     Forced sexual activity: None   Other Topics Concern      Service No     Blood Transfusions No     Caffeine Concern Yes     Comment: 20 oz pepsi     Occupational Exposure Not Asked     Hobby Hazards Not Asked     Sleep Concern Not Asked     Stress Concern Not Asked     Weight Concern Not Asked     Special Diet Yes     Comment: 2-3 dairy per day     Back Care Not Asked     Exercise No     Comment: active with kids, walking puppy     Bike Helmet Not Asked     Seat Belt Yes     Self-Exams No     Parent/sibling w/ CABG, MI or angioplasty before 65F 55M? No     Comment: No family history - Adopted   Social History Narrative     None       Review of Systems:  Skin:  Negative       Eyes:  Negative      ENT:  Negative      Respiratory:  Negative   one SOB episode last week   Cardiovascular:  Negative palpitations;Positive for palpitations better  Gastroenterology: Negative      Genitourinary:  Negative      Musculoskeletal:  Negative      Neurologic:  Negative      Psychiatric:  Negative      Heme/Lymph/Imm:  Positive for allergies    Endocrine:  Negative        Physical Exam:  Vitals: /70 (BP Location: Right arm, Patient Position: Sitting, Cuff Size: Adult Regular)   Pulse 67   Ht 1.626 m (5' 4.02\")   Wt 117 kg (258 lb)   LMP  (LMP Unknown)   SpO2 95%   BMI 44.26 kg/m      Constitutional:  cooperative, alert and oriented, well developed, well nourished, in no acute distress obese      Skin:  warm " and dry to the touch, no apparent skin lesions or masses noted          Head:  normocephalic, no masses or lesions        Eyes:  pupils equal and round, conjunctivae and lids unremarkable, sclera white, no xanthalasma, EOMS intact, no nystagmus        Lymph:      ENT:  no pallor or cyanosis, dentition good        Neck:  carotid pulses are full and equal bilaterally, JVP normal, no carotid bruit        Respiratory:  normal breath sounds, clear to auscultation, normal A-P diameter, normal symmetry, normal respiratory excursion, no use of accessory muscles         Cardiac: regular rhythm   fixed split S2   early systolic murmur;RUSB;grade 1     murmur hardly audible today  not assessed this visit                                   right femoral vein clean without hum or bruit    GI:  abdomen soft, non-tender, BS normoactive, no mass, no HSM, no bruits   ventral hernia    Extremities and Muscular Skeletal:      telangiectasia RLE edema;trace LLE edema;trace      Neurological:  no gross motor deficits        Psych:  Alert and Oriented x 3      Recent Lab Results:  LIPID RESULTS:  Lab Results   Component Value Date    CHOL 182 01/24/2020    HDL 41 (L) 01/24/2020     (H) 01/24/2020    TRIG 119 01/24/2020    CHOLHDLRATIO 4.2 04/25/2007       LIVER ENZYME RESULTS:  Lab Results   Component Value Date    AST 23 09/15/2020    ALT 34 09/15/2020       CBC RESULTS:  Lab Results   Component Value Date    WBC 7.7 09/16/2020    RBC 4.92 09/16/2020    HGB 15.6 09/16/2020    HCT 46.0 09/16/2020    MCV 94 09/16/2020    MCH 31.7 09/16/2020    MCHC 33.9 09/16/2020    RDW 12.6 09/16/2020     09/16/2020       BMP RESULTS:  Lab Results   Component Value Date     09/16/2020    POTASSIUM 4.0 09/16/2020    CHLORIDE 106 09/16/2020    CO2 29 09/16/2020    ANIONGAP 4 09/16/2020     (H) 09/16/2020    BUN 18 09/16/2020    CR 0.65 09/16/2020    GFRESTIMATED >90 09/16/2020    GFRESTBLACK >90 09/16/2020    CARROL 8.9 09/16/2020         A1C RESULTS:  Lab Results   Component Value Date    A1C 5.1 04/24/2019       INR RESULTS:  Lab Results   Component Value Date    INR 1.05 09/15/2020    INR 1.02 09/15/2020       Thank you for allowing me to participate in the care of your patient.    Sincerely,     BARRINGTON Crowell [facility]

## 2020-09-18 NOTE — PATIENT INSTRUCTIONS
No changes today    Stay on same meds    Video visit in 2 weeks.    If you have issues call Sofya [phone]

## 2020-09-18 NOTE — PROGRESS NOTES
"History of Present Illness:     Lyubov Sanchez is a 50 year old female followed here by Dr. Hodges who recently underwent percutaneous closure of an ASD. She awakened during the night on 9-15 after a nightmare and noted her heart rate racing and pounding associated with SOB and she feels \"off\". I had her go to our office and EKG confirms atrial fibrillation with RVR to 162 BPM. She was sent to the ED after talking with Dr. Hodges, placed on Eliquis and Metoprolol. We suggested CONCEPCION/DCCV but anesthesia was not available so she remained overnight. She underwent CONCEPCION the following day revealing NO GRETCHEN thrombus and was cardioverted.    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 She went on for cardiac MRI showing a mildly dilated right ventricle with normal right ventricular function. Qp/Qs ratio was 1.27. Main pulmonary is dilated to 3.8cm. Moderate ELVA is present. CONCEPCION noted moderate left to right shunt. RVSP on transthoracic echo was 35.8 plus RAP but on CONCEPCION 52 plus RAP.        Other medical history includes obesity, dyslipidemia, obstructive sleep apnea, and hypertension. Aortic root appeared to measure 3.5cm on CONCEPCION; ascending aorta on chest CT in 2018 was 4.1cm. She has lower extremity edema likely multifactorial with venous changes, bilateral knee replacements and amlodipine. When we added Metoprolol, we stopped amlodipine and her edema continues to be well controlled.     She has a ventral hernia which developed after her hysterectomy in January and she hopes to have this surgically repaired in January.       On August 26 of 2020 she underwent successful percutaneous closure with a number 30 mm cardio form septal occluder device.  She had mild pulmonary hypertension with a PA pressure 43/23 mean of 30 right atrial pressure was 10. "  She did well postprocedure however her chest x-ray showed pulmonary vascular congestion.  She was given a dose of IV Lasix.     Since her closure she notes that her breathlessness she used to feel when she would try to lie flat has resolved.  She notes a significant improvement in her leg edema.     However when she reverted out of rhythm, she again felt quite dyspneic with the rapid hear rate. She feel much better now.     LIpids: total cholesterol 182 HDL 41  1-  Labs: magnesium 2.0 sodium 139 potassium 4.0 BUN 18 Creatinine 0.65 Hemoglobin 15.6      She notes more easy bruising but no hematuria, epistaxis or other signs of bleeding.      12 lead ekg today shows sinus rhythm.    Impression/Plan:      1. Secundum ASD  S/p successful closure with #30mm Cardioform device  Mild pulmonary hypertension (RA 10; PA 43/23 mean 30; LA 10)  CXR-vascular congestion post procedure which was treated with 1 dose of IV Lasix post closure and another dose IV when she was admitted with her rapid atrial fibrillation  She will continue aspirin and Plavix and have a follow-up echo bubble study in November and a visit back with Dr. Hodges   At that point I would ask him to decide if her Plavix can be discontinued and if she is cleared to proceed with her ventral hernia repair in January.  She also has an umbilical hernia which they will repair at the same time  We reviewed that she can now lift up to 30 pounds for the next couple of weeks.  No aggressive aerobic activity for 4 weeks no scuba diving, skydiving contact sports for 3 months.     2. Symptomatic, Paroxysmal atrial fibrillation with RVR-NEW post recent percutaneous closure of secundum ASD--higher risk population for this rhythm  -Likely due to post procedure septal swelling  Normal atrial size on recent pre procedure CONCEPCION  S/p CONCEPCION with successful DCCV  -continue Eliquis for likely for the 3 month post closure course  -continue Metoprolol  -if more arrhythmias  with symptoms, go back to ED        3. Mildly dilated ascending aorta  -Continue good blood pressure control  -This will be reassessed intermittently on her follow-up echocardiograms     4. Dyslipidemia    Lifestyle modifications  Recheck and PMD     5. KRISTYN  Wears CPAP     6. Venous disease   Would suggest leaving her off of amlodipine and using metoprolol for her antihypertensive     7. HTN  controlled   Would suggest leaving her off of amloidipine and using metoprolol for her antihypertensives now given tendency towards edema, enlarged aorta and higher lifetime risk of atrial arrhythmias    8. Ventral hernia  She would like to consider surgery in January if possible     FMLA papers were completed today and sent for scanning.  Greater than 50% of this 30 minute visit was spent in counseling/paperwork completion.    I will see her via video visit in 2 weeks    Eve Orantes, MSN, APRN-BC, CNP  Cardiology    Orders Placed This Encounter   Procedures     EKG 12-lead complete w/read - Clinics (performed today)     No orders of the defined types were placed in this encounter.    There are no discontinued medications.      Encounter Diagnosis   Name Primary?     Paroxysmal atrial fibrillation (H)        CURRENT MEDICATIONS:  Current Outpatient Medications   Medication Sig Dispense Refill     apixaban ANTICOAGULANT (ELIQUIS) 5 MG tablet Take 1 tablet (5 mg) by mouth 2 times daily 60 tablet 11     citalopram (CELEXA) 20 MG tablet TAKE 1 TABLET BY MOUTH EVERY DAY (Patient taking differently: Take 20 mg by mouth every morning ) 90 tablet 0     clopidogrel (PLAVIX) 75 MG tablet Take 75 mg by mouth daily       fluticasone (FLONASE) 50 MCG/ACT nasal spray Spray 1 spray into both nostrils daily as needed for rhinitis or allergies       lisinopril-hydrochlorothiazide (ZESTORETIC) 20-12.5 MG tablet Take 1/2 tab once daily/ (Patient taking differently: Take 0.5 tablets by mouth daily Take 1/2 tab once daily/) 45  tablet 0     metoprolol succinate ER (TOPROL-XL) 50 MG 24 hr tablet Take 1 tablet (50 mg) by mouth daily 90 tablet 3       ALLERGIES     Allergies   Allergen Reactions     Penicillins Hives     hives       PAST MEDICAL HISTORY:  Past Medical History:   Diagnosis Date     Anxiety and depression     Pt reports is on Rx Celexa.     Aortic aneurysm (H)     Pt reports its small and is currently being monitored.     Arthritis      Depressive disorder      DYSPLASIA OF CERVIX 3/6/2003     Dysplasia of cervix (uteri)     Rx cryotherapy Nov , and      Hypertension     NO cardiology     Incomplete right bundle branch block 11/10/2017     Migraine      Other chronic pain     Knee pain for 8 years     Plantar fasciitis     bilat     Unspecified sleep apnea     CPAP will bring on the day of surgery.     Ventral hernia without obstruction or gangrene 4/15/2020       PAST SURGICAL HISTORY:  Past Surgical History:   Procedure Laterality Date     ANESTHESIA CARDIOVERSION N/A 2020    Procedure: ANESTHESIA, FOR CONCEPCION/CARDIOVERSION;  Surgeon: GENERIC ANESTHESIA PROVIDER;  Location:  OR     ARTHROPLASTY KNEE Right 2017    Procedure: ARTHROPLASTY KNEE;  Right total knee arthroplasty using the Arthrex iBalance total knee system;  Surgeon: Hebert Lee MD;  Location: [facility] OR     ARTHROPLASTY KNEE Left 3/19/2018    Procedure: ARTHROPLASTY KNEE;  Left total knee arthroplasty using an Arthrex iBalance total knee system;  Surgeon: Hebert Lee MD;  Location: [facility] OR     C  DELIVERY ONLY      , Low Cervical     CV ASD CLOSURE N/A 2020    Procedure: ASD Closure;  Surgeon: Freddie Frias MD;  Location:  HEART CARDIAC CATH LAB     CV RIGHT HEART CATH N/A 2020    Procedure: Right Heart Cath;  Surgeon: Freddie Frias MD;  Location:  HEART CARDIAC CATH LAB     DAVINCI HYSTERECTOMY TOTAL, BILATERAL SALPINGO-OOPHORECTOMY, COMBINED Bilateral 2020    Procedure:  DaVinci robotic-assisted total laparoscopic hysterectomy, bilateral salpingectomy;  Surgeon: Venancio Bartlett MD;  Location:  OR - Path all benign     DAVINCI HYSTERECTOMY TOTAL, SALPINGECTOMY BILATERAL Bilateral 2020    Fibroid uterus, Menometrorrhagia - Robotic TLH, Bilateral salpingectomy - Path all benign     ENT SURGERY       HC COLP VAGINA W CERVIX IF PRES W BIOPSY  2005    [facility] for ASCUS     TONSILLECTOMY & ADENOIDECTOMY         FAMILY HISTORY:  Family History   Adopted: Yes   Problem Relation Age of Onset     Unknown/Adopted Other         pt is adopted and has no access to health history     Unknown/Adopted Mother      Unknown/Adopted Father      Unknown/Adopted Maternal Grandmother      Unknown/Adopted Maternal Grandfather      Unknown/Adopted Paternal Grandmother      Unknown/Adopted Other        SOCIAL HISTORY:  Social History     Socioeconomic History     Marital status:      Spouse name: Saúl     Number of children: 2     Years of education: None     Highest education level: None   Occupational History     Occupation:  at home   Social Needs     Financial resource strain: None     Food insecurity     Worry: None     Inability: None     Transportation needs     Medical: None     Non-medical: None   Tobacco Use     Smoking status: Former Smoker     Packs/day: 0.50     Years: 5.00     Pack years: 2.50     Types: Cigarettes     Last attempt to quit: 2007     Years since quittin.4     Smokeless tobacco: Never Used   Substance and Sexual Activity     Alcohol use: Yes     Comment: 0-1 weekly     Drug use: No     Sexual activity: Yes     Partners: Male     Birth control/protection: Female Surgical, Male Surgical     Comment: Hysterectomy / vasectomy    Lifestyle     Physical activity     Days per week: None     Minutes per session: None     Stress: None   Relationships     Social connections     Talks on phone: None     Gets together: None     Attends Taoism  "service: None     Active member of club or organization: None     Attends meetings of clubs or organizations: None     Relationship status: None     Intimate partner violence     Fear of current or ex partner: None     Emotionally abused: None     Physically abused: None     Forced sexual activity: None   Other Topics Concern      Service No     Blood Transfusions No     Caffeine Concern Yes     Comment: 20 oz pepsi     Occupational Exposure Not Asked     Hobby Hazards Not Asked     Sleep Concern Not Asked     Stress Concern Not Asked     Weight Concern Not Asked     Special Diet Yes     Comment: 2-3 dairy per day     Back Care Not Asked     Exercise No     Comment: active with kids, walking puppy     Bike Helmet Not Asked     Seat Belt Yes     Self-Exams No     Parent/sibling w/ CABG, MI or angioplasty before 65F 55M? No     Comment: No family history - Adopted   Social History Narrative     None       Review of Systems:  Skin:  Negative       Eyes:  Negative      ENT:  Negative      Respiratory:  Negative   one SOB episode last week   Cardiovascular:  Negative palpitations;Positive for palpitations better  Gastroenterology: Negative      Genitourinary:  Negative      Musculoskeletal:  Negative      Neurologic:  Negative      Psychiatric:  Negative      Heme/Lymph/Imm:  Positive for allergies    Endocrine:  Negative        Physical Exam:  Vitals: /70 (BP Location: Right arm, Patient Position: Sitting, Cuff Size: Adult Regular)   Pulse 67   Ht 1.626 m (5' 4.02\")   Wt 117 kg (258 lb)   LMP  (LMP Unknown)   SpO2 95%   BMI 44.26 kg/m      Constitutional:  cooperative, alert and oriented, well developed, well nourished, in no acute distress obese      Skin:  warm and dry to the touch, no apparent skin lesions or masses noted          Head:  normocephalic, no masses or lesions        Eyes:  pupils equal and round, conjunctivae and lids unremarkable, sclera white, no xanthalasma, EOMS intact, no " nystagmus        Lymph:      ENT:  no pallor or cyanosis, dentition good        Neck:  carotid pulses are full and equal bilaterally, JVP normal, no carotid bruit        Respiratory:  normal breath sounds, clear to auscultation, normal A-P diameter, normal symmetry, normal respiratory excursion, no use of accessory muscles         Cardiac: regular rhythm   fixed split S2   early systolic murmur;RUSB;grade 1     murmur hardly audible today  not assessed this visit                                   right femoral vein clean without hum or bruit    GI:  abdomen soft, non-tender, BS normoactive, no mass, no HSM, no bruits   ventral hernia    Extremities and Muscular Skeletal:      telangiectasia RLE edema;trace LLE edema;trace      Neurological:  no gross motor deficits        Psych:  Alert and Oriented x 3      Recent Lab Results:  LIPID RESULTS:  Lab Results   Component Value Date    CHOL 182 01/24/2020    HDL 41 (L) 01/24/2020     (H) 01/24/2020    TRIG 119 01/24/2020    CHOLHDLRATIO 4.2 04/25/2007       LIVER ENZYME RESULTS:  Lab Results   Component Value Date    AST 23 09/15/2020    ALT 34 09/15/2020       CBC RESULTS:  Lab Results   Component Value Date    WBC 7.7 09/16/2020    RBC 4.92 09/16/2020    HGB 15.6 09/16/2020    HCT 46.0 09/16/2020    MCV 94 09/16/2020    MCH 31.7 09/16/2020    MCHC 33.9 09/16/2020    RDW 12.6 09/16/2020     09/16/2020       BMP RESULTS:  Lab Results   Component Value Date     09/16/2020    POTASSIUM 4.0 09/16/2020    CHLORIDE 106 09/16/2020    CO2 29 09/16/2020    ANIONGAP 4 09/16/2020     (H) 09/16/2020    BUN 18 09/16/2020    CR 0.65 09/16/2020    GFRESTIMATED >90 09/16/2020    GFRESTBLACK >90 09/16/2020    CARROL 8.9 09/16/2020        A1C RESULTS:  Lab Results   Component Value Date    A1C 5.1 04/24/2019       INR RESULTS:  Lab Results   Component Value Date    INR 1.05 09/15/2020    INR 1.02 09/15/2020           CC  Eve Orantes, APRN CNP  [id number]  [place] [id number]

## 2020-09-22 NOTE — TELEPHONE ENCOUNTER
Pt calling to check status of refill, scheduled for telephone appointment today and will address at that time.     Sachin Caldwell   06/09/17         [Time Spent: ___ minutes] : I have spent [unfilled] minutes of time on the encounter.

## 2020-09-26 ENCOUNTER — HOSPITAL ENCOUNTER (EMERGENCY)
Facility: CLINIC | Age: 50
Discharge: HOME | End: 2020-09-26
Attending: EMERGENCY MEDICINE | Admitting: EMERGENCY MEDICINE
Payer: COMMERCIAL

## 2020-09-26 VITALS
OXYGEN SATURATION: 95 % | TEMPERATURE: 98 F | SYSTOLIC BLOOD PRESSURE: 128 MMHG | HEIGHT: 64 IN | HEART RATE: 72 BPM | BODY MASS INDEX: 44.29 KG/M2 | RESPIRATION RATE: 27 BRPM | DIASTOLIC BLOOD PRESSURE: 72 MMHG

## 2020-09-26 DIAGNOSIS — I48.0 PAROXYSMAL ATRIAL FIBRILLATION (H): ICD-10-CM

## 2020-09-26 LAB
ANION GAP SERPL CALCULATED.3IONS-SCNC: 4 MMOL/L (ref 3–14)
BUN SERPL-MCNC: 22 MG/DL (ref 7–30)
CALCIUM SERPL-MCNC: 9 MG/DL (ref 8.5–10.1)
CHLORIDE SERPL-SCNC: 105 MMOL/L (ref 94–109)
CO2 SERPL-SCNC: 30 MMOL/L (ref 20–32)
CREAT SERPL-MCNC: 0.62 MG/DL (ref 0.52–1.04)
GFR SERPL CREATININE-BSD FRML MDRD: >90 ML/MIN/{1.73_M2}
GLUCOSE SERPL-MCNC: 102 MG/DL (ref 70–99)
HCG SERPL QL: NEGATIVE
POTASSIUM SERPL-SCNC: 3.9 MMOL/L (ref 3.4–5.3)
SODIUM SERPL-SCNC: 139 MMOL/L (ref 133–144)

## 2020-09-26 PROCEDURE — 99284 EMERGENCY DEPT VISIT MOD MDM: CPT

## 2020-09-26 PROCEDURE — 93005 ELECTROCARDIOGRAM TRACING: CPT

## 2020-09-26 PROCEDURE — 80048 BASIC METABOLIC PNL TOTAL CA: CPT | Performed by: EMERGENCY MEDICINE

## 2020-09-26 PROCEDURE — 84703 CHORIONIC GONADOTROPIN ASSAY: CPT | Performed by: EMERGENCY MEDICINE

## 2020-09-26 RX ORDER — METOPROLOL SUCCINATE 25 MG/1
75 TABLET, EXTENDED RELEASE ORAL DAILY
Qty: 90 TABLET | Refills: 0 | Status: SHIPPED | OUTPATIENT
Start: 2020-09-26 | End: 2020-10-02

## 2020-09-26 ASSESSMENT — ENCOUNTER SYMPTOMS
APPETITE CHANGE: 0
DIZZINESS: 0
SHORTNESS OF BREATH: 1
FEVER: 0
CHILLS: 0
PALPITATIONS: 1

## 2020-09-26 NOTE — ED AVS SNAPSHOT
Emergency Department  [address] [place] [id number]  Phone:  [phone]  Fax:  [phone]                                    Lyubov Sanchez   MRN: [id number]    Department:   Emergency Department   Date of Visit:  9/26/2020           After Visit Summary Signature Page    I have received my discharge instructions, and my questions have been answered. I have discussed any challenges I see with this plan with the nurse or doctor.    ..........................................................................................................................................  Patient/Patient Representative Signature      ..........................................................................................................................................  Patient Representative Print Name and Relationship to Patient    ..................................................               ................................................  Date                                   Time    ..........................................................................................................................................  Reviewed by Signature/Title    ...................................................              ..............................................  Date                                               Time          22EPIC Rev 08/18

## 2020-09-26 NOTE — ED PROVIDER NOTES
History     Chief Complaint:  Palpitations        HPI  Lyubov Sanchez is a 50 year old female with a history of atrial fibrillation, hypertension, and PFO closure on 2020 who presents for evaluation of palpitations. The patient reports on Thursday she felt her heart fluttering. The fluttering has been intermittent since, but today she felt her heart fluttering again. On her way to the emergency department, her palpitations resolved and haven't returned. She has associated shortness of breath. She is currently taking 50 mg of metoprolol XL. She denies any dizziness, leg swelling, chest discomfort, loss of consciousness, fever, chills, appetite change, trouble sleeping, or trouble breathing.     Allergies:  Penicillins     Medications:   Eliquis   Celexa  Flonase  Zestoretic   Toprol XL     Medical History:   Anxiety and depression   Aortic aneurysm   Arthritis   Depressive disorder    Dysplasia of cervix   Hypertension    Plantar fasciitis   Unspecified sleep apnea   Ventral hernia without obstruction or gangrene  Hyperlipidemia   Migraine  Morbid obesity due to excess calories  Obstructive sleep apnea  Incomplete right bundle branch block   Patent foramen ovale  Pulmonary hypertension   Ostium secundum type atrial septal defect  Atrial septal defect  Atrial fibrillation with RVR      Surgical History   Anesthesia cardioversion  Arthroplasty Knee x2   delivery   CV ASD closure  CV right heart cath   DaVinci hysterectomy total, salpingectomy bilateral  ENT surgery    vagina w cervix ifd pres w biopsy   Tonsillectomy and adenoidectomy     Family History:   Family history reviewed. No pertinent family history.    Social History:  The patient was accompanied to the ED by .  Smoking Status: Former Smoker  Smokeless Tobacco: Never Used  Alcohol Use: Positive  Drug Use: Negative  PCP: Yolie Delarosa        Review of Systems   Constitutional: Negative for appetite change, chills and fever.  "  Respiratory: Positive for shortness of breath.    Cardiovascular: Positive for palpitations. Negative for chest pain and leg swelling.   Neurological: Negative for dizziness.        No loss of consciousness    All other systems reviewed and are negative.      Physical Exam     Patient Vitals for the past 24 hrs:   BP Temp Temp src Pulse Resp SpO2 Height   09/26/20 1816 128/72 -- -- 72 -- -- --   09/26/20 1702 -- -- -- 78 27 -- --   09/26/20 1700 -- -- -- 79 24 -- --   09/26/20 1610 135/81 98  F (36.7  C) Temporal 95 18 95 % 1.626 m (5' 4\")        Physical Exam  Gen: Pleasant, appears stated age.    Eye:   Pupils are equal, round, and reactive.     Sclera non-injected.    ENT:   Masked.    Cardiac:     Normal rate and regular rhythm.    No murmurs, gallops, or rubs.    Pulmonary:     Clear to auscultation bilaterally.    No wheezes, rales, or rhonchi.    Abdomen:     Normal active bowel sounds.     Abdomen is soft and non-distended, without focal tenderness.    Musculoskeletal:     Normal movement of all extremities without evidence for deficit.    Extremities:    No edema.  Calves non-tender.    Skin:   Warm and dry.    Neurologic:    Non-focal exam without asymmetric weakness or numbness.    Normal tone    Psychiatric:     Normal affect with appropriate interaction with examiner.    Emergency Department Course     ECG:  ECG taken at 1615, ECG read at 1718  Normal sinus rhythm  Possible Left atrial enlargement   Right superior axis deviation  Right ventricular hypertrophy  Nonspecific ST abnormality  Abnormal ECG    Rate 88 bpm. AK interval 164 ms. QRS duration 84 ms. QT/QTc 394/476 ms. P-R-T axes 42 209 54.     Laboratory:  Laboratory findings were communicated with the patient who voiced understanding of the findings.    BMP: glucose 102 (H) (Creatinine 0.62) o/w WNL   HCG Qualitative Blood: negative    Emergency Department Course:    1615 EKG obtained as noted above.    1708 Nursing notes and vitals reviewed. I " performed an exam of the patient as documented above.     1724 IV was inserted and blood was drawn for laboratory testing, results above.    Findings and plan explained to the Patient. Patient discharged home with instructions regarding supportive care, medications, and reasons to return. The importance of close follow-up was reviewed.     Impression & Plan   Medical Decision Making:  Lyubov Sanchez is a 50 year old female with a history of atrial fibrillation, hypertension, recent PFO closure, who presents with transient sensation of heart fluttering. The sensation she is feeling today is identical to what she has felt before with artrial fibrillation, although perhaps not as fast. Fortunately, symptoms resolved prior to my evaluation in the ED. Since arriving here she has been in a normal sinus rhythm with heart rates in the 60's and 70's. Labs are reassuring with normal potassium and otherwise normal electrolytes and no anemia. According to cardiology, I suspect that septal swelling could be the cause of her new onset atrial fibrillation following PFO closure. At the point, I think she is safe to be discharged home. We did discuss increasing metoprolol, however reevaluation the patient's heart rate was in the 60's and I did not feel that she could safely tolerate higher dose of metroporol. I recommended close follow-up with cardiology, and otherwise return to the ED if she develops recurrent palpitations that fail to resolve after an hour or two, associated chest pain, shortness of breath, or syncope. At this point, I do not think she needs additional workup for sepsis, alcohol withdrawal, hyperthyroidism, pulmonary embolism, or other dangerous conditions.     Diagnosis:     ICD-10-CM    1. Paroxysmal atrial fibrillation (H)  I48.0         Disposition:  Discharged to home.    Discharge Medications:  There are no discharge medications.    Scribe Disclosure:  I, Nereyda Hicks, am serving as a scribe at 4:48 PM on  9/26/2020 to document services personally performed by Bea Diop based on my observations and the provider's statements to me.     Bea Hooper MD  09/28/20 [id number]

## 2020-09-26 NOTE — DISCHARGE INSTRUCTIONS
Your blood work today appears normal.  Your heart appears to be in a normal sinus rhythm while in the emergency department.  I am recommending increasing your metoprolol XL medication to 75 mg a day from 50 mg a day.  Take your other medications as directed.  You should go back to the lower dose if you develop dizziness, pass out, or feel badly taking the higher dose of metoprolol.  I am recommending following up with your cardiologist in the next week to discuss your symptoms, and to discuss the change in your medications.  You can return to the ED at any time if you have palpitations especially those that do not resolve, or are associated with chest pain, shortness of breath, passing out, or any other concerning symptoms.

## 2020-09-26 NOTE — ED TRIAGE NOTES
Pt had PFO closure on 8/26/20. Since that time has had intermittent afib. Today felt like palpitations were worse. Concerned that she was back in afib. no symptoms during this time.

## 2020-09-28 LAB — INTERPRETATION ECG - MUSE: NORMAL

## 2020-10-01 NOTE — PROGRESS NOTES
"Lyubov Sanchez is a 50 year old female who is being evaluated via a billable video visit.      The patient has been notified of following:     \"This video visit will be conducted via a call between you and your physician/provider. We have found that certain health care needs can be provided without the need for an in-person physical exam.  This service lets us provide the care you need with a video conversation.  If a prescription is necessary we can send it directly to your pharmacy.  If lab work is needed we can place an order for that and you can then stop by our lab to have the test done at a later time.    Video visits are billed at different rates depending on your insurance coverage.  Please reach out to your insurance provider with any questions.    If during the course of the call the physician/provider feels a video visit is not appropriate, you will not be charged for this service.\"    Patient has given verbal consent for Video visit? Yes  How would you like to obtain your AVS? Mail a copy  If you are dropped from the video visit, the video invite should be resent to: Text to cell phone: [phone]  Will anyone else be joining your video visit? No       Review Of Systems  Skin: negative  Eyes: negative  Ears/Nose/Throat: negative  Respiratory: No shortness of breath, dyspnea on exertion, cough, or hemoptysis  Cardiovascular: palpitations and last week and weekend, chest heaviness like she is getting a cold   Gastrointestinal: negative  Genitourinary: negative  Musculoskeletal: negative  Neurologic: negative  Psychiatric: negative  Hematologic/Lymphatic/Immunologic: negative  Endocrine: negative    Patient reported vitals:  BP:  Heart rate:  Weight: 255lb    [facility]        Video-Visit Details      History of Present Illness:     Lyubov Sanchez is a 50 year old female followed here by Dr. Hodges who recently underwent percutaneous closure of an ASD. She awakened during the night on 9-15 after a " "nightmare and noted her heart rate racing and pounding associated with SOB and she feels \"off\". I had her go to our office and EKG confirms atrial fibrillation with RVR to 162 BPM. She was placed on Eliquis and Metoprolol. She underwent CONCEPCION the following day revealing NO GRETCHEN thrombus and was cardioverted.     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 She went on for cardiac MRI showing a mildly dilated right ventricle with normal right ventricular function. Qp/Qs ratio was 1.27. Main pulmonary is dilated to 3.8cm. Moderate ELVA is present. CONCEPCION noted moderate left to right shunt. RVSP on transthoracic echo was 35.8 plus RAP but on CONCEPCION 52 plus RAP. LVEDP was 15.     On August 26 of 2020 she underwent successful percutaneous closure with a number 30 mm cardio form septal occluder device.  She had mild pulmonary hypertension with a PA pressure 43/23 mean of 30 right atrial pressure was 10; LVEDP 15.  She did well postprocedure however her chest x-ray showed pulmonary vascular congestion.  She was given a dose of IV Lasix.    Other medical history includes obesity, dyslipidemia, obstructive sleep apnea, and hypertension. Aortic root appeared to measure 3.5cm on CONCEPCION; ascending aorta on chest CT in 2018 was 4.1cm. She has lower extremity edema likely multifactorial with venous changes, bilateral knee replacements and amlodipine. When we added Metoprolol, we stopped amlodipine and her edema continues to be well controlled.     She has a ventral hernia which developed after her hysterectomy in January and she hopes to have this surgically repaired in January.  Since our last visit she has developed palpitations once but this did not feel like a racing heart.  She went to the emergency room and by the time she arrived they had resolved.  EKG " showed sinus rhythm.  Heart rates were in the high 80s.  Since this time she has noted some heaviness in her chest which was similar to what she felt after her procedure when her x-ray showed pulmonary vascular congestion.  This heaviness is nonexertional.  She denies orthopnea or PND.  She has not had an abrupt weight gain.  Blood pressures well controlled at home.  Since her emergency room visit she has not felt more palpitations.  She is currently taking metoprolol at 50 mg once daily.        LIpids: total cholesterol 182 HDL 41  1-  Labs: magnesium 2.0 sodium 139 potassium 4.0 BUN 18 Creatinine 0.65 Hemoglobin 15.6      She notes more easy bruising but no hematuria, epistaxis or other signs of bleeding.         General Appearance:    no distress, normal body habitus, upright.    ENT/Mouth:    membranes moist, no nasal discharge or bleeding gums. Normal head shape, no evidence of injury or laceration.    EYES:    no scleral icterus, normal conjunctivae    Neck:    no evidence of thyromegaly.    Chest/Lungs:    No audible wheezing equal chest wall expansion. Non labored breathing. No cough.    Cardiovascular:    No evidence of elevated jugular venous pressure but difficult to assess due to poor video quality. No evidence of pitting edema bilaterally    Abdomen:    no evidence of abdominal distention. No observed jaundice.    Extremities:    no cyanosis or clubbing noted.    Skin:    no xanthelasma, normal skin collar. No evidence of facial lacerations.    Neurologic:    Normal arm motion bilateral, no tremors. No evidence of focal defect.    Psychiatric:    alert and oriented x3, calm     Impression/Plan:      1. Secundum ASD  S/p successful closure with #30mm Cardioform device  Mild pulmonary hypertension (RA 10; PA 43/23 mean 30; LA 10) LVEDP 15  CXR-vascular congestion post procedure which was treated with 1 dose of IV Lasix post closure and another dose IV when she was admitted with her rapid  atrial fibrillation  She will continue aspirin and Plavix and have a follow-up echo bubble study in November and a visit back with Dr. Hodges   At that point I would ask him to decide if her Plavix can be discontinued and if she is cleared to proceed with her ventral hernia repair in January.  She also has an umbilical hernia which they will repair at the same time    No aggressive aerobic activity for 4 weeks no scuba diving, skydiving contact sports for 3 months.     2. Symptomatic, Paroxysmal atrial fibrillation with RVR- post recent percutaneous closure of secundum ASD--higher risk population for this rhythm  -Likely due to post procedure septal swelling  Normal atrial size on recent pre procedure CONCEPCION  S/p CONCEPCION with successful DCCV  -continue Eliquis for likely for the 3 month post closure course  -now with recurrent palpitations  Plan:  Increase metoprolol to 75 mg daily  Place a 30-day event recorder  If there are more episodes of atrial fibrillation I may need to have her see our electrophysiology department    Continue Eliquis-     3. Sense of chest congestion  -She has no other associated symptoms such as fever cough sore throat or myalgias.  -She may be developing some pulmonary vascular congestion  Plan:  -Stop lisinopril hydrochlorothiazide  -Start lisinopril 10 mg once daily  -Add Lasix 20 mg daily  -Check a basic metabolic panel at our office next week when she has a 30-day event recorder placed     3. Mildly dilated ascending aorta  -Continue good blood pressure control  -This will be reassessed intermittently on her follow-up echocardiograms     4. Dyslipidemia    Lifestyle modifications  Recheck and PMD     5. KRISTYN  Wears CPAP     6. Venous disease   Would suggest leaving her off of amlodipine and using metoprolol for her antihypertensive     7. HTN  controlled   Would suggest leaving her off of amloidipine and using metoprolol with her ACE inhibitor for her antihypertensives now given tendency  towards edema, enlarged aorta and higher lifetime risk of atrial arrhythmias     8. Ventral hernia  She would like to consider surgery in January if possible    I will have my nurse contact her next week for an update on her clinical status    Schedule a return video visit October 16 with me  Greater than 50% of this 25-minute visit was spent in counseling       CURRENT MEDICATIONS:  Current Outpatient Medications   Medication Sig Dispense Refill     apixaban ANTICOAGULANT (ELIQUIS) 5 MG tablet Take 1 tablet (5 mg) by mouth 2 times daily 60 tablet 11     citalopram (CELEXA) 20 MG tablet TAKE 1 TABLET BY MOUTH EVERY DAY (Patient taking differently: Take 20 mg by mouth every morning ) 90 tablet 0     clopidogrel (PLAVIX) 75 MG tablet Take 75 mg by mouth daily       fluticasone (FLONASE) 50 MCG/ACT nasal spray Spray 1 spray into both nostrils daily as needed for rhinitis or allergies       lisinopril-hydrochlorothiazide (ZESTORETIC) 20-12.5 MG tablet Take 1/2 tab once daily/ (Patient taking differently: Take 0.5 tablets by mouth daily Take 1/2 tab once daily/) 45 tablet 0     metoprolol succinate ER (TOPROL-XL) 25 MG 24 hr tablet Take 3 tablets (75 mg) by mouth daily 90 tablet 0       ALLERGIES     Allergies   Allergen Reactions     Penicillins Hives     hives       PAST MEDICAL HISTORY:  Past Medical History:   Diagnosis Date     Anxiety and depression     Pt reports is on Rx Celexa.     Aortic aneurysm (H)     Pt reports its small and is currently being monitored.     Arthritis      Depressive disorder      DYSPLASIA OF CERVIX 3/6/2003     Dysplasia of cervix (uteri) 2003    Rx cryotherapy Nov 03, and 2005     Hypertension     NO cardiology     Incomplete right bundle branch block 11/10/2017     Migraine      Other chronic pain     Knee pain for 8 years     Plantar fasciitis     bilat     Unspecified sleep apnea     CPAP will bring on the day of surgery.     Ventral hernia without obstruction or gangrene 4/15/2020        Type of service:  Video Visit    Video Start Time: 0732am  Video End Time: 0757am    Originating Location (pt. Location): Other mother's home    Distant Location (provider location):  home office  Platform used for Video Visit: BARRINGTON Diaz CNP

## 2020-10-02 ENCOUNTER — VIRTUAL VISIT (OUTPATIENT)
Dept: CARDIOLOGY | Facility: CLINIC | Age: 50
End: 2020-10-02
Payer: COMMERCIAL

## 2020-10-02 ENCOUNTER — DOCUMENTATION ONLY (OUTPATIENT)
Dept: CARDIOLOGY | Facility: CLINIC | Age: 50
End: 2020-10-02

## 2020-10-02 DIAGNOSIS — Q21.10 ASD (ATRIAL SEPTAL DEFECT): ICD-10-CM

## 2020-10-02 DIAGNOSIS — I10 HTN, GOAL BELOW 140/90: ICD-10-CM

## 2020-10-02 DIAGNOSIS — I48.0 PAROXYSMAL ATRIAL FIBRILLATION (H): Primary | ICD-10-CM

## 2020-10-02 PROCEDURE — 99214 OFFICE O/P EST MOD 30 MIN: CPT | Mod: 95 | Performed by: NURSE PRACTITIONER

## 2020-10-02 RX ORDER — METOPROLOL SUCCINATE 50 MG/1
75 TABLET, EXTENDED RELEASE ORAL DAILY
Qty: 135 TABLET | Refills: 3 | Status: SHIPPED | OUTPATIENT
Start: 2020-10-02 | End: 2021-11-29

## 2020-10-02 RX ORDER — LISINOPRIL AND HYDROCHLOROTHIAZIDE 12.5; 2 MG/1; MG/1
TABLET ORAL
Qty: 45 TABLET | Refills: 0 | COMMUNITY
Start: 2020-10-02 | End: 2020-10-02

## 2020-10-02 RX ORDER — ACETAMINOPHEN 500 MG
500-1000 TABLET ORAL EVERY 6 HOURS PRN
COMMUNITY

## 2020-10-02 RX ORDER — LISINOPRIL 10 MG/1
10 TABLET ORAL DAILY
Qty: 90 TABLET | Refills: 3 | Status: SHIPPED | OUTPATIENT
Start: 2020-10-02 | End: 2021-09-28

## 2020-10-02 RX ORDER — FUROSEMIDE 20 MG
20 TABLET ORAL DAILY
Qty: 90 TABLET | Refills: 3 | Status: SHIPPED | OUTPATIENT
Start: 2020-10-02 | End: 2021-01-14

## 2020-10-02 RX ORDER — METOPROLOL SUCCINATE 50 MG/1
75 TABLET, EXTENDED RELEASE ORAL DAILY
Qty: 90 TABLET | Refills: 3 | COMMUNITY
Start: 2020-10-02 | End: 2020-10-02

## 2020-10-02 NOTE — PROGRESS NOTES
Can you call and check on her middle of next week      She had an ER visit for palpitations  No afib    I have put her on an event recorder    She is more sob     I increased toprol to 75mg daily  Changed lisinopril/hct 20/12.5 (1/2 pill) to liisnopril 10mg daily and lasix 20mg daily      I added her for a video visit 10-16  thanks

## 2020-10-02 NOTE — PATIENT INSTRUCTIONS
I will have scheduling call you to set up a heart monitor and have labs at our [facility] office next week    Med changes  Increase Metoprolol to 1 1/2 pills daily  Stop Lisinopril/HCT  Start Lisinopril 10mg daily with Furosemide 20mg daily    Video with me 10-16 at 0850am  Call with questions [phone]

## 2020-10-02 NOTE — LETTER
"10/2/2020    Yolie Delarosa MD  [address]  [facility] [address]    RE: Lyubov Sanchez       Dear Colleague,    I had the pleasure of seeing Lyubov Sanchez in the [facility].    Lyubov Sanchez is a 50 year old female who is being evaluated via a billable video visit.      The patient has been notified of following:     \"This video visit will be conducted via a call between you and your physician/provider. We have found that certain health care needs can be provided without the need for an in-person physical exam.  This service lets us provide the care you need with a video conversation.  If a prescription is necessary we can send it directly to your pharmacy.  If lab work is needed we can place an order for that and you can then stop by our lab to have the test done at a later time.    Video visits are billed at different rates depending on your insurance coverage.  Please reach out to your insurance provider with any questions.    If during the course of the call the physician/provider feels a video visit is not appropriate, you will not be charged for this service.\"    Patient has given verbal consent for Video visit? Yes  How would you like to obtain your AVS? Mail a copy  If you are dropped from the video visit, the video invite should be resent to: Text to cell phone: [phone]  Will anyone else be joining your video visit? No       Review Of Systems  Skin: negative  Eyes: negative  Ears/Nose/Throat: negative  Respiratory: No shortness of breath, dyspnea on exertion, cough, or hemoptysis  Cardiovascular: palpitations and last week and weekend, chest heaviness like she is getting a cold   Gastrointestinal: negative  Genitourinary: negative  Musculoskeletal: negative  Neurologic: negative  Psychiatric: negative  Hematologic/Lymphatic/Immunologic: negative  Endocrine: negative    Patient reported vitals:  BP:  Heart rate:  Weight: 255lb    Marcia Adams " "[facility]        Video-Visit Details      History of Present Illness:     Lyubov Sanchez is a 50 year old female followed here by Dr. Hodges who recently underwent percutaneous closure of an ASD. She awakened during the night on 9-15 after a nightmare and noted her heart rate racing and pounding associated with SOB and she feels \"off\". I had her go to our office and EKG confirms atrial fibrillation with RVR to 162 BPM. She was placed on Eliquis and Metoprolol. She underwent CONCEPCION the following day revealing NO GRETCHEN thrombus and was cardioverted.     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 She went on for cardiac MRI showing a mildly dilated right ventricle with normal right ventricular function. Qp/Qs ratio was 1.27. Main pulmonary is dilated to 3.8cm. Moderate ELVA is present. CONCEPCION noted moderate left to right shunt. RVSP on transthoracic echo was 35.8 plus RAP but on CONCEPCION 52 plus RAP. LVEDP was 15.     On August 26 of 2020 she underwent successful percutaneous closure with a number 30 mm cardio form septal occluder device.  She had mild pulmonary hypertension with a PA pressure 43/23 mean of 30 right atrial pressure was 10; LVEDP 15.  She did well postprocedure however her chest x-ray showed pulmonary vascular congestion.  She was given a dose of IV Lasix.    Other medical history includes obesity, dyslipidemia, obstructive sleep apnea, and hypertension. Aortic root appeared to measure 3.5cm on CONCEPCION; ascending aorta on chest CT in 2018 was 4.1cm. She has lower extremity edema likely multifactorial with venous changes, bilateral knee replacements and amlodipine. When we added Metoprolol, we stopped amlodipine and her edema continues to be well controlled.     She has a ventral hernia which developed after her hysterectomy in January and she " hopes to have this surgically repaired in January.  Since our last visit she has developed palpitations once but this did not feel like a racing heart.  She went to the emergency room and by the time she arrived they had resolved.  EKG showed sinus rhythm.  Heart rates were in the high 80s.  Since this time she has noted some heaviness in her chest which was similar to what she felt after her procedure when her x-ray showed pulmonary vascular congestion.  This heaviness is nonexertional.  She denies orthopnea or PND.  She has not had an abrupt weight gain.  Blood pressures well controlled at home.  Since her emergency room visit she has not felt more palpitations.  She is currently taking metoprolol at 50 mg once daily.        LIpids: total cholesterol 182 HDL 41  1-  Labs: magnesium 2.0 sodium 139 potassium 4.0 BUN 18 Creatinine 0.65 Hemoglobin 15.6      She notes more easy bruising but no hematuria, epistaxis or other signs of bleeding.         General Appearance:    no distress, normal body habitus, upright.    ENT/Mouth:    membranes moist, no nasal discharge or bleeding gums. Normal head shape, no evidence of injury or laceration.    EYES:    no scleral icterus, normal conjunctivae    Neck:    no evidence of thyromegaly.    Chest/Lungs:    No audible wheezing equal chest wall expansion. Non labored breathing. No cough.    Cardiovascular:    No evidence of elevated jugular venous pressure but difficult to assess due to poor video quality. No evidence of pitting edema bilaterally    Abdomen:    no evidence of abdominal distention. No observed jaundice.    Extremities:    no cyanosis or clubbing noted.    Skin:    no xanthelasma, normal skin collar. No evidence of facial lacerations.    Neurologic:    Normal arm motion bilateral, no tremors. No evidence of focal defect.    Psychiatric:    alert and oriented x3, calm     Impression/Plan:      1. Secundum ASD  S/p successful closure with #30mm  Cardioform device  Mild pulmonary hypertension (RA 10; PA 43/23 mean 30; LA 10) LVEDP 15  CXR-vascular congestion post procedure which was treated with 1 dose of IV Lasix post closure and another dose IV when she was admitted with her rapid atrial fibrillation  She will continue aspirin and Plavix and have a follow-up echo bubble study in November and a visit back with Dr. Hodges   At that point I would ask him to decide if her Plavix can be discontinued and if she is cleared to proceed with her ventral hernia repair in January.  She also has an umbilical hernia which they will repair at the same time    No aggressive aerobic activity for 4 weeks no scuba diving, skydiving contact sports for 3 months.     2. Symptomatic, Paroxysmal atrial fibrillation with RVR- post recent percutaneous closure of secundum ASD--higher risk population for this rhythm  -Likely due to post procedure septal swelling  Normal atrial size on recent pre procedure CONCEPCION  S/p CONCEPCION with successful DCCV  -continue Eliquis for likely for the 3 month post closure course  -now with recurrent palpitations  Plan:  Increase metoprolol to 75 mg daily  Place a 30-day event recorder  If there are more episodes of atrial fibrillation I may need to have her see our electrophysiology department    Continue Eliquis-     3. Sense of chest congestion  -She has no other associated symptoms such as fever cough sore throat or myalgias.  -She may be developing some pulmonary vascular congestion  Plan:  -Stop lisinopril hydrochlorothiazide  -Start lisinopril 10 mg once daily  -Add Lasix 20 mg daily  -Check a basic metabolic panel at our office next week when she has a 30-day event recorder placed     3. Mildly dilated ascending aorta  -Continue good blood pressure control  -This will be reassessed intermittently on her follow-up echocardiograms     4. Dyslipidemia    Lifestyle modifications  Recheck and PMD     5. KRISTYN  Wears CPAP     6. Venous disease   Would  suggest leaving her off of amlodipine and using metoprolol for her antihypertensive     7. HTN  controlled   Would suggest leaving her off of amloidipine and using metoprolol with her ACE inhibitor for her antihypertensives now given tendency towards edema, enlarged aorta and higher lifetime risk of atrial arrhythmias     8. Ventral hernia  She would like to consider surgery in January if possible    I will have my nurse contact her next week for an update on her clinical status    Schedule a return video visit October 16 with me  Greater than 50% of this 25-minute visit was spent in counseling       CURRENT MEDICATIONS:  Current Outpatient Medications   Medication Sig Dispense Refill     apixaban ANTICOAGULANT (ELIQUIS) 5 MG tablet Take 1 tablet (5 mg) by mouth 2 times daily 60 tablet 11     citalopram (CELEXA) 20 MG tablet TAKE 1 TABLET BY MOUTH EVERY DAY (Patient taking differently: Take 20 mg by mouth every morning ) 90 tablet 0     clopidogrel (PLAVIX) 75 MG tablet Take 75 mg by mouth daily       fluticasone (FLONASE) 50 MCG/ACT nasal spray Spray 1 spray into both nostrils daily as needed for rhinitis or allergies       lisinopril-hydrochlorothiazide (ZESTORETIC) 20-12.5 MG tablet Take 1/2 tab once daily/ (Patient taking differently: Take 0.5 tablets by mouth daily Take 1/2 tab once daily/) 45 tablet 0     metoprolol succinate ER (TOPROL-XL) 25 MG 24 hr tablet Take 3 tablets (75 mg) by mouth daily 90 tablet 0       ALLERGIES     Allergies   Allergen Reactions     Penicillins Hives     hives       PAST MEDICAL HISTORY:  Past Medical History:   Diagnosis Date     Anxiety and depression     Pt reports is on Rx Celexa.     Aortic aneurysm (H)     Pt reports its small and is currently being monitored.     Arthritis      Depressive disorder      DYSPLASIA OF CERVIX 3/6/2003     Dysplasia of cervix (uteri) 2003    Rx cryotherapy Nov 03, and 2005     Hypertension     NO cardiology     Incomplete right bundle branch  block 11/10/2017     Migraine      Other chronic pain     Knee pain for 8 years     Plantar fasciitis     bilat     Unspecified sleep apnea     CPAP will bring on the day of surgery.     Ventral hernia without obstruction or gangrene 4/15/2020       Type of service:  Video Visit    Video Start Time: 0732am  Video End Time: 0757am    Originating Location (pt. Location): Other mother's home    Distant Location (provider location):  home office  Platform used for Video Visit: D[employer]im[employer]              Thank you for allowing me to participate in the care of your patient.    Sincerely,     BARRINGTON Crowell [facility] [place]

## 2020-10-09 ENCOUNTER — HOSPITAL ENCOUNTER (OUTPATIENT)
Dept: CARDIOLOGY | Facility: CLINIC | Age: 50
Discharge: HOME | End: 2020-10-09
Attending: NURSE PRACTITIONER | Admitting: NURSE PRACTITIONER
Payer: COMMERCIAL

## 2020-10-09 DIAGNOSIS — I48.0 PAROXYSMAL ATRIAL FIBRILLATION (H): ICD-10-CM

## 2020-10-09 LAB
ANION GAP SERPL CALCULATED.3IONS-SCNC: 6 MMOL/L (ref 3–14)
BUN SERPL-MCNC: 14 MG/DL (ref 7–30)
CALCIUM SERPL-MCNC: 8.7 MG/DL (ref 8.5–10.1)
CHLORIDE SERPL-SCNC: 105 MMOL/L (ref 94–109)
CO2 SERPL-SCNC: 25 MMOL/L (ref 20–32)
CREAT SERPL-MCNC: 0.69 MG/DL (ref 0.52–1.04)
GFR SERPL CREATININE-BSD FRML MDRD: >90 ML/MIN/{1.73_M2}
GLUCOSE SERPL-MCNC: 91 MG/DL (ref 70–99)
POTASSIUM SERPL-SCNC: 4.3 MMOL/L (ref 3.4–5.3)
SODIUM SERPL-SCNC: 136 MMOL/L (ref 133–144)

## 2020-10-09 PROCEDURE — 36415 COLL VENOUS BLD VENIPUNCTURE: CPT | Performed by: NURSE PRACTITIONER

## 2020-10-09 PROCEDURE — 80048 BASIC METABOLIC PNL TOTAL CA: CPT | Performed by: NURSE PRACTITIONER

## 2020-10-09 PROCEDURE — 93270 REMOTE 30 DAY ECG REV/REPORT: CPT

## 2020-10-09 PROCEDURE — 93272 ECG/REVIEW INTERPRET ONLY: CPT | Performed by: INTERNAL MEDICINE

## 2020-10-13 ENCOUNTER — CARE COORDINATION (OUTPATIENT)
Dept: CARDIOLOGY | Facility: CLINIC | Age: 50
End: 2020-10-13

## 2020-10-13 NOTE — PROGRESS NOTES
Called and spoke with pt.  Discussed that GEORGINA Lemus reviewed event monitor strips that we have received so far and the only thing seen are PACs, which are not concerning but can feel like palpitations.  Reviewed that no a-fib has been seen on the monitor yet.  She verbalized understanding and and denies any questions/concerns.    Reminded pt of upcoming video visit with GEORGINA Lemus on 10/16/20- she confirms appt at 8:50am.    SHELIA Zuniga 3:49 PM 10/13/2020

## 2020-10-13 NOTE — PROGRESS NOTES
"           DThomas-Kvidera, CNP's note from 10/2/20 states:  \"Symptomatic, Paroxysmal atrial fibrillation with RVR- post recent percutaneous closure of secundum ASD--higher risk population for this rhythm  -Likely due to post procedure septal swelling  Normal atrial size on recent pre procedure CONCEPCION  S/p CONCEPCION with successful DCCV  -continue Eliquis for likely for the 3 month post closure course  -now with recurrent palpitations  Plan:  Increase metoprolol to 75 mg daily  Place a 30-day event recorder  If there are more episodes of atrial fibrillation I may need to have her see our electrophysiology department\"    Pt is scheduled for visit with GEORGINA Lemus on 10/15/20. Will route to GEORGINA Lemus for review.    SHELIA Zuniga 3:10 PM 10/13/2020    "

## 2020-10-16 ENCOUNTER — VIRTUAL VISIT (OUTPATIENT)
Dept: CARDIOLOGY | Facility: CLINIC | Age: 50
End: 2020-10-16
Payer: COMMERCIAL

## 2020-10-16 DIAGNOSIS — I10 HTN, GOAL BELOW 140/90: ICD-10-CM

## 2020-10-16 DIAGNOSIS — I48.0 PAROXYSMAL ATRIAL FIBRILLATION (H): Primary | ICD-10-CM

## 2020-10-16 DIAGNOSIS — Q21.10 ASD (ATRIAL SEPTAL DEFECT): ICD-10-CM

## 2020-10-16 PROCEDURE — 99213 OFFICE O/P EST LOW 20 MIN: CPT | Mod: 95 | Performed by: NURSE PRACTITIONER

## 2020-10-16 NOTE — PROGRESS NOTES
"Lyubov Sanchez is a 50 year old female who is being evaluated via a billable video visit.      Could not connect on video  She was in a place with poor connectivity  Changed to phone visit    The patient has been notified of following:     \"This video visit will be conducted via a call between you and your physician/provider. We have found that certain health care needs can be provided without the need for an in-person physical exam.  This service lets us provide the care you need with a video conversation.  If a prescription is necessary we can send it directly to your pharmacy.  If lab work is needed we can place an order for that and you can then stop by our lab to have the test done at a later time.    Video visits are billed at different rates depending on your insurance coverage.  Please reach out to your insurance provider with any questions.    If during the course of the call the physician/provider feels a video visit is not appropriate, you will not be charged for this service.\"    Patient has given verbal consent for Video visit? Yes  How would you like to obtain your AVS? Mail a copy  If you are dropped from the video visit, the video invite should be resent to: Text to cell phone: [phone] [employer]  Will anyone else be joining your video visit? No       Review Of Systems  Skin: Negative  Eyes: Negative  Ears/Nose/Throat: Negative  Respiratory: Positive for sleep apnea - wears a CPAP  Cardiovascular: Negative  Gastrointestinal: Negative  Genitourinary: Negative  Musculoskeletal: Negative  Neurologic: Negative  Psychiatric: Negative  Hematologic/Lymphatic/Immunologic: Negative  Endocrine: Negative    Patient reported vitals:  BP: N/A  Heart rate: N/A  Weight: N/A    Jill GASTELUM CMA      Telehone visit; could not connect on DoximmyaNUMBER or my chart Upside          History of Present Illness:     Lyubov Sanchez is a 50 year old female followed here by Dr. Hodges who recently underwent percutaneous closure " "of an ASD. I am having a follow up visit for medication changes I made for mild SOB, HTN and her atrial fibrillation.    Upon clearance for a hysterectomy earlier in the year she was found to have a secundum ASD measuring 7.7 cm. She went on for cardiac MRI showing a mildly dilated right ventricle with normal right ventricular function. Qp/Qs ratio was 1.27. Main pulmonary is dilated to 3.8cm. Moderate ELVA is present. CONCEPCION noted moderate left to right shunt. RVSP on transthoracic echo was 35.8 plus RAP but on CONCEPCION 52 plus RAP. LVEDP was 15.     On August 26 of 2020 she underwent successful percutaneous closure with a number 30 mm cardio form septal occluder device.  She had mild pulmonary hypertension with a PA pressure 43/23 mean of 30 right atrial pressure was 10; LVEDP 15.  She did well postprocedure however her chest x-ray showed pulmonary vascular congestion.  She was given a dose of IV Lasix.     Other medical history includes obesity, dyslipidemia, obstructive sleep apnea, and hypertension. Aortic root appeared to measure 3.5cm on CONCEPCION; ascending aorta on chest CT in 2018 was 4.1cm. She has lower extremity edema likely multifactorial with venous changes, bilateral knee replacements and amlodipine.     She has a ventral hernia which developed after her hysterectomy in January and she hopes to have this surgically repaired in January.    She awakened during the night on 9-15 after a nightmare and noted her heart rate racing and pounding associated with SOB and she feels \"off\". I had her go to our office and EKG confirms atrial fibrillation with RVR to 162 BPM. She was placed on Eliquis and Metoprolol. She underwent CONCEPCION the following day revealing NO GRETCHEN thrombus and was cardioverted.    Subsequently, she has developed palpitations once but this did not feel like a racing heart.  She went to the emergency room and by the time she arrived they had resolved.  EKG showed sinus rhythm.  Heart rates were in the high " 80s.  Since this time she has noted some heaviness in her chest which was similar to what she felt after her procedure when her x-ray showed pulmonary vascular congestion.     I increased Metoprolol, added lasix and pur her on an event recorder and changed Lisinopirl/HCT to plain lisinopril; follow up BMP was normal.    She has sent in a few tracing that show PVC's and PAC's but no atrial fibrillation.    Since the med changes, her breathing is better; edema has resolved.      LIpids: total cholesterol 182 HDL 41  1-  Labs: BMP 10-9: sodium 136 potassium 4.3 BUN 14 Creatinine 0.69     She notes more easy bruising but no hematuria, epistaxis or other signs of bleeding.              Impression/Plan:      1. Secundum ASD  S/p successful closure with #30mm Cardioform device  Mild pulmonary hypertension (RA 10; PA 43/23 mean 30; LA 10) LVEDP 15  CXR-vascular congestion post procedure which was treated with 1 dose of IV Lasix post closure and another dose IV when she was admitted with her rapid atrial fibrillation  Continue Plavix and have a follow-up echo bubble study in November and a visit back with Dr. Hodges    At that point I would ask him to decide if her Plavix can be discontinued and if she is cleared to proceed with her ventral hernia repair in January.  She also has an umbilical hernia which they will repair at the same time     Aspirin was stopped when Eliquis was started.              2. Symptomatic, Paroxysmal atrial fibrillation with RVR- post recent percutaneous closure of secundum ASD--higher risk population for this rhythm  -Likely due to post procedure septal swelling but this is a higher lifetime risk in this population  Normal atrial size on recent pre procedure CONCEPCION  S/p CONCEPCION with successful DCCV  -continue Eliquis for likely for the 3 months post closure-Dr. Hodges to decide duration    Continue metoprolol to 75 mg daily  Continue 30-day event recorder  If there are more episodes of  atrial fibrillation I may need to have her see our electrophysiology department        3. Sense of chest congestion-likely diastolic dysfunction/mild CHF  -She has no other associated symptoms such as fever cough sore throat or myalgias.  -improved after lasix  Plan:    -continue lisinopril 10 mg once daily  -continue Lasix 20 mg daily  -continue metoprolol     3. Mildly dilated ascending aorta  -Continue good blood pressure control  -This will be reassessed intermittently on her follow-up echocardiograms     4. Dyslipidemia    Lifestyle modifications  Recheck at PMD     5. KRISTYN  Wears CPAP     6. Venous disease   Would suggest leaving her off of amlodipine and using her current meds for her antihypertensive regimen     7. HTN  controlled        8. Ventral hernia  She would like to consider surgery in January if possible    It has been a pleasure as always talking with Lyubov today; she will call my nurse with any issue otherwise we will see her back in November for echo with bubbles study and follow up visit.    CURRENT MEDICATIONS:  Current Outpatient Medications   Medication Sig Dispense Refill     acetaminophen (TYLENOL) 500 MG tablet Take 500-1,000 mg by mouth every 6 hours as needed for mild pain       apixaban ANTICOAGULANT (ELIQUIS) 5 MG tablet Take 1 tablet (5 mg) by mouth 2 times daily 60 tablet 11     citalopram (CELEXA) 20 MG tablet TAKE 1 TABLET BY MOUTH EVERY DAY (Patient taking differently: Take 20 mg by mouth every morning ) 90 tablet 0     clopidogrel (PLAVIX) 75 MG tablet Take 75 mg by mouth daily       fluticasone (FLONASE) 50 MCG/ACT nasal spray Spray 1 spray into both nostrils daily as needed for rhinitis or allergies       furosemide (LASIX) 20 MG tablet Take 1 tablet (20 mg) by mouth daily 90 tablet 3     lisinopril (ZESTRIL) 10 MG tablet Take 1 tablet (10 mg) by mouth daily 90 tablet 3     metoprolol succinate ER (TOPROL-XL) 50 MG 24 hr tablet Take 1.5 tablets (75 mg) by mouth daily 135  tablet 3       ALLERGIES     Allergies   Allergen Reactions     Penicillins Hives     hives       PAST MEDICAL HISTORY:  Past Medical History:   Diagnosis Date     Anxiety and depression     Pt reports is on Rx Celexa.     Aortic aneurysm (H)     Pt reports its small and is currently being monitored.     Arthritis      Depressive disorder      DYSPLASIA OF CERVIX 3/6/2003     Dysplasia of cervix (uteri) 2003    Rx cryotherapy Nov 03, and 2005     Hypertension     NO cardiology     Incomplete right bundle branch block 11/10/2017     Migraine      Other chronic pain     Knee pain for 8 years     Plantar fasciitis     bilat     Unspecified sleep apnea     CPAP will bring on the day of surgery.     Ventral hernia without obstruction or gangrene 4/15/2020           Type of service:  telephone visit     Total phone visit time: 9 minutes    Eve Orantes RN, MSN, CNP

## 2020-10-16 NOTE — LETTER
"10/16/2020    Yolie Delarosa MD  [address]  [facility] [address]    RE: Lyubov Sanchez       Dear Colleague,    I had the pleasure of seeing Lyubov Sanchez in the [facility].    Lyubov Sanchez is a 50 year old female who is being evaluated via a billable video visit.      Could not connect on video  She was in a place with poor connectivity  Changed to phone visit    The patient has been notified of following:     \"This video visit will be conducted via a call between you and your physician/provider. We have found that certain health care needs can be provided without the need for an in-person physical exam.  This service lets us provide the care you need with a video conversation.  If a prescription is necessary we can send it directly to your pharmacy.  If lab work is needed we can place an order for that and you can then stop by our lab to have the test done at a later time.    Video visits are billed at different rates depending on your insurance coverage.  Please reach out to your insurance provider with any questions.    If during the course of the call the physician/provider feels a video visit is not appropriate, you will not be charged for this service.\"    Patient has given verbal consent for Video visit? Yes  How would you like to obtain your AVS? Mail a copy  If you are dropped from the video visit, the video invite should be resent to: Text to cell phone: [phone] [employer]  Will anyone else be joining your video visit? No       Review Of Systems  Skin: Negative  Eyes: Negative  Ears/Nose/Throat: Negative  Respiratory: Positive for sleep apnea - wears a CPAP  Cardiovascular: Negative  Gastrointestinal: Negative  Genitourinary: Negative  Musculoskeletal: Negative  Neurologic: Negative  Psychiatric: Negative  Hematologic/Lymphatic/Immunologic: Negative  Endocrine: Negative    Patient reported vitals:  BP: N/A  Heart rate: N/A  Weight: N/A    Jill GASTELUM" "[facility]      Telehone visit; could not connect on [email or web address] or my chart Ornis lizeth        History of Present Illness:     Lyubov Sanchez is a 50 year old female followed here by Dr. Hodges who recently underwent percutaneous closure of an ASD. I am having a follow up visit for medication changes I made for mild SOB, HTN and her atrial fibrillation.    Upon clearance for a hysterectomy earlier in the year she was found to have a secundum ASD measuring 7.7 cm. She went on for cardiac MRI showing a mildly dilated right ventricle with normal right ventricular function. Qp/Qs ratio was 1.27. Main pulmonary is dilated to 3.8cm. Moderate ELVA is present. CONCEPCION noted moderate left to right shunt. RVSP on transthoracic echo was 35.8 plus RAP but on CONCEPCION 52 plus RAP. LVEDP was 15.     On August 26 of 2020 she underwent successful percutaneous closure with a number 30 mm cardio form septal occluder device.  She had mild pulmonary hypertension with a PA pressure 43/23 mean of 30 right atrial pressure was 10; LVEDP 15.  She did well postprocedure however her chest x-ray showed pulmonary vascular congestion.  She was given a dose of IV Lasix.     Other medical history includes obesity, dyslipidemia, obstructive sleep apnea, and hypertension. Aortic root appeared to measure 3.5cm on CONCEPCION; ascending aorta on chest CT in 2018 was 4.1cm. She has lower extremity edema likely multifactorial with venous changes, bilateral knee replacements and amlodipine.     She has a ventral hernia which developed after her hysterectomy in January and she hopes to have this surgically repaired in January.    She awakened during the night on 9-15 after a nightmare and noted her heart rate racing and pounding associated with SOB and she feels \"off\". I had her go to our office and EKG confirms atrial fibrillation with RVR to 162 BPM. She was placed on Eliquis and Metoprolol. She underwent CONCEPCION the following day revealing NO GRETCHEN thrombus and was " cardioverted.    Subsequently, she has developed palpitations once but this did not feel like a racing heart.  She went to the emergency room and by the time she arrived they had resolved.  EKG showed sinus rhythm.  Heart rates were in the high 80s.  Since this time she has noted some heaviness in her chest which was similar to what she felt after her procedure when her x-ray showed pulmonary vascular congestion.     I increased Metoprolol, added lasix and pur her on an event recorder and changed Lisinopirl/HCT to plain lisinopril; follow up BMP was normal.    She has sent in a few tracing that show PVC's and PAC's but no atrial fibrillation.    Since the med changes, her breathing is better; edema has resolved.      LIpids: total cholesterol 182 HDL 41  1-  Labs: BMP 10-9: sodium 136 potassium 4.3 BUN 14 Creatinine 0.69     She notes more easy bruising but no hematuria, epistaxis or other signs of bleeding.          Impression/Plan:      1. Secundum ASD  S/p successful closure with #30mm Cardioform device  Mild pulmonary hypertension (RA 10; PA 43/23 mean 30; LA 10) LVEDP 15  CXR-vascular congestion post procedure which was treated with 1 dose of IV Lasix post closure and another dose IV when she was admitted with her rapid atrial fibrillation  Continue Plavix and have a follow-up echo bubble study in November and a visit back with Dr. Hodges    At that point I would ask him to decide if her Plavix can be discontinued and if she is cleared to proceed with her ventral hernia repair in January.  She also has an umbilical hernia which they will repair at the same time     Aspirin was stopped when Eliquis was started.     2. Symptomatic, Paroxysmal atrial fibrillation with RVR- post recent percutaneous closure of secundum ASD--higher risk population for this rhythm  -Likely due to post procedure septal swelling but this is a higher lifetime risk in this population  Normal atrial size on recent pre  procedure CONCEPCION  S/p CONCEPCION with successful DCCV  -continue Eliquis for likely for the 3 months post closure-Dr. Hodges to decide duration    Continue metoprolol to 75 mg daily  Continue 30-day event recorder  If there are more episodes of atrial fibrillation I may need to have her see our electrophysiology department        3. Sense of chest congestion-likely diastolic dysfunction/mild CHF  -She has no other associated symptoms such as fever cough sore throat or myalgias.  -improved after lasix  Plan:    -continue lisinopril 10 mg once daily  -continue Lasix 20 mg daily  -continue metoprolol     3. Mildly dilated ascending aorta  -Continue good blood pressure control  -This will be reassessed intermittently on her follow-up echocardiograms     4. Dyslipidemia    Lifestyle modifications  Recheck at PMD     5. KRISTYN  Wears CPAP     6. Venous disease   Would suggest leaving her off of amlodipine and using her current meds for her antihypertensive regimen     7. HTN  controlled        8. Ventral hernia  She would like to consider surgery in January if possible    It has been a pleasure as always talking with Lyubov today; she will call my nurse with any issue otherwise we will see her back in November for echo with bubbles study and follow up visit.    CURRENT MEDICATIONS:  Current Outpatient Medications   Medication Sig Dispense Refill     acetaminophen (TYLENOL) 500 MG tablet Take 500-1,000 mg by mouth every 6 hours as needed for mild pain       apixaban ANTICOAGULANT (ELIQUIS) 5 MG tablet Take 1 tablet (5 mg) by mouth 2 times daily 60 tablet 11     citalopram (CELEXA) 20 MG tablet TAKE 1 TABLET BY MOUTH EVERY DAY (Patient taking differently: Take 20 mg by mouth every morning ) 90 tablet 0     clopidogrel (PLAVIX) 75 MG tablet Take 75 mg by mouth daily       fluticasone (FLONASE) 50 MCG/ACT nasal spray Spray 1 spray into both nostrils daily as needed for rhinitis or allergies       furosemide (LASIX) 20 MG tablet Take  1 tablet (20 mg) by mouth daily 90 tablet 3     lisinopril (ZESTRIL) 10 MG tablet Take 1 tablet (10 mg) by mouth daily 90 tablet 3     metoprolol succinate ER (TOPROL-XL) 50 MG 24 hr tablet Take 1.5 tablets (75 mg) by mouth daily 135 tablet 3       ALLERGIES     Allergies   Allergen Reactions     Penicillins Hives     hives       PAST MEDICAL HISTORY:  Past Medical History:   Diagnosis Date     Anxiety and depression     Pt reports is on Rx Celexa.     Aortic aneurysm (H)     Pt reports its small and is currently being monitored.     Arthritis      Depressive disorder      DYSPLASIA OF CERVIX 3/6/2003     Dysplasia of cervix (uteri) 2003    Rx cryotherapy Nov 03, and 2005     Hypertension     NO cardiology     Incomplete right bundle branch block 11/10/2017     Migraine      Other chronic pain     Knee pain for 8 years     Plantar fasciitis     bilat     Unspecified sleep apnea     CPAP will bring on the day of surgery.     Ventral hernia without obstruction or gangrene 4/15/2020           Type of service:  telephone visit     Total phone visit time: 9 minutes      Thank you for allowing me to participate in the care of your patient.    Sincerely,     BARRINGTON Crowell [facility]

## 2020-10-21 ENCOUNTER — CARE COORDINATION (OUTPATIENT)
Dept: CARDIOLOGY | Facility: CLINIC | Age: 50
End: 2020-10-21

## 2020-10-21 NOTE — PROGRESS NOTES
Called and spoke with pt.  She confirms she took 25mg Toprol after our conversation earlier today and she is doing much better since then. She checked her BP a couple times late this afternoon and both were good, 110/65 was the most recent reading.      Advised pt to continue taking Toprol 75mg daily going forward and she agrees with this plan.      Also discussed that had reviewed her high BPs and headache with GEORGINA Lemus and she recommended that pt call if BP goes to 140/90 or higher consistently because she would likely want to increase her lisinopril dose; however, if her BP goes back to the 200s/100s range and she has a headache she needs to go to the ED for evaluation immediately. Pt verbalized understanding and agrees with this plan.    SHELIA Zuniga 4:36 PM 10/21/2020

## 2020-10-21 NOTE — PROGRESS NOTES
Received voicemail from pt who reports she has noticed her blood pressure is higher than usual the last couple of days (200-225/100-126) and she has also had a headache for the last couple of days.     Called and spoke with pt.  She reports about 10 minutes after she left the voicemail her headache started to subside and she just checked her BP again after sitting down and eating lunch and her BP is 180/99.      Per review of DThomas-Kvidera, CNP's most recent visit note on 10/16/20, pt should be taking Toprol 75mg daily, lisinopril 10mg daily and lasix 20mg daily.    She has only been taking Toprol 50mg daily because she thought it was decreased again at some point.  Advised pt to take 25mg Toprol now to total 75mg for the day and will call her again around 4:30pm for another BP update. She verbalized understanding and agrees with this plan.    Also advised pt to check her BP if her headache returns and go to ED if BP is 220s/120s again, she verbalized understanding and agrees with this plan.    SHELIA Zuniga 1:07 PM 10/21/2020

## 2020-10-29 DIAGNOSIS — I10 HTN, GOAL BELOW 140/90: ICD-10-CM

## 2020-10-29 RX ORDER — LISINOPRIL AND HYDROCHLOROTHIAZIDE 12.5; 2 MG/1; MG/1
TABLET ORAL
Qty: 45 TABLET | Refills: 0 | OUTPATIENT
Start: 2020-10-29

## 2020-10-29 NOTE — TELEPHONE ENCOUNTER
Looks like she is on a lower dose of lisinopril now, and no hydrochlorothiazide, see med list, can we call the patient and ask her

## 2020-10-29 NOTE — TELEPHONE ENCOUNTER
Routing refill request to provider for review/approval because:  Drug not active on patient's medication list    Sarbjit ANSARI RN, BSN

## 2020-10-29 NOTE — TELEPHONE ENCOUNTER
Called patient to clarify.  Yes, medications changed by cardiology in August.  She feels old RX on auto refill.  She will contact her pharmacy to let them know Lisin/HCTZ was DC'd.  Katie Hubbard RN

## 2020-11-02 ENCOUNTER — HOSPITAL ENCOUNTER (OUTPATIENT)
Dept: CARDIOLOGY | Facility: CLINIC | Age: 50
Discharge: HOME | End: 2020-11-02
Attending: NURSE PRACTITIONER | Admitting: NURSE PRACTITIONER
Payer: COMMERCIAL

## 2020-11-02 DIAGNOSIS — I48.91 ATRIAL FIBRILLATION WITH RVR (H): Primary | ICD-10-CM

## 2020-11-02 DIAGNOSIS — Q21.11 OSTIUM SECUNDUM TYPE ATRIAL SEPTAL DEFECT: ICD-10-CM

## 2020-11-02 PROCEDURE — 255N000002 HC RX 255 OP 636: Performed by: NURSE PRACTITIONER

## 2020-11-02 PROCEDURE — 999N000208 ECHOCARDIOGRAM COMPLETE

## 2020-11-02 PROCEDURE — 93306 TTE W/DOPPLER COMPLETE: CPT | Mod: 26 | Performed by: INTERNAL MEDICINE

## 2020-11-02 RX ADMIN — HUMAN ALBUMIN MICROSPHERES AND PERFLUTREN 9 ML: 10; .22 INJECTION, SOLUTION INTRAVENOUS at 08:46

## 2020-11-05 ENCOUNTER — OFFICE VISIT (OUTPATIENT)
Dept: CARDIOLOGY | Facility: CLINIC | Age: 50
End: 2020-11-05
Payer: COMMERCIAL

## 2020-11-05 VITALS
SYSTOLIC BLOOD PRESSURE: 110 MMHG | HEIGHT: 64 IN | WEIGHT: 266.2 LBS | HEART RATE: 81 BPM | DIASTOLIC BLOOD PRESSURE: 69 MMHG | BODY MASS INDEX: 45.45 KG/M2

## 2020-11-05 DIAGNOSIS — Q21.11 OSTIUM SECUNDUM TYPE ATRIAL SEPTAL DEFECT: ICD-10-CM

## 2020-11-05 PROCEDURE — 99214 OFFICE O/P EST MOD 30 MIN: CPT | Performed by: INTERNAL MEDICINE

## 2020-11-05 ASSESSMENT — MIFFLIN-ST. JEOR: SCORE: 1817.8

## 2020-11-05 NOTE — PROGRESS NOTES
Cardiology Clinic (Structurtal - ASD)    Assessment & Plan       1.  ASD S/P Closure with 30 mm Cardioform septal occluder August 2020  2.  Elevated BMI  3.  Hyperlipidemia  4.  Obstructive sleep apnea  5.  Mildly dilated proximal ascending aorta  6.  Mild pulmonary hypertension from # 1  7.  Patient S/P hysterectomy  8.  HTN  9.  PAF S/P CV to NSR    Recommendations    1.  Patient has done well after her closure of her ASD.  Recent echocardiogram was reviewed demonstrating well-seated device with no residual shunting.  2.  She had paroxysmal atrial fibrillation post procedure and is now on antiplatelet and anticoagulation.  She is also on metoprolol.  We will continue with this and likely discontinue the Eliquis next month.  We will then go forward with one antiplatelet agent, likely aspirin  3.  Patient is in need of ventral hernia surgery, tentatively scheduled for January 2021.  4.  I we will have her return to clinic next month, virtual to discuss her clinical progress.  Provided she is doing well we will discontinue her Eliquis and clear her for her upcoming surgery.    It was a pleasure seeing her on your behalf      Corby Hodges MD      HPI:    Patient is a very pleasant 49-year-old woman who I the pleasure meeting earlier this year preoperative clearance for hysterectomy which she underwent without difficulty..  Prior to her preoperative visit she had an echocardiogram that suggested an interatrial shunt.  This was not clarified clearly on the transthoracic study.  She had a CONCEPCION performed demonstrating a small ASD with diameter of 8 mm.     Patient ultimately underwent ASD repair with a cardio firm 30 mm [place] device in August 2020.  This was uneventful.  In September she was awakened with a pounding heart rate and eventually sought medical attention.  EKG demonstrated atrial fibrillation rapid ventricular response.  She was placed on Eliquis and metoprolol and underwent CONCEPCION cardioversion to  normal sinus rhythm.  Subsequent monitoring has demonstrated no recurrence of her atrial fibrillation however occasional PACs and PVCs.  She was symptomatic initially for the next few weeks however these have since abated the last 10 to 12 days.  She is continue to be maintained on her metoprolol Plavix as well as Eliquis.  She also had Lasix placed with improvement in her shortness of breath.  An echocardiogram has been recently performed demonstrating no shunting and a well-placed ASD device.  Here for a close follow-up.  Also of note patient needs ventral hernia surgery in January 2021.      Cardiac MRI:    Normal left ventricular size and systolic function. Quantitative LVEF 71%.   Mildly dilated right ventricle with normal RV systolic function. Quantitative RVEF 65%.   There is a small secundum atrial septal defect, located in the superior aspect of the interatrial septum,  measures 7.7mm. The superior rim measures approximately 2.6mm.  By phase contrast imaging, Qp/Qs is 1.27.   Pulmonary vein angiogram reveals no evidence of anomalous venous return.   Dilated main pulmonary artery (3.8cm).       Primary Care Physician   Yolie Delarosa      Patient Active Problem List   Diagnosis     Hyperlipidemia LDL goal <100     Migraine     HTN, goal below 140/90     Migraine without aura     Health Care Home     Anxiety     Morbid obesity due to excess calories (H)     KRISTYN (obstructive sleep apnea)     Incomplete right bundle branch block     S/P total knee arthroplasty     Major depressive disorder, recurrent episode, mild (H)     Aneurysm, ascending aorta (H)     Umbilical hernia without obstruction and without gangrene     Patent foramen ovale     Pulmonary hypertension (H)     Ventral hernia without obstruction or gangrene       Past Medical History   I have reviewed this patient's medical history and updated it with pertinent information if needed.   Past Medical History:   Diagnosis Date     Anxiety and  depression     Pt reports is on Rx Celexa.     Aortic aneurysm (H)     Pt reports its small and is currently being monitored.     Arthritis      Depressive disorder      DYSPLASIA OF CERVIX 3/6/2003     Dysplasia of cervix (uteri)     Rx cryotherapy Nov , and      Hypertension     NO cardiology     Incomplete right bundle branch block 11/10/2017     Migraine      Other chronic pain     Knee pain for 8 years     Plantar fasciitis     bilat     Unspecified sleep apnea     CPAP will bring on the day of surgery.     Ventral hernia without obstruction or gangrene 4/15/2020       Past Surgical History   I have reviewed this patient's surgical history and updated it with pertinent information if needed.  Past Surgical History:   Procedure Laterality Date     ANESTHESIA CARDIOVERSION N/A 2020    Procedure: ANESTHESIA, FOR CONCEPCION/CARDIOVERSION;  Surgeon: GENERIC ANESTHESIA PROVIDER;  Location:  OR     ARTHROPLASTY KNEE Right 2017    Procedure: ARTHROPLASTY KNEE;  Right total knee arthroplasty using the Arthrex iBalance total knee system;  Surgeon: Hebert Lee MD;  Location: [facility] OR     ARTHROPLASTY KNEE Left 3/19/2018    Procedure: ARTHROPLASTY KNEE;  Left total knee arthroplasty using an Arthrex iBalance total knee system;  Surgeon: Hebert Lee MD;  Location: [facility] OR     C  DELIVERY ONLY      , Low Cervical     CV ASD CLOSURE N/A 2020    Procedure: ASD Closure;  Surgeon: Freddie Frias MD;  Location:  HEART CARDIAC CATH LAB     CV RIGHT HEART CATH N/A 2020    Procedure: Right Heart Cath;  Surgeon: Freddie Frias MD;  Location:  HEART CARDIAC CATH LAB     DAVINCI HYSTERECTOMY TOTAL, BILATERAL SALPINGO-OOPHORECTOMY, COMBINED Bilateral 2020    Procedure: DaVinci robotic-assisted total laparoscopic hysterectomy, bilateral salpingectomy;  Surgeon: Venancio Bartlett MD;  Location:  OR - Path all benign     DAVINCI HYSTERECTOMY TOTAL,  "SALPINGECTOMY BILATERAL Bilateral 01/31/2020    Fibroid uterus, Menometrorrhagia - Robotic TLH, Bilateral salpingectomy - Path all benign     ENT SURGERY  1976     HC COLP VAGINA W CERVIX IF PRES W BIOPSY  2005    [place] for ASCUS     TONSILLECTOMY & ADENOIDECTOMY         Prior to Admission Medications   Cannot display prior to admission medications because the patient has not been admitted in this contact.     [unfilled]  [unfilled]  Allergies   Allergies   Allergen Reactions     Penicillins Hives     hives       Social History    reports that she quit smoking about 13 years ago. Her smoking use included cigarettes. She has a 2.50 pack-year smoking history. She has never used smokeless tobacco. She reports current alcohol use. She reports that she does not use drugs.    Family History   Family History   Adopted: Yes   Problem Relation Age of Onset     Unknown/Adopted Other         pt is adopted and has no access to health history     Unknown/Adopted Mother      Unknown/Adopted Father      Unknown/Adopted Maternal Grandmother      Unknown/Adopted Maternal Grandfather      Unknown/Adopted Paternal Grandmother      Unknown/Adopted Other        Review of Systems   The comprehensive 10 point Review of Systems is negative other than noted in the HPI or here.     Physical Exam   Vital Signs with Ranges  Pulse:  [81] 81  BP: (110)/(69) 110/69  Wt Readings from Last 4 Encounters:   11/05/20 120.7 kg (266 lb 3.2 oz)   09/18/20 117 kg (258 lb)   09/16/20 114.4 kg (252 lb 4.8 oz)   09/11/20 115.7 kg (255 lb)     [unfilled]      Vitals: /69   Pulse 81   Ht 1.626 m (5' 4.02\")   Wt 120.7 kg (266 lb 3.2 oz)   LMP  (LMP Unknown)   BMI 45.66 kg/m      GENERAL: Healthy, alert and no distress  EYES: Eyes grossly normal to inspection.  No discharge or erythema, or obvious scleral/conjunctival abnormalities.  RESP: No audible wheeze, cough, or visible cyanosis.  No visible retractions or increased work of breathing.  "   SKIN: Visible skin clear. No significant rash, abnormal pigmentation or lesions.  NEURO: Cranial nerves grossly intact.  Mentation and speech appropriate for age.  PSYCH: Mentation appears normal, affect normal/bright, judgement and insight intact, normal speech and appearance well-groomed.    @LABRCNTIPR(tropi:5,troponinies:5)@    @LABRCNTIPR(wbc:3,hgb:3,mcv:3,plt:3,inr:3,na:3,potassium:3,chloride:3,co2:3,bun:3,cr:3,gfrestimated:3,gfrestblack:3,aniongap:3,bob:3,glc:3,albumin:2,prottotal:2,bilitotal:2,alkphos:2,alt:2,ast:2,lipase:2,tropi:3)@  Recent Labs   Lab Test 20  0902 17  0945   CHOL 182 155   HDL 41* 36*   * 68   TRIG 119 256*     @LABRCNTIP(wbc:3,hgb:3,hct:3,mcv:3,plt:3,iron:3,ironsat:3,reticabsct:3,retp:3,feb:3,sigifredo:3,b12:3,folic:3,epoe:3,morph:3)@  @LABRCNTIP(PH:3,PHV:3,PO2:3,PO2V:3,sat:3,PCO2:3,PCO2V:3,HCO3:3,HCO3V:3)@  @LABRCNTIP(NTBNPI:3,NTBNP:3)@  @LABRCNTIP(DD:1)@  @LABRCNTIP(sed:3,crp:3)@  @LABRCNTIP(PLT:3)@  @LABRCNTIP(TSH:3)@  @LABRCNTIP(color:1,appearance:1,urineg,urinebili:1,urineketone:1,s,ubld:1,urineph:1,protein:1,urobilinogen:1,nitrite:1,leukest:1,rbcu:1,wbcu:1)@    Imaging:  No results found for this or any previous visit (from the past 48 hour(s)).    Echo:  No results found for this or any previous visit (from the past 4320 hour(s)).

## 2020-11-05 NOTE — LETTER
11/5/2020    Yolie Delarosa MD  [address]  [facility] [place] [address]    RE: Lyubov Sanchez       Dear Colleague,    I had the pleasure of seeing Lyubov Sanchez in the [facility].      Cardiology Clinic (Structurtal - ASD)    Assessment & Plan       1.  ASD S/P Closure with 30 mm Cardioform septal occluder August 2020  2.  Elevated BMI  3.  Hyperlipidemia  4.  Obstructive sleep apnea  5.  Mildly dilated proximal ascending aorta  6.  Mild pulmonary hypertension from # 1  7.  Patient S/P hysterectomy  8.  HTN  9.  PAF S/P CV to NSR    Recommendations    1.  Patient has done well after her closure of her ASD.  Recent echocardiogram was reviewed demonstrating well-seated device with no residual shunting.  2.  She had paroxysmal atrial fibrillation post procedure and is now on antiplatelet and anticoagulation.  She is also on metoprolol.  We will continue with this and likely discontinue the Eliquis next month.  We will then go forward with one antiplatelet agent, likely aspirin  3.  Patient is in need of ventral hernia surgery, tentatively scheduled for January 2021.  4.  I we will have her return to clinic next month, virtual to discuss her clinical progress.  Provided she is doing well we will discontinue her Eliquis and clear her for her upcoming surgery.    It was a pleasure seeing her on your behalf      Corby Hodges MD      HPI:    Patient is a very pleasant 49-year-old woman who I the pleasure meeting earlier this year preoperative clearance for hysterectomy which she underwent without difficulty..  Prior to her preoperative visit she had an echocardiogram that suggested an interatrial shunt.  This was not clarified clearly on the transthoracic study.  She had a CONCEPCION performed demonstrating a small ASD with diameter of 8 mm.     Patient ultimately underwent ASD repair with a cardio firm 30 mm [place] device in August 2020.  This was uneventful.  In September she  was awakened with a pounding heart rate and eventually sought medical attention.  EKG demonstrated atrial fibrillation rapid ventricular response.  She was placed on Eliquis and metoprolol and underwent CONCEPCION cardioversion to normal sinus rhythm.  Subsequent monitoring has demonstrated no recurrence of her atrial fibrillation however occasional PACs and PVCs.  She was symptomatic initially for the next few weeks however these have since abated the last 10 to 12 days.  She is continue to be maintained on her metoprolol Plavix as well as Eliquis.  She also had Lasix placed with improvement in her shortness of breath.  An echocardiogram has been recently performed demonstrating no shunting and a well-placed ASD device.  Here for a close follow-up.  Also of note patient needs ventral hernia surgery in January 2021.      Cardiac MRI:    Normal left ventricular size and systolic function. Quantitative LVEF 71%.   Mildly dilated right ventricle with normal RV systolic function. Quantitative RVEF 65%.   There is a small secundum atrial septal defect, located in the superior aspect of the interatrial septum,  measures 7.7mm. The superior rim measures approximately 2.6mm.  By phase contrast imaging, Qp/Qs is 1.27.   Pulmonary vein angiogram reveals no evidence of anomalous venous return.   Dilated main pulmonary artery (3.8cm).       Primary Care Physician   Yolie Delarosa      Patient Active Problem List   Diagnosis     Hyperlipidemia LDL goal <100     Migraine     HTN, goal below 140/90     Migraine without aura     Health Care Home     Anxiety     Morbid obesity due to excess calories (H)     KRISTYN (obstructive sleep apnea)     Incomplete right bundle branch block     S/P total knee arthroplasty     Major depressive disorder, recurrent episode, mild (H)     Aneurysm, ascending aorta (H)     Umbilical hernia without obstruction and without gangrene     Patent foramen ovale     Pulmonary hypertension (H)     Ventral  hernia without obstruction or gangrene       Past Medical History   I have reviewed this patient's medical history and updated it with pertinent information if needed.   Past Medical History:   Diagnosis Date     Anxiety and depression     Pt reports is on Rx Celexa.     Aortic aneurysm (H)     Pt reports its small and is currently being monitored.     Arthritis      Depressive disorder      DYSPLASIA OF CERVIX 3/6/2003     Dysplasia of cervix (uteri)     Rx cryotherapy Nov , and      Hypertension     NO cardiology     Incomplete right bundle branch block 11/10/2017     Migraine      Other chronic pain     Knee pain for 8 years     Plantar fasciitis     bilat     Unspecified sleep apnea     CPAP will bring on the day of surgery.     Ventral hernia without obstruction or gangrene 4/15/2020       Past Surgical History   I have reviewed this patient's surgical history and updated it with pertinent information if needed.  Past Surgical History:   Procedure Laterality Date     ANESTHESIA CARDIOVERSION N/A 2020    Procedure: ANESTHESIA, FOR CONCEPCION/CARDIOVERSION;  Surgeon: GENERIC ANESTHESIA PROVIDER;  Location:  OR     ARTHROPLASTY KNEE Right 2017    Procedure: ARTHROPLASTY KNEE;  Right total knee arthroplasty using the Arthrex iBalance total knee system;  Surgeon: Hebert Lee MD;  Location: [facility] OR     ARTHROPLASTY KNEE Left 3/19/2018    Procedure: ARTHROPLASTY KNEE;  Left total knee arthroplasty using an Arthrex iBalance total knee system;  Surgeon: Hebert Lee MD;  Location: [facility] OR     C  DELIVERY ONLY  ,    , Low Cervical     CV ASD CLOSURE N/A 2020    Procedure: ASD Closure;  Surgeon: Freddie Frias MD;  Location:  HEART CARDIAC CATH LAB     CV RIGHT HEART CATH N/A 2020    Procedure: Right Heart Cath;  Surgeon: Freddie Frias MD;  Location:  HEART CARDIAC CATH LAB     DAVINCI HYSTERECTOMY TOTAL, BILATERAL SALPINGO-OOPHORECTOMY,  "COMBINED Bilateral 1/31/2020    Procedure: DaVinci robotic-assisted total laparoscopic hysterectomy, bilateral salpingectomy;  Surgeon: Venancio Bartlett MD;  Location:  OR - Path all benign     DAVINCI HYSTERECTOMY TOTAL, SALPINGECTOMY BILATERAL Bilateral 01/31/2020    Fibroid uterus, Menometrorrhagia - Robotic TLH, Bilateral salpingectomy - Path all benign     ENT SURGERY  1976     HC COLP VAGINA W CERVIX IF PRES W BIOPSY  2005    [place] for ASCUS     TONSILLECTOMY & ADENOIDECTOMY         Prior to Admission Medications   Cannot display prior to admission medications because the patient has not been admitted in this contact.     [unfilled]  [unfilled]  Allergies   Allergies   Allergen Reactions     Penicillins Hives     hives       Social History    reports that she quit smoking about 13 years ago. Her smoking use included cigarettes. She has a 2.50 pack-year smoking history. She has never used smokeless tobacco. She reports current alcohol use. She reports that she does not use drugs.    Family History   Family History   Adopted: Yes   Problem Relation Age of Onset     Unknown/Adopted Other         pt is adopted and has no access to health history     Unknown/Adopted Mother      Unknown/Adopted Father      Unknown/Adopted Maternal Grandmother      Unknown/Adopted Maternal Grandfather      Unknown/Adopted Paternal Grandmother      Unknown/Adopted Other        Review of Systems   The comprehensive 10 point Review of Systems is negative other than noted in the HPI or here.     Physical Exam   Vital Signs with Ranges  Pulse:  [81] 81  BP: (110)/(69) 110/69  Wt Readings from Last 4 Encounters:   11/05/20 120.7 kg (266 lb 3.2 oz)   09/18/20 117 kg (258 lb)   09/16/20 114.4 kg (252 lb 4.8 oz)   09/11/20 115.7 kg (255 lb)     [unfilled]      Vitals: /69   Pulse 81   Ht 1.626 m (5' 4.02\")   Wt 120.7 kg (266 lb 3.2 oz)   LMP  (LMP Unknown)   BMI 45.66 kg/m      GENERAL: Healthy, alert and no " distress  EYES: Eyes grossly normal to inspection.  No discharge or erythema, or obvious scleral/conjunctival abnormalities.  RESP: No audible wheeze, cough, or visible cyanosis.  No visible retractions or increased work of breathing.    SKIN: Visible skin clear. No significant rash, abnormal pigmentation or lesions.  NEURO: Cranial nerves grossly intact.  Mentation and speech appropriate for age.  PSYCH: Mentation appears normal, affect normal/bright, judgement and insight intact, normal speech and appearance well-groomed.    @LABRCNTIPR(tropi:5,troponinies:5)@    @LABRCNTIPR(wbc:3,hgb:3,mcv:3,plt:3,inr:3,na:3,potassium:3,chloride:3,co2:3,bun:3,cr:3,gfrestimated:3,gfrestblack:3,aniongap:3,bob:3,glc:3,albumin:2,prottotal:2,bilitotal:2,alkphos:2,alt:2,ast:2,lipase:2,tropi:3)@  Recent Labs   Lab Test 20  0902 17  0945   CHOL 182 155   HDL 41* 36*   * 68   TRIG 119 256*     @LABRCNTIP(wbc:3,hgb:3,hct:3,mcv:3,plt:3,iron:3,ironsat:3,reticabsct:3,retp:3,feb:3,sigifredo:3,b12:3,folic:3,epoe:3,morph:3)@  @LABRCNTIP(PH:3,PHV:3,PO2:3,PO2V:3,sat:3,PCO2:3,PCO2V:3,HCO3:3,HCO3V:3)@  @LABRCNTIP(NTBNPI:3,NTBNP:3)@  @LABRCNTIP(DD:1)@  @LABRCNTIP(sed:3,crp:3)@  @LABRCNTIP(PLT:3)@  @LABRCNTIP(TSH:3)@  @LABRCNTIP(color:1,appearance:1,urineg,urinebili:1,urineketone:1,s,ubld:1,urineph:1,protein:1,urobilinogen:1,nitrite:1,leukest:1,rbcu:1,wbcu:1)@    Imaging:  No results found for this or any previous visit (from the past 48 hour(s)).    Echo:  No results found for this or any previous visit (from the past 4320 hour(s)).         Thank you for allowing me to participate in the care of your patient.    Sincerely,     Corby Hodges MD     [facility]

## 2020-11-10 NOTE — PROGRESS NOTES
Received End of Service Summary from [facility] for cardiac event monitor pt abner from 10/9/20-11/7/20:               Will route to Dr. Hodges's nurse team to have Dr. Hodges review and sign End of Service Summary so that it can be scanned into Epic.    SHELIA Zuniga 10:35 AM 11/10/2020

## 2020-11-16 ENCOUNTER — HEALTH MAINTENANCE LETTER (OUTPATIENT)
Age: 50
End: 2020-11-16

## 2020-11-17 ENCOUNTER — CARE COORDINATION (OUTPATIENT)
Dept: CARDIOLOGY | Facility: CLINIC | Age: 50
End: 2020-11-17

## 2020-11-17 NOTE — PROGRESS NOTES
End of summary report for event monitor has been reviewed and signed by Dr. Hodges.  Rhythm showing SR with PAC's.  No atrial fib detected.  She had PAF post ASD closure.  Is currently taking Eliquis and Plavix.   According to Dr. Hodges's last OV note (11/5/20) she maybe able to stop her Eliquis.  Dr. Hodges will be doing a virtual visit with her in a couple of weeks.  Patient has been called with these results and is aware Dr. Hodges will discuss any med changes with her at upcoming appointment.

## 2020-11-23 ENCOUNTER — PREP FOR PROCEDURE (OUTPATIENT)
Dept: SURGERY | Facility: CLINIC | Age: 50
End: 2020-11-23

## 2020-11-23 ENCOUNTER — TELEPHONE (OUTPATIENT)
Dept: SURGERY | Facility: CLINIC | Age: 50
End: 2020-11-23

## 2020-11-23 DIAGNOSIS — K43.9 VENTRAL HERNIA: Primary | ICD-10-CM

## 2020-11-23 DIAGNOSIS — K42.9 UMBILICAL HERNIA WITHOUT OBSTRUCTION AND WITHOUT GANGRENE: ICD-10-CM

## 2020-11-23 NOTE — TELEPHONE ENCOUNTER
Type of surgery: ROBOTIC ASSISTED VENTRAL AND INCISIONAL HERNIA REPAIR WITH MESH    Location of surgery: [facility]  Date and time of surgery: 12/29/2020 @ 9:45  Surgeon: Violetta Lazaro MD   Pre-Op Appt Date: PATIENT TO SCHEDULE    Post-Op Appt Date: PATIENT TO SCHEDULE     Packet sent out: Yes  Pre-cert/Authorization completed:  Not Applicable  Date: 11/23/2020       ROBOTIC ASSISTED VENTRAL AND INCISIONAL HERNIA REPAIR WITH MESH     GENERAL PT INST TO HAVE H&P WITH DR SUBRAMANIAN 150 MIN REQ PA ASSIST MGB NMS

## 2020-11-24 ENCOUNTER — APPOINTMENT (OUTPATIENT)
Dept: CT IMAGING | Facility: CLINIC | Age: 50
End: 2020-11-24
Attending: EMERGENCY MEDICINE
Payer: COMMERCIAL

## 2020-11-24 ENCOUNTER — HOSPITAL ENCOUNTER (EMERGENCY)
Facility: CLINIC | Age: 50
Discharge: HOME | End: 2020-11-24
Attending: EMERGENCY MEDICINE | Admitting: EMERGENCY MEDICINE
Payer: COMMERCIAL

## 2020-11-24 VITALS
RESPIRATION RATE: 18 BRPM | OXYGEN SATURATION: 97 % | DIASTOLIC BLOOD PRESSURE: 84 MMHG | HEART RATE: 65 BPM | SYSTOLIC BLOOD PRESSURE: 122 MMHG | TEMPERATURE: 98 F

## 2020-11-24 DIAGNOSIS — K42.9 UMBILICAL HERNIA WITHOUT OBSTRUCTION AND WITHOUT GANGRENE: ICD-10-CM

## 2020-11-24 DIAGNOSIS — K76.0 FATTY LIVER: ICD-10-CM

## 2020-11-24 DIAGNOSIS — R10.84 ABDOMINAL PAIN, GENERALIZED: ICD-10-CM

## 2020-11-24 DIAGNOSIS — K80.20 CALCULUS OF GALLBLADDER WITHOUT CHOLECYSTITIS WITHOUT OBSTRUCTION: ICD-10-CM

## 2020-11-24 LAB
ALBUMIN SERPL-MCNC: 3.9 G/DL (ref 3.4–5)
ALBUMIN UR-MCNC: NEGATIVE MG/DL
ALP SERPL-CCNC: 45 U/L (ref 40–150)
ALT SERPL W P-5'-P-CCNC: 38 U/L (ref 0–50)
ANION GAP SERPL CALCULATED.3IONS-SCNC: 3 MMOL/L (ref 3–14)
APPEARANCE UR: CLEAR
AST SERPL W P-5'-P-CCNC: 31 U/L (ref 0–45)
BACTERIA #/AREA URNS HPF: ABNORMAL /HPF
BASOPHILS # BLD AUTO: 0 10E9/L (ref 0–0.2)
BASOPHILS NFR BLD AUTO: 0.6 %
BILIRUB SERPL-MCNC: 1.1 MG/DL (ref 0.2–1.3)
BILIRUB UR QL STRIP: NEGATIVE
BUN SERPL-MCNC: 17 MG/DL (ref 7–30)
CALCIUM SERPL-MCNC: 9.2 MG/DL (ref 8.5–10.1)
CHLORIDE SERPL-SCNC: 104 MMOL/L (ref 94–109)
CO2 SERPL-SCNC: 31 MMOL/L (ref 20–32)
COLOR UR AUTO: ABNORMAL
CREAT SERPL-MCNC: 0.75 MG/DL (ref 0.52–1.04)
DIFFERENTIAL METHOD BLD: ABNORMAL
EOSINOPHIL # BLD AUTO: 0.2 10E9/L (ref 0–0.7)
EOSINOPHIL NFR BLD AUTO: 2.6 %
ERYTHROCYTE [DISTWIDTH] IN BLOOD BY AUTOMATED COUNT: 13 % (ref 10–15)
GFR SERPL CREATININE-BSD FRML MDRD: >90 ML/MIN/{1.73_M2}
GLUCOSE SERPL-MCNC: 106 MG/DL (ref 70–99)
GLUCOSE UR STRIP-MCNC: NEGATIVE MG/DL
HCT VFR BLD AUTO: 43.9 % (ref 35–47)
HGB BLD-MCNC: 14.4 G/DL (ref 11.7–15.7)
HGB UR QL STRIP: NEGATIVE
IMM GRANULOCYTES # BLD: 0 10E9/L (ref 0–0.4)
IMM GRANULOCYTES NFR BLD: 0.6 %
KETONES UR STRIP-MCNC: NEGATIVE MG/DL
LEUKOCYTE ESTERASE UR QL STRIP: NEGATIVE
LYMPHOCYTES # BLD AUTO: 1.9 10E9/L (ref 0.8–5.3)
LYMPHOCYTES NFR BLD AUTO: 28.4 %
MCH RBC QN AUTO: 31.4 PG (ref 26.5–33)
MCHC RBC AUTO-ENTMCNC: 32.8 G/DL (ref 31.5–36.5)
MCV RBC AUTO: 96 FL (ref 78–100)
MONOCYTES # BLD AUTO: 0.5 10E9/L (ref 0–1.3)
MONOCYTES NFR BLD AUTO: 7.4 %
MUCOUS THREADS #/AREA URNS LPF: PRESENT /LPF
NEUTROPHILS # BLD AUTO: 3.9 10E9/L (ref 1.6–8.3)
NEUTROPHILS NFR BLD AUTO: 60.4 %
NITRATE UR QL: NEGATIVE
NRBC # BLD AUTO: 0 10*3/UL
NRBC BLD AUTO-RTO: 0 /100
PH UR STRIP: 6 PH (ref 5–7)
PLATELET # BLD AUTO: 141 10E9/L (ref 150–450)
POTASSIUM SERPL-SCNC: 4.1 MMOL/L (ref 3.4–5.3)
PROT SERPL-MCNC: 7.3 G/DL (ref 6.8–8.8)
RBC # BLD AUTO: 4.58 10E12/L (ref 3.8–5.2)
RBC #/AREA URNS AUTO: <1 /HPF (ref 0–2)
SODIUM SERPL-SCNC: 138 MMOL/L (ref 133–144)
SOURCE: ABNORMAL
SP GR UR STRIP: 1.01 (ref 1–1.03)
SQUAMOUS #/AREA URNS AUTO: <1 /HPF (ref 0–1)
UROBILINOGEN UR STRIP-MCNC: NORMAL MG/DL (ref 0–2)
WBC # BLD AUTO: 6.5 10E9/L (ref 4–11)
WBC #/AREA URNS AUTO: <1 /HPF (ref 0–5)

## 2020-11-24 PROCEDURE — 74177 CT ABD & PELVIS W/CONTRAST: CPT

## 2020-11-24 PROCEDURE — 250N000011 HC RX IP 250 OP 636: Performed by: EMERGENCY MEDICINE

## 2020-11-24 PROCEDURE — 85025 COMPLETE CBC W/AUTO DIFF WBC: CPT | Performed by: EMERGENCY MEDICINE

## 2020-11-24 PROCEDURE — 80053 COMPREHEN METABOLIC PANEL: CPT | Performed by: EMERGENCY MEDICINE

## 2020-11-24 PROCEDURE — 250N000009 HC RX 250: Performed by: EMERGENCY MEDICINE

## 2020-11-24 PROCEDURE — 99285 EMERGENCY DEPT VISIT HI MDM: CPT | Mod: 25

## 2020-11-24 PROCEDURE — 81001 URINALYSIS AUTO W/SCOPE: CPT | Performed by: EMERGENCY MEDICINE

## 2020-11-24 RX ORDER — IOPAMIDOL 755 MG/ML
500 INJECTION, SOLUTION INTRAVASCULAR ONCE
Status: COMPLETED | OUTPATIENT
Start: 2020-11-24 | End: 2020-11-24

## 2020-11-24 RX ADMIN — SODIUM CHLORIDE 65 ML: 9 INJECTION, SOLUTION INTRAVENOUS at 14:58

## 2020-11-24 RX ADMIN — IOPAMIDOL 100 ML: 755 INJECTION, SOLUTION INTRAVENOUS at 14:58

## 2020-11-24 ASSESSMENT — ENCOUNTER SYMPTOMS
DIAPHORESIS: 1
SHORTNESS OF BREATH: 0
LIGHT-HEADEDNESS: 1
RHINORRHEA: 0
NAUSEA: 1
COUGH: 0
FEVER: 0
VOMITING: 0

## 2020-11-24 NOTE — ED PROVIDER NOTES
History     Chief Complaint:  Abdominal Pain    HPI   Lyubov Sanchez is a 50 year old female with history of atrial fibrillation with RVR, ventral hernia, obesity, and hypertension who presents for evaluation of acute onset sharp supraumbilical abdominal pain, associated with nausea, diaphoresis, and lightheadedness, that began at 1030. The patient states she has a history of known hernia and is scheduled to have them surgically repaired on . This morning she woke up feeling baseline and while cleaning up toys around 1030, she bent over and had acute onset, sharp abdominal pain. Due to her pain, she felt nauseated and lightheaded, stating she felt near-syncopal, and became diaphoretic. Her pain is exacerbated by touching the area of her hernia and bending over and it alleviates at rest. She then noticed her hernia became bigger, prompting her presentation. She denies any emesis, fever, cough, congestion, rhinorrhea, shortness of breath, chest pain, or other symptoms prompting her presentation.     Allergies:  Penicillins    Medications:   Eliquis   Celexa  Plavix  Lasix   Zestril   Metoprolol     Past Medical History:    Anxiety   Depression  Arthritis   Hypertension  Migraine   Chronic pain  Plantar fasciitis   Ventral hernia   Hyperlipidemia   Obesity   Obstructive sleep apnea  Ascending aorta aneurysm   Atrial fibrillation with RVR     Past Surgical History:    Cardioversion  Arthroplasty knee, bilateral   section  ASD closure  Right heart cath  Total hysterectomy with bilateral salpingo-oophorectomy   ENT surgery   Colposcopy vagina w cervix  Tonsillectomy   Adenoidectomy      Family History:    Unknown/adopted     Social History:  The patient was unaccompanied to the ED.  Smoking Status: Former Smoker,   Smokeless Tobacco: Never Used  Alcohol Use: Yes  Drug Use: No  PCP: Yolie Delarosa   Marital Status:      Review of Systems   Constitutional: Positive for diaphoresis.  Negative for fever.   HENT: Negative for congestion and rhinorrhea.    Respiratory: Negative for cough and shortness of breath.    Cardiovascular: Negative for chest pain.   Gastrointestinal: Positive for nausea. Negative for vomiting.   Neurological: Positive for light-headedness.   All other systems reviewed and are negative.      Physical Exam     Patient Vitals for the past 24 hrs:   BP Temp Temp src Pulse Resp SpO2   11/24/20 1530 132/76 -- -- 62 -- 97 %   11/24/20 1525 -- -- -- -- -- 97 %   11/24/20 1520 134/81 -- -- 65 -- 96 %   11/24/20 1400 (!) 144/89 -- -- 70 -- 96 %   11/24/20 1213 (!) 151/85 98  F (36.7  C) Oral 65 18 95 %     Physical Exam  Nursing note and vitals reviewed.    Constitutional: Pleasant and well groomed. Elevated BMI.         HENT:    Mouth/Throat: Oropharynx is without swelling or erythema. Oral mucosa moist.    Eyes: Conjunctivae are normal. No scleral icterus.    Neck: Neck supple.   Cardiovascular: Normal rate, regular rhythm and intact distal pulses.    Pulmonary/Chest: Effort normal and breath sounds normal.   Abdominal: Soft.  No distension. Small umbilical hernia that appears to be easily reduced. Just superior to umbilicus there is an area of tenderness which reproduces her symptoms. I cannot appreciate if there is a hernia or not. Remainder of the abdomen is soft without tenderness.   Musculoskeletal:  No edema, No calf tenderness  Neurological:Alert. Coordination normal.   Skin: Skin is warm and dry.   Psychiatric: Normal mood and affect.      Emergency Department Course     Imaging:  Radiology findings were communicated with the patient who voiced understanding of the findings.    CT Abdomen Pelvis w Contrast  IMPRESSION:  1. Moderate size fat-containing supraumbilical and small  fat-containing umbilical hernia.  2. Hepatic steatosis.  3. Splenomegaly.  4. Cholelithiasis without sonographic evidence of acute cholecystitis.  Reading per radiology.      Laboratory:  Laboratory  findings were communicated with the patient who voiced understanding of the findings.    CBC:  (L) o/w WNL (WBC 6.5, HGB 14.4)  CMP: Glucose 106 (H) o/w WNL (Creatinine 0.75)     UA with Microscopic: Bacteria Few (A), Mucous Present (A) o/w WNL     Emergency Department Course:  Past medical records, nursing notes, and vitals reviewed.  The patient was sent for a CT Abdomen Pelvis w Contrast while in the emergency department, results above.    IV was inserted and blood was drawn for laboratory testing, results above.   The patient provided a urine sample here in the emergency department. This was sent for laboratory testing, findings above.     (1416)   I performed an exam of the patient as documented above. History obtained from patient.     (1631)   I rechecked the patient and discussed results and plan of care.     Findings and plan explained to the Patient. Patient discharged home with instructions regarding supportive care, medications, and reasons to return. The importance of close follow-up was reviewed. I personally reviewed the laboratory and imaging results with the Patient and answered all related questions prior to discharge.     Impression & Plan     Medical Decision Making:  This patient presents with acute onset upper abdominal, as described above, in the setting of having known umbilical hernias. Her history and examination was concerning for possible incarcerated hernia and I was not able to rule this out on physical exam, and therefore a CT scan was obtained. This reveal fat-containing hernias, but no bowel and no sign of obstruction. With reasonable clinical certainty, I felt she could be safely discharged home with on-going evaluation and management as an outpatient. She understands to return if she has any reoccurrence of this severe pain and otherwise avoid activities that would increase intraabdominal pressure. She has elective operative management scheduled for late December.  Also  discussed the presence of the incidental findings on CT importance of follow-up.    Diagnosis:    ICD-10-CM    1. Abdominal pain, generalized  R10.84    2. Umbilical hernia without obstruction and without gangrene  K42.9     and supraumbilical hernia   3. Fatty liver  K76.0    4. Calculus of gallbladder without cholecystitis without obstruction  K80.20      Disposition:  Discharged to home.    Scribe Disclosure:  I, Steven Steel, am serving as a scribe at 2:02 PM on 11/24/2020 to document services personally performed by Sanjuanita Alberto MD based on my observations and the provider's statements to me.  November 24, 2020   [facility] EMERGENCY DEPT        Sanjuanita Alberto MD  11/25/20 0801

## 2020-11-24 NOTE — DISCHARGE INSTRUCTIONS
Diagnosis: abdominal pain and umbilical hernia    What do you do next:   Continue your home medications unless we have specifically changed them  Follow up as indicated below.   Continue to avoid activities that increase intra-abdominal pressure as discussed  Elective operative management as scheduled  When do you return to the ED: If you have recurrence of the severe pain, or any new  symptoms that concern you or worsening symptoms, please return to the ED for repeat evaluation.    Thank you for allowing us to care for you today.

## 2020-11-24 NOTE — ED AVS SNAPSHOT
[facility] Emergency Dept  [address]  [facility] [address]  Phone: [phone]  Fax: [phone]                                    Lyubov Sanchez   MRN: [id number]    Department: [facility] Emergency Dept   Date of Visit: 11/24/2020           After Visit Summary Signature Page    I have received my discharge instructions, and my questions have been answered. I have discussed any challenges I see with this plan with the nurse or doctor.    ..........................................................................................................................................  Patient/Patient Representative Signature      ..........................................................................................................................................  Patient Representative Print Name and Relationship to Patient    ..................................................               ................................................  Date                                   Time    ..........................................................................................................................................  Reviewed by Signature/Title    ...................................................              ..............................................  Date                                               Time          22EPIC Rev 08/18

## 2020-11-24 NOTE — ED TRIAGE NOTES
Patient has known abdominal and umbilical hernias, has an appointment for repair. Patient states that today she was bending over and felt a sudden sharp pain, thought she was going to pass out, hernia looks bigger. ABCs intact.

## 2020-11-25 DIAGNOSIS — Z11.59 ENCOUNTER FOR SCREENING FOR OTHER VIRAL DISEASES: Primary | ICD-10-CM

## 2020-12-09 ENCOUNTER — VIRTUAL VISIT (OUTPATIENT)
Dept: CARDIOLOGY | Facility: CLINIC | Age: 50
End: 2020-12-09
Attending: INTERNAL MEDICINE
Payer: COMMERCIAL

## 2020-12-09 DIAGNOSIS — Q21.11 OSTIUM SECUNDUM TYPE ATRIAL SEPTAL DEFECT: ICD-10-CM

## 2020-12-09 DIAGNOSIS — I48.91 ATRIAL FIBRILLATION WITH RVR (H): Primary | ICD-10-CM

## 2020-12-09 PROCEDURE — 99214 OFFICE O/P EST MOD 30 MIN: CPT | Mod: 95 | Performed by: INTERNAL MEDICINE

## 2020-12-09 NOTE — PROGRESS NOTES
"Lyubov Sanchez is a 50 year old female who is being evaluated via a billable video visit.      The patient has been notified of following:     \"This video visit will be conducted via a call between you and your physician/provider. We have found that certain health care needs can be provided without the need for an in-person physical exam.  This service lets us provide the care you need with a video conversation.  If a prescription is necessary we can send it directly to your pharmacy.  If lab work is needed we can place an order for that and you can then stop by our lab to have the test done at a later time.    Video visits are billed at different rates depending on your insurance coverage.  Please reach out to your insurance provider with any questions.    If during the course of the call the physician/provider feels a video visit is not appropriate, you will not be charged for this service.\"    Patient has given verbal consent for Video visit? Yes  How would you like to obtain your AVS? MyChart  If you are dropped from the video visit, the video invite should be resent to: Text to cell phone: [phone]  Will anyone else be joining your video visit? No          Review Of Systems  Skin: NEGATIVE  Eyes:Ears/Nose/Throat: NEGATIVE  Respiratory: sleep apnea  Cardiovascular:no issues, feels good  Gastrointestinal: NEGATIVE  Genitourinary:NEGATIVE   Musculoskeletal: NEGATIVE  Neurologic: anxiety  Psychiatric: NEGATIVE  Hematologic/Lymphatic/Immunologic: NEGATIVE  Endocrine:  NEGATIVE  Vitals - Patient Reported  Systolic (Patient Reported): 134  Diastolic (Patient Reported): 89  Weight (Patient Reported): 117.9 kg (260 lb)  Pulse (Patient Reported): 96        Telephone number of patient: [phone]      Video-Visit Details    Type of service:  Video Visit    Video Start Time: 1:50 PM  Video End Time: 2:05 PM    Originating Location (pt. Location): Home    Distant Location (provider location):  [facility]     Platform used for Video Visit: Kizzy Bernal LPN      Cardiology Clinic ([facility] - ASD)    Assessment & Plan       1.  ASD S/P Closure with 30 mm Cardioform septal occluder August 2020  2.  Elevated BMI  3.  Hyperlipidemia  4.  Obstructive sleep apnea  5.  Mildly dilated proximal ascending aorta  6.  Mild pulmonary hypertension from # 1  7.  Patient S/P hysterectomy  8.  HTN  9.  PAF S/P CV to NSR    Recommendations    1.  Patient has done well after her closure of her ASD.  Recent echocardiogram was reviewed demonstrating well-seated device with no residual shunting.  2.  She had paroxysmal atrial fibrillation post procedure and is now on antiplatelet and anticoagulation.  Remains in normal sinus rhythm.  We will now ask her to discontinue her Eliquis and Plavix on December 18, 2020 in anticipation of her hernia surgery later this month.    3.  Cleared for her hernia surgery without any additional cardiac testing  4.  I will asked patient to take a baby aspirin only moving forward after her surgery is complete and cleared from her surgeons to resume.  5.  Return to clinic in summer 2021 with preclinic echocardiogram.  We have wished her best on her upcoming surgery      It was a pleasure seeing her on your behalf      Corby Hodges MD      HPI:    Patient is a very pleasant 50-year-old woman who I the pleasure meeting earlier this year preoperative clearance for hysterectomy which she underwent without difficulty..  Prior to her preoperative visit she had an echocardiogram that suggested an interatrial shunt.  This was not clarified clearly on the transthoracic study.  She had a CONCEPCION performed demonstrating a small ASD with diameter of 8 mm.     Patient ultimately underwent ASD repair with a cardio firm 30 mm Weldon device in August 2020.  This was uneventful.  In September she was awakened with a pounding heart rate and eventually sought medical attention.  EKG  demonstrated atrial fibrillation rapid ventricular response.  She was placed on Eliquis and metoprolol and underwent CONCEPCION cardioversion to normal sinus rhythm.  Subsequent monitoring has demonstrated no recurrence of her atrial fibrillation however occasional PACs and PVCs.  She was symptomatic initially for the next few weeks however these have since abated the last 10 to 12 days.  She is continue to be maintained on her metoprolol Plavix as well as Eliquis.  She also had Lasix placed with improvement in her shortness of breath.  An echocardiogram has been recently performed demonstrating no shunting and a well-placed ASD device.     Since my last visit she had a presentation to the emergency room on November 24, 2020.  At that time she had abdominal pain and there was a concern regarding incarcerated umbilical hernia.  Fortunately the testing did not suggest this and she has done well with conservative management.  As a result of her presentation her hernia surgery has been moved off to December 29, 2020.  Here for a close follow-up and planning for preoperative clearance regarding her medical therapy.    Cardiac MRI:    Normal left ventricular size and systolic function. Quantitative LVEF 71%.   Mildly dilated right ventricle with normal RV systolic function. Quantitative RVEF 65%.   There is a small secundum atrial septal defect, located in the superior aspect of the interatrial septum,  measures 7.7mm. The superior rim measures approximately 2.6mm.  By phase contrast imaging, Qp/Qs is 1.27.   Pulmonary vein angiogram reveals no evidence of anomalous venous return.   Dilated main pulmonary artery (3.8cm).       Primary Care Physician   Yolie Delarosa      Patient Active Problem List   Diagnosis     Hyperlipidemia LDL goal <100     Migraine     HTN, goal below 140/90     Migraine without aura     Health Care Home     Anxiety     Morbid obesity due to excess calories (H)     KRISTYN (obstructive sleep apnea)      Incomplete right bundle branch block     S/P total knee arthroplasty     Major depressive disorder, recurrent episode, mild (H)     Aneurysm, ascending aorta (H)     Umbilical hernia without obstruction and without gangrene     Patent foramen ovale     Pulmonary hypertension (H)     Ventral hernia without obstruction or gangrene       Past Medical History   I have reviewed this patient's medical history and updated it with pertinent information if needed.   Past Medical History:   Diagnosis Date     Anxiety and depression     Pt reports is on Rx Celexa.     Aortic aneurysm (H)     Pt reports its small and is currently being monitored.     Arthritis      Depressive disorder      DYSPLASIA OF CERVIX 3/6/2003     Dysplasia of cervix (uteri)     Rx cryotherapy , and      Hypertension     NO cardiology     Incomplete right bundle branch block 11/10/2017     Migraine      Other chronic pain     Knee pain for 8 years     Plantar fasciitis     bilat     Unspecified sleep apnea     CPAP will bring on the day of surgery.     Ventral hernia without obstruction or gangrene 4/15/2020       Past Surgical History   I have reviewed this patient's surgical history and updated it with pertinent information if needed.  Past Surgical History:   Procedure Laterality Date     ANESTHESIA CARDIOVERSION N/A 2020    Procedure: ANESTHESIA, FOR CONCEPCION/CARDIOVERSION;  Surgeon: GENERIC ANESTHESIA PROVIDER;  Location: [facility] OR     ARTHROPLASTY KNEE Right 2017    Procedure: ARTHROPLASTY KNEE;  Right total knee arthroplasty using the Arthrex iBalance total knee system;  Surgeon: Hebert Lee MD;  Location:  OR     ARTHROPLASTY KNEE Left 3/19/2018    Procedure: ARTHROPLASTY KNEE;  Left total knee arthroplasty using an Arthrex iBalance total knee system;  Surgeon: Hebert Lee MD;  Location: [facility] OR     C  DELIVERY ONLY      , Low Cervical     CV ASD CLOSURE N/A 2020     Procedure: ASD Closure;  Surgeon: Freddie Frias MD;  Location:  HEART CARDIAC CATH LAB     CV RIGHT HEART CATH N/A 8/26/2020    Procedure: Right Heart Cath;  Surgeon: Freddie Frias MD;  Location:  HEART CARDIAC CATH LAB     DAVINCI HYSTERECTOMY TOTAL, BILATERAL SALPINGO-OOPHORECTOMY, COMBINED Bilateral 1/31/2020    Procedure: DaVinci robotic-assisted total laparoscopic hysterectomy, bilateral salpingectomy;  Surgeon: Venancio Bartlett MD;  Location: [facility] OR - Path all benign     DAVINCI HYSTERECTOMY TOTAL, SALPINGECTOMY BILATERAL Bilateral 01/31/2020    Fibroid uterus, Menometrorrhagia - Robotic TLH, Bilateral salpingectomy - Path all benign     ENT SURGERY  1976     HC COLP VAGINA W CERVIX IF PRES W BIOPSY  2005    [place] for ASCUS     TONSILLECTOMY & ADENOIDECTOMY         Prior to Admission Medications   Cannot display prior to admission medications because the patient has not been admitted in this contact.     [unfilled]  [unfilled]  Allergies   Allergies   Allergen Reactions     Penicillins Hives     hives       Social History    reports that she quit smoking about 13 years ago. Her smoking use included cigarettes. She has a 2.50 pack-year smoking history. She has never used smokeless tobacco. She reports current alcohol use. She reports that she does not use drugs.    Family History   Family History   Adopted: Yes   Problem Relation Age of Onset     Unknown/Adopted Other         pt is adopted and has no access to health history     Unknown/Adopted Mother      Unknown/Adopted Father      Unknown/Adopted Maternal Grandmother      Unknown/Adopted Maternal Grandfather      Unknown/Adopted Paternal Grandmother      Unknown/Adopted Other        Review of Systems   The comprehensive 10 point Review of Systems is negative other than noted in the HPI or here.     Physical Exam   Vital Signs with Ranges     Wt Readings from Last 4 Encounters:   11/05/20 120.7 kg (266 lb 3.2 oz)   09/18/20 117 kg (258 lb)    20 114.4 kg (252 lb 4.8 oz)   20 115.7 kg (255 lb)     [unfilled]      Vitals: LMP  (LMP Unknown)     GENERAL: Healthy, alert and no distress  EYES: Eyes grossly normal to inspection.  No discharge or erythema, or obvious scleral/conjunctival abnormalities.  RESP: No audible wheeze, cough, or visible cyanosis.  No visible retractions or increased work of breathing.    SKIN: Visible skin clear. No significant rash, abnormal pigmentation or lesions.  NEURO: Cranial nerves grossly intact.  Mentation and speech appropriate for age.  PSYCH: Mentation appears normal, affect normal/bright, judgement and insight intact, normal speech and appearance well-groomed.    @LABRCNTIPR(tropi:5,troponinies:5)@    @LABRCNTIPR(wbc:3,hgb:3,mcv:3,plt:3,inr:3,na:3,potassium:3,chloride:3,co2:3,bun:3,cr:3,gfrestimated:3,gfrestblack:3,aniongap:3,bob:3,glc:3,albumin:2,prottotal:2,bilitotal:2,alkphos:2,alt:2,ast:2,lipase:2,tropi:3)@  Recent Labs   Lab Test 20  0902 17  0945   CHOL 182 155   HDL 41* 36*   * 68   TRIG 119 256*     @LABRCNTIP(wbc:3,hgb:3,hct:3,mcv:3,plt:3,iron:3,ironsat:3,reticabsct:3,retp:3,feb:3,sigifredo:3,b12:3,folic:3,epoe:3,morph:3)@  @LABRCNTIP(PH:3,PHV:3,PO2:3,PO2V:3,sat:3,PCO2:3,PCO2V:3,HCO3:3,HCO3V:3)@  @LABRCNTIP(NTBNPI:3,NTBNP:3)@  @LABRCNTIP(DD:1)@  @LABRCNTIP(sed:3,crp:3)@  @LABRCNTIP(PLT:3)@  @LABRCNTIP(TSH:3)@  @LABNT(color:1,appearance:1,urineg,urinebili:1,urineketone:1,s,ubld:1,urineph:1,protein:1,urobilinogen:1,nitrite:1,leukest:1,rbcu:1,wbcu:1)@    Imaging:  No results found for this or any previous visit (from the past 48 hour(s)).    Echo:  No results found for this or any previous visit (from the past 4320 hour(s)).

## 2020-12-09 NOTE — LETTER
"12/9/2020    Yolie Delarosa MD  [id number]  [facility] [id number]    RE: Lyubov Sanchez       Dear Colleague,    I had the pleasure of seeing Lyubov Sanchez in the [facility].    Lyubov Sanchez is a 50 year old female who is being evaluated via a billable video visit.      The patient has been notified of following:     \"This video visit will be conducted via a call between you and your physician/provider. We have found that certain health care needs can be provided without the need for an in-person physical exam.  This service lets us provide the care you need with a video conversation.  If a prescription is necessary we can send it directly to your pharmacy.  If lab work is needed we can place an order for that and you can then stop by our lab to have the test done at a later time.    Video visits are billed at different rates depending on your insurance coverage.  Please reach out to your insurance provider with any questions.    If during the course of the call the physician/provider feels a video visit is not appropriate, you will not be charged for this service.\"    Patient has given verbal consent for Video visit? Yes  How would you like to obtain your AVS? MyChart  If you are dropped from the video visit, the video invite should be resent to: Text to cell phone: [phone]  Will anyone else be joining your video visit? No          Review Of Systems  Skin: NEGATIVE  Eyes:Ears/Nose/Throat: NEGATIVE  Respiratory: sleep apnea  Cardiovascular:no issues, feels good  Gastrointestinal: NEGATIVE  Genitourinary:NEGATIVE   Musculoskeletal: NEGATIVE  Neurologic: anxiety  Psychiatric: NEGATIVE  Hematologic/Lymphatic/Immunologic: NEGATIVE  Endocrine:  NEGATIVE  Vitals - Patient Reported  Systolic (Patient Reported): 134  Diastolic (Patient Reported): 89  Weight (Patient Reported): 117.9 kg (260 lb)  Pulse (Patient Reported): 96        Telephone number of patient: " [phone]      Video-Visit Details    Type of service:  Video Visit    Video Start Time: 1:50 PM  Video End Time: 2:05 PM    Originating Location (pt. Location): Home    Distant Location (provider location):  [facility]     Platform used for Video Visit: Kizzy Bernal LPN      Cardiology Clinic ([facility] - ASD)    Assessment & Plan       1.  ASD S/P Closure with 30 mm Cardioform septal occluder August 2020  2.  Elevated BMI  3.  Hyperlipidemia  4.  Obstructive sleep apnea  5.  Mildly dilated proximal ascending aorta  6.  Mild pulmonary hypertension from # 1  7.  Patient S/P hysterectomy  8.  HTN  9.  PAF S/P CV to NSR    Recommendations    1.  Patient has done well after her closure of her ASD.  Recent echocardiogram was reviewed demonstrating well-seated device with no residual shunting.  2.  She had paroxysmal atrial fibrillation post procedure and is now on antiplatelet and anticoagulation.  Remains in normal sinus rhythm.  We will now ask her to discontinue her Eliquis and Plavix on December 18, 2020 in anticipation of her hernia surgery later this month.    3.  Cleared for her hernia surgery without any additional cardiac testing  4.  I will asked patient to take a baby aspirin only moving forward after her surgery is complete and cleared from her surgeons to resume.  5.  Return to clinic in summer 2021 with preclinic echocardiogram.  We have wished her best on her upcoming surgery      It was a pleasure seeing her on your behalf      Corby Hodges MD      HPI:    Patient is a very pleasant 50-year-old woman who I the pleasure meeting earlier this year preoperative clearance for hysterectomy which she underwent without difficulty..  Prior to her preoperative visit she had an echocardiogram that suggested an interatrial shunt.  This was not clarified clearly on the transthoracic study.  She had a CONCEPCION performed demonstrating a small ASD with diameter of 8  mm.     Patient ultimately underwent ASD repair with a cardio firm 30 mm [place] device in August 2020.  This was uneventful.  In September she was awakened with a pounding heart rate and eventually sought medical attention.  EKG demonstrated atrial fibrillation rapid ventricular response.  She was placed on Eliquis and metoprolol and underwent CONCEPCION cardioversion to normal sinus rhythm.  Subsequent monitoring has demonstrated no recurrence of her atrial fibrillation however occasional PACs and PVCs.  She was symptomatic initially for the next few weeks however these have since abated the last 10 to 12 days.  She is continue to be maintained on her metoprolol Plavix as well as Eliquis.  She also had Lasix placed with improvement in her shortness of breath.  An echocardiogram has been recently performed demonstrating no shunting and a well-placed ASD device.     Since my last visit she had a presentation to the emergency room on November 24, 2020.  At that time she had abdominal pain and there was a concern regarding incarcerated umbilical hernia.  Fortunately the testing did not suggest this and she has done well with conservative management.  As a result of her presentation her hernia surgery has been moved off to December 29, 2020.  Here for a close follow-up and planning for preoperative clearance regarding her medical therapy.    Cardiac MRI:    Normal left ventricular size and systolic function. Quantitative LVEF 71%.   Mildly dilated right ventricle with normal RV systolic function. Quantitative RVEF 65%.   There is a small secundum atrial septal defect, located in the superior aspect of the interatrial septum,  measures 7.7mm. The superior rim measures approximately 2.6mm.  By phase contrast imaging, Qp/Qs is 1.27.   Pulmonary vein angiogram reveals no evidence of anomalous venous return.   Dilated main pulmonary artery (3.8cm).       Primary Care Physician   Yolie Delarosa      Patient Active Problem List    Diagnosis     Hyperlipidemia LDL goal <100     Migraine     HTN, goal below 140/90     Migraine without aura     Health Care Home     Anxiety     Morbid obesity due to excess calories (H)     KRISTYN (obstructive sleep apnea)     Incomplete right bundle branch block     S/P total knee arthroplasty     Major depressive disorder, recurrent episode, mild (H)     Aneurysm, ascending aorta (H)     Umbilical hernia without obstruction and without gangrene     Patent foramen ovale     Pulmonary hypertension (H)     Ventral hernia without obstruction or gangrene       Past Medical History   I have reviewed this patient's medical history and updated it with pertinent information if needed.   Past Medical History:   Diagnosis Date     Anxiety and depression     Pt reports is on Rx Celexa.     Aortic aneurysm (H)     Pt reports its small and is currently being monitored.     Arthritis      Depressive disorder      DYSPLASIA OF CERVIX 3/6/2003     Dysplasia of cervix (uteri) 2003    Rx cryotherapy Nov 03, and 2005     Hypertension     NO cardiology     Incomplete right bundle branch block 11/10/2017     Migraine      Other chronic pain     Knee pain for 8 years     Plantar fasciitis     bilat     Unspecified sleep apnea     CPAP will bring on the day of surgery.     Ventral hernia without obstruction or gangrene 4/15/2020       Past Surgical History   I have reviewed this patient's surgical history and updated it with pertinent information if needed.  Past Surgical History:   Procedure Laterality Date     ANESTHESIA CARDIOVERSION N/A 9/16/2020    Procedure: ANESTHESIA, FOR CONCEPCION/CARDIOVERSION;  Surgeon: GENERIC ANESTHESIA PROVIDER;  Location: [facility] OR     ARTHROPLASTY KNEE Right 11/17/2017    Procedure: ARTHROPLASTY KNEE;  Right total knee arthroplasty using the Arthrex iBalance total knee system;  Surgeon: Hebert Lee MD;  Location: [facility] OR     ARTHROPLASTY KNEE Left 3/19/2018    Procedure: ARTHROPLASTY KNEE;  Left total  knee arthroplasty using an Arthrex iBalance total knee system;  Surgeon: Hebert Lee MD;  Location: [facility] OR     C  DELIVERY ONLY  ,    , Low Cervical     CV ASD CLOSURE N/A 2020    Procedure: ASD Closure;  Surgeon: Frdedie Frias MD;  Location:  HEART CARDIAC CATH LAB     CV RIGHT HEART CATH N/A 2020    Procedure: Right Heart Cath;  Surgeon: Freddie Frias MD;  Location:  HEART CARDIAC CATH LAB     DAVINCI HYSTERECTOMY TOTAL, BILATERAL SALPINGO-OOPHORECTOMY, COMBINED Bilateral 2020    Procedure: DaVinci robotic-assisted total laparoscopic hysterectomy, bilateral salpingectomy;  Surgeon: Venancio Bartlett MD;  Location: [facility] OR - Path all benign     DAVINCI HYSTERECTOMY TOTAL, SALPINGECTOMY BILATERAL Bilateral 2020    Fibroid uterus, Menometrorrhagia - Robotic TLH, Bilateral salpingectomy - Path all benign     ENT SURGERY       HC COLP VAGINA W CERVIX IF PRES W BIOPSY      [place] for ASCUS     TONSILLECTOMY & ADENOIDECTOMY         Prior to Admission Medications   Cannot display prior to admission medications because the patient has not been admitted in this contact.     [unfilled]  [unfilled]  Allergies   Allergies   Allergen Reactions     Penicillins Hives     hives       Social History    reports that she quit smoking about 13 years ago. Her smoking use included cigarettes. She has a 2.50 pack-year smoking history. She has never used smokeless tobacco. She reports current alcohol use. She reports that she does not use drugs.    Family History   Family History   Adopted: Yes   Problem Relation Age of Onset     Unknown/Adopted Other         pt is adopted and has no access to health history     Unknown/Adopted Mother      Unknown/Adopted Father      Unknown/Adopted Maternal Grandmother      Unknown/Adopted Maternal Grandfather      Unknown/Adopted Paternal Grandmother      Unknown/Adopted Other        Review of Systems   The comprehensive 10  point Review of Systems is negative other than noted in the HPI or here.     Physical Exam   Vital Signs with Ranges     Wt Readings from Last 4 Encounters:   20 120.7 kg (266 lb 3.2 oz)   20 117 kg (258 lb)   20 114.4 kg (252 lb 4.8 oz)   20 115.7 kg (255 lb)     [unfilled]      Vitals: LMP  (LMP Unknown)     GENERAL: Healthy, alert and no distress  EYES: Eyes grossly normal to inspection.  No discharge or erythema, or obvious scleral/conjunctival abnormalities.  RESP: No audible wheeze, cough, or visible cyanosis.  No visible retractions or increased work of breathing.    SKIN: Visible skin clear. No significant rash, abnormal pigmentation or lesions.  NEURO: Cranial nerves grossly intact.  Mentation and speech appropriate for age.  PSYCH: Mentation appears normal, affect normal/bright, judgement and insight intact, normal speech and appearance well-groomed.    @LABRCNTIPR(tropi:5,troponinies:5)@    @LABRCNTIPR(wbc:3,hgb:3,mcv:3,plt:3,inr:3,na:3,potassium:3,chloride:3,co2:3,bun:3,cr:3,gfrestimated:3,gfrestblack:3,aniongap:3,bob:3,glc:3,albumin:2,prottotal:2,bilitotal:2,alkphos:2,alt:2,ast:2,lipase:2,tropi:3)@  Recent Labs   Lab Test 20  0902 17  0945   CHOL 182 155   HDL 41* 36*   * 68   TRIG 119 256*     @LABRCNTIP(wbc:3,hgb:3,hct:3,mcv:3,plt:3,iron:3,ironsat:3,reticabsct:3,retp:3,feb:3,sigifredo:3,b12:3,folic:3,epoe:3,morph:3)@  @LABRCNTIP(PH:3,PHV:3,PO2:3,PO2V:3,sat:3,PCO2:3,PCO2V:3,HCO3:3,HCO3V:3)@  @LABRCNTIP(NTBNPI:3,NTBNP:3)@  @LABRCNTIP(DD:1)@  @LABRCNTIP(sed:3,crp:3)@  @LABRCNTIP(PLT:3)@  @LABRCNTIP(TSH:3)@  @LABRCNTIP(color:1,appearance:1,urineg,urinebili:1,urineketone:1,s,ubld:1,urineph:1,protein:1,urobilinogen:1,nitrite:1,leukest:1,rbcu:1,wbcu:1)@    Imaging:  No results found for this or any previous visit (from the past 48 hour(s)).    Echo:  No results found for this or any previous visit (from the past 4320 hour(s)).        Thank you for allowing me  to participate in the care of your patient.    Sincerely,     Corby Hodges MD     [facility]

## 2020-12-22 ENCOUNTER — OFFICE VISIT (OUTPATIENT)
Dept: INTERNAL MEDICINE | Facility: CLINIC | Age: 50
End: 2020-12-22
Payer: COMMERCIAL

## 2020-12-22 VITALS
DIASTOLIC BLOOD PRESSURE: 82 MMHG | BODY MASS INDEX: 47.56 KG/M2 | HEIGHT: 64 IN | TEMPERATURE: 98.2 F | HEART RATE: 78 BPM | OXYGEN SATURATION: 98 % | SYSTOLIC BLOOD PRESSURE: 122 MMHG | WEIGHT: 278.6 LBS

## 2020-12-22 DIAGNOSIS — Q21.10 ASD (ATRIAL SEPTAL DEFECT): ICD-10-CM

## 2020-12-22 DIAGNOSIS — K43.9 VENTRAL HERNIA WITHOUT OBSTRUCTION OR GANGRENE: Primary | ICD-10-CM

## 2020-12-22 DIAGNOSIS — F33.0 MAJOR DEPRESSIVE DISORDER, RECURRENT EPISODE, MILD (H): ICD-10-CM

## 2020-12-22 DIAGNOSIS — Z01.818 PREOP GENERAL PHYSICAL EXAM: ICD-10-CM

## 2020-12-22 DIAGNOSIS — K42.9 UMBILICAL HERNIA WITHOUT OBSTRUCTION AND WITHOUT GANGRENE: ICD-10-CM

## 2020-12-22 DIAGNOSIS — I10 HTN, GOAL BELOW 140/90: ICD-10-CM

## 2020-12-22 DIAGNOSIS — I48.0 PAROXYSMAL ATRIAL FIBRILLATION (H): ICD-10-CM

## 2020-12-22 LAB
ANION GAP SERPL CALCULATED.3IONS-SCNC: 4 MMOL/L (ref 3–14)
BUN SERPL-MCNC: 12 MG/DL (ref 7–30)
CALCIUM SERPL-MCNC: 9.1 MG/DL (ref 8.5–10.1)
CHLORIDE SERPL-SCNC: 107 MMOL/L (ref 94–109)
CO2 SERPL-SCNC: 28 MMOL/L (ref 20–32)
CREAT SERPL-MCNC: 0.62 MG/DL (ref 0.52–1.04)
GFR SERPL CREATININE-BSD FRML MDRD: >90 ML/MIN/{1.73_M2}
GLUCOSE SERPL-MCNC: 102 MG/DL (ref 70–99)
POTASSIUM SERPL-SCNC: 4.5 MMOL/L (ref 3.4–5.3)
SODIUM SERPL-SCNC: 139 MMOL/L (ref 133–144)

## 2020-12-22 PROCEDURE — 99215 OFFICE O/P EST HI 40 MIN: CPT | Mod: 25 | Performed by: INTERNAL MEDICINE

## 2020-12-22 PROCEDURE — 36415 COLL VENOUS BLD VENIPUNCTURE: CPT | Performed by: INTERNAL MEDICINE

## 2020-12-22 PROCEDURE — 90686 IIV4 VACC NO PRSV 0.5 ML IM: CPT | Performed by: INTERNAL MEDICINE

## 2020-12-22 PROCEDURE — 90471 IMMUNIZATION ADMIN: CPT | Performed by: INTERNAL MEDICINE

## 2020-12-22 PROCEDURE — 80048 BASIC METABOLIC PNL TOTAL CA: CPT | Performed by: INTERNAL MEDICINE

## 2020-12-22 RX ORDER — CETIRIZINE HYDROCHLORIDE 10 MG/1
10 TABLET ORAL PRN
COMMUNITY

## 2020-12-22 ASSESSMENT — MIFFLIN-ST. JEOR: SCORE: 1873.72

## 2020-12-22 ASSESSMENT — PATIENT HEALTH QUESTIONNAIRE - PHQ9: SUM OF ALL RESPONSES TO PHQ QUESTIONS 1-9: 5

## 2020-12-22 NOTE — PROGRESS NOTES
[address] [address]  Phone: [phone]  Primary Provider: Yolie Delarosa  Pre-op Performing Provider: IVY MAI    PREOPERATIVE EVALUATION:  Today's date: 12/22/2020    Lyubov Sanchez is a 50 year old female who presents for a preoperative evaluation.    Surgical Information:  Surgery/Procedure: ROBOTIC ASSISTED VENTRAL AND INCISIONAL HERNIA REPAIR WITH MESH  Surgery Location: [facility]   Surgeon: Violetta Lazaro   Surgery Date: 12/29/2020  Time of Surgery: 10:00AM  Where patient plans to recover: At home with family  Fax number for surgical facility: Note does not need to be faxed, will be available electronically in Epic.    Type of Anesthesia Anticipated: to be determined    Subjective     HPI related to upcoming procedure: ventral hernia and umbilical hernia.     Preop Questions 12/22/2020   1. Have you ever had a heart attack or stroke? No   2. Have you ever had surgery on your heart or blood vessels, such as a stent placement, a coronary artery bypass, or surgery on an artery in your head, neck, heart, or legs? No   3. Do you have chest pain with activity? No   4. Do you have a history of  heart failure? No   5. Do you currently have a cold, bronchitis or symptoms of other infection? No   6. Do you have a cough, shortness of breath, or wheezing? No   7. Do you or anyone in your family have previous history of blood clots? No (family history unknown, she is adopted)   8. Do you or does anyone in your family have a serious bleeding problem such as prolonged bleeding following surgeries or cuts? No (family history unknown, she is adopted)   9. Have you ever had problems with anemia or been told to take iron pills? No   10. Have you had any abnormal blood loss such as black, tarry or bloody stools, or abnormal vaginal bleeding? No   11. Have you ever had a blood transfusion? No   12. Are you willing  to have a blood transfusion if it is medically needed before, during, or after your surgery? Yes   13. Have you or any of your relatives ever had problems with anesthesia? No   14. Do you have sleep apnea, excessive snoring or daytime drowsiness? YES - sleep apnea on CPAP   14a. Do you have a CPAP machine? Yes   15. Do you have any artifical heart valves or other implanted medical devices like a pacemaker, defibrillator, or continuous glucose monitor? No devices - but recent ASD closure with 30mm [place] Cardioform, implanted 8/2020   15a. What type of device do you have? patch for a hole in my heart   15b. Name of the clinic that manages your device:  n/a   16. Do you have artificial joints? YES - both knees    17. Are you allergic to latex? No   18. Is there any chance that you may be pregnant? No       Health Care Directive:  Patient does not have a Health Care Directive or Living Will:     Preoperative Review of :   reviewed - no record of controlled substances prescribed.        *  No recent infectious illnesses.    *  No recent cardiac or pulmonary issues or symptoms.    *  No problems performing vigorous physical activity, no changes in exercise tolerance.    *  No personal or family history of anesthesia complications.    *  No personal or family history of bleeding or clotting disorders.     Atrial Septal defect:  ASD closure with 30mm [place] Cardioform, implanted 8/2020, follow up echocardiogram normal in November 2020    Paroxysmal atrial fibrillation:  Brief atrial fibrillation after ASD closure procedure, has since converted back to sinus rhythm.  Eliquis and plavix recently discontinued by Cardiology Clinic, will return to just daily 81 mg ASA.     Hypertension:  History of hypertension, on medication. No reported side effects from medications.    Reviewed last 6 BP readings in chart:  BP Readings from Last 6 Encounters:   12/22/20 122/82   11/24/20 122/84   11/05/20 110/69   09/26/20 128/72    09/18/20 100/70   09/16/20 118/82     No active cardiac complaints or symptoms with exercise.         Review of Systems  Constitutional, neuro, ENT, endocrine, pulmonary, cardiac, gastrointestinal, genitourinary, musculoskeletal, integument and psychiatric systems are negative, except as otherwise noted.    Patient Active Problem List    Diagnosis Date Noted     Ventral hernia 11/23/2020     Priority: Medium     Added automatically from request for surgery [id number]       Atrial fibrillation with RVR (H) 09/15/2020     Priority: Medium     ASD (atrial septal defect) 08/26/2020     Priority: Medium     Ostium secundum type atrial septal defect 07/29/2020     Priority: Medium     Added automatically from request for surgery [id number]       Ventral hernia without obstruction or gangrene 04/15/2020     Priority: Medium     Patent foramen ovale 01/09/2020     Priority: Medium     Pulmonary hypertension (H) 01/09/2020     Priority: Medium     Umbilical hernia without obstruction and without gangrene 12/02/2019     Priority: Medium     Aneurysm, ascending aorta (H) 10/31/2019     Priority: Medium     Major depressive disorder, recurrent episode, mild (H) 04/24/2019     Priority: Medium     S/P total knee arthroplasty 11/17/2017     Priority: Medium     Incomplete right bundle branch block 11/10/2017     Priority: Medium     KRISTYN (obstructive sleep apnea) 11/02/2017     Priority: Medium     Morbid obesity due to excess calories (H) 07/22/2016     Priority: Medium     Anxiety 06/03/2016     Priority: Medium     Health Care Home 06/23/2015     Priority: Medium        Status:  Closed   Care Coordinator:  Caroline Snow [phone]   See Letters for [facility]  Date:  July 3, 2015               Migraine without aura 03/13/2015     Priority: Medium     Problem list name updated by automated process. Provider to review       HTN, goal below 140/90 10/21/2014     Priority: Medium     Migraine 01/13/2012      Priority: Medium     Hyperlipidemia LDL goal <100 10/31/2010     Priority: Medium      Past Medical History:   Diagnosis Date     Anxiety and depression     Pt reports is on Rx Celexa.     Aortic aneurysm (H)     Pt reports its small and is currently being monitored.     Arthritis      Depressive disorder      DYSPLASIA OF CERVIX 3/6/2003     Dysplasia of cervix (uteri)     Rx cryotherapy Nov , and      Hypertension     NO cardiology     Incomplete right bundle branch block 11/10/2017     Migraine      Other chronic pain     Knee pain for 8 years     Plantar fasciitis     bilat     Unspecified sleep apnea     CPAP will bring on the day of surgery.     Ventral hernia without obstruction or gangrene 4/15/2020     Past Surgical History:   Procedure Laterality Date     ANESTHESIA CARDIOVERSION N/A 2020    Procedure: ANESTHESIA, FOR CONCEPCION/CARDIOVERSION;  Surgeon: GENERIC ANESTHESIA PROVIDER;  Location:  OR     ARTHROPLASTY KNEE Right 2017    Procedure: ARTHROPLASTY KNEE;  Right total knee arthroplasty using the Arthrex iBalance total knee system;  Surgeon: Hebert Lee MD;  Location: [facility] OR     ARTHROPLASTY KNEE Left 3/19/2018    Procedure: ARTHROPLASTY KNEE;  Left total knee arthroplasty using an Arthrex iBalance total knee system;  Surgeon: Hebert Lee MD;  Location:  OR     C  DELIVERY ONLY  ,    , Low Cervical     CV ASD CLOSURE N/A 2020    Procedure: ASD Closure;  Surgeon: Freddie Frias MD;  Location:  HEART CARDIAC CATH LAB     CV RIGHT HEART CATH N/A 2020    Procedure: Right Heart Cath;  Surgeon: Freddie Frias MD;  Location:  HEART CARDIAC CATH LAB     DAVINCI HYSTERECTOMY TOTAL, BILATERAL SALPINGO-OOPHORECTOMY, COMBINED Bilateral 2020    Procedure: DaVinci robotic-assisted total laparoscopic hysterectomy, bilateral salpingectomy;  Surgeon: Venancio Bartlett MD;  Location:  OR - Path all benign     DAVINCI  "HYSTERECTOMY TOTAL, SALPINGECTOMY BILATERAL Bilateral 2020    Fibroid uterus, Menometrorrhagia - Robotic TLH, Bilateral salpingectomy - Path all benign     ENT SURGERY       HC COLP VAGINA W CERVIX IF PRES W BIOPSY      [place] for ASCUS     TONSILLECTOMY & ADENOIDECTOMY       Current Outpatient Medications   Medication Sig Dispense Refill     acetaminophen (TYLENOL) 500 MG tablet Take 500-1,000 mg by mouth every 6 hours as needed for mild pain       citalopram (CELEXA) 20 MG tablet TAKE 1 TABLET BY MOUTH EVERY DAY (Patient taking differently: Take 20 mg by mouth every morning ) 90 tablet 0     furosemide (LASIX) 20 MG tablet Take 1 tablet (20 mg) by mouth daily 90 tablet 3     lisinopril (ZESTRIL) 10 MG tablet Take 1 tablet (10 mg) by mouth daily 90 tablet 3     metoprolol succinate ER (TOPROL-XL) 50 MG 24 hr tablet Take 1.5 tablets (75 mg) by mouth daily 135 tablet 3     fluticasone (FLONASE) 50 MCG/ACT nasal spray Spray 1 spray into both nostrils daily as needed for rhinitis or allergies         Allergies   Allergen Reactions     Penicillins Hives     hives        Social History     Tobacco Use     Smoking status: Former Smoker     Packs/day: 0.50     Years: 5.00     Pack years: 2.50     Types: Cigarettes     Quit date: 2007     Years since quittin.7     Smokeless tobacco: Never Used   Substance Use Topics     Alcohol use: Yes     Comment: 0-1 weekly     Family history unknown,  Patient is adopted.       History   Drug Use No         Objective     /82   Pulse 78   Temp 98.2  F (36.8  C) (Temporal)   Ht 1.626 m (5' 4\")   Wt 126.4 kg (278 lb 9.6 oz)   LMP  (LMP Unknown)   SpO2 98%   BMI 47.82 kg/m      Physical Exam  GENERAL alert and no distress  EYES:  Normal sclera,conjunctiva, EOMI  HENT: oral and posterior pharynx without lesions or erythema, facies symmetric  NECK: Neck supple. No LAD, without thyroidmegaly.  RESP: Clear to ausculation bilaterally without wheezes or crackles. " Normal BS in all fields.  CV: RRR normal S1S2 without murmurs, rubs or gallops.  LYMPH: no cervical lymph adenopathy appreciated  MS: extremities- no gross deformities of the visible extremities noted,   EXT:  no lower extremity edema  PSYCH: Alert and oriented times 3; speech- coherent  SKIN:  No obvious significant skin lesions on visible portions of face     Recent Labs   Lab Test 11/24/20  1303 10/09/20  0800 09/16/20  0534 09/16/20  0534 09/15/20  1941 09/15/20  1240 04/24/19  0813 04/24/19  0813   HGB 14.4  --   --  15.6  --  15.1   < > 15.1   *  --   --  187  --  218   < > 180   INR  --   --   --   --  1.05 1.02   < >  --     136   < > 139  --  138   < >  --    POTASSIUM 4.1 4.3   < > 4.0 3.7 4.4   < >  --    CR 0.75 0.69   < > 0.65  --  0.57   < >  --    A1C  --   --   --   --   --   --   --  5.1    < > = values in this interval not displayed.      Results for orders placed or performed in visit on 12/22/20   Basic metabolic panel     Status: Abnormal   Result Value Ref Range    Sodium 139 133 - 144 mmol/L    Potassium 4.5 3.4 - 5.3 mmol/L    Chloride 107 94 - 109 mmol/L    Carbon Dioxide 28 20 - 32 mmol/L    Anion Gap 4 3 - 14 mmol/L    Glucose 102 (H) 70 - 99 mg/dL    Urea Nitrogen 12 7 - 30 mg/dL    Creatinine 0.62 0.52 - 1.04 mg/dL    GFR Estimate >90 >60 mL/min/[1.73_m2]    GFR Estimate If Black >90 >60 mL/min/[1.73_m2]    Calcium 9.1 8.5 - 10.1 mg/dL      Diagnostics:  Labs pending at this time.  Results will be reviewed when available.       EKG (926/20): sinus rhythm, right axis deviation, normal intervals, no acute ST/T changes c/w ischemia, right ventricular hypertrophy, no LVH by voltage criteria, unchanged from previous tracings    Echocardiogram 11/2/20:  1. The left ventricle is normal in structure, function and size. The visual  ejection fraction is estimated at 60%.  2. The right ventricle is normal in structure, function and size.  3. s/p ASD closure with 30mm [place] Cardioform,  implanted 8/2020. No color  Doppler evidence of leak or flow across septum. A contrast injection (Bubble  Study) was performed that was negative for flow across the interatrial septum.  4. No valve disease.     CONCEPCION 9-16-20 showed EF 55%, tiny left to right shunt along aortic edge of ASD  device.        Revised Cardiac Risk Index (RCRI):  The patient has the following serious cardiovascular risks for perioperative complications:   - No serious cardiac risks = 0 points     RCRI Interpretation: 0 points: Class I (very low risk - 0.4% complication rate)         Assessment & Plan   The proposed surgical procedure is considered INTERMEDIATE risk.      1. Ventral hernia without obstruction or gangrene    2. Umbilical hernia without obstruction and without gangrene    3. Preop general physical exam    4. Paroxysmal atrial fibrillation (H)    5. ASD (atrial septal defect)    6. Major depressive disorder, recurrent episode, mild (H)    7. HTN, goal below 140/90         Risks and Recommendations:  The patient has the following additional risks and recommendations for perioperative complications:  Cardiovascular:  --blood pressure well controlled, no longer in atrial fibrillation.     Pulmonary:   --no active pulmonary symptoms at this time.     Obstructive Sleep Apnea:   --obstructive sleep apnea on CPAP,  --observe for desaturations while sedated, provide supplemental O2 as needed.     Medication Instructions:   - aspirin: Discontinue aspirin 7-10 days prior to procedure to reduce bleeding risk. It should be resumed postoperatively.    - ACE/ARB: NO Lisinopril for 24 hours prior to surgery due to risk of hypotension during surgery.    - Beta Blockers: Continue taking on the day of surgery.  -  Diuretics:  Do not take on the morning of surgery.     RECOMMENDATION:  APPROVAL GIVEN to proceed with proposed procedure, without further diagnostic evaluation.    Signed Electronically by: Pastor Brown MD    Copy of this  evaluation report is provided to requesting physician.    Preop [facility] Preop Guidelines    Revised Cardiac Risk Index

## 2020-12-22 NOTE — PATIENT INSTRUCTIONS
PRE-OPERATIVE INSTRUCTIONS:     *  Contact your surgeon if there is any change in your health. This includes signs of new infection, such as cold or flu (such as a sore throat, runny nose, cough, rash or fever)      *  Complete any Covid testing within 48-72 hours of the surgery.  The surgeon's office is responsible for arranging and completing this testing before surgery.  Contact the surgery office for questions about this.      *  Stop aspirin of any kind for 7 days before procedure.   (even low dose daily aspirin).    *  No NSAIDs (Motrin, Advil, ibuprofen, Aleve, etc) within 5-7 days of surgery.    *  Stop any Fish Oil or vitamin E supplements for 7 days prior to surgery because these can affect platelet function.      *  Tylenol (acetaminophen) OK to take if needed for pain or headache.  Follow instructions on the bottle    *  Prepare your body as instructed by the surgery clinic.  If instructed, Take a shower or bath the night before surgery. Use any special soaps or cleaning instructions according to instructions from your surgeon.  If you do not have soap from your surgeon, use your regular soap. Do not shave or scrub the surgery site.  Wear clean pajamas and have clean sheets on your bed     *  ON THE MORNING OF SURGERY:     --Do NOT take Furosemide     --Do NOT take Lisinopril (must be off this for 24 hours prior to surgery)     --Take only the Metoprolol with a small sip of water      *  Resume all medications at the same doses after surgery, unless instructed otherwise by the medical staff.     *  Attend all follow up appointments with the surgeons (and/or therapists if applicable) as instructed.     *  Contact the surgeon's office for any specific questions about after-surgery cares and follow up instructions.        IF YOU REQUIRE NARCOTIC PAIN MEDICATION AFTER YOUR SURGERY:    --Take the narcotic pain medication exactly as prescribed, and use the absolute lowest dose needed for pain control.   The  goal of pain medication is not complete pain relief, aim to just make it manageable.    --Beware of drowsiness, nausea, vomiting, when taking this medication.  Do not drive, or operate dangerous equipment after taking this.    --The main side effects from narcotic pain medication can also include intestinal side effects including nausea, vomiting, constipation, or diarrhea.    --If your pain is not able to be controlled with the pain medication supplied to you, contact the surgeons because uncontrolled pain can be a sign of possible surgical complications.    --In case of constipation from pain medications, take over the counter Miralax powder or stool softner Senokot.  If you have a history of constipation with narcotic pain medication, consider taking Miralax powder on any day that you take a pain tablet.

## 2020-12-27 DIAGNOSIS — Z11.59 ENCOUNTER FOR SCREENING FOR OTHER VIRAL DISEASES: ICD-10-CM

## 2020-12-27 LAB
SARS-COV-2 RNA SPEC QL NAA+PROBE: NORMAL
SPECIMEN SOURCE: NORMAL

## 2020-12-27 PROCEDURE — U0003 INFECTIOUS AGENT DETECTION BY NUCLEIC ACID (DNA OR RNA); SEVERE ACUTE RESPIRATORY SYNDROME CORONAVIRUS 2 (SARS-COV-2) (CORONAVIRUS DISEASE [COVID-19]), AMPLIFIED PROBE TECHNIQUE, MAKING USE OF HIGH THROUGHPUT TECHNOLOGIES AS DESCRIBED BY CMS-2020-01-R: HCPCS | Performed by: SURGERY

## 2020-12-28 LAB
LABORATORY COMMENT REPORT: NORMAL
SARS-COV-2 RNA SPEC QL NAA+PROBE: NEGATIVE
SPECIMEN SOURCE: NORMAL

## 2020-12-29 ENCOUNTER — ANESTHESIA EVENT (OUTPATIENT)
Dept: SURGERY | Facility: CLINIC | Age: 50
End: 2020-12-29
Payer: COMMERCIAL

## 2020-12-29 ENCOUNTER — HOSPITAL ENCOUNTER (OUTPATIENT)
Facility: CLINIC | Age: 50
Discharge: HOME | End: 2020-12-29
Attending: SURGERY | Admitting: SURGERY
Payer: COMMERCIAL

## 2020-12-29 ENCOUNTER — APPOINTMENT (OUTPATIENT)
Dept: SURGERY | Facility: PHYSICIAN GROUP | Age: 50
End: 2020-12-29
Payer: COMMERCIAL

## 2020-12-29 ENCOUNTER — ANESTHESIA (OUTPATIENT)
Dept: SURGERY | Facility: CLINIC | Age: 50
End: 2020-12-29
Payer: COMMERCIAL

## 2020-12-29 ENCOUNTER — SURGERY (OUTPATIENT)
Age: 50
End: 2020-12-29
Payer: COMMERCIAL

## 2020-12-29 VITALS
WEIGHT: 278 LBS | DIASTOLIC BLOOD PRESSURE: 86 MMHG | BODY MASS INDEX: 47.46 KG/M2 | RESPIRATION RATE: 20 BRPM | HEART RATE: 81 BPM | TEMPERATURE: 97.4 F | SYSTOLIC BLOOD PRESSURE: 133 MMHG | HEIGHT: 64 IN | OXYGEN SATURATION: 94 %

## 2020-12-29 DIAGNOSIS — K43.9 VENTRAL HERNIA: ICD-10-CM

## 2020-12-29 DIAGNOSIS — K42.9 UMBILICAL HERNIA WITHOUT OBSTRUCTION AND WITHOUT GANGRENE: ICD-10-CM

## 2020-12-29 PROCEDURE — 258N000003 HC RX IP 258 OP 636: Performed by: ANESTHESIOLOGY

## 2020-12-29 PROCEDURE — 761N000001 HC RECOVERY PHASE 1 LEVEL 1 FIRST HR: Performed by: SURGERY

## 2020-12-29 PROCEDURE — 49652 PR LAP VENT/ABD HERNIA REPAIR: CPT | Mod: AS | Performed by: PHYSICIAN ASSISTANT

## 2020-12-29 PROCEDURE — 250N000011 HC RX IP 250 OP 636: Performed by: PHYSICIAN ASSISTANT

## 2020-12-29 PROCEDURE — 761N000002 HC RECOVERY PHASE 1 LEVEL 1 EA ADDTL HR: Performed by: SURGERY

## 2020-12-29 PROCEDURE — 370N000001 HC ANESTHESIA TECHNICAL FEE, 1ST 30 MIN: Performed by: SURGERY

## 2020-12-29 PROCEDURE — 250N000011 HC RX IP 250 OP 636: Performed by: NURSE ANESTHETIST, CERTIFIED REGISTERED

## 2020-12-29 PROCEDURE — 370N000002 HC ANESTHESIA TECHNICAL FEE, EACH ADDTL 15 MIN: Performed by: SURGERY

## 2020-12-29 PROCEDURE — 258N000003 HC RX IP 258 OP 636: Performed by: NURSE ANESTHETIST, CERTIFIED REGISTERED

## 2020-12-29 PROCEDURE — C1781 MESH (IMPLANTABLE): HCPCS | Performed by: SURGERY

## 2020-12-29 PROCEDURE — 250N000009 HC RX 250: Performed by: SURGERY

## 2020-12-29 PROCEDURE — 761N000007 HC RECOVERY PHASE 2 EACH 15 MINS: Performed by: SURGERY

## 2020-12-29 PROCEDURE — 49652 PR LAP VENT/ABD HERNIA REPAIR: CPT | Performed by: SURGERY

## 2020-12-29 PROCEDURE — 272N000001 HC OR GENERAL SUPPLY STERILE: Performed by: SURGERY

## 2020-12-29 PROCEDURE — 250N000013 HC RX MED GY IP 250 OP 250 PS 637: Performed by: SURGERY

## 2020-12-29 PROCEDURE — 360N000068 HC SURGERY LEVEL 8 1ST 30 MIN: Performed by: SURGERY

## 2020-12-29 PROCEDURE — 999N000136 HC STATISTIC PRE PROC ASSESS II: Performed by: SURGERY

## 2020-12-29 PROCEDURE — 360N000069 HC SURGERY LEVEL 8 EA 15 ADDTL MIN: Performed by: SURGERY

## 2020-12-29 PROCEDURE — 250N000009 HC RX 250: Performed by: NURSE ANESTHETIST, CERTIFIED REGISTERED

## 2020-12-29 DEVICE — MESH COMPOSITE PARIETENE DS 20X15X1CM PPDS2015: Type: IMPLANTABLE DEVICE | Site: ABDOMEN | Status: FUNCTIONAL

## 2020-12-29 RX ORDER — NALOXONE HYDROCHLORIDE 0.4 MG/ML
0.4 INJECTION, SOLUTION INTRAMUSCULAR; INTRAVENOUS; SUBCUTANEOUS
Status: DISCONTINUED | OUTPATIENT
Start: 2020-12-29 | End: 2020-12-29 | Stop reason: HOSPADM

## 2020-12-29 RX ORDER — ONDANSETRON 2 MG/ML
4 INJECTION INTRAMUSCULAR; INTRAVENOUS EVERY 30 MIN PRN
Status: DISCONTINUED | OUTPATIENT
Start: 2020-12-29 | End: 2020-12-29 | Stop reason: HOSPADM

## 2020-12-29 RX ORDER — DEXAMETHASONE SODIUM PHOSPHATE 4 MG/ML
INJECTION, SOLUTION INTRA-ARTICULAR; INTRALESIONAL; INTRAMUSCULAR; INTRAVENOUS; SOFT TISSUE PRN
Status: DISCONTINUED | OUTPATIENT
Start: 2020-12-29 | End: 2020-12-29

## 2020-12-29 RX ORDER — FENTANYL CITRATE 50 UG/ML
25-50 INJECTION, SOLUTION INTRAMUSCULAR; INTRAVENOUS
Status: DISCONTINUED | OUTPATIENT
Start: 2020-12-29 | End: 2020-12-29 | Stop reason: HOSPADM

## 2020-12-29 RX ORDER — NALOXONE HYDROCHLORIDE 0.4 MG/ML
0.2 INJECTION, SOLUTION INTRAMUSCULAR; INTRAVENOUS; SUBCUTANEOUS
Status: DISCONTINUED | OUTPATIENT
Start: 2020-12-29 | End: 2020-12-29 | Stop reason: HOSPADM

## 2020-12-29 RX ORDER — OXYCODONE HYDROCHLORIDE 5 MG/1
5-10 TABLET ORAL EVERY 4 HOURS PRN
Qty: 20 TABLET | Refills: 0 | Status: SHIPPED | OUTPATIENT
Start: 2020-12-29 | End: 2021-01-14

## 2020-12-29 RX ORDER — LIDOCAINE HYDROCHLORIDE 10 MG/ML
INJECTION, SOLUTION INFILTRATION; PERINEURAL PRN
Status: DISCONTINUED | OUTPATIENT
Start: 2020-12-29 | End: 2020-12-29

## 2020-12-29 RX ORDER — ONDANSETRON 2 MG/ML
INJECTION INTRAMUSCULAR; INTRAVENOUS PRN
Status: DISCONTINUED | OUTPATIENT
Start: 2020-12-29 | End: 2020-12-29

## 2020-12-29 RX ORDER — FENTANYL CITRATE 50 UG/ML
INJECTION, SOLUTION INTRAMUSCULAR; INTRAVENOUS PRN
Status: DISCONTINUED | OUTPATIENT
Start: 2020-12-29 | End: 2020-12-29

## 2020-12-29 RX ORDER — CEFAZOLIN SODIUM 1 G/3ML
1 INJECTION, POWDER, FOR SOLUTION INTRAMUSCULAR; INTRAVENOUS SEE ADMIN INSTRUCTIONS
Status: DISCONTINUED | OUTPATIENT
Start: 2020-12-29 | End: 2020-12-29 | Stop reason: HOSPADM

## 2020-12-29 RX ORDER — OXYCODONE HYDROCHLORIDE 5 MG/1
10 TABLET ORAL
Status: COMPLETED | OUTPATIENT
Start: 2020-12-29 | End: 2020-12-29

## 2020-12-29 RX ORDER — GLYCOPYRROLATE 0.2 MG/ML
INJECTION, SOLUTION INTRAMUSCULAR; INTRAVENOUS PRN
Status: DISCONTINUED | OUTPATIENT
Start: 2020-12-29 | End: 2020-12-29

## 2020-12-29 RX ORDER — LIDOCAINE 40 MG/G
CREAM TOPICAL
Status: DISCONTINUED | OUTPATIENT
Start: 2020-12-29 | End: 2020-12-29 | Stop reason: HOSPADM

## 2020-12-29 RX ORDER — SODIUM CHLORIDE, SODIUM LACTATE, POTASSIUM CHLORIDE, CALCIUM CHLORIDE 600; 310; 30; 20 MG/100ML; MG/100ML; MG/100ML; MG/100ML
INJECTION, SOLUTION INTRAVENOUS CONTINUOUS
Status: DISCONTINUED | OUTPATIENT
Start: 2020-12-29 | End: 2020-12-29 | Stop reason: HOSPADM

## 2020-12-29 RX ORDER — KETAMINE HYDROCHLORIDE 10 MG/ML
INJECTION INTRAMUSCULAR; INTRAVENOUS PRN
Status: DISCONTINUED | OUTPATIENT
Start: 2020-12-29 | End: 2020-12-29

## 2020-12-29 RX ORDER — BUPIVACAINE HYDROCHLORIDE 5 MG/ML
INJECTION, SOLUTION EPIDURAL; INTRACAUDAL PRN
Status: DISCONTINUED | OUTPATIENT
Start: 2020-12-29 | End: 2020-12-29 | Stop reason: HOSPADM

## 2020-12-29 RX ORDER — NEOSTIGMINE METHYLSULFATE 1 MG/ML
VIAL (ML) INJECTION PRN
Status: DISCONTINUED | OUTPATIENT
Start: 2020-12-29 | End: 2020-12-29

## 2020-12-29 RX ORDER — AMOXICILLIN 250 MG
1-2 CAPSULE ORAL 2 TIMES DAILY
Qty: 30 TABLET | Refills: 0 | Status: SHIPPED | OUTPATIENT
Start: 2020-12-29 | End: 2021-01-14

## 2020-12-29 RX ORDER — ACETAMINOPHEN 325 MG/1
650 TABLET ORAL
Status: DISCONTINUED | OUTPATIENT
Start: 2020-12-29 | End: 2020-12-29 | Stop reason: HOSPADM

## 2020-12-29 RX ORDER — ONDANSETRON 4 MG/1
4 TABLET, ORALLY DISINTEGRATING ORAL EVERY 30 MIN PRN
Status: DISCONTINUED | OUTPATIENT
Start: 2020-12-29 | End: 2020-12-29 | Stop reason: HOSPADM

## 2020-12-29 RX ORDER — HYDROMORPHONE HYDROCHLORIDE 1 MG/ML
.3-.5 INJECTION, SOLUTION INTRAMUSCULAR; INTRAVENOUS; SUBCUTANEOUS EVERY 10 MIN PRN
Status: DISCONTINUED | OUTPATIENT
Start: 2020-12-29 | End: 2020-12-29 | Stop reason: HOSPADM

## 2020-12-29 RX ORDER — PROPOFOL 10 MG/ML
INJECTION, EMULSION INTRAVENOUS PRN
Status: DISCONTINUED | OUTPATIENT
Start: 2020-12-29 | End: 2020-12-29

## 2020-12-29 RX ORDER — LABETALOL 20 MG/4 ML (5 MG/ML) INTRAVENOUS SYRINGE
10
Status: DISCONTINUED | OUTPATIENT
Start: 2020-12-29 | End: 2020-12-29 | Stop reason: HOSPADM

## 2020-12-29 RX ORDER — IBUPROFEN 200 MG
600 TABLET ORAL EVERY 6 HOURS PRN
COMMUNITY
Start: 2020-12-29 | End: 2023-04-12

## 2020-12-29 RX ORDER — MEPERIDINE HYDROCHLORIDE 25 MG/ML
12.5 INJECTION INTRAMUSCULAR; INTRAVENOUS; SUBCUTANEOUS
Status: DISCONTINUED | OUTPATIENT
Start: 2020-12-29 | End: 2020-12-29 | Stop reason: HOSPADM

## 2020-12-29 RX ORDER — CEFAZOLIN SODIUM IN 0.9 % NACL 3 G/100 ML
3 INTRAVENOUS SOLUTION, PIGGYBACK (ML) INTRAVENOUS
Status: COMPLETED | OUTPATIENT
Start: 2020-12-29 | End: 2020-12-29

## 2020-12-29 RX ORDER — PROPOFOL 10 MG/ML
INJECTION, EMULSION INTRAVENOUS CONTINUOUS PRN
Status: DISCONTINUED | OUTPATIENT
Start: 2020-12-29 | End: 2020-12-29

## 2020-12-29 RX ADMIN — PROPOFOL 35 MCG/KG/MIN: 10 INJECTION, EMULSION INTRAVENOUS at 10:11

## 2020-12-29 RX ADMIN — SODIUM CHLORIDE, POTASSIUM CHLORIDE, SODIUM LACTATE AND CALCIUM CHLORIDE: 600; 310; 30; 20 INJECTION, SOLUTION INTRAVENOUS at 09:30

## 2020-12-29 RX ADMIN — GLYCOPYRROLATE 0.2 MG: 0.2 INJECTION, SOLUTION INTRAMUSCULAR; INTRAVENOUS at 10:03

## 2020-12-29 RX ADMIN — PROPOFOL 200 MG: 10 INJECTION, EMULSION INTRAVENOUS at 10:03

## 2020-12-29 RX ADMIN — ROCURONIUM BROMIDE 30 MG: 10 INJECTION INTRAVENOUS at 10:30

## 2020-12-29 RX ADMIN — ROCURONIUM BROMIDE 10 MG: 10 INJECTION INTRAVENOUS at 11:28

## 2020-12-29 RX ADMIN — BUPIVACAINE HYDROCHLORIDE 20 ML: 5 INJECTION, SOLUTION EPIDURAL; INTRACAUDAL at 13:35

## 2020-12-29 RX ADMIN — ONDANSETRON HYDROCHLORIDE 4 MG: 2 INJECTION, SOLUTION INTRAVENOUS at 10:03

## 2020-12-29 RX ADMIN — SODIUM CHLORIDE, POTASSIUM CHLORIDE, SODIUM LACTATE AND CALCIUM CHLORIDE: 600; 310; 30; 20 INJECTION, SOLUTION INTRAVENOUS at 13:36

## 2020-12-29 RX ADMIN — LIDOCAINE HYDROCHLORIDE 50 MG: 10 INJECTION, SOLUTION INFILTRATION; PERINEURAL at 10:03

## 2020-12-29 RX ADMIN — SODIUM CHLORIDE, POTASSIUM CHLORIDE, SODIUM LACTATE AND CALCIUM CHLORIDE: 600; 310; 30; 20 INJECTION, SOLUTION INTRAVENOUS at 11:19

## 2020-12-29 RX ADMIN — ROCURONIUM BROMIDE 40 MG: 10 INJECTION INTRAVENOUS at 10:03

## 2020-12-29 RX ADMIN — OXYCODONE HYDROCHLORIDE 10 MG: 5 TABLET ORAL at 15:39

## 2020-12-29 RX ADMIN — Medication 3 G: at 09:57

## 2020-12-29 RX ADMIN — DEXAMETHASONE SODIUM PHOSPHATE 4 MG: 4 INJECTION, SOLUTION INTRA-ARTICULAR; INTRALESIONAL; INTRAMUSCULAR; INTRAVENOUS; SOFT TISSUE at 10:03

## 2020-12-29 RX ADMIN — PHENYLEPHRINE HYDROCHLORIDE 200 MCG: 10 INJECTION INTRAVENOUS at 10:43

## 2020-12-29 RX ADMIN — Medication 50 MG: at 10:29

## 2020-12-29 RX ADMIN — ROCURONIUM BROMIDE 20 MG: 10 INJECTION INTRAVENOUS at 12:10

## 2020-12-29 RX ADMIN — GLYCOPYRROLATE 0.6 MG: 0.2 INJECTION, SOLUTION INTRAMUSCULAR; INTRAVENOUS at 13:38

## 2020-12-29 RX ADMIN — HYDROMORPHONE HYDROCHLORIDE 0.5 MG: 1 INJECTION, SOLUTION INTRAMUSCULAR; INTRAVENOUS; SUBCUTANEOUS at 13:34

## 2020-12-29 RX ADMIN — FENTANYL CITRATE 100 MCG: 50 INJECTION, SOLUTION INTRAMUSCULAR; INTRAVENOUS at 10:03

## 2020-12-29 RX ADMIN — PHENYLEPHRINE HYDROCHLORIDE 200 MCG: 10 INJECTION INTRAVENOUS at 10:26

## 2020-12-29 RX ADMIN — Medication 5 MG: at 13:38

## 2020-12-29 RX ADMIN — ROCURONIUM BROMIDE 20 MG: 10 INJECTION INTRAVENOUS at 12:45

## 2020-12-29 RX ADMIN — MIDAZOLAM 2 MG: 1 INJECTION INTRAMUSCULAR; INTRAVENOUS at 09:57

## 2020-12-29 RX ADMIN — HYDROMORPHONE HYDROCHLORIDE 1 MG: 1 INJECTION, SOLUTION INTRAMUSCULAR; INTRAVENOUS; SUBCUTANEOUS at 12:30

## 2020-12-29 ASSESSMENT — COPD QUESTIONNAIRES: COPD: 0

## 2020-12-29 ASSESSMENT — LIFESTYLE VARIABLES: TOBACCO_USE: 1

## 2020-12-29 ASSESSMENT — ENCOUNTER SYMPTOMS
DYSRHYTHMIAS: 1
SEIZURES: 0
STRIDOR: 0

## 2020-12-29 ASSESSMENT — MIFFLIN-ST. JEOR: SCORE: 1871

## 2020-12-29 NOTE — BRIEF OP NOTE
[facility]    Brief Operative Note    Pre-operative diagnosis: Ventral hernia [K43.9]  Umbilical hernia without obstruction and without gangrene [K42.9]  Post-operative diagnosis Same as pre-operative diagnosis    Procedure: Procedure(s):  ROBOTIC ASSISTED VENTRAL AND INCISIONAL HERNIA REPAIR WITH MESH  Surgeon: Surgeon(s) and Role:     * Violetta Lazaro MD - Primary     * Mansoor Marion PA-C - Assisting  Anesthesia: General   Estimated blood loss: 20 ml  Drains: None  Specimens: None  Findings:   None.  Complications: None.  Implants:   Implant Name Type Inv. Item Serial No.  Lot No. LRB No. Used Action   MESH COMPOSITE PARIETENE DS [id number] Mesh MESH COMPOSITE PARIETENE DS [id number]  [facility] [id number] N/A 1 Implanted

## 2020-12-29 NOTE — ANESTHESIA PREPROCEDURE EVALUATION
Anesthesia Pre-Procedure Evaluation    Patient: Lyubov Sanchez   MRN: [id number] : 1970          Preoperative Diagnosis: Ventral hernia [K43.9]  Umbilical hernia without obstruction and without gangrene [K42.9]    Procedure(s):  ROBOTIC ASSISTED VENTRAL AND INCISIONAL HERNIA REPAIR WITH MESH    Past Medical History:   Diagnosis Date     Anxiety and depression     Pt reports is on Rx Celexa.     Aortic aneurysm (H)     Pt reports its small and is currently being monitored.     Arthritis      Depressive disorder      DYSPLASIA OF CERVIX 3/6/2003     Dysplasia of cervix (uteri)     Rx cryotherapy Nov , and      Hypertension     NO cardiology     Incomplete right bundle branch block 11/10/2017     Migraine      Other chronic pain     Knee pain for 8 years     Plantar fasciitis     bilat     Unspecified sleep apnea     CPAP will bring on the day of surgery.     Ventral hernia without obstruction or gangrene 4/15/2020     Past Surgical History:   Procedure Laterality Date     ANESTHESIA CARDIOVERSION N/A 2020    Procedure: ANESTHESIA, FOR CONCEPCION/CARDIOVERSION;  Surgeon: GENERIC ANESTHESIA PROVIDER;  Location:  OR     ARTHROPLASTY KNEE Right 2017    Procedure: ARTHROPLASTY KNEE;  Right total knee arthroplasty using the Arthrex iBalance total knee system;  Surgeon: Hebert Lee MD;  Location: [facility] OR     ARTHROPLASTY KNEE Left 3/19/2018    Procedure: ARTHROPLASTY KNEE;  Left total knee arthroplasty using an Arthrex iBalance total knee system;  Surgeon: Hebert Lee MD;  Location: [facility] OR     C  DELIVERY ONLY  ,    , Low Cervical     CV ASD CLOSURE N/A 2020    Procedure: ASD Closure;  Surgeon: Freddie Frias MD;  Location:  HEART CARDIAC CATH LAB     CV RIGHT HEART CATH N/A 2020    Procedure: Right Heart Cath;  Surgeon: Freddie Frias MD;  Location:  HEART CARDIAC CATH LAB     DAVINCI HYSTERECTOMY TOTAL, BILATERAL  SALPINGO-OOPHORECTOMY, COMBINED Bilateral 1/31/2020    Procedure: DaVinci robotic-assisted total laparoscopic hysterectomy, bilateral salpingectomy;  Surgeon: Venancio Bartlett MD;  Location: [facility] OR - Path all benign     DAVINCI HYSTERECTOMY TOTAL, SALPINGECTOMY BILATERAL Bilateral 01/31/2020    Fibroid uterus, Menometrorrhagia - Robotic TLH, Bilateral salpingectomy - Path all benign     ENT SURGERY  1976     HC COLP VAGINA W CERVIX IF PRES W BIOPSY  2005    [place] for ASCUS     TONSILLECTOMY & ADENOIDECTOMY       Anesthesia Evaluation     . Pt has had prior anesthetic. Type: General    No history of anesthetic complications          ROS/MED HX    ENT/Pulmonary:     (+)sleep apnea, tobacco use, Past use uses CPAP , . .   (-) asthma, COPD and recent URI   Neurologic:     (+)migraines,    (-) seizures and CVA   Cardiovascular: Comment: ASD repair -8/20    (+) Dyslipidemia, hypertension----. : . . . :. dysrhythmias a-fib, . pulmonary hypertension, Previous cardiac testing Echodate:11/20results:1. The left ventricle is normal in structure, function and size. The visual  ejection fraction is estimated at 60%.  2. The right ventricle is normal in structure, function and size.  3. s/p ASD closure with 30mm Max Cardioform, implanted 8/2020. No color  Doppler evidence of leak or flow across septum. A contrast injection (Bubble  Study) was performed that was negative for flow across the interatrial septum.  4. No valve disease.     CONCEPCION 9-16-20 showed EF 55%, tiny left to right shunt along aortic edge of ASD  device.date: results:ECG reviewed date:9/20 results:NSR.  RVH.  Nonspecific ST changes. date: results:         (-) CAD and valvular problems/murmurs   METS/Exercise Tolerance:     Hematologic: Comments: Lab Test        11/24/20     09/16/20     09/15/20     09/15/20     08/26/20                       1303          0534          1941          1240          0908          WBC          6.5          7.7           --          9.3           6.9           HGB          14.4         15.6          --          15.1         14.5          MCV          96           94            --          93           91            PLT          141*         187           --          218          195           INR           --           --          1.05         1.02         1.02           Lab Test        12/22/20     11/24/20     10/09/20                       0837          1303          0800          NA           139          138          136           POTASSIUM    4.5          4.1          4.3           CHLORIDE     107          104          105           CO2          28           31           25            BUN          12           17           14            CR           0.62         0.75         0.69          ANIONGAP     4            3            6             CARROL          9.1          9.2          8.7           GLC          102*         106*         91           - neg hematologic  ROS       Musculoskeletal:   (+) arthritis,  -       GI/Hepatic:  - neg GI/hepatic ROS       Renal/Genitourinary:  - ROS Renal section negative       Endo:     (+) Obesity, .   (-) Type I DM, Type II DM, thyroid disease and chronic steroid usage   Psychiatric:     (+) psychiatric history anxiety and depression      Infectious Disease:  - neg infectious disease ROS       Malignancy:      - no malignancy   Other:    (+) H/O Chronic Pain,                        Physical Exam  Normal systems: cardiovascular, pulmonary and dental    Airway   Mallampati: III  TM distance: >3 FB  Neck ROM: full    Dental     Cardiovascular   Rhythm and rate: regular and normal  (-) no friction rub, no systolic click and no murmur    Pulmonary    breath sounds clear to auscultation(-) no rhonchi, no decreased breath sounds, no wheezes, no rales and no stridor            Lab Results   Component Value Date    WBC 6.5 11/24/2020    HGB 14.4 11/24/2020    HCT 43.9 11/24/2020     (L) 11/24/2020    SED 6  "10/06/2014     12/22/2020    POTASSIUM 4.5 12/22/2020    CHLORIDE 107 12/22/2020    CO2 28 12/22/2020    BUN 12 12/22/2020    CR 0.62 12/22/2020     (H) 12/22/2020    CARROL 9.1 12/22/2020    MAG 2.0 09/16/2020    ALBUMIN 3.9 11/24/2020    PROTTOTAL 7.3 11/24/2020    ALT 38 11/24/2020    AST 31 11/24/2020    ALKPHOS 45 11/24/2020    BILITOTAL 1.1 11/24/2020    PTT 23 09/15/2020    INR 1.05 09/15/2020    TSH 0.79 02/26/2018    HCG Negative 01/31/2020    HCGS Negative 09/26/2020       Preop Vitals  BP Readings from Last 3 Encounters:   12/29/20 (!) 150/90   12/22/20 122/82   11/24/20 122/84    Pulse Readings from Last 3 Encounters:   12/29/20 73   12/22/20 78   11/24/20 65      Resp Readings from Last 3 Encounters:   11/24/20 18   09/26/20 27   09/16/20 18    SpO2 Readings from Last 3 Encounters:   12/22/20 98%   11/24/20 97%   09/26/20 95%      Temp Readings from Last 1 Encounters:   12/29/20 97.7  F (36.5  C) (Temporal)    Ht Readings from Last 1 Encounters:   12/29/20 1.626 m (5' 4\")      Wt Readings from Last 1 Encounters:   12/29/20 126.1 kg (278 lb)    Estimated body mass index is 47.72 kg/m  as calculated from the following:    Height as of this encounter: 1.626 m (5' 4\").    Weight as of this encounter: 126.1 kg (278 lb).       Anesthesia Plan      History & Physical Review  History and physical reviewed and following examination; no interval change.    ASA Status:  3 .    NPO Status:  > 8 hours    Plan for General with Intravenous induction. Maintenance will be Balanced.    PONV prophylaxis:  Ondansetron (or other 5HT-3) and Dexamethasone or Solumedrol         Postoperative Care  Postoperative pain management:  IV analgesics.      Consents  Anesthetic plan, risks, benefits and alternatives discussed with:  Patient or representative and Patient..                 David Betancourt MD                    .  "

## 2020-12-29 NOTE — ANESTHESIA CARE TRANSFER NOTE
Patient: Lyubov Sanchez    Procedure(s):  ROBOTIC ASSISTED VENTRAL AND INCISIONAL HERNIA REPAIR WITH MESH    Diagnosis: Ventral hernia [K43.9]  Umbilical hernia without obstruction and without gangrene [K42.9]  Diagnosis Additional Information: No value filed.    Anesthesia Type:   General     Note:  Airway :Nasopharyngeal Airway  Patient transferred to:PACU  Comments:   Spontaneous respirations, oral suctioned, bilateral eye opening and hand grasps.  Extubated to FM O2 6lpm.  VSS to PACU.Handoff Report: Identifed the Patient, Identified the Reponsible Provider, Reviewed the pertinent medical history, Discussed the surgical course, Reviewed Intra-OP anesthesia mangement and issues during anesthesia, Set expectations for post-procedure period and Allowed opportunity for questions and acknowledgement of understanding      Vitals: (Last set prior to Anesthesia Care Transfer)    CRNA VITALS  12/29/2020 1315 - 12/29/2020 1353      12/29/2020             NIBP:  125/77    SpO2:  97 %    EKG:  Sinus rhythm                Electronically Signed By: BARRINGTON Viveros CRNA  December 29, 2020  1:53 PM

## 2020-12-29 NOTE — ANESTHESIA PROCEDURE NOTES
Airway   Date/Time: 12/29/2020 10:08 AM   Patient location during procedure: OR    Staff -   CRNA: Radha Jj APRN CRNA  Performed By: CRNA    Consent for Airway   Urgency: elective    Indications and Patient Condition  Indications for airway management: luis-procedural  Induction type:intravenousMask difficulty assessment: 3 - difficult mask (inadequate, unstable, or two providers) +/- NMBA (oral airway 2 person 2handed, desaturated quickly needing recruitment)    Final Airway Details  Final airway type: endotracheal airway  Successful airway:ETT - single and Oral  Endotracheal Airway Details   ETT size (mm): 7.0  Cuffed: yes  Successful intubation technique: direct laryngoscopy  Grade View of Cords: 1 (small mouth opening large tongue large amouth redundant tissue)  Adjucts: stylet  Measured from: gums/teeth  Secured at (cm): 22  Secured with: plastic tape  Bite block used: Soft    Post intubation assessment   Placement verified by: capnometry, equal breath sounds and chest rise   Number of attempts at approach: 1  Number of other approaches attempted: 0  Secured with:plastic tape  Dentition: Intact and Unchanged

## 2020-12-29 NOTE — ANESTHESIA POSTPROCEDURE EVALUATION
Patient: Lyubov Sanchez    Procedure(s):  ROBOTIC ASSISTED VENTRAL AND INCISIONAL HERNIA REPAIR WITH MESH    Diagnosis:Ventral hernia [K43.9]  Umbilical hernia without obstruction and without gangrene [K42.9]  Diagnosis Additional Information: No value filed.    Anesthesia Type:  General    Note:  Anesthesia Post Evaluation    Patient location during evaluation: PACU  Patient participation: Able to fully participate in evaluation  Level of consciousness: awake  Pain management: adequate  Airway patency: patent  Cardiovascular status: acceptable  Respiratory status: acceptable  Hydration status: acceptable  PONV: controlled     Anesthetic complications: None          Last vitals:  Vitals:    12/29/20 1415 12/29/20 1430 12/29/20 1500   BP: 137/84 117/80 128/78   Pulse: 72 67 71   Resp: 19 14 16   Temp:   98.5  F (36.9  C)   SpO2: 98% 94% 94%         Electronically Signed By: David Betancourt MD  December 29, 2020  3:16 PM

## 2020-12-30 ENCOUNTER — TELEPHONE (OUTPATIENT)
Dept: SURGERY | Facility: CLINIC | Age: 50
End: 2020-12-30

## 2020-12-30 NOTE — TELEPHONE ENCOUNTER
S/p ROBOTIC ASSISTED VENTRAL AND INCISIONAL HERNIA REPAIR WITH MESH  Date: 12/29/20  Surgeon: Dr. Lazaro    Pt is wondering if it is ok to to re-start baby ASA once a day after surgery.      Discussed with patient that it is ok to take baby ASA Qday from a surgery standpoint.    She should take both baby ASA and Ibuprofen with food to minimize GI side effects.      Pt verbalized understanding.

## 2021-01-04 NOTE — OP NOTE
[facility] General Surgery Operative Note    Pre-operative diagnosis: Ventral hernia and umbilical hernia   Post-operative diagnosis: Same   Procedure: Robotic assisted laparoscopic pre-peritoneal ventral hernia repair   Surgeon: Violetta Lazaro MD   Assistant(s): Mansoor Marion PA-C  The Physician Assistant was medically necessary for their expertise in prepping, camera management, suctioning, suturing and retraction.   Anesthesia: general   Estimated blood loss:  Specimen:  FINDINGS:  20 cc  none  Two hernia defects, 3.5 cm and 2 cm, situated 2.5 cm apart along the midline.       Indication for Procedure: This is a 50 year old female who presented to the office with a symptomatic umbilical hernia and supraumbilical incisional hernia following total abdominal hysterectomy and desired repair. We discussed approaches to management and mutually agreed on a robotic approach for this patient.    Description of procedure:  Patient was brought to the operating room, placed on the operating table in supine position. Anesthesia was induced. Her  pressure points were padded and she was secured with a safety strap. A timeout was called to verify the patient, site of procedure and procedure to be performed.    The abdomen was entered with a optiview trocar in the left upper quadrant under direct visualization. Pneumoperitoneum was established. The abdomen was surveyed and was found to have 2 hernia defects, as expected from her preoperative imaging and exam.  Three 8mm ports were then placed in the right lateral abdomen, after marking out the dimensions of the hernia. The camera port was placed 15cm from the right lateral edge of the expected mesh position. The other ports were placed 7 cm away from the camera port, the lower port being at least 2 fingers off the ASIS and the upper port being at least 2 fingers off the ribcage.    The robot was then docked. With a monopolar scissors in the right hand and the forced  bipolar in the left hand, the omentum that was adherent to the superior hernia sac was reduced into the abdomen. A pre-peritoneal pocket was created by incising the peritoneum off the transversalis fascia on the patient's right side. Dissection was carried across midline. The lower hernia sac easily  off the subcutaneous tissues and was kept intact. However, the larger, more superior hernia sac was very thin and could not be  from subcutaneous fat without disruption. Some of the hernia sac was left within the subcutaneous tissues. On the left side of midline, per-peritoneal dissection continued to make one large pocket in the preperitoneal space.  There were 2 midline hernia defects, with the largest being 3.5 cm and the smaller 2 cm. A running 0 Strattafix was then used to close the fascial defects as one continuous closure. The final dimensions of the hernias combined was 3.5 cm x 8 cm. A 15 cm x 20cm piece of composite Parietene DS mesh was chosen and trimmed to size. This was secured into place to the posterior fascia with 3-0 vicryl sutures in the center and in the four cardinal locations. The flap of peritoneum was closed using running 2- V-lock suture. The peritoneal defect in the midline where the larger of the two hernia sacs was disrupted was sewn back to the abdominal wall circumferentially, which both tacked the disrupted peritoneum back up, and secured the central portion of the mesh to the abdominal wall. Thus, a ~6 x 6 cm area in the center of the coated mesh was exposed to the intraabdominal contents. The mesh lay flat at the conclusion with all edges covered by peritoneum.  The cavity was inspected and there was adequate hemostasis. The trocars were then removed. The skin was closed with 4-0 suture. Sterile dressings were applied. At the end of the operation, all sponge, instrument, and needle counts were correct.     Violetta Lazaro MD

## 2021-01-11 ENCOUNTER — TELEPHONE (OUTPATIENT)
Dept: SURGERY | Facility: CLINIC | Age: 51
End: 2021-01-11

## 2021-01-11 NOTE — TELEPHONE ENCOUNTER
[facility] Postoperative Call Note:     Lyubov Sanchez was called for an update regarding her recovery. [facility] [place] [facility]. Today she tells me she is doing well and denies any complaints. She is eating a normal diet and her bowels are regular. She states her wounds are healing well.    Patient was instructed to slowly and gradually resume all normal activities.The patient states all of her questions were answered. She understands our discussion. She agrees to follow up as needed or to call our office with any concerns.    Mansoor Marion PA-C      Please route or send letter to:  Primary Care Provider (PCP)

## 2021-01-13 ENCOUNTER — TELEPHONE (OUTPATIENT)
Dept: CARDIOLOGY | Facility: CLINIC | Age: 51
End: 2021-01-13

## 2021-01-13 ENCOUNTER — TELEPHONE (OUTPATIENT)
Dept: SURGERY | Facility: CLINIC | Age: 51
End: 2021-01-13

## 2021-01-13 NOTE — TELEPHONE ENCOUNTER
S/p Robotic assisted laparoscopic pre-peritoneal ventral hernia repair  Date: 12/29/20  Surgeon: Dr. Lazaro    Pt states she has noted wheezing  - worse at night.  States her chest feels heavy and that her feet are swollen.      She remembers the same thing happening after heart surgery last fall.  The cardiologist prescribed lasix which helped with symptoms.  She is currently taking 20mg lasix Q day.      I advised patient to discuss symptoms with her cardiologist.      Pt verbalized understanding and agrees with plan.

## 2021-01-13 NOTE — TELEPHONE ENCOUNTER
Returned voicemail from patient who states following hernia repair 2 weeks ago. She states she has increased lower extremity edema, some occasional wheezing and chest heaviness. She states she felt very similarly after her ASD closure and required increased Lasix dosing. Eve adjusted her medications and felt much better. She would like to have this again. Eve NP's schedule full tomorrow. Scheduled appt with Gloria NAPIER for 1/14/21-date time and location reviewed with patient.    Elyssa Alcala RN  [facility]

## 2021-01-13 NOTE — TELEPHONE ENCOUNTER
Name of caller: Lyubov    Reason for Call:  Pt is having wheezing type symptoms especially at night. Started within the last 2 nights. Pt states she had something similar after another surgery and then ended up changing up her meds and things got better.    Surgeon:  Dr. Lazaro    Recent Surgery:  Yes.    If yes, when & what type:  12/29, Robotic assisted laparoscopic pre-peritoneal ventral hernia repair    Best phone number to reach pt at is: [phone]  Ok to leave a message with medical info? Yes.    Pharmacy preferred (if calling for a refill): [facility] in [place]

## 2021-01-14 ENCOUNTER — OFFICE VISIT (OUTPATIENT)
Dept: CARDIOLOGY | Facility: CLINIC | Age: 51
End: 2021-01-14
Payer: COMMERCIAL

## 2021-01-14 VITALS
HEIGHT: 64 IN | SYSTOLIC BLOOD PRESSURE: 132 MMHG | BODY MASS INDEX: 48.9 KG/M2 | HEART RATE: 67 BPM | WEIGHT: 286.4 LBS | DIASTOLIC BLOOD PRESSURE: 86 MMHG

## 2021-01-14 DIAGNOSIS — Q21.10 ASD (ATRIAL SEPTAL DEFECT): ICD-10-CM

## 2021-01-14 DIAGNOSIS — R60.0 BILATERAL LEG EDEMA: Primary | ICD-10-CM

## 2021-01-14 DIAGNOSIS — R06.02 SHORTNESS OF BREATH: ICD-10-CM

## 2021-01-14 DIAGNOSIS — R07.89 CHEST HEAVINESS: ICD-10-CM

## 2021-01-14 PROCEDURE — 99214 OFFICE O/P EST MOD 30 MIN: CPT | Performed by: NURSE PRACTITIONER

## 2021-01-14 RX ORDER — ASPIRIN 81 MG/1
81 TABLET ORAL DAILY
COMMUNITY

## 2021-01-14 RX ORDER — FUROSEMIDE 20 MG
40 TABLET ORAL DAILY
Qty: 90 TABLET | Refills: 3 | COMMUNITY
Start: 2021-01-14 | End: 2021-04-02

## 2021-01-14 ASSESSMENT — MIFFLIN-ST. JEOR: SCORE: 1909.1

## 2021-01-14 NOTE — PATIENT INSTRUCTIONS
Thanks for participating in a office visit with the [facility] clinic today.    Worsening shortness of breath, weight gain and leg edema over the last week.   Increase lasix to 40 mg daily x 4 days, monitor for lightheadedness, dizziness or lower blood pressures.   Monitor daily weights  Daily blood pressures   Continue on all other mediations  Low sodium diet    Follow up on Monday with a nurse call for update in symptoms.      Please call my nurse at [phone] with any questions or concerns.    Scheduling phone number: [phone]  Reminder: Please bring in all current medications, over the counter supplements and vitamin bottles to your next appointment.

## 2021-01-14 NOTE — LETTER
1/14/2021    Yolie Delarosa MD  [id number]  Formerly [facility], later [facility] [address]    RE: Lyubov Sanchez       Dear Colleague,    I had the pleasure of seeing Lyubov Sanchez in the [facility].    Cardiology Clinic Progress Note  Lyubov Sanchez MRN# [id number]   YOB: 1970 Age: 50 year old     Reason For Visit:  Leg edema   Primary Cardiologist:   Dr. Hodges           History of Presenting Illness:      Lyubov Sanchez is a pleasant 50 year old patient who carries a past medical history significant for obesity, dyslipidemia, KRISTYN, hypertension, and secundum ASD measuring 7.7 cm s/p percutaneous closure using a 30 mm cardio form septal occluder device.     On 12/29/2021 she underwent elective robotic ventral and umbilical hernia repair. The procedure was uncomplicated and she is recovering well. Over the last week, she has noticed wheezing, chest heaviness and leg edema. She presents to the office today for clinical evaluation. Upon exam, lungs are clear bilaterally, heart rate and rhythm regular, no visible JVD although difficult to assess due to body habitus, and trace to mild bilateral pedal edema. She denies chest heaviness at present, shortness of breath, PND, presyncope, or syncope. She reports mild orthopnea, however, she has been sleeping in a recliner since her surgery due to abdominal comfort.     Blood pressure is well controlled at 132/86, managed on lisinopril, metoprolol and lasix.   BMI 49.16.  Weight is 286 lbs today, up 8 lbs since discharge.  Early in 2020, she states she was down to 215 pounds with the assistance of weight watchers.  Unfortunately, due to the coronavirus pandemic and surgical interventions, she has become significantly more sedentary.  She is currently on a 20 pound weight restriction for surgery.  She plans to restart her weight watchers routine once she has been given clearance.    Last echocardiogram (11/2/2020) demonstrated a normal  ejection fraction estimated at 60%, normal RV size and function, a well-seated 30 mm [place] cardio form ASD closure device with no evidence of leak or flow across septum, and no significant valvular disease.     She denies a high sodium diet  Remains compliant with all medications.                   Assessment and Plan:        Lyubov Sanchez is a pleasant 50 year old patient who carries a past medical history significant for obesity, dyslipidemia, KRISTYN, hypertension, and secundum ASD measuring 7.7 cm s/p percutaneous closure using a 30 mm cardio form septal occluder device, and recent robotic ventral and umbilical hernia repair.  Overall, she is healing well from her recent hernia surgery.  She is noted to be up approximately 8 pounds over the last 2 weeks accompanied by daily wheezing/chest heaviness, and lower extremity edema.  Lungs are clear upon exam and bilateral pedal edema is mild today.  She is currently on 20 mg of Lasix daily.  I have asked her to increase to 40 mg daily x4 days and monitor symptoms, daily weights, and blood pressures.  Notify office with lightheadedness, dizziness, or hypotension.  I will have my nurse call on Monday, 1/18/2021 for an update in symptoms. Continue on all other medications. Should symptoms worsen or become severe seek ER evaluation.            Thank you for allowing me to participate in this delightful patient's care.        BARRINGTON Ziegler CNP         Review of Systems:     Review of Systems:  Skin:  Negative     Eyes:  Negative    ENT:  Negative    Respiratory:  Positive for shortness of breath  Cardiovascular:    Positive for(Chest pressure)  Gastroenterology: Negative    Genitourinary:  Negative    Musculoskeletal:  Negative    Neurologic:  Positive for numbness or tingling of feet  Psychiatric:       Heme/Lymph/Imm:  Negative    Endocrine:  Negative                Physical Exam:     GEN:  In general, this is a obese female in no acute distress.  HEENT:  Pupils  equal, round. Sclerae nonicteric. Clear oropharynx. Mucous membranes moist.  NECK: Supple, no masses appreciated. Trachea midline. No JVD   C/V:  Regular rate and rhythm, No murmur, rub or gallop. No S3 or RV heave.   RESP: Respirations are unlabored. No use of accessory muscles. Clear to auscultation bilaterally without wheezing, rales, or rhonchi.  GI: Abdomen soft, nontender, nondistended. No HSM appreciated.   EXTREM: Mild bilateral pedal/ LE edema. No cyanosis or clubbing.  NEURO: Alert and oriented, cooperative. No obvious focal deficits.   PSYCH: Normal affect.  SKIN: Warm and dry. No rashes or petechiae appreciated.          Past Medical History:     Past Medical History:   Diagnosis Date     Anxiety and depression     Pt reports is on Rx Celexa.     Aortic aneurysm (H)     Pt reports its small and is currently being monitored.     Arthritis      Depressive disorder      DYSPLASIA OF CERVIX 3/6/2003     Dysplasia of cervix (uteri) 2003    Rx cryotherapy Nov 03, and 2005     Hypertension     NO cardiology     Incomplete right bundle branch block 11/10/2017     Migraine      Other chronic pain     Knee pain for 8 years     Plantar fasciitis     bilat     Unspecified sleep apnea     CPAP will bring on the day of surgery.     Ventral hernia without obstruction or gangrene 4/15/2020              Past Surgical History:     Past Surgical History:   Procedure Laterality Date     ANESTHESIA CARDIOVERSION N/A 9/16/2020    Procedure: ANESTHESIA, FOR CONCEPCION/CARDIOVERSION;  Surgeon: GENERIC ANESTHESIA PROVIDER;  Location: [facility] OR     ARTHROPLASTY KNEE Right 11/17/2017    Procedure: ARTHROPLASTY KNEE;  Right total knee arthroplasty using the Arthrex iBalance total knee system;  Surgeon: Hebert Lee MD;  Location:  OR     ARTHROPLASTY KNEE Left 3/19/2018    Procedure: ARTHROPLASTY KNEE;  Left total knee arthroplasty using an Arthrex iBalance total knee system;  Surgeon: Hebert Lee MD;  Location:  [facility] OR     C  DELIVERY ONLY  ,    , Low Cervical     CV ASD CLOSURE N/A 2020    Procedure: ASD Closure;  Surgeon: Freddie Frias MD;  Location:  HEART CARDIAC CATH LAB     CV RIGHT HEART CATH N/A 2020    Procedure: Right Heart Cath;  Surgeon: Freddie Frias MD;  Location:  HEART CARDIAC CATH LAB     DAVINCI HERNIORRHAPHY VENTRAL N/A 2020    Procedure: ROBOTIC ASSISTED VENTRAL AND INCISIONAL HERNIA REPAIR WITH MESH;  Surgeon: Violetta Lazaro MD;  Location: [facility] OR     DAVINCI HYSTERECTOMY TOTAL, BILATERAL SALPINGO-OOPHORECTOMY, COMBINED Bilateral 2020    Procedure: DaVinci robotic-assisted total laparoscopic hysterectomy, bilateral salpingectomy;  Surgeon: Venancio Bartlett MD;  Location: [facility] OR - Path all benign     DAVINCI HYSTERECTOMY TOTAL, SALPINGECTOMY BILATERAL Bilateral 2020    Fibroid uterus, Menometrorrhagia - Robotic TLH, Bilateral salpingectomy - Path all benign     ENT SURGERY       HC COLP VAGINA W CERVIX IF PRES W BIOPSY  2005    [place] for ASCUS     TONSILLECTOMY & ADENOIDECTOMY                Allergies:   Penicillins       Data:   All laboratory data reviewed:    LAST CHOLESTEROL:  Lab Results   Component Value Date    CHOL 182 2020     Lab Results   Component Value Date    HDL 41 2020     Lab Results   Component Value Date     2020     Lab Results   Component Value Date    TRIG 119 2020     Lab Results   Component Value Date    CHOLHDLRATIO 4.2 2007       LAST BMP:  Lab Results   Component Value Date     2020      Lab Results   Component Value Date    POTASSIUM 4.5 2020     Lab Results   Component Value Date    CHLORIDE 107 2020     Lab Results   Component Value Date    CARROL 9.1 2020     Lab Results   Component Value Date    CO2 28 2020     Lab Results   Component Value Date    BUN 12 2020     Lab Results   Component Value Date    CR 0.62 2020     Lab  Results   Component Value Date     12/22/2020       LAST CBC:  Lab Results   Component Value Date    WBC 6.5 11/24/2020     Lab Results   Component Value Date    RBC 4.58 11/24/2020     Lab Results   Component Value Date    HGB 14.4 11/24/2020     Lab Results   Component Value Date    HCT 43.9 11/24/2020     Lab Results   Component Value Date    MCV 96 11/24/2020     Lab Results   Component Value Date    MCH 31.4 11/24/2020     Lab Results   Component Value Date    MCHC 32.8 11/24/2020     Lab Results   Component Value Date    RDW 13.0 11/24/2020     Lab Results   Component Value Date     11/24/2020       Thank you for allowing me to participate in the care of your patient.    Sincerely,     BARRINGTON Ziegler [facility]

## 2021-01-14 NOTE — PROGRESS NOTES
Cardiology Clinic Progress Note  Lyubov Sanchez MRN# [id number]   YOB: 1970 Age: 50 year old     Reason For Visit:  Leg edema   Primary Cardiologist:   Dr. Hodges           History of Presenting Illness:      Lyubov Sanchez is a pleasant 50 year old patient who carries a past medical history significant for obesity, dyslipidemia, KRISTYN, hypertension, and secundum ASD measuring 7.7 cm s/p percutaneous closure using a 30 mm cardio form septal occluder device.     On 12/29/2021 she underwent elective robotic ventral and umbilical hernia repair. The procedure was uncomplicated and she is recovering well. Over the last week, she has noticed wheezing, chest heaviness and leg edema. She presents to the office today for clinical evaluation. Upon exam, lungs are clear bilaterally, heart rate and rhythm regular, no visible JVD although difficult to assess due to body habitus, and trace to mild bilateral pedal edema. She denies chest heaviness at present, shortness of breath, PND, presyncope, or syncope. She reports mild orthopnea, however, she has been sleeping in a recliner since her surgery due to abdominal comfort.     Blood pressure is well controlled at 132/86, managed on lisinopril, metoprolol and lasix.   BMI 49.16.  Weight is 286 lbs today, up 8 lbs since discharge.  Early in 2020, she states she was down to 215 pounds with the assistance of weight watchers.  Unfortunately, due to the coronavirus pandemic and surgical interventions, she has become significantly more sedentary.  She is currently on a 20 pound weight restriction for surgery.  She plans to restart her weight watchers routine once she has been given clearance.    Last echocardiogram (11/2/2020) demonstrated a normal ejection fraction estimated at 60%, normal RV size and function, a well-seated 30 mm [place] cardio form ASD closure device with no evidence of leak or flow across septum, and no significant valvular disease.     She denies a high  sodium diet  Remains compliant with all medications.                   Assessment and Plan:        Lyubov Sanchez is a pleasant 50 year old patient who carries a past medical history significant for obesity, dyslipidemia, KRISTYN, hypertension, and secundum ASD measuring 7.7 cm s/p percutaneous closure using a 30 mm cardio form septal occluder device, and recent robotic ventral and umbilical hernia repair.  Overall, she is healing well from her recent hernia surgery.  She is noted to be up approximately 8 pounds over the last 2 weeks accompanied by daily wheezing/chest heaviness, and lower extremity edema.  Lungs are clear upon exam and bilateral pedal edema is mild today.  She is currently on 20 mg of Lasix daily.  I have asked her to increase to 40 mg daily x4 days and monitor symptoms, daily weights, and blood pressures.  Notify office with lightheadedness, dizziness, or hypotension.  I will have my nurse call on Monday, 1/18/2021 for an update in symptoms. Continue on all other medications. Should symptoms worsen or become severe seek ER evaluation.            Thank you for allowing me to participate in this delightful patient's care.        Gloria Sinclair, BARRINGTON CNP         Review of Systems:     Review of Systems:  Skin:  Negative     Eyes:  Negative    ENT:  Negative    Respiratory:  Positive for shortness of breath  Cardiovascular:    Positive for(Chest pressure)  Gastroenterology: Negative    Genitourinary:  Negative    Musculoskeletal:  Negative    Neurologic:  Positive for numbness or tingling of feet  Psychiatric:       Heme/Lymph/Imm:  Negative    Endocrine:  Negative                Physical Exam:     GEN:  In general, this is a obese female in no acute distress.  HEENT:  Pupils equal, round. Sclerae nonicteric. Clear oropharynx. Mucous membranes moist.  NECK: Supple, no masses appreciated. Trachea midline. No JVD   C/V:  Regular rate and rhythm, No murmur, rub or gallop. No S3 or RV heave.   RESP:  Respirations are unlabored. No use of accessory muscles. Clear to auscultation bilaterally without wheezing, rales, or rhonchi.  GI: Abdomen soft, nontender, nondistended. No HSM appreciated.   EXTREM: Mild bilateral pedal/ LE edema. No cyanosis or clubbing.  NEURO: Alert and oriented, cooperative. No obvious focal deficits.   PSYCH: Normal affect.  SKIN: Warm and dry. No rashes or petechiae appreciated.          Past Medical History:     Past Medical History:   Diagnosis Date     Anxiety and depression     Pt reports is on Rx Celexa.     Aortic aneurysm (H)     Pt reports its small and is currently being monitored.     Arthritis      Depressive disorder      DYSPLASIA OF CERVIX 3/6/2003     Dysplasia of cervix (uteri)     Rx cryotherapy , and      Hypertension     NO cardiology     Incomplete right bundle branch block 11/10/2017     Migraine      Other chronic pain     Knee pain for 8 years     Plantar fasciitis     bilat     Unspecified sleep apnea     CPAP will bring on the day of surgery.     Ventral hernia without obstruction or gangrene 4/15/2020              Past Surgical History:     Past Surgical History:   Procedure Laterality Date     ANESTHESIA CARDIOVERSION N/A 2020    Procedure: ANESTHESIA, FOR CONCEPCION/CARDIOVERSION;  Surgeon: GENERIC ANESTHESIA PROVIDER;  Location:  OR     ARTHROPLASTY KNEE Right 2017    Procedure: ARTHROPLASTY KNEE;  Right total knee arthroplasty using the Arthrex iBalance total knee system;  Surgeon: Hebert Lee MD;  Location:  OR     ARTHROPLASTY KNEE Left 3/19/2018    Procedure: ARTHROPLASTY KNEE;  Left total knee arthroplasty using an Arthrex iBalance total knee system;  Surgeon: Hebert Lee MD;  Location: [facility] OR     C  DELIVERY ONLY  ,    , Low Cervical     CV ASD CLOSURE N/A 2020    Procedure: ASD Closure;  Surgeon: Freddie Frias MD;  Location:  HEART CARDIAC CATH LAB     CV RIGHT HEART CATH  N/A 8/26/2020    Procedure: Right Heart Cath;  Surgeon: Freddie Frias MD;  Location:  HEART CARDIAC CATH LAB     DAVINCI HERNIORRHAPHY VENTRAL N/A 12/29/2020    Procedure: ROBOTIC ASSISTED VENTRAL AND INCISIONAL HERNIA REPAIR WITH MESH;  Surgeon: Violetta Lazaro MD;  Location: [facility] OR     DAVINCI HYSTERECTOMY TOTAL, BILATERAL SALPINGO-OOPHORECTOMY, COMBINED Bilateral 1/31/2020    Procedure: DaVinci robotic-assisted total laparoscopic hysterectomy, bilateral salpingectomy;  Surgeon: Venancio Bartlett MD;  Location: [facility] OR - Path all benign     DAVINCI HYSTERECTOMY TOTAL, SALPINGECTOMY BILATERAL Bilateral 01/31/2020    Fibroid uterus, Menometrorrhagia - Robotic TLH, Bilateral salpingectomy - Path all benign     ENT SURGERY  1976     HC COLP VAGINA W CERVIX IF PRES W BIOPSY  2005    [place] for ASCUS     TONSILLECTOMY & ADENOIDECTOMY                Allergies:   Penicillins       Data:   All laboratory data reviewed:    LAST CHOLESTEROL:  Lab Results   Component Value Date    CHOL 182 01/24/2020     Lab Results   Component Value Date    HDL 41 01/24/2020     Lab Results   Component Value Date     01/24/2020     Lab Results   Component Value Date    TRIG 119 01/24/2020     Lab Results   Component Value Date    CHOLHDLRATIO 4.2 04/25/2007       LAST BMP:  Lab Results   Component Value Date     12/22/2020      Lab Results   Component Value Date    POTASSIUM 4.5 12/22/2020     Lab Results   Component Value Date    CHLORIDE 107 12/22/2020     Lab Results   Component Value Date    CARROL 9.1 12/22/2020     Lab Results   Component Value Date    CO2 28 12/22/2020     Lab Results   Component Value Date    BUN 12 12/22/2020     Lab Results   Component Value Date    CR 0.62 12/22/2020     Lab Results   Component Value Date     12/22/2020       LAST CBC:  Lab Results   Component Value Date    WBC 6.5 11/24/2020     Lab Results   Component Value Date    RBC 4.58 11/24/2020     Lab Results   Component Value Date     HGB 14.4 11/24/2020     Lab Results   Component Value Date    HCT 43.9 11/24/2020     Lab Results   Component Value Date    MCV 96 11/24/2020     Lab Results   Component Value Date    MCH 31.4 11/24/2020     Lab Results   Component Value Date    MCHC 32.8 11/24/2020     Lab Results   Component Value Date    RDW 13.0 11/24/2020     Lab Results   Component Value Date     11/24/2020

## 2021-01-15 ENCOUNTER — HEALTH MAINTENANCE LETTER (OUTPATIENT)
Age: 51
End: 2021-01-15

## 2021-01-18 ENCOUNTER — TELEPHONE (OUTPATIENT)
Dept: CARDIOLOGY | Facility: CLINIC | Age: 51
End: 2021-01-18

## 2021-01-18 NOTE — TELEPHONE ENCOUNTER
If patient is near baseline, decrease lasix to 20 mg daily.   If leg edema returns or worsening shortness of breath, OK to take additional 20 mg PRN.   Continue on current medical therapy.   Follow up in 6 months with MATT or sooner if needed.

## 2021-01-18 NOTE — TELEPHONE ENCOUNTER
Called pt as requested. Pt states she is doing much beter since increasing her lasix, states the swelling is down immensely, her breathing is better, states her weight is down 4 lbs. She states she feels about 70% better than when she spoke with Gloria Sinclair NP. Pt states today is her last day on the increased dose of Lasix. Will let Gloria Sinclair NP know. Neris BASS

## 2021-01-18 NOTE — TELEPHONE ENCOUNTER
----- Message from BARRINGTON Ziegler CNP sent at 1/14/2021  2:57 PM CST -----  Kin Odom,     Can we make sure to call the above patient on Monday 1/18/2021 for update in symptoms after her lasix was increased?     Thanks,   German

## 2021-01-25 ENCOUNTER — TELEPHONE (OUTPATIENT)
Dept: CARDIOLOGY | Facility: CLINIC | Age: 51
End: 2021-01-25

## 2021-01-25 DIAGNOSIS — R06.02 SHORTNESS OF BREATH: Primary | ICD-10-CM

## 2021-01-25 DIAGNOSIS — I10 HTN, GOAL BELOW 140/90: ICD-10-CM

## 2021-01-25 NOTE — TELEPHONE ENCOUNTER
Returned voicemail from patient stating her shortness of breath has worsened. She occasionally feels wheezy. She states the increased dose of Lasix helped for the few days that Gloria NP prescribed it. And now she is back feeling short of breath again.    Per Gloria NAPIER's note 1/18/21: Patient is okay to use 20mg Lasix PRN for shortness of breath.    I relayed this to patient who will take 40mg Lasix daily for the next few days. She is going to call her PCP for appt to evaluate her wheezing. I requested she call me in 3 days to let me know how she feels on increased lasix dose and appt with PCP. Direct contact information provided.    Elyssa Alcala RN  [facility]

## 2021-01-26 ENCOUNTER — OFFICE VISIT (OUTPATIENT)
Dept: FAMILY MEDICINE | Facility: CLINIC | Age: 51
End: 2021-01-26
Payer: COMMERCIAL

## 2021-01-26 VITALS
RESPIRATION RATE: 16 BRPM | SYSTOLIC BLOOD PRESSURE: 132 MMHG | DIASTOLIC BLOOD PRESSURE: 84 MMHG | TEMPERATURE: 97.8 F | WEIGHT: 283 LBS | OXYGEN SATURATION: 97 % | BODY MASS INDEX: 48.58 KG/M2 | HEART RATE: 72 BPM

## 2021-01-26 DIAGNOSIS — R06.02 SHORTNESS OF BREATH: Primary | ICD-10-CM

## 2021-01-26 DIAGNOSIS — Z12.31 ENCOUNTER FOR SCREENING MAMMOGRAM FOR BREAST CANCER: ICD-10-CM

## 2021-01-26 PROCEDURE — 99214 OFFICE O/P EST MOD 30 MIN: CPT | Performed by: FAMILY MEDICINE

## 2021-01-26 RX ORDER — ALBUTEROL SULFATE 90 UG/1
2 AEROSOL, METERED RESPIRATORY (INHALATION) EVERY 6 HOURS PRN
Qty: 6.7 G | Refills: 0 | Status: SHIPPED | OUTPATIENT
Start: 2021-01-26

## 2021-01-26 ASSESSMENT — ENCOUNTER SYMPTOMS
UNEXPECTED WEIGHT CHANGE: 0
SHORTNESS OF BREATH: 1
WHEEZING: 1
COUGH: 0

## 2021-01-26 NOTE — TELEPHONE ENCOUNTER
Patient has a good response to lasix 40 mg daily. Continue on higher dose lasix for now.   If symptoms worsen or do not improve on higher dose lasix, we can consider switching to bumex.   Follow up BMP in 2 weeks.

## 2021-01-26 NOTE — TELEPHONE ENCOUNTER
I telephoned patient to share Gloria NP's recommendations. She will stay on Lasix 40mg daily. BMP scheduled for 2/8/21 - appt date time and location reviewed with patient.    Elyssa Alcala RN  [facility]

## 2021-01-26 NOTE — TELEPHONE ENCOUNTER
Patient called to report she saw her PCP this morning who recommended switching Lasix to Bumex. She wanted to discuss this with cardiology first. Told her I would discuss with Gloria NAPIER and Dr. Hodges and get back to her.    Elyssa Alcala RN  [facility]     No

## 2021-01-26 NOTE — PROGRESS NOTES
Assessment & Plan     Shortness of breath  Improving.  Patient had a temporary increase of her diuretic regimen after she had an 8 pound unintentional weight gain following an elective umbilical hernia repair on 12-.  She has a concomitant history of atrial septal defect status post closure, obstructive sleep apnea, hypertension and BMI of 48.  This is my first time evaluating the patient today and she was encouraged to have follow-up with her primary for being evaluated by cardiology.  She is in no respiratory distress without symptoms of active wheezing, saturating well on room air.  She is not in active congestive heart failure.  I reviewed her echocardiogram from 8- with good ejection fraction of 60% with reportedly normal left ventricular diastolic function.  Unfortunately, she has been more symptomatic since decreasing her Lasix from 40 mg to 20 mg daily, this is likely multifactorial due to her comorbid conditions as previously mentioned.  I think it is reasonable she speaks with cardiology to see if she would be a candidate switching her diuretic regimen from Lasix once a day to something longer acting such as Bumex.  She will contact them today, if agreeable I will switch her diuretic management. As she was reportedly having wheezing, will give her a rescue inhaler just as needed however, she also needs to have a formal management program for her concomitant BMI of 48 as she likely has some symptoms of obesity hypoventilation syndrome on top of obstructive sleep apnea; which she did reportedly have a titration study recently.   - albuterol (PROAIR HFA/PROVENTIL HFA/VENTOLIN HFA) 108 (90 Base) MCG/ACT inhaler; Inhale 2 puffs into the lungs every 6 hours as needed for shortness of breath / dyspnea or wheezing    BMI 45.0-49.9, adult (H)  - COMPREHENSIVE WEIGHT MANAGEMENT    Encounter for screening mammogram for breast cancer  - MA Screen Bilateral w/Ambrosio; Future      Return in about 1 week  (around 2/2/2021) for If symptoms do not improve or gets worse..    Arnold Ochoa MD  [facility] LUMA Mcarthur is a 50 year old who presents to clinic today for the following health issues     HPI       Concern - SOB  Onset: 3 weeks ago  Description: SOB with wheezing  Intensity: moderate  Progression of Symptoms:  improving  Accompanying Signs & Symptoms: no  Previous history of similar problem: yes  Precipitating factors:        Worsened by: Activity  Alleviating factors:        Improved by: Lasix  Therapies tried and outcome: None        Review of Systems   Constitutional: Negative for unexpected weight change.   Respiratory: Positive for shortness of breath and wheezing. Negative for cough.             Objective    /84 (Cuff Size: Adult Large)   Pulse 72   Temp 97.8  F (36.6  C) (Oral)   Resp 16   Wt 128.4 kg (283 lb)   LMP  (LMP Unknown)   SpO2 97%   BMI 48.58 kg/m    Body mass index is 48.58 kg/m .  Physical Exam  Vitals signs reviewed.   Neck:      Comments: Negative JVD.  Cardiovascular:      Rate and Rhythm: Normal rate and regular rhythm.   Pulmonary:      Effort: Pulmonary effort is normal.      Breath sounds: Normal breath sounds.   Abdominal:      Comments: No hepatojugular reflex   Musculoskeletal:      Comments: 2+ bilateral lower extremity edema

## 2021-02-01 ENCOUNTER — ANCILLARY PROCEDURE (OUTPATIENT)
Dept: MAMMOGRAPHY | Facility: CLINIC | Age: 51
End: 2021-02-01
Attending: FAMILY MEDICINE
Payer: COMMERCIAL

## 2021-02-01 DIAGNOSIS — Z12.31 ENCOUNTER FOR SCREENING MAMMOGRAM FOR BREAST CANCER: ICD-10-CM

## 2021-02-01 PROCEDURE — 77067 SCR MAMMO BI INCL CAD: CPT | Mod: TC | Performed by: RADIOLOGY

## 2021-02-01 PROCEDURE — 77063 BREAST TOMOSYNTHESIS BI: CPT | Mod: TC | Performed by: RADIOLOGY

## 2021-02-08 DIAGNOSIS — R06.02 SHORTNESS OF BREATH: ICD-10-CM

## 2021-02-08 DIAGNOSIS — I10 HTN, GOAL BELOW 140/90: ICD-10-CM

## 2021-02-08 LAB
ANION GAP SERPL CALCULATED.3IONS-SCNC: 5 MMOL/L (ref 3–14)
BUN SERPL-MCNC: 16 MG/DL (ref 7–30)
CALCIUM SERPL-MCNC: 9.3 MG/DL (ref 8.5–10.1)
CHLORIDE SERPL-SCNC: 102 MMOL/L (ref 94–109)
CO2 SERPL-SCNC: 31 MMOL/L (ref 20–32)
CREAT SERPL-MCNC: 0.68 MG/DL (ref 0.52–1.04)
GFR SERPL CREATININE-BSD FRML MDRD: >90 ML/MIN/{1.73_M2}
GLUCOSE SERPL-MCNC: 149 MG/DL (ref 70–99)
POTASSIUM SERPL-SCNC: 3.7 MMOL/L (ref 3.4–5.3)
SODIUM SERPL-SCNC: 138 MMOL/L (ref 133–144)

## 2021-02-08 PROCEDURE — 36415 COLL VENOUS BLD VENIPUNCTURE: CPT | Performed by: INTERNAL MEDICINE

## 2021-02-08 PROCEDURE — 80048 BASIC METABOLIC PNL TOTAL CA: CPT | Performed by: INTERNAL MEDICINE

## 2021-02-11 ENCOUNTER — TELEPHONE (OUTPATIENT)
Dept: CARDIOLOGY | Facility: CLINIC | Age: 51
End: 2021-02-11

## 2021-02-11 NOTE — TELEPHONE ENCOUNTER
"Last Metabolic Panel:  Sodium   Date Value Ref Range Status   02/08/2021 138 133 - 144 mmol/L Final     Potassium   Date Value Ref Range Status   02/08/2021 3.7 3.4 - 5.3 mmol/L Final     Chloride   Date Value Ref Range Status   02/08/2021 102 94 - 109 mmol/L Final     Carbon Dioxide   Date Value Ref Range Status   02/08/2021 31 20 - 32 mmol/L Final     Anion Gap   Date Value Ref Range Status   02/08/2021 5 3 - 14 mmol/L Final     Glucose   Date Value Ref Range Status   02/08/2021 149 (H) 70 - 99 mg/dL Final     Urea Nitrogen   Date Value Ref Range Status   02/08/2021 16 7 - 30 mg/dL Final     Creatinine   Date Value Ref Range Status   02/08/2021 0.68 0.52 - 1.04 mg/dL Final     GFR Estimate   Date Value Ref Range Status   02/08/2021 >90 >60 mL/min/[1.73_m2] Final     Comment:     Non  GFR Calc  Starting 12/18/2018, serum creatinine based estimated GFR (eGFR) will be   calculated using the Chronic Kidney Disease Epidemiology Collaboration   (CKD-EPI) equation.       Calcium   Date Value Ref Range Status   02/08/2021 9.3 8.5 - 10.1 mg/dL Final       BMP results reviewed with Gloria NP:  Gloria Sinclair APRN CNP Goubeaux, Laura, RN             If symptoms are back to baseline, recommend decreasing lasix to 20 mg daily. If swelling returns, Ok to take additional lasix PRN. Encourage strict low sodium diet, exercise and weight loss.     Thanks,   Gloria MARQUEZ telephoned patient to review these results and recommendations. She states she feels good in the morning, and as the day goes on she has more shortness of breath. She will stay on 40mg of Lasix. She is concerned that she \"just doesn't feel right.\" She has felt this way since her hernia repair in Dec 2020. Will plan on follow-up visit with Dr. Hodges. She states she also was exposed to COVID, her daughter has it. Appt scheduled for 3/1/21. Will discuss need for labs or testing prior to visit with Dr. Hodges.    Elyssa Alcala RN  [facility] [facility]

## 2021-02-25 ENCOUNTER — NURSE TRIAGE (OUTPATIENT)
Dept: NURSING | Facility: CLINIC | Age: 51
End: 2021-02-25

## 2021-02-25 NOTE — TELEPHONE ENCOUNTER
Reason for Disposition    COVID-19 vaccine, Frequently Asked Questions (FAQs)    Additional Information    Negative: [1] Difficulty breathing or swallowing AND [2] starts within 2 hours after injection    Negative: Sounds like a life-threatening emergency to the triager    Negative: [1] COVID-19 exposure AND [2] no symptoms    Negative: [1] Typical COVID-19 symptoms AND [2] symptoms that are NOT expected from vaccine (e.g., cough, difficulty breathing, loss of taste or smell, runny nose, sore throat)    Negative: [1] Typical COVID-19 symptoms AND [2] started > 3 days after getting vaccine    Negative: Fever > 104 F (40 C)    Negative: Sounds like a severe, unusual reaction to the triager    Negative: [1] Redness or red streak around the injection site AND [2] started > 48 hours after getting vaccine AND [3] fever    Negative: [1] Fever > 101 F (38.3 C) AND [2] age > 60 AND [3] started > 48 hours after getting vaccine    Negative: [1] Fever > 100.0 F (37.8 C) AND [2] bedridden (e.g., nursing home patient, CVA, chronic illness, recovering from surgery) AND [3] started > 48 hours after getting vaccine    Negative: [1] Fever > 100.0 F (37.8 C) AND [2] diabetes mellitus or weak immune system (e.g., HIV positive, cancer chemo, splenectomy, organ transplant, chronic steroids) AND [3] started > 48 hours after getting vaccine    Negative: [1] Redness or red streak around the injection site AND [2] started > 48 hours after getting vaccine AND [3] no fever  (Exception: red area < 1 inch or 2.5 cm wide)    Negative: [1] Pain, tenderness, or swelling at the injection site AND [2] over 3 days (72 hours) since vaccine AND [3] getting worse    Negative: Fever > 100.0 F (37.8 C) present > 3 days (72 hours)    Negative: [1] Fever > 100.0 F (37.8 C) AND [2] healthcare worker    Negative: [1] Requesting COVID-19 vaccine AND [2] healthcare worker (e.g., EMS first responders, doctors, nurses)    Negative: [1] Requesting COVID-19  vaccine AND [2] resident of a long-term care facility (e.g., nursing home)    Negative: [1] Requesting COVID-19 vaccine AND [2] vaccine available in the community for this patient group    Negative: COVID-19 vaccine, injection site reaction (e.g., pain, redness, swelling), question about    Protocols used: CORONAVIRUS (COVID-19) VACCINE QUESTIONS AND [id number] 1.3    Patient reports that she recently had COVID-19 and is due for her second dose of vaccine.  She questions if she should receive the second dose.  Writer advised that patient should receive her 2nd dose as she is no longer symptomatic and as advised per guideline/FAQ.    Karen Greenwood RN  [facility]

## 2021-03-03 DIAGNOSIS — F33.0 MAJOR DEPRESSIVE DISORDER, RECURRENT EPISODE, MILD (H): ICD-10-CM

## 2021-03-05 RX ORDER — CITALOPRAM HYDROBROMIDE 20 MG/1
20 TABLET ORAL DAILY
Qty: 90 TABLET | Refills: 0 | Status: SHIPPED | OUTPATIENT
Start: 2021-03-05 | End: 2021-04-02

## 2021-03-05 NOTE — TELEPHONE ENCOUNTER
Prescription approved per [facility] [place] [facility].  Lexus Oliveros RN

## 2021-04-02 DIAGNOSIS — F33.0 MAJOR DEPRESSIVE DISORDER, RECURRENT EPISODE, MILD (H): ICD-10-CM

## 2021-04-02 DIAGNOSIS — R06.02 SHORTNESS OF BREATH: ICD-10-CM

## 2021-04-02 DIAGNOSIS — Q21.10 ASD (ATRIAL SEPTAL DEFECT): ICD-10-CM

## 2021-04-02 RX ORDER — FUROSEMIDE 20 MG
40 TABLET ORAL DAILY
Qty: 180 TABLET | Refills: 3 | Status: SHIPPED | OUTPATIENT
Start: 2021-04-02 | End: 2022-02-16

## 2021-04-02 RX ORDER — ALBUTEROL SULFATE 90 UG/1
2 AEROSOL, METERED RESPIRATORY (INHALATION) EVERY 6 HOURS PRN
Qty: 6.7 G | Refills: 0 | Status: CANCELLED | OUTPATIENT
Start: 2021-04-02

## 2021-04-02 NOTE — TELEPHONE ENCOUNTER
Please call patient and see how patient's breathing is doing. Patient was last prescribed this prescription 2 months ago for shortness of breath. Does she need a follow up appointment?     Fernanda Salomon RN on 4/2/2021 at 3:51 PM

## 2021-04-02 NOTE — TELEPHONE ENCOUNTER
Routing refill request to provider for review/approval because:  Patricia given x1 and patient did not follow up, please advise    Please help pt schedule visit     Karen Mendoza RN

## 2021-04-05 ENCOUNTER — MYC MEDICAL ADVICE (OUTPATIENT)
Dept: NURSING | Facility: CLINIC | Age: 51
End: 2021-04-05

## 2021-04-06 RX ORDER — CITALOPRAM HYDROBROMIDE 20 MG/1
TABLET ORAL
Qty: 30 TABLET | Refills: 0 | Status: SHIPPED | OUTPATIENT
Start: 2021-04-06 | End: 2021-04-27

## 2021-04-27 ENCOUNTER — VIRTUAL VISIT (OUTPATIENT)
Dept: FAMILY MEDICINE | Facility: CLINIC | Age: 51
End: 2021-04-27
Payer: COMMERCIAL

## 2021-04-27 DIAGNOSIS — Z86.79 PERSONAL HISTORY OF ATRIAL FIBRILLATION: ICD-10-CM

## 2021-04-27 DIAGNOSIS — F33.0 MAJOR DEPRESSIVE DISORDER, RECURRENT EPISODE, MILD (H): Primary | ICD-10-CM

## 2021-04-27 DIAGNOSIS — I10 ESSENTIAL HYPERTENSION: ICD-10-CM

## 2021-04-27 DIAGNOSIS — I77.810 THORACIC AORTIC ECTASIA (H): ICD-10-CM

## 2021-04-27 PROBLEM — K43.9 VENTRAL HERNIA: Status: RESOLVED | Noted: 2020-11-23 | Resolved: 2021-04-27

## 2021-04-27 PROBLEM — K43.9 VENTRAL HERNIA WITHOUT OBSTRUCTION OR GANGRENE: Status: RESOLVED | Noted: 2020-04-15 | Resolved: 2021-04-27

## 2021-04-27 PROBLEM — I48.91 ATRIAL FIBRILLATION WITH RVR (H): Status: RESOLVED | Noted: 2020-09-15 | Resolved: 2021-04-27

## 2021-04-27 PROBLEM — Q21.12 PATENT FORAMEN OVALE: Status: RESOLVED | Noted: 2020-01-09 | Resolved: 2021-04-27

## 2021-04-27 PROBLEM — I27.20 PULMONARY HYPERTENSION (H): Status: RESOLVED | Noted: 2020-01-09 | Resolved: 2021-04-27

## 2021-04-27 PROBLEM — Q21.10 ASD (ATRIAL SEPTAL DEFECT): Status: RESOLVED | Noted: 2020-08-26 | Resolved: 2021-04-27

## 2021-04-27 PROBLEM — Q21.11 OSTIUM SECUNDUM TYPE ATRIAL SEPTAL DEFECT: Status: RESOLVED | Noted: 2020-07-29 | Resolved: 2021-04-27

## 2021-04-27 PROCEDURE — 99214 OFFICE O/P EST MOD 30 MIN: CPT | Mod: 95 | Performed by: FAMILY MEDICINE

## 2021-04-27 RX ORDER — CITALOPRAM HYDROBROMIDE 20 MG/1
20 TABLET ORAL DAILY
Qty: 90 TABLET | Refills: 1 | Status: SHIPPED | OUTPATIENT
Start: 2021-04-27 | End: 2021-12-17

## 2021-04-27 ASSESSMENT — ANXIETY QUESTIONNAIRES
1. FEELING NERVOUS, ANXIOUS, OR ON EDGE: SEVERAL DAYS
2. NOT BEING ABLE TO STOP OR CONTROL WORRYING: SEVERAL DAYS
IF YOU CHECKED OFF ANY PROBLEMS ON THIS QUESTIONNAIRE, HOW DIFFICULT HAVE THESE PROBLEMS MADE IT FOR YOU TO DO YOUR WORK, TAKE CARE OF THINGS AT HOME, OR GET ALONG WITH OTHER PEOPLE: NOT DIFFICULT AT ALL
7. FEELING AFRAID AS IF SOMETHING AWFUL MIGHT HAPPEN: NOT AT ALL
GAD7 TOTAL SCORE: 5
6. BECOMING EASILY ANNOYED OR IRRITABLE: SEVERAL DAYS
3. WORRYING TOO MUCH ABOUT DIFFERENT THINGS: SEVERAL DAYS
5. BEING SO RESTLESS THAT IT IS HARD TO SIT STILL: NOT AT ALL

## 2021-04-27 ASSESSMENT — PATIENT HEALTH QUESTIONNAIRE - PHQ9
5. POOR APPETITE OR OVEREATING: SEVERAL DAYS
SUM OF ALL RESPONSES TO PHQ QUESTIONS 1-9: 4

## 2021-04-27 NOTE — PROGRESS NOTES
"Lyubov is a 50 year old who is being evaluated via a billable video visit.      How would you like to obtain your AVS? MyChart  If the video visit is dropped, the invitation should be resent by: Text to cell phone: [phone]  Will anyone else be joining your video visit? No    Video Start Time: 8:50 AM    Assessment & Plan     Major depressive disorder, recurrent episode, mild (H)  Cont same, working well  - citalopram (CELEXA) 20 MG tablet; Take 1 tablet (20 mg) by mouth daily    Essential hypertension  Well controlled, cont same    Personal history of atrial fibrillation  Follow up with cardiology, cont lasix and aspirin    Thoracic aortic ectasia, follow up with cardiolgy, pt is feeling well, questions answered               BMI:   Estimated body mass index is 48.58 kg/m  as calculated from the following:    Height as of 1/14/21: 1.626 m (5' 4\").    Weight as of 1/26/21: 128.4 kg (283 lb).   Weight management plan: Discussed healthy diet and exercise guidelines    Regular exercise    Return in about 6 months (around 10/27/2021) for Preventive Visit, Depression/anxiety.    Yolie Delarosa MD  [facility]    Katy Mcarthur is a 50 year old who presents for the following health issues     HPI     Depression and Anxiety Follow-Up    How are you doing with your depression since your last visit? No change    How are you doing with your anxiety since your last visit?  No change    Are you having other symptoms that might be associated with depression or anxiety? No    Have you had a significant life event? OTHER: 2 surgeries     Do you have any concerns with your use of alcohol or other drugs? No     Heart surgery went well, cardiology appt coming up soon, the closure of foramen ovale.   aorotic anyrsym dx about 15 months ago, seen by cardiology and they said this was fine, but needed this closure, back in august 2020. Then she got more SOB, heart racing, had gone into afib, needed " shocking and rythem is good, but still needing lasix, due to her SOB and wheezing on and off.   Lasix 40mg now, helps, but does not feel the same as previously.  Gained some weight and maybe that is contributing per pt.  Taking both BP meds, checking her /74.   Lisinopril 10mg, toprol 75mg  Not really using the inhaler, got this just in case. Has not needed it really.      Hernia repaired in January, .     Got 1 vaccine in Feb of covid, then got covid 1 week later, it was not good. She has a question about when she should get her 2nd dose. Her  Vlad got it and her daughter marti also got it. Everyone recovered.           Social History     Tobacco Use     Smoking status: Former Smoker     Packs/day: 0.50     Years: 5.00     Pack years: 2.50     Types: Cigarettes     Quit date: 2007     Years since quittin.0     Smokeless tobacco: Never Used   Substance Use Topics     Alcohol use: Yes     Comment: 0-1 weekly     Drug use: No     PHQ 2020   PHQ-9 Total Score 4 5 4   Q9: Thoughts of better off dead/self-harm past 2 weeks Not at all Not at all Not at all     PINKY-7 SCORE 2020   Total Score - - -   Total Score 4 (minimal anxiety) - -   Total Score 4 4 5         Suicide Assessment Five-step Evaluation and Treatment (SAFE-T)      How many servings of fruits and vegetables do you eat daily?  2-3    On average, how many sweetened beverages do you drink each day (Examples: soda, juice, sweet tea, etc.  Do NOT count diet or artificially sweetened beverages)?   0    How many days per week do you exercise enough to make your heart beat faster? 3 or less    How many minutes a day do you exercise enough to make your heart beat faster? 9 or less    How many days per week do you miss taking your medication? 0        Review of Systems   CONSTITUTIONAL: NEGATIVE for fever, chills, change in weight  ENT/MOUTH: NEGATIVE for ear, mouth and throat  problems  RESP: NEGATIVE for significant cough or SOB  CV: NEGATIVE for chest pain, palpitations or peripheral edema      Objective           Vitals:  No vitals were obtained today due to virtual visit.    Physical Exam   GENERAL: Healthy, alert and no distress  EYES: Eyes grossly normal to inspection.  No discharge or erythema, or obvious scleral/conjunctival abnormalities.  RESP: No audible wheeze, cough, or visible cyanosis.  No visible retractions or increased work of breathing.    SKIN: Visible skin clear. No significant rash, abnormal pigmentation or lesions.  NEURO: Cranial nerves grossly intact.  Mentation and speech appropriate for age.  PSYCH: Mentation appears normal, affect normal/bright, judgement and insight intact, normal speech and appearance well-groomed.                Video-Visit Details    Type of service:  Video Visit    Video End Time:9:07 AM    Originating Location (pt. Location): Home    Distant Location (provider location):  [facility]     Platform used for Video Visit: DCWafers

## 2021-04-28 ASSESSMENT — ANXIETY QUESTIONNAIRES: GAD7 TOTAL SCORE: 5

## 2021-05-03 ENCOUNTER — TELEPHONE (OUTPATIENT)
Dept: NURSING | Facility: CLINIC | Age: 51
End: 2021-05-03

## 2021-05-03 DIAGNOSIS — Z23 HIGH PRIORITY FOR 2019-NCOV VACCINE: Primary | ICD-10-CM

## 2021-05-03 NOTE — TELEPHONE ENCOUNTER
Request for 2nd COVID-19 vaccination due to 1st dose being received second dose was delayed due to timing issue and no existing Epic order -PROCEED     RN obtained the following information from: Patient      Any vaccine in previous 14 days? NO - Okay to Proceed    The name of 1st dose vaccine product received as first dose: Moderna    Date 1st dose vaccination was given: 02/1/2021    Lot #: [id number]    The 2nd dose of the mRNA COVID-19 vaccine product should be the same vaccine product as the 1st dose and be administered within appropriate timeframe.     Based on the information collected, the patient should receive the vaccine after 3/1/21 date.         Manda Watt RN  [facility]

## 2021-05-08 ENCOUNTER — IMMUNIZATION (OUTPATIENT)
Dept: NURSING | Facility: CLINIC | Age: 51
End: 2021-05-08
Attending: OTOLARYNGOLOGY
Payer: COMMERCIAL

## 2021-05-08 DIAGNOSIS — Z23 HIGH PRIORITY FOR 2019-NCOV VACCINE: ICD-10-CM

## 2021-05-08 PROCEDURE — 0011A PR COVID VAC MODERNA 100 MCG/0.5 ML IM: CPT

## 2021-05-08 PROCEDURE — 91301 PR COVID VAC MODERNA 100 MCG/0.5 ML IM: CPT

## 2021-06-08 ENCOUNTER — HOSPITAL ENCOUNTER (OUTPATIENT)
Dept: CARDIOLOGY | Facility: CLINIC | Age: 51
Discharge: HOME | End: 2021-06-08
Attending: INTERNAL MEDICINE | Admitting: INTERNAL MEDICINE
Payer: COMMERCIAL

## 2021-06-08 DIAGNOSIS — Q21.11 OSTIUM SECUNDUM TYPE ATRIAL SEPTAL DEFECT: ICD-10-CM

## 2021-06-08 PROCEDURE — 93321 DOPPLER ECHO F-UP/LMTD STD: CPT | Mod: 26 | Performed by: INTERNAL MEDICINE

## 2021-06-08 PROCEDURE — 93325 DOPPLER ECHO COLOR FLOW MAPG: CPT | Mod: 26 | Performed by: INTERNAL MEDICINE

## 2021-06-08 PROCEDURE — 93308 TTE F-UP OR LMTD: CPT | Mod: 26 | Performed by: INTERNAL MEDICINE

## 2021-06-08 PROCEDURE — 258N000001 HC RX 258: Performed by: INTERNAL MEDICINE

## 2021-06-08 PROCEDURE — 999N000208 ECHOCARDIOGRAM LIMITED

## 2021-06-08 PROCEDURE — 255N000002 HC RX 255 OP 636: Performed by: INTERNAL MEDICINE

## 2021-06-08 RX ORDER — ACYCLOVIR 200 MG/1
30 CAPSULE ORAL ONCE
Status: COMPLETED | OUTPATIENT
Start: 2021-06-08 | End: 2021-06-08

## 2021-06-08 RX ADMIN — HUMAN ALBUMIN MICROSPHERES AND PERFLUTREN 2 ML: 10; .22 INJECTION, SOLUTION INTRAVENOUS at 15:15

## 2021-06-08 RX ADMIN — SODIUM CHLORIDE 30 ML: 9 INJECTION, SOLUTION INTRAMUSCULAR; INTRAVENOUS; SUBCUTANEOUS at 15:16

## 2021-08-02 ENCOUNTER — VIRTUAL VISIT (OUTPATIENT)
Dept: CARDIOLOGY | Facility: CLINIC | Age: 51
End: 2021-08-02
Attending: INTERNAL MEDICINE
Payer: COMMERCIAL

## 2021-08-02 DIAGNOSIS — R60.9 EDEMA, UNSPECIFIED TYPE: Primary | ICD-10-CM

## 2021-08-02 DIAGNOSIS — Q21.11 OSTIUM SECUNDUM TYPE ATRIAL SEPTAL DEFECT: ICD-10-CM

## 2021-08-02 PROCEDURE — 99214 OFFICE O/P EST MOD 30 MIN: CPT | Mod: 95 | Performed by: INTERNAL MEDICINE

## 2021-08-02 NOTE — LETTER
"8/2/2021    Yolie Delarosa MD  [id number]  [facility] [address]    RE: Lyubov WALLER Daniel       Dear Colleague,    I had the pleasure of seeing Lyubov Sanchez in the  Heart Care.    Review Of Systems  Skin: negative  Eyes: negative  Ears/Nose/Throat: negative  Respiratory: Shortness of breath-  and Dyspnea on exertion-   Cardiovascular: Lightheadedness  Gastrointestinal: negative  Genitourinary: negative  Musculoskeletal: negative  Neurologic: negative  Psychiatric: negative  Hematologic/Lymphatic/Immunologic: negative  Endocrine: negative    Vitals - Patient Reported  Systolic (Patient Reported):  (Pt has not checked VS recently)  Weight (Patient Reported): 129.3 kg (285 lb)  Height (Patient Reported): 162.6 cm (5' 4\")  BMI (Based on Pt Reported Ht/Wt): 48.92    Reviewed: ALEXIS Jensen  08/02/2021    Lyubov is a 50 year old who is being evaluated via a billable video visit.      How would you like to obtain your AVS? MyChart  If the video visit is dropped, the invitation should be resent by: Text to cell phone: [phone]  Will anyone else be joining your video visit? No          Cardiology Clinic (Structurtal - ASD)    Assessment & Plan       1.  ASD S/P Closure with 30 mm Cardioform septal occluder August 2020  2.  Elevated BMI  3.  Hyperlipidemia  4.  Obstructive sleep apnea  5.  Mildly dilated proximal ascending aorta  6.  Mild pulmonary hypertension from # 1  7.  Patient S/P hysterectomy  8.  HTN  9.  PAF S/P CV to NSR    Recommendations    1.  Patient has done well after her closure of her ASD.  Recent echocardiogram was reviewed demonstrating well-seated device with no residual shunting.  2.  Given her edema I will order a venous competency study to have further assessment of this.  Following that I will have her sent to our colleagues in the vein clinic for evaluation.  3.  Patient's symptoms apparently began after her hernia surgery.  " Consideration should also be given by the vein clinic to imaging her venous system for extrinsic compression (May Thurner syndrome) as a result of her surgery and thus lower extremity edema.  I will leave this in the capable hands of my vein clinic colleagues.  4.  She can follow-up with me in 1 years time.    Corby Hodges MD      HPI:    Patient was originally scheduled to be in person clinic visit.  She runs a  and the person who was set to relieve her is not available.  As such we have switched her over to a video visit.      Patient is a very pleasant 50-year-old woman who I the pleasure meeting earlier this year preoperative clearance for hysterectomy which she underwent without difficulty..  Prior to her preoperative visit she had an echocardiogram that suggested an interatrial shunt.  This was not clarified clearly on the transthoracic study.  She had a CONCEPCION performed demonstrating a small ASD with diameter of 8 mm.     Patient ultimately underwent ASD repair with a cardioform 30 mm [place] device in August 2020.  This was uneventful.  In September she was awakened with a pounding heart rate and eventually sought medical attention.  EKG demonstrated atrial fibrillation rapid ventricular response.  She was placed on Eliquis and metoprolol and underwent CONCEPCION cardioversion to normal sinus rhythm.  Subsequent monitoring has demonstrated no recurrence of her atrial fibrillation however occasional PACs and PVCs.  She was symptomatic initially for the next few weeks however these have since abated the last 10 to 12 days.  She is continue to be maintained on her metoprolol Plavix as well as Eliquis.  She also had Lasix placed with improvement in her shortness of breath.  An echocardiogram has been recently performed demonstrating no shunting and a well-placed ASD device.     On November 24, 2020 patient had an ER visit.  At that time she had abdominal pain and there was a concern regarding incarcerated  umbilical hernia.  Fortunately the testing did not suggest this and she has done well with conservative management.  As a result of her presentation her hernia surgery has been moved off to December 29, 2020.     In December she had her hernia surgery and preceding and certainly postoperatively she has had difficulty with edema.  She has had up titration of her diuretics with some improvement however symptoms persist.  Her weight is also gone up during this timeframe.  She feels short of breath as a result.  An echocardiogram was performed recently demonstrating preserved LV systolic function normal right-sided size and function and the interatrial device was well-positioned with no evidence of residual shunt.      Cardiac MRI:    Normal left ventricular size and systolic function. Quantitative LVEF 71%.   Mildly dilated right ventricle with normal RV systolic function. Quantitative RVEF 65%.   There is a small secundum atrial septal defect, located in the superior aspect of the interatrial septum,  measures 7.7mm. The superior rim measures approximately 2.6mm.  By phase contrast imaging, Qp/Qs is 1.27.   Pulmonary vein angiogram reveals no evidence of anomalous venous return.   Dilated main pulmonary artery (3.8cm).       Primary Care Physician   Yolie Delarosa      Patient Active Problem List   Diagnosis     Hyperlipidemia LDL goal <100     Migraine     HTN, goal below 140/90     Migraine without aura     Health Care Home     Anxiety     Morbid obesity due to excess calories (H)     KRISTYN (obstructive sleep apnea)     Incomplete right bundle branch block     S/P total knee arthroplasty     Major depressive disorder, recurrent episode, mild (H)     Aneurysm, ascending aorta (H)     Umbilical hernia without obstruction and without gangrene     Patent foramen ovale     Pulmonary hypertension (H)     Ventral hernia without obstruction or gangrene       Past Medical History   I have reviewed this patient's medical  history and updated it with pertinent information if needed.   Past Medical History:   Diagnosis Date     Anxiety and depression     Pt reports is on Rx Celexa.     Aortic aneurysm (H)     Pt reports its small and is currently being monitored.     Arthritis      Atrial fibrillation with RVR (H) 9/15/2020     Depressive disorder      DYSPLASIA OF CERVIX 3/6/2003     Dysplasia of cervix (uteri)     Rx cryotherapy Nov , and      Hypertension     NO cardiology     Incomplete right bundle branch block 11/10/2017     Migraine      Ostium secundum type atrial septal defect 2020    Added automatically from request for surgery [id number]     Other chronic pain     Knee pain for 8 years     Patent foramen ovale 2020     Plantar fasciitis     bilat     Unspecified sleep apnea     CPAP will bring on the day of surgery.     Ventral hernia without obstruction or gangrene 4/15/2020     Ventral hernia without obstruction or gangrene 4/15/2020       Past Surgical History   I have reviewed this patient's surgical history and updated it with pertinent information if needed.  Past Surgical History:   Procedure Laterality Date     ANESTHESIA CARDIOVERSION N/A 2020    Procedure: ANESTHESIA, FOR CONCEPCION/CARDIOVERSION;  Surgeon: GENERIC ANESTHESIA PROVIDER;  Location:  OR     ARTHROPLASTY KNEE Right 2017    Procedure: ARTHROPLASTY KNEE;  Right total knee arthroplasty using the Arthrex iBalance total knee system;  Surgeon: Hebert Lee MD;  Location:  OR     ARTHROPLASTY KNEE Left 3/19/2018    Procedure: ARTHROPLASTY KNEE;  Left total knee arthroplasty using an Arthrex iBalance total knee system;  Surgeon: Hebert Lee MD;  Location: [facility] OR     C  DELIVERY ONLY      , Low Cervical     CV ASD CLOSURE N/A 2020    Procedure: ASD Closure;  Surgeon: Freddie Frias MD;  Location:  HEART CARDIAC CATH LAB     CV RIGHT HEART CATH MEASUREMENTS RECORDED N/A  8/26/2020    Procedure: Right Heart Cath;  Surgeon: Freddie Frias MD;  Location:  HEART CARDIAC CATH LAB     DAVINCI HERNIORRHAPHY VENTRAL N/A 12/29/2020    Procedure: ROBOTIC ASSISTED VENTRAL AND INCISIONAL HERNIA REPAIR WITH MESH;  Surgeon: Violetta Lazaro MD;  Location: [facility] OR     DAVINCI HYSTERECTOMY TOTAL, BILATERAL SALPINGO-OOPHORECTOMY, COMBINED Bilateral 1/31/2020    Procedure: DaVinci robotic-assisted total laparoscopic hysterectomy, bilateral salpingectomy;  Surgeon: Venancio Bartlett MD;  Location: [facility] OR - Path all benign     DAVINCI HYSTERECTOMY TOTAL, SALPINGECTOMY BILATERAL Bilateral 01/31/2020    Fibroid uterus, Menometrorrhagia - Robotic TLH, Bilateral salpingectomy - Path all benign     ENT SURGERY  1976     HC COLP VAGINA W CERVIX IF PRES W BIOPSY  2005    Chemult for ASCUS     TONSILLECTOMY & ADENOIDECTOMY         Prior to Admission Medications   Cannot display prior to admission medications because the patient has not been admitted in this contact.     [unfilled]  [unfilled]  Allergies   Allergies   Allergen Reactions     Penicillins Hives     hives       Social History    reports that she quit smoking about 14 years ago. Her smoking use included cigarettes. She has a 2.50 pack-year smoking history. She has never used smokeless tobacco. She reports current alcohol use. She reports that she does not use drugs.    Family History   Family History   Adopted: Yes   Problem Relation Age of Onset     Unknown/Adopted Other         pt is adopted and has no access to health history     Unknown/Adopted Mother      Unknown/Adopted Father      Unknown/Adopted Maternal Grandmother      Unknown/Adopted Maternal Grandfather      Unknown/Adopted Paternal Grandmother      Unknown/Adopted Other        Review of Systems   The comprehensive 10 point Review of Systems is negative other than noted in the HPI or here.     Physical Exam   Vital Signs with Ranges     Wt Readings from Last 4 Encounters:    01/26/21 128.4 kg (283 lb)   01/14/21 129.9 kg (286 lb 6.4 oz)   12/29/20 126.1 kg (278 lb)   12/22/20 126.4 kg (278 lb 9.6 oz)             Video Start Time: 3:30 PM  Video-Visit Details    Type of service:  Video Visit    Video End Time:3:46 PM    Originating Location (pt. Location): Home    Distant Location (provider location):  [facility] [place]     Platform used for Video Visit: Doximmarcin      Thank you for allowing me to participate in the care of your patient.      Sincerely,     Corby Hodges MD     [facility]  cc:   Corby Hodges MD  [address] [facility] [address] [place] [id number]

## 2021-08-02 NOTE — PROGRESS NOTES
"Review Of Systems  Skin: negative  Eyes: negative  Ears/Nose/Throat: negative  Respiratory: Shortness of breath-  and Dyspnea on exertion-   Cardiovascular: Lightheadedness  Gastrointestinal: negative  Genitourinary: negative  Musculoskeletal: negative  Neurologic: negative  Psychiatric: negative  Hematologic/Lymphatic/Immunologic: negative  Endocrine: negative    Vitals - Patient Reported  Systolic (Patient Reported):  (Pt has not checked VS recently)  Weight (Patient Reported): 129.3 kg (285 lb)  Height (Patient Reported): 162.6 cm (5' 4\")  BMI (Based on Pt Reported Ht/Wt): 48.92    Reviewed: ALEXIS Jensen  08/02/2021    Lyubov is a 50 year old who is being evaluated via a billable video visit.      How would you like to obtain your AVS? MyChart  If the video visit is dropped, the invitation should be resent by: Text to cell phone: [phone]  Will anyone else be joining your video visit? No          Cardiology Clinic ([facility] - ASD)    Assessment & Plan       1.  ASD S/P Closure with 30 mm Cardioform septal occluder August 2020  2.  Elevated BMI  3.  Hyperlipidemia  4.  Obstructive sleep apnea  5.  Mildly dilated proximal ascending aorta  6.  Mild pulmonary hypertension from # 1  7.  Patient S/P hysterectomy  8.  HTN  9.  PAF S/P CV to NSR    Recommendations    1.  Patient has done well after her closure of her ASD.  Recent echocardiogram was reviewed demonstrating well-seated device with no residual shunting.  2.  Given her edema I will order a venous competency study to have further assessment of this.  Following that I will have her sent to our colleagues in the vein clinic for evaluation.  3.  Patient's symptoms apparently began after her hernia surgery.  Consideration should also be given by the vein clinic to imaging her venous system for extrinsic compression (May Thurner syndrome) as a result of her surgery and thus lower extremity edema.  I will leave this in the capable hands of my vein clinic " colleagues.  4.  She can follow-up with me in 1 years time.    Corby Hodges MD      HPI:    Patient was originally scheduled to be in person clinic visit.  She runs a  and the person who was set to relieve her is not available.  As such we have switched her over to a video visit.      Patient is a very pleasant 50-year-old woman who I the pleasure meeting earlier this year preoperative clearance for hysterectomy which she underwent without difficulty..  Prior to her preoperative visit she had an echocardiogram that suggested an interatrial shunt.  This was not clarified clearly on the transthoracic study.  She had a CONCEPCION performed demonstrating a small ASD with diameter of 8 mm.     Patient ultimately underwent ASD repair with a cardioform 30 mm [place] device in August 2020.  This was uneventful.  In September she was awakened with a pounding heart rate and eventually sought medical attention.  EKG demonstrated atrial fibrillation rapid ventricular response.  She was placed on Eliquis and metoprolol and underwent CONCEPCION cardioversion to normal sinus rhythm.  Subsequent monitoring has demonstrated no recurrence of her atrial fibrillation however occasional PACs and PVCs.  She was symptomatic initially for the next few weeks however these have since abated the last 10 to 12 days.  She is continue to be maintained on her metoprolol Plavix as well as Eliquis.  She also had Lasix placed with improvement in her shortness of breath.  An echocardiogram has been recently performed demonstrating no shunting and a well-placed ASD device.     On November 24, 2020 patient had an ER visit.  At that time she had abdominal pain and there was a concern regarding incarcerated umbilical hernia.  Fortunately the testing did not suggest this and she has done well with conservative management.  As a result of her presentation her hernia surgery has been moved off to December 29, 2020.     In December she had her hernia surgery  and preceding and certainly postoperatively she has had difficulty with edema.  She has had up titration of her diuretics with some improvement however symptoms persist.  Her weight is also gone up during this timeframe.  She feels short of breath as a result.  An echocardiogram was performed recently demonstrating preserved LV systolic function normal right-sided size and function and the interatrial device was well-positioned with no evidence of residual shunt.      Cardiac MRI:    Normal left ventricular size and systolic function. Quantitative LVEF 71%.   Mildly dilated right ventricle with normal RV systolic function. Quantitative RVEF 65%.   There is a small secundum atrial septal defect, located in the superior aspect of the interatrial septum,  measures 7.7mm. The superior rim measures approximately 2.6mm.  By phase contrast imaging, Qp/Qs is 1.27.   Pulmonary vein angiogram reveals no evidence of anomalous venous return.   Dilated main pulmonary artery (3.8cm).       Primary Care Physician   Yolie Delarosa      Patient Active Problem List   Diagnosis     Hyperlipidemia LDL goal <100     Migraine     HTN, goal below 140/90     Migraine without aura     Health Care Home     Anxiety     Morbid obesity due to excess calories (H)     KRISTYN (obstructive sleep apnea)     Incomplete right bundle branch block     S/P total knee arthroplasty     Major depressive disorder, recurrent episode, mild (H)     Aneurysm, ascending aorta (H)     Umbilical hernia without obstruction and without gangrene     Patent foramen ovale     Pulmonary hypertension (H)     Ventral hernia without obstruction or gangrene       Past Medical History   I have reviewed this patient's medical history and updated it with pertinent information if needed.   Past Medical History:   Diagnosis Date     Anxiety and depression     Pt reports is on Rx Celexa.     Aortic aneurysm (H)     Pt reports its small and is currently being monitored.      Arthritis      Atrial fibrillation with RVR (H) 9/15/2020     Depressive disorder      DYSPLASIA OF CERVIX 3/6/2003     Dysplasia of cervix (uteri)     Rx cryotherapy Nov , and      Hypertension     NO cardiology     Incomplete right bundle branch block 11/10/2017     Migraine      Ostium secundum type atrial septal defect 2020    Added automatically from request for surgery [id number]     Other chronic pain     Knee pain for 8 years     Patent foramen ovale 2020     Plantar fasciitis     bilat     Unspecified sleep apnea     CPAP will bring on the day of surgery.     Ventral hernia without obstruction or gangrene 4/15/2020     Ventral hernia without obstruction or gangrene 4/15/2020       Past Surgical History   I have reviewed this patient's surgical history and updated it with pertinent information if needed.  Past Surgical History:   Procedure Laterality Date     ANESTHESIA CARDIOVERSION N/A 2020    Procedure: ANESTHESIA, FOR CONCEPCION/CARDIOVERSION;  Surgeon: GENERIC ANESTHESIA PROVIDER;  Location:  OR     ARTHROPLASTY KNEE Right 2017    Procedure: ARTHROPLASTY KNEE;  Right total knee arthroplasty using the Arthrex iBalance total knee system;  Surgeon: Hebert Lee MD;  Location: [facility] OR     ARTHROPLASTY KNEE Left 3/19/2018    Procedure: ARTHROPLASTY KNEE;  Left total knee arthroplasty using an Arthrex iBalance total knee system;  Surgeon: Hebert Lee MD;  Location: [facility] OR     C  DELIVERY ONLY      , Low Cervical     CV ASD CLOSURE N/A 2020    Procedure: ASD Closure;  Surgeon: Freddie Frias MD;  Location:  HEART CARDIAC CATH LAB     CV RIGHT HEART CATH MEASUREMENTS RECORDED N/A 2020    Procedure: Right Heart Cath;  Surgeon: Freddie Frias MD;  Location:  HEART CARDIAC CATH LAB     DAVINCI HERNIORRHAPHY VENTRAL N/A 2020    Procedure: ROBOTIC ASSISTED VENTRAL AND INCISIONAL HERNIA REPAIR WITH MESH;  Surgeon:  Violetta Lazaro MD;  Location: [facility] OR     DAVINCI HYSTERECTOMY TOTAL, BILATERAL SALPINGO-OOPHORECTOMY, COMBINED Bilateral 1/31/2020    Procedure: DaVinci robotic-assisted total laparoscopic hysterectomy, bilateral salpingectomy;  Surgeon: Venancio Bartlett MD;  Location: [facility] OR - Path all benign     DAVINCI HYSTERECTOMY TOTAL, SALPINGECTOMY BILATERAL Bilateral 01/31/2020    Fibroid uterus, Menometrorrhagia - Robotic TLH, Bilateral salpingectomy - Path all benign     ENT SURGERY  1976     HC COLP VAGINA W CERVIX IF PRES W BIOPSY  2005    [place] for ASCUS     TONSILLECTOMY & ADENOIDECTOMY         Prior to Admission Medications   Cannot display prior to admission medications because the patient has not been admitted in this contact.     [unfilled]  [unfilled]  Allergies   Allergies   Allergen Reactions     Penicillins Hives     hives       Social History    reports that she quit smoking about 14 years ago. Her smoking use included cigarettes. She has a 2.50 pack-year smoking history. She has never used smokeless tobacco. She reports current alcohol use. She reports that she does not use drugs.    Family History   Family History   Adopted: Yes   Problem Relation Age of Onset     Unknown/Adopted Other         pt is adopted and has no access to health history     Unknown/Adopted Mother      Unknown/Adopted Father      Unknown/Adopted Maternal Grandmother      Unknown/Adopted Maternal Grandfather      Unknown/Adopted Paternal Grandmother      Unknown/Adopted Other        Review of Systems   The comprehensive 10 point Review of Systems is negative other than noted in the HPI or here.     Physical Exam   Vital Signs with Ranges     Wt Readings from Last 4 Encounters:   01/26/21 128.4 kg (283 lb)   01/14/21 129.9 kg (286 lb 6.4 oz)   12/29/20 126.1 kg (278 lb)   12/22/20 126.4 kg (278 lb 9.6 oz)             Video Start Time: 3:30 PM  Video-Visit Details    Type of service:  Video Visit    Video End Time:3:46  PM    Originating Location (pt. Location): Home    Distant Location (provider location):  [facility]     Platform used for Video Visit: Kizzy

## 2021-08-19 ENCOUNTER — OFFICE VISIT (OUTPATIENT)
Dept: URGENT CARE | Facility: URGENT CARE | Age: 51
End: 2021-08-19
Payer: COMMERCIAL

## 2021-08-19 ENCOUNTER — HOSPITAL ENCOUNTER (EMERGENCY)
Facility: CLINIC | Age: 51
Discharge: HOME | End: 2021-08-19
Attending: EMERGENCY MEDICINE | Admitting: EMERGENCY MEDICINE
Payer: COMMERCIAL

## 2021-08-19 ENCOUNTER — APPOINTMENT (OUTPATIENT)
Dept: GENERAL RADIOLOGY | Facility: CLINIC | Age: 51
End: 2021-08-19
Attending: EMERGENCY MEDICINE
Payer: COMMERCIAL

## 2021-08-19 VITALS
DIASTOLIC BLOOD PRESSURE: 68 MMHG | WEIGHT: 290 LBS | HEART RATE: 73 BPM | SYSTOLIC BLOOD PRESSURE: 122 MMHG | OXYGEN SATURATION: 94 % | RESPIRATION RATE: 12 BRPM | TEMPERATURE: 98.7 F | BODY MASS INDEX: 49.78 KG/M2

## 2021-08-19 VITALS
HEART RATE: 65 BPM | RESPIRATION RATE: 21 BRPM | TEMPERATURE: 98.1 F | SYSTOLIC BLOOD PRESSURE: 164 MMHG | OXYGEN SATURATION: 95 % | DIASTOLIC BLOOD PRESSURE: 86 MMHG

## 2021-08-19 DIAGNOSIS — R60.0 BILATERAL LOWER EXTREMITY EDEMA: ICD-10-CM

## 2021-08-19 DIAGNOSIS — R50.9 INTERMITTENT FEVER OF UNKNOWN ORIGIN: ICD-10-CM

## 2021-08-19 DIAGNOSIS — R06.02 SOB (SHORTNESS OF BREATH): Primary | ICD-10-CM

## 2021-08-19 DIAGNOSIS — R06.00 DYSPNEA, UNSPECIFIED TYPE: ICD-10-CM

## 2021-08-19 LAB
ALBUMIN SERPL-MCNC: 4 G/DL (ref 3.4–5)
ALP SERPL-CCNC: 47 U/L (ref 40–150)
ALT SERPL W P-5'-P-CCNC: 52 U/L (ref 0–50)
ANION GAP SERPL CALCULATED.3IONS-SCNC: 5 MMOL/L (ref 3–14)
AST SERPL W P-5'-P-CCNC: 27 U/L (ref 0–45)
BASOPHILS # BLD AUTO: 0 10E3/UL (ref 0–0.2)
BASOPHILS NFR BLD AUTO: 0 %
BILIRUB DIRECT SERPL-MCNC: 0.2 MG/DL (ref 0–0.2)
BILIRUB SERPL-MCNC: 1.2 MG/DL (ref 0.2–1.3)
BUN SERPL-MCNC: 13 MG/DL (ref 7–30)
CALCIUM SERPL-MCNC: 9.4 MG/DL (ref 8.5–10.1)
CHLORIDE BLD-SCNC: 102 MMOL/L (ref 94–109)
CO2 SERPL-SCNC: 33 MMOL/L (ref 20–32)
CREAT SERPL-MCNC: 0.69 MG/DL (ref 0.52–1.04)
D DIMER PPP FEU-MCNC: 0.31 UG/ML FEU (ref 0–0.5)
EOSINOPHIL # BLD AUTO: 0.3 10E3/UL (ref 0–0.7)
EOSINOPHIL NFR BLD AUTO: 3 %
ERYTHROCYTE [DISTWIDTH] IN BLOOD BY AUTOMATED COUNT: 12.6 % (ref 10–15)
GFR SERPL CREATININE-BSD FRML MDRD: >90 ML/MIN/1.73M2
GLUCOSE BLD-MCNC: 110 MG/DL (ref 70–99)
HCT VFR BLD AUTO: 40.5 % (ref 35–47)
HGB BLD-MCNC: 13.8 G/DL (ref 11.7–15.7)
HOLD SPECIMEN: NORMAL
IMM GRANULOCYTES # BLD: 0.1 10E3/UL
IMM GRANULOCYTES NFR BLD: 1 %
LYMPHOCYTES # BLD AUTO: 3.2 10E3/UL (ref 0.8–5.3)
LYMPHOCYTES NFR BLD AUTO: 37 %
MCH RBC QN AUTO: 31.9 PG (ref 26.5–33)
MCHC RBC AUTO-ENTMCNC: 34.1 G/DL (ref 31.5–36.5)
MCV RBC AUTO: 94 FL (ref 78–100)
MONOCYTES # BLD AUTO: 0.6 10E3/UL (ref 0–1.3)
MONOCYTES NFR BLD AUTO: 7 %
NEUTROPHILS # BLD AUTO: 4.5 10E3/UL (ref 1.6–8.3)
NEUTROPHILS NFR BLD AUTO: 52 %
NRBC # BLD AUTO: 0 10E3/UL
NRBC BLD AUTO-RTO: 0 /100
NT-PROBNP SERPL-MCNC: 210 PG/ML (ref 0–900)
PLATELET # BLD AUTO: 156 10E3/UL (ref 150–450)
POTASSIUM BLD-SCNC: 3.7 MMOL/L (ref 3.4–5.3)
PROT SERPL-MCNC: 7.3 G/DL (ref 6.8–8.8)
RBC # BLD AUTO: 4.33 10E6/UL (ref 3.8–5.2)
SARS-COV-2 RNA RESP QL NAA+PROBE: NEGATIVE
SODIUM SERPL-SCNC: 140 MMOL/L (ref 133–144)
TROPONIN I SERPL-MCNC: <0.015 UG/L (ref 0–0.04)
TSH SERPL DL<=0.005 MIU/L-ACNC: 0.88 MU/L (ref 0.4–4)
WBC # BLD AUTO: 8.7 10E3/UL (ref 4–11)

## 2021-08-19 PROCEDURE — 83880 ASSAY OF NATRIURETIC PEPTIDE: CPT | Performed by: EMERGENCY MEDICINE

## 2021-08-19 PROCEDURE — 82248 BILIRUBIN DIRECT: CPT | Performed by: EMERGENCY MEDICINE

## 2021-08-19 PROCEDURE — 85379 FIBRIN DEGRADATION QUANT: CPT | Performed by: EMERGENCY MEDICINE

## 2021-08-19 PROCEDURE — 85025 COMPLETE CBC W/AUTO DIFF WBC: CPT | Performed by: EMERGENCY MEDICINE

## 2021-08-19 PROCEDURE — 71046 X-RAY EXAM CHEST 2 VIEWS: CPT

## 2021-08-19 PROCEDURE — 99285 EMERGENCY DEPT VISIT HI MDM: CPT | Mod: 25

## 2021-08-19 PROCEDURE — C9803 HOPD COVID-19 SPEC COLLECT: HCPCS

## 2021-08-19 PROCEDURE — 84484 ASSAY OF TROPONIN QUANT: CPT | Performed by: EMERGENCY MEDICINE

## 2021-08-19 PROCEDURE — 82040 ASSAY OF SERUM ALBUMIN: CPT | Performed by: EMERGENCY MEDICINE

## 2021-08-19 PROCEDURE — 93000 ELECTROCARDIOGRAM COMPLETE: CPT | Performed by: FAMILY MEDICINE

## 2021-08-19 PROCEDURE — 87635 SARS-COV-2 COVID-19 AMP PRB: CPT | Performed by: EMERGENCY MEDICINE

## 2021-08-19 PROCEDURE — 36415 COLL VENOUS BLD VENIPUNCTURE: CPT | Performed by: EMERGENCY MEDICINE

## 2021-08-19 PROCEDURE — 99214 OFFICE O/P EST MOD 30 MIN: CPT | Performed by: FAMILY MEDICINE

## 2021-08-19 PROCEDURE — 80048 BASIC METABOLIC PNL TOTAL CA: CPT | Performed by: EMERGENCY MEDICINE

## 2021-08-19 PROCEDURE — 93005 ELECTROCARDIOGRAM TRACING: CPT

## 2021-08-19 PROCEDURE — 84443 ASSAY THYROID STIM HORMONE: CPT | Performed by: EMERGENCY MEDICINE

## 2021-08-19 ASSESSMENT — ENCOUNTER SYMPTOMS
HEMATURIA: 0
FEVER: 1
UNEXPECTED WEIGHT CHANGE: 0
DIARRHEA: 0
DYSURIA: 0
ABDOMINAL DISTENTION: 0
DIFFICULTY URINATING: 0
SHORTNESS OF BREATH: 1
COUGH: 0
FREQUENCY: 0

## 2021-08-20 LAB
ATRIAL RATE - MUSE: 68 BPM
DIASTOLIC BLOOD PRESSURE - MUSE: NORMAL MMHG
INTERPRETATION ECG - MUSE: NORMAL
P AXIS - MUSE: 17 DEGREES
PR INTERVAL - MUSE: 176 MS
QRS DURATION - MUSE: 94 MS
QT - MUSE: 470 MS
QTC - MUSE: 499 MS
R AXIS - MUSE: -80 DEGREES
SYSTOLIC BLOOD PRESSURE - MUSE: NORMAL MMHG
T AXIS - MUSE: 108 DEGREES
VENTRICULAR RATE- MUSE: 68 BPM

## 2021-08-20 NOTE — ED PROVIDER NOTES
History   Chief Complaint:  Fever       HPI   Lyubov Sanchez is a 51 year old female with history of atrial fibrillation, hypertension, hyperlipidemia, obesity who presents with intermittent fevers and shortness of breath which began approximately 1 week ago. For the past 3 weeks, she has been experiencing sinus congestion as well. Lyubov reports that every few days for the past week, she has had a fever as high as 101.5F which are intermittent and tend to come on when she wakes up in the morning. Her shortness of breath is also intermittent and tends to worsen when she lays flat on her back; her shortness of breath is not associated with exertion. She was seen at urgent care earlier today with regard to her symptoms but was sent here to the ED as her oxygen saturation was 94%. She does note some recent increased stress as she took her daughter to college this week. Lyubov denies cough, leg swelling, recent weight gain, diarrhea, abdominal distention, urinary symptoms. She denies hormone/birth control use and does not have a history of blood clots or asthma. She denies recent surgery, immobilization. Lyubov is vaccinated for COVID-19.    Review of Systems   Constitutional: Positive for fever. Negative for unexpected weight change.   HENT: Positive for congestion.    Respiratory: Positive for shortness of breath. Negative for cough.    Cardiovascular: Negative for leg swelling.   Gastrointestinal: Negative for abdominal distention and diarrhea.   Genitourinary: Negative for difficulty urinating, dysuria, frequency and hematuria.   All other systems reviewed and are negative.    Allergies:  Penicillins    Medications:  Albuterol  Aspirin 81 mg  Zyrtec  Celexa  Flonase  Lasix  Lisinopril  Toprol XL    Past Medical History:    Anxiety  Depression   Aortic aneurysm  Arthritis  Atrial fibrillation  Cervical dysplasia  Hypertension   Migraines  Ostium secundum type atrial septal defect  Patent foramen ovale  Plantar  fasciitis  Ventral hernia  Thoracic aortic ectasia  Bilateral leg edema  Umbilical hernia   Incomplete RBBB  Morbid obesity  Hyperlipidemia     Past Surgical History:    Cardioversion  Knee arthroplasty x2   section x2  ASD closure  Right heart catheterization  DaVinci herniorrhaphy ventral  Total hysterectomy  Tonsillectomy  Adenoidectomy     Social History:  Patient presents to the ED alone.  Patient presents to the ED via car.    Physical Exam     Patient Vitals for the past 24 hrs:   BP Temp Pulse Resp SpO2   21 (!) 141/85 -- 68 22 96 %   21 -- -- 67 23 96 %   21 (!) 148/97 -- 69 -- 95 %   21 (!) 189/109 98.1  F (36.7  C) 70 20 98 %       Physical Exam  General: Large adult female sitting upright  Eyes: PERRL, Conjunctive within normal limits.  No scleral icterus.  ENT: Moist mucous membranes, oropharynx clear.   CV: Normal S1S2, no murmur, rub or gallop. Regular rate and rhythm.  No appreciable JVD.  Resp: Clear to auscultation bilaterally, no wheezes, rales or rhonchi. Normal respiratory effort.  GI: Abdomen is soft and protuberant.  Nontender.  No palpable masses. No rebound or guarding.  MSK: Nonpitting edema bilateral lower extremities, symmetric. Nontender. Normal active range of motion.  Skin: Warm and dry. No rashes or lesions or ecchymoses on visible skin.  Neuro: Alert and oriented. Responds appropriately to all questions and commands. No focal findings appreciated. Normal muscle tone.  Psych: Normal mood and affect. Pleasant.    Emergency Department Course     ECG  ECG taken at , ECG read at   Normal sinus rhythm  Left axis deviation  Pulmonary disease pattern  Incomplete RBBB  Abnormal QRS-T angle, consider primary T wave abnormality  Abnormal ECG  Rate 68 bpm. KS interval 176 ms. QRS duration 94 ms. QT/QTc 470/499 ms. P-R-T axes 17 -80 108.     Imaging:    Chest XR,  PA & LAT  Low lung volumes. Borderline cardiomegaly. Mild interstitial  prominence suggests mild congestion and edema. No pleural fluid or pneumothorax. Cardiac septal closure device present.  Reading per radiology     Laboratory:    CBC: WBC 8.7, HGB 13.8,      BMP: CO2 33 (H), glucose 110 (H) o/w WNL (Creatinine 0.69)     Hepatic Panel: ALT 52 (H) o/w WNL     Troponin (Collected 2123): <0.015    Ddimer: 0.31    BNP: 210    TSH: 0.88    Asymptomatic COVID19 Virus PCR by nasopharyngeal swab negative    Emergency Department Course:    Reviewed:  I reviewed nursing notes, vitals, past medical history and care everywhere    Assessments:  2130 I obtained history and examined the patient as noted above.     2300 I rechecked the patient and explained findings.  She currently is asymptomatic and has been throughout her ED stay.  She denies any new symptoms or concerns.    Disposition:  The patient was discharged to home.     Impression & Plan     Medical Decision Making:  Lyubov Sanchez is a 51 year old female who presents for evaluation of intermittent shortness of breath with occasional fevers noted in the morning.  She has no associated respiratory symptoms, no increase in lower extremity edema which is chronic for her, and no chest pain.  She currently is asymptomatic here in the ED.  The workup here in the emergency room was to evaluate for infections, metabolic, electrolyte, neurologic, muscle or cardiovascular causes.  Of note, there are no signs of pneumonia or ACS.  No clinical evidence of CHF was a borderline abnormal chest x-ray.  She has a normal D-dimer and I think PE is unlikely in this clinical setting.  CT chest did not seem indicated.  She is not hypoxic or having any symptoms throughout her stay here.  She is afebrile.  Her morning fevers that have been intermittent are not clear in etiology.  Covid test is negative and she is vaccinated.  She should get further assessed for unknown febrile illness should these fevers persist.  She does relate multiple stressors and  her symptoms have only been occurring at night, and not reproduced reliably by certain positions or activity.  The patient does have a cardiologist and primary clinic.  She feels comfortable plan for discharge home.  She understands the importance of return should symptoms worsen.  She had an echocardiogram 1 month ago per her report and should have this repeated given her new dyspnea, although somewhat atypical in presentation.  She can discuss this also with her cardiologist tomorrow.  She return for any additional symptoms or worsening.  She feels comfortable this plan.  She is discharged home with her .    Covid-19  Lyubov Sanchez was evaluated during a global COVID-19 pandemic, which necessitated consideration that the patient might be at risk for infection with the SARS-CoV-2 virus that causes COVID-19.   Applicable protocols for evaluation were followed during the patient's care.   COVID-19 was considered as part of the patient's evaluation. The plan for testing is:  a test was obtained during this visit.    Diagnosis:    ICD-10-CM    1. Dyspnea, unspecified type  R06.00 Echocardiogram Complete   2. Intermittent fever of unknown origin  R50.9        Scribe Disclosure:  I, Adriana Yancey, am serving as a scribe at 9:30 PM on 8/19/2021 to document services personally performed by Bea Long MD based on my observations and the provider's statements to me.       Bea Long MD  08/20/21 0002

## 2021-08-20 NOTE — PROGRESS NOTES
ASSESSMENT/ PLAN:    SOB (shortness of breath)     - EKG 12-lead complete w/read - Clinics    Present 3 weeks,  With chest heaviness- has not felt ill , no cough, no fever, no runny nose.  O2 sats lower than usual 94%.   Has history of atrial fib intermittently-  But no tachycardia recently.  Had Atrial septal defect repair 1 year ago    Bilateral lower extremity edema    Has noticed increase bilateral lower extremity edema in the past 3 weeks  She has sleep apnea-  Uses her CPAP every night      Discussed that evaluation for PE, and CHF , cardiac ischemia are appropriate with her symptoms.  Recommended evaluation in the ER,  [place].  EKG shows no acute ischemia-  She may transport herself in her personal vehicle    Called [facility], patient accepted  ------------------------------------------------------------------------------------------------------------------------------------------------    SUBJECTIVE:  Chief Complaint   Patient presents with     Urgent Care     Cough     SOB, sinus, fever- sick 3 weeks - pt concerened she has pneumonia - heart surgery 1 year ago      Lyubov Sanchez is a 51 year old female who presents to the clinic today with a chief complaint of shortness of breath. and chest pressure with increased bilateral lower extremity edema for 3 week(s).  Patient denies cough , central chest pain. and pleuritic    The patient's symptoms are moderate and not changing over the course of time.  Associated symptoms include malaise.  She had sinus infection symptoms that have mostly resolved with antibiotics in the past week  The patient's symptoms are exacerbated by exercise    She had Surgery to repair atrial septal defect 1 year ago.  Has had 2 hospitalizations for atrial fib since the surgery    Past Medical History:   Diagnosis Date     Anxiety and depression     Pt reports is on Rx Celexa.     Aortic aneurysm (H)     Pt reports its small and is currently being monitored.      Arthritis      Atrial fibrillation with RVR (H) 9/15/2020     Depressive disorder      DYSPLASIA OF CERVIX 3/6/2003     Dysplasia of cervix (uteri) 2003    Rx cryotherapy Nov 03, and 2005     Hypertension     NO cardiology     Incomplete right bundle branch block 11/10/2017     Migraine      Ostium secundum type atrial septal defect 7/29/2020    Added automatically from request for surgery [id number]     Other chronic pain     Knee pain for 8 years     Patent foramen ovale 1/9/2020     Plantar fasciitis     bilat     Unspecified sleep apnea     CPAP will bring on the day of surgery.     Ventral hernia without obstruction or gangrene 4/15/2020     Ventral hernia without obstruction or gangrene 4/15/2020     Patient Active Problem List   Diagnosis     Hyperlipidemia LDL goal <100     HTN, goal below 140/90     Migraine without aura     Health Care Home     Anxiety     Morbid obesity due to excess calories (H)     KRISTYN (obstructive sleep apnea)     Incomplete right bundle branch block     S/P total knee arthroplasty     Major depressive disorder, recurrent episode, mild (H)     Aneurysm, ascending aorta (H)     Umbilical hernia without obstruction and without gangrene     Bilateral leg edema     Shortness of breath     Chest heaviness     Personal history of atrial fibrillation     Thoracic aortic ectasia (H)       ALLERGIES:  Penicillins    MEDs  acetaminophen (TYLENOL) 500 MG tablet, Take 500-1,000 mg by mouth every 6 hours as needed for mild pain  albuterol (PROAIR HFA/PROVENTIL HFA/VENTOLIN HFA) 108 (90 Base) MCG/ACT inhaler, Inhale 2 puffs into the lungs every 6 hours as needed for shortness of breath / dyspnea or wheezing  aspirin 81 MG EC tablet, Take 81 mg by mouth daily  cetirizine (ZYRTEC) 10 MG tablet, Take 10 mg by mouth as needed for allergies  citalopram (CELEXA) 20 MG tablet, Take 1 tablet (20 mg) by mouth daily  fluticasone (FLONASE) 50 MCG/ACT nasal spray, Spray 1 spray into both nostrils daily as needed  for rhinitis or allergies  furosemide (LASIX) 20 MG tablet, Take 2 tablets (40 mg) by mouth daily  ibuprofen (ADVIL/MOTRIN) 200 MG tablet, Take 3 tablets (600 mg) by mouth every 6 hours as needed for pain  lisinopril (ZESTRIL) 10 MG tablet, Take 1 tablet (10 mg) by mouth daily  metoprolol succinate ER (TOPROL-XL) 50 MG 24 hr tablet, Take 1.5 tablets (75 mg) by mouth daily    No current facility-administered medications on file prior to visit.      Social History     Tobacco Use     Smoking status: Former Smoker     Packs/day: 0.50     Years: 5.00     Pack years: 2.50     Types: Cigarettes     Quit date: 2007     Years since quittin.3     Smokeless tobacco: Never Used   Substance Use Topics     Alcohol use: Yes     Comment: 0-1 weekly       Family History   Adopted: Yes   Problem Relation Age of Onset     Unknown/Adopted Other         pt is adopted and has no access to health history     Unknown/Adopted Mother      Unknown/Adopted Father      Unknown/Adopted Maternal Grandmother      Unknown/Adopted Maternal Grandfather      Unknown/Adopted Paternal Grandmother      Unknown/Adopted Other          ROS  CONSTITUTIONAL:NEGATIVE for fever, chills   INTEGUMENTARY/SKIN: NEGATIVE for worrisome rashes  or lesions  EYES: NEGATIVE for vision changes or irritation  ENT/MOUTH: NEGATIVE for ear, mouth and throat problems  GI: NEGATIVE for nausea, abdominal pain,  , or change in bowel habits    OBJECTIVE:  /68 (BP Location: Right arm)   Pulse 73   Temp 98.7  F (37.1  C) (Oral)   Resp 12   Wt 131.5 kg (290 lb)   LMP  (LMP Unknown)   SpO2 94%   BMI 49.78 kg/m    GENERAL APPEARANCE: alert, moderate distress and cooperative  EYES: EOMI,  PERRL, conjunctiva clear  HENT: ear canals and TM's normal.  Nose and mouth without ulcers, erythema or lesions  NECK: supple, nontender, no lymphadenopathy  RESP: lungs clear to auscultation - no rales, rhonchi or wheezes  CV: regular rates and rhythm, normal S1 S2, no murmur  noted  ABDOMEN:  soft, nontender, no HSM or masses and bowel sounds normal  NEURO: Normal strength and tone, sensory exam grossly normal,  normal speech and mentation  SKIN: no suspicious lesions or rashes     EKG: sinus rhythm rate 75,  No ischemic ST changes

## 2021-09-07 ENCOUNTER — HOSPITAL ENCOUNTER (OUTPATIENT)
Dept: CARDIOLOGY | Facility: CLINIC | Age: 51
Discharge: HOME | End: 2021-09-07
Attending: INTERNAL MEDICINE | Admitting: INTERNAL MEDICINE
Payer: COMMERCIAL

## 2021-09-07 DIAGNOSIS — R60.9 EDEMA, UNSPECIFIED TYPE: ICD-10-CM

## 2021-09-07 PROCEDURE — 93970 EXTREMITY STUDY: CPT

## 2021-09-07 PROCEDURE — 93970 EXTREMITY STUDY: CPT | Mod: 26 | Performed by: INTERNAL MEDICINE

## 2021-09-11 ENCOUNTER — TRANSFERRED RECORDS (OUTPATIENT)
Dept: HEALTH INFORMATION MANAGEMENT | Facility: CLINIC | Age: 51
End: 2021-09-11

## 2021-09-18 ENCOUNTER — HEALTH MAINTENANCE LETTER (OUTPATIENT)
Age: 51
End: 2021-09-18

## 2021-09-22 ENCOUNTER — OFFICE VISIT (OUTPATIENT)
Dept: CARDIOLOGY | Facility: CLINIC | Age: 51
End: 2021-09-22
Attending: INTERNAL MEDICINE
Payer: COMMERCIAL

## 2021-09-22 VITALS
HEIGHT: 64 IN | BODY MASS INDEX: 50.02 KG/M2 | WEIGHT: 293 LBS | DIASTOLIC BLOOD PRESSURE: 86 MMHG | SYSTOLIC BLOOD PRESSURE: 139 MMHG | HEART RATE: 71 BPM

## 2021-09-22 DIAGNOSIS — R60.9 EDEMA, UNSPECIFIED TYPE: ICD-10-CM

## 2021-09-22 PROCEDURE — 99213 OFFICE O/P EST LOW 20 MIN: CPT | Performed by: NURSE PRACTITIONER

## 2021-09-22 ASSESSMENT — MIFFLIN-ST. JEOR: SCORE: 1952.18

## 2021-09-22 NOTE — PATIENT INSTRUCTIONS
Today's Recommendations    1. Velcro leg wraps that can be purchased on line. The measurements are available to help get the correct size and fit.  2. Exercise, leg elevation, weight loss and low sodium diet  3. Continue all medications without changes.  4. Please follow up with Sharita in 3 months.    Please send a [employer] message or call [phone] with questions or concerns.     Scheduling number [phone].

## 2021-09-22 NOTE — PROGRESS NOTES
Cardiology Clinic Progress Note  Lyubov Sanchez MRN# [id number]   YOB: 1970 Age: 51 year old     Primary cardiologist: Dr. Hodges    Reason for visit: Follow up venous competency study    History of presenting illness:    Lyubov Sanchez, a pleasant 51 year old patient who has a past medical history significant for ASD status post closure with 30 mm cardio form septal occluder in August 2020, hyperlipidemia, obstructive sleep apnea, mild Marino dilated proximal ascending aorta, mild pulmonary hypertension, hypertension and paroxysmal atrial fibrillation.    At her recent office visit with Dr. Hodges the patient noted lower extremity edema and a venous competency study was recommended.  The patient reported that the symptoms began after hernia surgery question question question may Jayshreeer's    The venous competency study was completed on 9/7/2021 without DVT noted bilaterally.  The right lower extremity showed a small segment of superficial reflux of the GSV at the proximal thigh with a severely incompetent large tributary off the proximal GSV that extends from the proximal thigh to the ankle.  The left lower extremity showed a small segment of superficial reflux of the GSV at the level of the SFJ without varicose veins noted bilaterally.    Today he reports that despite being on oral diuretics she has had no symptomatic provement in her lower extremity edema.  She feels that initially it started post her ASD closure and then subsequently worsened post her hernia repair.  She is also had bilateral knee surgeries and has had weight gain over the last year and a half.    History of ulcer: No  History of edema: Yes  History of compression stockings: No  History of leg discomfort requiring analgesics: No  History of itching, skin change or restless legs: Yes    Greater than six weeks of conservative treatment (weight reduction, daily exercise plan, periodic leg elevation and the use of graduated compression  "stockings): No    Significant pain and significant edema (swelling) that interferes with activities of daily living (I.e. housework, ability to move or coordinate normal physical tasks): Yes    Edema does not respond to medical treatment: Unknown           Assessment and Plan:     ASSESSMENT:    1. Venous insufficiency    Noted on the right lower extremity with a severely incompetent large tributary off the proximal GSV that extends from the proximal thigh to the ankle    No significant insufficiency on the left but has considerable edema    Previously has not had a good response to oral diuretics    2. ASD    Status post closure in August 2020    Repeat echo shows stability of site without shunt    PLAN:     1. Conservative therapy with compression stockings, exercise, elevation and weight loss as well as low-sodium diet  2. Return to clinic in approximately 3 months to assess symptoms and discuss further venous interventions       Orders this Visit:  No orders of the defined types were placed in this encounter.    No orders of the defined types were placed in this encounter.    There are no discontinued medications.         Review of Systems:     Review of Systems:  Skin:  Negative     Eyes:  Negative    ENT:  Negative    Respiratory:  Positive for shortness of breath;dyspnea on exertion;dyspnea at rest;sleep apnea;CPAP  Cardiovascular:    edema;Positive for;lightheadedness  Gastroenterology: Negative    Genitourinary:  Negative    Musculoskeletal:  Negative    Neurologic:  Negative    Psychiatric:  Negative    Heme/Lymph/Imm:  Negative    Endocrine:  Negative              Physical Exam:     Vitals: /86   Pulse 71   Ht 1.626 m (5' 4\")   Wt 134.7 kg (297 lb)   LMP  (LMP Unknown)   BMI 50.98 kg/m    Constitutional:  cooperative, alert and oriented, well developed, well nourished, in no acute distress obese      Skin:  warm and dry to the touch, no apparent skin lesions or masses noted        Head:  " normocephalic, no masses or lesions        Eyes:  pupils equal and round        ENT:  no pallor or cyanosis        Neck:  not assessed this visit        Chest:  not assessed this visit        Cardiac:               murmur hardly audible today    Abdomen:    obese ventral hernia    Vascular: not assessed this visit                                   Extremities and Back:        LE bitateral edema with venous stasis changes and spider veins on the right    Neurological:  no gross motor deficits             Medications:     Current Outpatient Medications   Medication Sig Dispense Refill     acetaminophen (TYLENOL) 500 MG tablet Take 500-1,000 mg by mouth every 6 hours as needed for mild pain       albuterol (PROAIR HFA/PROVENTIL HFA/VENTOLIN HFA) 108 (90 Base) MCG/ACT inhaler Inhale 2 puffs into the lungs every 6 hours as needed for shortness of breath / dyspnea or wheezing 6.7 g 0     aspirin 81 MG EC tablet Take 81 mg by mouth daily       cetirizine (ZYRTEC) 10 MG tablet Take 10 mg by mouth as needed for allergies       citalopram (CELEXA) 20 MG tablet Take 1 tablet (20 mg) by mouth daily 90 tablet 1     fluticasone (FLONASE) 50 MCG/ACT nasal spray Spray 1 spray into both nostrils daily as needed for rhinitis or allergies       furosemide (LASIX) 20 MG tablet Take 2 tablets (40 mg) by mouth daily 180 tablet 3     ibuprofen (ADVIL/MOTRIN) 200 MG tablet Take 3 tablets (600 mg) by mouth every 6 hours as needed for pain       lisinopril (ZESTRIL) 10 MG tablet Take 1 tablet (10 mg) by mouth daily 90 tablet 3     metoprolol succinate ER (TOPROL-XL) 50 MG 24 hr tablet Take 1.5 tablets (75 mg) by mouth daily 135 tablet 3       Family History   Adopted: Yes   Problem Relation Age of Onset     Unknown/Adopted Other         pt is adopted and has no access to health history     Unknown/Adopted Mother      Unknown/Adopted Father      Unknown/Adopted Maternal Grandmother      Unknown/Adopted Maternal Grandfather       Unknown/Adopted Paternal Grandmother      Unknown/Adopted Other        Social History     Socioeconomic History     Marital status:      Spouse name: Saúl     Number of children: 2     Years of education: Not on file     Highest education level: Not on file   Occupational History     Occupation:  at home   Tobacco Use     Smoking status: Former Smoker     Packs/day: 0.50     Years: 5.00     Pack years: 2.50     Types: Cigarettes     Quit date: 2007     Years since quittin.4     Smokeless tobacco: Never Used   Substance and Sexual Activity     Alcohol use: Yes     Comment: 2-3 per weekend     Drug use: No     Sexual activity: Yes     Partners: Male     Birth control/protection: Female Surgical, Male Surgical     Comment: Hysterectomy 2020/ vasectomy    Other Topics Concern      Service No     Blood Transfusions No     Caffeine Concern Yes     Comment: 20 oz pepsi     Occupational Exposure Not Asked     Hobby Hazards Not Asked     Sleep Concern Not Asked     Stress Concern Not Asked     Weight Concern Not Asked     Special Diet Yes     Comment: 2-3 dairy per day     Back Care Not Asked     Exercise No     Comment: active with kids, walking puppy     Bike Helmet Not Asked     Seat Belt Yes     Self-Exams No     Parent/sibling w/ CABG, MI or angioplasty before 65F 55M? No     Comment: No family history - Adopted   Social History Narrative     Not on file     Social Determinants of Health     Financial Resource Strain:      Difficulty of Paying Living Expenses:    Food Insecurity:      Worried About Running Out of Food in the Last Year:      Ran Out of Food in the Last Year:    Transportation Needs:      Lack of Transportation (Medical):      Lack of Transportation (Non-Medical):    Physical Activity:      Days of Exercise per Week:      Minutes of Exercise per Session:    Stress:      Feeling of Stress :    Social Connections:      Frequency of Communication with Friends and  Family:      Frequency of Social Gatherings with Friends and Family:      Attends Worship Services:      Active Member of Clubs or Organizations:      Attends Club or Organization Meetings:      Marital Status:    Intimate Partner Violence:      Fear of Current or Ex-Partner:      Emotionally Abused:      Physically Abused:      Sexually Abused:             Past Medical History:     Past Medical History:   Diagnosis Date     Anxiety and depression     Pt reports is on Rx Celexa.     Aortic aneurysm (H)     Pt reports its small and is currently being monitored.     Arthritis      Atrial fibrillation with RVR (H) 9/15/2020     Depressive disorder      DYSPLASIA OF CERVIX 3/6/2003     Dysplasia of cervix (uteri)     Rx cryotherapy , and      Hypertension     NO cardiology     Incomplete right bundle branch block 11/10/2017     Migraine      Ostium secundum type atrial septal defect 2020    Added automatically from request for surgery [id number]     Other chronic pain     Knee pain for 8 years     Patent foramen ovale 2020     Plantar fasciitis     bilat     Unspecified sleep apnea     CPAP will bring on the day of surgery.     Ventral hernia without obstruction or gangrene 4/15/2020     Ventral hernia without obstruction or gangrene 4/15/2020              Past Surgical History:     Past Surgical History:   Procedure Laterality Date     ANESTHESIA CARDIOVERSION N/A 2020    Procedure: ANESTHESIA, FOR CONCEPCION/CARDIOVERSION;  Surgeon: GENERIC ANESTHESIA PROVIDER;  Location: [facility] OR     ARTHROPLASTY KNEE Right 2017    Procedure: ARTHROPLASTY KNEE;  Right total knee arthroplasty using the Arthrex iBalance total knee system;  Surgeon: Hebert Lee MD;  Location: [facility] OR     ARTHROPLASTY KNEE Left 3/19/2018    Procedure: ARTHROPLASTY KNEE;  Left total knee arthroplasty using an Arthrex iBalance total knee system;  Surgeon: Hebert Lee MD;  Location: [facility] OR     C   DELIVERY ONLY  ,    , Low Cervical     CV ASD CLOSURE N/A 2020    Procedure: ASD Closure;  Surgeon: Freddie Frias MD;  Location:  [facility]     CV RIGHT HEART CATH MEASUREMENTS RECORDED N/A 2020    Procedure: Right Heart Cath;  Surgeon: Freddie Frias MD;  Location:  [facility]     DAVINCI HERNIORRHAPHY VENTRAL N/A 2020    Procedure: ROBOTIC ASSISTED VENTRAL AND INCISIONAL HERNIA REPAIR WITH MESH;  Surgeon: Violetta Lazaro MD;  Location: [facility] OR     DAVINCI HYSTERECTOMY TOTAL, BILATERAL SALPINGO-OOPHORECTOMY, COMBINED Bilateral 2020    Procedure: DaVinci robotic-assisted total laparoscopic hysterectomy, bilateral salpingectomy;  Surgeon: Venancio Bartlett MD;  Location: [facility] OR - Path all benign     DAVINCI HYSTERECTOMY TOTAL, SALPINGECTOMY BILATERAL Bilateral 2020    Fibroid uterus, Menometrorrhagia - Robotic TLH, Bilateral salpingectomy - Path all benign     ENT SURGERY       HC COLP VAGINA W CERVIX IF PRES W BIOPSY      Kismet for ASCUS     TONSILLECTOMY & ADENOIDECTOMY                Allergies:   Penicillins       Data:   All laboratory data reviewed:    Recent Labs   Lab Test 21  2123 20  0902 19  1633 18  1619 17  0945 16  0814 16  0814   LDL  --  117*  --   --  68  --  87   HDL  --  41*  --   --  36*  --  32*   NHDL  --  141*  --   --  119  --  139*   CHOL  --  182  --   --  155  --  171   TRIG  --  119  --   --  256*  --  261*   TSH 0.88  --   --  0.79 0.75   < > 1.00   NII  --   --  71  --   --   --   --     < > = values in this interval not displayed.       Lab Results   Component Value Date    WBC 8.7 2021    WBC 6.5 2020    RBC 4.33 2021    RBC 4.58 2020    HGB 13.8 2021    HGB 14.4 2020    HCT 40.5 2021    HCT 43.9 2020    MCV 94 2021    MCV 96 2020    MCH 31.9 2021    MCH 31.4 2020    MCHC 34.1 2021     MCHC 32.8 11/24/2020    RDW 12.6 08/19/2021    RDW 13.0 11/24/2020     08/19/2021     (L) 11/24/2020       Lab Results   Component Value Date     08/19/2021     02/08/2021    POTASSIUM 3.7 08/19/2021    POTASSIUM 3.7 02/08/2021    CHLORIDE 102 08/19/2021    CHLORIDE 102 02/08/2021    CO2 33 (H) 08/19/2021    CO2 31 02/08/2021    ANIONGAP 5 08/19/2021    ANIONGAP 5 02/08/2021     (H) 08/19/2021     (H) 02/08/2021    BUN 13 08/19/2021    BUN 16 02/08/2021    CR 0.69 08/19/2021    CR 0.68 02/08/2021    GFRESTIMATED >90 08/19/2021    GFRESTIMATED >90 02/08/2021    GFRESTBLACK >90 02/08/2021    CARROL 9.4 08/19/2021    CARROL 9.3 02/08/2021      Lab Results   Component Value Date    AST 27 08/19/2021    AST 31 11/24/2020    ALT 52 (H) 08/19/2021    ALT 38 11/24/2020       Lab Results   Component Value Date    A1C 5.1 04/24/2019       Lab Results   Component Value Date    INR 1.05 09/15/2020    INR 1.02 09/15/2020         [facility]ARR[facility] RE[facility]  Pager: [phone]  RN phone: [phone]

## 2021-09-22 NOTE — LETTER
9/22/2021    Yolie Delarosa MD  [address]  [facility] [address]    RE: Lyubov Sanchez       Dear Colleague,    I had the pleasure of seeing Lyubov Sanchez in the [facility].      Cardiology Clinic Progress Note  Lyubov Sanchez MRN# [id number]   YOB: 1970 Age: 51 year old     Primary cardiologist: Dr. Hodges    Reason for visit: Follow up venous competency study    History of presenting illness:    Lyuobv Sanchez, a pleasant 51 year old patient who has a past medical history significant for ASD status post closure with 30 mm cardio form septal occluder in August 2020, hyperlipidemia, obstructive sleep apnea, mild Marino dilated proximal ascending aorta, mild pulmonary hypertension, hypertension and paroxysmal atrial fibrillation.    At her recent office visit with Dr. Hodges the patient noted lower extremity edema and a venous competency study was recommended.  The patient reported that the symptoms began after hernia surgery question question question may Thurner's    The venous competency study was completed on 9/7/2021 without DVT noted bilaterally.  The right lower extremity showed a small segment of superficial reflux of the GSV at the proximal thigh with a severely incompetent large tributary off the proximal GSV that extends from the proximal thigh to the ankle.  The left lower extremity showed a small segment of superficial reflux of the GSV at the level of the SFJ without varicose veins noted bilaterally.    Today he reports that despite being on oral diuretics she has had no symptomatic provement in her lower extremity edema.  She feels that initially it started post her ASD closure and then subsequently worsened post her hernia repair.  She is also had bilateral knee surgeries and has had weight gain over the last year and a half.    History of ulcer: No  History of edema: Yes  History of compression stockings: No  History  "of leg discomfort requiring analgesics: No  History of itching, skin change or restless legs: Yes    Greater than six weeks of conservative treatment (weight reduction, daily exercise plan, periodic leg elevation and the use of graduated compression stockings): No    Significant pain and significant edema (swelling) that interferes with activities of daily living (I.e. housework, ability to move or coordinate normal physical tasks): Yes    Edema does not respond to medical treatment: Unknown           Assessment and Plan:     ASSESSMENT:    1. Venous insufficiency    Noted on the right lower extremity with a severely incompetent large tributary off the proximal GSV that extends from the proximal thigh to the ankle    No significant insufficiency on the left but has considerable edema    Previously has not had a good response to oral diuretics    2. ASD    Status post closure in August 2020    Repeat echo shows stability of site without shunt    PLAN:     1. Conservative therapy with compression stockings, exercise, elevation and weight loss as well as low-sodium diet  2. Return to clinic in approximately 3 months to assess symptoms and discuss further venous interventions       Orders this Visit:  No orders of the defined types were placed in this encounter.    No orders of the defined types were placed in this encounter.    There are no discontinued medications.         Review of Systems:     Review of Systems:  Skin:  Negative     Eyes:  Negative    ENT:  Negative    Respiratory:  Positive for shortness of breath;dyspnea on exertion;dyspnea at rest;sleep apnea;CPAP  Cardiovascular:    edema;Positive for;lightheadedness  Gastroenterology: Negative    Genitourinary:  Negative    Musculoskeletal:  Negative    Neurologic:  Negative    Psychiatric:  Negative    Heme/Lymph/Imm:  Negative    Endocrine:  Negative              Physical Exam:     Vitals: /86   Pulse 71   Ht 1.626 m (5' 4\")   Wt 134.7 kg (297 lb)   " LMP  (LMP Unknown)   BMI 50.98 kg/m    Constitutional:  cooperative, alert and oriented, well developed, well nourished, in no acute distress obese      Skin:  warm and dry to the touch, no apparent skin lesions or masses noted        Head:  normocephalic, no masses or lesions        Eyes:  pupils equal and round        ENT:  no pallor or cyanosis        Neck:  not assessed this visit        Chest:  not assessed this visit        Cardiac:               murmur hardly audible today    Abdomen:    obese ventral hernia    Vascular: not assessed this visit                                   Extremities and Back:        LE bitateral edema with venous stasis changes and spider veins on the right    Neurological:  no gross motor deficits             Medications:     Current Outpatient Medications   Medication Sig Dispense Refill     acetaminophen (TYLENOL) 500 MG tablet Take 500-1,000 mg by mouth every 6 hours as needed for mild pain       albuterol (PROAIR HFA/PROVENTIL HFA/VENTOLIN HFA) 108 (90 Base) MCG/ACT inhaler Inhale 2 puffs into the lungs every 6 hours as needed for shortness of breath / dyspnea or wheezing 6.7 g 0     aspirin 81 MG EC tablet Take 81 mg by mouth daily       cetirizine (ZYRTEC) 10 MG tablet Take 10 mg by mouth as needed for allergies       citalopram (CELEXA) 20 MG tablet Take 1 tablet (20 mg) by mouth daily 90 tablet 1     fluticasone (FLONASE) 50 MCG/ACT nasal spray Spray 1 spray into both nostrils daily as needed for rhinitis or allergies       furosemide (LASIX) 20 MG tablet Take 2 tablets (40 mg) by mouth daily 180 tablet 3     ibuprofen (ADVIL/MOTRIN) 200 MG tablet Take 3 tablets (600 mg) by mouth every 6 hours as needed for pain       lisinopril (ZESTRIL) 10 MG tablet Take 1 tablet (10 mg) by mouth daily 90 tablet 3     metoprolol succinate ER (TOPROL-XL) 50 MG 24 hr tablet Take 1.5 tablets (75 mg) by mouth daily 135 tablet 3       Family History   Adopted: Yes   Problem Relation Age of Onset      Unknown/Adopted Other         pt is adopted and has no access to health history     Unknown/Adopted Mother      Unknown/Adopted Father      Unknown/Adopted Maternal Grandmother      Unknown/Adopted Maternal Grandfather      Unknown/Adopted Paternal Grandmother      Unknown/Adopted Other        Social History     Socioeconomic History     Marital status:      Spouse name: Saúl     Number of children: 2     Years of education: Not on file     Highest education level: Not on file   Occupational History     Occupation:  at home   Tobacco Use     Smoking status: Former Smoker     Packs/day: 0.50     Years: 5.00     Pack years: 2.50     Types: Cigarettes     Quit date: 2007     Years since quittin.4     Smokeless tobacco: Never Used   Substance and Sexual Activity     Alcohol use: Yes     Comment: 2-3 per weekend     Drug use: No     Sexual activity: Yes     Partners: Male     Birth control/protection: Female Surgical, Male Surgical     Comment: Hysterectomy 2020/ vasectomy    Other Topics Concern      Service No     Blood Transfusions No     Caffeine Concern Yes     Comment: 20 oz pepsi     Occupational Exposure Not Asked     Hobby Hazards Not Asked     Sleep Concern Not Asked     Stress Concern Not Asked     Weight Concern Not Asked     Special Diet Yes     Comment: 2-3 dairy per day     Back Care Not Asked     Exercise No     Comment: active with kids, walking puppy     Bike Helmet Not Asked     Seat Belt Yes     Self-Exams No     Parent/sibling w/ CABG, MI or angioplasty before 65F 55M? No     Comment: No family history - Adopted   Social History Narrative     Not on file     Social Determinants of Health     Financial Resource Strain:      Difficulty of Paying Living Expenses:    Food Insecurity:      Worried About Running Out of Food in the Last Year:      Ran Out of Food in the Last Year:    Transportation Needs:      Lack of Transportation (Medical):      Lack of  Transportation (Non-Medical):    Physical Activity:      Days of Exercise per Week:      Minutes of Exercise per Session:    Stress:      Feeling of Stress :    Social Connections:      Frequency of Communication with Friends and Family:      Frequency of Social Gatherings with Friends and Family:      Attends Jew Services:      Active Member of Clubs or Organizations:      Attends Club or Organization Meetings:      Marital Status:    Intimate Partner Violence:      Fear of Current or Ex-Partner:      Emotionally Abused:      Physically Abused:      Sexually Abused:             Past Medical History:     Past Medical History:   Diagnosis Date     Anxiety and depression     Pt reports is on Rx Celexa.     Aortic aneurysm (H)     Pt reports its small and is currently being monitored.     Arthritis      Atrial fibrillation with RVR (H) 9/15/2020     Depressive disorder      DYSPLASIA OF CERVIX 3/6/2003     Dysplasia of cervix (uteri) 2003    Rx cryotherapy Nov 03, and 2005     Hypertension     NO cardiology     Incomplete right bundle branch block 11/10/2017     Migraine      Ostium secundum type atrial septal defect 7/29/2020    Added automatically from request for surgery [id number]     Other chronic pain     Knee pain for 8 years     Patent foramen ovale 1/9/2020     Plantar fasciitis     bilat     Unspecified sleep apnea     CPAP will bring on the day of surgery.     Ventral hernia without obstruction or gangrene 4/15/2020     Ventral hernia without obstruction or gangrene 4/15/2020              Past Surgical History:     Past Surgical History:   Procedure Laterality Date     ANESTHESIA CARDIOVERSION N/A 9/16/2020    Procedure: ANESTHESIA, FOR CONCEPCION/CARDIOVERSION;  Surgeon: GENERIC ANESTHESIA PROVIDER;  Location: [facility] OR     ARTHROPLASTY KNEE Right 11/17/2017    Procedure: ARTHROPLASTY KNEE;  Right total knee arthroplasty using the Arthrex iBalance total knee system;  Surgeon: Hebert Lee MD;   Location: [facility] OR     ARTHROPLASTY KNEE Left 3/19/2018    Procedure: ARTHROPLASTY KNEE;  Left total knee arthroplasty using an Arthrex iBalance total knee system;  Surgeon: Hebert Lee MD;  Location: [facility] OR     C  DELIVERY ONLY  ,    , Low Cervical     CV ASD CLOSURE N/A 2020    Procedure: ASD Closure;  Surgeon: Freddie Frias MD;  Location:  HEART CARDIAC CATH LAB     CV RIGHT HEART CATH MEASUREMENTS RECORDED N/A 2020    Procedure: Right Heart Cath;  Surgeon: Freddie Frias MD;  Location:  HEART CARDIAC CATH LAB     DAVINCI HERNIORRHAPHY VENTRAL N/A 2020    Procedure: ROBOTIC ASSISTED VENTRAL AND INCISIONAL HERNIA REPAIR WITH MESH;  Surgeon: Violetta Lazaro MD;  Location: [facility] OR     DAVINCI HYSTERECTOMY TOTAL, BILATERAL SALPINGO-OOPHORECTOMY, COMBINED Bilateral 2020    Procedure: DaVinci robotic-assisted total laparoscopic hysterectomy, bilateral salpingectomy;  Surgeon: Venancio Bartlett MD;  Location: [facility] OR - Path all benign     DAVINCI HYSTERECTOMY TOTAL, SALPINGECTOMY BILATERAL Bilateral 2020    Fibroid uterus, Menometrorrhagia - Robotic TLH, Bilateral salpingectomy - Path all benign     ENT SURGERY       HC COLP VAGINA W CERVIX IF PRES W BIOPSY      Causey for ASCUS     TONSILLECTOMY & ADENOIDECTOMY                Allergies:   Penicillins       Data:   All laboratory data reviewed:    Recent Labs   Lab Test 21  2123 20  0902 19  1633 18  1619 17  0945 16  0814 16  0814   LDL  --  117*  --   --  68  --  87   HDL  --  41*  --   --  36*  --  32*   NHDL  --  141*  --   --  119  --  139*   CHOL  --  182  --   --  155  --  171   TRIG  --  119  --   --  256*  --  261*   TSH 0.88  --   --  0.79 0.75   < > 1.00   NII  --   --  71  --   --   --   --     < > = values in this interval not displayed.       Lab Results   Component Value Date    WBC 8.7 2021    WBC 6.5 2020    RBC 4.33  08/19/2021    RBC 4.58 11/24/2020    HGB 13.8 08/19/2021    HGB 14.4 11/24/2020    HCT 40.5 08/19/2021    HCT 43.9 11/24/2020    MCV 94 08/19/2021    MCV 96 11/24/2020    MCH 31.9 08/19/2021    MCH 31.4 11/24/2020    MCHC 34.1 08/19/2021    MCHC 32.8 11/24/2020    RDW 12.6 08/19/2021    RDW 13.0 11/24/2020     08/19/2021     (L) 11/24/2020       Lab Results   Component Value Date     08/19/2021     02/08/2021    POTASSIUM 3.7 08/19/2021    POTASSIUM 3.7 02/08/2021    CHLORIDE 102 08/19/2021    CHLORIDE 102 02/08/2021    CO2 33 (H) 08/19/2021    CO2 31 02/08/2021    ANIONGAP 5 08/19/2021    ANIONGAP 5 02/08/2021     (H) 08/19/2021     (H) 02/08/2021    BUN 13 08/19/2021    BUN 16 02/08/2021    CR 0.69 08/19/2021    CR 0.68 02/08/2021    GFRESTIMATED >90 08/19/2021    GFRESTIMATED >90 02/08/2021    GFRESTBLACK >90 02/08/2021    CARROL 9.4 08/19/2021    CARROL 9.3 02/08/2021      Lab Results   Component Value Date    AST 27 08/19/2021    AST 31 11/24/2020    ALT 52 (H) 08/19/2021    ALT 38 11/24/2020       Lab Results   Component Value Date    A1C 5.1 04/24/2019       Lab Results   Component Value Date    INR 1.05 09/15/2020    INR 1.02 09/15/2020         BARRINGTON REED CNP  [facility]  Pager: [phone]  RN phone: [phone]      Thank you for allowing me to participate in the care of your patient.      Sincerely,     BARRINGTON REED [facility]  cc:   Corby Hodges MD  [address]  [place] [address]

## 2021-09-28 DIAGNOSIS — Q21.10 ASD (ATRIAL SEPTAL DEFECT): ICD-10-CM

## 2021-09-28 RX ORDER — LISINOPRIL 10 MG/1
10 TABLET ORAL DAILY
Qty: 90 TABLET | Refills: 2 | Status: SHIPPED | OUTPATIENT
Start: 2021-09-28 | End: 2022-03-28

## 2021-11-29 DIAGNOSIS — I48.0 PAROXYSMAL ATRIAL FIBRILLATION (H): ICD-10-CM

## 2021-11-29 RX ORDER — METOPROLOL SUCCINATE 50 MG/1
75 TABLET, EXTENDED RELEASE ORAL DAILY
Qty: 135 TABLET | Refills: 3 | Status: SHIPPED | OUTPATIENT
Start: 2021-11-29 | End: 2022-02-16

## 2021-12-15 DIAGNOSIS — F33.0 MAJOR DEPRESSIVE DISORDER, RECURRENT EPISODE, MILD (H): ICD-10-CM

## 2021-12-17 RX ORDER — CITALOPRAM HYDROBROMIDE 20 MG/1
TABLET ORAL
Qty: 90 TABLET | Refills: 0 | Status: SHIPPED | OUTPATIENT
Start: 2021-12-17 | End: 2022-01-07

## 2021-12-17 NOTE — TELEPHONE ENCOUNTER
"Medication is being filled for 1 time refill only due to:  Patient needs to be seen because PER PROVIDIERS LAST OFFICE VISIT NOTES:  \"Return in about 6 months (around 10/27/2021) for Preventive Visit, Depression/anxiety\". Please contact patient and schedule appointment for further refills.     Lexus Oliveros RN  "

## 2022-01-03 NOTE — TELEPHONE ENCOUNTER
Patient scheduled virtual appt with PCP on 1/7/22, she will scheduled PX via [employer].  -Viviana Perla

## 2022-01-07 ENCOUNTER — VIRTUAL VISIT (OUTPATIENT)
Dept: FAMILY MEDICINE | Facility: CLINIC | Age: 52
End: 2022-01-07
Payer: COMMERCIAL

## 2022-01-07 DIAGNOSIS — I27.20 PULMONARY HYPERTENSION, UNSPECIFIED (H): ICD-10-CM

## 2022-01-07 DIAGNOSIS — Z12.31 VISIT FOR SCREENING MAMMOGRAM: ICD-10-CM

## 2022-01-07 DIAGNOSIS — F33.0 MAJOR DEPRESSIVE DISORDER, RECURRENT EPISODE, MILD (H): Primary | ICD-10-CM

## 2022-01-07 DIAGNOSIS — I77.810 THORACIC AORTIC ECTASIA (H): ICD-10-CM

## 2022-01-07 DIAGNOSIS — Z12.11 SCREEN FOR COLON CANCER: ICD-10-CM

## 2022-01-07 PROCEDURE — 96127 BRIEF EMOTIONAL/BEHAV ASSMT: CPT | Mod: 95 | Performed by: PHYSICIAN ASSISTANT

## 2022-01-07 PROCEDURE — 99213 OFFICE O/P EST LOW 20 MIN: CPT | Mod: 95 | Performed by: PHYSICIAN ASSISTANT

## 2022-01-07 RX ORDER — CITALOPRAM HYDROBROMIDE 20 MG/1
20 TABLET ORAL DAILY
Qty: 90 TABLET | Refills: 1 | Status: SHIPPED | OUTPATIENT
Start: 2022-01-07 | End: 2022-08-30

## 2022-01-07 ASSESSMENT — ANXIETY QUESTIONNAIRES
7. FEELING AFRAID AS IF SOMETHING AWFUL MIGHT HAPPEN: NOT AT ALL
IF YOU CHECKED OFF ANY PROBLEMS ON THIS QUESTIONNAIRE, HOW DIFFICULT HAVE THESE PROBLEMS MADE IT FOR YOU TO DO YOUR WORK, TAKE CARE OF THINGS AT HOME, OR GET ALONG WITH OTHER PEOPLE: NOT DIFFICULT AT ALL
3. WORRYING TOO MUCH ABOUT DIFFERENT THINGS: NOT AT ALL
GAD7 TOTAL SCORE: 2
5. BEING SO RESTLESS THAT IT IS HARD TO SIT STILL: NOT AT ALL
1. FEELING NERVOUS, ANXIOUS, OR ON EDGE: NOT AT ALL
2. NOT BEING ABLE TO STOP OR CONTROL WORRYING: NOT AT ALL
6. BECOMING EASILY ANNOYED OR IRRITABLE: SEVERAL DAYS

## 2022-01-07 ASSESSMENT — PATIENT HEALTH QUESTIONNAIRE - PHQ9
5. POOR APPETITE OR OVEREATING: SEVERAL DAYS
SUM OF ALL RESPONSES TO PHQ QUESTIONS 1-9: 4

## 2022-01-07 NOTE — PROGRESS NOTES
Lyubov is a 51 year old who is being evaluated via a billable telephone visit.      What phone number would you like to be contacted at? [phone]  How would you like to obtain your AVS? MyChart    Assessment & Plan     Major depressive disorder, recurrent episode, mild (H)  Chronic, stable. No concerns. Refills given.  - citalopram (CELEXA) 20 MG tablet; Take 1 tablet (20 mg) by mouth daily    Screen for colon cancer  - Adult Gastro Ref - Procedure Only; Future    Visit for screening mammogram  - MA SCREENING DIGITAL BILAT - Future  (s+30); Future    Pulmonary hypertension, unspecified (H)  Stable, following with cardiology, last saw     BMI 45.0-49.9, adult (H)  Work on healthy lifestyle    Thoracic aortic ectasia (H)  Stable, following with cardiology, last saw       Return in about 1 month (around 2022) for Preventive Physical Exam, mood/med check in 6 months.    Violetta Lee PA-C  [facility]   Lyubov is a 51 year old who presents for the following health issues     HPI     Depression and Anxiety Follow-Up    How are you doing with your depression since your last visit? No change    How are you doing with your anxiety since your last visit?  No change    Are you having other symptoms that might be associated with depression or anxiety? No    Have you had a significant life event? No     Do you have any concerns with your use of alcohol or other drugs? No    Social History     Tobacco Use     Smoking status: Former Smoker     Packs/day: 0.50     Years: 5.00     Pack years: 2.50     Types: Cigarettes     Quit date: 2007     Years since quittin.7     Smokeless tobacco: Never Used   Substance Use Topics     Alcohol use: Yes     Comment: 2-3 per weekend     Drug use: No     PHQ 2020   PHQ-9 Total Score 5 4 4   Q9: Thoughts of better off dead/self-harm past 2 weeks Not at all Not at all Not at all     PINKY-7 SCORE 2020  4/27/2021 1/7/2022   Total Score - - -   Total Score - - -   Total Score 4 5 2     Last PHQ-9 1/7/2022   1.  Little interest or pleasure in doing things 0   2.  Feeling down, depressed, or hopeless 0   3.  Trouble falling or staying asleep, or sleeping too much 1   4.  Feeling tired or having little energy 2   5.  Poor appetite or overeating 1   6.  Feeling bad about yourself 0   7.  Trouble concentrating 0   8.  Moving slowly or restless 0   Q9: Thoughts of better off dead/self-harm past 2 weeks 0   PHQ-9 Total Score 4   Difficulty at work, home, or with people Not difficult at all     PINKY-7  1/7/2022   1. Feeling nervous, anxious, or on edge 0   2. Not being able to stop or control worrying 0   3. Worrying too much about different things 0   4. Trouble relaxing 1   5. Being so restless that it is hard to sit still 0   6. Becoming easily annoyed or irritable 1   7. Feeling afraid, as if something awful might happen 0   PINKY-7 Total Score 2   If you checked any problems, how difficult have they made it for you to do your work, take care of things at home, or get along with other people? Not difficult at all     Mood is well controlled with celexa. She has been on stable dose for several years. No side effects or concerns. No HI/SI. Would like to stay on same dose.    Suicide Assessment Five-step Evaluation and Treatment (SAFE-T)      How many servings of fruits and vegetables do you eat daily?  2-3    On average, how many sweetened beverages do you drink each day (Examples: soda, juice, sweet tea, etc.  Do NOT count diet or artificially sweetened beverages)?   0    How many days per week do you exercise enough to make your heart beat faster? none    How many minutes a day do you exercise enough to make your heart beat faster? none    How many days per week do you miss taking your medication? 0      Review of Systems   Constitutional, HEENT, cardiovascular, pulmonary, gi and gu systems are negative, except as  otherwise noted.      Objective    Vitals - Patient Reported  Systolic (Patient Reported): 120  Diastolic (Patient Reported): 78  Pain Score: No Pain (0)      Vitals:  No vitals were obtained today due to virtual visit.    Physical Exam   healthy, alert and no distress  PSYCH: Alert and oriented times 3; coherent speech, normal   rate and volume, able to articulate logical thoughts, able   to abstract reason, no tangential thoughts, no hallucinations   or delusions  Her affect is normal  RESP: No cough, no audible wheezing, able to talk in full sentences  Remainder of exam unable to be completed due to telephone visits    Phone call duration: 5 minutes

## 2022-01-08 ASSESSMENT — ANXIETY QUESTIONNAIRES: GAD7 TOTAL SCORE: 2

## 2022-01-31 ENCOUNTER — TRANSFERRED RECORDS (OUTPATIENT)
Dept: HEALTH INFORMATION MANAGEMENT | Facility: CLINIC | Age: 52
End: 2022-01-31
Payer: COMMERCIAL

## 2022-02-16 ENCOUNTER — VIRTUAL VISIT (OUTPATIENT)
Dept: CARDIOLOGY | Facility: CLINIC | Age: 52
End: 2022-02-16
Payer: COMMERCIAL

## 2022-02-16 DIAGNOSIS — R06.02 SHORTNESS OF BREATH: Primary | ICD-10-CM

## 2022-02-16 DIAGNOSIS — Q21.10 ASD (ATRIAL SEPTAL DEFECT): ICD-10-CM

## 2022-02-16 DIAGNOSIS — I48.0 PAROXYSMAL ATRIAL FIBRILLATION (H): ICD-10-CM

## 2022-02-16 DIAGNOSIS — R60.0 BILATERAL LEG EDEMA: ICD-10-CM

## 2022-02-16 DIAGNOSIS — I10 HTN, GOAL BELOW 140/90: ICD-10-CM

## 2022-02-16 PROCEDURE — 99214 OFFICE O/P EST MOD 30 MIN: CPT | Mod: 95 | Performed by: NURSE PRACTITIONER

## 2022-02-16 RX ORDER — METOPROLOL SUCCINATE 50 MG/1
50 TABLET, EXTENDED RELEASE ORAL DAILY
Qty: 90 TABLET | Refills: 3 | Status: SHIPPED | OUTPATIENT
Start: 2022-02-16 | End: 2022-09-19

## 2022-02-16 RX ORDER — FUROSEMIDE 20 MG
TABLET ORAL
Qty: 270 TABLET | Refills: 3 | Status: SHIPPED | OUTPATIENT
Start: 2022-02-16 | End: 2023-02-28

## 2022-02-16 NOTE — LETTER
"2/16/2022    Yolie Delarosa MD  [id number]  [facility] [address]    RE: Lyubov Sanchez       Dear Colleague,     I had the pleasure of seeing Lyubov Sanchez in the [facility].    Lyubov is a 51 year old who is being evaluated via a billable video visit.      How would you like to obtain your AVS? MyChart  If the video visit is dropped, the invitation should be resent by: Text to cell phone: [phone]  Will anyone else be joining your video visit? No        Video-Visit Details    Type of service:  Video Visit    Video Start Time: 3:01 PM    Video End Time:3:18 PM    Originating Location (pt. Location): Home    Distant Location (provider location):  [facility]ART [facility] [place]     Platform used for Video Visit: [place]   Vitals - Patient Reported  Systolic (Patient Reported): 118  Diastolic (Patient Reported): 74  Weight (Patient Reported): 131.5 kg (290 lb)  Height (Patient Reported): 162.6 cm (5' 4\")  BMI (Based on Pt Reported Ht/Wt): 49.78    Review Of Systems  Skin: NEGATIVE  Eyes:Ears/Nose/Throat: NEGATIVE  Respiratory: SOB, sleep apnea wears a CPAP  Cardiovascular: Edema, dizziness and lightheadedness  Gastrointestinal: NEGATIVE  Genitourinary:NEGATIVE   Musculoskeletal: NEGATIVE  Neurologic: NEGATIVE  Psychiatric: NEGATIVE  Hematologic/Lymphatic/Immunologic: NEGATIVE  Endocrine:  NEGATIVE    Telephone number of patient: [phone]    AILREZA Ndiaye LPN      Cardiology Clinic Progress Note  Lyubov Sanchez MRN# [id number]   YOB: 1970 Age: 51 year old     Primary cardiologist: Dr. Hodges    Reason for visit: SOB and lower extremity edema    History of presenting illness:    Lyubov Sanchez, a pleasant 51 year old patient who has a past medical history significant for ASD status post closure in August 2020, hyperlipidemia, obstructive sleep apnea (compliant with CPAP), mildly dilated proximal ascending aorta, mild pulmonary hypertension, previous history of " tobacco use and quit in 1999, hypertension and paroxysmal atrial fibrillation. She was adopted and unsure of family history.     Today during her visit Lyubov reports that ongoing and slightly progressive bilateral lower extremity edema and shortness of breath on exertion.  She denies chest discomfort, orthopnea, PND, or palpitations.  She is questioning if her symptoms have progressed since increasing metoprolol 75 mg daily when she was seen in the emergency department for atrial fibrillation.  She reports that since her hernia repair and ASD closure she has gained a significant amount of weight as well.  She denies any significant weight gain since she was last evaluated in cardiology clinic in September.         Assessment and Plan:     ASSESSMENT:    1. Shortness of breath and dyspnea on exertion    Ongoing and slightly progressive over the last few months    Currently on Lasix 40 mg daily    2. Venous insufficiency    Noted on the right lower extremity with a severely incompetent large tributary off the proximal GSV that extends from the proximal thigh to the ankle    No significant insufficiency on the left but has considerable edema    3. Paroxysmal atrial fibrillation    Noted post ASD closure and was found to have atrial fibrillation with RVR in September 2020    Most recent EKG was normal sinus rhythm    4. ASD    Status post closure in August 2020    Repeat echo shows stability of site without shunt    PLAN:     1.        Orders this Visit:  No orders of the defined types were placed in this encounter.    No orders of the defined types were placed in this encounter.    There are no discontinued medications.         Review of Systems:     Review of Systems:  Skin:        Eyes:       ENT:       Respiratory:       Cardiovascular:       Gastroenterology:      Genitourinary:       Musculoskeletal:       Neurologic:       Psychiatric:       Heme/Lymph/Imm:       Endocrine:                 Physical Exam:      Vitals: LMP  (LMP Unknown)   Constitutional:           Skin:           Head:           Eyes:           ENT:           Neck:           Chest:           Cardiac:                    Abdomen:           Vascular:                                     Extremities and Back:        LE bitateral edema with venous stasis changes and spider veins on the right    Neurological:                Medications:     Current Outpatient Medications   Medication Sig Dispense Refill     acetaminophen (TYLENOL) 500 MG tablet Take 500-1,000 mg by mouth every 6 hours as needed for mild pain (Patient not taking: Reported on 1/7/2022)       albuterol (PROAIR HFA/PROVENTIL HFA/VENTOLIN HFA) 108 (90 Base) MCG/ACT inhaler Inhale 2 puffs into the lungs every 6 hours as needed for shortness of breath / dyspnea or wheezing 6.7 g 0     aspirin 81 MG EC tablet Take 81 mg by mouth daily       cetirizine (ZYRTEC) 10 MG tablet Take 10 mg by mouth as needed for allergies       citalopram (CELEXA) 20 MG tablet Take 1 tablet (20 mg) by mouth daily 90 tablet 1     fluticasone (FLONASE) 50 MCG/ACT nasal spray Spray 1 spray into both nostrils daily as needed for rhinitis or allergies       furosemide (LASIX) 20 MG tablet Take 2 tablets (40 mg) by mouth daily 180 tablet 3     ibuprofen (ADVIL/MOTRIN) 200 MG tablet Take 3 tablets (600 mg) by mouth every 6 hours as needed for pain       lisinopril (ZESTRIL) 10 MG tablet Take 1 tablet (10 mg) by mouth daily 90 tablet 2     metoprolol succinate ER (TOPROL-XL) 50 MG 24 hr tablet Take 1.5 tablets (75 mg) by mouth daily 135 tablet 3       Family History   Adopted: Yes   Problem Relation Age of Onset     Unknown/Adopted Other         pt is adopted and has no access to health history     Unknown/Adopted Mother      Unknown/Adopted Father      Unknown/Adopted Maternal Grandmother      Unknown/Adopted Maternal Grandfather      Unknown/Adopted Paternal Grandmother      Unknown/Adopted Other        Social History      Socioeconomic History     Marital status:      Spouse name: Saúl     Number of children: 2     Years of education: Not on file     Highest education level: Not on file   Occupational History     Occupation:  at home   Tobacco Use     Smoking status: Former Smoker     Packs/day: 0.50     Years: 5.00     Pack years: 2.50     Types: Cigarettes     Quit date: 2007     Years since quittin.8     Smokeless tobacco: Never Used   Substance and Sexual Activity     Alcohol use: Yes     Comment: 2-3 per weekend     Drug use: No     Sexual activity: Yes     Partners: Male     Birth control/protection: Female Surgical, Male Surgical     Comment: Hysterectomy / vasectomy    Other Topics Concern      Service No     Blood Transfusions No     Caffeine Concern Yes     Comment: 20 oz pepsi     Occupational Exposure Not Asked     Hobby Hazards Not Asked     Sleep Concern Not Asked     Stress Concern Not Asked     Weight Concern Not Asked     Special Diet Yes     Comment: 2-3 dairy per day     Back Care Not Asked     Exercise No     Comment: active with kids, walking puppy     Bike Helmet Not Asked     Seat Belt Yes     Self-Exams No     Parent/sibling w/ CABG, MI or angioplasty before 65F 55M? No     Comment: No family history - Adopted   Social History Narrative     Not on file     Social Determinants of Health     Financial Resource Strain: Not on file   Food Insecurity: Not on file   Transportation Needs: Not on file   Physical Activity: Not on file   Stress: Not on file   Social Connections: Not on file   Intimate Partner Violence: Not on file   Housing Stability: Not on file            Past Medical History:     Past Medical History:   Diagnosis Date     Anxiety and depression     Pt reports is on Rx Celexa.     Aortic aneurysm (H)     Pt reports its small and is currently being monitored.     Arthritis      Atrial fibrillation with RVR (H) 9/15/2020     Depressive disorder       DYSPLASIA OF CERVIX 3/6/2003     Dysplasia of cervix (uteri) 2003    Rx cryotherapy Nov 03, and 2005     Hypertension     NO cardiology     Incomplete right bundle branch block 11/10/2017     Migraine      KRISTYN (obstructive sleep apnea)      Ostium secundum type atrial septal defect 7/29/2020    Added automatically from request for surgery [id number]     Other chronic pain     Knee pain for 8 years     Patent foramen ovale 1/9/2020     Plantar fasciitis     bilat     Unspecified sleep apnea     CPAP will bring on the day of surgery.     Ventral hernia without obstruction or gangrene 4/15/2020     Ventral hernia without obstruction or gangrene 4/15/2020              Past Surgical History:     Past Surgical History:   Procedure Laterality Date     ANESTHESIA CARDIOVERSION N/A 9/16/2020    Procedure: ANESTHESIA, FOR CONCEPCION/CARDIOVERSION;  Surgeon: GENERIC ANESTHESIA PROVIDER;  Location:  OR     ARTHROPLASTY KNEE Right 11/17/2017    Procedure: ARTHROPLASTY KNEE;  Right total knee arthroplasty using the Arthrex iBalance total knee system;  Surgeon: Hebert Lee MD;  Location: [facility] OR     ARTHROPLASTY KNEE Left 3/19/2018    Procedure: ARTHROPLASTY KNEE;  Left total knee arthroplasty using an Arthrex iBalance total knee system;  Surgeon: Hebert Lee MD;  Location: [facility] OR     CV ASD CLOSURE N/A 8/26/2020    Procedure: ASD Closure;  Surgeon: Freddie Frias MD;  Location:  HEART CARDIAC CATH LAB     CV RIGHT HEART CATH MEASUREMENTS RECORDED N/A 8/26/2020    Procedure: Right Heart Cath;  Surgeon: Freddie Frias MD;  Location:  HEART CARDIAC CATH LAB     DAVINCI HERNIORRHAPHY VENTRAL N/A 12/29/2020    Procedure: ROBOTIC ASSISTED VENTRAL AND INCISIONAL HERNIA REPAIR WITH MESH;  Surgeon: Violetta Lazaro MD;  Location: [facility] OR     DAVINCI HYSTERECTOMY TOTAL, BILATERAL SALPINGO-OOPHORECTOMY, COMBINED Bilateral 1/31/2020    Procedure: DaVinci robotic-assisted total laparoscopic hysterectomy, bilateral  salpingectomy;  Surgeon: Venancio Bartlett MD;  Location: [facility] OR - Path all benign     DAVINCI HYSTERECTOMY TOTAL, SALPINGECTOMY BILATERAL Bilateral 2020    Fibroid uterus, Menometrorrhagia - Robotic TLH, Bilateral salpingectomy - Path all benign     ENT SURGERY       HC COLP VAGINA W CERVIX IF PRES W BIOPSY      [place] for ASCUS     TONSILLECTOMY & ADENOIDECTOMY       ZC  DELIVERY ONLY  ,    , Low Cervical              Allergies:   Penicillins       Data:   All laboratory data reviewed:    Recent Labs   Lab Test 21  2123 20  0902 19  1633 18  1619 17  0945 16  0814   LDL  --  117*  --   --  68 87   HDL  --  41*  --   --  36* 32*   NHDL  --  141*  --   --  119 139*   CHOL  --  182  --   --  155 171   TRIG  --  119  --   --  256* 261*   TSH 0.88  --   --  0.79 0.75 1.00   NII  --   --  71  --   --   --        Lab Results   Component Value Date    WBC 8.7 2021    WBC 6.5 2020    RBC 4.33 2021    RBC 4.58 2020    HGB 13.8 2021    HGB 14.4 2020    HCT 40.5 2021    HCT 43.9 2020    MCV 94 2021    MCV 96 2020    MCH 31.9 2021    MCH 31.4 2020    MCHC 34.1 2021    MCHC 32.8 2020    RDW 12.6 2021    RDW 13.0 2020     2021     (L) 2020       Lab Results   Component Value Date     2021     2021    POTASSIUM 3.7 2021    POTASSIUM 3.7 2021    CHLORIDE 102 2021    CHLORIDE 102 2021    CO2 33 (H) 2021    CO2 31 2021    ANIONGAP 5 2021    ANIONGAP 5 2021     (H) 2021     (H) 2021    BUN 13 2021    BUN 16 2021    CR 0.69 2021    CR 0.68 2021    GFRESTIMATED >90 2021    GFRESTIMATED >90 2021    GFRESTBLACK >90 2021    CARROL 9.4 2021    CARROL 9.3 2021      Lab Results   Component Value Date    AST  27 08/19/2021    AST 31 11/24/2020    ALT 52 (H) 08/19/2021    ALT 38 11/24/2020       Lab Results   Component Value Date    A1C 5.1 04/24/2019       Lab Results   Component Value Date    INR 1.05 09/15/2020    INR 1.02 09/15/2020         BARRINGTON REED CNP  [facility]  Pager: [phone]  RN phone: [phone]    Thank you for allowing me to participate in the care of your patient.      Sincerely,     BARRINGTON REED [facility]  cc:   Referred Self, MD  No address on file

## 2022-02-16 NOTE — PATIENT INSTRUCTIONS
Today's Recommendations    1. Increase Lasix to 40 mg in the am and 20 mg in the pm  2. Decrease metoprolol to 50 mg daily  3. Continue all other medications without changes.  4. Please follow up with Sharita in 4-6 weeks.    Please send a [email or web address] message or call [phone] to the RN team with questions or concerns.     Scheduling number [phone]

## 2022-02-16 NOTE — PROGRESS NOTES
"  Lyubov is a 51 year old who is being evaluated via a billable video visit.      How would you like to obtain your AVS? MyChart  If the video visit is dropped, the invitation should be resent by: Text to cell phone: [phone]  Will anyone else be joining your video visit? No        Video-Visit Details    Type of service:  Video Visit    Video Start Time: 3:01 PM    Video End Time:3:18 PM    Originating Location (pt. Location): Home    Distant Location (provider location):  [facility] HEART [facility]     Platform used for Video Visit: [employer]   Vitals - Patient Reported  Systolic (Patient Reported): 118  Diastolic (Patient Reported): 74  Weight (Patient Reported): 131.5 kg (290 lb)  Height (Patient Reported): 162.6 cm (5' 4\")  BMI (Based on Pt Reported Ht/Wt): 49.78    Review Of Systems  Skin: NEGATIVE  Eyes:Ears/Nose/Throat: NEGATIVE  Respiratory: SOB, sleep apnea wears a CPAP  Cardiovascular: Edema, dizziness and lightheadedness  Gastrointestinal: NEGATIVE  Genitourinary:NEGATIVE   Musculoskeletal: NEGATIVE  Neurologic: NEGATIVE  Psychiatric: NEGATIVE  Hematologic/Lymphatic/Immunologic: NEGATIVE  Endocrine:  NEGATIVE    Telephone number of patient: [phone]    ALIREZA Ndiaye LPN      Cardiology Clinic Progress Note  Lyubov Sanchez MRN# [id number]   YOB: 1970 Age: 51 year old     Primary cardiologist: Dr. Hodges    Reason for visit: SOB and lower extremity edema    History of presenting illness:    Lyubov Sanchez, a pleasant 51 year old patient who has a past medical history significant for ASD status post closure in August 2020, hyperlipidemia, obstructive sleep apnea (compliant with CPAP), mildly dilated proximal ascending aorta, mild pulmonary hypertension, previous history of tobacco use and quit in 1999, hypertension and paroxysmal atrial fibrillation. She was adopted and unsure of family history.     Today during her visit Lyubov reports that ongoing and slightly progressive bilateral " lower extremity edema and shortness of breath on exertion.  She denies chest discomfort, orthopnea, PND, or palpitations.  She is questioning if her symptoms have progressed since increasing metoprolol 75 mg daily when she was seen in the emergency department for atrial fibrillation.  She reports that since her hernia repair and ASD closure she has gained a significant amount of weight as well.  She denies any significant weight gain since she was last evaluated in cardiology clinic in September.         Assessment and Plan:     ASSESSMENT:    1. Shortness of breath and dyspnea on exertion    Ongoing and slightly progressive over the last few months    Currently on Lasix 40 mg daily    2. Venous insufficiency    Noted on the right lower extremity with a severely incompetent large tributary off the proximal GSV that extends from the proximal thigh to the ankle    No significant insufficiency on the left but has considerable edema    3. Paroxysmal atrial fibrillation    Noted post ASD closure and was found to have atrial fibrillation with RVR in September 2020    Most recent EKG was normal sinus rhythm    4. ASD    Status post closure in August 2020    Repeat echo shows stability of site without shunt    PLAN:     1. Increase Lasix to 40 mg in the am and 20 mg in the pm  2. Decrease metoprolol to 50 mg daily  3. Continue all other medications without changes.  4. Please follow up with Sharita in 4-6 weeks.            Physical Exam:     General Appearance:  No distress, normal body habitus, upright.    ENT/Mouth:  Membranes moist, no nasal discharge or bleeding gums. Normal head shape, no evidence of injury or laceration.    EYES:  No scleral icterus, normal conjunctivae    Neurologic:  Normal arm motion bilateral, no tremors. No evidence of focal defect.    Psychiatric:  Alert and oriented x3, calm         Medications:     Current Outpatient Medications   Medication Sig Dispense Refill     acetaminophen (TYLENOL) 500 MG  tablet Take 500-1,000 mg by mouth every 6 hours as needed for mild pain        albuterol (PROAIR HFA/PROVENTIL HFA/VENTOLIN HFA) 108 (90 Base) MCG/ACT inhaler Inhale 2 puffs into the lungs every 6 hours as needed for shortness of breath / dyspnea or wheezing 6.7 g 0     aspirin 81 MG EC tablet Take 81 mg by mouth daily       cetirizine (ZYRTEC) 10 MG tablet Take 10 mg by mouth as needed for allergies       citalopram (CELEXA) 20 MG tablet Take 1 tablet (20 mg) by mouth daily 90 tablet 1     fluticasone (FLONASE) 50 MCG/ACT nasal spray Spray 1 spray into both nostrils daily as needed for rhinitis or allergies       furosemide (LASIX) 20 MG tablet Take 2 tablet in the morning (40 mg daily) and 1 tablet (20 mg daily) 270 tablet 3     ibuprofen (ADVIL/MOTRIN) 200 MG tablet Take 3 tablets (600 mg) by mouth every 6 hours as needed for pain       lisinopril (ZESTRIL) 10 MG tablet Take 1 tablet (10 mg) by mouth daily 90 tablet 2     metoprolol succinate ER (TOPROL-XL) 50 MG 24 hr tablet Take 1 tablet (50 mg) by mouth daily 90 tablet 3       Family History   Adopted: Yes   Problem Relation Age of Onset     Unknown/Adopted Other         pt is adopted and has no access to health history     Unknown/Adopted Mother      Unknown/Adopted Father      Unknown/Adopted Maternal Grandmother      Unknown/Adopted Maternal Grandfather      Unknown/Adopted Paternal Grandmother      Unknown/Adopted Other        Social History     Socioeconomic History     Marital status:      Spouse name: Saúl     Number of children: 2     Years of education: Not on file     Highest education level: Not on file   Occupational History     Occupation:  at home   Tobacco Use     Smoking status: Former Smoker     Packs/day: 0.50     Years: 5.00     Pack years: 2.50     Types: Cigarettes     Quit date: 4/1/2007     Years since quitting: 15.0     Smokeless tobacco: Never Used   Substance and Sexual Activity     Alcohol use: Yes     Comment: 2-3  per weekend     Drug use: No     Sexual activity: Yes     Partners: Male     Birth control/protection: Female Surgical, Male Surgical     Comment: Hysterectomy 2020/ vasectomy    Other Topics Concern      Service No     Blood Transfusions No     Caffeine Concern Yes     Comment: 20 oz pepsi     Occupational Exposure Not Asked     Hobby Hazards Not Asked     Sleep Concern Not Asked     Stress Concern Not Asked     Weight Concern Not Asked     Special Diet Yes     Comment: 2-3 dairy per day     Back Care Not Asked     Exercise No     Comment: active with kids, walking puppy     Bike Helmet Not Asked     Seat Belt Yes     Self-Exams No     Parent/sibling w/ CABG, MI or angioplasty before 65F 55M? No     Comment: No family history - Adopted   Social History Narrative     Not on file     Social Determinants of Health     Financial Resource Strain: Not on file   Food Insecurity: Not on file   Transportation Needs: Not on file   Physical Activity: Not on file   Stress: Not on file   Social Connections: Not on file   Intimate Partner Violence: Not on file   Housing Stability: Not on file            Past Medical History:     Past Medical History:   Diagnosis Date     Anxiety and depression     Pt reports is on Rx Celexa.     Aortic aneurysm (H)     Pt reports its small and is currently being monitored.     Arthritis      Atrial fibrillation with RVR (H) 9/15/2020     Depressive disorder      DYSPLASIA OF CERVIX 3/6/2003     Dysplasia of cervix (uteri) 2003    Rx cryotherapy Nov 03, and 2005     Hypertension     NO cardiology     Incomplete right bundle branch block 11/10/2017     Migraine      KRISTYN (obstructive sleep apnea)      Ostium secundum type atrial septal defect 7/29/2020    Added automatically from request for surgery [id number]     Other chronic pain     Knee pain for 8 years     Patent foramen ovale 1/9/2020     Plantar fasciitis     bilat     Unspecified sleep apnea     CPAP will bring on the day of  surgery.     Ventral hernia without obstruction or gangrene 4/15/2020     Ventral hernia without obstruction or gangrene 4/15/2020              Past Surgical History:     Past Surgical History:   Procedure Laterality Date     ANESTHESIA CARDIOVERSION N/A 2020    Procedure: ANESTHESIA, FOR CONCEPCION/CARDIOVERSION;  Surgeon: GENERIC ANESTHESIA PROVIDER;  Location: [facility] OR     ARTHROPLASTY KNEE Right 2017    Procedure: ARTHROPLASTY KNEE;  Right total knee arthroplasty using the Arthrex iBalance total knee system;  Surgeon: Hebert Lee MD;  Location: [facility] OR     ARTHROPLASTY KNEE Left 3/19/2018    Procedure: ARTHROPLASTY KNEE;  Left total knee arthroplasty using an Arthrex iBalance total knee system;  Surgeon: Hebert Lee MD;  Location: [facility] OR     ATRIAL SEPTAL DEFECT CLOSURE N/A 2020    Procedure: ASD Closure;  Surgeon: Freddie Frias MD;  Location:  HEART CARDIAC CATH LAB     CV RIGHT HEART CATH MEASUREMENTS RECORDED N/A 2020    Procedure: Right Heart Cath;  Surgeon: Freddie Frias MD;  Location:  HEART CARDIAC CATH LAB     DAVINCI HERNIORRHAPHY VENTRAL N/A 2020    Procedure: ROBOTIC ASSISTED VENTRAL AND INCISIONAL HERNIA REPAIR WITH MESH;  Surgeon: Violetta Lazaro MD;  Location: [facility] OR     DAVINCI HYSTERECTOMY TOTAL, BILATERAL SALPINGO-OOPHORECTOMY, COMBINED Bilateral 2020    Procedure: DaVinci robotic-assisted total laparoscopic hysterectomy, bilateral salpingectomy;  Surgeon: Venancio Bartlett MD;  Location: [facility] OR - Path all benign     DAVINCI HYSTERECTOMY TOTAL, SALPINGECTOMY BILATERAL Bilateral 2020    Fibroid uterus, Menometrorrhagia - Robotic TLH, Bilateral salpingectomy - Path all benign     ENT SURGERY       HC COLP VAGINA W CERVIX IF PRES W BIOPSY      [place] for ASCUS     TONSILLECTOMY & ADENOIDECTOMY       ZZC  DELIVERY ONLY  ,    , Low Cervical              Allergies:   Penicillins       Data:   Laboratory  tests:    Recent Labs   Lab Test 08/19/21  2123 01/24/20  0902 12/02/19  1633 02/26/18  1619 07/05/17  0945 07/22/16  0814   LDL  --  117*  --   --  68 87   HDL  --  41*  --   --  36* 32*   NHDL  --  141*  --   --  119 139*   CHOL  --  182  --   --  155 171   TRIG  --  119  --   --  256* 261*   TSH 0.88  --   --  0.79 0.75 1.00   NII  --   --  71  --   --   --        Lab Results   Component Value Date    WBC 8.7 08/19/2021    WBC 6.5 11/24/2020    RBC 4.33 08/19/2021    RBC 4.58 11/24/2020    HGB 13.8 08/19/2021    HGB 14.4 11/24/2020    HCT 40.5 08/19/2021    HCT 43.9 11/24/2020    MCV 94 08/19/2021    MCV 96 11/24/2020    MCH 31.9 08/19/2021    MCH 31.4 11/24/2020    MCHC 34.1 08/19/2021    MCHC 32.8 11/24/2020    RDW 12.6 08/19/2021    RDW 13.0 11/24/2020     08/19/2021     (L) 11/24/2020       Lab Results   Component Value Date     08/19/2021     02/08/2021    POTASSIUM 3.7 08/19/2021    POTASSIUM 3.7 02/08/2021    CHLORIDE 102 08/19/2021    CHLORIDE 102 02/08/2021    CO2 33 (H) 08/19/2021    CO2 31 02/08/2021    ANIONGAP 5 08/19/2021    ANIONGAP 5 02/08/2021     (H) 08/19/2021     (H) 02/08/2021    BUN 13 08/19/2021    BUN 16 02/08/2021    CR 0.69 08/19/2021    CR 0.68 02/08/2021    GFRESTIMATED >90 08/19/2021    GFRESTIMATED >90 02/08/2021    GFRESTBLACK >90 02/08/2021    CARROL 9.4 08/19/2021    CARROL 9.3 02/08/2021      Lab Results   Component Value Date    AST 27 08/19/2021    AST 31 11/24/2020    ALT 52 (H) 08/19/2021    ALT 38 11/24/2020       Lab Results   Component Value Date    A1C 5.1 04/24/2019       Lab Results   Component Value Date    INR 1.05 09/15/2020    INR 1.02 09/15/2020       BARRINGTON REED [facility]  Pager: [phone]  RN phone: [phone]

## 2022-02-18 ENCOUNTER — ANCILLARY PROCEDURE (OUTPATIENT)
Dept: MAMMOGRAPHY | Facility: CLINIC | Age: 52
End: 2022-02-18
Attending: PHYSICIAN ASSISTANT
Payer: COMMERCIAL

## 2022-02-18 DIAGNOSIS — Z12.31 VISIT FOR SCREENING MAMMOGRAM: ICD-10-CM

## 2022-02-18 PROCEDURE — 77067 SCR MAMMO BI INCL CAD: CPT | Mod: TC | Performed by: RADIOLOGY

## 2022-02-18 PROCEDURE — 77063 BREAST TOMOSYNTHESIS BI: CPT | Mod: TC | Performed by: RADIOLOGY

## 2022-03-05 ENCOUNTER — HEALTH MAINTENANCE LETTER (OUTPATIENT)
Age: 52
End: 2022-03-05

## 2022-03-25 NOTE — PROGRESS NOTES
"  Vitals - Patient Reported  Systolic (Patient Reported): (!) 148  Diastolic (Patient Reported): 72  Weight (Patient Reported): 129.3 kg (285 lb)  Height (Patient Reported): 162.6 cm (5' 4\")  BMI (Based on Pt Reported Ht/Wt): 48.92    Review Of Systems  Skin: NEGATIVE  Eyes:Ears/Nose/Throat: NEGATIVE  Respiratory: Sleep apnea.  Uses a CPAP  Cardiovascular:NEGATIVE  Gastrointestinal: NEGATIVE  Genitourinary:NEGATIVE   Musculoskeletal: NEGATIVE  Neurologic: NEGATIVE  Psychiatric: NEGATIVE  Hematologic/Lymphatic/Immunologic: Allergies  Endocrine:  NEGATIVE    Jamaal Whitney NREMT    Lyubov is a 51 year old who is being evaluated via a billable video visit.      How would you like to obtain your AVS? MyChart  If the video visit is dropped, the invitation should be resent by: Text to cell phone: [phone]  Will anyone else be joining your video visit? No        Video-Visit Details    Type of service:  Video Visit    Video Start Time: 7:40 AM    Video End Time:7:45 AM    Originating Location (pt. Location): Home    Distant Location (provider location):  [facility]     Platform used for Video Visit: [facility]        Cardiology Clinic Progress Note  Lyubov Sanchez MRN# [id number]   YOB: 1970 Age: 51 year old     Primary cardiologist: Dr. Hodges    Reason for visit: Follow up medication changes.     History of presenting illness:    Lyubov Sanchez, a pleasant 51 year old patient who has a past medical history significant for ASD status post closure in August 2020, hyperlipidemia, obstructive sleep apnea (compliant with CPAP), mildly dilated proximal ascending aorta, mild pulmonary hypertension, previous history of tobacco use and quit in 1999, hypertension and paroxysmal atrial fibrillation. She was adopted and unsure of family history.     At our last visit she noted increase LE edema and SOBOE that she thought correlated with increasing metoprolol. Lasix was increased to 40 mg in am " and 20 mg pm and metoprolol was decreased to 50 mg daily. Today she reports that she has had improvement SOB and LE edema. Her weight is down 5 lbs. She dose note that she intermittently forgets to take the pm dose and BP is intermittently elevated about 140/90.          Assessment and Plan:     ASSESSMENT:    1. Shortness of breath and dyspnea on exertion    Improved with increase lasix dosing and decreasing metoprolol     Currently on Lasix 40 mg in am and 20 mg in pm     2. Venous insufficiency    Noted on the right lower extremity with a severely incompetent large tributary off the proximal GSV that extends from the proximal thigh to the ankle    No significant insufficiency on the left but has considerable edema     3. Hypertension    Intermittent elevation       4. Paroxysmal atrial fibrillation    Noted post ASD closure and was found to have atrial fibrillation with RVR in September 2020    Most recent EKG was normal sinus rhythm     4. ASD    Status post closure in August 2020    Repeat echo shows stability of site without shunt    PLAN:     1. Continue Lasix 40 mg in am and 20 mg in pm (can take 60 mg daily if has continued difficulty with remembering pm dosing)  2. Increase lisinopril to 20 mg daily  3. BMP in ~2 weeks   4. Return to clinic in 1 year or sooner if BP is not controlled on increase lisinopril             Review of Systems:     Negative unless noted in HPI     Today's clinic visit entailed:  Review of the result(s) of each unique test - Venous insufficiency, BMP  Ordering of each unique test  Prescription drug management  15 minutes spent on the date of the encounter doing chart review, history and exam, documentation and further activities per the note  Provider  Link to [facility] Help Grid     The level of medical decision making during this visit was of moderate complexity.           Physical Exam:     General Appearance:  No distress, normal body habitus, upright.    ENT/Mouth:  Membranes  moist, no nasal discharge or bleeding gums. Normal head shape, no evidence of injury or laceration.    EYES:  No scleral icterus, normal conjunctivae    Neurologic:  Normal arm motion bilateral, no tremors. No evidence of focal defect.    Psychiatric:  Alert and oriented x3, calm         Medications:     Current Outpatient Medications   Medication Sig Dispense Refill     acetaminophen (TYLENOL) 500 MG tablet Take 500-1,000 mg by mouth every 6 hours as needed for mild pain        albuterol (PROAIR HFA/PROVENTIL HFA/VENTOLIN HFA) 108 (90 Base) MCG/ACT inhaler Inhale 2 puffs into the lungs every 6 hours as needed for shortness of breath / dyspnea or wheezing 6.7 g 0     aspirin 81 MG EC tablet Take 81 mg by mouth daily       cetirizine (ZYRTEC) 10 MG tablet Take 10 mg by mouth as needed for allergies       citalopram (CELEXA) 20 MG tablet Take 1 tablet (20 mg) by mouth daily 90 tablet 1     fluticasone (FLONASE) 50 MCG/ACT nasal spray Spray 1 spray into both nostrils daily as needed for rhinitis or allergies       furosemide (LASIX) 20 MG tablet Take 2 tablet in the morning (40 mg daily) and 1 tablet (20 mg daily) 270 tablet 3     ibuprofen (ADVIL/MOTRIN) 200 MG tablet Take 3 tablets (600 mg) by mouth every 6 hours as needed for pain       lisinopril (ZESTRIL) 10 MG tablet Take 1 tablet (10 mg) by mouth daily 90 tablet 2     metoprolol succinate ER (TOPROL-XL) 50 MG 24 hr tablet Take 1 tablet (50 mg) by mouth daily 90 tablet 3       Family History   Adopted: Yes   Problem Relation Age of Onset     Unknown/Adopted Other         pt is adopted and has no access to health history     Unknown/Adopted Mother      Unknown/Adopted Father      Unknown/Adopted Maternal Grandmother      Unknown/Adopted Maternal Grandfather      Unknown/Adopted Paternal Grandmother      Unknown/Adopted Other        Social History     Socioeconomic History     Marital status:      Spouse name: Saúl     Number of children: 2     Years of  education: Not on file     Highest education level: Not on file   Occupational History     Occupation:  at home   Tobacco Use     Smoking status: Former Smoker     Packs/day: 0.50     Years: 5.00     Pack years: 2.50     Types: Cigarettes     Quit date: 2007     Years since quittin.9     Smokeless tobacco: Never Used   Substance and Sexual Activity     Alcohol use: Yes     Comment: 2-3 per weekend     Drug use: No     Sexual activity: Yes     Partners: Male     Birth control/protection: Female Surgical, Male Surgical     Comment: Hysterectomy / vasectomy    Other Topics Concern      Service No     Blood Transfusions No     Caffeine Concern Yes     Comment: 20 oz pepsi     Occupational Exposure Not Asked     Hobby Hazards Not Asked     Sleep Concern Not Asked     Stress Concern Not Asked     Weight Concern Not Asked     Special Diet Yes     Comment: 2-3 dairy per day     Back Care Not Asked     Exercise No     Comment: active with kids, walking puppy     Bike Helmet Not Asked     Seat Belt Yes     Self-Exams No     Parent/sibling w/ CABG, MI or angioplasty before 65F 55M? No     Comment: No family history - Adopted   Social History Narrative     Not on file     Social Determinants of Health     Financial Resource Strain: Not on file   Food Insecurity: Not on file   Transportation Needs: Not on file   Physical Activity: Not on file   Stress: Not on file   Social Connections: Not on file   Intimate Partner Violence: Not on file   Housing Stability: Not on file            Past Medical History:     Past Medical History:   Diagnosis Date     Anxiety and depression     Pt reports is on Rx Celexa.     Aortic aneurysm (H)     Pt reports its small and is currently being monitored.     Arthritis      Atrial fibrillation with RVR (H) 9/15/2020     Depressive disorder      DYSPLASIA OF CERVIX 3/6/2003     Dysplasia of cervix (uteri) 2003    Rx cryotherapy , and      Hypertension      NO cardiology     Incomplete right bundle branch block 11/10/2017     Migraine      KRISTYN (obstructive sleep apnea)      Ostium secundum type atrial septal defect 7/29/2020    Added automatically from request for surgery [id number]     Other chronic pain     Knee pain for 8 years     Patent foramen ovale 1/9/2020     Plantar fasciitis     bilat     Unspecified sleep apnea     CPAP will bring on the day of surgery.     Ventral hernia without obstruction or gangrene 4/15/2020     Ventral hernia without obstruction or gangrene 4/15/2020              Past Surgical History:     Past Surgical History:   Procedure Laterality Date     ANESTHESIA CARDIOVERSION N/A 9/16/2020    Procedure: ANESTHESIA, FOR CONCEPCION/CARDIOVERSION;  Surgeon: GENERIC ANESTHESIA PROVIDER;  Location: [facility] OR     ARTHROPLASTY KNEE Right 11/17/2017    Procedure: ARTHROPLASTY KNEE;  Right total knee arthroplasty using the Arthrex iBalance total knee system;  Surgeon: Hebert Lee MD;  Location: [facility] OR     ARTHROPLASTY KNEE Left 3/19/2018    Procedure: ARTHROPLASTY KNEE;  Left total knee arthroplasty using an Arthrex iBalance total knee system;  Surgeon: Hebert Lee MD;  Location: [facility] OR     ATRIAL SEPTAL DEFECT CLOSURE N/A 8/26/2020    Procedure: ASD Closure;  Surgeon: Freddie Frias MD;  Location:  HEART CARDIAC CATH LAB      RIGHT HEART CATH MEASUREMENTS RECORDED N/A 8/26/2020    Procedure: Right Heart Cath;  Surgeon: Freddie Frias MD;  Location:  HEART CARDIAC CATH LAB     DAVINCI HERNIORRHAPHY VENTRAL N/A 12/29/2020    Procedure: ROBOTIC ASSISTED VENTRAL AND INCISIONAL HERNIA REPAIR WITH MESH;  Surgeon: Violetta Lazaro MD;  Location: [facility] OR     DAVINCI HYSTERECTOMY TOTAL, BILATERAL SALPINGO-OOPHORECTOMY, COMBINED Bilateral 1/31/2020    Procedure: DaVinci robotic-assisted total laparoscopic hysterectomy, bilateral salpingectomy;  Surgeon: Venancio Bartlett MD;  Location: [facility] OR - Path all benign     DAVINCI HYSTERECTOMY  TOTAL, SALPINGECTOMY BILATERAL Bilateral 2020    Fibroid uterus, Menometrorrhagia - Robotic TLH, Bilateral salpingectomy - Path all benign     ENT SURGERY       HC COLP VAGINA W CERVIX IF PRES W BIOPSY  2005    Verbena for ASCUS     TONSILLECTOMY & ADENOIDECTOMY       ZZC  DELIVERY ONLY  ,    , Low Cervical              Allergies:   Penicillins       Data:   Laboratory tests:    Recent Labs   Lab Test 21  2123 20  0902 19  1633 18  1619 17  0945 16  0814   LDL  --  117*  --   --  68 87   HDL  --  41*  --   --  36* 32*   NHDL  --  141*  --   --  119 139*   CHOL  --  182  --   --  155 171   TRIG  --  119  --   --  256* 261*   TSH 0.88  --   --  0.79 0.75 1.00   NII  --   --  71  --   --   --        Lab Results   Component Value Date    WBC 8.7 2021    WBC 6.5 2020    RBC 4.33 2021    RBC 4.58 2020    HGB 13.8 2021    HGB 14.4 2020    HCT 40.5 2021    HCT 43.9 2020    MCV 94 2021    MCV 96 2020    MCH 31.9 2021    MCH 31.4 2020    MCHC 34.1 2021    MCHC 32.8 2020    RDW 12.6 2021    RDW 13.0 2020     2021     (L) 2020       Lab Results   Component Value Date     2021     2021    POTASSIUM 3.7 2021    POTASSIUM 3.7 2021    CHLORIDE 102 2021    CHLORIDE 102 2021    CO2 33 (H) 2021    CO2 31 2021    ANIONGAP 5 2021    ANIONGAP 5 2021     (H) 2021     (H) 2021    BUN 13 2021    BUN 16 2021    CR 0.69 2021    CR 0.68 2021    GFRESTIMATED >90 2021    GFRESTIMATED >90 2021    GFRESTBLACK >90 2021    CARROL 9.4 2021    CARROL 9.3 2021      Lab Results   Component Value Date    AST 27 2021    AST 31 2020    ALT 52 (H) 2021    ALT 38 2020       Lab Results   Component Value  Date    A1C 5.1 04/24/2019       Lab Results   Component Value Date    INR 1.05 09/15/2020    INR 1.02 09/15/2020       BARRINGTON REED [facility]  Pager: [phone]  RN phone: [phone]

## 2022-03-28 ENCOUNTER — VIRTUAL VISIT (OUTPATIENT)
Dept: CARDIOLOGY | Facility: CLINIC | Age: 52
End: 2022-03-28
Attending: NURSE PRACTITIONER
Payer: COMMERCIAL

## 2022-03-28 DIAGNOSIS — R60.0 BILATERAL LEG EDEMA: ICD-10-CM

## 2022-03-28 DIAGNOSIS — I10 HTN, GOAL BELOW 140/90: ICD-10-CM

## 2022-03-28 DIAGNOSIS — Q21.10 ASD (ATRIAL SEPTAL DEFECT): ICD-10-CM

## 2022-03-28 DIAGNOSIS — R06.02 SHORTNESS OF BREATH: ICD-10-CM

## 2022-03-28 DIAGNOSIS — I48.0 PAROXYSMAL ATRIAL FIBRILLATION (H): ICD-10-CM

## 2022-03-28 PROCEDURE — 99214 OFFICE O/P EST MOD 30 MIN: CPT | Mod: 95 | Performed by: NURSE PRACTITIONER

## 2022-03-28 RX ORDER — LISINOPRIL 20 MG/1
20 TABLET ORAL DAILY
Qty: 90 TABLET | Refills: 3 | Status: SHIPPED | OUTPATIENT
Start: 2022-03-28 | End: 2022-10-14

## 2022-03-28 NOTE — PATIENT INSTRUCTIONS
Today's Recommendations    1. Increase lisinopril to 20 mg daily  2. Continue Lasix 40 mg in am and 20 mg in pm. Can take 60 mg in am if easier to remember  3. Non fasting labs in 1-3 weeks and I will let you know the results  4. If BP is no consistently <140/90 with increase of lisinopril, please let me know  5. Continue all other medications without changes.  6. Please follow up with Dr. Hodges or Sharita in 1 year.    Please send a [employer] message or call [phone] to the RN team with questions or concerns.     Scheduling number [phone]    BARRINGTON Jin, CNP

## 2022-03-28 NOTE — LETTER
"3/28/2022    Yolie Delarosa MD  [address]  [facility] [address]    RE: Lyubov Sanchez       Dear Colleague,     I had the pleasure of seeing Lyubov Sanchez in the [facility].    Vitals - Patient Reported  Systolic (Patient Reported): (!) 148  Diastolic (Patient Reported): 72  Weight (Patient Reported): 129.3 kg (285 lb)  Height (Patient Reported): 162.6 cm (5' 4\")  BMI (Based on Pt Reported Ht/Wt): 48.92    Review Of Systems  Skin: NEGATIVE  Eyes:Ears/Nose/Throat: NEGATIVE  Respiratory: Sleep apnea.  Uses a CPAP  Cardiovascular:NEGATIVE  Gastrointestinal: NEGATIVE  Genitourinary:NEGATIVE   Musculoskeletal: NEGATIVE  Neurologic: NEGATIVE  Psychiatric: NEGATIVE  Hematologic/Lymphatic/Immunologic: Allergies  Endocrine:  NEGATIVE    Jamaal Whitney NREMT    Lyubov is a 51 year old who is being evaluated via a billable video visit.      How would you like to obtain your AVS? MyChart  If the video visit is dropped, the invitation should be resent by: Text to cell phone: [phone]  Will anyone else be joining your video visit? No        Video-Visit Details    Type of service:  Video Visit    Video Start Time: 7:40 AM    Video End Time:7:45 AM    Originating Location (pt. Location): Home    Distant Location (provider location):  [facility]     Platform used for Video Visit: [facility]        Cardiology Clinic Progress Note  Lyubov Sanchez MRN# [id number]   YOB: 1970 Age: 51 year old     Primary cardiologist: Dr. Hodges    Reason for visit: Follow up medication changes.     History of presenting illness:    Lyubov Sanchez, a pleasant 51 year old patient who has a past medical history significant for ASD status post closure in August 2020, hyperlipidemia, obstructive sleep apnea (compliant with CPAP), mildly dilated proximal ascending aorta, mild pulmonary hypertension, previous history of tobacco use and quit in 1999, hypertension and paroxysmal atrial " fibrillation. She was adopted and unsure of family history.     At our last visit she noted increase LE edema and SOBOE that she thought correlated with increasing metoprolol. Lasix was increased to 40 mg in am and 20 mg pm and metoprolol was decreased to 50 mg daily. Today she reports that she has had improvement SOB and LE edema. Her weight is down 5 lbs. She dose note that she intermittently forgets to take the pm dose and BP is intermittently elevated about 140/90.          Assessment and Plan:     ASSESSMENT:    1. Shortness of breath and dyspnea on exertion    Improved with increase lasix dosing and decreasing metoprolol     Currently on Lasix 40 mg in am and 20 mg in pm     2. Venous insufficiency    Noted on the right lower extremity with a severely incompetent large tributary off the proximal GSV that extends from the proximal thigh to the ankle    No significant insufficiency on the left but has considerable edema     3. Hypertension    Intermittent elevation       4. Paroxysmal atrial fibrillation    Noted post ASD closure and was found to have atrial fibrillation with RVR in September 2020    Most recent EKG was normal sinus rhythm     4. ASD    Status post closure in August 2020    Repeat echo shows stability of site without shunt    PLAN:     1. Continue Lasix 40 mg in am and 20 mg in pm (can take 60 mg daily if has continued difficulty with remembering pm dosing)  2. Increase lisinopril to 20 mg daily  3. BMP in ~2 weeks   4. Return to clinic in 1 year or sooner if BP is not controlled on increase lisinopril             Review of Systems:     Negative unless noted in HPI     Today's clinic visit entailed:  Review of the result(s) of each unique test - Venous insufficiency, BMP  Ordering of each unique test  Prescription drug management  15 minutes spent on the date of the encounter doing chart review, history and exam, documentation and further activities per the note  Provider  Link to [facility] Help Grid      The level of medical decision making during this visit was of moderate complexity.           Physical Exam:     General Appearance:  No distress, normal body habitus, upright.    ENT/Mouth:  Membranes moist, no nasal discharge or bleeding gums. Normal head shape, no evidence of injury or laceration.    EYES:  No scleral icterus, normal conjunctivae    Neurologic:  Normal arm motion bilateral, no tremors. No evidence of focal defect.    Psychiatric:  Alert and oriented x3, calm         Medications:     Current Outpatient Medications   Medication Sig Dispense Refill     acetaminophen (TYLENOL) 500 MG tablet Take 500-1,000 mg by mouth every 6 hours as needed for mild pain        albuterol (PROAIR HFA/PROVENTIL HFA/VENTOLIN HFA) 108 (90 Base) MCG/ACT inhaler Inhale 2 puffs into the lungs every 6 hours as needed for shortness of breath / dyspnea or wheezing 6.7 g 0     aspirin 81 MG EC tablet Take 81 mg by mouth daily       cetirizine (ZYRTEC) 10 MG tablet Take 10 mg by mouth as needed for allergies       citalopram (CELEXA) 20 MG tablet Take 1 tablet (20 mg) by mouth daily 90 tablet 1     fluticasone (FLONASE) 50 MCG/ACT nasal spray Spray 1 spray into both nostrils daily as needed for rhinitis or allergies       furosemide (LASIX) 20 MG tablet Take 2 tablet in the morning (40 mg daily) and 1 tablet (20 mg daily) 270 tablet 3     ibuprofen (ADVIL/MOTRIN) 200 MG tablet Take 3 tablets (600 mg) by mouth every 6 hours as needed for pain       lisinopril (ZESTRIL) 10 MG tablet Take 1 tablet (10 mg) by mouth daily 90 tablet 2     metoprolol succinate ER (TOPROL-XL) 50 MG 24 hr tablet Take 1 tablet (50 mg) by mouth daily 90 tablet 3       Family History   Adopted: Yes   Problem Relation Age of Onset     Unknown/Adopted Other         pt is adopted and has no access to health history     Unknown/Adopted Mother      Unknown/Adopted Father      Unknown/Adopted Maternal Grandmother      Unknown/Adopted Maternal Grandfather       Unknown/Adopted Paternal Grandmother      Unknown/Adopted Other        Social History     Socioeconomic History     Marital status:      Spouse name: Saúl     Number of children: 2     Years of education: Not on file     Highest education level: Not on file   Occupational History     Occupation:  at home   Tobacco Use     Smoking status: Former Smoker     Packs/day: 0.50     Years: 5.00     Pack years: 2.50     Types: Cigarettes     Quit date: 2007     Years since quittin.9     Smokeless tobacco: Never Used   Substance and Sexual Activity     Alcohol use: Yes     Comment: 2-3 per weekend     Drug use: No     Sexual activity: Yes     Partners: Male     Birth control/protection: Female Surgical, Male Surgical     Comment: Hysterectomy 2020/ vasectomy    Other Topics Concern      Service No     Blood Transfusions No     Caffeine Concern Yes     Comment: 20 oz pepsi     Occupational Exposure Not Asked     Hobby Hazards Not Asked     Sleep Concern Not Asked     Stress Concern Not Asked     Weight Concern Not Asked     Special Diet Yes     Comment: 2-3 dairy per day     Back Care Not Asked     Exercise No     Comment: active with kids, walking puppy     Bike Helmet Not Asked     Seat Belt Yes     Self-Exams No     Parent/sibling w/ CABG, MI or angioplasty before 65F 55M? No     Comment: No family history - Adopted   Social History Narrative     Not on file     Social Determinants of Health     Financial Resource Strain: Not on file   Food Insecurity: Not on file   Transportation Needs: Not on file   Physical Activity: Not on file   Stress: Not on file   Social Connections: Not on file   Intimate Partner Violence: Not on file   Housing Stability: Not on file            Past Medical History:     Past Medical History:   Diagnosis Date     Anxiety and depression     Pt reports is on Rx Celexa.     Aortic aneurysm (H)     Pt reports its small and is currently being monitored.      Arthritis      Atrial fibrillation with RVR (H) 9/15/2020     Depressive disorder      DYSPLASIA OF CERVIX 3/6/2003     Dysplasia of cervix (uteri) 2003    Rx cryotherapy Nov 03, and 2005     Hypertension     NO cardiology     Incomplete right bundle branch block 11/10/2017     Migraine      KRISTYN (obstructive sleep apnea)      Ostium secundum type atrial septal defect 7/29/2020    Added automatically from request for surgery [id number]     Other chronic pain     Knee pain for 8 years     Patent foramen ovale 1/9/2020     Plantar fasciitis     bilat     Unspecified sleep apnea     CPAP will bring on the day of surgery.     Ventral hernia without obstruction or gangrene 4/15/2020     Ventral hernia without obstruction or gangrene 4/15/2020              Past Surgical History:     Past Surgical History:   Procedure Laterality Date     ANESTHESIA CARDIOVERSION N/A 9/16/2020    Procedure: ANESTHESIA, FOR CONCEPCION/CARDIOVERSION;  Surgeon: GENERIC ANESTHESIA PROVIDER;  Location:  OR     ARTHROPLASTY KNEE Right 11/17/2017    Procedure: ARTHROPLASTY KNEE;  Right total knee arthroplasty using the Arthrex iBalance total knee system;  Surgeon: Hebert Lee MD;  Location: [facility] OR     ARTHROPLASTY KNEE Left 3/19/2018    Procedure: ARTHROPLASTY KNEE;  Left total knee arthroplasty using an Arthrex iBalance total knee system;  Surgeon: Hebert Lee MD;  Location: [facility] OR     ATRIAL SEPTAL DEFECT CLOSURE N/A 8/26/2020    Procedure: ASD Closure;  Surgeon: Freddie Frias MD;  Location:  HEART CARDIAC CATH LAB      RIGHT HEART CATH MEASUREMENTS RECORDED N/A 8/26/2020    Procedure: Right Heart Cath;  Surgeon: Freddie Frias MD;  Location:  HEART CARDIAC CATH LAB     DAVINCI HERNIORRHAPHY VENTRAL N/A 12/29/2020    Procedure: ROBOTIC ASSISTED VENTRAL AND INCISIONAL HERNIA REPAIR WITH MESH;  Surgeon: Violetta Lazaro MD;  Location: [facility] OR     DAVINCI HYSTERECTOMY TOTAL, BILATERAL SALPINGO-OOPHORECTOMY, COMBINED  Bilateral 2020    Procedure: DaVinci robotic-assisted total laparoscopic hysterectomy, bilateral salpingectomy;  Surgeon: Venancio Bartlett MD;  Location: [facility] OR - Path all benign     DAVINCI HYSTERECTOMY TOTAL, SALPINGECTOMY BILATERAL Bilateral 2020    Fibroid uterus, Menometrorrhagia - Robotic TLH, Bilateral salpingectomy - Path all benign     ENT SURGERY       HC COLP VAGINA W CERVIX IF PRES W BIOPSY  2005    [place] for ASCUS     TONSILLECTOMY & ADENOIDECTOMY       Z  DELIVERY ONLY  ,    , Low Cervical              Allergies:   Penicillins       Data:   Laboratory tests:    Recent Labs   Lab Test 21  2123 20  0902 19  1633 18  1619 17  0945 16  0814   LDL  --  117*  --   --  68 87   HDL  --  41*  --   --  36* 32*   NHDL  --  141*  --   --  119 139*   CHOL  --  182  --   --  155 171   TRIG  --  119  --   --  256* 261*   TSH 0.88  --   --  0.79 0.75 1.00   NII  --   --  71  --   --   --        Lab Results   Component Value Date    WBC 8.7 2021    WBC 6.5 2020    RBC 4.33 2021    RBC 4.58 2020    HGB 13.8 2021    HGB 14.4 2020    HCT 40.5 2021    HCT 43.9 2020    MCV 94 2021    MCV 96 2020    MCH 31.9 2021    MCH 31.4 2020    MCHC 34.1 2021    MCHC 32.8 2020    RDW 12.6 2021    RDW 13.0 2020     2021     (L) 2020       Lab Results   Component Value Date     2021     2021    POTASSIUM 3.7 2021    POTASSIUM 3.7 2021    CHLORIDE 102 2021    CHLORIDE 102 2021    CO2 33 (H) 2021    CO2 31 2021    ANIONGAP 5 2021    ANIONGAP 5 2021     (H) 2021     (H) 2021    BUN 13 2021    BUN 16 2021    CR 0.69 2021    CR 0.68 2021    GFRESTIMATED >90 2021    GFRESTIMATED >90 2021    GFRESTBLACK >90 2021     CARROL 9.4 08/19/2021    CARROL 9.3 02/08/2021      Lab Results   Component Value Date    AST 27 08/19/2021    AST 31 11/24/2020    ALT 52 (H) 08/19/2021    ALT 38 11/24/2020       Lab Results   Component Value Date    A1C 5.1 04/24/2019       Lab Results   Component Value Date    INR 1.05 09/15/2020    INR 1.02 09/15/2020       BARRINGTON REED CNP  [facility]  Pager: [phone]  RN phone: [phone]    Thank you for allowing me to participate in the care of your patient.      Sincerely,     BARRINGTON REED CNP     [facility]      cc:   BARRINGTON Keyes CNP  [address]  [place] [address]

## 2022-06-18 ENCOUNTER — TELEPHONE (OUTPATIENT)
Dept: URGENT CARE | Facility: URGENT CARE | Age: 52
End: 2022-06-18

## 2022-06-18 ENCOUNTER — OFFICE VISIT (OUTPATIENT)
Dept: URGENT CARE | Facility: URGENT CARE | Age: 52
End: 2022-06-18
Payer: COMMERCIAL

## 2022-06-18 VITALS
WEIGHT: 293 LBS | OXYGEN SATURATION: 96 % | BODY MASS INDEX: 50.64 KG/M2 | DIASTOLIC BLOOD PRESSURE: 88 MMHG | SYSTOLIC BLOOD PRESSURE: 130 MMHG | RESPIRATION RATE: 18 BRPM | TEMPERATURE: 98.9 F | HEART RATE: 75 BPM

## 2022-06-18 DIAGNOSIS — R05.9 COUGH: Primary | ICD-10-CM

## 2022-06-18 PROCEDURE — 99214 OFFICE O/P EST MOD 30 MIN: CPT | Performed by: FAMILY MEDICINE

## 2022-06-18 RX ORDER — AZITHROMYCIN 250 MG/1
TABLET, FILM COATED ORAL
Qty: 6 TABLET | Refills: 0 | Status: SHIPPED | OUTPATIENT
Start: 2022-06-18 | End: 2022-06-23

## 2022-06-18 RX ORDER — BENZONATATE 100 MG/1
100 CAPSULE ORAL 3 TIMES DAILY PRN
Qty: 42 CAPSULE | Refills: 3 | Status: SHIPPED | OUTPATIENT
Start: 2022-06-18 | End: 2022-08-08

## 2022-06-18 NOTE — PROGRESS NOTES
SUBJECTIVE:   Lyubov Sanchez is a 51 year old female with a history of atrial fibrillation, hypertension, hyperlipidemia, presenting with a chief complaint of cough (productive of green phlegm) and a sensation of intermittent chest heaviness at the midline area.   There is some shortness of breath when supine.   No relief with Mucinex.    No fevers.    She has done three at-home COVID-19 tests over the past week; they have all been negative.  The most COVID-19 test was done yesterday and it was negative.     No severe chest pain.     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     No fevers.      Past Medical History:   Diagnosis Date     Anxiety and depression     Pt reports is on Rx Celexa.     Aortic aneurysm (H)     Pt reports its small and is currently being monitored.     Arthritis      Atrial fibrillation with RVR (H) 9/15/2020     Depressive disorder      DYSPLASIA OF CERVIX 3/6/2003     Dysplasia of cervix (uteri) 2003    Rx cryotherapy Nov 03, and 2005     Hypertension     NO cardiology     Incomplete right bundle branch block 11/10/2017     Migraine      KRISTYN (obstructive sleep apnea)      Ostium secundum type atrial septal defect 7/29/2020    Added automatically from request for surgery [id number]     Other chronic pain     Knee pain for 8 years     Patent foramen ovale 1/9/2020     Plantar fasciitis     bilat     Unspecified sleep apnea     CPAP will bring on the day of surgery.     Ventral hernia without obstruction or gangrene 4/15/2020     Ventral hernia without obstruction or gangrene 4/15/2020     Current Outpatient Medications   Medication Sig Dispense Refill     acetaminophen (TYLENOL) 500 MG tablet Take 500-1,000 mg by mouth every 6 hours as needed for mild pain        albuterol (PROAIR HFA/PROVENTIL HFA/VENTOLIN HFA) 108 (90 Base) MCG/ACT inhaler Inhale 2 puffs into the lungs every 6 hours as needed for shortness of breath / dyspnea or wheezing 6.7 g 0     aspirin  81 MG EC tablet Take 81 mg by mouth daily       cetirizine (ZYRTEC) 10 MG tablet Take 10 mg by mouth as needed for allergies       citalopram (CELEXA) 20 MG tablet Take 1 tablet (20 mg) by mouth daily 90 tablet 1     fluticasone (FLONASE) 50 MCG/ACT nasal spray Spray 1 spray into both nostrils daily as needed for rhinitis or allergies       furosemide (LASIX) 20 MG tablet Take 2 tablet in the morning (40 mg daily) and 1 tablet (20 mg daily) 270 tablet 3     ibuprofen (ADVIL/MOTRIN) 200 MG tablet Take 3 tablets (600 mg) by mouth every 6 hours as needed for pain       lisinopril (ZESTRIL) 20 MG tablet Take 1 tablet (20 mg) by mouth daily 90 tablet 3     metoprolol succinate ER (TOPROL-XL) 50 MG 24 hr tablet Take 1 tablet (50 mg) by mouth daily 90 tablet 3     Social History     Tobacco Use     Smoking status: Former Smoker     Packs/day: 0.50     Years: 5.00     Pack years: 2.50     Types: Cigarettes     Quit date: 4/1/2007     Years since quitting: 15.2     Smokeless tobacco: Never Used   Substance Use Topics     Alcohol use: Yes     Comment: 2-3 per weekend       ROS:  CONSTITUTIONAL:negative for fevers.   ENT/MOUTH:  Negative for sore throat.    RESP: positive for productive cough, chest heaviness.      OBJECTIVE:  /88 (BP Location: Right arm, Patient Position: Chair, Cuff Size: Adult Large)   Pulse 75   Temp 98.9  F (37.2  C) (Oral)   Resp 18   Wt 133.8 kg (295 lb)   LMP  (LMP Unknown)   SpO2 96%   Breastfeeding No   BMI 50.64 kg/m    GENERAL APPEARANCE: healthy, alert and no distress  No acute respiratory distress. She can speak in full sentences.     HENT: oral mucous membranes moist, no erythema noted  NECK: supple, nontender, no lymphadenopathy  RESP: lungs clear to auscultation - no rales, rhonchi or wheezes  CV: regular rates and rhythm, normal S1 S2, no murmur noted  SKIN: no cyanosis/diaphoresis.      chest x-ray:  I viewed all X-rays during this clinic encounter.  The chest x-ray showed  possible right middle lobe infiltrates.  .      ASSESSMENT:  Cough with a possible right lung pneumonia.     PLAN:  Rx:  Tessalon Perles  Rx:  Azithromycin  follow up with the primary care provider if not better in 10 days.   Go to the emergency room if experiencing worsening, severe shortness of breath.     Paul Garcia MD

## 2022-06-18 NOTE — PATIENT INSTRUCTIONS
Go to the emergency room if you experience worsening, severe shortness of breath or worsening persistent chest pain/discomfort.      Follow up with your primary care provider if not better in 10 days.

## 2022-06-18 NOTE — TELEPHONE ENCOUNTER
SUBJECTIVE:  The radiology report on the June 18, 2022, chest X-rays found a normal heart size, no pleural effusion, no pneumothorax, no consolidation.      PLAN:  I notified the patient of this result via telephone on June 18, 2022 at around 11:45 am.  Patient can still continue the five-day course of the Azithromycin.      Paul Garcia MD

## 2022-07-21 ENCOUNTER — HOSPITAL ENCOUNTER (EMERGENCY)
Facility: CLINIC | Age: 52
Discharge: HOME | End: 2022-07-21
Attending: EMERGENCY MEDICINE | Admitting: EMERGENCY MEDICINE
Payer: COMMERCIAL

## 2022-07-21 ENCOUNTER — APPOINTMENT (OUTPATIENT)
Dept: GENERAL RADIOLOGY | Facility: CLINIC | Age: 52
End: 2022-07-21
Attending: EMERGENCY MEDICINE
Payer: COMMERCIAL

## 2022-07-21 VITALS
OXYGEN SATURATION: 96 % | WEIGHT: 290 LBS | DIASTOLIC BLOOD PRESSURE: 99 MMHG | BODY MASS INDEX: 49.51 KG/M2 | HEART RATE: 85 BPM | HEIGHT: 64 IN | SYSTOLIC BLOOD PRESSURE: 160 MMHG | TEMPERATURE: 97.6 F | RESPIRATION RATE: 20 BRPM

## 2022-07-21 DIAGNOSIS — R06.00 DYSPNEA, UNSPECIFIED TYPE: ICD-10-CM

## 2022-07-21 DIAGNOSIS — I10 HYPERTENSION, UNSPECIFIED TYPE: ICD-10-CM

## 2022-07-21 LAB
ANION GAP SERPL CALCULATED.3IONS-SCNC: 4 MMOL/L (ref 3–14)
ATRIAL RATE - MUSE: 79 BPM
ATRIAL RATE - MUSE: 85 BPM
BASOPHILS # BLD AUTO: 0 10E3/UL (ref 0–0.2)
BASOPHILS NFR BLD AUTO: 1 %
BUN SERPL-MCNC: 13 MG/DL (ref 7–30)
CALCIUM SERPL-MCNC: 8.8 MG/DL (ref 8.5–10.1)
CHLORIDE BLD-SCNC: 103 MMOL/L (ref 94–109)
CO2 SERPL-SCNC: 31 MMOL/L (ref 20–32)
CREAT SERPL-MCNC: 0.62 MG/DL (ref 0.52–1.04)
D DIMER PPP FEU-MCNC: 0.44 UG/ML FEU (ref 0–0.5)
DIASTOLIC BLOOD PRESSURE - MUSE: NORMAL MMHG
DIASTOLIC BLOOD PRESSURE - MUSE: NORMAL MMHG
EOSINOPHIL # BLD AUTO: 0.2 10E3/UL (ref 0–0.7)
EOSINOPHIL NFR BLD AUTO: 3 %
ERYTHROCYTE [DISTWIDTH] IN BLOOD BY AUTOMATED COUNT: 13.4 % (ref 10–15)
GFR SERPL CREATININE-BSD FRML MDRD: >90 ML/MIN/1.73M2
GLUCOSE BLD-MCNC: 168 MG/DL (ref 70–99)
HCT VFR BLD AUTO: 43.5 % (ref 35–47)
HGB BLD-MCNC: 13.9 G/DL (ref 11.7–15.7)
HOLD SPECIMEN: NORMAL
IMM GRANULOCYTES # BLD: 0 10E3/UL
IMM GRANULOCYTES NFR BLD: 1 %
INTERPRETATION ECG - MUSE: NORMAL
INTERPRETATION ECG - MUSE: NORMAL
LYMPHOCYTES # BLD AUTO: 2.6 10E3/UL (ref 0.8–5.3)
LYMPHOCYTES NFR BLD AUTO: 34 %
MCH RBC QN AUTO: 31.4 PG (ref 26.5–33)
MCHC RBC AUTO-ENTMCNC: 32 G/DL (ref 31.5–36.5)
MCV RBC AUTO: 98 FL (ref 78–100)
MONOCYTES # BLD AUTO: 0.4 10E3/UL (ref 0–1.3)
MONOCYTES NFR BLD AUTO: 6 %
NEUTROPHILS # BLD AUTO: 4.2 10E3/UL (ref 1.6–8.3)
NEUTROPHILS NFR BLD AUTO: 55 %
NRBC # BLD AUTO: 0 10E3/UL
NRBC BLD AUTO-RTO: 0 /100
NT-PROBNP SERPL-MCNC: 162 PG/ML (ref 0–900)
P AXIS - MUSE: 47 DEGREES
P AXIS - MUSE: 47 DEGREES
PLATELET # BLD AUTO: 144 10E3/UL (ref 150–450)
POTASSIUM BLD-SCNC: 3.7 MMOL/L (ref 3.4–5.3)
PR INTERVAL - MUSE: 172 MS
PR INTERVAL - MUSE: 178 MS
QRS DURATION - MUSE: 92 MS
QRS DURATION - MUSE: 98 MS
QT - MUSE: 420 MS
QT - MUSE: 442 MS
QTC - MUSE: 499 MS
QTC - MUSE: 506 MS
R AXIS - MUSE: -81 DEGREES
R AXIS - MUSE: -89 DEGREES
RBC # BLD AUTO: 4.43 10E6/UL (ref 3.8–5.2)
SARS-COV-2 RNA RESP QL NAA+PROBE: NEGATIVE
SODIUM SERPL-SCNC: 138 MMOL/L (ref 133–144)
SYSTOLIC BLOOD PRESSURE - MUSE: NORMAL MMHG
SYSTOLIC BLOOD PRESSURE - MUSE: NORMAL MMHG
T AXIS - MUSE: 84 DEGREES
T AXIS - MUSE: 93 DEGREES
TROPONIN I SERPL HS-MCNC: 10 NG/L
TROPONIN I SERPL HS-MCNC: 12 NG/L
VENTRICULAR RATE- MUSE: 79 BPM
VENTRICULAR RATE- MUSE: 85 BPM
WBC # BLD AUTO: 7.5 10E3/UL (ref 4–11)

## 2022-07-21 PROCEDURE — 80048 BASIC METABOLIC PNL TOTAL CA: CPT | Performed by: EMERGENCY MEDICINE

## 2022-07-21 PROCEDURE — 83880 ASSAY OF NATRIURETIC PEPTIDE: CPT | Performed by: EMERGENCY MEDICINE

## 2022-07-21 PROCEDURE — 84484 ASSAY OF TROPONIN QUANT: CPT | Performed by: EMERGENCY MEDICINE

## 2022-07-21 PROCEDURE — 99285 EMERGENCY DEPT VISIT HI MDM: CPT | Mod: 25

## 2022-07-21 PROCEDURE — 36415 COLL VENOUS BLD VENIPUNCTURE: CPT | Performed by: EMERGENCY MEDICINE

## 2022-07-21 PROCEDURE — 93005 ELECTROCARDIOGRAM TRACING: CPT | Mod: 76

## 2022-07-21 PROCEDURE — 71045 X-RAY EXAM CHEST 1 VIEW: CPT

## 2022-07-21 PROCEDURE — U0005 INFEC AGEN DETEC AMPLI PROBE: HCPCS | Performed by: EMERGENCY MEDICINE

## 2022-07-21 PROCEDURE — 93005 ELECTROCARDIOGRAM TRACING: CPT

## 2022-07-21 PROCEDURE — 250N000011 HC RX IP 250 OP 636: Performed by: EMERGENCY MEDICINE

## 2022-07-21 PROCEDURE — 85025 COMPLETE CBC W/AUTO DIFF WBC: CPT | Performed by: EMERGENCY MEDICINE

## 2022-07-21 PROCEDURE — 85379 FIBRIN DEGRADATION QUANT: CPT | Performed by: EMERGENCY MEDICINE

## 2022-07-21 PROCEDURE — 96374 THER/PROPH/DIAG INJ IV PUSH: CPT

## 2022-07-21 RX ORDER — FUROSEMIDE 10 MG/ML
40 INJECTION INTRAMUSCULAR; INTRAVENOUS ONCE
Status: COMPLETED | OUTPATIENT
Start: 2022-07-21 | End: 2022-07-21

## 2022-07-21 RX ADMIN — FUROSEMIDE 40 MG: 10 INJECTION, SOLUTION INTRAMUSCULAR; INTRAVENOUS at 03:40

## 2022-07-21 ASSESSMENT — ENCOUNTER SYMPTOMS
NAUSEA: 0
RHINORRHEA: 0
DIARRHEA: 0
FEVER: 0
COUGH: 0
CHILLS: 0
ABDOMINAL PAIN: 0
VOMITING: 0
SHORTNESS OF BREATH: 1

## 2022-07-21 NOTE — ED PROVIDER NOTES
History   Chief Complaint:  Shortness of Breath       HPI   Lyubov Sanchez is a 51 year old female with history of atrial fibrillation, aortic aneurysm, hypertension, hyperlipidemia, obesity  who presents with intermittent shortness of breath for the past few months which is worse while laying down. Her shortness of breath is much worse tonight which prompted her presentation. She denies diarrhea, chest pain, abdominal pain, nausea, vomiting, fever, chills, cough, and runny nose. She denies long distance travel, use of hormonal birth control, blood clots, and history of cancer. She notes that her legs are chronically swollen though appear somewhat more swollen. She endorses alcohol use but denies smoking. COVID vaccinated, works at [email or web address].    ECHO 6/8/21  A contrast injection (Bubble Study) was performed that was negative for flow  across the interatrial septum.  Closure device not seen in this study.  The study was technically difficult. Contrast was used without apparent  complications.  ______________________________________________________________________________  Left Ventricle  The left ventricle is normal in size. There is normal left ventricular wall  thickness. The visual ejection fraction is estimated at 60-65%.     Right Ventricle  The right ventricle is normal in structure, function and size.     Atria  Normal left atrial size. Right atrial size is normal. A contrast injection  (Bubble Study) was performed that was negative for flow across the interatrial  septum. Intact atrial septum.     Mitral Valve  There is mild mitral annular calcification. The mitral valve leaflets are  mildly thickened. There is trace mitral regurgitation.     Tricuspid Valve  Normal tricuspid valve.     Aortic Valve  Normal tricuspid aortic valve.     Pulmonic Valve  There is no pulmonic valvular stenosis.     Vessels  The ascending aorta is Mildly dilated.    Review of Systems   Constitutional: Negative for chills and fever.  "  HENT: Negative for rhinorrhea.    Respiratory: Positive for shortness of breath. Negative for cough.    Cardiovascular: Negative for chest pain.   Gastrointestinal: Negative for abdominal pain, diarrhea, nausea and vomiting.     Allergies:  Penicillins    Medications:  Albuterol  Aspirin 81 mg  Zyrtec  Celexa  Flonase  Lasix  Lisinopril  Toprol    Past Medical History:     Anxiety  Depression   Aortic aneurysm  Arthritis  Atrial fibrillation  Cervical dysplasia  Hypertension   Migraines  Ostium secundum type atrial septal defect  Patent foramen ovale  Plantar fasciitis  Ventral hernia  Thoracic aortic ectasia  Bilateral leg edema  Umbilical hernia   Incomplete RBBB  Morbid obesity  Hyperlipidemia      Past Surgical History:    Cardioversion  Knee arthroplasty x2   section x2  ASD closure  Right heart catheterization  DaVinci herniorrhaphy ventral  Total hysterectomy  Tonsillectomy  Adenoidectomy     Social History:  The patient presents to the ED alone  Presents via car driven by   Marital status:     Physical Exam     Patient Vitals for the past 24 hrs:   BP Temp Pulse Resp SpO2 Height Weight   22 0421 -- -- 85 20 96 % -- --   22 0400 -- -- -- -- 95 % -- --   22 0315 (!) 160/99 -- 86 -- 94 % -- --   22 0300 (!) 174/97 -- 76 -- 91 % -- --   22 0245 -- -- -- -- 92 % -- --   22 0230 (!) 172/98 -- 81 -- 92 % -- --   22 0215 (!) 181/90 -- 82 -- 92 % -- --   22 0210 (!) 182/98 -- 83 -- 95 % -- --   22 0205 (!) 178/97 -- 83 -- 92 % -- --   22 0059 (!) 208/114 97.6  F (36.4  C) 89 24 94 % 1.626 m (5' 4\") 131.5 kg (290 lb)       Physical Exam  Nursing note and vitals reviewed.  Constitutional: Well nourished.   Eyes: Conjunctiva normal.  Pupils are equal, round, and reactive to light.   ENT: Nose normal. Mucous membranes pink and moist.    Neck: Normal range of motion.  CVS: Normal rate, regular rhythm.  Normal heart sounds.  No " murmur.  Pulmonary: Lungs with scant bibasilar rales  GI: Obese. Abdomen soft. Nontender, nondistended. No rigidity or guarding.    MSK: No calf tenderness; +1 LE edema  Neuro: Alert. Follows simple commands.  Skin: Skin is warm and dry. No rash noted.   Psychiatric: Normal affect.       Emergency Department Course   ECG  ECG results from 07/21/22   EKG 12 lead     Value    Systolic Blood Pressure     Diastolic Blood Pressure     Ventricular Rate 85    Atrial Rate 85    FL Interval 172    QRS Duration 98        QTc 499    P Axis 47    R AXIS -89    T Axis 84    Interpretation ECG      Sinus rhythm  Possible Left atrial enlargement  Left axis deviation  Incomplete right bundle branch block  Nonspecific ST abnormality  Prolonged QT  Abnormal ECG  When compared with ECG of 19-AUG-2021 21:31,  Nonspecific T wave abnormality no longer evident in Lateral leads     EKG 12 lead     Value    Systolic Blood Pressure     Diastolic Blood Pressure     Ventricular Rate 79    Atrial Rate 79    FL Interval 178    QRS Duration 92        QTc 506    P Axis 47    R AXIS -81    T Axis 93    Interpretation ECG      Sinus rhythm  Possible Left atrial enlargement  Left axis deviation  Incomplete right bundle branch block  Nonspecific ST and T wave abnormality  Prolonged QT  Abnormal ECG  When compared with ECG of 21-JUL-2022 01:08, (unconfirmed)  No significant change was found         Imaging:  XR Chest Port 1 View   Final Result   IMPRESSION: Heart size at the upper limits of normal. Normal pulmonary vascularity. Atrial septal occlusion device. Lungs clear. Degenerative changes in the spine.        Report per radiology    Laboratory:  Labs Ordered and Resulted from Time of ED Arrival to Time of ED Departure   BASIC METABOLIC PANEL - Abnormal       Result Value    Sodium 138      Potassium 3.7      Chloride 103      Carbon Dioxide (CO2) 31      Anion Gap 4      Urea Nitrogen 13      Creatinine 0.62      Calcium 8.8       Glucose 168 (*)     GFR Estimate >90     CBC WITH PLATELETS AND DIFFERENTIAL - Abnormal    WBC Count 7.5      RBC Count 4.43      Hemoglobin 13.9      Hematocrit 43.5      MCV 98      MCH 31.4      MCHC 32.0      RDW 13.4      Platelet Count 144 (*)     % Neutrophils 55      % Lymphocytes 34      % Monocytes 6      % Eosinophils 3      % Basophils 1      % Immature Granulocytes 1      NRBCs per 100 WBC 0      Absolute Neutrophils 4.2      Absolute Lymphocytes 2.6      Absolute Monocytes 0.4      Absolute Eosinophils 0.2      Absolute Basophils 0.0      Absolute Immature Granulocytes 0.0      Absolute NRBCs 0.0     COVID-19 VIRUS (CORONAVIRUS) BY PCR - Normal    SARS CoV2 PCR Negative     D DIMER QUANTITATIVE - Normal    D-Dimer Quantitative 0.44     NT PROBNP INPATIENT - Normal    N terminal Pro BNP Inpatient 162     TROPONIN I - Normal    Troponin I High Sensitivity 12     TROPONIN I - Normal    Troponin I High Sensitivity 10          Emergency Department Course:  Reviewed:  I reviewed nursing notes, vitals, past medical history and Care Everywhere    Assessments:  0121 I obtained history and examined the patient as noted above.    Interventions:  0340 Lasix, 40 mg, IV    Disposition:  The patient was discharged to home.     Impression & Plan   Medical Decision Making:  Patient is a 51-year-old female presenting with dyspnea that has been intermittent for the past few months.  She is hypertensive on arrival though this down trended during her time in the ED.  EKG without STEMI.  High-sensitivity screening troponin negative x2.  She denies any active chest pain and I have a lower suspicion for ACS.  D-dimer negative, clinically doubt PE.  No profound anemia or significant electrolyte derangements noted on lab work.  Chest x-ray without pneumonia, fluid overload, widened mediastinum or pneumothorax.  Clinically she does appear slightly fluid overloaded.  She was given IV diuresis.  She reports compliance with her  diuresis and I did recommend she continue this as prescribed with close outpatient follow-up.  She ambulates without hypoxia or significant work of breathing.  She tested negative for COVID-19 during her time in the ED.  At this point in time I do not feel further emergent work-up is warranted.  She did report some symptom improvement during her time in the ED.  I do feel comfortable with plans for close outpatient PCP follow-up and we did discuss she may benefit from seeing her cardiologist again in the near future especially if her symptoms persist.  No evidence to suggest hypertensive emergency/urgency though I did recommend maintaining a blood pressure diary as well to aid PCP and possible antihypertensive medication adjustments.    Diagnosis:    ICD-10-CM    1. Hypertension, unspecified type  I10    2. Dyspnea, unspecified type  R06.00        Scribe Disclosure:  I, Ike Dia, am serving as a scribe at 1:21 AM on 7/21/2022 to document services personally performed by Louann Morel DO based on my observations and the provider's statements to me.          Louann Morel DO  07/21/22 [id number]

## 2022-07-21 NOTE — ED TRIAGE NOTES
Pt having hard time taking deep breath. Has been going on intermittently for months, worse 2 nights ago and tonight. Worse when pt lays down. Hx of KRISTYN and uses CPAP. Covid test negative at home. Pt vaccinated and boosted for covid.

## 2022-08-08 ENCOUNTER — OFFICE VISIT (OUTPATIENT)
Dept: FAMILY MEDICINE | Facility: CLINIC | Age: 52
End: 2022-08-08
Payer: COMMERCIAL

## 2022-08-08 VITALS
TEMPERATURE: 98.2 F | HEART RATE: 80 BPM | DIASTOLIC BLOOD PRESSURE: 84 MMHG | BODY MASS INDEX: 51.62 KG/M2 | SYSTOLIC BLOOD PRESSURE: 134 MMHG | RESPIRATION RATE: 20 BRPM | WEIGHT: 293 LBS | OXYGEN SATURATION: 91 %

## 2022-08-08 DIAGNOSIS — R06.09 DOE (DYSPNEA ON EXERTION): Primary | ICD-10-CM

## 2022-08-08 DIAGNOSIS — E66.01 MORBID OBESITY (H): ICD-10-CM

## 2022-08-08 PROBLEM — H52.4 HYPEROPIA OF BOTH EYES WITH ASTIGMATISM AND PRESBYOPIA: Status: ACTIVE | Noted: 2019-03-21

## 2022-08-08 PROBLEM — H52.03 HYPEROPIA OF BOTH EYES WITH ASTIGMATISM AND PRESBYOPIA: Status: ACTIVE | Noted: 2019-03-21

## 2022-08-08 PROBLEM — H52.203 HYPEROPIA OF BOTH EYES WITH ASTIGMATISM AND PRESBYOPIA: Status: ACTIVE | Noted: 2019-03-21

## 2022-08-08 PROCEDURE — 99214 OFFICE O/P EST MOD 30 MIN: CPT | Performed by: NURSE PRACTITIONER

## 2022-08-08 ASSESSMENT — PAIN SCALES - GENERAL: PAINLEVEL: NO PAIN (0)

## 2022-08-08 ASSESSMENT — PATIENT HEALTH QUESTIONNAIRE - PHQ9
10. IF YOU CHECKED OFF ANY PROBLEMS, HOW DIFFICULT HAVE THESE PROBLEMS MADE IT FOR YOU TO DO YOUR WORK, TAKE CARE OF THINGS AT HOME, OR GET ALONG WITH OTHER PEOPLE: NOT DIFFICULT AT ALL
SUM OF ALL RESPONSES TO PHQ QUESTIONS 1-9: 6
SUM OF ALL RESPONSES TO PHQ QUESTIONS 1-9: 6

## 2022-08-08 NOTE — PATIENT INSTRUCTIONS
Scheduling will call you to set up the at home heart monitor.    Call cardiology to schedule follow-up and your echo.

## 2022-08-08 NOTE — PROGRESS NOTES
Assessment & Plan     DAVE (dyspnea on exertion)  Ongoing; lungs clear throughout.  Initially noted to have O2 sat in the low 90s.  We ambulated around the clinic and O2 sat continued in the low 90s though HR increased into the low 100-110s.  After five minutes of rest HR decreased back to the 70-80s and O2 sat improved to 93-94%.  Despite daily beta blocker HR does increase >100 bpm.  Regular rhythm today.  Will have her wear a home monitor to assess rhythm and follow-up with cardiology after.  She tells me is due for her annual echo and cards follow-up.  Consider stress trest.  If work up normal consider referral to pulmonology.   - Adult Leadless EKG Monitor 3 to 7 Days; Future      Morbid obesity (H)  Suspect weight and deconditioning contributing but need to r/o other causes of her symptoms.     No follow-ups on file.    BARRINGTON Brewer CNP  [facility] JAMES Mcarthur is a 51 year old, presenting for the following health issues:  ER F/U      HPI     ED/UC Followup:    Facility:  [facility]   Date of visit: 07/21/22  Reason for visit: SOB  Current Status: patient feels fine, but easily SOB and heaviness in chest and constant swelling in feet and ankles.      Had PFO surgery 2 years ago.   Reports felt great for about 1.5 months after surgery.   1.5 months after surgery developed afib and has never felt the same.   She has gained weight over time.  SOB with ambulating from car into a store.   No CP or cough, but chest will sometimes feel heavy.   Hx of chronic leg swelling bilat.   Can feel SOB at night when trying to sleep.    Hx of KRISTYN and uses CPAP.   Has found on the nights she is feeling SOB that lying on the couch can help.   No hx of asthma.   Hx of seasonal allergies--both spring and fall.   Sometimes has wheezing.   In the past told she had a thyroid problem, but then another doctor said she didn't have this.   She always has dry skin.  Can have  occasional palpitations.  Is always warm.   Adopted; unknown family hx.       Review of Systems   Constitutional, HEENT, cardiovascular, pulmonary, gi and gu systems are negative, except as otherwise noted.      Objective    /84 (BP Location: Right arm, Patient Position: Sitting, Cuff Size: Adult Large)   Pulse 80   Temp 98.2  F (36.8  C) (Oral)   Resp 20   Wt 136.4 kg (300 lb 11.2 oz)   LMP  (LMP Unknown)   SpO2 91%   BMI 51.62 kg/m    Body mass index is 51.62 kg/m .  Physical Exam   GENERAL: healthy, alert and no distress  NECK: no adenopathy, no asymmetry, masses, or scars and thyroid normal to palpation  RESP: lungs clear to auscultation - no rales, rhonchi or wheezes, increased work of breathing with ambulation   CV: regular rate and rhythm, normal S1 S2, no S3 or S4, no murmur, click or rub, bilat LE non pitting peripheral edema and peripheral pulses strong    No results found for this or any previous visit (from the past 24 hour(s)).                .  ..

## 2022-08-16 ENCOUNTER — HOSPITAL ENCOUNTER (OUTPATIENT)
Dept: CARDIOLOGY | Facility: CLINIC | Age: 52
Discharge: HOME | End: 2022-08-16
Attending: NURSE PRACTITIONER | Admitting: NURSE PRACTITIONER
Payer: COMMERCIAL

## 2022-08-16 DIAGNOSIS — R06.09 DOE (DYSPNEA ON EXERTION): ICD-10-CM

## 2022-08-16 PROCEDURE — 93244 EXT ECG>48HR<7D REV&INTERPJ: CPT | Performed by: INTERNAL MEDICINE

## 2022-08-16 PROCEDURE — 93242 EXT ECG>48HR<7D RECORDING: CPT

## 2022-08-30 DIAGNOSIS — F33.0 MAJOR DEPRESSIVE DISORDER, RECURRENT EPISODE, MILD (H): ICD-10-CM

## 2022-08-30 RX ORDER — CITALOPRAM HYDROBROMIDE 20 MG/1
TABLET ORAL
Qty: 90 TABLET | Refills: 1 | Status: SHIPPED | OUTPATIENT
Start: 2022-08-30 | End: 2023-02-28

## 2022-08-30 NOTE — TELEPHONE ENCOUNTER
Routing refill request to provider for review/approval because:  Labs out of range:  PHQ-9  A break in medication  PHQ 4/27/2021 1/7/2022 8/8/2022   PHQ-9 Total Score 4 4 6   Q9: Thoughts of better off dead/self-harm past 2 weeks Not at all Not at all Not at all       Emilia Strong RN

## 2022-09-14 ENCOUNTER — HOSPITAL ENCOUNTER (OUTPATIENT)
Dept: CARDIOLOGY | Facility: CLINIC | Age: 52
Discharge: HOME | End: 2022-09-14
Attending: EMERGENCY MEDICINE | Admitting: EMERGENCY MEDICINE
Payer: COMMERCIAL

## 2022-09-14 DIAGNOSIS — R06.00 DYSPNEA, UNSPECIFIED TYPE: ICD-10-CM

## 2022-09-14 LAB — LVEF ECHO: NORMAL

## 2022-09-14 PROCEDURE — 999N000208 ECHOCARDIOGRAM COMPLETE

## 2022-09-14 PROCEDURE — 255N000002 HC RX 255 OP 636: Performed by: EMERGENCY MEDICINE

## 2022-09-14 PROCEDURE — 93306 TTE W/DOPPLER COMPLETE: CPT | Mod: 26 | Performed by: INTERNAL MEDICINE

## 2022-09-14 RX ADMIN — HUMAN ALBUMIN MICROSPHERES AND PERFLUTREN 3 ML: 10; .22 INJECTION, SOLUTION INTRAVENOUS at 08:32

## 2022-09-19 ENCOUNTER — OFFICE VISIT (OUTPATIENT)
Dept: CARDIOLOGY | Facility: CLINIC | Age: 52
End: 2022-09-19
Attending: INTERNAL MEDICINE
Payer: COMMERCIAL

## 2022-09-19 VITALS
WEIGHT: 293 LBS | HEIGHT: 64 IN | HEART RATE: 85 BPM | DIASTOLIC BLOOD PRESSURE: 86 MMHG | BODY MASS INDEX: 50.02 KG/M2 | SYSTOLIC BLOOD PRESSURE: 139 MMHG

## 2022-09-19 DIAGNOSIS — I10 HTN, GOAL BELOW 140/90: ICD-10-CM

## 2022-09-19 DIAGNOSIS — Q21.11 OSTIUM SECUNDUM TYPE ATRIAL SEPTAL DEFECT: ICD-10-CM

## 2022-09-19 DIAGNOSIS — R06.02 SHORTNESS OF BREATH: Primary | ICD-10-CM

## 2022-09-19 PROCEDURE — 99214 OFFICE O/P EST MOD 30 MIN: CPT | Performed by: INTERNAL MEDICINE

## 2022-09-19 RX ORDER — FLUTICASONE PROPIONATE 50 MCG
1 SPRAY, SUSPENSION (ML) NASAL DAILY PRN
Qty: 3 ML | Refills: 3 | Status: SHIPPED | OUTPATIENT
Start: 2022-09-19 | End: 2024-06-03

## 2022-09-19 RX ORDER — CARVEDILOL 12.5 MG/1
12.5 TABLET ORAL 2 TIMES DAILY WITH MEALS
Qty: 90 TABLET | Refills: 3 | Status: SHIPPED | OUTPATIENT
Start: 2022-09-19 | End: 2022-09-19

## 2022-09-19 NOTE — PROGRESS NOTES
Cardiology Clinic ([facility] - ASD)    Assessment & Plan       1.  ASD S/P Closure with 30 mm Cardioform septal occluder August 2020  2.  Elevated BMI  3.  Hyperlipidemia  4.  Obstructive sleep apnea  5.  Mildly dilated proximal ascending aorta  6.  Mild pulmonary hypertension from # 1  7.  Patient S/P hysterectomy  8.  HTN  9.  PAF S/P CV to NSR    Recommendations    1.  Patient has done well after her closure of her ASD.  Recent echocardiogram was reviewed demonstrating well-seated device with no residual shunting.  2.  Shortness of breath: This is been a chronic issue for patient.  Reviewed her recent echocardiogram.  They could not estimate PA systolic pressure due to inadequate TR jet.  Likely multifactorial.  Patient is wondering if her metoprolol is causing trouble.  She has no active wheezing.  However we will try to change her to carvedilol 12.5 mg twice daily.  Also consideration for bisoprolol can be made.  We will discontinue her Toprol at present.  3.  Patient has had COVID x2.  I have asked her to follow-up with her primary to see if a CT of the chest would be appropriate to make sure that her lungs are not contributing to her shortness of breath.  4.  Reviewed her Zio patch monitor.  No significant arrhythmias.  No atrial fibrillation recurrence.  For her paroxysmal defibrillation we will continue with aspirin therapy given her risk score.  5.  Close clinic follow-up to reassess her symptoms.          Corby Hodges MD, MD      HPI:      Patient is a very pleasant 52-year-old woman who I the pleasure meeting in 2020 for preoperative clearance for hysterectomy which she underwent without difficulty..  Prior to her preoperative visit she had an echocardiogram that suggested an interatrial shunt.  This was not clarified clearly on the transthoracic study.  She had a CONCEPCION performed demonstrating a small ASD with diameter of 8 mm.     Patient ultimately underwent ASD repair with a cardioform  30 mm Benton device in August 2020.  This was uneventful.  In September she was awakened with a pounding heart rate and eventually sought medical attention.  EKG demonstrated atrial fibrillation rapid ventricular response.  She was placed on Eliquis and metoprolol and underwent CONCEPCION cardioversion to normal sinus rhythm.  Subsequent monitoring has demonstrated no recurrence of her atrial fibrillation however occasional PACs and PVCs.  She was symptomatic initially for the next few weeks however these have since abated the last 10 to 12 days.  She is continue to be maintained on her metoprolol Plavix as well as Eliquis.  She also had Lasix placed with improvement in her shortness of breath.  An echocardiogram has been recently performed demonstrating no shunting and a well-placed ASD device.     On November 24, 2020 patient had an ER visit.  At that time she had abdominal pain and there was a concern regarding incarcerated umbilical hernia.  Fortunately the testing did not suggest this and she has done well with conservative management.  As a result of her presentation her hernia surgery has been moved off to December 29, 2020.     In December 2020 she had her hernia surgery and preceding and certainly postoperatively she has had difficulty with edema.  She has had up titration of her diuretics with some improvement however symptoms persist.  Her weight is also gone up during this timeframe.  She feels short of breath as a result.  An echocardiogram was performed recently demonstrating preserved LV systolic function normal right-sided size and function and the interatrial device was well-positioned with no evidence of residual shunt.      Since I last saw her she had an evaluation with my vein colleagues.  She was diagnosed with venous insufficiency.  Stockings were advised.    More recently this summer in 2022 she had an admission for shortness of breath and was found to be severely hypertensive.  BNP was ordered and  this was within normal limits along with troponin.  Medical management was advised.      Echo 2022  Left ventricular systolic function is normal.  The visual ejection fraction is 60-65%.  Mild-moderate left ventricuular hypertrophy.  Grade II or moderate diastolic dysfunction (average E/E' is 20).  The right ventricle is normal in structure, function and size.  History of PFO closure. No evidence of shunt by color Doppler.  No significant valve dysfunction.  The ascending aorta is Mildly dilated at 4.0 cm.  The inferior vena cava was normal in size with preserved respiratory  variability.     Compared to study dated 6/8/2021, wall thickness better appreciated and there  is evidence of hypertrophy and diastolic dysfunction.      Primary Care Physician   Yolie Delarosa      Patient Active Problem List   Diagnosis     Hyperlipidemia LDL goal <100     Migraine     HTN, goal below 140/90     Migraine without aura     Health Care Home     Anxiety     Morbid obesity due to excess calories (H)     KRISTYN (obstructive sleep apnea)     Incomplete right bundle branch block     S/P total knee arthroplasty     Major depressive disorder, recurrent episode, mild (H)     Aneurysm, ascending aorta (H)     Umbilical hernia without obstruction and without gangrene     Patent foramen ovale     Pulmonary hypertension (H)     Ventral hernia without obstruction or gangrene       Past Medical History   I have reviewed this patient's medical history and updated it with pertinent information if needed.   Past Medical History:   Diagnosis Date     Anxiety and depression     Pt reports is on Rx Celexa.     Aortic aneurysm (H)     Pt reports its small and is currently being monitored.     Arthritis      Atrial fibrillation with RVR (H) 9/15/2020     Depressive disorder      DYSPLASIA OF CERVIX 3/6/2003     Dysplasia of cervix (uteri) 2003    Rx cryotherapy Nov 03, and 2005     Hypertension     NO cardiology     Incomplete right bundle  branch block 11/10/2017     Migraine      KRISTYN (obstructive sleep apnea)      Ostium secundum type atrial septal defect 7/29/2020    Added automatically from request for surgery [id number]     Other chronic pain     Knee pain for 8 years     Patent foramen ovale 1/9/2020     Plantar fasciitis     bilat     Unspecified sleep apnea     CPAP will bring on the day of surgery.     Ventral hernia without obstruction or gangrene 4/15/2020     Ventral hernia without obstruction or gangrene 4/15/2020       Past Surgical History   I have reviewed this patient's surgical history and updated it with pertinent information if needed.  Past Surgical History:   Procedure Laterality Date     ANESTHESIA CARDIOVERSION N/A 9/16/2020    Procedure: ANESTHESIA, FOR CONCEPCION/CARDIOVERSION;  Surgeon: GENERIC ANESTHESIA PROVIDER;  Location: [facility] OR     ARTHROPLASTY KNEE Right 11/17/2017    Procedure: ARTHROPLASTY KNEE;  Right total knee arthroplasty using the Arthrex iBalance total knee system;  Surgeon: Hebert Lee MD;  Location: [facility] OR     ARTHROPLASTY KNEE Left 3/19/2018    Procedure: ARTHROPLASTY KNEE;  Left total knee arthroplasty using an Arthrex iBalance total knee system;  Surgeon: Hebert Lee MD;  Location: [facility] OR     ATRIAL SEPTAL DEFECT CLOSURE N/A 8/26/2020    Procedure: ASD Closure;  Surgeon: Freddie Frias MD;  Location:  HEART CARDIAC CATH LAB     CV RIGHT HEART CATH MEASUREMENTS RECORDED N/A 8/26/2020    Procedure: Right Heart Cath;  Surgeon: Freddie Frias MD;  Location:  HEART CARDIAC CATH LAB     DAVINCI HERNIORRHAPHY VENTRAL N/A 12/29/2020    Procedure: ROBOTIC ASSISTED VENTRAL AND INCISIONAL HERNIA REPAIR WITH MESH;  Surgeon: Violetta Lazaro MD;  Location: [facility] OR     DAVINCI HYSTERECTOMY TOTAL, BILATERAL SALPINGO-OOPHORECTOMY, COMBINED Bilateral 1/31/2020    Procedure: DaVinci robotic-assisted total laparoscopic hysterectomy, bilateral salpingectomy;  Surgeon: Venancio Bartlett MD;  Location:  [facility] OR - Path all benign     DAVINCI HYSTERECTOMY TOTAL, SALPINGECTOMY BILATERAL Bilateral 2020    Fibroid uterus, Menometrorrhagia - Robotic TLH, Bilateral salpingectomy - Path all benign     ENT SURGERY       HC COLP VAGINA W CERVIX IF PRES W BIOPSY      [place] for ASCUS     TONSILLECTOMY & ADENOIDECTOMY       ZZC  DELIVERY ONLY  ,    , Low Cervical       Prior to Admission Medications   Cannot display prior to admission medications because the patient has not been admitted in this contact.     [unfilled]  [unfilled]  Allergies   Allergies   Allergen Reactions     Penicillins Hives     hives       Social History    reports that she quit smoking about 15 years ago. Her smoking use included cigarettes. She has a 2.50 pack-year smoking history. She has never used smokeless tobacco. She reports current alcohol use. She reports that she does not use drugs.    Family History   Family History   Adopted: Yes   Problem Relation Age of Onset     Unknown/Adopted Other         pt is adopted and has no access to health history     Unknown/Adopted Mother      Unknown/Adopted Father      Unknown/Adopted Maternal Grandmother      Unknown/Adopted Maternal Grandfather      Unknown/Adopted Paternal Grandmother      Unknown/Adopted Other        Review of Systems   The comprehensive 10 point Review of Systems is negative other than noted in the HPI or here.     Physical Exam   Vital Signs with Ranges     Wt Readings from Last 4 Encounters:   22 136.4 kg (300 lb 11.2 oz)   22 131.5 kg (290 lb)   22 133.8 kg (295 lb)   21 134.7 kg (297 lb)

## 2022-09-19 NOTE — LETTER
9/19/2022    Yolie Delarosa MD  [address] [address] [facility] [address]    RE: Lyubov CHRISTIN Daniel       Dear Colleague,     I had the pleasure of seeing Lyubov Sanchez in the [facility].      Cardiology Clinic ([facility] - ASD)    Assessment & Plan       1.  ASD S/P Closure with 30 mm Cardioform septal occluder August 2020  2.  Elevated BMI  3.  Hyperlipidemia  4.  Obstructive sleep apnea  5.  Mildly dilated proximal ascending aorta  6.  Mild pulmonary hypertension from # 1  7.  Patient S/P hysterectomy  8.  HTN  9.  PAF S/P CV to NSR    Recommendations    1.  Patient has done well after her closure of her ASD.  Recent echocardiogram was reviewed demonstrating well-seated device with no residual shunting.  2.  Shortness of breath: This is been a chronic issue for patient.  Reviewed her recent echocardiogram.  They could not estimate PA systolic pressure due to inadequate TR jet.  Likely multifactorial.  Patient is wondering if her metoprolol is causing trouble.  She has no active wheezing.  However we will try to change her to carvedilol 12.5 mg twice daily.  Also consideration for bisoprolol can be made.  We will discontinue her Toprol at present.  3.  Patient has had COVID x2.  I have asked her to follow-up with her primary to see if a CT of the chest would be appropriate to make sure that her lungs are not contributing to her shortness of breath.  4.  Reviewed her Zio patch monitor.  No significant arrhythmias.  No atrial fibrillation recurrence.  For her paroxysmal defibrillation we will continue with aspirin therapy given her risk score.  5.  Close clinic follow-up to reassess her symptoms.          Corby Hodges MD, MD      HPI:      Patient is a very pleasant 52-year-old woman who I the pleasure meeting in 2020 for preoperative clearance for hysterectomy which she underwent without difficulty..  Prior to her preoperative visit she had an echocardiogram that suggested an  interatrial shunt.  This was not clarified clearly on the transthoracic study.  She had a CONCEPCION performed demonstrating a small ASD with diameter of 8 mm.     Patient ultimately underwent ASD repair with a cardioform 30 mm Milmine device in August 2020.  This was uneventful.  In September she was awakened with a pounding heart rate and eventually sought medical attention.  EKG demonstrated atrial fibrillation rapid ventricular response.  She was placed on Eliquis and metoprolol and underwent CONCEPCION cardioversion to normal sinus rhythm.  Subsequent monitoring has demonstrated no recurrence of her atrial fibrillation however occasional PACs and PVCs.  She was symptomatic initially for the next few weeks however these have since abated the last 10 to 12 days.  She is continue to be maintained on her metoprolol Plavix as well as Eliquis.  She also had Lasix placed with improvement in her shortness of breath.  An echocardiogram has been recently performed demonstrating no shunting and a well-placed ASD device.     On November 24, 2020 patient had an ER visit.  At that time she had abdominal pain and there was a concern regarding incarcerated umbilical hernia.  Fortunately the testing did not suggest this and she has done well with conservative management.  As a result of her presentation her hernia surgery has been moved off to December 29, 2020.     In December 2020 she had her hernia surgery and preceding and certainly postoperatively she has had difficulty with edema.  She has had up titration of her diuretics with some improvement however symptoms persist.  Her weight is also gone up during this timeframe.  She feels short of breath as a result.  An echocardiogram was performed recently demonstrating preserved LV systolic function normal right-sided size and function and the interatrial device was well-positioned with no evidence of residual shunt.      Since I last saw her she had an evaluation with my vein colleagues.   She was diagnosed with venous insufficiency.  Stockings were advised.    More recently this summer in 2022 she had an admission for shortness of breath and was found to be severely hypertensive.  BNP was ordered and this was within normal limits along with troponin.  Medical management was advised.      Echo 2022  Left ventricular systolic function is normal.  The visual ejection fraction is 60-65%.  Mild-moderate left ventricuular hypertrophy.  Grade II or moderate diastolic dysfunction (average E/E' is 20).  The right ventricle is normal in structure, function and size.  History of PFO closure. No evidence of shunt by color Doppler.  No significant valve dysfunction.  The ascending aorta is Mildly dilated at 4.0 cm.  The inferior vena cava was normal in size with preserved respiratory  variability.     Compared to study dated 6/8/2021, wall thickness better appreciated and there  is evidence of hypertrophy and diastolic dysfunction.      Primary Care Physician   Yolie Delarosa      Patient Active Problem List   Diagnosis     Hyperlipidemia LDL goal <100     Migraine     HTN, goal below 140/90     Migraine without aura     Health Care Home     Anxiety     Morbid obesity due to excess calories (H)     KRISTYN (obstructive sleep apnea)     Incomplete right bundle branch block     S/P total knee arthroplasty     Major depressive disorder, recurrent episode, mild (H)     Aneurysm, ascending aorta (H)     Umbilical hernia without obstruction and without gangrene     Patent foramen ovale     Pulmonary hypertension (H)     Ventral hernia without obstruction or gangrene       Past Medical History   I have reviewed this patient's medical history and updated it with pertinent information if needed.   Past Medical History:   Diagnosis Date     Anxiety and depression     Pt reports is on Rx Celexa.     Aortic aneurysm (H)     Pt reports its small and is currently being monitored.     Arthritis      Atrial fibrillation with  RVR (H) 9/15/2020     Depressive disorder      DYSPLASIA OF CERVIX 3/6/2003     Dysplasia of cervix (uteri) 2003    Rx cryotherapy Nov 03, and 2005     Hypertension     NO cardiology     Incomplete right bundle branch block 11/10/2017     Migraine      KRISTYN (obstructive sleep apnea)      Ostium secundum type atrial septal defect 7/29/2020    Added automatically from request for surgery [id number]     Other chronic pain     Knee pain for 8 years     Patent foramen ovale 1/9/2020     Plantar fasciitis     bilat     Unspecified sleep apnea     CPAP will bring on the day of surgery.     Ventral hernia without obstruction or gangrene 4/15/2020     Ventral hernia without obstruction or gangrene 4/15/2020       Past Surgical History   I have reviewed this patient's surgical history and updated it with pertinent information if needed.  Past Surgical History:   Procedure Laterality Date     ANESTHESIA CARDIOVERSION N/A 9/16/2020    Procedure: ANESTHESIA, FOR CONCEPCION/CARDIOVERSION;  Surgeon: GENERIC ANESTHESIA PROVIDER;  Location:  OR     ARTHROPLASTY KNEE Right 11/17/2017    Procedure: ARTHROPLASTY KNEE;  Right total knee arthroplasty using the Arthrex iBalance total knee system;  Surgeon: Hebert Lee MD;  Location:  OR     ARTHROPLASTY KNEE Left 3/19/2018    Procedure: ARTHROPLASTY KNEE;  Left total knee arthroplasty using an Arthrex iBalance total knee system;  Surgeon: Hebert Lee MD;  Location:  OR     ATRIAL SEPTAL DEFECT CLOSURE N/A 8/26/2020    Procedure: ASD Closure;  Surgeon: Freddie Frias MD;  Location:  HEART CARDIAC CATH LAB     CV RIGHT HEART CATH MEASUREMENTS RECORDED N/A 8/26/2020    Procedure: Right Heart Cath;  Surgeon: Freddie Frias MD;  Location:  HEART CARDIAC CATH LAB     DAVINCI HERNIORRHAPHY VENTRAL N/A 12/29/2020    Procedure: ROBOTIC ASSISTED VENTRAL AND INCISIONAL HERNIA REPAIR WITH MESH;  Surgeon: Violetta Lazaro MD;  Location:  OR     DAVINCI  HYSTERECTOMY TOTAL, BILATERAL SALPINGO-OOPHORECTOMY, COMBINED Bilateral 2020    Procedure: DaVinci robotic-assisted total laparoscopic hysterectomy, bilateral salpingectomy;  Surgeon: Venancio Bartlett MD;  Location: [facility] OR - Path all benign     DAVINCI HYSTERECTOMY TOTAL, SALPINGECTOMY BILATERAL Bilateral 2020    Fibroid uterus, Menometrorrhagia - Robotic TLH, Bilateral salpingectomy - Path all benign     ENT SURGERY       HC COLP VAGINA W CERVIX IF PRES W BIOPSY      [place] for ASCUS     TONSILLECTOMY & ADENOIDECTOMY       [facility]  DELIVERY ONLY  ,    , Low Cervical       Prior to Admission Medications   Cannot display prior to admission medications because the patient has not been admitted in this contact.     [unfilled]  [unfilled]  Allergies   Allergies   Allergen Reactions     Penicillins Hives     hives       Social History    reports that she quit smoking about 15 years ago. Her smoking use included cigarettes. She has a 2.50 pack-year smoking history. She has never used smokeless tobacco. She reports current alcohol use. She reports that she does not use drugs.    Family History   Family History   Adopted: Yes   Problem Relation Age of Onset     Unknown/Adopted Other         pt is adopted and has no access to health history     Unknown/Adopted Mother      Unknown/Adopted Father      Unknown/Adopted Maternal Grandmother      Unknown/Adopted Maternal Grandfather      Unknown/Adopted Paternal Grandmother      Unknown/Adopted Other        Review of Systems   The comprehensive 10 point Review of Systems is negative other than noted in the HPI or here.     Physical Exam   Vital Signs with Ranges     Wt Readings from Last 4 Encounters:   22 136.4 kg (300 lb 11.2 oz)   22 131.5 kg (290 lb)   22 133.8 kg (295 lb)   21 134.7 kg (297 lb)     Thank you for allowing me to participate in the care of your patient.      Sincerely,   Corby Hodges MD    [facility]  cc:   Corby Hodges MD  [address]  [place] [id number]

## 2022-09-23 ENCOUNTER — MYC MEDICAL ADVICE (OUTPATIENT)
Dept: FAMILY MEDICINE | Facility: CLINIC | Age: 52
End: 2022-09-23

## 2022-09-23 DIAGNOSIS — R06.09 DOE (DYSPNEA ON EXERTION): Primary | ICD-10-CM

## 2022-09-23 DIAGNOSIS — R06.02 SHORTNESS OF BREATH: ICD-10-CM

## 2022-09-23 NOTE — TELEPHONE ENCOUNTER
"See patient's [employer] message     - Patient had an appointment with BARRINGTON Mcgovern CNP on 8/8/2022 for complaints of dyspnea on exertion   - Encounter from office visit on 8/8/2022 states, \"Will have her wear a home monitor to assess rhythm and follow-up with cardiology after.  She tells me is due for her annual echo and cards follow-up.  Consider stress trest.  If work up normal consider referral to pulmonology.   - Adult Leadless EKG Monitor 3 to 7 Days; Future\"     - Patient states that she followed-up with Dr. Hodges in cardiology (see office visit encounter from 9/19/2022) and medication adjustments were made     carvedilol (COREG) 12.5 MG tablet 180 tablet 1 9/19/2022  No   Sig: TAKE 1 TABLET BY MOUTH TWICE A DAY WITH MEALS     fluticasone (FLONASE) 50 MCG/ACT nasal spray 3 mL 3 9/19/2022  No   Sig - Route: Spray 1 spray into both nostrils daily as needed for rhinitis or allergies - Both Nostrils     - Dr. Hodges states, \"Patient is wondering if her metoprolol is causing trouble.  She has no active wheezing. However we will try to change her to carvedilol 12.5 mg twice daily.  Also consideration for bisoprolol can be made.  We will discontinue her Toprol at present.\" \"I have asked her to follow-up with her primary to see if a CT of the chest would be appropriate to make sure that her lungs are not contributing to her shortness of breath.\"      - Patient requesting a referral to a lung specialist   - Pended CT of the chest and a pulmonology referral     Dr. Delarosa, please review, advise, and order as appropriate.     Patricia CAMPBELL RN   Patient Advocate Liaison (PAL)  [facility]     "

## 2022-09-28 DIAGNOSIS — R06.02 SHORTNESS OF BREATH: Primary | ICD-10-CM

## 2022-09-28 NOTE — TELEPHONE ENCOUNTER
Patient saw a cardiologist and they are recommending a CT and a pulmonologist consult.      Please place orders and call patient so she can schedule these      Alena Valiente

## 2022-10-14 ENCOUNTER — VIRTUAL VISIT (OUTPATIENT)
Dept: CARDIOLOGY | Facility: CLINIC | Age: 52
End: 2022-10-14
Payer: COMMERCIAL

## 2022-10-14 DIAGNOSIS — Q21.11 OSTIUM SECUNDUM TYPE ATRIAL SEPTAL DEFECT: ICD-10-CM

## 2022-10-14 DIAGNOSIS — I10 HTN, GOAL BELOW 140/90: ICD-10-CM

## 2022-10-14 DIAGNOSIS — R06.02 SHORTNESS OF BREATH: ICD-10-CM

## 2022-10-14 DIAGNOSIS — Q21.10 ASD (ATRIAL SEPTAL DEFECT): ICD-10-CM

## 2022-10-14 PROCEDURE — 99214 OFFICE O/P EST MOD 30 MIN: CPT | Mod: 95 | Performed by: INTERNAL MEDICINE

## 2022-10-14 RX ORDER — LISINOPRIL 40 MG/1
40 TABLET ORAL DAILY
Qty: 90 TABLET | Refills: 3 | Status: SHIPPED | OUTPATIENT
Start: 2022-10-14 | End: 2023-08-21

## 2022-10-14 RX ORDER — CARVEDILOL 12.5 MG/1
12.5 TABLET ORAL 2 TIMES DAILY WITH MEALS
Qty: 180 TABLET | Refills: 3 | Status: SHIPPED | OUTPATIENT
Start: 2022-10-14 | End: 2023-11-15

## 2022-10-14 NOTE — PATIENT INSTRUCTIONS
Your blood pressure was elevated at your appointment today.  Elevated blood pressure can increase your risk of a heart attack, stroke and heart failure.  For this reason, we feel it is important to monitor your blood pressure closely.  If you have access to a home blood pressure monitor or are able to check your blood pressure at a local pharmacy in the next week we would like you to do so and call our clinic with those readings. Please call [phone] (Mandi) and leave a message with your name, date of birth, blood pressure reading, date and location it was completed. If your blood pressure remains elevated your care team will be notified.  We appreciate being a part of your healthcare team and look forward to hearing from you soon.

## 2022-10-14 NOTE — PROGRESS NOTES
"Vitals - Patient Reported  Systolic (Patient Reported): (!) 146  Diastolic (Patient Reported): (!) 93  Weight (Patient Reported): 136.1 kg (300 lb)  Height (Patient Reported): 162.6 cm (5' 4\")  BMI (Based on Pt Reported Ht/Wt): 51.49  Pulse (Patient Reported): 97     Jamaal Whitney,  NREMT    Lyubov is a 52 year old who is being evaluated via a billable video visit.      How would you like to obtain your AVS? MyChart  If the video visit is dropped, the invitation should be resent by: Text to cell phone: [phone]  Will anyone else be joining your video visit? Yes: . How would they like to receive their invitation? Text to cell phone: [phone]        Video-Visit Details    Video Start Time: 9:45 AM    Type of service:  Video Visit    Video End Time:10:00 AM    Originating Location (pt. Location): Home        Distant Location (provider location):  On-site    Platform used for Video Visit: [facility]              [facility]    Assessment & Plan       1.  ASD S/P Closure with 30 mm Cardioform septal occluder August 2020  2.  Elevated BMI  3.  Hyperlipidemia  4.  Obstructive sleep apnea  5.  Mildly dilated proximal ascending aorta  6.  Mild pulmonary hypertension from # 1  7.  Patient S/P hysterectomy  8.  HTN  9.  PAF S/P CV to NSR    Recommendations    1.  Patient has done well after her closure of her ASD.  Recent echocardiogram was reviewed demonstrating well-seated device with no residual shunting.  2.  Shortness of breath: Likely multifactorial.  There is possibly a diastolic component.  She has responded well to an additional dose of Lasix.  We did change her beta-blocker to carvedilol and she has responded well with significant improvement in her shortness of breath symptoms.  I will also increase her lisinopril to 40 mg daily for better blood pressure control.  3.  Patient has had COVID x2.  She will have a follow-up with pulmonary and a CT scan of the chest has been ordered.  4.  " Reviewed her Zio patch monitor.  No significant arrhythmias.  No atrial fibrillation recurrence.  For her paroxysmal defibrillation we will continue with aspirin therapy given her risk score.  5.  Patient is doing well.  We are happy that she has responded to our adjustments in her medical therapy.  We will have her return to clinic in summer with pre-clinic echocardiogram and lab work.        Corby Hodges MD, MD      HPI:      Patient is a very pleasant 52-year-old woman who I the pleasure meeting in 2020 for preoperative clearance for hysterectomy which she underwent without difficulty..  Prior to her preoperative visit she had an echocardiogram that suggested an interatrial shunt.  This was not clarified clearly on the transthoracic study.  She had a CONCEPCION performed demonstrating a small ASD with diameter of 8 mm.     Patient ultimately underwent ASD repair with a cardioform 30 mm Dalbo device in August 2020.  This was uneventful.  In September she was awakened with a pounding heart rate and eventually sought medical attention.  EKG demonstrated atrial fibrillation rapid ventricular response.  She was placed on Eliquis and metoprolol and underwent CONCEPCION cardioversion to normal sinus rhythm.  Subsequent monitoring has demonstrated no recurrence of her atrial fibrillation however occasional PACs and PVCs.  She was symptomatic initially for the next few weeks however these have since abated the last 10 to 12 days.  She is continue to be maintained on her metoprolol Plavix as well as Eliquis.  She also had Lasix placed with improvement in her shortness of breath.  An echocardiogram has been recently performed demonstrating no shunting and a well-placed ASD device.     On November 24, 2020 patient had an ER visit.  At that time she had abdominal pain and there was a concern regarding incarcerated umbilical hernia.  Fortunately the testing did not suggest this and she has done well with conservative management.   As a result of her presentation her hernia surgery has been moved off to December 29, 2020.     In December 2020 she had her hernia surgery and preceding and certainly postoperatively she has had difficulty with edema.  She has had up titration of her diuretics with some improvement however symptoms persist.  Her weight is also gone up during this timeframe.  She feels short of breath as a result.  An echocardiogram was performed recently demonstrating preserved LV systolic function normal right-sided size and function and the interatrial device was well-positioned with no evidence of residual shunt.      Since I last saw her she had an evaluation with my vein colleagues.  She was diagnosed with venous insufficiency.  Stockings were advised.    More recently this summer in 2022 she had an admission for shortness of breath and was found to be severely hypertensive.  BNP was ordered and this was within normal limits along with troponin.  Medical management was advised.    We had made adjustments in her medical regimen and switched her from Toprol to carvedilol.  In addition of this she is taking an extra dose of Lasix.  Here for evaluation.  She states that she has had significant improvement in her shortness of breath with the change in her medical therapy.  Her blood pressure is still slightly elevated.        Echo 2022  Left ventricular systolic function is normal.  The visual ejection fraction is 60-65%.  Mild-moderate left ventricuular hypertrophy.  Grade II or moderate diastolic dysfunction (average E/E' is 20).  The right ventricle is normal in structure, function and size.  History of PFO closure. No evidence of shunt by color Doppler.  No significant valve dysfunction.  The ascending aorta is Mildly dilated at 4.0 cm.  The inferior vena cava was normal in size with preserved respiratory  variability.     Compared to study dated 6/8/2021, wall thickness better appreciated and there  is evidence of hypertrophy  and diastolic dysfunction.      Primary Care Physician   Yolie Delarosa      Patient Active Problem List   Diagnosis     Hyperlipidemia LDL goal <100     Migraine     HTN, goal below 140/90     Migraine without aura     Health Care Home     Anxiety     Morbid obesity due to excess calories (H)     KRISTYN (obstructive sleep apnea)     Incomplete right bundle branch block     S/P total knee arthroplasty     Major depressive disorder, recurrent episode, mild (H)     Aneurysm, ascending aorta (H)     Umbilical hernia without obstruction and without gangrene     Patent foramen ovale     Pulmonary hypertension (H)     Ventral hernia without obstruction or gangrene       Past Medical History   I have reviewed this patient's medical history and updated it with pertinent information if needed.   Past Medical History:   Diagnosis Date     Anxiety and depression     Pt reports is on Rx Celexa.     Aortic aneurysm (H)     Pt reports its small and is currently being monitored.     Arthritis      Atrial fibrillation with RVR (H) 9/15/2020     Depressive disorder      DYSPLASIA OF CERVIX 3/6/2003     Dysplasia of cervix (uteri) 2003    Rx cryotherapy Nov 03, and 2005     Hypertension     NO cardiology     Incomplete right bundle branch block 11/10/2017     Migraine      KRISTYN (obstructive sleep apnea)      Ostium secundum type atrial septal defect 7/29/2020    Added automatically from request for surgery [id number]     Other chronic pain     Knee pain for 8 years     Patent foramen ovale 1/9/2020     Plantar fasciitis     bilat     Unspecified sleep apnea     CPAP will bring on the day of surgery.     Ventral hernia without obstruction or gangrene 4/15/2020     Ventral hernia without obstruction or gangrene 4/15/2020       Past Surgical History   I have reviewed this patient's surgical history and updated it with pertinent information if needed.  Past Surgical History:   Procedure Laterality Date     ANESTHESIA CARDIOVERSION N/A  2020    Procedure: ANESTHESIA, FOR CONCEPCION/CARDIOVERSION;  Surgeon: GENERIC ANESTHESIA PROVIDER;  Location: [facility] OR     ARTHROPLASTY KNEE Right 2017    Procedure: ARTHROPLASTY KNEE;  Right total knee arthroplasty using the Arthrex iBalance total knee system;  Surgeon: Hebert Lee MD;  Location: [facility] OR     ARTHROPLASTY KNEE Left 3/19/2018    Procedure: ARTHROPLASTY KNEE;  Left total knee arthroplasty using an Arthrex iBalance total knee system;  Surgeon: Hebert Lee MD;  Location: [facility] OR     ATRIAL SEPTAL DEFECT CLOSURE N/A 2020    Procedure: ASD Closure;  Surgeon: Freddie Frias MD;  Location:  HEART CARDIAC CATH LAB     CV RIGHT HEART CATH MEASUREMENTS RECORDED N/A 2020    Procedure: Right Heart Cath;  Surgeon: Freddie Frias MD;  Location:  HEART CARDIAC CATH LAB     DAVINCI HERNIORRHAPHY VENTRAL N/A 2020    Procedure: ROBOTIC ASSISTED VENTRAL AND INCISIONAL HERNIA REPAIR WITH MESH;  Surgeon: Violetta Lazaro MD;  Location: [facility] OR     DAVINCI HYSTERECTOMY TOTAL, BILATERAL SALPINGO-OOPHORECTOMY, COMBINED Bilateral 2020    Procedure: DaVinci robotic-assisted total laparoscopic hysterectomy, bilateral salpingectomy;  Surgeon: Venancio Bartlett MD;  Location: [facility] OR - Path all benign     DAVINCI HYSTERECTOMY TOTAL, SALPINGECTOMY BILATERAL Bilateral 2020    Fibroid uterus, Menometrorrhagia - Robotic TLH, Bilateral salpingectomy - Path all benign     ENT SURGERY       HC COLP VAGINA W CERVIX IF PRES W BIOPSY      [place] for ASCUS     TONSILLECTOMY & ADENOIDECTOMY       [facility]  DELIVERY ONLY  ,    , Low Cervical       Prior to Admission Medications   Cannot display prior to admission medications because the patient has not been admitted in this contact.     [unfilled]  [unfilled]  Allergies   Allergies   Allergen Reactions     Penicillins Hives     hives       Social History    reports that she quit smoking about 15  years ago. Her smoking use included cigarettes. She has a 2.50 pack-year smoking history. She has never used smokeless tobacco. She reports current alcohol use. She reports that she does not use drugs.    Family History   Family History   Adopted: Yes   Problem Relation Age of Onset     Unknown/Adopted Other         pt is adopted and has no access to health history     Unknown/Adopted Mother      Unknown/Adopted Father      Unknown/Adopted Maternal Grandmother      Unknown/Adopted Maternal Grandfather      Unknown/Adopted Paternal Grandmother      Unknown/Adopted Other        Review of Systems   The comprehensive 10 point Review of Systems is negative other than noted in the HPI or here.     Physical Exam   Vital Signs with Ranges     Wt Readings from Last 4 Encounters:   09/19/22 136.4 kg (300 lb 9.6 oz)   08/08/22 136.4 kg (300 lb 11.2 oz)   07/21/22 131.5 kg (290 lb)   06/18/22 133.8 kg (295 lb)

## 2022-10-14 NOTE — LETTER
"10/14/2022    Yolie Delarosa MD  [address]  [facility] [address]    RE: Lyubov Sanchez       Dear Colleague,     I had the pleasure of seeing Lyubov Sanchez in the [facility].  Vitals - Patient Reported  Systolic (Patient Reported): (!) 146  Diastolic (Patient Reported): (!) 93  Weight (Patient Reported): 136.1 kg (300 lb)  Height (Patient Reported): 162.6 cm (5' 4\")  BMI (Based on Pt Reported Ht/Wt): 51.49  Pulse (Patient Reported): 97     Jamaal Whitney,  DARRICKT    Lyubov is a 52 year old who is being evaluated via a billable video visit.      How would you like to obtain your AVS? MyChart  If the video visit is dropped, the invitation should be resent by: Text to cell phone: [phone]  Will anyone else be joining your video visit? Yes: . How would they like to receive their invitation? Text to cell phone: [phone]        Video-Visit Details    Video Start Time: 9:45 AM    Type of service:  Video Visit    Video End Time:10:00 AM    Originating Location (pt. Location): Home        Distant Location (provider location):  On-site    Platform used for Video Visit: [facility]              Cardiology Clinic (Structurtal - ASD)    Assessment & Plan       1.  ASD S/P Closure with 30 mm Cardioform septal occluder August 2020  2.  Elevated BMI  3.  Hyperlipidemia  4.  Obstructive sleep apnea  5.  Mildly dilated proximal ascending aorta  6.  Mild pulmonary hypertension from # 1  7.  Patient S/P hysterectomy  8.  HTN  9.  PAF S/P CV to NSR    Recommendations    1.  Patient has done well after her closure of her ASD.  Recent echocardiogram was reviewed demonstrating well-seated device with no residual shunting.  2.  Shortness of breath: Likely multifactorial.  There is possibly a diastolic component.  She has responded well to an additional dose of Lasix.  We did change her beta-blocker to carvedilol and she has responded well with significant improvement in her shortness of breath symptoms.  I " will also increase her lisinopril to 40 mg daily for better blood pressure control.  3.  Patient has had COVID x2.  She will have a follow-up with pulmonary and a CT scan of the chest has been ordered.  4.  Reviewed her Zio patch monitor.  No significant arrhythmias.  No atrial fibrillation recurrence.  For her paroxysmal defibrillation we will continue with aspirin therapy given her risk score.  5.  Patient is doing well.  We are happy that she has responded to our adjustments in her medical therapy.  We will have her return to clinic in summer with pre-clinic echocardiogram and lab work.        Corby Hodges MD, MD      HPI:      Patient is a very pleasant 52-year-old woman who I the pleasure meeting in 2020 for preoperative clearance for hysterectomy which she underwent without difficulty..  Prior to her preoperative visit she had an echocardiogram that suggested an interatrial shunt.  This was not clarified clearly on the transthoracic study.  She had a CONCEPCION performed demonstrating a small ASD with diameter of 8 mm.     Patient ultimately underwent ASD repair with a cardioform 30 mm [place] device in August 2020.  This was uneventful.  In September she was awakened with a pounding heart rate and eventually sought medical attention.  EKG demonstrated atrial fibrillation rapid ventricular response.  She was placed on Eliquis and metoprolol and underwent CONCEPCION cardioversion to normal sinus rhythm.  Subsequent monitoring has demonstrated no recurrence of her atrial fibrillation however occasional PACs and PVCs.  She was symptomatic initially for the next few weeks however these have since abated the last 10 to 12 days.  She is continue to be maintained on her metoprolol Plavix as well as Eliquis.  She also had Lasix placed with improvement in her shortness of breath.  An echocardiogram has been recently performed demonstrating no shunting and a well-placed ASD device.     On November 24, 2020 patient had an ER  visit.  At that time she had abdominal pain and there was a concern regarding incarcerated umbilical hernia.  Fortunately the testing did not suggest this and she has done well with conservative management.  As a result of her presentation her hernia surgery has been moved off to December 29, 2020.     In December 2020 she had her hernia surgery and preceding and certainly postoperatively she has had difficulty with edema.  She has had up titration of her diuretics with some improvement however symptoms persist.  Her weight is also gone up during this timeframe.  She feels short of breath as a result.  An echocardiogram was performed recently demonstrating preserved LV systolic function normal right-sided size and function and the interatrial device was well-positioned with no evidence of residual shunt.      Since I last saw her she had an evaluation with my vein colleagues.  She was diagnosed with venous insufficiency.  Stockings were advised.    More recently this summer in 2022 she had an admission for shortness of breath and was found to be severely hypertensive.  BNP was ordered and this was within normal limits along with troponin.  Medical management was advised.    We had made adjustments in her medical regimen and switched her from Toprol to carvedilol.  In addition of this she is taking an extra dose of Lasix.  Here for evaluation.  She states that she has had significant improvement in her shortness of breath with the change in her medical therapy.  Her blood pressure is still slightly elevated.        Echo 2022  Left ventricular systolic function is normal.  The visual ejection fraction is 60-65%.  Mild-moderate left ventricuular hypertrophy.  Grade II or moderate diastolic dysfunction (average E/E' is 20).  The right ventricle is normal in structure, function and size.  History of PFO closure. No evidence of shunt by color Doppler.  No significant valve dysfunction.  The ascending aorta is Mildly  dilated at 4.0 cm.  The inferior vena cava was normal in size with preserved respiratory  variability.     Compared to study dated 6/8/2021, wall thickness better appreciated and there  is evidence of hypertrophy and diastolic dysfunction.      Primary Care Physician   Yolie Delarosa      Patient Active Problem List   Diagnosis     Hyperlipidemia LDL goal <100     Migraine     HTN, goal below 140/90     Migraine without aura     Health Care Home     Anxiety     Morbid obesity due to excess calories (H)     KRISTYN (obstructive sleep apnea)     Incomplete right bundle branch block     S/P total knee arthroplasty     Major depressive disorder, recurrent episode, mild (H)     Aneurysm, ascending aorta (H)     Umbilical hernia without obstruction and without gangrene     Patent foramen ovale     Pulmonary hypertension (H)     Ventral hernia without obstruction or gangrene       Past Medical History   I have reviewed this patient's medical history and updated it with pertinent information if needed.   Past Medical History:   Diagnosis Date     Anxiety and depression     Pt reports is on Rx Celexa.     Aortic aneurysm (H)     Pt reports its small and is currently being monitored.     Arthritis      Atrial fibrillation with RVR (H) 9/15/2020     Depressive disorder      DYSPLASIA OF CERVIX 3/6/2003     Dysplasia of cervix (uteri) 2003    Rx cryotherapy Nov 03, and 2005     Hypertension     NO cardiology     Incomplete right bundle branch block 11/10/2017     Migraine      KRISTYN (obstructive sleep apnea)      Ostium secundum type atrial septal defect 7/29/2020    Added automatically from request for surgery [id number]     Other chronic pain     Knee pain for 8 years     Patent foramen ovale 1/9/2020     Plantar fasciitis     bilat     Unspecified sleep apnea     CPAP will bring on the day of surgery.     Ventral hernia without obstruction or gangrene 4/15/2020     Ventral hernia without obstruction or gangrene 4/15/2020        Past Surgical History   I have reviewed this patient's surgical history and updated it with pertinent information if needed.  Past Surgical History:   Procedure Laterality Date     ANESTHESIA CARDIOVERSION N/A 2020    Procedure: ANESTHESIA, FOR CONCEPCION/CARDIOVERSION;  Surgeon: GENERIC ANESTHESIA PROVIDER;  Location: [facility] OR     ARTHROPLASTY KNEE Right 2017    Procedure: ARTHROPLASTY KNEE;  Right total knee arthroplasty using the Arthrex iBalance total knee system;  Surgeon: Hebert Lee MD;  Location: [facility] OR     ARTHROPLASTY KNEE Left 3/19/2018    Procedure: ARTHROPLASTY KNEE;  Left total knee arthroplasty using an Arthrex iBalance total knee system;  Surgeon: Hebert Lee MD;  Location: [facility] OR     ATRIAL SEPTAL DEFECT CLOSURE N/A 2020    Procedure: ASD Closure;  Surgeon: Freddie Frias MD;  Location:  HEART CARDIAC CATH LAB     CV RIGHT HEART CATH MEASUREMENTS RECORDED N/A 2020    Procedure: Right Heart Cath;  Surgeon: Freddie Frias MD;  Location:  HEART CARDIAC CATH LAB     DAVINCI HERNIORRHAPHY VENTRAL N/A 2020    Procedure: ROBOTIC ASSISTED VENTRAL AND INCISIONAL HERNIA REPAIR WITH MESH;  Surgeon: Violetta Lazaro MD;  Location: [facility] OR     DAVINCI HYSTERECTOMY TOTAL, BILATERAL SALPINGO-OOPHORECTOMY, COMBINED Bilateral 2020    Procedure: DaVinci robotic-assisted total laparoscopic hysterectomy, bilateral salpingectomy;  Surgeon: Venancio Bartlett MD;  Location: [facility] OR - Path all benign     DAVINCI HYSTERECTOMY TOTAL, SALPINGECTOMY BILATERAL Bilateral 2020    Fibroid uterus, Menometrorrhagia - Robotic TLH, Bilateral salpingectomy - Path all benign     ENT SURGERY       HC COLP VAGINA W CERVIX IF PRES W BIOPSY      [place] for ASCUS     TONSILLECTOMY & ADENOIDECTOMY       Z  DELIVERY ONLY  ,    , Low Cervical       Prior to Admission Medications   Cannot display prior to admission medications because the  patient has not been admitted in this contact.     [unfilled]  @[facility]@  Allergies   Allergies   Allergen Reactions     Penicillins Hives     hives       Social History    reports that she quit smoking about 15 years ago. Her smoking use included cigarettes. She has a 2.50 pack-year smoking history. She has never used smokeless tobacco. She reports current alcohol use. She reports that she does not use drugs.    Family History   Family History   Adopted: Yes   Problem Relation Age of Onset     Unknown/Adopted Other         pt is adopted and has no access to health history     Unknown/Adopted Mother      Unknown/Adopted Father      Unknown/Adopted Maternal Grandmother      Unknown/Adopted Maternal Grandfather      Unknown/Adopted Paternal Grandmother      Unknown/Adopted Other        Review of Systems   The comprehensive 10 point Review of Systems is negative other than noted in the HPI or here.     Physical Exam   Vital Signs with Ranges     Wt Readings from Last 4 Encounters:   09/19/22 136.4 kg (300 lb 9.6 oz)   08/08/22 136.4 kg (300 lb 11.2 oz)   07/21/22 131.5 kg (290 lb)   06/18/22 133.8 kg (295 lb)       Thank you for allowing me to participate in the care of your patient.      Sincerely,     Corby Hodges MD     [facility]  cc:   Corby Hodges MD  [address] [place] [address]

## 2022-10-19 ENCOUNTER — HOSPITAL ENCOUNTER (OUTPATIENT)
Dept: CT IMAGING | Facility: CLINIC | Age: 52
Discharge: HOME | End: 2022-10-19
Attending: NURSE PRACTITIONER | Admitting: NURSE PRACTITIONER
Payer: COMMERCIAL

## 2022-10-19 DIAGNOSIS — R06.02 SHORTNESS OF BREATH: ICD-10-CM

## 2022-10-19 PROCEDURE — 250N000011 HC RX IP 250 OP 636: Performed by: NURSE PRACTITIONER

## 2022-10-19 PROCEDURE — 71260 CT THORAX DX C+: CPT

## 2022-10-19 PROCEDURE — 250N000009 HC RX 250: Performed by: NURSE PRACTITIONER

## 2022-10-19 RX ORDER — IOPAMIDOL 755 MG/ML
500 INJECTION, SOLUTION INTRAVASCULAR ONCE
Status: COMPLETED | OUTPATIENT
Start: 2022-10-19 | End: 2022-10-19

## 2022-10-19 RX ADMIN — IOPAMIDOL 100 ML: 755 INJECTION, SOLUTION INTRAVENOUS at 07:48

## 2022-10-19 RX ADMIN — SODIUM CHLORIDE 65 ML: 9 INJECTION, SOLUTION INTRAVENOUS at 07:48

## 2022-10-27 ENCOUNTER — ANCILLARY PROCEDURE (OUTPATIENT)
Dept: GENERAL RADIOLOGY | Facility: CLINIC | Age: 52
End: 2022-10-27
Attending: PHYSICIAN ASSISTANT
Payer: COMMERCIAL

## 2022-10-27 ENCOUNTER — OFFICE VISIT (OUTPATIENT)
Dept: URGENT CARE | Facility: URGENT CARE | Age: 52
End: 2022-10-27
Payer: COMMERCIAL

## 2022-10-27 VITALS
BODY MASS INDEX: 51.49 KG/M2 | OXYGEN SATURATION: 95 % | WEIGHT: 293 LBS | DIASTOLIC BLOOD PRESSURE: 88 MMHG | HEART RATE: 87 BPM | TEMPERATURE: 98 F | SYSTOLIC BLOOD PRESSURE: 160 MMHG

## 2022-10-27 DIAGNOSIS — J18.9 COMMUNITY ACQUIRED PNEUMONIA, UNSPECIFIED LATERALITY: Primary | ICD-10-CM

## 2022-10-27 DIAGNOSIS — J06.9 UPPER RESPIRATORY TRACT INFECTION, UNSPECIFIED TYPE: ICD-10-CM

## 2022-10-27 LAB
DEPRECATED S PYO AG THROAT QL EIA: NEGATIVE
FLUAV AG SPEC QL IA: NEGATIVE
FLUBV AG SPEC QL IA: NEGATIVE
GROUP A STREP BY PCR: NOT DETECTED
SARS-COV-2 RNA RESP QL NAA+PROBE: NEGATIVE

## 2022-10-27 PROCEDURE — 99214 OFFICE O/P EST MOD 30 MIN: CPT | Mod: CS | Performed by: PHYSICIAN ASSISTANT

## 2022-10-27 PROCEDURE — 71046 X-RAY EXAM CHEST 2 VIEWS: CPT | Mod: TC | Performed by: RADIOLOGY

## 2022-10-27 PROCEDURE — 87651 STREP A DNA AMP PROBE: CPT | Performed by: PHYSICIAN ASSISTANT

## 2022-10-27 PROCEDURE — U0003 INFECTIOUS AGENT DETECTION BY NUCLEIC ACID (DNA OR RNA); SEVERE ACUTE RESPIRATORY SYNDROME CORONAVIRUS 2 (SARS-COV-2) (CORONAVIRUS DISEASE [COVID-19]), AMPLIFIED PROBE TECHNIQUE, MAKING USE OF HIGH THROUGHPUT TECHNOLOGIES AS DESCRIBED BY CMS-2020-01-R: HCPCS | Performed by: PHYSICIAN ASSISTANT

## 2022-10-27 PROCEDURE — U0005 INFEC AGEN DETEC AMPLI PROBE: HCPCS | Performed by: PHYSICIAN ASSISTANT

## 2022-10-27 PROCEDURE — 87804 INFLUENZA ASSAY W/OPTIC: CPT | Mod: 59 | Performed by: PHYSICIAN ASSISTANT

## 2022-10-27 RX ORDER — LEVOFLOXACIN 750 MG/1
750 TABLET, FILM COATED ORAL DAILY
Qty: 7 TABLET | Refills: 0 | Status: SHIPPED | OUTPATIENT
Start: 2022-10-27 | End: 2022-11-03

## 2022-10-27 RX ORDER — ALBUTEROL SULFATE 90 UG/1
2 AEROSOL, METERED RESPIRATORY (INHALATION) EVERY 4 HOURS PRN
Qty: 18 G | Refills: 0 | Status: SHIPPED | OUTPATIENT
Start: 2022-10-27 | End: 2023-04-12

## 2022-10-27 RX ORDER — BENZONATATE 200 MG/1
200 CAPSULE ORAL 3 TIMES DAILY PRN
Qty: 30 CAPSULE | Refills: 0 | Status: SHIPPED | OUTPATIENT
Start: 2022-10-27 | End: 2023-03-14

## 2022-10-27 NOTE — PROGRESS NOTES
Assessment & Plan     Community acquired pneumonia, unspecified laterality  Viral versus bacterial upper respiratory infectious symptoms.  Rapid influenza and strep negative in clinic.  Chest radiograph with bilateral findings suggestive of possible infectious etiology.  We will plan to cover with Levaquin given allergies to other antibiotics.  Also treat with antitussive and albuterol refilled per patient request.  Advised close follow-up with primary care if no improvement in symptoms with early treatment.  Advised earlier return for any significant worsening of symptoms.  Patient with recent testing for cardiac evaluation, low suspicion for cardiac etiology or pulmonary edema as cause for symptoms today.  COVID PCR pending at time of completion of visit.  - Influenza A & B Antigen - Clinic Collect  - Symptomatic; Yes; 10/22/2022 COVID-19 Virus (Coronavirus) by PCR Nose  - Streptococcus A Rapid Screen w/Reflex to PCR - Clinic Collect  - XR Chest 2 Views  - Group A Streptococcus PCR Throat Swab  - levofloxacin (LEVAQUIN) 750 MG tablet  Dispense: 7 tablet; Refill: 0  - benzonatate (TESSALON) 200 MG capsule  Dispense: 30 capsule; Refill: 0  - albuterol (PROAIR HFA/PROVENTIL HFA/VENTOLIN HFA) 108 (90 Base) MCG/ACT inhaler  Dispense: 18 g; Refill: 0     I spent a total of 30 minutes on the day of the visit.   Time spent doing chart review, history and exam, documentation and further activities per the note    Return in about 1 week (around 11/3/2022) for reevaluation with PCP if symptoms not improving, return earlier if symptoms are worsening.    Himanshu Sood PA-C  [facility] LUMA Mcarthur is a 52 year old female who presents to clinic today for the following health issues:  Chief Complaint   Patient presents with     Urgent Care     X5 Days cough, congestion, SOB, throat pain initially but better, not sleeping well, runny nose slight, sinus pressure,   3 covid test at home,  recent one was yesterday, negative  Taking mucinex, nyquil ( no longer using) , - no help      HPI  Patient is a 52-year-old female presenting to urgent care for evaluation of 5 days of upper respiratory infectious symptoms.  Patient notes initially symptoms were primarily sinus congestion/pressure, rhinorrhea, sore throat, headache, and fatigue with a mild cough.  Patient notes that over the course of illness the symptoms have progressed into her chest and is coughing more, notes some increased dyspnea especially with coughing, and reports that it is preventing her from sleeping well.  Continues feeling rundown.  Denies any chest pressure or heaviness, abdominal pain, diaphoresis, headache, ear pain, nausea/vomiting, increased leg swelling or edema, or other complaints.      Review of Systems  Focused ROS obtained, pertinent positives/negatives reviewed in the HPI.       Objective    BP (!) 160/88   Pulse 87   Temp 98  F (36.7  C) (Tympanic)   Wt 136.1 kg (300 lb)   LMP  (LMP Unknown)   SpO2 95%   BMI 51.49 kg/m    Physical Exam   GENERAL: healthy, ill-appearing, alert and no distress  HENT: normal cephalic/atraumatic, ear canals and TM's normal, nose and mouth without ulcers or lesions, oropharynx clear, oral mucous membranes moist, tonsillar erythema and sinuses: not tender  NECK: no adenopathy  RESP: no rales , no wheezes and rhonchi R lower posterior and L lower posterior  CV: regular rates and rhythm, peripheral pulses strong and no peripheral edema  MS: no gross musculoskeletal defects noted, no edema  SKIN: no suspicious lesions or rashes    Results for orders placed or performed in visit on 10/27/22 (from the past 24 hour(s))   Influenza A & B Antigen - Clinic Collect    Specimen: Nose; Swab   Result Value Ref Range    Influenza A antigen Negative Negative    Influenza B antigen Negative Negative    Narrative    Test results must be correlated with clinical data. If necessary, results should be  confirmed by a molecular assay or viral culture.   Streptococcus A Rapid Screen w/Reflex to PCR - Clinic Collect    Specimen: Throat; Swab   Result Value Ref Range    Group A Strep antigen Negative Negative       Narrative & Impression   CHEST TWO VIEWS  10/27/2022 1:11 PM      HISTORY:  Cough. Dyspnea.     COMPARISON: 7/21/2022.                                                                      IMPRESSION: Mild interstitial prominence in both lower lungs may be  related to edema or infection. Heart size upper limits of normal. Mild  pulmonary venous congestion. No pleural effusions. Atrial septal  occlusion device. No pneumothorax.

## 2022-11-20 ENCOUNTER — HEALTH MAINTENANCE LETTER (OUTPATIENT)
Age: 52
End: 2022-11-20

## 2022-11-29 ENCOUNTER — TELEPHONE (OUTPATIENT)
Dept: CARDIOLOGY | Facility: CLINIC | Age: 52
End: 2022-11-29

## 2022-11-29 DIAGNOSIS — I10 HTN, GOAL BELOW 140/90: Primary | ICD-10-CM

## 2022-11-29 NOTE — TELEPHONE ENCOUNTER
[facility] Call Center    Phone Message    May a detailed message be left on voicemail: yes     Reason for Call: Other: Per call from patient, had blood pressure medication changed recently and now blood pressure is higher than usual. Patient requested a call back to further discuss it.     Action Taken: Other: Cardiology    Travel Screening: Not Applicable

## 2022-11-29 NOTE — TELEPHONE ENCOUNTER
"Called back and spoke with pt. Pt has a hx of ASD s/p closure in 2020. Last visit on 10/14/22 with Dr. Hodges, per note:     \"1.  Patient has done well after her closure of her ASD.  Recent echocardiogram was reviewed demonstrating well-seated device with no residual shunting.  2.  Shortness of breath: Likely multifactorial.  There is possibly a diastolic component.  She has responded well to an additional dose of Lasix.  We did change her beta-blocker to carvedilol and she has responded well with significant improvement in her shortness of breath symptoms.  I will also increase her lisinopril to 40 mg daily for better blood pressure control.  3.  Patient has had COVID x2.  She will have a follow-up with pulmonary and a CT scan of the chest has been ordered.  4.  Reviewed her Zio patch monitor.  No significant arrhythmias.  No atrial fibrillation recurrence.  For her paroxysmal defibrillation we will continue with aspirin therapy given her risk score.  5.  Patient is doing well.  We are happy that she has responded to our adjustments in her medical therapy.  We will have her return to clinic in summer with pre-clinic echocardiogram and lab work.\"    Per pt she has been checking her BP at home for the past 3 days due to not feeling well     Reported the following BP readings:     11/27/22 - /94 (taken in the evening)   11/28/22 - /99 (taken in the evening)   11/29/22 - /88 (prior to medications)     Per pt her HR is usually around 80 however she did not write this down. Pt took BP again over the phone and reported 149/88. Pt said on 11/27/22 she had some chest heaviness and headache which are symptoms she has had before with elevated BP. Per pt today she is feeling well. Pt stated she was feeling really well until the past few days. Discussed if any recent changes, per pt no med changes since last visit. Discussed diet, per pt on Thanksgiving and over the weekend she likely ate more and had more " sodium than usual. Requested pt continue to monitor BP about 2 hours after medications and keep a log. Reviewed ER precautions if pt becomes symptomatic with elevated BP. RN will check in with pt at the end of the week to review readings and if BP is still elevated will review with Dr. Hodges. Pt to call sooner if needed.

## 2022-12-02 NOTE — TELEPHONE ENCOUNTER
Called pt for BP update, per pt she is away from home and unable to review readings at this time. Pt will call back on Monday to review, provided direct phone number for Team 1.

## 2022-12-05 ENCOUNTER — TELEPHONE (OUTPATIENT)
Dept: CARDIOLOGY | Facility: CLINIC | Age: 52
End: 2022-12-05

## 2022-12-05 RX ORDER — SPIRONOLACTONE 25 MG/1
25 TABLET ORAL DAILY
Qty: 30 TABLET | Refills: 1 | Status: SHIPPED | OUTPATIENT
Start: 2022-12-05 | End: 2022-12-27

## 2022-12-05 NOTE — TELEPHONE ENCOUNTER
Reviewed pt call with Dr. Hodges and he is recommending :  - aldactone 25mg daily   - check BMP in 2 weeks  - pt OK to water class and hot tub     Spoke with pt about Dr. Hodges's recommendations.   Pt gave verbal understanding and will start aldactone and get labs done at her PCP in 2 weeks.   Provided pt with Team 1's call back number if pt has any questions or concerns.

## 2022-12-05 NOTE — TELEPHONE ENCOUNTER
Pt called back to give update on recent BP readings.  Pt reports she takes her first dose of carvedilol, lasix and lisinopril around 8am, and she takes her second dose of carvedilol and lasix around 5pm.     11/30/22: 173/92 HR 69 12:45am                   161/76 HR 78 7am        163/87 HR 73 at 5pm     12/1/22: 169/84 HR 86 at 7:15am      176/82 HR 75 at 5pm    12/3/22: 172/82 HR 78 at 8:40pm                    12/4/22: 191/86 HR 72 at 1am                 171/86 HR 71 at 9:15am                 195/81 HR 72 at 10pm    12/5/22: 168/83 HR 71 at 7am                 176/74 HR 72 at 12:29pm (taken while on the phone)    Pt is wanting to start water aerobics class and wants to make sure that is OK with Dr. Hodges and also is she OK to sit in a hot tub?     Will forward to Dr. Hodges to review.

## 2022-12-05 NOTE — TELEPHONE ENCOUNTER
[facility] Call Center    Phone Message    May a detailed message be left on voicemail: yes     Reason for Call: Other: BP discussion from last week call the pt back today.  She lost the direct dial number to that nurse.     Action Taken: Message routed to:  [facility] cardio    Travel Screening: Not Applicable     Thank you!  [facility]

## 2022-12-26 ENCOUNTER — LAB (OUTPATIENT)
Dept: LAB | Facility: CLINIC | Age: 52
End: 2022-12-26
Payer: COMMERCIAL

## 2022-12-26 DIAGNOSIS — I10 HTN, GOAL BELOW 140/90: ICD-10-CM

## 2022-12-26 LAB
ANION GAP SERPL CALCULATED.3IONS-SCNC: 12 MMOL/L (ref 7–15)
BUN SERPL-MCNC: 10.5 MG/DL (ref 6–20)
CALCIUM SERPL-MCNC: 9.3 MG/DL (ref 8.6–10)
CHLORIDE SERPL-SCNC: 98 MMOL/L (ref 98–107)
CREAT SERPL-MCNC: 0.56 MG/DL (ref 0.51–0.95)
DEPRECATED HCO3 PLAS-SCNC: 27 MMOL/L (ref 22–29)
GFR SERPL CREATININE-BSD FRML MDRD: >90 ML/MIN/1.73M2
GLUCOSE SERPL-MCNC: 167 MG/DL (ref 70–99)
POTASSIUM SERPL-SCNC: 4.2 MMOL/L (ref 3.4–5.3)
SODIUM SERPL-SCNC: 137 MMOL/L (ref 136–145)

## 2022-12-26 PROCEDURE — 36415 COLL VENOUS BLD VENIPUNCTURE: CPT

## 2022-12-26 PROCEDURE — 80048 BASIC METABOLIC PNL TOTAL CA: CPT

## 2022-12-27 DIAGNOSIS — I10 HTN, GOAL BELOW 140/90: ICD-10-CM

## 2022-12-27 RX ORDER — SPIRONOLACTONE 25 MG/1
25 TABLET ORAL DAILY
Qty: 30 TABLET | Refills: 1 | Status: SHIPPED | OUTPATIENT
Start: 2022-12-27 | End: 2023-01-27

## 2022-12-27 RX ORDER — SPIRONOLACTONE 25 MG/1
TABLET ORAL
Qty: 30 TABLET | Refills: 1 | OUTPATIENT
Start: 2022-12-27

## 2023-01-27 DIAGNOSIS — I10 HTN, GOAL BELOW 140/90: ICD-10-CM

## 2023-01-27 RX ORDER — SPIRONOLACTONE 25 MG/1
TABLET ORAL
Qty: 30 TABLET | Refills: 1 | OUTPATIENT
Start: 2023-01-27

## 2023-01-27 RX ORDER — SPIRONOLACTONE 25 MG/1
25 TABLET ORAL DAILY
Qty: 90 TABLET | Refills: 2 | Status: SHIPPED | OUTPATIENT
Start: 2023-01-27 | End: 2023-08-21

## 2023-02-28 DIAGNOSIS — F33.0 MAJOR DEPRESSIVE DISORDER, RECURRENT EPISODE, MILD (H): ICD-10-CM

## 2023-02-28 DIAGNOSIS — Q21.10 ASD (ATRIAL SEPTAL DEFECT): ICD-10-CM

## 2023-02-28 RX ORDER — CITALOPRAM HYDROBROMIDE 20 MG/1
TABLET ORAL
Qty: 30 TABLET | Refills: 0 | Status: SHIPPED | OUTPATIENT
Start: 2023-02-28 | End: 2023-03-14

## 2023-02-28 RX ORDER — FUROSEMIDE 20 MG
TABLET ORAL
Qty: 270 TABLET | Refills: 3 | Status: ON HOLD | OUTPATIENT
Start: 2023-02-28 | End: 2023-04-15

## 2023-02-28 NOTE — TELEPHONE ENCOUNTER
Routing refill request to provider for review/approval because:  Patient needs to be seen because:  Due for 6 month appt  Elevated and outdated PHQ-9  Tyra Ashton RN, BSN  [facility]

## 2023-03-14 ENCOUNTER — OFFICE VISIT (OUTPATIENT)
Dept: FAMILY MEDICINE | Facility: CLINIC | Age: 53
End: 2023-03-14
Payer: COMMERCIAL

## 2023-03-14 VITALS
TEMPERATURE: 98.1 F | DIASTOLIC BLOOD PRESSURE: 90 MMHG | WEIGHT: 293 LBS | HEART RATE: 68 BPM | HEIGHT: 64 IN | OXYGEN SATURATION: 93 % | RESPIRATION RATE: 19 BRPM | BODY MASS INDEX: 50.02 KG/M2 | SYSTOLIC BLOOD PRESSURE: 148 MMHG

## 2023-03-14 DIAGNOSIS — Z12.11 SCREEN FOR COLON CANCER: ICD-10-CM

## 2023-03-14 DIAGNOSIS — F33.0 MAJOR DEPRESSIVE DISORDER, RECURRENT EPISODE, MILD (H): ICD-10-CM

## 2023-03-14 PROCEDURE — 99213 OFFICE O/P EST LOW 20 MIN: CPT | Performed by: NURSE PRACTITIONER

## 2023-03-14 RX ORDER — CITALOPRAM HYDROBROMIDE 20 MG/1
20 TABLET ORAL DAILY
Qty: 90 TABLET | Refills: 1 | Status: SHIPPED | OUTPATIENT
Start: 2023-03-14 | End: 2023-08-22

## 2023-03-14 ASSESSMENT — PATIENT HEALTH QUESTIONNAIRE - PHQ9
SUM OF ALL RESPONSES TO PHQ QUESTIONS 1-9: 6
10. IF YOU CHECKED OFF ANY PROBLEMS, HOW DIFFICULT HAVE THESE PROBLEMS MADE IT FOR YOU TO DO YOUR WORK, TAKE CARE OF THINGS AT HOME, OR GET ALONG WITH OTHER PEOPLE: NOT DIFFICULT AT ALL
SUM OF ALL RESPONSES TO PHQ QUESTIONS 1-9: 6

## 2023-03-14 ASSESSMENT — PAIN SCALES - GENERAL: PAINLEVEL: NO PAIN (0)

## 2023-03-14 NOTE — PROGRESS NOTES
"  {PROVIDER CHARTING PREFERENCE:556906}    Katy Mcarthur is a 52 year old{ACCOMPANIED BY STATEMENT (Optional):106330}, presenting for the following health issues:  Recheck Medication      History of Present Illness       Reason for visit:  Medication renewal    She eats 2-3 servings of fruits and vegetables daily.She consumes 2 sweetened beverage(s) daily.She exercises with enough effort to increase her heart rate 9 or less minutes per day.  She exercises with enough effort to increase her heart rate 3 or less days per week.   She is taking medications regularly.    Today's PHQ-9         PHQ-9 Total Score: 6    PHQ-9 Q9 Thoughts of better off dead/self-harm past 2 weeks :   Not at all    How difficult have these problems made it for you to do your work, take care of things at home, or get along with other people: Not difficult at all       Medication Followup of ***    Taking Medication as prescribed: {.:157123::\"yes\"}    Side Effects:  {NONEORCHOOSE:092220::\"None\"}    Medication Helping Symptoms:  {.:688794::\"yes\"}    {additonal problems for provider to add (Optional):434830}    Review of Systems   {ROS COMP (Optional):094982}      Objective    LMP  (LMP Unknown)   There is no height or weight on file to calculate BMI.  Physical Exam   {Exam List (Optional):566599}    {Diagnostic Test Results (Optional):679478}    {AMBULATORY ATTESTATION (Optional):864979}            "

## 2023-04-07 ENCOUNTER — ANCILLARY PROCEDURE (OUTPATIENT)
Dept: GENERAL RADIOLOGY | Facility: CLINIC | Age: 53
End: 2023-04-07
Attending: PHYSICIAN ASSISTANT
Payer: COMMERCIAL

## 2023-04-07 ENCOUNTER — OFFICE VISIT (OUTPATIENT)
Dept: URGENT CARE | Facility: URGENT CARE | Age: 53
End: 2023-04-07
Payer: COMMERCIAL

## 2023-04-07 VITALS
OXYGEN SATURATION: 90 % | HEART RATE: 72 BPM | DIASTOLIC BLOOD PRESSURE: 77 MMHG | RESPIRATION RATE: 18 BRPM | SYSTOLIC BLOOD PRESSURE: 120 MMHG | TEMPERATURE: 97.4 F

## 2023-04-07 DIAGNOSIS — J45.21 MILD INTERMITTENT ASTHMA WITH EXACERBATION: ICD-10-CM

## 2023-04-07 DIAGNOSIS — R05.1 ACUTE COUGH: Primary | ICD-10-CM

## 2023-04-07 PROCEDURE — 99214 OFFICE O/P EST MOD 30 MIN: CPT | Performed by: PHYSICIAN ASSISTANT

## 2023-04-07 PROCEDURE — 71046 X-RAY EXAM CHEST 2 VIEWS: CPT | Mod: TC | Performed by: RADIOLOGY

## 2023-04-07 RX ORDER — ALBUTEROL SULFATE 90 UG/1
2 AEROSOL, METERED RESPIRATORY (INHALATION) EVERY 6 HOURS PRN
Qty: 18 G | Refills: 0 | Status: SHIPPED | OUTPATIENT
Start: 2023-04-07 | End: 2023-04-12

## 2023-04-07 RX ORDER — ALBUTEROL SULFATE 0.83 MG/ML
2.5 SOLUTION RESPIRATORY (INHALATION) EVERY 6 HOURS PRN
Qty: 90 ML | Refills: 0 | Status: SHIPPED | OUTPATIENT
Start: 2023-04-07 | End: 2024-03-06

## 2023-04-07 NOTE — PROGRESS NOTES
"  Assessment & Plan:      Problem List Items Addressed This Visit    None  Visit Diagnoses     Acute cough    -  Primary    Relevant Medications    albuterol (PROVENTIL) (2.5 MG/3ML) 0.083% neb solution    albuterol (PROAIR HFA/PROVENTIL HFA/VENTOLIN HFA) 108 (90 Base) MCG/ACT inhaler    Other Relevant Orders    XR Chest 2 Views (Completed)    Nebulizer and Supplies Order for DME - ONLY FOR DME    Mild intermittent asthma with exacerbation        Relevant Medications    albuterol (PROVENTIL) (2.5 MG/3ML) 0.083% neb solution    albuterol (PROAIR HFA/PROVENTIL HFA/VENTOLIN HFA) 108 (90 Base) MCG/ACT inhaler        Medical Decision Making    Lyubov Sanchez is a 52 year old female here for evaluation of a cough for one week. Upon examination, there is wheezing present on auscultation diffusely throughout all lobes. Because of this finding, and her low oxygen saturation, a chest x-ray was performed in clinic today to rule-out pneumonia. Her chest x-ray was negative. Recommended use of a nebulizer to help with wheezing. The nebulizer was given to the patient and an Albuterol solution was sent to her pharmacy. Patient should follow-up in 7 days, or sooner, if symptoms worsen, new symptoms are present, or new concerns arise. Patient is in agreement with this plan, and has no other concerns at this time.     Subjective:      Lyubov Sanchez is a 52 year old female here for evaluation of a cough for one week. Patient states she has had a cough that is worsening, shortness of breath, and postnasal drip for one week. She experiences intermittent SOB at rest and on exertion. She feels like she is \"breathing through a straw\". She states she has a pulse oximeter at home and her saturations have been between 86-92%. She has used an Albuterol inhaler without symptom relief. She did an at-home COVID test which was negative. History of pneumonia in the past.     The following portions of the patient's history were reviewed and updated " as appropriate: allergies, current medications, and problem list.     Review of Systems  Pertinent items are noted in HPI.  All other systems are negative.    Allergies  Allergies   Allergen Reactions     Penicillins Hives     hives       Family History   Adopted: Yes   Problem Relation Age of Onset     Unknown/Adopted Other         pt is adopted and has no access to health history     Unknown/Adopted Mother      Unknown/Adopted Father      Unknown/Adopted Maternal Grandmother      Unknown/Adopted Maternal Grandfather      Unknown/Adopted Paternal Grandmother      Unknown/Adopted Other        Social History     Tobacco Use     Smoking status: Former     Packs/day: 0.50     Years: 5.00     Pack years: 2.50     Types: Cigarettes     Quit date: 2007     Years since quittin.0     Smokeless tobacco: Never   Vaping Use     Vaping status: Never Used   Substance Use Topics     Alcohol use: Yes     Comment: 2-3 per weekend        Objective:      /77 (BP Location: Right arm, Patient Position: Sitting, Cuff Size: Adult Large)   Pulse 72   Temp 97.4  F (36.3  C) (Tympanic)   Resp 18   LMP  (LMP Unknown)   SpO2 90%      Ears - TMs with moderate serous fluid and mild bulging bilaterally, no erythema  Mouth - mucous membranes moist, pharynx normal without lesions, erythema present  Neck - no lymphadenopathy present  Chest - wheezing noted diffusely throughout all lobes  Heart - normal rate, regular rhythm, normal S1, S2, no murmurs, rubs, clicks or gallops     Lab & Imaging Results    Results for orders placed or performed in visit on 23   XR Chest 2 Views     Status: None    Narrative    CHEST TWO VIEWS 2023 3:02 PM     HISTORY: Worsening cough and shortness of breath x 1 week; rule out  pneumonia. Acute cough.    COMPARISON: 2022       Impression    IMPRESSION: There are no acute infiltrates. The cardiac silhouette is  not enlarged. Pulmonary vasculature is unremarkable.    ROSA LEMON  MD         SYSTEM ID:  [id number]       I personally reviewed these results and discussed findings with the patient.    I, Tracee Grant PA-C, agree with the assessment of Mitzi Kimble, NP student. I myself saw the patient and conducted my own individual history and physical exam today.      The use of Dragon/[employer] dictation services was used to construct the content of this note; any grammatical errors are non-intentional. Please contact the author directly if you are in need of any clarification.        details… detailed exam

## 2023-04-12 ENCOUNTER — HOSPITAL ENCOUNTER (INPATIENT)
Facility: CLINIC | Age: 53
LOS: 3 days | Discharge: HOME | DRG: 193 | End: 2023-04-15
Attending: EMERGENCY MEDICINE | Admitting: INTERNAL MEDICINE
Payer: COMMERCIAL

## 2023-04-12 ENCOUNTER — APPOINTMENT (OUTPATIENT)
Dept: CT IMAGING | Facility: CLINIC | Age: 53
DRG: 193 | End: 2023-04-12
Attending: EMERGENCY MEDICINE
Payer: COMMERCIAL

## 2023-04-12 DIAGNOSIS — J18.9 COMMUNITY ACQUIRED PNEUMONIA, UNSPECIFIED LATERALITY: ICD-10-CM

## 2023-04-12 DIAGNOSIS — B37.31 CANDIDIASIS OF VULVA AND VAGINA: Primary | ICD-10-CM

## 2023-04-12 DIAGNOSIS — J98.01 BRONCHOSPASM: ICD-10-CM

## 2023-04-12 DIAGNOSIS — Q21.10 ASD (ATRIAL SEPTAL DEFECT): ICD-10-CM

## 2023-04-12 DIAGNOSIS — R09.02 HYPOXIA: ICD-10-CM

## 2023-04-12 DIAGNOSIS — R52 PAIN: ICD-10-CM

## 2023-04-12 LAB
ANION GAP SERPL CALCULATED.3IONS-SCNC: 13 MMOL/L (ref 7–15)
BASE EXCESS BLDV CALC-SCNC: 7.2 MMOL/L (ref -7.7–1.9)
BASOPHILS # BLD AUTO: 0 10E3/UL (ref 0–0.2)
BASOPHILS NFR BLD AUTO: 0 %
BUN SERPL-MCNC: 10.6 MG/DL (ref 6–20)
C PNEUM DNA SPEC QL NAA+PROBE: NOT DETECTED
CALCIUM SERPL-MCNC: 9.5 MG/DL (ref 8.6–10)
CHLORIDE SERPL-SCNC: 96 MMOL/L (ref 98–107)
CREAT SERPL-MCNC: 0.57 MG/DL (ref 0.51–0.95)
CREAT SERPL-MCNC: 0.57 MG/DL (ref 0.51–0.95)
D DIMER PPP FEU-MCNC: 0.7 UG/ML FEU (ref 0–0.5)
DEPRECATED HCO3 PLAS-SCNC: 29 MMOL/L (ref 22–29)
EOSINOPHIL # BLD AUTO: 0.1 10E3/UL (ref 0–0.7)
EOSINOPHIL NFR BLD AUTO: 2 %
ERYTHROCYTE [DISTWIDTH] IN BLOOD BY AUTOMATED COUNT: 13.2 % (ref 10–15)
FLUAV H1 2009 PAND RNA SPEC QL NAA+PROBE: NOT DETECTED
FLUAV H1 RNA SPEC QL NAA+PROBE: NOT DETECTED
FLUAV H3 RNA SPEC QL NAA+PROBE: NOT DETECTED
FLUAV RNA SPEC QL NAA+PROBE: NEGATIVE
FLUAV RNA SPEC QL NAA+PROBE: NOT DETECTED
FLUBV RNA RESP QL NAA+PROBE: NEGATIVE
FLUBV RNA SPEC QL NAA+PROBE: NOT DETECTED
GFR SERPL CREATININE-BSD FRML MDRD: >90 ML/MIN/1.73M2
GFR SERPL CREATININE-BSD FRML MDRD: >90 ML/MIN/1.73M2
GLUCOSE SERPL-MCNC: 226 MG/DL (ref 70–99)
HADV DNA SPEC QL NAA+PROBE: NOT DETECTED
HBA1C MFR BLD: 6.6 %
HCO3 BLDV-SCNC: 34 MMOL/L (ref 21–28)
HCOV PNL SPEC NAA+PROBE: NOT DETECTED
HCT VFR BLD AUTO: 39.5 % (ref 35–47)
HGB BLD-MCNC: 13.7 G/DL (ref 11.7–15.7)
HMPV RNA SPEC QL NAA+PROBE: NOT DETECTED
HOLD SPECIMEN: NORMAL
HPIV1 RNA SPEC QL NAA+PROBE: NOT DETECTED
HPIV2 RNA SPEC QL NAA+PROBE: NOT DETECTED
HPIV3 RNA SPEC QL NAA+PROBE: NOT DETECTED
HPIV4 RNA SPEC QL NAA+PROBE: NOT DETECTED
IMM GRANULOCYTES # BLD: 0.1 10E3/UL
IMM GRANULOCYTES NFR BLD: 1 %
LYMPHOCYTES # BLD AUTO: 1.4 10E3/UL (ref 0.8–5.3)
LYMPHOCYTES NFR BLD AUTO: 18 %
M PNEUMO DNA SPEC QL NAA+PROBE: NOT DETECTED
MCH RBC QN AUTO: 32.8 PG (ref 26.5–33)
MCHC RBC AUTO-ENTMCNC: 34.7 G/DL (ref 31.5–36.5)
MCV RBC AUTO: 95 FL (ref 78–100)
MONOCYTES # BLD AUTO: 0.4 10E3/UL (ref 0–1.3)
MONOCYTES NFR BLD AUTO: 5 %
NEUTROPHILS # BLD AUTO: 5.7 10E3/UL (ref 1.6–8.3)
NEUTROPHILS NFR BLD AUTO: 74 %
NRBC # BLD AUTO: 0 10E3/UL
NRBC BLD AUTO-RTO: 0 /100
O2/TOTAL GAS SETTING VFR VENT: 0 %
OXYHGB MFR BLDV: 81 % (ref 70–75)
PCO2 BLDV: 55 MM HG (ref 40–50)
PH BLDV: 7.39 [PH] (ref 7.32–7.43)
PLATELET # BLD AUTO: 141 10E3/UL (ref 150–450)
PO2 BLDV: 46 MM HG (ref 25–47)
POTASSIUM SERPL-SCNC: 4 MMOL/L (ref 3.4–5.3)
PROCALCITONIN SERPL IA-MCNC: 0.08 NG/ML
RBC # BLD AUTO: 4.18 10E6/UL (ref 3.8–5.2)
RSV RNA SPEC NAA+PROBE: NEGATIVE
RSV RNA SPEC QL NAA+PROBE: NOT DETECTED
RSV RNA SPEC QL NAA+PROBE: NOT DETECTED
RV+EV RNA SPEC QL NAA+PROBE: NOT DETECTED
SARS-COV-2 RNA RESP QL NAA+PROBE: NEGATIVE
SODIUM SERPL-SCNC: 138 MMOL/L (ref 136–145)
WBC # BLD AUTO: 7.7 10E3/UL (ref 4–11)

## 2023-04-12 PROCEDURE — 96367 TX/PROPH/DG ADDL SEQ IV INF: CPT

## 2023-04-12 PROCEDURE — 250N000009 HC RX 250: Performed by: EMERGENCY MEDICINE

## 2023-04-12 PROCEDURE — 71275 CT ANGIOGRAPHY CHEST: CPT

## 2023-04-12 PROCEDURE — 83036 HEMOGLOBIN GLYCOSYLATED A1C: CPT | Performed by: INTERNAL MEDICINE

## 2023-04-12 PROCEDURE — 96365 THER/PROPH/DIAG IV INF INIT: CPT

## 2023-04-12 PROCEDURE — 250N000011 HC RX IP 250 OP 636: Performed by: INTERNAL MEDICINE

## 2023-04-12 PROCEDURE — 96375 TX/PRO/DX INJ NEW DRUG ADDON: CPT

## 2023-04-12 PROCEDURE — 85379 FIBRIN DEGRADATION QUANT: CPT | Performed by: EMERGENCY MEDICINE

## 2023-04-12 PROCEDURE — 84145 PROCALCITONIN (PCT): CPT | Performed by: EMERGENCY MEDICINE

## 2023-04-12 PROCEDURE — 99285 EMERGENCY DEPT VISIT HI MDM: CPT | Mod: 25,CS

## 2023-04-12 PROCEDURE — 36415 COLL VENOUS BLD VENIPUNCTURE: CPT | Performed by: EMERGENCY MEDICINE

## 2023-04-12 PROCEDURE — 250N000011 HC RX IP 250 OP 636: Performed by: EMERGENCY MEDICINE

## 2023-04-12 PROCEDURE — 94640 AIRWAY INHALATION TREATMENT: CPT

## 2023-04-12 PROCEDURE — 87637 SARSCOV2&INF A&B&RSV AMP PRB: CPT | Performed by: EMERGENCY MEDICINE

## 2023-04-12 PROCEDURE — 85025 COMPLETE CBC W/AUTO DIFF WBC: CPT | Performed by: EMERGENCY MEDICINE

## 2023-04-12 PROCEDURE — 120N000001 HC R&B MED SURG/OB

## 2023-04-12 PROCEDURE — 99222 1ST HOSP IP/OBS MODERATE 55: CPT | Performed by: INTERNAL MEDICINE

## 2023-04-12 PROCEDURE — 250N000013 HC RX MED GY IP 250 OP 250 PS 637: Performed by: INTERNAL MEDICINE

## 2023-04-12 PROCEDURE — C9803 HOPD COVID-19 SPEC COLLECT: HCPCS

## 2023-04-12 PROCEDURE — 80048 BASIC METABOLIC PNL TOTAL CA: CPT | Performed by: EMERGENCY MEDICINE

## 2023-04-12 PROCEDURE — 87486 CHLMYD PNEUM DNA AMP PROBE: CPT | Performed by: INTERNAL MEDICINE

## 2023-04-12 PROCEDURE — 258N000003 HC RX IP 258 OP 636: Performed by: EMERGENCY MEDICINE

## 2023-04-12 PROCEDURE — 82805 BLOOD GASES W/O2 SATURATION: CPT | Performed by: EMERGENCY MEDICINE

## 2023-04-12 RX ORDER — ONDANSETRON 4 MG/1
4 TABLET, ORALLY DISINTEGRATING ORAL EVERY 6 HOURS PRN
Status: DISCONTINUED | OUTPATIENT
Start: 2023-04-12 | End: 2023-04-15 | Stop reason: HOSPADM

## 2023-04-12 RX ORDER — MAGNESIUM SULFATE HEPTAHYDRATE 40 MG/ML
2 INJECTION, SOLUTION INTRAVENOUS ONCE
Status: COMPLETED | OUTPATIENT
Start: 2023-04-12 | End: 2023-04-12

## 2023-04-12 RX ORDER — AMOXICILLIN 250 MG
1 CAPSULE ORAL 2 TIMES DAILY PRN
Status: DISCONTINUED | OUTPATIENT
Start: 2023-04-12 | End: 2023-04-15 | Stop reason: HOSPADM

## 2023-04-12 RX ORDER — ONDANSETRON 2 MG/ML
4 INJECTION INTRAMUSCULAR; INTRAVENOUS EVERY 6 HOURS PRN
Status: DISCONTINUED | OUTPATIENT
Start: 2023-04-12 | End: 2023-04-15 | Stop reason: HOSPADM

## 2023-04-12 RX ORDER — CEFTRIAXONE 2 G/1
2 INJECTION, POWDER, FOR SOLUTION INTRAMUSCULAR; INTRAVENOUS ONCE
Status: DISCONTINUED | OUTPATIENT
Start: 2023-04-12 | End: 2023-04-12

## 2023-04-12 RX ORDER — SPIRONOLACTONE 25 MG/1
25 TABLET ORAL DAILY
Status: DISCONTINUED | OUTPATIENT
Start: 2023-04-12 | End: 2023-04-15 | Stop reason: HOSPADM

## 2023-04-12 RX ORDER — ACETAMINOPHEN 325 MG/1
650 TABLET ORAL EVERY 6 HOURS PRN
Status: DISCONTINUED | OUTPATIENT
Start: 2023-04-12 | End: 2023-04-15 | Stop reason: HOSPADM

## 2023-04-12 RX ORDER — METHYLPREDNISOLONE SODIUM SUCCINATE 125 MG/2ML
125 INJECTION, POWDER, LYOPHILIZED, FOR SOLUTION INTRAMUSCULAR; INTRAVENOUS ONCE
Status: COMPLETED | OUTPATIENT
Start: 2023-04-12 | End: 2023-04-12

## 2023-04-12 RX ORDER — IOPAMIDOL 755 MG/ML
500 INJECTION, SOLUTION INTRAVASCULAR ONCE
Status: COMPLETED | OUTPATIENT
Start: 2023-04-12 | End: 2023-04-12

## 2023-04-12 RX ORDER — LIDOCAINE 40 MG/G
CREAM TOPICAL
Status: DISCONTINUED | OUTPATIENT
Start: 2023-04-12 | End: 2023-04-15 | Stop reason: HOSPADM

## 2023-04-12 RX ORDER — POLYETHYLENE GLYCOL 3350 17 G/17G
17 POWDER, FOR SOLUTION ORAL DAILY PRN
Status: DISCONTINUED | OUTPATIENT
Start: 2023-04-12 | End: 2023-04-15 | Stop reason: HOSPADM

## 2023-04-12 RX ORDER — DOXYCYCLINE 100 MG/10ML
100 INJECTION, POWDER, LYOPHILIZED, FOR SOLUTION INTRAVENOUS ONCE
Status: COMPLETED | OUTPATIENT
Start: 2023-04-12 | End: 2023-04-12

## 2023-04-12 RX ORDER — FUROSEMIDE 20 MG
20 TABLET ORAL
Status: DISCONTINUED | OUTPATIENT
Start: 2023-04-12 | End: 2023-04-13

## 2023-04-12 RX ORDER — LISINOPRIL 10 MG/1
40 TABLET ORAL DAILY
Status: DISCONTINUED | OUTPATIENT
Start: 2023-04-12 | End: 2023-04-15 | Stop reason: HOSPADM

## 2023-04-12 RX ORDER — ASPIRIN 81 MG/1
81 TABLET ORAL DAILY
Status: DISCONTINUED | OUTPATIENT
Start: 2023-04-12 | End: 2023-04-15 | Stop reason: HOSPADM

## 2023-04-12 RX ORDER — AMOXICILLIN 250 MG
2 CAPSULE ORAL 2 TIMES DAILY PRN
Status: DISCONTINUED | OUTPATIENT
Start: 2023-04-12 | End: 2023-04-15 | Stop reason: HOSPADM

## 2023-04-12 RX ORDER — FLUTICASONE PROPIONATE 50 MCG
1 SPRAY, SUSPENSION (ML) NASAL DAILY PRN
Status: DISCONTINUED | OUTPATIENT
Start: 2023-04-12 | End: 2023-04-15 | Stop reason: HOSPADM

## 2023-04-12 RX ORDER — DOXYCYCLINE 100 MG/1
100 CAPSULE ORAL EVERY 12 HOURS SCHEDULED
Status: DISCONTINUED | OUTPATIENT
Start: 2023-04-12 | End: 2023-04-15 | Stop reason: HOSPADM

## 2023-04-12 RX ORDER — IPRATROPIUM BROMIDE AND ALBUTEROL SULFATE 2.5; .5 MG/3ML; MG/3ML
3 SOLUTION RESPIRATORY (INHALATION) ONCE
Status: COMPLETED | OUTPATIENT
Start: 2023-04-12 | End: 2023-04-12

## 2023-04-12 RX ORDER — CARVEDILOL 12.5 MG/1
12.5 TABLET ORAL 2 TIMES DAILY WITH MEALS
Status: DISCONTINUED | OUTPATIENT
Start: 2023-04-12 | End: 2023-04-15 | Stop reason: HOSPADM

## 2023-04-12 RX ORDER — KETOROLAC TROMETHAMINE 15 MG/ML
15 INJECTION, SOLUTION INTRAMUSCULAR; INTRAVENOUS ONCE
Status: COMPLETED | OUTPATIENT
Start: 2023-04-12 | End: 2023-04-12

## 2023-04-12 RX ORDER — IPRATROPIUM BROMIDE AND ALBUTEROL SULFATE 2.5; .5 MG/3ML; MG/3ML
3 SOLUTION RESPIRATORY (INHALATION) EVERY 4 HOURS PRN
Status: DISCONTINUED | OUTPATIENT
Start: 2023-04-12 | End: 2023-04-15 | Stop reason: HOSPADM

## 2023-04-12 RX ORDER — IPRATROPIUM BROMIDE AND ALBUTEROL SULFATE 2.5; .5 MG/3ML; MG/3ML
3 SOLUTION RESPIRATORY (INHALATION)
Status: DISPENSED | OUTPATIENT
Start: 2023-04-12 | End: 2023-04-12

## 2023-04-12 RX ORDER — ACETAMINOPHEN 650 MG/1
650 SUPPOSITORY RECTAL EVERY 6 HOURS PRN
Status: DISCONTINUED | OUTPATIENT
Start: 2023-04-12 | End: 2023-04-15 | Stop reason: HOSPADM

## 2023-04-12 RX ORDER — CODEINE PHOSPHATE AND GUAIFENESIN 10; 100 MG/5ML; MG/5ML
5 SOLUTION ORAL EVERY 4 HOURS PRN
Status: DISCONTINUED | OUTPATIENT
Start: 2023-04-12 | End: 2023-04-15 | Stop reason: HOSPADM

## 2023-04-12 RX ORDER — BENZONATATE 100 MG/1
100 CAPSULE ORAL 3 TIMES DAILY PRN
Status: DISCONTINUED | OUTPATIENT
Start: 2023-04-12 | End: 2023-04-13

## 2023-04-12 RX ORDER — ENOXAPARIN SODIUM 100 MG/ML
40 INJECTION SUBCUTANEOUS EVERY 12 HOURS
Status: DISCONTINUED | OUTPATIENT
Start: 2023-04-12 | End: 2023-04-15 | Stop reason: HOSPADM

## 2023-04-12 RX ORDER — CITALOPRAM HYDROBROMIDE 20 MG/1
20 TABLET ORAL DAILY
Status: DISCONTINUED | OUTPATIENT
Start: 2023-04-12 | End: 2023-04-15 | Stop reason: HOSPADM

## 2023-04-12 RX ORDER — CEFTRIAXONE 1 G/1
1 INJECTION, POWDER, FOR SOLUTION INTRAMUSCULAR; INTRAVENOUS EVERY 24 HOURS
Status: DISCONTINUED | OUTPATIENT
Start: 2023-04-13 | End: 2023-04-15 | Stop reason: HOSPADM

## 2023-04-12 RX ORDER — IBUPROFEN 200 MG
400 TABLET ORAL EVERY 6 HOURS PRN
Status: ON HOLD | COMMUNITY
End: 2023-04-15

## 2023-04-12 RX ADMIN — SODIUM CHLORIDE 100 ML: 9 INJECTION, SOLUTION INTRAVENOUS at 12:00

## 2023-04-12 RX ADMIN — FUROSEMIDE 20 MG: 20 TABLET ORAL at 18:23

## 2023-04-12 RX ADMIN — ASPIRIN 81 MG: 81 TABLET, COATED ORAL at 18:23

## 2023-04-12 RX ADMIN — KETOROLAC TROMETHAMINE 15 MG: 15 INJECTION, SOLUTION INTRAMUSCULAR; INTRAVENOUS at 12:21

## 2023-04-12 RX ADMIN — IPRATROPIUM BROMIDE AND ALBUTEROL SULFATE 3 ML: 2.5; .5 SOLUTION RESPIRATORY (INHALATION) at 10:34

## 2023-04-12 RX ADMIN — CARVEDILOL 12.5 MG: 12.5 TABLET, FILM COATED ORAL at 18:23

## 2023-04-12 RX ADMIN — DOXYCYCLINE HYCLATE 100 MG: 100 CAPSULE ORAL at 21:14

## 2023-04-12 RX ADMIN — IPRATROPIUM BROMIDE AND ALBUTEROL SULFATE 3 ML: 2.5; .5 SOLUTION RESPIRATORY (INHALATION) at 09:38

## 2023-04-12 RX ADMIN — IOPAMIDOL 90 ML: 755 INJECTION, SOLUTION INTRAVENOUS at 12:00

## 2023-04-12 RX ADMIN — LISINOPRIL 40 MG: 10 TABLET ORAL at 18:23

## 2023-04-12 RX ADMIN — CEFTRIAXONE 2 G: 2 INJECTION, POWDER, FOR SOLUTION INTRAMUSCULAR; INTRAVENOUS at 15:54

## 2023-04-12 RX ADMIN — SPIRONOLACTONE 25 MG: 25 TABLET ORAL at 18:23

## 2023-04-12 RX ADMIN — IPRATROPIUM BROMIDE AND ALBUTEROL SULFATE 3 ML: 2.5; .5 SOLUTION RESPIRATORY (INHALATION) at 10:24

## 2023-04-12 RX ADMIN — MAGNESIUM SULFATE HEPTAHYDRATE 2 G: 2 INJECTION, SOLUTION INTRAVENOUS at 11:19

## 2023-04-12 RX ADMIN — METHYLPREDNISOLONE SODIUM SUCCINATE 125 MG: 125 INJECTION, POWDER, FOR SOLUTION INTRAMUSCULAR; INTRAVENOUS at 10:23

## 2023-04-12 RX ADMIN — DOXYCYCLINE 100 MG: 100 INJECTION, POWDER, LYOPHILIZED, FOR SOLUTION INTRAVENOUS at 14:02

## 2023-04-12 RX ADMIN — ENOXAPARIN SODIUM 40 MG: 40 INJECTION SUBCUTANEOUS at 21:14

## 2023-04-12 RX ADMIN — CITALOPRAM HYDROBROMIDE 20 MG: 20 TABLET ORAL at 18:23

## 2023-04-12 ASSESSMENT — ACTIVITIES OF DAILY LIVING (ADL)
ADLS_ACUITY_SCORE: 35

## 2023-04-12 ASSESSMENT — ENCOUNTER SYMPTOMS
CHILLS: 1
DIARRHEA: 0
SORE THROAT: 0
RHINORRHEA: 1
DIFFICULTY URINATING: 0
VOMITING: 0
MYALGIAS: 1
DYSURIA: 0
HEMATURIA: 0
SHORTNESS OF BREATH: 1
FREQUENCY: 0
COUGH: 1
WHEEZING: 1

## 2023-04-12 NOTE — ED TRIAGE NOTES
Pt here with c/o cough/wheezing  x2 weeks. Intermittent clear productive cough. Denies hx asthma. Rx inhaler at UC but denies improvement. No fevers, rash, CP. ABC intact.

## 2023-04-12 NOTE — ED NOTES
[facility]  ED Nurse Handoff Report    Lyubov Sanchez is a 52 year old female   ED Chief complaint: Cough  . ED Diagnosis:   Final diagnoses:   None     Allergies:   Allergies   Allergen Reactions     Penicillins Hives     hives       Code Status: Full Code  Activity level - Baseline/Home:  Independent. Activity Level - Current:   Independent. Lift room needed: No. Bariatric: No   Needed: No   Isolation: No. Infection: Not Applicable.     Vital Signs:   Vitals:    04/12/23 1315 04/12/23 1316 04/12/23 1317 04/12/23 1340   BP:       Pulse:       Resp:       Temp:       TempSrc:       SpO2: (!) 88% (!) 87% (!) 86% 93%   Weight:       Height:           Cardiac Rhythm:  ,      Pain level:    Patient confused: No. Patient Falls Risk: Yes.   Elimination Status: Has voided   Patient Report - Initial Complaint: . Focused Assessment: Lyubov Sanchez is a 52 year old female with a history of HTN, hyperlipidemia, atrial fibrillation, and aortic aneurysm who presents with 2 weeks of a worsening productive cough, shortness of breath, and wheezing. Patient went to urgent care five days ago and was given a nebulizer and inhaler with little relief for symptoms. She also had a negative chest XR at this time. Lyubov endorses recent chills, body aches, and rhinorrhea as well. She developed a fever for the first time last night, with the maximum recorded temperature being 100.8 F.  Lyubov also reports sharp right-sided chest pain associated with her cough. Denies sore throat, vomiting, diarrhea, and urinary symptoms. Of note, patient has taken three home COVID tests which were all negative. She has not been on any recent steroid courses. No personal history of asthma or other lung problems. Patient has not had blood clots in the past. No recent travel. She denies heavy alcohol or tobacco use.   Tests Performed: labs, CT. Abnormal Results:   Labs Ordered and Resulted from Time of ED Arrival to Time of ED Departure   D  DIMER QUANTITATIVE - Abnormal       Result Value    D-Dimer Quantitative 0.70 (*)    BASIC METABOLIC PANEL - Abnormal    Sodium 138      Potassium 4.0      Chloride 96 (*)     Carbon Dioxide (CO2) 29      Anion Gap 13      Urea Nitrogen 10.6      Creatinine 0.57      Calcium 9.5      Glucose 226 (*)     GFR Estimate >90     CBC WITH PLATELETS AND DIFFERENTIAL - Abnormal    WBC Count 7.7      RBC Count 4.18      Hemoglobin 13.7      Hematocrit 39.5      MCV 95      MCH 32.8      MCHC 34.7      RDW 13.2      Platelet Count 141 (*)     % Neutrophils 74      % Lymphocytes 18      % Monocytes 5      % Eosinophils 2      % Basophils 0      % Immature Granulocytes 1      NRBCs per 100 WBC 0      Absolute Neutrophils 5.7      Absolute Lymphocytes 1.4      Absolute Monocytes 0.4      Absolute Eosinophils 0.1      Absolute Basophils 0.0      Absolute Immature Granulocytes 0.1      Absolute NRBCs 0.0     BLOOD GAS VENOUS WITH OXYHEMOGLOBIN - Abnormal    pH Venous 7.39      pCO2 Venous 55 (*)     pO2 Venous 46      Bicarbonate Venous 34 (*)     FIO2 0      Oxyhemoglobin Venous 81 (*)     Base Excess/Deficit (+/-) 7.2 (*)    INFLUENZA A/B, RSV, & SARS-COV2 PCR - Normal    Influenza A PCR Negative      Influenza B PCR Negative      RSV PCR Negative      SARS CoV2 PCR Negative     PROCALCITONIN     CT Chest Pulmonary Embolism w Contrast   Preliminary Result   IMPRESSION:   1.  No evidence for pulmonary embolism.   2.  Mild patchy groundglass and nodular opacities in both lower lungs   as well as bronchial wall thickening and scattered mucus plugging,   most likely infectious in etiology.   3.  Marked hepatic steatosis.   4.  Ectasia of the ascending thoracic aorta and dilatation of the   central pulmonary arteries, unchanged.      .   Treatments provided: see MAR, 2 L NC 02  Family Comments: at bedside   OBS brochure/video discussed/provided to patient:  N/A  ED Medications:   Medications   ipratropium - albuterol 0.5 mg/2.5 mg/3  mL (DUONEB) neb solution 3 mL (3 mLs Nebulization $Given 4/12/23 1034)   cefTRIAXone (ROCEPHIN) 2 g vial to attach to  ml bag for ADULTS or NS 50 ml bag for PEDS (has no administration in time range)   doxycycline (VIBRAMYCIN) 100 mg vial to attach to  mL bag (has no administration in time range)   ipratropium - albuterol 0.5 mg/2.5 mg/3 mL (DUONEB) neb solution 3 mL (3 mLs Nebulization $Given 4/12/23 0938)   methylPREDNISolone sodium succinate (solu-MEDROL) injection 125 mg (125 mg Intravenous $Given 4/12/23 1023)   magnesium sulfate 2 g in 50 mL sterile water intermittent infusion (0 g Intravenous Stopped 4/12/23 1221)   ketorolac (TORADOL) injection 15 mg (15 mg Intravenous $Given 4/12/23 1221)   0.9% sodium chloride BOLUS (0 mLs Intravenous Stopped 4/12/23 1221)   iopamidol (ISOVUE-370) solution 500 mL (90 mLs Intravenous $Given 4/12/23 1200)     Drips infusing:  No  For the majority of the shift, the patient's behavior Green. Interventions performed were n/a.    Sepsis treatment initiated: No     Patient tested for COVID 19 prior to admission: YES    ED Nurse Name/Phone Number: Vonnie Lehman RN,   1:45 PM    RECEIVING UNIT ED HANDOFF REVIEW    Above ED Nurse Handoff Report was reviewed: Yes  Reviewed by: Shannon Merrill RN on April 12, 2023 at 4:17 PM

## 2023-04-12 NOTE — ED NOTES
Pt complained of being cold, warm blanket brought to pt. Placed Pt. on monitor (pulse ox, BP cuff).

## 2023-04-12 NOTE — ED PROVIDER NOTES
History     Chief Complaint:  Cough       HPI   Lyubov Sanchez is a 52 year old female with a history of HTN, hyperlipidemia, atrial fibrillation, and aortic aneurysm who presents with 2 weeks of a worsening productive cough, shortness of breath, and wheezing. Patient went to urgent care five days ago and was given a nebulizer and inhaler with little relief for symptoms. She also had a negative chest XR at this time. Lyubov endorses recent chills, body aches, and rhinorrhea as well. She developed a fever for the first time last night, with the maximum recorded temperature being 100.8 F.  Lyubov also reports sharp right-sided chest pain associated with her cough. Denies sore throat, vomiting, diarrhea, and urinary symptoms. Of note, patient has taken three home COVID tests which were all negative. She has not been on any recent steroid courses. No personal history of asthma or other lung problems. Patient has not had blood clots in the past. No recent travel. She denies heavy alcohol or tobacco use.       Independent Historian:   None - Patient Only    Review of External Notes: I reviewed medical records from  urgent care office visit on/7/23 for an overview of the patient's previous visits for similar symptoms.     ROS:  Review of Systems   Constitutional: Positive for chills.   HENT: Positive for rhinorrhea. Negative for sore throat.    Respiratory: Positive for cough, shortness of breath and wheezing.    Cardiovascular: Positive for chest pain.   Gastrointestinal: Negative for diarrhea and vomiting.   Genitourinary: Negative for decreased urine volume, difficulty urinating, dysuria, frequency, hematuria and urgency.   Musculoskeletal: Positive for myalgias.   All other systems reviewed and are negative.    Allergies:  Penicillins     Medications:    Pro-air  Proventil  Aspirin 81 mg  Coreg  Zyrtec  Celexa  Flonase  Lasix  Zestril  Aldactone    Past Medical History:    Anxiety   Depression  Arthritis  Ascending  "aortic aneurysm  Persistent atrial fibrillation with RVR  Depression   Dysplasia of cervix  HTN  Ventral hernia  Sleep apnea  Plantar fasciitis  PFO  Chronic pain  Ostium secundum type atrial septal defect  Migraines   Hyperlipidemia   Incomplete RBBB   Morbid obesity   Umbilical hernia  Thoracic aortic ectasia   Pulmonary HTN    Past Surgical History:    TKA, bilateral  Atrial septal defect closure  Right heart catheterization   DaVinci ventral herniorrhaphy  DaVinci total hysterectomy, bilateral salpingectomy combined  Colposcopy   Low cervical  section   Tonsillectomy & adenoidectomy      Social History:  Arrives alone via private vehicle.  Patient is adopted   PCP: Yolie Delarosa MD, Family Medicine     Physical Exam     Patient Vitals for the past 24 hrs:   BP Temp Temp src Pulse Resp SpO2 Height Weight   23 1340 -- -- -- -- -- 93 % -- --   23 1317 -- -- -- -- -- (!) 86 % -- --   23 1316 -- -- -- -- -- (!) 87 % -- --   23 1315 -- -- -- -- -- (!) 88 % -- --   23 1102 -- -- -- -- -- 93 % -- --   23 1100 -- -- -- -- -- 93 % -- --   23 1049 -- -- -- -- -- (!) 86 % -- --   23 1048 -- -- -- -- -- (!) 87 % -- --   23 1022 -- -- -- -- -- (!) 89 % -- --   23 1007 -- -- -- -- -- 93 % -- --   23 0952 -- -- -- -- -- 90 % -- --   23 0937 (!) 160 -- -- -- -- 94 % -- --   23 0932 (!) 160 -- -- -- -- -- -- --   23 0930 -- -- -- 89 22 93 % 1.626 m (5' 4\") 135.2 kg (298 lb)   23 0927 -- 98.7  F (37.1  C) Oral -- -- -- -- --        Physical Exam  Constitutional: Well developed, forlorn, nontox appearance  Head: Atraumatic.   Neck:  no stridor  Eyes: no scleral icterus  Cardiovascular: RRR, 2+ bilat radial pulses  Pulmonary/Chest: nml resp effort, diffuse wheezing bilaterally  Abdominal: ND, soft, NT, no rebound or guarding   Ext: Warm, well perfused, no edema  Neurological: A&O, symmetric facies, moves ext x4  Skin: " Skin is warm and dry.   Psychiatric: Behavior is normal. Thought content normal.   Nursing note and vitals reviewed.      Emergency Department Course     Imaging:  CT Chest Pulmonary Embolism w Contrast   Preliminary Result   IMPRESSION:   1.  No evidence for pulmonary embolism.   2.  Mild patchy groundglass and nodular opacities in both lower lungs   as well as bronchial wall thickening and scattered mucus plugging,   most likely infectious in etiology.   3.  Marked hepatic steatosis.   4.  Ectasia of the ascending thoracic aorta and dilatation of the   central pulmonary arteries, unchanged.         Report per radiology    Laboratory:  Labs Ordered and Resulted from Time of ED Arrival to Time of ED Departure   D DIMER QUANTITATIVE - Abnormal       Result Value    D-Dimer Quantitative 0.70 (*)    BASIC METABOLIC PANEL - Abnormal    Sodium 138      Potassium 4.0      Chloride 96 (*)     Carbon Dioxide (CO2) 29      Anion Gap 13      Urea Nitrogen 10.6      Creatinine 0.57      Calcium 9.5      Glucose 226 (*)     GFR Estimate >90     CBC WITH PLATELETS AND DIFFERENTIAL - Abnormal    WBC Count 7.7      RBC Count 4.18      Hemoglobin 13.7      Hematocrit 39.5      MCV 95      MCH 32.8      MCHC 34.7      RDW 13.2      Platelet Count 141 (*)     % Neutrophils 74      % Lymphocytes 18      % Monocytes 5      % Eosinophils 2      % Basophils 0      % Immature Granulocytes 1      NRBCs per 100 WBC 0      Absolute Neutrophils 5.7      Absolute Lymphocytes 1.4      Absolute Monocytes 0.4      Absolute Eosinophils 0.1      Absolute Basophils 0.0      Absolute Immature Granulocytes 0.1      Absolute NRBCs 0.0     BLOOD GAS VENOUS WITH OXYHEMOGLOBIN - Abnormal    pH Venous 7.39      pCO2 Venous 55 (*)     pO2 Venous 46      Bicarbonate Venous 34 (*)     FIO2 0      Oxyhemoglobin Venous 81 (*)     Base Excess/Deficit (+/-) 7.2 (*)    PROCALCITONIN - Abnormal    Procalcitonin 0.08 (*)    INFLUENZA A/B, RSV, & SARS-COV2 PCR -  Normal    Influenza A PCR Negative      Influenza B PCR Negative      RSV PCR Negative      SARS CoV2 PCR Negative          Emergency Department Course & Assessments:       Interventions:  Medications   ipratropium - albuterol 0.5 mg/2.5 mg/3 mL (DUONEB) neb solution 3 mL (3 mLs Nebulization $Given 23 1034)   cefTRIAXone (ROCEPHIN) 2 g vial to attach to  ml bag for ADULTS or NS 50 ml bag for PEDS (has no administration in time range)   doxycycline (VIBRAMYCIN) 100 mg vial to attach to  mL bag (100 mg Intravenous $New Bag 23 1402)   ipratropium - albuterol 0.5 mg/2.5 mg/3 mL (DUONEB) neb solution 3 mL (3 mLs Nebulization $Given 23 0938)   methylPREDNISolone sodium succinate (solu-MEDROL) injection 125 mg (125 mg Intravenous $Given 23 1023)   magnesium sulfate 2 g in 50 mL sterile water intermittent infusion (0 g Intravenous Stopped 23 1221)   ketorolac (TORADOL) injection 15 mg (15 mg Intravenous $Given 23 1221)   0.9% sodium chloride BOLUS (0 mLs Intravenous Stopped 23 1221)   iopamidol (ISOVUE-370) solution 500 mL (90 mLs Intravenous $Given 23 1200)        Assessments:  0959 I obtained history and examined the patient as noted above.    Independent Interpretation (X-rays, CTs, rhythm strip):  None    Consultations/Discussion of Management or Tests:  1412 I spoke with Dr. Morgan Cota, hospitalist, who accepts the patient.        Social Determinants of Health affecting care:   No significant social determinants negatively affecting the patient's health    Disposition:  The patient was admitted to the hospital under the care of Dr. Cota.     Impression & Plan      Medical Decision Makin year old female presenting w/ cough, wheezing, right-sided chest pain    DDx includes viral syndrome NOS, influenza-like illness, influenza, COVID-19, viral induced wheezing, asthma exacerbation, PE.  Labs significant for mild elevated D-dimer, normal white blood cell count,  COVID-19 negative.  Imaging sig for concern for pneumonia on CT read as noted above, no evidence of PE.  Antibiotics given as noted above.  The patient developed hypoxia after nebulizers.  Trial off oxygen later in the emergency department stay demonstrated persistent hypoxia.  Discussed patient with the admitting hospitalist who excepted the patient for admission.  Patient counseled on all results, disposition and diagnosis.  They are understanding and agreeable to plan. Patient admitted in guarded condition.    Upon reevaluation, patient remains persistently wheezy    Diagnosis:    ICD-10-CM    1. Community acquired pneumonia, unspecified laterality  J18.9       2. Hypoxia  R09.02       3. Bronchospasm  J98.01            Scribe Disclosure:  Maria Teresa MARQUEZ, am serving as a scribe at 10:20 AM on 4/12/2023 to document services personally performed by Jake Finney MD based on my observations and the provider's statements to me.   4/12/2023   Jake Finney MD Vaughn, Christopher E, MD  04/12/23 1730

## 2023-04-12 NOTE — H&P
[facility]  History and Physical  Hospitalist - Hebert Cota DO       Date of Admission:  4/12/2023    Chief Complaint   Shortness of breath    History is obtained from the patient, the emergency department, as well as the electronic medical record.    History of Present Illness   Lyubov Sanchez is a 52 year old female with past medical history of morbid obesity, KRISTYN with CPAP, history of recurrent pneumonias, atrial fibrillation not currently on anticoagulation, hypertension, history of aortic aneurysm, anxiety/depression who presented on 4/12/2023 with chief complaint of shortness of breath.  The patient states that her symptoms started approximately 2 weeks ago with a cough.  She also reports some postnasal drip.  On 4/7, she went to urgent care where she had a negative chest x-ray.  She was discharged with an albuterol nebulizer.  She reports some mild improvement with the nebulizer treatments.  Today she developed a fever up to 100.8  F.  She has also reported progression of her cough to a productive cough with yellow sputum.  She denies any headache, sore throat, nausea or vomiting, diarrhea.  She denies any history of smoking or any previous diagnosis of asthma.  She does wear CPAP nightly and reports that she cleans it regularly.    In the emergency department, the patient was found have a temperature of 90.7  F, heart rate 89, blood pressure 160/82, respiratory rate 22, SPO2 87% on room air that improved to 93% with 2 L nasal cannula.  Initial lab work showed chloride 96, glucose 226, procalcitonin 0.08, WBC 7.7, D-dimer 0.70.  COVID, influenza, and RSV were negative.  CT chest showed no evidence of pulmonary embolism, mild patchy groundglass and nodular opacities in both lower lungs as well as bronchial wall thickening and scattered mucus plugging, marked hepatic steatosis, ectasia of the ascending thoracic aorta and dilation of the central pulmonary arteries that is unchanged from  prior.  The patient was started on ceftriaxone and doxycycline with concern for community-acquired pneumonia.    ASSESSMENT/PLAN    Acute Hypoxic Respiratory Failure  Community Acquired Pneumonia  - Procal 0.08 -> check resp viral panel  - Check sputum cx  - Continue ceftriaxone and doxycycline for now  - Duonebs prn  - Antitussives  - Wean O2 as able  - Per pt, gets pneumonia every seasonal change.  May benefit from referral to Pulmonology as an outpatient for PFT    Hyperglycemia  -  on arrival  - Check A1C    Mild Thrombocytopenia  - Likely secondary to infection  - Daily CBC    Paroxysmal Atrial Fibrillation  - States she was previously on eliquis, but no longer takes it.  Now is just on ASA 81 mg daily    Hypertension  - Resume antihypertensives once confirmed by pharmacy    Chronic Medical Problems:  KRISTYN with CPAP  Morbid Obesity - BMI 51.15  Aortic Aneurysm  Anxiety/Depression    PLAN: Resume home medications as appropriate once confirmed by pharmacy.     DVT Prophylaxis: Enoxaparin (Lovenox) SQ  Code Status: Full Code  Discharge Plan:    Expected Discharge Date: 04/14/2023                 Hebert Cota DO  Securely message with Vocera (more info)  Text page via [employer] Paging/Directory     Primary Care Physician   Yolie Delarosa    -----------------------------------------------------------------------------------------------------------------------------------------------------------------------------------------------------    Past Medical History    I have reviewed this patient's medical history and updated it with pertinent information if needed.   Past Medical History:   Diagnosis Date     Anxiety and depression     Pt reports is on Rx Celexa.     Aortic aneurysm (H)     Pt reports its small and is currently being monitored.     Arthritis      Atrial fibrillation with RVR (H) 9/15/2020     Depressive disorder      DYSPLASIA OF CERVIX 3/6/2003     Dysplasia of cervix (uteri) 2003    Rx  cryotherapy Nov 03, and 2005     Hypertension     NO cardiology     Incomplete right bundle branch block 11/10/2017     Migraine      KRISTYN (obstructive sleep apnea)      Ostium secundum type atrial septal defect 7/29/2020    Added automatically from request for surgery [id number]     Other chronic pain     Knee pain for 8 years     Patent foramen ovale 1/9/2020     Plantar fasciitis     bilat     Unspecified sleep apnea     CPAP will bring on the day of surgery.     Ventral hernia without obstruction or gangrene 4/15/2020     Ventral hernia without obstruction or gangrene 4/15/2020       Past Surgical History   I have reviewed this patient's surgical history and updated it with pertinent information if needed.  Past Surgical History:   Procedure Laterality Date     ANESTHESIA CARDIOVERSION N/A 9/16/2020    Procedure: ANESTHESIA, FOR CONCEPCION/CARDIOVERSION;  Surgeon: GENERIC ANESTHESIA PROVIDER;  Location:  OR     ARTHROPLASTY KNEE Right 11/17/2017    Procedure: ARTHROPLASTY KNEE;  Right total knee arthroplasty using the Arthrex iBalance total knee system;  Surgeon: Hebert Lee MD;  Location: [facility] OR     ARTHROPLASTY KNEE Left 3/19/2018    Procedure: ARTHROPLASTY KNEE;  Left total knee arthroplasty using an Arthrex iBalance total knee system;  Surgeon: Hebert Lee MD;  Location: [facility] OR     ATRIAL SEPTAL DEFECT CLOSURE N/A 8/26/2020    Procedure: ASD Closure;  Surgeon: Freddie Frias MD;  Location:  HEART CARDIAC CATH LAB     CV RIGHT HEART CATH MEASUREMENTS RECORDED N/A 8/26/2020    Procedure: Right Heart Cath;  Surgeon: Freddie Frias MD;  Location:  HEART CARDIAC CATH LAB     DAVINCI HERNIORRHAPHY VENTRAL N/A 12/29/2020    Procedure: ROBOTIC ASSISTED VENTRAL AND INCISIONAL HERNIA REPAIR WITH MESH;  Surgeon: Violetta Lazaro MD;  Location:  OR     DAVINCI HYSTERECTOMY TOTAL, BILATERAL SALPINGO-OOPHORECTOMY, COMBINED Bilateral 1/31/2020    Procedure: DaVinci robotic-assisted total  laparoscopic hysterectomy, bilateral salpingectomy;  Surgeon: Venancio Bartlett MD;  Location: [facility] OR - Path all benign     DAVINCI HYSTERECTOMY TOTAL, SALPINGECTOMY BILATERAL Bilateral 2020    Fibroid uterus, Menometrorrhagia - Robotic TLH, Bilateral salpingectomy - Path all benign     ENT SURGERY       HC COLP VAGINA W CERVIX IF PRES W BIOPSY      [place] for ASCUS     TONSILLECTOMY & ADENOIDECTOMY       Z  DELIVERY ONLY  ,    , Low Cervical       Prior to Admission Medications   Prior to Admission Medications   Prescriptions Last Dose Informant Patient Reported? Taking?   acetaminophen (TYLENOL) 500 MG tablet   Yes No   Sig: Take 500-1,000 mg by mouth every 6 hours as needed for mild pain    albuterol (PROAIR HFA/PROVENTIL HFA/VENTOLIN HFA) 108 (90 Base) MCG/ACT inhaler   No Yes   Sig: Inhale 2 puffs into the lungs every 6 hours as needed for shortness of breath / dyspnea or wheezing   albuterol (PROVENTIL) (2.5 MG/3ML) 0.083% neb solution   No Yes   Sig: Take 1 vial (2.5 mg) by nebulization every 6 hours as needed for shortness of breath, wheezing or cough   aspirin 81 MG EC tablet   Yes No   Sig: Take 81 mg by mouth daily   carvedilol (COREG) 12.5 MG tablet   No Yes   Sig: Take 1 tablet (12.5 mg) by mouth 2 times daily (with meals)   cetirizine (ZYRTEC) 10 MG tablet   Yes No   Sig: Take 10 mg by mouth as needed for allergies   citalopram (CELEXA) 20 MG tablet   No Yes   Sig: Take 1 tablet (20 mg) by mouth daily   fluticasone (FLONASE) 50 MCG/ACT nasal spray   No Yes   Sig: Spray 1 spray into both nostrils daily as needed for rhinitis or allergies   furosemide (LASIX) 20 MG tablet   No Yes   Sig: Take 2 tablet in the morning and 1 tablet in the afternoon   ibuprofen (ADVIL/MOTRIN) 200 MG tablet   No No   Sig: Take 3 tablets (600 mg) by mouth every 6 hours as needed for pain   lisinopril (ZESTRIL) 40 MG tablet   No Yes   Sig: Take 1 tablet (40 mg) by mouth daily    spironolactone (ALDACTONE) 25 MG tablet   No Yes   Sig: Take 1 tablet (25 mg) by mouth daily      Facility-Administered Medications: None     Allergies   Allergies   Allergen Reactions     Penicillins Hives     hives       Social History   I have reviewed this patient's social history and updated it with pertinent information if needed. Lyubov Sanchez  reports that she quit smoking about 16 years ago. Her smoking use included cigarettes. She has a 2.50 pack-year smoking history. She has never used smokeless tobacco. She reports current alcohol use. She reports that she does not use drugs.    Family History   I have reviewed this patient's family history and updated it with pertinent information if needed.   Family History   Adopted: Yes   Problem Relation Age of Onset     Unknown/Adopted Other         pt is adopted and has no access to health history     Unknown/Adopted Mother      Unknown/Adopted Father      Unknown/Adopted Maternal Grandmother      Unknown/Adopted Maternal Grandfather      Unknown/Adopted Paternal Grandmother      Unknown/Adopted Other        -----------------------------------------------------------------------------------------------------------------------------------------------------------------------------------------------------    Review of Systems   The 10 point Review of Systems is negative other than noted in the HPI or here.     Physical Exam   Temp: 98.7  F (37.1  C) Temp src: Oral BP: (!) 160/82 Pulse: 89   Resp: 22 SpO2: 93 % O2 Device: Nasal cannula Oxygen Delivery: 2 LPM  Vital Signs with Ranges  Temp:  [98.7  F (37.1  C)] 98.7  F (37.1  C)  Pulse:  [89] 89  Resp:  [22] 22  BP: (160)/(82) 160/82  SpO2:  [86 %-94 %] 93 %  298 lbs 0 oz    Constitutional: Awake, alert, cooperative, no apparent distress.  Eyes: Conjunctiva and pupils examined and normal.  HEENT: Moist mucous membranes, normal dentition.  Respiratory: Clear to auscultation bilaterally, no crackles or  wheezing.  Cardiovascular: Regular rate and rhythm, normal S1 and S2, and no murmur noted.  GI: Soft, non-distended, non-tender, normal bowel sounds.  Lymph/Hematologic: No anterior cervical or supraclavicular adenopathy.  Skin: No rashes, no cyanosis, no edema.  Musculoskeletal: No joint swelling, erythema or tenderness.  Neurologic: Cranial nerves 2-12 intact, normal strength and sensation.  Psychiatric: Alert, oriented to person, place and time, no obvious anxiety or depression.     Data     Recent Labs   Lab 04/12/23  1000   WBC 7.7   HGB 13.7   MCV 95   *      POTASSIUM 4.0   CHLORIDE 96*   CO2 29   BUN 10.6   CR 0.57   ANIONGAP 13   CARROL 9.5   *       Recent Results (from the past 24 hour(s))   CT Chest Pulmonary Embolism w Contrast    Narrative    CT CHEST PULMONARY EMBOLISM WITH CONTRAST 4/12/2023 12:08 PM    CLINICAL HISTORY: Hypoxia. Elevated D-dimer.    TECHNIQUE: CT angiogram chest during arterial phase injection IV  contrast. 2D and 3D MIP reconstructions were performed by the CT  technologist. Dose reduction techniques were used.     CONTRAST: 90mL Isovue-370    COMPARISON: Chest CT performed 10/19/2022.    FINDINGS:  ANGIOGRAM CHEST: No evidence for pulmonary embolism. The central  pulmonary arteries are dilated, similar to the previous exam, with the  main pulmonary artery measuring up to 4 cm, and again suggesting  pulmonary hypertension. Thoracic aorta is negative for dissection.  Ectasia of the ascending thoracic aorta is unchanged, measuring 4 cm.    LUNGS AND PLEURA: Mild patchy groundglass and nodular opacities in  both lower lungs are new since the previous exam, and may be  infectious in etiology. There is mild bronchial wall thickening and  scattered mucus plugging in both lower lungs, also likely infectious  or inflammatory. No pleural effusions.    MEDIASTINUM/AXILLAE: No enlarged lymph nodes in the chest. Scattered  mildly prominent mediastinal and right hilar lymph  nodes are  unchanged. Atrial septal closure device is again noted.    CORONARY ARTERY CALCIFICATION: None.    UPPER ABDOMEN: Marked hepatic steatosis.    MUSCULOSKELETAL: Degenerative changes in the thoracic spine.      Impression    IMPRESSION:  1.  No evidence for pulmonary embolism.  2.  Mild patchy groundglass and nodular opacities in both lower lungs  as well as bronchial wall thickening and scattered mucus plugging,  most likely infectious in etiology.  3.  Marked hepatic steatosis.  4.  Ectasia of the ascending thoracic aorta and dilatation of the  central pulmonary arteries, unchanged.

## 2023-04-12 NOTE — PLAN OF CARE
"Arrived to floor @1630. Pt a/o x4. BP elevated and on 1 L nasal canula. Denies pain, only has pain when coughing. Voiding adequately. Sputum sample needed. Continue to wean O2 and treating with abx.     BP (!) 176/83   Pulse 98   Temp 98.8  F (37.1  C) (Oral)   Resp 22   Ht 1.626 m (5' 4\")   Wt 135.2 kg (298 lb)   LMP  (LMP Unknown)   SpO2 98%   BMI 51.15 kg/m                          "

## 2023-04-12 NOTE — PHARMACY-ADMISSION MEDICATION HISTORY
Pharmacist Admission Medication History    Admission medication history is complete. The information provided in this note is only as accurate as the sources available at the time of the update.    Medication reconciliation/reorder completed by provider prior to medication history? No    Information Source(s): Patient and CareEverywhere/SureScripts via in-person    Pertinent Information: None    Changes made to PTA medication list:    Added: None    Deleted: Duplicate albuterol entries    Changed: None    Medication Affordability:  Not including over the counter (OTC) medications, was there a time in the past 12 months when you did not take your medications as prescribed because of cost?: No    Allergies reviewed with patient and updates made in EHR: yes    Medication History Completed By: Juan Alva RP 4/12/2023 2:41 PM    PTA Med List   Medication Sig Last Dose     acetaminophen (TYLENOL) 500 MG tablet Take 500-1,000 mg by mouth every 6 hours as needed for mild pain       albuterol (PROAIR HFA/PROVENTIL HFA/VENTOLIN HFA) 108 (90 Base) MCG/ACT inhaler Inhale 2 puffs into the lungs every 6 hours as needed for shortness of breath / dyspnea or wheezing Past Week     albuterol (PROVENTIL) (2.5 MG/3ML) 0.083% neb solution Take 1 vial (2.5 mg) by nebulization every 6 hours as needed for shortness of breath, wheezing or cough Past Week     aspirin 81 MG EC tablet Take 81 mg by mouth daily 4/11/2023 at AM     carvedilol (COREG) 12.5 MG tablet Take 1 tablet (12.5 mg) by mouth 2 times daily (with meals) 4/11/2023 at x2     cetirizine (ZYRTEC) 10 MG tablet Take 10 mg by mouth as needed for allergies More than a month     citalopram (CELEXA) 20 MG tablet Take 1 tablet (20 mg) by mouth daily 4/11/2023 at AM     fluticasone (FLONASE) 50 MCG/ACT nasal spray Spray 1 spray into both nostrils daily as needed for rhinitis or allergies Past Week     furosemide (LASIX) 20 MG tablet Take 2 tablet in the morning and 1 tablet in  the afternoon 4/11/2023 at x2     ibuprofen (ADVIL/MOTRIN) 200 MG tablet Take 400 mg by mouth every 6 hours as needed for pain 4/11/2023 at AM     lisinopril (ZESTRIL) 40 MG tablet Take 1 tablet (40 mg) by mouth daily 4/11/2023 at AM     spironolactone (ALDACTONE) 25 MG tablet Take 1 tablet (25 mg) by mouth daily 4/11/2023 at AM

## 2023-04-13 LAB
ALBUMIN SERPL BCG-MCNC: 4.2 G/DL (ref 3.5–5.2)
ALP SERPL-CCNC: 53 U/L (ref 35–104)
ALT SERPL W P-5'-P-CCNC: 42 U/L (ref 10–35)
ANION GAP SERPL CALCULATED.3IONS-SCNC: 10 MMOL/L (ref 7–15)
AST SERPL W P-5'-P-CCNC: 30 U/L (ref 10–35)
BILIRUB SERPL-MCNC: 0.6 MG/DL
BUN SERPL-MCNC: 12.6 MG/DL (ref 6–20)
CALCIUM SERPL-MCNC: 9.6 MG/DL (ref 8.6–10)
CHLORIDE SERPL-SCNC: 99 MMOL/L (ref 98–107)
CREAT SERPL-MCNC: 0.57 MG/DL (ref 0.51–0.95)
DEPRECATED HCO3 PLAS-SCNC: 30 MMOL/L (ref 22–29)
ERYTHROCYTE [DISTWIDTH] IN BLOOD BY AUTOMATED COUNT: 13.2 % (ref 10–15)
GFR SERPL CREATININE-BSD FRML MDRD: >90 ML/MIN/1.73M2
GLUCOSE SERPL-MCNC: 223 MG/DL (ref 70–99)
HCT VFR BLD AUTO: 41.3 % (ref 35–47)
HGB BLD-MCNC: 13.9 G/DL (ref 11.7–15.7)
MCH RBC QN AUTO: 32.3 PG (ref 26.5–33)
MCHC RBC AUTO-ENTMCNC: 33.7 G/DL (ref 31.5–36.5)
MCV RBC AUTO: 96 FL (ref 78–100)
NT-PROBNP SERPL-MCNC: 317 PG/ML (ref 0–900)
PLATELET # BLD AUTO: 177 10E3/UL (ref 150–450)
POTASSIUM SERPL-SCNC: 4.2 MMOL/L (ref 3.4–5.3)
PROT SERPL-MCNC: 7.2 G/DL (ref 6.4–8.3)
RBC # BLD AUTO: 4.3 10E6/UL (ref 3.8–5.2)
SODIUM SERPL-SCNC: 139 MMOL/L (ref 136–145)
WBC # BLD AUTO: 10.6 10E3/UL (ref 4–11)

## 2023-04-13 PROCEDURE — 99232 SBSQ HOSP IP/OBS MODERATE 35: CPT | Performed by: INTERNAL MEDICINE

## 2023-04-13 PROCEDURE — 80053 COMPREHEN METABOLIC PANEL: CPT | Performed by: INTERNAL MEDICINE

## 2023-04-13 PROCEDURE — 120N000001 HC R&B MED SURG/OB

## 2023-04-13 PROCEDURE — 85014 HEMATOCRIT: CPT | Performed by: INTERNAL MEDICINE

## 2023-04-13 PROCEDURE — 250N000011 HC RX IP 250 OP 636: Performed by: INTERNAL MEDICINE

## 2023-04-13 PROCEDURE — 83880 ASSAY OF NATRIURETIC PEPTIDE: CPT | Performed by: INTERNAL MEDICINE

## 2023-04-13 PROCEDURE — 250N000013 HC RX MED GY IP 250 OP 250 PS 637: Performed by: INTERNAL MEDICINE

## 2023-04-13 PROCEDURE — 36415 COLL VENOUS BLD VENIPUNCTURE: CPT | Performed by: INTERNAL MEDICINE

## 2023-04-13 RX ORDER — NALOXONE HYDROCHLORIDE 0.4 MG/ML
0.2 INJECTION, SOLUTION INTRAMUSCULAR; INTRAVENOUS; SUBCUTANEOUS
Status: DISCONTINUED | OUTPATIENT
Start: 2023-04-13 | End: 2023-04-15 | Stop reason: HOSPADM

## 2023-04-13 RX ORDER — FUROSEMIDE 10 MG/ML
20 INJECTION INTRAMUSCULAR; INTRAVENOUS EVERY 8 HOURS
Status: DISCONTINUED | OUTPATIENT
Start: 2023-04-13 | End: 2023-04-13

## 2023-04-13 RX ORDER — NALOXONE HYDROCHLORIDE 0.4 MG/ML
0.4 INJECTION, SOLUTION INTRAMUSCULAR; INTRAVENOUS; SUBCUTANEOUS
Status: DISCONTINUED | OUTPATIENT
Start: 2023-04-13 | End: 2023-04-15 | Stop reason: HOSPADM

## 2023-04-13 RX ORDER — OXYCODONE HYDROCHLORIDE 5 MG/1
5 TABLET ORAL EVERY 4 HOURS PRN
Status: DISCONTINUED | OUTPATIENT
Start: 2023-04-13 | End: 2023-04-15 | Stop reason: HOSPADM

## 2023-04-13 RX ORDER — BENZONATATE 100 MG/1
100 CAPSULE ORAL 3 TIMES DAILY
Status: DISCONTINUED | OUTPATIENT
Start: 2023-04-13 | End: 2023-04-15 | Stop reason: HOSPADM

## 2023-04-13 RX ORDER — IBUPROFEN 400 MG/1
400 TABLET, FILM COATED ORAL EVERY 6 HOURS PRN
Status: DISCONTINUED | OUTPATIENT
Start: 2023-04-13 | End: 2023-04-15 | Stop reason: HOSPADM

## 2023-04-13 RX ORDER — FUROSEMIDE 40 MG
40 TABLET ORAL DAILY
Status: DISCONTINUED | OUTPATIENT
Start: 2023-04-14 | End: 2023-04-15 | Stop reason: HOSPADM

## 2023-04-13 RX ORDER — FUROSEMIDE 20 MG
20 TABLET ORAL DAILY
Status: DISCONTINUED | OUTPATIENT
Start: 2023-04-14 | End: 2023-04-15 | Stop reason: HOSPADM

## 2023-04-13 RX ADMIN — SPIRONOLACTONE 25 MG: 25 TABLET ORAL at 09:36

## 2023-04-13 RX ADMIN — IBUPROFEN 400 MG: 400 TABLET, FILM COATED ORAL at 23:14

## 2023-04-13 RX ADMIN — GUAIFENESIN AND CODEINE PHOSPHATE 5 ML: 10; 100 LIQUID ORAL at 01:33

## 2023-04-13 RX ADMIN — DOXYCYCLINE HYCLATE 100 MG: 100 CAPSULE ORAL at 19:32

## 2023-04-13 RX ADMIN — ENOXAPARIN SODIUM 40 MG: 40 INJECTION SUBCUTANEOUS at 09:36

## 2023-04-13 RX ADMIN — FUROSEMIDE 20 MG: 20 TABLET ORAL at 09:36

## 2023-04-13 RX ADMIN — ACETAMINOPHEN 650 MG: 325 TABLET ORAL at 01:33

## 2023-04-13 RX ADMIN — DOXYCYCLINE HYCLATE 100 MG: 100 CAPSULE ORAL at 09:36

## 2023-04-13 RX ADMIN — FUROSEMIDE 20 MG: 10 INJECTION, SOLUTION INTRAMUSCULAR; INTRAVENOUS at 13:15

## 2023-04-13 RX ADMIN — GUAIFENESIN AND CODEINE PHOSPHATE 5 ML: 10; 100 LIQUID ORAL at 07:09

## 2023-04-13 RX ADMIN — CEFTRIAXONE 1 G: 1 INJECTION, POWDER, FOR SOLUTION INTRAMUSCULAR; INTRAVENOUS at 13:18

## 2023-04-13 RX ADMIN — ENOXAPARIN SODIUM 40 MG: 40 INJECTION SUBCUTANEOUS at 19:32

## 2023-04-13 RX ADMIN — ACETAMINOPHEN 650 MG: 325 TABLET ORAL at 07:09

## 2023-04-13 RX ADMIN — BENZONATATE 100 MG: 100 CAPSULE ORAL at 16:29

## 2023-04-13 RX ADMIN — LISINOPRIL 40 MG: 10 TABLET ORAL at 09:36

## 2023-04-13 RX ADMIN — GUAIFENESIN AND CODEINE PHOSPHATE 5 ML: 10; 100 LIQUID ORAL at 13:16

## 2023-04-13 RX ADMIN — OXYCODONE HYDROCHLORIDE 5 MG: 5 TABLET ORAL at 13:15

## 2023-04-13 RX ADMIN — OXYCODONE HYDROCHLORIDE 5 MG: 5 TABLET ORAL at 18:21

## 2023-04-13 RX ADMIN — BENZONATATE 100 MG: 100 CAPSULE ORAL at 21:30

## 2023-04-13 RX ADMIN — CARVEDILOL 12.5 MG: 12.5 TABLET, FILM COATED ORAL at 18:21

## 2023-04-13 RX ADMIN — CARVEDILOL 12.5 MG: 12.5 TABLET, FILM COATED ORAL at 09:36

## 2023-04-13 RX ADMIN — ASPIRIN 81 MG: 81 TABLET, COATED ORAL at 09:36

## 2023-04-13 RX ADMIN — CITALOPRAM HYDROBROMIDE 20 MG: 20 TABLET ORAL at 09:36

## 2023-04-13 ASSESSMENT — ACTIVITIES OF DAILY LIVING (ADL)
ADLS_ACUITY_SCORE: 35

## 2023-04-13 NOTE — PLAN OF CARE
Goal Outcome Evaluation:       End of Shift Summary  For vital signs and complete assessments, please see documentation flowsheets.     Pertinent assessments: Alert and oriented. Complains of pain in right breast area when coughing. Frequent harsh cough early in shift. Robitussin AC and tylenol given.   Major Shift Events none  Treatment Plan: Doxy, Rocephin. Monitor sats, O2 , nebs  Bedside Nurse: Gianna Richmond RN

## 2023-04-13 NOTE — PLAN OF CARE
To Do:  End of Shift Summary  For vital signs and complete assessments, please see documentation flowsheets.     Pertinent assessments: AxOx4. VSS, 2L NC. Complains of pain in right breast area when coughing. Robitussin x1; oxycodone x1. Frequent harsh cough early in shift with production.     Major Shift Events: IV lasix started.     Treatment Plan: Doxy, Rocephin. Monitor sats, O2 support - wean as able    Bedside Nurse: Renu Bowers RN

## 2023-04-14 LAB
ANION GAP SERPL CALCULATED.3IONS-SCNC: 9 MMOL/L (ref 7–15)
BUN SERPL-MCNC: 16.8 MG/DL (ref 6–20)
CALCIUM SERPL-MCNC: 9.4 MG/DL (ref 8.6–10)
CHLORIDE SERPL-SCNC: 98 MMOL/L (ref 98–107)
CREAT SERPL-MCNC: 0.64 MG/DL (ref 0.51–0.95)
DEPRECATED HCO3 PLAS-SCNC: 31 MMOL/L (ref 22–29)
ERYTHROCYTE [DISTWIDTH] IN BLOOD BY AUTOMATED COUNT: 13.5 % (ref 10–15)
GFR SERPL CREATININE-BSD FRML MDRD: >90 ML/MIN/1.73M2
GLUCOSE SERPL-MCNC: 158 MG/DL (ref 70–99)
HCT VFR BLD AUTO: 45.8 % (ref 35–47)
HGB BLD-MCNC: 15 G/DL (ref 11.7–15.7)
MCH RBC QN AUTO: 32.1 PG (ref 26.5–33)
MCHC RBC AUTO-ENTMCNC: 32.8 G/DL (ref 31.5–36.5)
MCV RBC AUTO: 98 FL (ref 78–100)
PLATELET # BLD AUTO: 177 10E3/UL (ref 150–450)
POTASSIUM SERPL-SCNC: 4.6 MMOL/L (ref 3.4–5.3)
RBC # BLD AUTO: 4.67 10E6/UL (ref 3.8–5.2)
SODIUM SERPL-SCNC: 138 MMOL/L (ref 136–145)
WBC # BLD AUTO: 8.9 10E3/UL (ref 4–11)

## 2023-04-14 PROCEDURE — 250N000011 HC RX IP 250 OP 636: Performed by: INTERNAL MEDICINE

## 2023-04-14 PROCEDURE — 250N000013 HC RX MED GY IP 250 OP 250 PS 637: Performed by: INTERNAL MEDICINE

## 2023-04-14 PROCEDURE — 99232 SBSQ HOSP IP/OBS MODERATE 35: CPT | Performed by: INTERNAL MEDICINE

## 2023-04-14 PROCEDURE — 120N000001 HC R&B MED SURG/OB

## 2023-04-14 PROCEDURE — 80048 BASIC METABOLIC PNL TOTAL CA: CPT | Performed by: INTERNAL MEDICINE

## 2023-04-14 PROCEDURE — 85027 COMPLETE CBC AUTOMATED: CPT | Performed by: INTERNAL MEDICINE

## 2023-04-14 PROCEDURE — 36415 COLL VENOUS BLD VENIPUNCTURE: CPT | Performed by: INTERNAL MEDICINE

## 2023-04-14 RX ORDER — FUROSEMIDE 10 MG/ML
40 INJECTION INTRAMUSCULAR; INTRAVENOUS
Status: DISCONTINUED | OUTPATIENT
Start: 2023-04-14 | End: 2023-04-15 | Stop reason: HOSPADM

## 2023-04-14 RX ORDER — LANOLIN ALCOHOL/MO/W.PET/CERES
3 CREAM (GRAM) TOPICAL
Status: DISCONTINUED | OUTPATIENT
Start: 2023-04-14 | End: 2023-04-15 | Stop reason: HOSPADM

## 2023-04-14 RX ORDER — TEMAZEPAM 7.5 MG/1
7.5 CAPSULE ORAL
Status: DISCONTINUED | OUTPATIENT
Start: 2023-04-14 | End: 2023-04-15 | Stop reason: HOSPADM

## 2023-04-14 RX ADMIN — BENZONATATE 100 MG: 100 CAPSULE ORAL at 08:36

## 2023-04-14 RX ADMIN — BENZONATATE 100 MG: 100 CAPSULE ORAL at 16:26

## 2023-04-14 RX ADMIN — IBUPROFEN 400 MG: 400 TABLET, FILM COATED ORAL at 08:40

## 2023-04-14 RX ADMIN — CEFTRIAXONE 1 G: 1 INJECTION, POWDER, FOR SOLUTION INTRAMUSCULAR; INTRAVENOUS at 12:53

## 2023-04-14 RX ADMIN — IBUPROFEN 400 MG: 400 TABLET, FILM COATED ORAL at 18:17

## 2023-04-14 RX ADMIN — LISINOPRIL 40 MG: 10 TABLET ORAL at 08:37

## 2023-04-14 RX ADMIN — SPIRONOLACTONE 25 MG: 25 TABLET ORAL at 08:36

## 2023-04-14 RX ADMIN — ENOXAPARIN SODIUM 40 MG: 40 INJECTION SUBCUTANEOUS at 19:34

## 2023-04-14 RX ADMIN — CITALOPRAM HYDROBROMIDE 20 MG: 20 TABLET ORAL at 08:37

## 2023-04-14 RX ADMIN — FUROSEMIDE 40 MG: 40 TABLET ORAL at 08:36

## 2023-04-14 RX ADMIN — DOXYCYCLINE HYCLATE 100 MG: 100 CAPSULE ORAL at 19:34

## 2023-04-14 RX ADMIN — DOXYCYCLINE HYCLATE 100 MG: 100 CAPSULE ORAL at 08:36

## 2023-04-14 RX ADMIN — ENOXAPARIN SODIUM 40 MG: 40 INJECTION SUBCUTANEOUS at 08:36

## 2023-04-14 RX ADMIN — BENZONATATE 100 MG: 100 CAPSULE ORAL at 21:29

## 2023-04-14 RX ADMIN — FUROSEMIDE 40 MG: 10 INJECTION, SOLUTION INTRAMUSCULAR; INTRAVENOUS at 16:25

## 2023-04-14 RX ADMIN — CARVEDILOL 12.5 MG: 12.5 TABLET, FILM COATED ORAL at 08:36

## 2023-04-14 RX ADMIN — CARVEDILOL 12.5 MG: 12.5 TABLET, FILM COATED ORAL at 18:17

## 2023-04-14 RX ADMIN — ASPIRIN 81 MG: 81 TABLET, COATED ORAL at 08:36

## 2023-04-14 RX ADMIN — POLYETHYLENE GLYCOL 3350 17 G: 17 POWDER, FOR SOLUTION ORAL at 08:36

## 2023-04-14 ASSESSMENT — ACTIVITIES OF DAILY LIVING (ADL)
ADLS_ACUITY_SCORE: 35

## 2023-04-14 NOTE — PROGRESS NOTES
[facility]    Medicine Progress Note - Hospitalist Service    Date of Admission:  4/12/2023    Assessment & Plan      Lyubov Sanchez is a 52 year old female with past medical history of morbid obesity, KRISTYN with CPAP, history of recurrent pneumonias, atrial fibrillation not currently on anticoagulation, hypertension, history of aortic aneurysm, anxiety/depression who presented on 4/12/2023 with chief complaint of shortness of breath.  She reported 2 weeks of cough.  She been seen in urgent care 1 week earlier with negative chest x-ray.  She had fever at home.  Emergency department showed temperature of 90.7  F, heart rate 89, blood pressure 160/82, respiratory rate 22, SPO2 87% on room air that improved to 93% with 2 L nasal cannula.  Initial lab work showed chloride 96, glucose 226, procalcitonin 0.08, WBC 7.7, D-dimer 0.70.  COVID, influenza, and RSV were negative.  CT chest showed no evidence of pulmonary embolism, mild patchy groundglass and nodular opacities in both lower lungs as well as bronchial wall thickening and scattered mucus plugging, marked hepatic steatosis, ectasia of the ascending thoracic aorta and dilation of the central pulmonary arteries that is unchanged from prior.  Lyubov was started on ceftriaxone and doxycycline with concern for community-acquired pneumonia.  She was admitted for further cares.  Procalcitonin came back only mildly elevated.  Respiratory panel was sent was negative.      ASSESSMENT/PLAN     Acute Hypoxic Respiratory Failure  Community Acquired Pneumonia, possibly viral  -Continue Rocephin and Zithromax  -Continue scheduled Tessalon Perles  -Continue ibuprofen and oxycodone for pleuritic chest pain associated with coughing  -Wean from supplemental oxygen as able- SaO2 marginal today. Added incentive spirometry.  -Continue trial of diuresis to see if that helps oxygen weaning- 40 mg IV BID for now.  Likely increase PTA dose of Lasix to 40 mg p.o. twice  "daily on discharge  -A.m. basic metabolic panel     Hyperglycemia  - on arrival but was not fasting  -Blood sugar the morning after admission was elevated at 223 but this was after giving Solu-Medrol in the emergency department the day before  -Blood glucose this morning 158  -Hemoglobin A1c was 6.6  -We will discuss lifestyle changes versus possibly starting medication with patient.  With hemoglobin A1c of 6.6, I might favor lifestyle changes with close outpatient follow-up  -Primary care follow-up     Mild Thrombocytopenia  -Likely secondary to infection  -AM CBC     Paroxysmal Atrial Fibrillation  -States she was previously on eliquis, but no longer takes it.  Now is just on ASA 81 mg daily     Hypertension  -Continue prior to admission Coreg, lisinopril, spironolactone     Chronic Medical Problems:  KRISTYN with CPAP  Morbid Obesity - BMI 51.15  Aortic Aneurysm  Anxiety/Depression         Diet: Combination Diet Regular Diet Adult    DVT Prophylaxis: Enoxaparin (Lovenox) SQ  Martell Catheter: Not present  Lines: None     Cardiac Monitoring: None  Code Status: Full Code      Clinically Significant Risk Factors                       # DMII: A1C = 6.6 % (Ref range: <5.7 %) within past 6 months, PRESENT ON ADMISSION  # Severe Obesity: Estimated body mass index is 51.15 kg/m  as calculated from the following:    Height as of this encounter: 1.626 m (5' 4\").    Weight as of this encounter: 135.2 kg (298 lb)., PRESENT ON ADMISSION         Disposition Plan      Expected Discharge Date: 04/15/2023                  Marcelino Jacobs MD  Hospitalist Service  [facility]  Securely message with [employer] (more info)  Text page via [employer] Paging/Directory   ______________________________________________________________________    Interval History   Felt pretty fatigued today.  Was not quite weaned off of oxygen.    Physical Exam   Vital Signs: Temp: 98.6  F (37  C) Temp src: Oral BP: (!) 176/84 Pulse: 68   " Resp: 20 SpO2: 90 % O2 Device: Nasal cannula Oxygen Delivery: 1 LPM  Weight: 298 lbs 0 oz    GENERAL:  Comfortable. Cooperative.  PSYCH: pleasant, oriented, No acute distress.  EYES: PERRLA, Normal conjunctiva.  HEART:  Regular rate and rhythm. No JVD. Pulses normal. No edema.  LUNGS:  Clear to auscultation, normal Respiratory effort.  ABDOMEN:  Soft, no hepatosplenomegaly, normal bowel sounds.  EXTREMETIES: No clubbing, cyanosis or ischemia  SKIN:  Dry to touch, No rash.      Medical Decision Making       45 MINUTES SPENT BY ME on the date of service doing chart review, history, exam, documentation & further activities per the note.      Data     I have personally reviewed the following data over the past 24 hrs:    8.9  \   15.0   / 177     138 98 16.8 /  158 (H)   4.6 31 (H) 0.64 \       Imaging results reviewed over the past 24 hrs:   No results found for this or any previous visit (from the past 24 hour(s)).  Recent Labs   Lab 04/14/23  0845 04/13/23  0724 04/12/23  1000   WBC 8.9 10.6 7.7   HGB 15.0 13.9 13.7   MCV 98 96 95    177 141*    139 138   POTASSIUM 4.6 4.2 4.0   CHLORIDE 98 99 96*   CO2 31* 30* 29   BUN 16.8 12.6 10.6   CR 0.64 0.57 0.57  0.57   ANIONGAP 9 10 13   CARROL 9.4 9.6 9.5   * 223* 226*   ALBUMIN  --  4.2  --    PROTTOTAL  --  7.2  --    BILITOTAL  --  0.6  --    ALKPHOS  --  53  --    ALT  --  42*  --    AST  --  30  --

## 2023-04-14 NOTE — PLAN OF CARE
Goal Outcome Evaluation:       End of Shift Summary  For vital signs and complete assessments, please see documentation flowsheets.     Pertinent assessments:  Pt A&OX4. Vss and weaned to 1L NC. LS clear and no sob noted. Tolerated diet and no nausea. Had  pain  to the right breast when she coughs and oxy given x1 with relief. Infrequent productive cough.    Major Shift Events :None    Treatment Plan: IV rocephin, po doxy, monitor O2.

## 2023-04-14 NOTE — PROGRESS NOTES
Pt A/Ox4, pleasant. Up ad desiree in room. K+ protocol. O2 1 LPM/NC need to wean off. R sided pleuritic pain under breast area, pt stated ibuprofen helped.  Tolerating reg diet. CMS intact. IS given to pt and showed how to use, to bring up O2 sats. Currently at 89-91% without oxygen on. PIV on left hand,  ABX/Rocephin given. Pt took shower, replaced continuous O2 probe. Pt resting in bed

## 2023-04-14 NOTE — PROGRESS NOTES
[facility]    Medicine Progress Note - Hospitalist Service    Date of Admission:  4/12/2023    Assessment & Plan     Lyubov Sanchez is a 52 year old female with past medical history of morbid obesity, KRISTYN with CPAP, history of recurrent pneumonias, atrial fibrillation not currently on anticoagulation, hypertension, history of aortic aneurysm, anxiety/depression who presented on 4/12/2023 with chief complaint of shortness of breath.  She reported 2 weeks of cough.  She been seen in urgent care 1 week earlier with negative chest x-ray.  She had fever at home.  Emergency department showed temperature of 90.7  F, heart rate 89, blood pressure 160/82, respiratory rate 22, SPO2 87% on room air that improved to 93% with 2 L nasal cannula.  Initial lab work showed chloride 96, glucose 226, procalcitonin 0.08, WBC 7.7, D-dimer 0.70.  COVID, influenza, and RSV were negative.  CT chest showed no evidence of pulmonary embolism, mild patchy groundglass and nodular opacities in both lower lungs as well as bronchial wall thickening and scattered mucus plugging, marked hepatic steatosis, ectasia of the ascending thoracic aorta and dilation of the central pulmonary arteries that is unchanged from prior.  Lyubov was started on ceftriaxone and doxycycline with concern for community-acquired pneumonia.  She was admitted for further cares.  Procalcitonin came back only mildly elevated.  Respiratory panel was sent was negative.      ASSESSMENT/PLAN     Acute Hypoxic Respiratory Failure  Community Acquired Pneumonia, possibly viral  -Continue Rocephin and Zithromax  -Schedule Tessalon Perles  -Add ibuprofen and oxycodone for pleuritic chest pain associated with coughing  -Wean from supplemental oxygen as able  -Trial of diuresis to see if that helps oxygen weaning  -A.m. basic metabolic panel     Hyperglycemia  - on arrival but was not fasting  -Blood sugar this morning was elevated at 223 but this was after  "giving Solu-Medrol in the emergency department.  -Hemoglobin A1c was 6.6  -We will discuss lifestyle changes versus possibly starting medication with patient.  With hemoglobin A1c of 6.6, I might favor lifestyle changes with close outpatient follow-up  -Primary care follow-up     Mild Thrombocytopenia  -Likely secondary to infection  -AM CBC     Paroxysmal Atrial Fibrillation  -States she was previously on eliquis, but no longer takes it.  Now is just on ASA 81 mg daily     Hypertension  -Continue prior to admission Coreg, lisinopril, spironolactone     Chronic Medical Problems:  KRISTYN with CPAP  Morbid Obesity - BMI 51.15  Aortic Aneurysm  Anxiety/Depression       Diet: Combination Diet Regular Diet Adult    DVT Prophylaxis: Enoxaparin (Lovenox) SQ  Martell Catheter: Not present  Lines: None     Cardiac Monitoring: None  Code Status: Full Code      Clinically Significant Risk Factors                       # DMII: A1C = 6.6 % (Ref range: <5.7 %) within past 6 months, PRESENT ON ADMISSION  # Severe Obesity: Estimated body mass index is 51.15 kg/m  as calculated from the following:    Height as of this encounter: 1.626 m (5' 4\").    Weight as of this encounter: 135.2 kg (298 lb)., PRESENT ON ADMISSION         Disposition Plan      Expected Discharge Date: 04/15/2023                  Marcelino Jacobs MD  Hospitalist Service  [facility]  Securely message with [employer] (more info)  Text page via [email or web address] Paging/Directory   ______________________________________________________________________    Interval History   No new problems.  Still with cough and associated right-sided pleuritic chest pain.  Still needing a lot of oxygen.  Overall feeling a little bit better.    Physical Exam   Vital Signs: Temp: 98.5  F (36.9  C) Temp src: Oral BP: (!) 142/83 Pulse: 68   Resp: 16 SpO2: 94 % O2 Device: Nasal cannula Oxygen Delivery: 1 LPM  Weight: 298 lbs 0 oz    GENERAL:  Comfortable. Cooperative.  PSYCH: pleasant, " oriented, No acute distress.  EYES: PERRLA, Normal conjunctiva.  HEART:  Regular rate and rhythm. No JVD. Pulses normal. No edema.  LUNGS:  Clear to auscultation, normal Respiratory effort.  ABDOMEN:  Soft, no hepatosplenomegaly, normal bowel sounds.  EXTREMETIES: No clubbing, cyanosis or ischemia  SKIN:  Dry to touch, No rash.      Medical Decision Making       45 MINUTES SPENT BY ME on the date of service doing chart review, history, exam, documentation & further activities per the note.      Data     I have personally reviewed the following data over the past 24 hrs:    10.6  \   13.9   / 177     139 99 12.6 /  223 (H)   4.2 30 (H) 0.57 \       ALT: 42 (H) AST: 30 AP: 53 TBILI: 0.6   ALB: 4.2 TOT PROTEIN: 7.2 LIPASE: N/A       Trop: N/A BNP: 317       Imaging results reviewed over the past 24 hrs:   No results found for this or any previous visit (from the past 24 hour(s)).  Recent Labs   Lab 04/13/23  0724 04/12/23  1000   WBC 10.6 7.7   HGB 13.9 13.7   MCV 96 95    141*    138   POTASSIUM 4.2 4.0   CHLORIDE 99 96*   CO2 30* 29   BUN 12.6 10.6   CR 0.57 0.57  0.57   ANIONGAP 10 13   CARROL 9.6 9.5   * 226*   ALBUMIN 4.2  --    PROTTOTAL 7.2  --    BILITOTAL 0.6  --    ALKPHOS 53  --    ALT 42*  --    AST 30  --

## 2023-04-14 NOTE — PLAN OF CARE
Goal Outcome Evaluation:    Pertinent assessments: Assumed cares [id number]. Pt A&Ox4. C/o pain in R side of chest with coughing. Prn Ibuprofen given with improvement. Vitals stable. LS clear on 1L O2; sats high 90s. Patient reported occasional cough, and saying Tessalon pearls effective. Reported some shortness of breath. Appeared to be sleeping most of the night. Independent in the room.

## 2023-04-15 ENCOUNTER — HEALTH MAINTENANCE LETTER (OUTPATIENT)
Age: 53
End: 2023-04-15

## 2023-04-15 VITALS
DIASTOLIC BLOOD PRESSURE: 78 MMHG | RESPIRATION RATE: 20 BRPM | WEIGHT: 293 LBS | HEART RATE: 71 BPM | OXYGEN SATURATION: 92 % | HEIGHT: 64 IN | BODY MASS INDEX: 50.02 KG/M2 | SYSTOLIC BLOOD PRESSURE: 145 MMHG | TEMPERATURE: 98.1 F

## 2023-04-15 LAB
CREAT SERPL-MCNC: 0.59 MG/DL (ref 0.51–0.95)
GFR SERPL CREATININE-BSD FRML MDRD: >90 ML/MIN/1.73M2
PLATELET # BLD AUTO: 154 10E3/UL (ref 150–450)

## 2023-04-15 PROCEDURE — 250N000011 HC RX IP 250 OP 636: Performed by: INTERNAL MEDICINE

## 2023-04-15 PROCEDURE — 82565 ASSAY OF CREATININE: CPT | Performed by: INTERNAL MEDICINE

## 2023-04-15 PROCEDURE — 36415 COLL VENOUS BLD VENIPUNCTURE: CPT | Performed by: INTERNAL MEDICINE

## 2023-04-15 PROCEDURE — 250N000013 HC RX MED GY IP 250 OP 250 PS 637: Performed by: INTERNAL MEDICINE

## 2023-04-15 PROCEDURE — 99239 HOSP IP/OBS DSCHRG MGMT >30: CPT | Performed by: INTERNAL MEDICINE

## 2023-04-15 PROCEDURE — 85049 AUTOMATED PLATELET COUNT: CPT | Performed by: INTERNAL MEDICINE

## 2023-04-15 PROCEDURE — 999N000127 HC STATISTIC PERIPHERAL IV START W US GUIDANCE

## 2023-04-15 RX ORDER — FUROSEMIDE 40 MG
40 TABLET ORAL 2 TIMES DAILY
Qty: 60 TABLET | Refills: 2 | Status: SHIPPED | OUTPATIENT
Start: 2023-04-15 | End: 2023-11-15

## 2023-04-15 RX ORDER — BENZONATATE 100 MG/1
100 CAPSULE ORAL 3 TIMES DAILY PRN
Qty: 30 CAPSULE | Refills: 1 | Status: SHIPPED | OUTPATIENT
Start: 2023-04-15 | End: 2023-10-23

## 2023-04-15 RX ORDER — DOXYCYCLINE 100 MG/1
100 CAPSULE ORAL 2 TIMES DAILY
Qty: 9 CAPSULE | Refills: 0 | Status: SHIPPED | OUTPATIENT
Start: 2023-04-15 | End: 2023-04-20

## 2023-04-15 RX ORDER — IBUPROFEN 200 MG
400 TABLET ORAL EVERY 6 HOURS PRN
Qty: 15 TABLET | Refills: 1 | Status: SHIPPED | OUTPATIENT
Start: 2023-04-15

## 2023-04-15 RX ORDER — CEFUROXIME AXETIL 500 MG/1
500 TABLET ORAL 2 TIMES DAILY
Qty: 9 TABLET | Refills: 0 | Status: SHIPPED | OUTPATIENT
Start: 2023-04-15 | End: 2023-04-20

## 2023-04-15 RX ORDER — ACETAMINOPHEN 500 MG
500-1000 TABLET ORAL EVERY 6 HOURS PRN
COMMUNITY
Start: 2023-04-15 | End: 2023-10-23

## 2023-04-15 RX ORDER — FLUCONAZOLE 150 MG/1
150 TABLET ORAL ONCE
Qty: 1 TABLET | Refills: 1 | Status: SHIPPED | OUTPATIENT
Start: 2023-04-15 | End: 2023-04-15

## 2023-04-15 RX ORDER — CODEINE PHOSPHATE AND GUAIFENESIN 10; 100 MG/5ML; MG/5ML
5 SOLUTION ORAL EVERY 4 HOURS PRN
Qty: 180 ML | Refills: 0 | Status: SHIPPED | OUTPATIENT
Start: 2023-04-15 | End: 2023-10-23

## 2023-04-15 RX ADMIN — FUROSEMIDE 40 MG: 10 INJECTION, SOLUTION INTRAMUSCULAR; INTRAVENOUS at 10:50

## 2023-04-15 RX ADMIN — ENOXAPARIN SODIUM 40 MG: 40 INJECTION SUBCUTANEOUS at 09:39

## 2023-04-15 RX ADMIN — DOXYCYCLINE HYCLATE 100 MG: 100 CAPSULE ORAL at 09:37

## 2023-04-15 RX ADMIN — IBUPROFEN 400 MG: 400 TABLET, FILM COATED ORAL at 12:04

## 2023-04-15 RX ADMIN — LISINOPRIL 40 MG: 10 TABLET ORAL at 09:37

## 2023-04-15 RX ADMIN — ASPIRIN 81 MG: 81 TABLET, COATED ORAL at 09:37

## 2023-04-15 RX ADMIN — SPIRONOLACTONE 25 MG: 25 TABLET ORAL at 09:37

## 2023-04-15 RX ADMIN — BENZONATATE 100 MG: 100 CAPSULE ORAL at 09:37

## 2023-04-15 RX ADMIN — IBUPROFEN 400 MG: 400 TABLET, FILM COATED ORAL at 01:38

## 2023-04-15 RX ADMIN — CEFTRIAXONE 1 G: 1 INJECTION, POWDER, FOR SOLUTION INTRAMUSCULAR; INTRAVENOUS at 10:55

## 2023-04-15 RX ADMIN — CARVEDILOL 12.5 MG: 12.5 TABLET, FILM COATED ORAL at 09:37

## 2023-04-15 RX ADMIN — CITALOPRAM HYDROBROMIDE 20 MG: 20 TABLET ORAL at 09:38

## 2023-04-15 ASSESSMENT — ACTIVITIES OF DAILY LIVING (ADL)
ADLS_ACUITY_SCORE: 35

## 2023-04-15 NOTE — DISCHARGE SUMMARY
Patient hospitalized for pneumonia. Cleared for discharge to home today per MD. Discharge instructions, medications, and follow-ups reviewed with patient. Discharge medications to be filled at patient's preferred pharmacy including guaifenesin-codeine. Patient verbalized understanding of discharge instructions. Belongings were returned to patient at time of discharge.  providing transport.

## 2023-04-15 NOTE — PLAN OF CARE
End of Shift Summary  For vital signs and complete assessments, please see documentation flowsheets.     Pertinent assessments: VSS, pt states improvement in SOB, decrease in coughing, sats WNL on 1/2lpm. Lungs diminished and clear. UP independent in room. Ibuprofen given for chest discomfort from cough.     Major Shift Events Uneventful    Treatment Plan: IV rocephin, po doxy, monitor resp status.    Bedside Nurse: Norma Mejia RN

## 2023-04-15 NOTE — PLAN OF CARE
Goal Outcome Evaluation:       End of Shift Summary  For vital signs and complete assessments, please see documentation flowsheets.     Pertinent assessments:  Pt A&OX4, VSS Awith BP elevated and coreg given and Bp down to normal. On 0.5L tried to wean but Pt kept desating 88-86.. Had a good appetite this shift. Ibuprofen given for pain with relief. IS in use with some progress. Some infrequent  non-productive cough. Indep in the room.    Major Shift Events :None    Treatment Plan: IV rocephin, po doxy, pain management, monitor O2.

## 2023-04-18 NOTE — DISCHARGE SUMMARY
[facility]  Hospitalist Discharge Summary      Date of Admission:  4/12/2023  Date of Discharge:  4/15/2023  2:22 PM  Discharging Provider: Marcelino Jacobs MD  Discharge Service: Hospitalist Service    Discharge Diagnoses      Acute Hypoxic Respiratory Failure  Community Acquired Pneumonia, possibly viral  Hyperglycemia, improved  Pre-diabetes  Mild Thrombocytopenia  Paroxysmal Atrial Fibrillation  Hypertension    Follow-ups Needed After Discharge   Follow-up Appointments     Follow-up and recommended labs and tests       Follow up with primary care provider, Yolie Delarosa, within 7   days for hospital follow- up.  The following labs/tests are recommended:   basic metabolic  panel. Also, review blood sugar and hemoglobin A1C in   consideration os starting an oral medication for pre-diabetes.             Unresulted Labs Ordered in the Past 30 Days of this Admission     No orders found from 3/13/2023 to 4/13/2023.          Discharge Disposition   Discharged to home  Condition at discharge: Stable      Hospital Course      Lyubov Sanchez is a 52 year old female with past medical history of morbid obesity, KRISTYN with CPAP, history of recurrent pneumonias, atrial fibrillation not currently on anticoagulation, hypertension, history of aortic aneurysm, anxiety/depression who presented on 4/12/2023 with chief complaint of shortness of breath.  She reported 2 weeks of cough.  She been seen in urgent care 1 week earlier with negative chest x-ray.  She had fever at home.  Emergency department showed temperature of 90.7  F, heart rate 89, blood pressure 160/82, respiratory rate 22, SPO2 87% on room air that improved to 93% with 2 L nasal cannula.  Initial lab work showed chloride 96, glucose 226, procalcitonin 0.08, WBC 7.7, D-dimer 0.70.  COVID, influenza, and RSV were negative.  CT chest showed no evidence of pulmonary embolism, mild patchy groundglass and nodular opacities in both lower lungs  as well as bronchial wall thickening and scattered mucus plugging, marked hepatic steatosis, ectasia of the ascending thoracic aorta and dilation of the central pulmonary arteries that is unchanged from prior.  Lyubov was started on ceftriaxone and doxycycline with concern for community-acquired pneumonia.  She was admitted for further cares.  Procalcitonin came back only mildly elevated.  Respiratory panel was sent was negative.      ASSESSMENT/PLAN     Acute Hypoxic Respiratory Failure  Community Acquired Pneumonia, possibly viral  -Complete 7 day courses of antibiotic with Ceftin and oral doxycycline  -Prescribe Tessalon Perles on discharge  -Increase PTA dose of Lasix to 40 mg po BID and get BMP in follow up     Hyperglycemia  - on arrival but was not fasting  -Blood sugar the morning after admission was elevated at 223 but this was after giving Solu-Medrol in the emergency department the day before  -Blood glucose improved some  -HGB A1C was6.6  -Outpatient follow up     Mild Thrombocytopenia  -Likely secondary to infection  -AM CBC     Paroxysmal Atrial Fibrillation  -States she was previously on eliquis, but no longer takes it.  Now is just on ASA 81 mg daily     Hypertension  -Continue prior to admission Coreg, lisinopril, spironolactone     Chronic Medical Problems:  KRISTYN with CPAP  Morbid Obesity - BMI 51.15  Aortic Aneurysm  Anxiety/Depression       Consultations This Hospital Stay   None    Code Status   Prior    Time Spent on this Encounter   I, Marcelino Jacobs MD, personally saw the patient today and spent greater than 30 minutes discharging this patient.       Marcelino Jacobs MD  [place] [address] [facility]  [address] [place] [address]  Phone: [phone]  Fax: [phone]  ______________________________________________________________________    Physical Exam   Vital Signs:                   Weight: 294 lbs 12.8 oz  GENERAL:  Comfortable.  Cooperative.  PSYCH: pleasant, oriented, No acute distress.  EYES: PERRLA, Normal conjunctiva.  HEART:  Regular rate and rhythm. No JVD. Pulses normal. No edema.  LUNGS:  Clear to auscultation, normal Respiratory effort.  ABDOMEN:  Soft, no hepatosplenomegaly, normal bowel sounds.  EXTREMETIES: No clubbing, cyanosis or ischemia  SKIN:  Dry to touch, No rash.         Primary Care Physician   Yolie Delarosa    Discharge Orders      Reason for your hospital stay    Community acquired pneumonia, possibly viral.     Follow-up and recommended labs and tests     Follow up with primary care provider, Yolie Delarosa, within 7 days for hospital follow- up.  The following labs/tests are recommended: basic metabolic  panel. Also, review blood sugar and hemoglobin A1C in consideration os starting an oral medication for pre-diabetes.     Activity    Your activity upon discharge: activity as tolerated     Discharge Instructions    Resume PTA CPAP with sleep     Diet    Follow this diet upon discharge: Regular diet, avoid simple sugars and excessive carbohyrates.       Significant Results and Procedures   Most Recent 3 CBC's:Recent Labs   Lab Test 04/15/23  0616 04/14/23  0845 04/13/23  0724 04/12/23  1000   WBC  --  8.9 10.6 7.7   HGB  --  15.0 13.9 13.7   MCV  --  98 96 95    177 177 141*     Most Recent 3 BMP's:Recent Labs   Lab Test 04/15/23  0616 04/14/23  0845 04/13/23  0724 04/12/23  1000   NA  --  138 139 138   POTASSIUM  --  4.6 4.2 4.0   CHLORIDE  --  98 99 96*   CO2  --  31* 30* 29   BUN  --  16.8 12.6 10.6   CR 0.59 0.64 0.57 0.57  0.57   ANIONGAP  --  9 10 13   CARROL  --  9.4 9.6 9.5   GLC  --  158* 223* 226*     7-Day Micro Results     Collected Updated Procedure Result Status      04/12/2023 1834 04/12/2023 2214 Respiratory Panel PCR [[id number]]    Swab from Nasopharyngeal    Final result Component Value   Adenovirus Not Detected   Coronavirus Not Detected   This test detects Coronavirus 229E,  HKU1, NL63 and OC43 but does not distinguish between them. It does not detect MERS ( Respiratory Syndrome), SARS (Severe Acute Respiratory Syndrome) or 2019-nCoV (Novel 2019) Coronavirus.   Human Metapneumovirus Not Detected   Human Rhin/Enterovirus Not Detected   Influenza A Not Detected   Influenza A, H1 Not Detected   Influenza A 2009 H1N1 Not Detected   Influenza A, H3 Not Detected   Influenza B Not Detected   Parainfluenza Virus 1 Not Detected   Parainfluenza Virus 2 Not Detected   Parainfluenza Virus 3 Not Detected   Parainfluenza Virus 4 Not Detected   Respiratory Syncytial Virus A Not Detected   Respiratory Syncytial Virus B Not Detected   Chlamydia Pneumoniae Not Detected   Mycoplasma Pneumoniae Not Detected            04/12/2023 1020 04/12/2023 1256 Symptomatic Influenza A/B, RSV, & SARS-CoV2 PCR (COVID-19) Nasopharyngeal [[id number]]    Swab from Nasopharyngeal    Final result Component Value   Influenza A PCR Negative   Influenza B PCR Negative   RSV PCR Negative   SARS CoV2 PCR Negative   NEGATIVE: SARS-CoV-2 (COVID-19) RNA not detected, presumed negative.                Most Recent 6 glucoses:Recent Labs   Lab Test 04/14/23  0845 04/13/23  0724 04/12/23  1000 12/26/22  1401 07/21/22  0124 08/19/21  2123   * 223* 226* 167* 168* 110*   ,   Results for orders placed or performed during the hospital encounter of 04/12/23   CT Chest Pulmonary Embolism w Contrast    Narrative    CT CHEST PULMONARY EMBOLISM WITH CONTRAST 4/12/2023 12:08 PM    CLINICAL HISTORY: Hypoxia. Elevated D-dimer.    TECHNIQUE: CT angiogram chest during arterial phase injection IV  contrast. 2D and 3D MIP reconstructions were performed by the CT  technologist. Dose reduction techniques were used.     CONTRAST: 90mL [id number]    COMPARISON: Chest CT performed 10/19/2022.    FINDINGS:  ANGIOGRAM CHEST: No evidence for pulmonary embolism. The central  pulmonary arteries are dilated, similar to the previous exam,  with the  main pulmonary artery measuring up to 4 cm, and again suggesting  pulmonary hypertension. Thoracic aorta is negative for dissection.  Ectasia of the ascending thoracic aorta is unchanged, measuring 4 cm.    LUNGS AND PLEURA: Mild patchy groundglass and nodular opacities in  both lower lungs are new since the previous exam, and may be  infectious in etiology. There is mild bronchial wall thickening and  scattered mucus plugging in both lower lungs, also likely infectious  or inflammatory. No pleural effusions.    MEDIASTINUM/AXILLAE: No enlarged lymph nodes in the chest. Scattered  mildly prominent mediastinal and right hilar lymph nodes are  unchanged. Atrial septal closure device is again noted.    CORONARY ARTERY CALCIFICATION: None.    UPPER ABDOMEN: Marked hepatic steatosis.    MUSCULOSKELETAL: Degenerative changes in the thoracic spine.      Impression    IMPRESSION:  1.  No evidence for pulmonary embolism.  2.  Mild patchy groundglass and nodular opacities in both lower lungs  as well as bronchial wall thickening and scattered mucus plugging,  most likely infectious in etiology.  3.  Marked hepatic steatosis.  4.  Ectasia of the ascending thoracic aorta and dilatation of the  central pulmonary arteries, unchanged.    RIP BARFIELD MD         SYSTEM ID:  [id number]       Discharge Medications   Discharge Medication List as of 4/15/2023  2:01 PM      START taking these medications    Details   !! acetaminophen (TYLENOL) 500 MG tablet Take 1-2 tablets (500-1,000 mg) by mouth every 6 hours as needed for mild pain or other (and adjunct with moderate or severe pain or per patient request), OTC      benzonatate (TESSALON) 100 MG capsule Take 1 capsule (100 mg) by mouth 3 times daily as needed for cough, Disp-30 capsule, R-1, E-Prescribe      cefuroxime (CEFTIN) 500 MG tablet Take 1 tablet (500 mg) by mouth 2 times daily for 9 doses, Disp-9 tablet, R-0, E-Prescribe      doxycycline hyclate (VIBRAMYCIN) 100  MG capsule Take 1 capsule (100 mg) by mouth 2 times daily for 9 doses, Disp-9 capsule, R-0, E-Prescribe      fluconazole (DIFLUCAN) 150 MG tablet Take 1 tablet (150 mg) by mouth once for 1 dose, Disp-1 tablet, R-1, E-Prescribe      guaiFENesin-codeine (ROBITUSSIN AC) 100-10 MG/5ML solution Take 5 mLs by mouth every 4 hours as needed for cough, Disp-180 mL, R-0, Local Print       !! - Potential duplicate medications found. Please discuss with provider.      CONTINUE these medications which have CHANGED    Details   furosemide (LASIX) 40 MG tablet Take 1 tablet (40 mg) by mouth 2 times daily, Disp-60 tablet, R-2, E-Prescribe      ibuprofen (ADVIL/MOTRIN) 200 MG tablet Take 2 tablets (400 mg) by mouth every 6 hours as needed for pain, Disp-15 tablet, R-1, E-Prescribe         CONTINUE these medications which have NOT CHANGED    Details   !! acetaminophen (TYLENOL) 500 MG tablet Take 500-1,000 mg by mouth every 6 hours as needed for mild pain , Historical      albuterol (PROAIR HFA/PROVENTIL HFA/VENTOLIN HFA) 108 (90 Base) MCG/ACT inhaler Inhale 2 puffs into the lungs every 6 hours as needed for shortness of breath / dyspnea or wheezing, Disp-6.7 g, R-0, E-PrescribePharmacy may dispense brand covered by insurance (Proair, or proventil or ventolin or generic albuterol inhaler)      albuterol (PROVENTIL) (2.5 MG/3ML) 0.083% neb solution Take 1 vial (2.5 mg) by nebulization every 6 hours as needed for shortness of breath, wheezing or cough, Disp-90 mL, R-0, E-Prescribe      aspirin 81 MG EC tablet Take 81 mg by mouth daily, Historical      carvedilol (COREG) 12.5 MG tablet Take 1 tablet (12.5 mg) by mouth 2 times daily (with meals), Disp-180 tablet, R-3, E-Prescribe      cetirizine (ZYRTEC) 10 MG tablet Take 10 mg by mouth as needed for allergies, Historical      citalopram (CELEXA) 20 MG tablet Take 1 tablet (20 mg) by mouth daily, Disp-90 tablet, R-1, E-Prescribe      fluticasone (FLONASE) 50 MCG/ACT nasal spray Spray 1  spray into both nostrils daily as needed for rhinitis or allergies, Disp-3 mL, R-3, E-Prescribe      lisinopril (ZESTRIL) 40 MG tablet Take 1 tablet (40 mg) by mouth daily, Disp-90 tablet, R-3, E-Prescribe      spironolactone (ALDACTONE) 25 MG tablet Take 1 tablet (25 mg) by mouth daily, Disp-90 tablet, R-2, E-Prescribe       !! - Potential duplicate medications found. Please discuss with provider.        Allergies   Allergies   Allergen Reactions     Penicillins Hives     hives

## 2023-05-08 ENCOUNTER — ANCILLARY PROCEDURE (OUTPATIENT)
Dept: MAMMOGRAPHY | Facility: CLINIC | Age: 53
End: 2023-05-08
Attending: FAMILY MEDICINE
Payer: COMMERCIAL

## 2023-05-08 DIAGNOSIS — Z12.31 VISIT FOR SCREENING MAMMOGRAM: ICD-10-CM

## 2023-05-08 PROCEDURE — 77067 SCR MAMMO BI INCL CAD: CPT | Mod: TC | Performed by: RADIOLOGY

## 2023-05-08 PROCEDURE — 77063 BREAST TOMOSYNTHESIS BI: CPT | Mod: TC | Performed by: RADIOLOGY

## 2023-06-05 ENCOUNTER — TRANSFERRED RECORDS (OUTPATIENT)
Dept: MULTI SPECIALTY CLINIC | Facility: CLINIC | Age: 53
End: 2023-06-05

## 2023-06-05 LAB — RETINOPATHY: NORMAL

## 2023-08-19 DIAGNOSIS — Q21.10 ASD (ATRIAL SEPTAL DEFECT): ICD-10-CM

## 2023-08-19 DIAGNOSIS — F33.0 MAJOR DEPRESSIVE DISORDER, RECURRENT EPISODE, MILD (H): ICD-10-CM

## 2023-08-19 DIAGNOSIS — I10 HTN, GOAL BELOW 140/90: ICD-10-CM

## 2023-08-21 RX ORDER — SPIRONOLACTONE 25 MG/1
25 TABLET ORAL DAILY
Qty: 90 TABLET | Refills: 0 | Status: SHIPPED | OUTPATIENT
Start: 2023-08-21 | End: 2023-11-15

## 2023-08-21 RX ORDER — LISINOPRIL 40 MG/1
40 TABLET ORAL DAILY
Qty: 90 TABLET | Refills: 0 | Status: SHIPPED | OUTPATIENT
Start: 2023-08-21 | End: 2023-11-15

## 2023-08-21 NOTE — TELEPHONE ENCOUNTER
Routing refill request to provider for review/approval because:  Labs out of range:  PHQ-9        1/7/2022    10:26 AM 8/8/2022     8:50 AM 3/14/2023    12:33 PM   PHQ   PHQ-9 Total Score 4 6 6   Q9: Thoughts of better off dead/self-harm past 2 weeks Not at all Not at all Not at all     Herb TEJEDA RN 8/21/2023 at 3:15 PM

## 2023-08-22 RX ORDER — CITALOPRAM HYDROBROMIDE 20 MG/1
20 TABLET ORAL DAILY
Qty: 30 TABLET | Refills: 0 | Status: SHIPPED | OUTPATIENT
Start: 2023-08-22 | End: 2023-10-23

## 2023-08-22 NOTE — TELEPHONE ENCOUNTER
I have not seen her in years. Virtual visit or Evisit needed for refills, will give 30 days only until appt is made

## 2023-08-23 NOTE — TELEPHONE ENCOUNTER
Sent [employer] message requesting a call back for an appt. Two more attempts will be made.     Cinthya Wylie

## 2023-08-29 NOTE — PATIENT INSTRUCTIONS
Use medications as prescribed.  Follow-up with primary care provider if no further improvement in symptoms with treatment.  Return earlier for any significant worsening of symptoms as we discussed.   no back pain, no gout, no musculoskeletal pain, no neck pain, and no weakness.

## 2023-08-30 NOTE — TELEPHONE ENCOUNTER
Please let pt know a prescription was sent in on 8/22/23 for citalopram.  Please have her request a refill from her pharmacy about a week before she will be out.    Will forward to the station.

## 2023-08-30 NOTE — TELEPHONE ENCOUNTER
Pt scheduled for PX 10/23 with Marta Cline will need small refill to get her through to appointment.    Mitali Wylie

## 2023-09-18 ENCOUNTER — MYC MEDICAL ADVICE (OUTPATIENT)
Dept: CARDIOLOGY | Facility: CLINIC | Age: 53
End: 2023-09-18
Payer: COMMERCIAL

## 2023-09-18 NOTE — TELEPHONE ENCOUNTER
"Received [employer] message from pt:     Lyubov Sanchez [facility]  Phone Number: [phone]     Hello-  I was just researching a few things. I have had swollen feet, legs and hands since I went on new medication after my heart surgery. It was after going into a-fib actually. I was originally on a medication called metoprolol but that was changed due to shortness of breath and after a long time of trying to figure that out I discovered that it was possibly from my medication. At the same time I had shortness of breath I also started really retaining water. I ve been on furosemide ever since but my swelling is still awful. I ve been in for appointments and nothing has been found to take care of it. Today it hit me that maybe some of my medication could possibly be causing it and read that carvedilol causes water retention as well as citalopram and lisinopril both of what I ve been on for years. Just wondering if it s possible for me to go off of the carvedilol medication and if not could I try changing to something else again that wouldn t cause the shortness of breath or water  retention?    Last visit with Dr. Hodges on 10/14/22, per note:     \"Assessment & Plan   1.  ASD S/P Closure with 30 mm Cardioform septal occluder August 2020  2.  Elevated BMI  3.  Hyperlipidemia  4.  Obstructive sleep apnea  5.  Mildly dilated proximal ascending aorta  6.  Mild pulmonary hypertension from # 1  7.  Patient S/P hysterectomy  8.  HTN  9.  PAF S/P CV to NSR     Recommendations     1.  Patient has done well after her closure of her ASD.  Recent echocardiogram was reviewed demonstrating well-seated device with no residual shunting.  2.  Shortness of breath: Likely multifactorial.  There is possibly a diastolic component.  She has responded well to an additional dose of Lasix.  We did change her beta-blocker to carvedilol and she has responded well with significant improvement in her shortness of breath symptoms.  I " "will also increase her lisinopril to 40 mg daily for better blood pressure control.  3.  Patient has had COVID x2.  She will have a follow-up with pulmonary and a CT scan of the chest has been ordered.  4.  Reviewed her Zio patch monitor.  No significant arrhythmias.  No atrial fibrillation recurrence.  For her paroxysmal defibrillation we will continue with aspirin therapy given her risk score.  5.  Patient is doing well.  We are happy that she has responded to our adjustments in her medical therapy.  We will have her return to clinic in summer with pre-clinic echocardiogram and lab work.\"    Pt is scheduled for labs and echo on 9/28/23 and a video visit with Dr. Hodges on 11/15/23. Routing to Dr. Hodges to review.   "

## 2023-09-22 NOTE — TELEPHONE ENCOUNTER
I would advise staying on coreg and lisinopril.  I would defer to primary regarding citralopram.    Meet

## 2023-09-28 ENCOUNTER — LAB (OUTPATIENT)
Dept: LAB | Facility: CLINIC | Age: 53
End: 2023-09-28
Payer: COMMERCIAL

## 2023-09-28 DIAGNOSIS — I10 HTN, GOAL BELOW 140/90: ICD-10-CM

## 2023-09-28 LAB
ANION GAP SERPL CALCULATED.3IONS-SCNC: 13 MMOL/L (ref 7–15)
BUN SERPL-MCNC: 15.2 MG/DL (ref 6–20)
CALCIUM SERPL-MCNC: 9.6 MG/DL (ref 8.6–10)
CHLORIDE SERPL-SCNC: 96 MMOL/L (ref 98–107)
CREAT SERPL-MCNC: 0.6 MG/DL (ref 0.51–0.95)
EGFRCR SERPLBLD CKD-EPI 2021: >90 ML/MIN/1.73M2
GLUCOSE SERPL-MCNC: 287 MG/DL (ref 70–99)
HCO3 SERPL-SCNC: 28 MMOL/L (ref 22–29)
POTASSIUM SERPL-SCNC: 4.1 MMOL/L (ref 3.4–5.3)
SODIUM SERPL-SCNC: 137 MMOL/L (ref 135–145)

## 2023-09-28 PROCEDURE — 80048 BASIC METABOLIC PNL TOTAL CA: CPT | Performed by: INTERNAL MEDICINE

## 2023-09-28 PROCEDURE — 36415 COLL VENOUS BLD VENIPUNCTURE: CPT | Performed by: INTERNAL MEDICINE

## 2023-10-19 DIAGNOSIS — F33.0 MAJOR DEPRESSIVE DISORDER, RECURRENT EPISODE, MILD (H): ICD-10-CM

## 2023-10-20 RX ORDER — CITALOPRAM HYDROBROMIDE 20 MG/1
20 TABLET ORAL DAILY
Qty: 30 TABLET | Refills: 0 | OUTPATIENT
Start: 2023-10-20

## 2023-10-20 NOTE — TELEPHONE ENCOUNTER
Need to discuss for further refills. appt recommended  Update her chart please, not seen since 2021

## 2023-10-23 ENCOUNTER — LAB (OUTPATIENT)
Dept: FAMILY MEDICINE | Facility: CLINIC | Age: 53
End: 2023-10-23

## 2023-10-23 ENCOUNTER — OFFICE VISIT (OUTPATIENT)
Dept: FAMILY MEDICINE | Facility: CLINIC | Age: 53
End: 2023-10-23
Payer: COMMERCIAL

## 2023-10-23 VITALS
HEART RATE: 78 BPM | TEMPERATURE: 97.7 F | SYSTOLIC BLOOD PRESSURE: 118 MMHG | OXYGEN SATURATION: 96 % | RESPIRATION RATE: 20 BRPM | HEIGHT: 64 IN | BODY MASS INDEX: 50.02 KG/M2 | WEIGHT: 293 LBS | DIASTOLIC BLOOD PRESSURE: 76 MMHG

## 2023-10-23 DIAGNOSIS — F33.0 MAJOR DEPRESSIVE DISORDER, RECURRENT EPISODE, MILD (H): ICD-10-CM

## 2023-10-23 DIAGNOSIS — Z11.59 NEED FOR HEPATITIS C SCREENING TEST: ICD-10-CM

## 2023-10-23 DIAGNOSIS — I27.20 PULMONARY HYPERTENSION, UNSPECIFIED (H): ICD-10-CM

## 2023-10-23 DIAGNOSIS — Z23 NEED FOR PROPHYLACTIC VACCINATION AND INOCULATION AGAINST INFLUENZA: ICD-10-CM

## 2023-10-23 DIAGNOSIS — E66.01 MORBID OBESITY (H): ICD-10-CM

## 2023-10-23 DIAGNOSIS — I77.810 THORACIC AORTIC ECTASIA (H): ICD-10-CM

## 2023-10-23 DIAGNOSIS — L98.9 SKIN LESION: ICD-10-CM

## 2023-10-23 DIAGNOSIS — Z12.11 SCREEN FOR COLON CANCER: ICD-10-CM

## 2023-10-23 DIAGNOSIS — Z12.4 CERVICAL CANCER SCREENING: ICD-10-CM

## 2023-10-23 DIAGNOSIS — I10 HTN, GOAL BELOW 140/90: ICD-10-CM

## 2023-10-23 DIAGNOSIS — Q21.10 ASD (ATRIAL SEPTAL DEFECT): ICD-10-CM

## 2023-10-23 DIAGNOSIS — I48.0 PAROXYSMAL ATRIAL FIBRILLATION (H): ICD-10-CM

## 2023-10-23 DIAGNOSIS — R73.9 ELEVATED BLOOD SUGAR: ICD-10-CM

## 2023-10-23 DIAGNOSIS — Z23 NEED FOR PNEUMOCOCCAL VACCINE: ICD-10-CM

## 2023-10-23 DIAGNOSIS — Z00.00 ROUTINE GENERAL MEDICAL EXAMINATION AT A HEALTH CARE FACILITY: Primary | ICD-10-CM

## 2023-10-23 DIAGNOSIS — E78.5 HYPERLIPIDEMIA LDL GOAL <100: ICD-10-CM

## 2023-10-23 PROBLEM — K42.9 UMBILICAL HERNIA WITHOUT OBSTRUCTION AND WITHOUT GANGRENE: Status: RESOLVED | Noted: 2019-12-02 | Resolved: 2023-10-23

## 2023-10-23 LAB
ALBUMIN SERPL BCG-MCNC: 4.6 G/DL (ref 3.5–5.2)
ALP SERPL-CCNC: 54 U/L (ref 35–104)
ALT SERPL W P-5'-P-CCNC: 52 U/L (ref 0–50)
ANION GAP SERPL CALCULATED.3IONS-SCNC: 10 MMOL/L (ref 7–15)
AST SERPL W P-5'-P-CCNC: 39 U/L (ref 0–45)
BILIRUB SERPL-MCNC: 1.1 MG/DL
BUN SERPL-MCNC: 12 MG/DL (ref 6–20)
CALCIUM SERPL-MCNC: 9.4 MG/DL (ref 8.6–10)
CHLORIDE SERPL-SCNC: 100 MMOL/L (ref 98–107)
CHOLEST SERPL-MCNC: 184 MG/DL
CREAT SERPL-MCNC: 0.58 MG/DL (ref 0.51–0.95)
DEPRECATED HCO3 PLAS-SCNC: 29 MMOL/L (ref 22–29)
EGFRCR SERPLBLD CKD-EPI 2021: >90 ML/MIN/1.73M2
GLUCOSE SERPL-MCNC: 210 MG/DL (ref 70–99)
HBA1C MFR BLD: 7 % (ref 0–5.6)
HCV AB SERPL QL IA: NONREACTIVE
HDLC SERPL-MCNC: 34 MG/DL
LDLC SERPL CALC-MCNC: 95 MG/DL
NONHDLC SERPL-MCNC: 150 MG/DL
POTASSIUM SERPL-SCNC: 4.2 MMOL/L (ref 3.4–5.3)
PROT SERPL-MCNC: 7.1 G/DL (ref 6.4–8.3)
SODIUM SERPL-SCNC: 139 MMOL/L (ref 135–145)
TRIGL SERPL-MCNC: 274 MG/DL

## 2023-10-23 PROCEDURE — G0145 SCR C/V CYTO,THINLAYER,RESCR: HCPCS | Performed by: NURSE PRACTITIONER

## 2023-10-23 PROCEDURE — 99214 OFFICE O/P EST MOD 30 MIN: CPT | Mod: 25 | Performed by: NURSE PRACTITIONER

## 2023-10-23 PROCEDURE — 83036 HEMOGLOBIN GLYCOSYLATED A1C: CPT | Performed by: NURSE PRACTITIONER

## 2023-10-23 PROCEDURE — 99396 PREV VISIT EST AGE 40-64: CPT | Mod: 25 | Performed by: NURSE PRACTITIONER

## 2023-10-23 PROCEDURE — 87624 HPV HI-RISK TYP POOLED RSLT: CPT | Performed by: NURSE PRACTITIONER

## 2023-10-23 PROCEDURE — 36415 COLL VENOUS BLD VENIPUNCTURE: CPT | Performed by: NURSE PRACTITIONER

## 2023-10-23 PROCEDURE — 80061 LIPID PANEL: CPT | Performed by: NURSE PRACTITIONER

## 2023-10-23 PROCEDURE — 90677 PCV20 VACCINE IM: CPT | Performed by: NURSE PRACTITIONER

## 2023-10-23 PROCEDURE — 90472 IMMUNIZATION ADMIN EACH ADD: CPT | Performed by: NURSE PRACTITIONER

## 2023-10-23 PROCEDURE — 86803 HEPATITIS C AB TEST: CPT | Performed by: NURSE PRACTITIONER

## 2023-10-23 PROCEDURE — 80053 COMPREHEN METABOLIC PANEL: CPT | Performed by: NURSE PRACTITIONER

## 2023-10-23 PROCEDURE — 90471 IMMUNIZATION ADMIN: CPT | Performed by: NURSE PRACTITIONER

## 2023-10-23 PROCEDURE — 90682 RIV4 VACC RECOMBINANT DNA IM: CPT | Performed by: NURSE PRACTITIONER

## 2023-10-23 RX ORDER — CARVEDILOL 12.5 MG/1
12.5 TABLET ORAL 2 TIMES DAILY WITH MEALS
Qty: 180 TABLET | Refills: 3 | Status: CANCELLED | OUTPATIENT
Start: 2023-10-23

## 2023-10-23 RX ORDER — LISINOPRIL 40 MG/1
40 TABLET ORAL DAILY
Qty: 90 TABLET | Refills: 0 | Status: CANCELLED | OUTPATIENT
Start: 2023-10-23

## 2023-10-23 RX ORDER — CITALOPRAM HYDROBROMIDE 20 MG/1
20 TABLET ORAL DAILY
Qty: 90 TABLET | Refills: 1 | Status: SHIPPED | OUTPATIENT
Start: 2023-10-23 | End: 2024-03-06

## 2023-10-23 RX ORDER — SPIRONOLACTONE 25 MG/1
25 TABLET ORAL DAILY
Qty: 90 TABLET | Refills: 0 | Status: CANCELLED | OUTPATIENT
Start: 2023-10-23

## 2023-10-23 ASSESSMENT — ENCOUNTER SYMPTOMS
PALPITATIONS: 0
HEMATURIA: 0
JOINT SWELLING: 1
ABDOMINAL PAIN: 0
ARTHRALGIAS: 0
CONSTIPATION: 0
NERVOUS/ANXIOUS: 0
WEAKNESS: 1
CHILLS: 0
EYE PAIN: 0
HEADACHES: 0
SHORTNESS OF BREATH: 0
FREQUENCY: 1
COUGH: 0
DYSURIA: 0
MYALGIAS: 0
SORE THROAT: 0
BREAST MASS: 0
DIZZINESS: 0
PARESTHESIAS: 0
FEVER: 0
HEMATOCHEZIA: 0
DIARRHEA: 0
HEARTBURN: 0
NAUSEA: 0

## 2023-10-23 ASSESSMENT — PATIENT HEALTH QUESTIONNAIRE - PHQ9
SUM OF ALL RESPONSES TO PHQ QUESTIONS 1-9: 5
10. IF YOU CHECKED OFF ANY PROBLEMS, HOW DIFFICULT HAVE THESE PROBLEMS MADE IT FOR YOU TO DO YOUR WORK, TAKE CARE OF THINGS AT HOME, OR GET ALONG WITH OTHER PEOPLE: NOT DIFFICULT AT ALL
SUM OF ALL RESPONSES TO PHQ QUESTIONS 1-9: 5

## 2023-10-23 ASSESSMENT — ASTHMA QUESTIONNAIRES: ACT_TOTALSCORE: 25

## 2023-10-23 ASSESSMENT — PAIN SCALES - GENERAL: PAINLEVEL: NO PAIN (0)

## 2023-10-23 NOTE — PROGRESS NOTES
SUBJECTIVE:   CC: Lyubov is an 53 year old who presents for preventive health visit.       10/23/2023     6:54 AM   Additional Questions   Roomed by Lisa Magill, CMA   Accompanied by self         10/23/2023     6:54 AM   Patient Reported Additional Medications   Patient reports taking the following new medications NONE     Healthy Habits:     Getting at least 3 servings of Calcium per day:  NO    Bi-annual eye exam:  Yes    Dental care twice a year:  Yes    Sleep apnea or symptoms of sleep apnea:  Daytime drowsiness and Sleep apnea    Diet:  Regular (no restrictions)    Frequency of exercise:  None    Taking medications regularly:  Yes    Medication side effects:  None and Other    Additional concerns today:  Yes   Vaccines    Spot on right leg and in back of neck  Today's PHQ-9 Score:       10/23/2023     6:45 AM   PHQ-9 SCORE   PHQ-9 Total Score MyChart 5 (Mild depression)   PHQ-9 Total Score 5     Mood: symptoms feel stable.  Job is stressful.  Continues on celexa.     No longer having breathing issues.  Never saw pulmonology because cardiology changed meds and breathing improved.  Issues were felt to be due to her meds. Has cardiology follow-up in Nov.      Have you ever done Advance Care Planning? (For example, a Health Directive, POLST, or a discussion with a medical provider or your loved ones about your wishes): Yes, advance care planning is on file.    Social History     Tobacco Use    Smoking status: Former     Packs/day: 0.50     Years: 5.00     Additional pack years: 0.00     Total pack years: 2.50     Types: Cigarettes     Quit date: 2007     Years since quittin.5    Smokeless tobacco: Never   Substance Use Topics    Alcohol use: Yes     Comment: 2-3 per weekend             10/23/2023     6:48 AM   Alcohol Use   Prescreen: >3 drinks/day or >7 drinks/week? No          No data to display              Reviewed orders with patient.  Reviewed health maintenance and updated orders accordingly -  Yes  Labs reviewed in EPIC  BP Readings from Last 3 Encounters:   10/23/23 118/76   04/15/23 (!) 145/78   04/07/23 120/77    Wt Readings from Last 3 Encounters:   10/23/23 133.2 kg (293 lb 9.6 oz)   04/15/23 133.7 kg (294 lb 12.8 oz)   03/14/23 135.9 kg (299 lb 8 oz)                  Current Outpatient Medications   Medication Sig Dispense Refill    acetaminophen (TYLENOL) 500 MG tablet Take 1-2 tablets (500-1,000 mg) by mouth every 6 hours as needed for mild pain or other (and adjunct with moderate or severe pain or per patient request)      acetaminophen (TYLENOL) 500 MG tablet Take 500-1,000 mg by mouth every 6 hours as needed for mild pain       albuterol (PROAIR HFA/PROVENTIL HFA/VENTOLIN HFA) 108 (90 Base) MCG/ACT inhaler Inhale 2 puffs into the lungs every 6 hours as needed for shortness of breath / dyspnea or wheezing 6.7 g 0    albuterol (PROVENTIL) (2.5 MG/3ML) 0.083% neb solution Take 1 vial (2.5 mg) by nebulization every 6 hours as needed for shortness of breath, wheezing or cough 90 mL 0    aspirin 81 MG EC tablet Take 81 mg by mouth daily      benzonatate (TESSALON) 100 MG capsule Take 1 capsule (100 mg) by mouth 3 times daily as needed for cough 30 capsule 1    carvedilol (COREG) 12.5 MG tablet Take 1 tablet (12.5 mg) by mouth 2 times daily (with meals) 180 tablet 3    cetirizine (ZYRTEC) 10 MG tablet Take 10 mg by mouth as needed for allergies      citalopram (CELEXA) 20 MG tablet TAKE 1 TABLET BY MOUTH EVERY DAY 30 tablet 0    fluticasone (FLONASE) 50 MCG/ACT nasal spray Spray 1 spray into both nostrils daily as needed for rhinitis or allergies 3 mL 3    furosemide (LASIX) 40 MG tablet Take 1 tablet (40 mg) by mouth 2 times daily 60 tablet 2    guaiFENesin-codeine (ROBITUSSIN AC) 100-10 MG/5ML solution Take 5 mLs by mouth every 4 hours as needed for cough 180 mL 0    ibuprofen (ADVIL/MOTRIN) 200 MG tablet Take 2 tablets (400 mg) by mouth every 6 hours as needed for pain 15 tablet 1    lisinopril  (ZESTRIL) 40 MG tablet TAKE 1 TABLET BY MOUTH EVERY DAY 90 tablet 0    spironolactone (ALDACTONE) 25 MG tablet TAKE 1 TABLET BY MOUTH EVERY DAY 90 tablet 0     Allergies   Allergen Reactions    Penicillins Hives     hives       Breast Cancer Screening:    FHS-7:       2/18/2022     2:45 PM 2/18/2022     2:46 PM 5/8/2023     3:21 PM   Breast CA Risk Assessment (FHS-7)   Did any of your first-degree relatives have breast or ovarian cancer? No No Unknown   Did any of your relatives have bilateral breast cancer? No No Unknown   Did any man in your family have breast cancer? No No Unknown   Did any woman in your family have breast and ovarian cancer? No No Unknown   Did any woman in your family have breast cancer before age 50 y? No No Unknown   Do you have 2 or more relatives with breast and/or ovarian cancer? No No Unknown   Do you have 2 or more relatives with breast and/or bowel cancer? No No Unknown       Mammogram Screening: Recommended annual mammography  Pertinent mammograms are reviewed under the imaging tab.        Latest Ref Rng & Units 10/31/2019     4:43 PM 10/31/2019     4:37 PM 7/22/2016     8:35 AM   PAP / HPV   PAP (Historical)   NIL     HPV 16 DNA NEG^Negative Negative   Negative    HPV 18 DNA NEG^Negative Negative   Negative    Other HR HPV NEG^Negative Negative   Negative      Reviewed and updated as needed this visit by clinical staff   Tobacco  Allergies  Meds  Problems  Med Hx  Surg Hx  Fam Hx          Reviewed and updated as needed this visit by Provider                 Past Medical History:   Diagnosis Date    Anxiety and depression     Pt reports is on Rx Celexa.    Aortic aneurysm (H24)     Pt reports its small and is currently being monitored.    Arthritis     Atrial fibrillation with RVR (H) 9/15/2020    Depressive disorder     DYSPLASIA OF CERVIX 3/6/2003    Dysplasia of cervix (uteri) 2003    Rx cryotherapy Nov 03, and 2005    Hypertension     NO cardiology    Incomplete right bundle  branch block 11/10/2017    Migraine     KRISTYN (obstructive sleep apnea)     Ostium secundum type atrial septal defect 7/29/2020    Added automatically from request for surgery [id number]    Other chronic pain     Knee pain for 8 years    Patent foramen ovale 1/9/2020    Plantar fasciitis     bilat    Unspecified sleep apnea     CPAP will bring on the day of surgery.    Ventral hernia without obstruction or gangrene 4/15/2020    Ventral hernia without obstruction or gangrene 4/15/2020      Past Surgical History:   Procedure Laterality Date    ANESTHESIA CARDIOVERSION N/A 9/16/2020    Procedure: ANESTHESIA, FOR CONCEPCION/CARDIOVERSION;  Surgeon: GENERIC ANESTHESIA PROVIDER;  Location: [facility] OR    ARTHROPLASTY KNEE Right 11/17/2017    Procedure: ARTHROPLASTY KNEE;  Right total knee arthroplasty using the Arthrex iBalance total knee system;  Surgeon: Hebert Lee MD;  Location: [facility] OR    ARTHROPLASTY KNEE Left 3/19/2018    Procedure: ARTHROPLASTY KNEE;  Left total knee arthroplasty using an Arthrex iBalance total knee system;  Surgeon: Hebert Lee MD;  Location: [facility] OR    ATRIAL SEPTAL DEFECT CLOSURE N/A 8/26/2020    Procedure: ASD Closure;  Surgeon: Freddie Frias MD;  Location:  [facility]    CV RIGHT HEART CATH MEASUREMENTS RECORDED N/A 8/26/2020    Procedure: Right Heart Cath;  Surgeon: Freddie Frias MD;  Location:  [facility]ATH [facility]    DAVINCI HERNIORRHAPHY VENTRAL N/A 12/29/2020    Procedure: ROBOTIC ASSISTED VENTRAL AND INCISIONAL HERNIA REPAIR WITH MESH;  Surgeon: Violetta Lazaro MD;  Location: [facility] OR    DAVINCI HYSTERECTOMY TOTAL, BILATERAL SALPINGO-OOPHORECTOMY, COMBINED Bilateral 1/31/2020    Procedure: DaVinci robotic-assisted total laparoscopic hysterectomy, bilateral salpingectomy;  Surgeon: Venancio Bartlett MD;  Location: [facility] OR - Path all benign    DAVINCI HYSTERECTOMY TOTAL, SALPINGECTOMY BILATERAL Bilateral 01/31/2020    Fibroid uterus, Menometrorrhagia -  "Robotic TLH, Bilateral salpingectomy - Path all benign    ENT SURGERY      HC COLP VAGINA W CERVIX IF PRES W BIOPSY      [place] for ASCUS    TONSILLECTOMY & ADENOIDECTOMY      ZZC  DELIVERY ONLY  ,    , Low Cervical       Review of Systems   Constitutional:  Negative for chills and fever.   HENT:  Negative for congestion, ear pain, hearing loss and sore throat.    Eyes:  Negative for pain and visual disturbance.   Respiratory:  Negative for cough and shortness of breath.    Cardiovascular:  Positive for peripheral edema (bilat LE, worse if not taking diuretic). Negative for chest pain and palpitations.   Gastrointestinal:  Negative for abdominal pain, constipation, diarrhea, heartburn, hematochezia and nausea.   Breasts:  Negative for tenderness, breast mass and discharge.   Genitourinary:  Positive for frequency and urgency. Negative for dysuria, genital sores, hematuria, pelvic pain, vaginal bleeding and vaginal discharge.   Musculoskeletal:  Negative for arthralgias and myalgias.   Skin:  Negative for rash.   Neurological:  Positive for weakness (harder to open things with hands, no numbness or tingling). Negative for dizziness, headaches and paresthesias.   Psychiatric/Behavioral:  Negative for mood changes. The patient is not nervous/anxious.      Urinary symptoms have been present since taking furosemide.             2022     8:50 AM 3/14/2023    12:33 PM 10/23/2023     6:45 AM   PHQ   PHQ-9 Total Score 6 6 5   Q9: Thoughts of better off dead/self-harm past 2 weeks Not at all Not at all Not at all       OBJECTIVE:   /76 (BP Location: Right arm, Patient Position: Sitting, Cuff Size: Adult Large)   Pulse 78   Temp 97.7  F (36.5  C) (Oral)   Resp 20   Ht 1.619 m (5' 3.75\")   Wt 133.2 kg (293 lb 9.6 oz)   LMP  (LMP Unknown)   SpO2 96%   BMI 50.79 kg/m    Physical Exam  GENERAL APPEARANCE: healthy, alert and no distress  EYES: Eyes grossly normal to inspection, PERRL " and conjunctivae and sclerae normal  HENT: ear canals and TM's normal, nose and mouth without ulcers or lesions, oropharynx clear and oral mucous membranes moist  NECK: no adenopathy, no asymmetry, masses, or scars and thyroid normal to palpation  RESP: lungs clear to auscultation - no rales, rhonchi or wheezes  BREAST: normal without masses, tenderness or nipple discharge and no palpable axillary masses or adenopathy  CV: regular rate and rhythm, normal S1 S2, no S3 or S4, no murmur, click or rub, no peripheral edema and peripheral pulses strong  ABDOMEN: soft, nontender, no hepatosplenomegaly, no masses and bowel sounds normal   (female): normal female external genitalia, normal urethral meatus, vaginal mucosal atrophy noted, normal cervix, adnexae, and uterus without masses or abnormal discharge  MS: no musculoskeletal defects are noted and gait is age appropriate without ataxia  SKIN: no suspicious lesions or rashes, slightly raised skin colored  lesion on the L posterior neck; dark, round, slightly raised nodule on the R upper thigh; skin colored lesion with a wide stalk on the L upper thigh; red skin tag under the R eye  NEURO: Normal strength and tone, sensory exam grossly normal, mentation intact and speech normal  PSYCH: mentation appears normal and affect normal/bright    Diagnostic Test Results:  Labs reviewed in Epic      ASSESSMENT/PLAN:   1. Routine general medical examination at a health care facility  Reviewed age appropriate screenings and immunizations    2. ASD (atrial septal defect)  S/p closure; follows with cardiology has appt in Nov.    3. Major depressive disorder, recurrent episode, mild (H24)  Chronic, here today for follow-up. Stable/controlled.  Continue on current medications.  Follow-up in 6 mos.   - citalopram (CELEXA) 20 MG tablet; Take 1 tablet (20 mg) by mouth daily  Dispense: 90 tablet; Refill: 1    4. HTN, goal below 140/90  Chronic, controlled.  Managed by cardiology.     5.  Screen for colon cancer  Prefers stool test  - COLOGUARD([employer]); Future    6. Need for hepatitis C screening test  screen  - Hepatitis C Screen Reflex to HCV RNA Quant and Genotype; Future  - Hepatitis C Screen Reflex to HCV RNA Quant and Genotype    7. Cervical cancer screening    - PAP screen with HPV - recommended age 30 - 65 years  - HPV Hold (Lab Only)  - HPV High Risk Types DNA Cervical    8. Hyperlipidemia LDL goal <100  Meeting goal; discussed diet, exercise and weight loss.   The 10-year ASCVD risk score (Wilmar BARR, et al., 2019) is: 2.6%    Values used to calculate the score:      Age: 53 years      Sex: Female      Is Non- : No      Diabetic: No      Tobacco smoker: No      Systolic Blood Pressure: 118 mmHg      Is BP treated: Yes      HDL Cholesterol: 34 mg/dL      Total Cholesterol: 184 mg/dL    - Lipid panel reflex to direct LDL Fasting; Future  - Lipid panel reflex to direct LDL Fasting    9. Elevated blood sugar  Recheck today   - Comprehensive metabolic panel (BMP + Alb, Alk Phos, ALT, AST, Total. Bili, TP); Future  - Hemoglobin A1c; Future  - Comprehensive metabolic panel (BMP + Alb, Alk Phos, ALT, AST, Total. Bili, TP)  - Hemoglobin A1c    10. Skin lesion  refer  - Adult Dermatology  Referral; Future    11. Need for pneumococcal vaccine  - PNEUMOCOCCAL 20 VALENT CONJUGATE (PREVNAR 20)    12. Need for prophylactic vaccination and inoculation against influenza    13. Paroxysmal atrial fibrillation (H)  S/p CV; regular rate and rhythm today.  Continues to follow-up with cardiology; next appt Nov.     14. Pulmonary hypertension, unspecified (H)  Follows with cardiology; has appt in Nov    15. Thoracic aortic ectasia (H24)  Follows with cardiology; has appt in Nov    16. Morbid obesity (H)  Discussed diet and exercise.  Weight loss can help improve sugars, cholesterol, and HTN.         COUNSELING:  Reviewed preventive health counseling, as reflected in patient  "instructions      BMI:   Estimated body mass index is 50.79 kg/m  as calculated from the following:    Height as of this encounter: 1.619 m (5' 3.75\").    Weight as of this encounter: 133.2 kg (293 lb 9.6 oz).   Weight management plan: Discussed healthy diet and exercise guidelines      She reports that she quit smoking about 16 years ago. Her smoking use included cigarettes. She has a 2.50 pack-year smoking history. She has never used smokeless tobacco.          Marta Cline, [place] CNP  [facility]  "

## 2023-10-25 LAB
BKR LAB AP GYN ADEQUACY: NORMAL
BKR LAB AP GYN INTERPRETATION: NORMAL
BKR LAB AP HPV REFLEX: NORMAL
BKR LAB AP PREVIOUS ABNORMAL: NORMAL
PATH REPORT.COMMENTS IMP SPEC: NORMAL
PATH REPORT.COMMENTS IMP SPEC: NORMAL
PATH REPORT.RELEVANT HX SPEC: NORMAL

## 2023-10-27 LAB
HUMAN PAPILLOMA VIRUS 16 DNA: NEGATIVE
HUMAN PAPILLOMA VIRUS 18 DNA: NEGATIVE
HUMAN PAPILLOMA VIRUS FINAL DIAGNOSIS: NORMAL
HUMAN PAPILLOMA VIRUS OTHER HR: NEGATIVE

## 2023-11-08 PROBLEM — I48.0 PAROXYSMAL ATRIAL FIBRILLATION (H): Status: ACTIVE | Noted: 2023-11-08

## 2023-11-11 LAB — NONINV COLON CA DNA+OCC BLD SCRN STL QL: NEGATIVE

## 2023-11-14 ENCOUNTER — HOSPITAL ENCOUNTER (OUTPATIENT)
Dept: CARDIOLOGY | Facility: CLINIC | Age: 53
Discharge: HOME | End: 2023-11-14
Attending: INTERNAL MEDICINE | Admitting: INTERNAL MEDICINE
Payer: COMMERCIAL

## 2023-11-14 DIAGNOSIS — Q21.11 OSTIUM SECUNDUM TYPE ATRIAL SEPTAL DEFECT: ICD-10-CM

## 2023-11-14 LAB — LVEF ECHO: NORMAL

## 2023-11-14 PROCEDURE — 93306 TTE W/DOPPLER COMPLETE: CPT

## 2023-11-14 PROCEDURE — 93306 TTE W/DOPPLER COMPLETE: CPT | Mod: 26 | Performed by: INTERNAL MEDICINE

## 2023-11-15 ENCOUNTER — TELEPHONE (OUTPATIENT)
Dept: VASCULAR SURGERY | Facility: CLINIC | Age: 53
End: 2023-11-15

## 2023-11-15 ENCOUNTER — VIRTUAL VISIT (OUTPATIENT)
Dept: CARDIOLOGY | Facility: CLINIC | Age: 53
End: 2023-11-15
Attending: INTERNAL MEDICINE
Payer: COMMERCIAL

## 2023-11-15 DIAGNOSIS — I10 HTN, GOAL BELOW 140/90: ICD-10-CM

## 2023-11-15 DIAGNOSIS — R60.0 BILATERAL LEG EDEMA: Primary | ICD-10-CM

## 2023-11-15 DIAGNOSIS — Q21.11 OSTIUM SECUNDUM TYPE ATRIAL SEPTAL DEFECT: ICD-10-CM

## 2023-11-15 DIAGNOSIS — Q21.10 ASD (ATRIAL SEPTAL DEFECT): ICD-10-CM

## 2023-11-15 PROCEDURE — 99214 OFFICE O/P EST MOD 30 MIN: CPT | Mod: 95 | Performed by: INTERNAL MEDICINE

## 2023-11-15 RX ORDER — LISINOPRIL 40 MG/1
40 TABLET ORAL DAILY
Qty: 90 TABLET | Refills: 3 | Status: SHIPPED | OUTPATIENT
Start: 2023-11-15

## 2023-11-15 RX ORDER — CARVEDILOL 12.5 MG/1
12.5 TABLET ORAL 2 TIMES DAILY WITH MEALS
Qty: 180 TABLET | Refills: 3 | Status: SHIPPED | OUTPATIENT
Start: 2023-11-15

## 2023-11-15 RX ORDER — FUROSEMIDE 40 MG
40 TABLET ORAL 2 TIMES DAILY
Qty: 180 TABLET | Refills: 3 | Status: SHIPPED | OUTPATIENT
Start: 2023-11-15

## 2023-11-15 RX ORDER — SPIRONOLACTONE 25 MG/1
25 TABLET ORAL DAILY
Qty: 90 TABLET | Refills: 3 | Status: SHIPPED | OUTPATIENT
Start: 2023-11-15

## 2023-11-15 NOTE — LETTER
11/15/2023    Yolie Delarosa [place]  [address] [address] [facility] [id number]    RE: Lyubov Sanchez       Dear Colleague,     I had the pleasure of seeing Lyubov Sanchez in the [facility].  Lyubov is a 53 year old who is being evaluated via a billable video visit.      How would you like to obtain your AVS? MyChart  If the video visit is dropped, the invitation should be resent by: Text to cell phone: [phone]  Will anyone else be joining your video visit? No        Video-Visit Details    Type of service:  Video Visit     Originating Location (pt. Location): Home    Distant Location (provider location):  On-site  Platform used for Video Visit: [employer]    12 minutes        Cardiology Clinic (Structurtal - ASD)    Assessment & Plan      1.  ASD S/P Closure with 30 mm Cardioform septal occluder August 2020  2.  Elevated BMI  3.  Hyperlipidemia  4.  Obstructive sleep apnea  5.  Mildly dilated proximal ascending aorta  6.  Mild pulmonary hypertension from # 1  7.  Patient S/P hysterectomy  8.  HTN  9.  PAF S/P CV to NSR    Recommendations    1.  Patient has done well after her closure of her ASD.  Recent echocardiogram was reviewed demonstrating well-seated device with no residual shunting.  2.  Shortness of breath: Likely multifactorial.  There is possibly a diastolic component.  She has responded well to an additional dose of Lasix.  We did change her beta-blocker to carvedilol and she has responded well with significant improvement in her shortness of breath symptoms.  I   3.  Patient has had COVID x2.  She will have a follow-up with pulmonary and a CT scan of the chest has been ordered.    4.  Reviewed her Zio patch monitor.  No significant arrhythmias.  No atrial fibrillation recurrence.  For her paroxysmal defibrillation we will continue with aspirin therapy given her risk score.  5.  Patient continues to have lower extremity edema which is plaguing her.  She feels that her legs are taut and  heavy.  We have initiated diuretics with some good response however not complete resolution of her symptoms.  In the past we had a venous competency study which was abnormal.  She is willing to revisit the idea of possible vein ablation.  We will refer her to the vein clinic for assessment.  6.  Return to clinic in 1 years time, medications refilled        Corby Hodges MD, MD      HPI:      Patient is a very pleasant 53-year-old woman who I the pleasure meeting in 2020 for preoperative clearance for hysterectomy which she underwent without difficulty..  Prior to her preoperative visit she had an echocardiogram that suggested an interatrial shunt.  This was not clarified clearly on the transthoracic study.  She had a CONCEPCION performed demonstrating a small ASD with diameter of 8 mm.     Patient ultimately underwent ASD repair with a cardioform 30 mm [place] device in August 2020.  This was uneventful.  In September she was awakened with a pounding heart rate and eventually sought medical attention.  EKG demonstrated atrial fibrillation rapid ventricular response.  She was placed on Eliquis and metoprolol and underwent CONCEPCION cardioversion to normal sinus rhythm.  Subsequent monitoring has demonstrated no recurrence of her atrial fibrillation however occasional PACs and PVCs.  She was symptomatic initially for the next few weeks however these have since abated the last 10 to 12 days.  She is continue to be maintained on her metoprolol Plavix as well as Eliquis.  She also had Lasix placed with improvement in her shortness of breath.  An echocardiogram has been recently performed demonstrating no shunting and a well-placed ASD device.     On November 24, 2020 patient had an ER visit.  At that time she had abdominal pain and there was a concern regarding incarcerated umbilical hernia.  Fortunately the testing did not suggest this and she has done well with conservative management.  As a result of her presentation her  hernia surgery has been moved off to December 29, 2020.     In December 2020 she had her hernia surgery and preceding and certainly postoperatively she has had difficulty with edema.  She has had up titration of her diuretics with some improvement however symptoms persist.  Her weight is also gone up during this timeframe.  She feels short of breath as a result.  An echocardiogram was performed recently demonstrating preserved LV systolic function normal right-sided size and function and the interatrial device was well-positioned with no evidence of residual shunt.      Since I last saw her she had an evaluation with my vein colleagues.  She was diagnosed with venous insufficiency.  Stockings were advised.    More recently this summer in 2022 she had an admission for shortness of breath and was found to be severely hypertensive.  BNP was ordered and this was within normal limits along with troponin.  Medical management was advised.    We had made adjustments in her medical regimen and switched her from Toprol to carvedilol.  In addition of this she is taking an extra dose of Lasix.  Here for evaluation.  She states that she has had significant improvement in her shortness of breath with the change in her medical therapy.        Echo 2023  Left ventricular systolic function is normal.  The visual ejection fraction is 60-65%.  Regional wall motion abnormalities cannot be excluded due to limited  visualization.  Interatrial septal device noted in place.  There is no color Doppler evidence of an atrial shunt.  Ascending aorta dilatation is present.(4.1cm)  The study was technically difficult. Compared to the prior study dated 9/22,  there have been no changes.      Echo 2022  Left ventricular systolic function is normal.  The visual ejection fraction is 60-65%.  Mild-moderate left ventricuular hypertrophy.  Grade II or moderate diastolic dysfunction (average E/E' is 20).  The right ventricle is normal in structure,  function and size.  History of PFO closure. No evidence of shunt by color Doppler.  No significant valve dysfunction.  The ascending aorta is Mildly dilated at 4.0 cm.  The inferior vena cava was normal in size with preserved respiratory  variability.     Compared to study dated 6/8/2021, wall thickness better appreciated and there  is evidence of hypertrophy and diastolic dysfunction.      Primary Care Physician  Yolie Delarosa      Patient Active Problem List   Diagnosis    Hyperlipidemia LDL goal <100    Migraine    HTN, goal below 140/90    Migraine without aura    Health Care Home    Anxiety    Morbid obesity due to excess calories (H)    KRISTYN (obstructive sleep apnea)    Incomplete right bundle branch block    S/P total knee arthroplasty    Major depressive disorder, recurrent episode, mild (H)    Aneurysm, ascending aorta (H)    Umbilical hernia without obstruction and without gangrene    Patent foramen ovale    Pulmonary hypertension (H)    Ventral hernia without obstruction or gangrene       Past Medical History  I have reviewed this patient's medical history and updated it with pertinent information if needed.   Past Medical History:   Diagnosis Date    Anxiety and depression     Pt reports is on Rx Celexa.    Aortic aneurysm (H24)     Pt reports its small and is currently being monitored.    Arthritis     Atrial fibrillation with RVR (H) 9/15/2020    Depressive disorder     DYSPLASIA OF CERVIX 3/6/2003    Dysplasia of cervix (uteri) 2003    Rx cryotherapy Nov 03, and 2005    Hypertension     NO cardiology    Incomplete right bundle branch block 11/10/2017    Migraine     KRISTYN (obstructive sleep apnea)     Ostium secundum type atrial septal defect 7/29/2020    Added automatically from request for surgery [id number]    Other chronic pain     Knee pain for 8 years    Patent foramen ovale 1/9/2020    Plantar fasciitis     bilat    Unspecified sleep apnea     CPAP will bring on the day of surgery.    Ventral  hernia without obstruction or gangrene 4/15/2020    Ventral hernia without obstruction or gangrene 4/15/2020       Past Surgical History  I have reviewed this patient's surgical history and updated it with pertinent information if needed.  Past Surgical History:   Procedure Laterality Date    ANESTHESIA CARDIOVERSION N/A 2020    Procedure: ANESTHESIA, FOR CONCEPCION/CARDIOVERSION;  Surgeon: GENERIC ANESTHESIA PROVIDER;  Location: [facility] OR    ARTHROPLASTY KNEE Right 2017    Procedure: ARTHROPLASTY KNEE;  Right total knee arthroplasty using the Arthrex iBalance total knee system;  Surgeon: Hebert Lee MD;  Location: [facility] OR    ARTHROPLASTY KNEE Left 3/19/2018    Procedure: ARTHROPLASTY KNEE;  Left total knee arthroplasty using an Arthrex iBalance total knee system;  Surgeon: Hebert Lee MD;  Location: [facility] OR    ATRIAL SEPTAL DEFECT CLOSURE N/A 2020    Procedure: ASD Closure;  Surgeon: Freddie Frisa MD;  Location:  HEART CARDIAC CATH LAB     RIGHT HEART CATH MEASUREMENTS RECORDED N/A 2020    Procedure: Right Heart Cath;  Surgeon: Freddie Frias MD;  Location:  HEART CARDIAC CATH LAB    DAVINCI HERNIORRHAPHY VENTRAL N/A 2020    Procedure: ROBOTIC ASSISTED VENTRAL AND INCISIONAL HERNIA REPAIR WITH MESH;  Surgeon: Violetta Lazaro MD;  Location: [facility] OR    DAVINCI HYSTERECTOMY TOTAL, BILATERAL SALPINGO-OOPHORECTOMY, COMBINED Bilateral 2020    Procedure: DaVinci robotic-assisted total laparoscopic hysterectomy, bilateral salpingectomy;  Surgeon: Venancio Bartlett MD;  Location: [facility] OR - Path all benign    DAVINCI HYSTERECTOMY TOTAL, SALPINGECTOMY BILATERAL Bilateral 2020    Fibroid uterus, Menometrorrhagia - Robotic TLH, Bilateral salpingectomy - Path all benign    ENT SURGERY      HC COLP VAGINA W CERVIX IF PRES W BIOPSY      [facility]    TONSILLECTOMY & ADENOIDECTOMY      ZZC  DELIVERY ONLY  ,    , Low Cervical        Prior to Admission Medications  Cannot display prior to admission medications because the patient has not been admitted in this contact.     [unfilled]  [unfilled]  Allergies  Allergies   Allergen Reactions    Penicillins Hives     hives       Social History   reports that she quit smoking about 16 years ago. Her smoking use included cigarettes. She has a 2.50 pack-year smoking history. She has never been exposed to tobacco smoke. She has never used smokeless tobacco. She reports current alcohol use. She reports that she does not use drugs.    Family History  Family History   Adopted: Yes   Problem Relation Age of Onset    Unknown/Adopted Other         pt is adopted and has no access to health history    Unknown/Adopted Mother     Unknown/Adopted Father     Unknown/Adopted Maternal Grandmother     Unknown/Adopted Maternal Grandfather     Unknown/Adopted Paternal Grandmother     Unknown/Adopted Other        Review of Systems  The comprehensive 10 point Review of Systems is negative other than noted in the HPI or here.     Physical Exam  Vital Signs with Ranges     Wt Readings from Last 4 Encounters:   10/23/23 133.2 kg (293 lb 9.6 oz)   04/15/23 133.7 kg (294 lb 12.8 oz)   03/14/23 135.9 kg (299 lb 8 oz)   10/27/22 136.1 kg (300 lb)         Thank you for allowing me to participate in the care of your patient.      Sincerely,     Corby Hodges MD     [facility]  cc:   Corby Hodges MD  [address] [facility] [address] [place] [id number]

## 2023-11-15 NOTE — PROGRESS NOTES
Lyubov is a 53 year old who is being evaluated via a billable video visit.      How would you like to obtain your AVS? MyChart  If the video visit is dropped, the invitation should be resent by: Text to cell phone: [phone]  Will anyone else be joining your video visit? No        Video-Visit Details    Type of service:  Video Visit     Originating Location (pt. Location): Home    Distant Location (provider location):  On-site  Platform used for Video Visit: [employer]    12 minutes        Cardiology Clinic (Structurtal - ASD)    Assessment & Plan       1.  ASD S/P Closure with 30 mm Cardioform septal occluder August 2020  2.  Elevated BMI  3.  Hyperlipidemia  4.  Obstructive sleep apnea  5.  Mildly dilated proximal ascending aorta  6.  Mild pulmonary hypertension from # 1  7.  Patient S/P hysterectomy  8.  HTN  9.  PAF S/P CV to NSR    Recommendations    1.  Patient has done well after her closure of her ASD.  Recent echocardiogram was reviewed demonstrating well-seated device with no residual shunting.  2.  Shortness of breath: Likely multifactorial.  There is possibly a diastolic component.  She has responded well to an additional dose of Lasix.  We did change her beta-blocker to carvedilol and she has responded well with significant improvement in her shortness of breath symptoms.  I   3.  Patient has had COVID x2.  She will have a follow-up with pulmonary and a CT scan of the chest has been ordered.    4.  Reviewed her Zio patch monitor.  No significant arrhythmias.  No atrial fibrillation recurrence.  For her paroxysmal defibrillation we will continue with aspirin therapy given her risk score.  5.  Patient continues to have lower extremity edema which is plaguing her.  She feels that her legs are taut and heavy.  We have initiated diuretics with some good response however not complete resolution of her symptoms.  In the past we had a venous competency study which was abnormal.  She is willing to revisit the  idea of possible vein ablation.  We will refer her to the vein clinic for assessment.  6.  Return to clinic in 1 years time, medications refilled        Corby Hodges MD, MD      HPI:      Patient is a very pleasant 53-year-old woman who I the pleasure meeting in 2020 for preoperative clearance for hysterectomy which she underwent without difficulty..  Prior to her preoperative visit she had an echocardiogram that suggested an interatrial shunt.  This was not clarified clearly on the transthoracic study.  She had a CONCEPCION performed demonstrating a small ASD with diameter of 8 mm.     Patient ultimately underwent ASD repair with a cardioform 30 mm [place] device in August 2020.  This was uneventful.  In September she was awakened with a pounding heart rate and eventually sought medical attention.  EKG demonstrated atrial fibrillation rapid ventricular response.  She was placed on Eliquis and metoprolol and underwent CONCEPCION cardioversion to normal sinus rhythm.  Subsequent monitoring has demonstrated no recurrence of her atrial fibrillation however occasional PACs and PVCs.  She was symptomatic initially for the next few weeks however these have since abated the last 10 to 12 days.  She is continue to be maintained on her metoprolol Plavix as well as Eliquis.  She also had Lasix placed with improvement in her shortness of breath.  An echocardiogram has been recently performed demonstrating no shunting and a well-placed ASD device.     On November 24, 2020 patient had an ER visit.  At that time she had abdominal pain and there was a concern regarding incarcerated umbilical hernia.  Fortunately the testing did not suggest this and she has done well with conservative management.  As a result of her presentation her hernia surgery has been moved off to December 29, 2020.     In December 2020 she had her hernia surgery and preceding and certainly postoperatively she has had difficulty with edema.  She has had up titration  of her diuretics with some improvement however symptoms persist.  Her weight is also gone up during this timeframe.  She feels short of breath as a result.  An echocardiogram was performed recently demonstrating preserved LV systolic function normal right-sided size and function and the interatrial device was well-positioned with no evidence of residual shunt.      Since I last saw her she had an evaluation with my vein colleagues.  She was diagnosed with venous insufficiency.  Stockings were advised.    More recently this summer in 2022 she had an admission for shortness of breath and was found to be severely hypertensive.  BNP was ordered and this was within normal limits along with troponin.  Medical management was advised.    We had made adjustments in her medical regimen and switched her from Toprol to carvedilol.  In addition of this she is taking an extra dose of Lasix.  Here for evaluation.  She states that she has had significant improvement in her shortness of breath with the change in her medical therapy.        Echo 2023  Left ventricular systolic function is normal.  The visual ejection fraction is 60-65%.  Regional wall motion abnormalities cannot be excluded due to limited  visualization.  Interatrial septal device noted in place.  There is no color Doppler evidence of an atrial shunt.  Ascending aorta dilatation is present.(4.1cm)  The study was technically difficult. Compared to the prior study dated 9/22,  there have been no changes.      Echo 2022  Left ventricular systolic function is normal.  The visual ejection fraction is 60-65%.  Mild-moderate left ventricuular hypertrophy.  Grade II or moderate diastolic dysfunction (average E/E' is 20).  The right ventricle is normal in structure, function and size.  History of PFO closure. No evidence of shunt by color Doppler.  No significant valve dysfunction.  The ascending aorta is Mildly dilated at 4.0 cm.  The inferior vena cava was normal in size  with preserved respiratory  variability.     Compared to study dated 6/8/2021, wall thickness better appreciated and there  is evidence of hypertrophy and diastolic dysfunction.      Primary Care Physician   Yolie Delarosa      Patient Active Problem List   Diagnosis    Hyperlipidemia LDL goal <100    Migraine    HTN, goal below 140/90    Migraine without aura    Health Care Home    Anxiety    Morbid obesity due to excess calories (H)    KRISTYN (obstructive sleep apnea)    Incomplete right bundle branch block    S/P total knee arthroplasty    Major depressive disorder, recurrent episode, mild (H)    Aneurysm, ascending aorta (H)    Umbilical hernia without obstruction and without gangrene    Patent foramen ovale    Pulmonary hypertension (H)    Ventral hernia without obstruction or gangrene       Past Medical History   I have reviewed this patient's medical history and updated it with pertinent information if needed.   Past Medical History:   Diagnosis Date    Anxiety and depression     Pt reports is on Rx Celexa.    Aortic aneurysm (H24)     Pt reports its small and is currently being monitored.    Arthritis     Atrial fibrillation with RVR (H) 9/15/2020    Depressive disorder     DYSPLASIA OF CERVIX 3/6/2003    Dysplasia of cervix (uteri) 2003    Rx cryotherapy Nov 03, and 2005    Hypertension     NO cardiology    Incomplete right bundle branch block 11/10/2017    Migraine     KRISTYN (obstructive sleep apnea)     Ostium secundum type atrial septal defect 7/29/2020    Added automatically from request for surgery [id number]    Other chronic pain     Knee pain for 8 years    Patent foramen ovale 1/9/2020    Plantar fasciitis     bilat    Unspecified sleep apnea     CPAP will bring on the day of surgery.    Ventral hernia without obstruction or gangrene 4/15/2020    Ventral hernia without obstruction or gangrene 4/15/2020       Past Surgical History   I have reviewed this patient's surgical history and updated it with  pertinent information if needed.  Past Surgical History:   Procedure Laterality Date    ANESTHESIA CARDIOVERSION N/A 2020    Procedure: ANESTHESIA, FOR CONCEPCION/CARDIOVERSION;  Surgeon: GENERIC ANESTHESIA PROVIDER;  Location:  OR    ARTHROPLASTY KNEE Right 2017    Procedure: ARTHROPLASTY KNEE;  Right total knee arthroplasty using the Arthrex iBalance total knee system;  Surgeon: Hebert Lee MD;  Location: [facility] OR    ARTHROPLASTY KNEE Left 3/19/2018    Procedure: ARTHROPLASTY KNEE;  Left total knee arthroplasty using an Arthrex iBalance total knee system;  Surgeon: Hebert Lee MD;  Location: [facility] OR    ATRIAL SEPTAL DEFECT CLOSURE N/A 2020    Procedure: ASD Closure;  Surgeon: Freddie Frias MD;  Location:  HEART CARDIAC CATH LAB    CV RIGHT HEART CATH MEASUREMENTS RECORDED N/A 2020    Procedure: Right Heart Cath;  Surgeon: Freddie Frias MD;  Location:  HEART CARDIAC CATH LAB    DAVINCI HERNIORRHAPHY VENTRAL N/A 2020    Procedure: ROBOTIC ASSISTED VENTRAL AND INCISIONAL HERNIA REPAIR WITH MESH;  Surgeon: Violetta Lazaro MD;  Location: [facility] OR    DAVINCI HYSTERECTOMY TOTAL, BILATERAL SALPINGO-OOPHORECTOMY, COMBINED Bilateral 2020    Procedure: DaVinci robotic-assisted total laparoscopic hysterectomy, bilateral salpingectomy;  Surgeon: Venancio Bartlett MD;  Location: [facility] OR - Path all benign    DAVINCI HYSTERECTOMY TOTAL, SALPINGECTOMY BILATERAL Bilateral 2020    Fibroid uterus, Menometrorrhagia - Robotic TLH, Bilateral salpingectomy - Path all benign    ENT SURGERY      HC COLP VAGINA W CERVIX IF PRES W BIOPSY      [place] [facility]    TONSILLECTOMY & ADENOIDECTOMY      [facility]  DELIVERY ONLY      , Low Cervical       Prior to Admission Medications   Cannot display prior to admission medications because the patient has not been admitted in this contact.     @[email or web address]@  [unfilled]  Allergies   Allergies   Allergen  Reactions    Penicillins Hives     hives       Social History    reports that she quit smoking about 16 years ago. Her smoking use included cigarettes. She has a 2.50 pack-year smoking history. She has never been exposed to tobacco smoke. She has never used smokeless tobacco. She reports current alcohol use. She reports that she does not use drugs.    Family History   Family History   Adopted: Yes   Problem Relation Age of Onset    Unknown/Adopted Other         pt is adopted and has no access to health history    Unknown/Adopted Mother     Unknown/Adopted Father     Unknown/Adopted Maternal Grandmother     Unknown/Adopted Maternal Grandfather     Unknown/Adopted Paternal Grandmother     Unknown/Adopted Other        Review of Systems   The comprehensive 10 point Review of Systems is negative other than noted in the HPI or here.     Physical Exam   Vital Signs with Ranges     Wt Readings from Last 4 Encounters:   10/23/23 133.2 kg (293 lb 9.6 oz)   04/15/23 133.7 kg (294 lb 12.8 oz)   03/14/23 135.9 kg (299 lb 8 oz)   10/27/22 136.1 kg (300 lb)

## 2023-11-15 NOTE — TELEPHONE ENCOUNTER
LVM for this patient to schedule a consult.     JENIFER BERGERON,   [facility]

## 2024-01-10 ENCOUNTER — VIRTUAL VISIT (OUTPATIENT)
Dept: FAMILY MEDICINE | Facility: CLINIC | Age: 54
End: 2024-01-10
Payer: COMMERCIAL

## 2024-01-10 DIAGNOSIS — I77.810 THORACIC AORTIC ECTASIA (H): ICD-10-CM

## 2024-01-10 DIAGNOSIS — E11.9 TYPE 2 DIABETES MELLITUS WITHOUT COMPLICATION, WITHOUT LONG-TERM CURRENT USE OF INSULIN (H): Primary | ICD-10-CM

## 2024-01-10 DIAGNOSIS — I48.0 PAROXYSMAL ATRIAL FIBRILLATION (H): ICD-10-CM

## 2024-01-10 DIAGNOSIS — E66.01 MORBID OBESITY (H): ICD-10-CM

## 2024-01-10 DIAGNOSIS — I27.20 PULMONARY HYPERTENSION, UNSPECIFIED (H): ICD-10-CM

## 2024-01-10 DIAGNOSIS — F33.0 MAJOR DEPRESSIVE DISORDER, RECURRENT EPISODE, MILD (H): ICD-10-CM

## 2024-01-10 PROCEDURE — 99214 OFFICE O/P EST MOD 30 MIN: CPT | Mod: 95 | Performed by: NURSE PRACTITIONER

## 2024-01-10 RX ORDER — METFORMIN HCL 500 MG
500 TABLET, EXTENDED RELEASE 24 HR ORAL
Qty: 90 TABLET | Refills: 1 | Status: SHIPPED | OUTPATIENT
Start: 2024-01-10 | End: 2024-03-06

## 2024-01-10 NOTE — Clinical Note
Other Tests found in the patient's chart through Chart Review/Care Everywhere:  Eye exam with ophthalmology done by this group Jak on this date: 6/5/23.

## 2024-01-10 NOTE — PROGRESS NOTES
Lyubov is a 53 year old who is being evaluated via a billable video visit.      How would you like to obtain your AVS? MyChart  If the video visit is dropped, the invitation should be resent by: Text to cell phone: [phone]  Will anyone else be joining your video visit? No          Assessment & Plan     Type 2 diabetes mellitus without complication, without long-term current use of insulin (H)  New diagnosis.  Last A1c 7.0.  Starting on metformin.  Reviewed diagnosis today; will have her meet with diabetic education.  She has been trying to improve diet and has been tracking intake; this has resulted in 10# weight loss.  Congratulated her on her efforts and encouraged her to continue. Try to add in physical activity--start with walking 15 minutes. Eye exam last summer.  She is not on a statin--last LDL 90.  Will need to discuss stain in the future. Currently on ACEi for BP which has been controlled and she follows with cardiology.  Follow-up in 3 mos; in person appt for labs, foot exam.   - metFORMIN (GLUCOPHAGE XR) 500 MG 24 hr tablet; Take 1 tablet (500 mg) by mouth daily (with dinner)  - Adult Diabetes Education  Referral; Future    Pulmonary hypertension, unspecified (H)  Secondary to ASD s/p closure; followed by cardiology.    Major depressive disorder, recurrent episode, mild (H24)  Chronic, controlled.  Continue celexa.      8/8/2022     8:50 AM 3/14/2023    12:33 PM 10/23/2023     6:45 AM   PHQ   PHQ-9 Total Score 6 6 5   Q9: Thoughts of better off dead/self-harm past 2 weeks Not at all Not at all Not at all       Paroxysmal atrial fibrillation (H)  S/p CV; no recurrence. Continues on ASA; followed by cardiology.    Thoracic aortic ectasia (H24)  Mild; followed by cardiology.     Morbid obesity (H)  Continue to use lizeth to track intake; work on reducing portion sizes.  Weight loss can help improve HTN, hyperlipidemia, and DM.      Please abstract the following data from this visit with this patient  into the appropriate field in Epic:      Other Tests found in the patient's chart through Chart Review/Care Everywhere:    Eye exam with ophthalmology done by this group [facility] on this date: 6/5/23.        38 minutes spent by me on the date of the encounter doing chart review, history and exam, documentation and further activities per the note       Follow-up 3 mos in person appt    BARRINGTON Brewer CNP  [facility] JAMES Mcarthur is a 53 year old, presenting for the following health issues:  Diabetes (Pt recent diagnosis of diabetes )        1/10/2024     1:40 PM   Additional Questions   Roomed by Poppy CLAY LPN       History of Present Illness       Diabetes:   She presents for follow up of diabetes.    She is not checking blood glucose.        She is concerned about other.   She is having numbness in feet, burning in feet, excessive thirst and weight gain.            She eats 2-3 servings of fruits and vegetables daily.She consumes 2 sweetened beverage(s) daily.She exercises with enough effort to increase her heart rate 9 or less minutes per day.  She exercises with enough effort to increase her heart rate 3 or less days per week.   She is taking medications regularly.     Adopted; unknown family hx.   Last A1c 7.0; prior 6.6.  Last several sugars on labs in the 200's.   She and spouse recently started tracking diet intake on the Lose It lizeth; she is down 10#.  Trying to make better diet choices.   Had struggled with foot swelling (on diuretics) however since making better diet choices she has noticed swelling has been improved.  No dedicated physical activity.      Eye exam: August 2023; Jak    The 10-year ASCVD risk score (Wilmar BARR, et al., 2019) is: 2.6%    Values used to calculate the score:      Age: 53 years      Sex: Female      Is Non- : No      Diabetic: No      Tobacco smoker: No      Systolic Blood Pressure: 118 mmHg      Is BP treated:  Yes      HDL Cholesterol: 34 mg/dL      Total Cholesterol: 184 mg/dL      Review of Systems   Constitutional, HEENT, cardiovascular, pulmonary, gi and gu systems are negative, except as otherwise noted.      Objective    Vitals - Patient Reported  Systolic (Patient Reported): 130  Diastolic (Patient Reported): 76  Weight (Patient Reported): 128.8 kg (284 lb)  SpO2 (Patient Reported): 92  Pain Score: No Pain (0)        Physical Exam   GENERAL: Healthy, alert and no distress  EYES: Eyes grossly normal to inspection.  No discharge or erythema, or obvious scleral/conjunctival abnormalities.  RESP: No audible wheeze, cough, or visible cyanosis.  No visible retractions or increased work of breathing.    SKIN: Visible skin clear. No significant rash, abnormal pigmentation or lesions.  NEURO: Cranial nerves grossly intact.  Mentation and speech appropriate for age.  PSYCH: Mentation appears normal, affect normal/bright, judgement and insight intact, normal speech and appearance well-groomed.    No results found for this or any previous visit (from the past 24 hour(s)).          Video-Visit Details    Type of service:  Video Visit     Originating Location (pt. Location): Home    Distant Location (provider location):  Off-site  Platform used for Video Visit: Victoriano

## 2024-01-24 ENCOUNTER — VIRTUAL VISIT (OUTPATIENT)
Dept: EDUCATION SERVICES | Facility: CLINIC | Age: 54
End: 2024-01-24
Attending: NURSE PRACTITIONER
Payer: COMMERCIAL

## 2024-01-24 DIAGNOSIS — E11.9 TYPE 2 DIABETES MELLITUS WITHOUT COMPLICATION, WITHOUT LONG-TERM CURRENT USE OF INSULIN (H): ICD-10-CM

## 2024-01-24 PROCEDURE — G0108 DIAB MANAGE TRN  PER INDIV: HCPCS | Mod: 95 | Performed by: REGISTERED NURSE

## 2024-01-24 RX ORDER — LANCETS
EACH MISCELLANEOUS
Qty: 100 EACH | Refills: 4 | Status: SHIPPED | OUTPATIENT
Start: 2024-01-24

## 2024-01-24 RX ORDER — BLOOD SUGAR DIAGNOSTIC
STRIP MISCELLANEOUS
Qty: 100 STRIP | Refills: 4 | Status: SHIPPED | OUTPATIENT
Start: 2024-01-24

## 2024-01-24 RX ORDER — BLOOD-GLUCOSE METER
1 EACH MISCELLANEOUS PRN
Qty: 1 KIT | Refills: 1 | Status: SHIPPED | OUTPATIENT
Start: 2024-01-24

## 2024-01-24 NOTE — LETTER
1/24/2024         RE: Lyubov Sanchez  [address] [facility] [address]        Dear Colleague,    Thank you for referring your patient, Lyubov Sanchez, to the [facility]. Please see a copy of my visit note below.    Diabetes Self-Management Education & Support    Type of service:  Video Visit    If the video visit is dropped, the video visit invitation should be resent by: Text to cell phone: [phone]    Originating Location (pt. Location): Home  Distant Location (provider location): Offsite  Mode of Communication:  Video Conference via Rizzoma    Video Start Time:  1:58 PM  Video End Time (time video stopped): 3:00 PM    How would patient like to obtain AVS? MyChart      ASSESSMENT:  Initial visit for newly diagnosed Type 2 diabetes. Instructed on what is diabetes and how to control it. Reviewed symptoms and treatment of high and low blood sugar. Instructed on general diet guidelines and the importance of weight control and daily activity. Demonstrated how to check blood sugar at home.     Lyubov is using a calorie counting lizeth on her phone.  The lizeth calculated her calorie goal to be 1894 bob/day.  Patient is working on decreasing her intake of regular Pepsi.  She currently is drinking 2 to 3 cans/day.  Strongly encouraged her not to drink any pop or juice.    Diabetes Medications:Metformin Extended Release 0-0-500-0  Negative side effects resolved after the first week    Lab Results  Hemoglobin A1C   Date Value Ref Range Status   10/23/2023 7.0 (H) 0.0 - 5.6 % Final     Comment:     Normal <5.7%   Prediabetes 5.7-6.4%    Diabetes 6.5% or higher     Note: Adopted from ADA consensus guidelines.   04/12/2023 6.6 (H) <5.7 % Final     Comment:     Normal <5.7%   Prediabetes 5.7-6.4%    Diabetes 6.5% or higher     Note: Adopted from ADA consensus guidelines.   04/24/2019 5.1 0 - 5.6 % Final     Comment:     Normal <5.7% Prediabetes 5.7-6.4%  Diabetes 6.5% or higher - adopted  "from ADA   consensus guidelines.     07/05/2017 5.2 4.3 - 6.0 % Final   07/22/2016 5.8 4.3 - 6.0 % Final     is not meeting goal of <7.0    Diabetes knowledge and skills assessment:   Patient is knowledgeable in diabetes management concepts related to: All new information    Continue education with the following diabetes management concepts: Healthy Eating, Being Active, Monitoring, Taking Medication, Problem Solving, Reducing Risks, and Healthy Coping    Based on learning assessment above, most appropriate setting for further diabetes education would be: Individual setting.      PLAN      Education Materials Provided via email with permission   [place] [facility] Understanding Diabetes Booklet  Low carb snack ideas  Accu-Chek Guide Me free meter voucher    SUBJECTIVE/OBJECTIVE:  Runs a  in her home.  Adopted.  Does not know her family history of disease  Accompanied by: Self  Diabetes education in the past 24mo: No  Focus of Visit: Monitoring, Healthy Eating  Diabetes type: Type 2  Date of diagnosis: 4/12/23  Diabetes management related comments/concerns: what to eat  Transportation concerns: No  Difficulty affording diabetes medication?: No  Other concerns:: None  Cultural Influences/Ethnic Background:  Not  or     Diabetes Symptoms & Complications:  Weight trend: Decreasing  Complications assessed today?: Yes  Autonomic neuropathy: No  CVA: No  Heart disease: No  Nephropathy: No  Peripheral neuropathy: No  Peripheral Vascular Disease: No  Retinopathy: No    Patient Problem List and Family Medical History reviewed for relevant medical history, current medical status, and diabetes risk factors.    Vitals:  LMP  (LMP Unknown)   Estimated body mass index is 50.79 kg/m  as calculated from the following:    Height as of 10/23/23: 1.619 m (5' 3.75\").    Weight as of 10/23/23: 133.2 kg (293 lb 9.6 oz).   Last 3 BP:   BP Readings from Last 3 Encounters:   10/23/23 118/76   04/15/23 (!) 145/78   04/07/23 " "120/77       History   Smoking Status     Former     Packs/day: 0.50     Years: 5.00     Types: Cigarettes     Quit date: 4/1/2007   Smokeless Tobacco     Never       Labs:  Lab Results   Component Value Date    A1C 7.0 10/23/2023    A1C 5.1 04/24/2019     Lab Results   Component Value Date     10/23/2023     07/21/2022     02/08/2021     Lab Results   Component Value Date    LDL 95 10/23/2023     01/24/2020     HDL Cholesterol   Date Value Ref Range Status   01/24/2020 41 (L) >49 mg/dL Final     Direct Measure HDL   Date Value Ref Range Status   10/23/2023 34 (L) >=50 mg/dL Final   ]  GFR Estimate   Date Value Ref Range Status   10/23/2023 >90 >60 mL/min/1.73m2 Final   02/08/2021 >90 >60 mL/min/[1.73_m2] Final     Comment:     Non  GFR Calc  Starting 12/18/2018, serum creatinine based estimated GFR (eGFR) will be   calculated using the Chronic Kidney Disease Epidemiology Collaboration   (CKD-EPI) equation.       GFR Estimate If Black   Date Value Ref Range Status   02/08/2021 >90 >60 mL/min/[1.73_m2] Final     Comment:      GFR Calc  Starting 12/18/2018, serum creatinine based estimated GFR (eGFR) will be   calculated using the Chronic Kidney Disease Epidemiology Collaboration   (CKD-EPI) equation.       Lab Results   Component Value Date    CR 0.58 10/23/2023    CR 0.68 02/08/2021     No results found for: \"MICROALBUMIN\"    Healthy Eating:  Healthy Eating Assessed Today: No  Meal planning/habits: Calorie counting  Beverages: Soda, Juice, Water  Has patient met with a dietitian in the past?: No    Being Active:  Being Active Assessed Today: Yes  Exercise:: Currently not exercising    Monitoring:  Monitoring Assessed Today: Yes  Times checking blood sugar at home (number): Never    Taking Medications:  Diabetes Medication(s)       Biguanides       metFORMIN (GLUCOPHAGE XR) 500 MG 24 hr tablet Take 1 tablet (500 mg) by mouth daily (with dinner)      "       Taking Medication Assessed Today: Yes  Current Treatments: Oral Medication (taken by mouth)  Problems taking diabetes medications regularly?: No  Diabetes medication side effects?: No    Problem Solving:  Problem Solving Assessed Today: Yes  Is the patient at risk for hypoglycemia?: No  Is the patient at risk for DKA?: No    Reducing Risks:  Reducing Risks Assessed Today: Yes  Diabetes Risks: Age over 45 years, Hyperlipidemia  CAD Risks: Dyslipidemia, Hypertension, Obesity, Diabetes Mellitus    Healthy Coping:  Healthy Coping Assessed Today: Yes  Emotional response to diabetes: Ready to learn  Stage of change: PREPARATION (Decided to change - considering how)  Patient Activation Measure Survey Score:      7/7/2011     3:00 PM   RAMON Score (Last Two)   RAMON Raw Score 42   Activation Score 66   RAMON Level 3     Care Plan and Education Provided:  Care Plan: Diabetes   Updates made by Neena Peterson RD since 1/24/2024 12:00 AM        Problem: HbA1C Not In Goal         Goal: Establish Regular Follow-Ups with PCP         Task: Discuss with PCP the recommended timing for patient's next follow up visit(s)    Responsible User: Neena Peterson RD        Task: Discuss schedule for PCP visits with patient    Responsible User: Neena Peterson RD        Goal: Get HbA1C Level in Goal         Task: Educate patient on diabetes education self-management topics    Responsible User: Neena Peterson RD        Task: Educate patient on benefits of regular glucose monitoring Completed 1/24/2024   Responsible User: Neena Peterson RD        Task: Refer patient to appropriate extended care team member, as needed (Medication Therapy Management, Behavioral Health, Physical Therapy, etc.)    Responsible User: Neena Peterson RD        Task: Discuss diabetes treatment plan with patient    Responsible User: Neena Peterson RD        Problem: Diabetes Self-Management Education Needed to Optimize Self-Care Behaviors         Goal:  Understand diabetes pathophysiology and disease progression         Task: Provide education on diabetes pathophysiology and disease progression specfic to patient's diabetes type Completed 1/24/2024   Responsible User: Neena Peterson RD        Goal: Healthy Eating - follow a healthy eating pattern for diabetes         Task: Provide education on portion control and consistency in amount, composition and timing of food intake Completed 1/24/2024   Responsible User: Neena Peterson RD        Task: Provide education on managing carbohydrate intake (carbohydrate counting, plate planning method, etc.)    Responsible User: Neena Peterson RD        Task: Provide education on weight management    Responsible User: Neena Peterson RD        Task: Provide education on heart healthy eating    Responsible User: Neena Peterson RD        Task: Provide education on eating out    Responsible User: Neena Peterson RD        Task: Develop individualized healthy eating plan with patient    Responsible User: Neena Peterson RD        Goal: Being Active - get regular physical activity, working up to at least 150 minutes per week         Task: Provide education on relationship of activity to glucose and precautions to take if at risk for low glucose Completed 1/24/2024   Responsible User: Neena Peterson RD        Task: Discuss barriers to physical activity with patient    Responsible User: Neena Peterson RD        Task: Develop physical activity plan with patient    Responsible User: Neena Peterson RD        Task: Explore community resources including walking groups, assistance programs, and home videos    Responsible User: Neena Peterson RD        Goal: Monitoring - monitor glucose as directed    This Visit's Progress: 0%        Task: Provide education on blood glucose monitoring (purpose, proper technique, frequency, glucose targets, interpreting results, when to use glucose control solution, sharps disposal)     Responsible User: Neena Peterson RD        Task: Provide education on continuous glucose monitoring (sensor placement, use of lizeth or /reader, understanding glucose trends, alerts and alarms, differences between sensor glucose and blood glucose)    Responsible User: Neena Peterson RD        Task: Provide education on ketone monitoring (when to monitor, frequency, etc.)    Responsible User: Neena Peterson RD        Goal: Taking Medication - patient is consistently taking medications as directed    This Visit's Progress: 100%        Task: Provide education on action of prescribed medication, including when to take and possible side effects Completed 1/24/2024   Responsible User: Neena Peterson RD        Task: Provide education on insulin and injectable diabetes medications, including administration, storage, site selection and rotation for injection sites    Responsible User: Neena Peterson RD        Task: Discuss barriers to medication adherence with patient and provide management technique ideas as appropriate    Responsible User: Neena Peterson RD        Task: Provide education on frequency and refill details of medications    Responsible User: Neena Peterson RD        Goal: Problem Solving - know how to prevent and manage short-term diabetes complications         Task: Provide education on high blood glucose - causes, signs/symptoms, prevention and treatment Completed 1/24/2024   Responsible User: Neena Peterson RD        Task: Provide education on low blood glucose - causes, signs/symptoms, prevention, treatment, carrying a carbohydrate source at all times, and medical identification Completed 1/24/2024   Responsible User: Neena Peterson RD        Task: Provide education on safe travel with diabetes    Responsible User: Neena Peterson RD        Task: Provide education on how to care for diabetes on sick days    Responsible User: Neena Peterson RD        Task: Provide education on  when to call a health care provider    Responsible User: Neena Peterson RD        Goal: Reducing Risks - know how to prevent and treat long-term diabetes complications         Task: Provide education on major complications of diabetes, prevention, early diagnostic measures and treatment of complications    Responsible User: Neena Peterson RD        Task: Provide education on recommended care for dental, eye and foot health    Responsible User: Neena Peterson RD        Task: Provide education on Hemoglobin A1c - goals and relationship to blood glucose levels Completed 1/24/2024   Responsible User: Neena Peterson RD        Task: Provide education on recommendations for heart health - lipid levels and goals, blood pressure and goals, and aspirin therapy, if indicated    Responsible User: Neena Peterson RD        Task: Provide education on tobacco cessation    Responsible User: Neena Peterson RD        Goal: Healthy Coping - use available resources to cope with the challenges of managing diabetes         Task: Discuss recognizing feelings about having diabetes    Responsible User: Neena Peterson RD        Task: Provide education on the benefits of making appropriate lifestyle changes    Responsible User: Neena Peterson RD        Task: Provide education on benefits of utilizing support systems    Responsible User: Neena Peterson RD        Task: Discuss methods for coping with stress    Responsible User: Neena Peterson RD        Task: Provide education on when to seek professional counseling    Responsible User: Neena Peterson RD Joy Smetanka RD, LD, [facility]   Certified Diabetes Care and   [facility]     Time Spent: 60 minutes  Encounter Type: Individual    Any diabetes medication dose changes were made via the CDE Protocol per the patient's referring provider. A copy of this encounter was shared with the provider.

## 2024-01-24 NOTE — PATIENT INSTRUCTIONS
1. Test blood sugar 2 times per day at different times. Key times are before or 2 hours after any meal or bedtime.  2. Eat smaller amounts more often. Avoid skipping meals.  3. Include protein at meals and snacks.   4. Avoid sugary drinks (regular soda, lemonade, sweetened tea and Gatorade).  5. Eat fresh fruit instead of juice.  6. Include high fiber, whole grain foods instead of processed white foods. Healthier choices are whole grain cereals, whole wheat pasta, brown or wild rice and whole wheat bread.   7. Aim for foods with 3 grams of fiber or more per serving. Limit added sugar to 25 grams of added sugar per day.  8. Stay active every day; try not to sit for more than 30 minutes and walk 20-30 minutes most days of the week.   9. Next video in about 1 month.    Blood sugar targets:   Before meals:   2 hours after meals: less 180  Bedtime:     A1c target of less than 7.0% (estimated average blood sugar of 154 on your meter)

## 2024-01-25 NOTE — PROGRESS NOTES
Diabetes Self-Management Education & Support    Type of service:  Video Visit    If the video visit is dropped, the video visit invitation should be resent by: Text to cell phone: [phone]    Originating Location (pt. Location): Home  Distant Location (provider location): Offsite  Mode of Communication:  Video Conference via [employer]    Video Start Time:  1:58 PM  Video End Time (time video stopped): 3:00 PM    How would patient like to obtain AVS? MyChart      ASSESSMENT:  Initial visit for newly diagnosed Type 2 diabetes. Instructed on what is diabetes and how to control it. Reviewed symptoms and treatment of high and low blood sugar. Instructed on general diet guidelines and the importance of weight control and daily activity. Demonstrated how to check blood sugar at home.     Lyubov is using a calorie counting lizeth on her phone.  The lizeth calculated her calorie goal to be 1894 bob/day.  Patient is working on decreasing her intake of regular Pepsi.  She currently is drinking 2 to 3 cans/day.  Strongly encouraged her not to drink any pop or juice.    Diabetes Medications:Metformin Extended Release 0-0-500-0  Negative side effects resolved after the first week    Lab Results  Hemoglobin A1C   Date Value Ref Range Status   10/23/2023 7.0 (H) 0.0 - 5.6 % Final     Comment:     Normal <5.7%   Prediabetes 5.7-6.4%    Diabetes 6.5% or higher     Note: Adopted from ADA consensus guidelines.   04/12/2023 6.6 (H) <5.7 % Final     Comment:     Normal <5.7%   Prediabetes 5.7-6.4%    Diabetes 6.5% or higher     Note: Adopted from ADA consensus guidelines.   04/24/2019 5.1 0 - 5.6 % Final     Comment:     Normal <5.7% Prediabetes 5.7-6.4%  Diabetes 6.5% or higher - adopted from ADA   consensus guidelines.     07/05/2017 5.2 4.3 - 6.0 % Final   07/22/2016 5.8 4.3 - 6.0 % Final     is not meeting goal of <7.0    Diabetes knowledge and skills assessment:   Patient is knowledgeable in diabetes management concepts related to:  "All new information    Continue education with the following diabetes management concepts: Healthy Eating, Being Active, Monitoring, Taking Medication, Problem Solving, Reducing Risks, and Healthy Coping    Based on learning assessment above, most appropriate setting for further diabetes education would be: Individual setting.      PLAN      Education Materials Provided via email with permission  [facility] Understanding [facility] Booklet  Low carb snack ideas  Accu-Chek Guide Me free meter voucher    SUBJECTIVE/OBJECTIVE:  Runs a  in her home.  Adopted.  Does not know her family history of disease  Accompanied by: Self  Diabetes education in the past 24mo: No  Focus of Visit: Monitoring, Healthy Eating  Diabetes type: Type 2  Date of diagnosis: 4/12/23  Diabetes management related comments/concerns: what to eat  Transportation concerns: No  Difficulty affording diabetes medication?: No  Other concerns:: None  Cultural Influences/Ethnic Background:  Not  or     Diabetes Symptoms & Complications:  Weight trend: Decreasing  Complications assessed today?: Yes  Autonomic neuropathy: No  CVA: No  Heart disease: No  Nephropathy: No  Peripheral neuropathy: No  Peripheral Vascular Disease: No  Retinopathy: No    Patient Problem List and Family Medical History reviewed for relevant medical history, current medical status, and diabetes risk factors.    Vitals:  LMP  (LMP Unknown)   Estimated body mass index is 50.79 kg/m  as calculated from the following:    Height as of 10/23/23: 1.619 m (5' 3.75\").    Weight as of 10/23/23: 133.2 kg (293 lb 9.6 oz).   Last 3 BP:   BP Readings from Last 3 Encounters:   10/23/23 118/76   04/15/23 (!) 145/78   04/07/23 120/77       History   Smoking Status    Former    Packs/day: 0.50    Years: 5.00    Types: Cigarettes    Quit date: 4/1/2007   Smokeless Tobacco    Never       Labs:  Lab Results   Component Value Date    A1C 7.0 10/23/2023    A1C 5.1 04/24/2019 " "    Lab Results   Component Value Date     10/23/2023     07/21/2022     02/08/2021     Lab Results   Component Value Date    LDL 95 10/23/2023     01/24/2020     HDL Cholesterol   Date Value Ref Range Status   01/24/2020 41 (L) >49 mg/dL Final     Direct Measure HDL   Date Value Ref Range Status   10/23/2023 34 (L) >=50 mg/dL Final   ]  GFR Estimate   Date Value Ref Range Status   10/23/2023 >90 >60 mL/min/1.73m2 Final   02/08/2021 >90 >60 mL/min/[1.73_m2] Final     Comment:     Non  GFR Calc  Starting 12/18/2018, serum creatinine based estimated GFR (eGFR) will be   calculated using the Chronic Kidney Disease Epidemiology Collaboration   (CKD-EPI) equation.       GFR Estimate If Black   Date Value Ref Range Status   02/08/2021 >90 >60 mL/min/[1.73_m2] Final     Comment:      GFR Calc  Starting 12/18/2018, serum creatinine based estimated GFR (eGFR) will be   calculated using the Chronic Kidney Disease Epidemiology Collaboration   (CKD-EPI) equation.       Lab Results   Component Value Date    CR 0.58 10/23/2023    CR 0.68 02/08/2021     No results found for: \"MICROALBUMIN\"    Healthy Eating:  Healthy Eating Assessed Today: No  Meal planning/habits: Calorie counting  Beverages: Soda, Juice, Water  Has patient met with a dietitian in the past?: No    Being Active:  Being Active Assessed Today: Yes  Exercise:: Currently not exercising    Monitoring:  Monitoring Assessed Today: Yes  Times checking blood sugar at home (number): Never    Taking Medications:  Diabetes Medication(s)       Biguanides       metFORMIN (GLUCOPHAGE XR) 500 MG 24 hr tablet Take 1 tablet (500 mg) by mouth daily (with dinner)            Taking Medication Assessed Today: Yes  Current Treatments: Oral Medication (taken by mouth)  Problems taking diabetes medications regularly?: No  Diabetes medication side effects?: No    Problem Solving:  Problem Solving Assessed Today: Yes  Is the " patient at risk for hypoglycemia?: No  Is the patient at risk for DKA?: No    Reducing Risks:  Reducing Risks Assessed Today: Yes  Diabetes Risks: Age over 45 years, Hyperlipidemia  CAD Risks: Dyslipidemia, Hypertension, Obesity, Diabetes Mellitus    Healthy Coping:  Healthy Coping Assessed Today: Yes  Emotional response to diabetes: Ready to learn  Stage of change: PREPARATION (Decided to change - considering how)  Patient Activation Measure Survey Score:      7/7/2011     3:00 PM   RAMON Score (Last Two)   RAMON Raw Score 42   Activation Score 66   RAMON Level 3     Care Plan and Education Provided:  Care Plan: Diabetes   Updates made by Neena Peterson RD since 1/24/2024 12:00 AM        Problem: HbA1C Not In Goal         Goal: Establish Regular Follow-Ups with PCP         Task: Discuss with PCP the recommended timing for patient's next follow up visit(s)    Responsible User: Neena Peterson RD        Task: Discuss schedule for PCP visits with patient    Responsible User: Neena Peterson RD        Goal: Get HbA1C Level in Goal         Task: Educate patient on diabetes education self-management topics    Responsible User: Neena Peterson RD        Task: Educate patient on benefits of regular glucose monitoring Completed 1/24/2024   Responsible User: Neena Peterson RD        Task: Refer patient to appropriate extended care team member, as needed (Medication Therapy Management, Behavioral Health, Physical Therapy, etc.)    Responsible User: Neena Peterson RD        Task: Discuss diabetes treatment plan with patient    Responsible User: Neena Peterson RD        Problem: Diabetes Self-Management Education Needed to Optimize Self-Care Behaviors         Goal: Understand diabetes pathophysiology and disease progression         Task: Provide education on diabetes pathophysiology and disease progression specfic to patient's diabetes type Completed 1/24/2024   Responsible User: Neena Peterson RD        Goal: Healthy  Eating - follow a healthy eating pattern for diabetes         Task: Provide education on portion control and consistency in amount, composition and timing of food intake Completed 1/24/2024   Responsible User: Neena Peterson RD        Task: Provide education on managing carbohydrate intake (carbohydrate counting, plate planning method, etc.)    Responsible User: Neena Peterson RD        Task: Provide education on weight management    Responsible User: Neena Peterson RD        Task: Provide education on heart healthy eating    Responsible User: Neena Peterson RD        Task: Provide education on eating out    Responsible User: Neena Peterson RD        Task: Develop individualized healthy eating plan with patient    Responsible User: Neena Peterson RD        Goal: Being Active - get regular physical activity, working up to at least 150 minutes per week         Task: Provide education on relationship of activity to glucose and precautions to take if at risk for low glucose Completed 1/24/2024   Responsible User: Neena Peterson RD        Task: Discuss barriers to physical activity with patient    Responsible User: Neena Peterson RD        Task: Develop physical activity plan with patient    Responsible User: Neena Peterson RD        Task: Explore community resources including walking groups, assistance programs, and home videos    Responsible User: Neena Peterson RD        Goal: Monitoring - monitor glucose as directed    This Visit's Progress: 0%        Task: Provide education on blood glucose monitoring (purpose, proper technique, frequency, glucose targets, interpreting results, when to use glucose control solution, sharps disposal)    Responsible User: Neena Peterson RD        Task: Provide education on continuous glucose monitoring (sensor placement, use of lizeth or /reader, understanding glucose trends, alerts and alarms, differences between sensor glucose and blood glucose)     Responsible User: Neena Peterson RD        Task: Provide education on ketone monitoring (when to monitor, frequency, etc.)    Responsible User: Neena Peterson RD        Goal: Taking Medication - patient is consistently taking medications as directed    This Visit's Progress: 100%        Task: Provide education on action of prescribed medication, including when to take and possible side effects Completed 1/24/2024   Responsible User: Neena Peterson RD        Task: Provide education on insulin and injectable diabetes medications, including administration, storage, site selection and rotation for injection sites    Responsible User: Neena Peterson RD        Task: Discuss barriers to medication adherence with patient and provide management technique ideas as appropriate    Responsible User: Neena Peterson RD        Task: Provide education on frequency and refill details of medications    Responsible User: Neena Peterson RD        Goal: Problem Solving - know how to prevent and manage short-term diabetes complications         Task: Provide education on high blood glucose - causes, signs/symptoms, prevention and treatment Completed 1/24/2024   Responsible User: Neena Petesron RD        Task: Provide education on low blood glucose - causes, signs/symptoms, prevention, treatment, carrying a carbohydrate source at all times, and medical identification Completed 1/24/2024   Responsible User: Neena Peterson RD        Task: Provide education on safe travel with diabetes    Responsible User: Neena Peterson RD        Task: Provide education on how to care for diabetes on sick days    Responsible User: Neena Peterson RD        Task: Provide education on when to call a health care provider    Responsible User: Neena Peterson RD        Goal: Reducing Risks - know how to prevent and treat long-term diabetes complications         Task: Provide education on major complications of diabetes, prevention, early  diagnostic measures and treatment of complications    Responsible User: Neena Peterson RD        Task: Provide education on recommended care for dental, eye and foot health    Responsible User: Neena Peterson RD        Task: Provide education on Hemoglobin A1c - goals and relationship to blood glucose levels Completed 1/24/2024   Responsible User: Neena Peterson RD        Task: Provide education on recommendations for heart health - lipid levels and goals, blood pressure and goals, and aspirin therapy, if indicated    Responsible User: Neena Peterson RD        Task: Provide education on tobacco cessation    Responsible User: Neena Peterson RD        Goal: Healthy Coping - use available resources to cope with the challenges of managing diabetes         Task: Discuss recognizing feelings about having diabetes    Responsible User: Neena Peterson RD        Task: Provide education on the benefits of making appropriate lifestyle changes    Responsible User: Neena Peterson RD        Task: Provide education on benefits of utilizing support systems    Responsible User: Neena Peterson RD        Task: Discuss methods for coping with stress    Responsible User: Neena Peterson RD        Task: Provide education on when to seek professional counseling    Responsible User: Neena Peterson RD Joy Smetanka RD, LD, [facility]   Certified Diabetes Care and   [facility]     Time Spent: 60 minutes  Encounter Type: Individual    Any diabetes medication dose changes were made via the CDE Protocol per the patient's referring provider. A copy of this encounter was shared with the provider.

## 2024-01-28 ENCOUNTER — HEALTH MAINTENANCE LETTER (OUTPATIENT)
Age: 54
End: 2024-01-28

## 2024-01-28 ENCOUNTER — MYC MEDICAL ADVICE (OUTPATIENT)
Dept: FAMILY MEDICINE | Facility: CLINIC | Age: 54
End: 2024-01-28
Payer: COMMERCIAL

## 2024-03-06 ENCOUNTER — VIRTUAL VISIT (OUTPATIENT)
Dept: EDUCATION SERVICES | Facility: CLINIC | Age: 54
End: 2024-03-06
Payer: COMMERCIAL

## 2024-03-06 ENCOUNTER — OFFICE VISIT (OUTPATIENT)
Dept: FAMILY MEDICINE | Facility: CLINIC | Age: 54
End: 2024-03-06
Payer: COMMERCIAL

## 2024-03-06 VITALS
HEIGHT: 64 IN | BODY MASS INDEX: 47.68 KG/M2 | SYSTOLIC BLOOD PRESSURE: 137 MMHG | WEIGHT: 279.3 LBS | DIASTOLIC BLOOD PRESSURE: 74 MMHG | OXYGEN SATURATION: 95 % | TEMPERATURE: 97.9 F | HEART RATE: 66 BPM | RESPIRATION RATE: 20 BRPM

## 2024-03-06 DIAGNOSIS — E11.9 TYPE 2 DIABETES MELLITUS WITHOUT COMPLICATION, WITHOUT LONG-TERM CURRENT USE OF INSULIN (H): ICD-10-CM

## 2024-03-06 DIAGNOSIS — F33.0 MAJOR DEPRESSIVE DISORDER, RECURRENT EPISODE, MILD (H): Primary | ICD-10-CM

## 2024-03-06 DIAGNOSIS — E11.9 TYPE 2 DIABETES MELLITUS WITHOUT COMPLICATION, WITHOUT LONG-TERM CURRENT USE OF INSULIN (H): Primary | ICD-10-CM

## 2024-03-06 LAB — HBA1C MFR BLD: 6.1 % (ref 0–5.6)

## 2024-03-06 PROCEDURE — 82570 ASSAY OF URINE CREATININE: CPT | Performed by: NURSE PRACTITIONER

## 2024-03-06 PROCEDURE — 82043 UR ALBUMIN QUANTITATIVE: CPT | Performed by: NURSE PRACTITIONER

## 2024-03-06 PROCEDURE — G0108 DIAB MANAGE TRN  PER INDIV: HCPCS | Mod: 95 | Performed by: REGISTERED NURSE

## 2024-03-06 PROCEDURE — 99214 OFFICE O/P EST MOD 30 MIN: CPT | Performed by: NURSE PRACTITIONER

## 2024-03-06 PROCEDURE — 80048 BASIC METABOLIC PNL TOTAL CA: CPT | Performed by: NURSE PRACTITIONER

## 2024-03-06 PROCEDURE — 99207 PR FOOT EXAM NO CHARGE: CPT | Mod: 25 | Performed by: NURSE PRACTITIONER

## 2024-03-06 PROCEDURE — 83036 HEMOGLOBIN GLYCOSYLATED A1C: CPT | Performed by: NURSE PRACTITIONER

## 2024-03-06 PROCEDURE — 36415 COLL VENOUS BLD VENIPUNCTURE: CPT | Performed by: NURSE PRACTITIONER

## 2024-03-06 RX ORDER — METFORMIN HCL 500 MG
500 TABLET, EXTENDED RELEASE 24 HR ORAL
Qty: 90 TABLET | Refills: 1 | Status: SHIPPED | OUTPATIENT
Start: 2024-03-06 | End: 2024-09-24

## 2024-03-06 RX ORDER — SIMVASTATIN 20 MG
20 TABLET ORAL AT BEDTIME
Qty: 90 TABLET | Refills: 1 | Status: SHIPPED | OUTPATIENT
Start: 2024-03-06 | End: 2024-08-30

## 2024-03-06 RX ORDER — CITALOPRAM HYDROBROMIDE 20 MG/1
20 TABLET ORAL DAILY
Qty: 90 TABLET | Refills: 1 | Status: SHIPPED | OUTPATIENT
Start: 2024-03-06 | End: 2024-09-24

## 2024-03-06 ASSESSMENT — PATIENT HEALTH QUESTIONNAIRE - PHQ9: SUM OF ALL RESPONSES TO PHQ QUESTIONS 1-9: 1

## 2024-03-06 ASSESSMENT — ASTHMA QUESTIONNAIRES: ACT_TOTALSCORE: 25

## 2024-03-06 ASSESSMENT — PAIN SCALES - GENERAL: PAINLEVEL: NO PAIN (0)

## 2024-03-06 NOTE — PROGRESS NOTES
Assessment & Plan     Major depressive disorder, recurrent episode, mild (H24)  Chronic, controlled.  Continue on current medication.    - citalopram (CELEXA) 20 MG tablet; Take 1 tablet (20 mg) by mouth daily    Type 2 diabetes mellitus without complication, without long-term current use of insulin (H)  Chronic, here for follow-up after starting on metformin.  She has made great strides to improve her diet and has been successful with weight loss.  A1c improved; diabetes is controlled.  Statin indicated; started today.  Follow-up in 6 mos.   - Albumin Random Urine Quantitative with Creat Ratio; Future  - HEMOGLOBIN A1C; Future  - FOOT EXAM  - metFORMIN (GLUCOPHAGE XR) 500 MG 24 hr tablet; Take 1 tablet (500 mg) by mouth daily (with dinner)  - Basic metabolic panel  (Ca, Cl, CO2, Creat, Gluc, K, Na, BUN); Future  - simvastatin (ZOCOR) 20 MG tablet; Take 1 tablet (20 mg) by mouth at bedtime  - Albumin Random Urine Quantitative with Creat Ratio  - HEMOGLOBIN A1C  - Basic metabolic panel  (Ca, Cl, CO2, Creat, Gluc, K, Na, BUN)          Katy Mcarthur is a 53 year old, presenting for the following health issues:  Diabetes        3/6/2024     6:46 AM   Additional Questions   Roomed by Lisa Magill, CMA   Accompanied by self         3/6/2024     6:46 AM   Patient Reported Additional Medications   Patient reports taking the following new medications NONE     History of Present Illness       Diabetes:   She presents for follow up of diabetes.  She is checking home blood glucose two times daily.   She checks blood glucose before and after meals.  Blood glucose is sometimes over 200 and never under 70. She is aware of hypoglycemia symptoms including shakiness, dizziness and weakness.    She has no concerns regarding her diabetes at this time.  She is having numbness in feet, burning in feet and excessive thirst.            She eats 2-3 servings of fruits and vegetables daily.She consumes 1 sweetened beverage(s) daily.She  "exercises with enough effort to increase her heart rate 9 or less minutes per day.  She exercises with enough effort to increase her heart rate 4 days per week.   She is taking medications regularly.  Had been drinking 3+ cans of regular soda daily.  She has cut back to 1 a day.  She has been making changes to improve diet.  She is using the lose it lizeth to track her intake and has lost 15 pounds. She is not on a statin.        Component      Latest Ref Rng 10/23/2023  7:57 AM   Hemoglobin A1C      0.0 - 5.6 % 7.0 (H)       Mood stable, continues on celexa.        3/14/2023    12:33 PM 10/23/2023     6:45 AM 3/6/2024    11:56 AM   PHQ   PHQ-9 Total Score 6 5 1   Q9: Thoughts of better off dead/self-harm past 2 weeks Not at all Not at all Not at all       The 10-year ASCVD risk score (Wilmar BARR, et al., 2019) is: 6.8%    Values used to calculate the score:      Age: 53 years      Sex: Female      Is Non- : No      Diabetic: Yes      Tobacco smoker: No      Systolic Blood Pressure: 137 mmHg      Is BP treated: Yes      HDL Cholesterol: 34 mg/dL      Total Cholesterol: 184 mg/dL          Review of Systems  Constitutional, HEENT, cardiovascular, pulmonary, gi and gu systems are negative, except as otherwise noted.      Objective    /74 (BP Location: Right arm, Patient Position: Sitting, Cuff Size: Adult Large)   Pulse 66   Temp 97.9  F (36.6  C) (Oral)   Resp 20   Ht 1.619 m (5' 3.75\")   Wt 126.7 kg (279 lb 4.8 oz)   LMP  (LMP Unknown)   SpO2 95%   BMI 48.32 kg/m    Body mass index is 48.32 kg/m .  Physical Exam   GENERAL: alert and no distress  EYES: Eyes grossly normal to inspection, PERRL and conjunctivae and sclerae normal  NECK: no adenopathy, no asymmetry, masses, or scars  RESP: lungs clear to auscultation - no rales, rhonchi or wheezes  CV: regular rate and rhythm, normal S1 S2, no S3 or S4, no murmur, click or rub, no peripheral edema  MS: no gross musculoskeletal defects " noted, no edema  SKIN: no suspicious lesions or rashes  NEURO: Normal strength and tone, mentation intact and speech normal  PSYCH: mentation appears normal, affect normal/bright  Diabetic foot exam: normal DP and PT pulses, no trophic changes or ulcerative lesions, normal sensory exam, and normal monofilament exam    Results for orders placed or performed in visit on 03/06/24 (from the past 24 hour(s))   HEMOGLOBIN A1C   Result Value Ref Range    Hemoglobin A1C 6.1 (H) 0.0 - 5.6 %           Signed Electronically by: BARRINGTON Brewer CNP

## 2024-03-06 NOTE — LETTER
3/6/2024         RE: Lyubov Sanchez  [address] [facility] [id number]        Dear Colleague,    Thank you for referring your patient, Lyubov Sanchez, to the [facility]. Please see a copy of my visit note below.    Diabetes Self-Management Education & Support    Presents for: Follow-up    Type of service:  Video Visit    If the video visit is dropped, the video visit invitation should be resent by: Text to cell phone: [phone]    Originating Location (pt. Location): Home  Distant Location (provider location): Offsite  Mode of Communication:  Video Conference via [employer]    Video Start Time:  2:00 PM  Video End Time (time video stopped): 2:34 PM    How would patient like to obtain AVS? MyChart    ASSESSMENT:  1 month follow-up for diabetes education and counseling.  Lyubov is checking her blood sugar twice per day.  The majority of her blood sugars are within target.  The focus of today's visit was preventing diabetes complications and insulin resistance.    Patient's most recent   Lab Results   Component Value Date    A1C 6.1 03/06/2024    A1C 7.0 10/23/23     is meeting goal of <7.0    Diabetes knowledge and skills assessment:   Patient is knowledgeable in diabetes management concepts related to: Healthy Eating, Being Active, Monitoring, Taking Medication, Problem Solving, Reducing Risks, and Healthy Coping    Continue education with the following diabetes management concepts: None    Based on learning assessment above, most appropriate setting for further diabetes education would be: Individual setting.      PLAN  Continue to check your blood sugar 1-2 times per day.  Key times to check blood sugar are before breakfast or 2 hours after the largest meal.  Continue Metformin Extended Release 500 mg once per day with the evening meal.  Continue to eat a low carbohydrate diet.  Continue current exercise routine.  Continue to drink a lot of water to stay hydrated and protect  "your kidneys.  Follow-up as needed.  Feel free to send me a [employer] message with any questions or concerns.    See Care Plan for co-developed, patient-state behavior change goals.  AVS provided for patient today.    Education Materials Provided: Attached to AVS  Preventing diabetes complications  Footcare guidelines  Sick day management  Insulin resistance    SUBJECTIVE/OBJECTIVE:  Presents for: Follow-up  Accompanied by: Self  Diabetes education in the past 24mo: Yes  Focus of Visit: Reducing Risks, Problem Solving  Diabetes type: Type 2  Date of diagnosis: 4/12/2023  Disease course: Improving  Diabetes management related comments/concerns: None  Transportation concerns: No  Difficulty affording diabetes medication?: No  Difficulty affording diabetes testing supplies?: No  Other concerns:: None  Cultural Influences/Ethnic Background:  Not  or     Diabetes Symptoms & Complications:  Diabetes Related Symptoms: Fatigue, Neuropathy, Polydipsia (increased thirst), Polyuria (increased urination)  Weight trend: Decreasing  Symptom course: Stable  Disease course: Improving  Complications assessed today?: Yes  Autonomic neuropathy: No  CVA: No  Heart disease: No  Nephropathy: No  Peripheral neuropathy: No  Peripheral Vascular Disease: No  Retinopathy: No    Patient Problem List and Family Medical History reviewed for relevant medical history, current medical status, and diabetes risk factors.    Vitals:  LMP  (LMP Unknown)   Estimated body mass index is 48.32 kg/m  as calculated from the following:    Height as of an earlier encounter on 3/6/24: 1.619 m (5' 3.75\").    Weight as of an earlier encounter on 3/6/24: 126.7 kg (279 lb 4.8 oz).   Last 3 BP:   BP Readings from Last 3 Encounters:   03/06/24 137/74   10/23/23 118/76   04/15/23 (!) 145/78       History   Smoking Status     Former     Packs/day: 0.50     Years: 5.00     Types: Cigarettes     Quit date: 4/1/2007   Smokeless Tobacco     Never " "      Labs:  Lab Results   Component Value Date    A1C 6.1 03/06/2024    A1C 5.1 04/24/2019     Lab Results   Component Value Date     10/23/2023     07/21/2022     02/08/2021     Lab Results   Component Value Date    LDL 95 10/23/2023     01/24/2020     HDL Cholesterol   Date Value Ref Range Status   01/24/2020 41 (L) >49 mg/dL Final     Direct Measure HDL   Date Value Ref Range Status   10/23/2023 34 (L) >=50 mg/dL Final   ]  GFR Estimate   Date Value Ref Range Status   10/23/2023 >90 >60 mL/min/1.73m2 Final   02/08/2021 >90 >60 mL/min/[1.73_m2] Final     Comment:     Non  GFR Calc  Starting 12/18/2018, serum creatinine based estimated GFR (eGFR) will be   calculated using the Chronic Kidney Disease Epidemiology Collaboration   (CKD-EPI) equation.       GFR Estimate If Black   Date Value Ref Range Status   02/08/2021 >90 >60 mL/min/[1.73_m2] Final     Comment:      GFR Calc  Starting 12/18/2018, serum creatinine based estimated GFR (eGFR) will be   calculated using the Chronic Kidney Disease Epidemiology Collaboration   (CKD-EPI) equation.       Lab Results   Component Value Date    CR 0.58 10/23/2023    CR 0.68 02/08/2021     No results found for: \"MICROALBUMIN\"    Healthy Eating:  Healthy Eating Assessed Today: No  Meal planning/habits: Avoiding sweets, Calorie counting, Smaller portions  Beverages: Water  Has patient met with a dietitian in the past?: Yes    Being Active:  Being Active Assessed Today: Yes  Exercise:: Yes (You Tube exercise videos)  Days per week of moderate to strenuous exercise (like a brisk walk): 6  On average, minutes per day of exercise at this level: 10  How intense was your typical exercise? : Light (like stretching or slow walking)  Exercise Minutes per Week: 60    Monitoring:  Monitoring Assessed Today: Yes  Blood Glucose Meter: Accu-chek  Times checking blood sugar at home (number): 2  Times checking blood sugar at home " (per): Day  Blood glucose trend: Decreasing    Taking Medications:  Diabetes Medication(s)       Biguanides       metFORMIN (GLUCOPHAGE XR) 500 MG 24 hr tablet Take 1 tablet (500 mg) by mouth daily (with dinner)            Taking Medication Assessed Today: Yes  Current Treatments: Diet, Oral Medication (taken by mouth)  Problems taking diabetes medications regularly?: No  Diabetes medication side effects?: No    Problem Solving:  Problem Solving Assessed Today: Yes  Is the patient at risk for hypoglycemia?: No  Patient carries a carbohydrate source: Not Needed  Is the patient at risk for DKA?: No    Hypoglycemia Symptoms  Hypoglycemia: None    Hypoglycemia Complications  Hypoglycemia Complications: None    Reducing Risks:  Reducing Risks Assessed Today: Yes  Diabetes Risks: Age over 45 years, Hyperlipidemia, Sedentary Lifestyle  CAD Risks: Dyslipidemia, Hypertension, Obesity, Diabetes Mellitus, Sedentary lifestyle  Feet checked by healthcare provider in the last year?: Yes    Healthy Coping:  Healthy Coping Assessed Today: Yes  Emotional response to diabetes: Ready to learn  Stage of change: ACTION (Actively working towards change)  Patient Activation Measure Survey Score:      7/7/2011     3:00 PM   RAMON Score (Last Two)   RAMON Raw Score 42   Activation Score 66   RAMON Level 3     Care Plan and Education Provided:  Care Plan: Diabetes   Updates made by Neena Peterson RD since 3/6/2024 12:00 AM        Problem: HbA1C Not In Goal         Goal: Establish Regular Follow-Ups with PCP         Task: Discuss schedule for PCP visits with patient Completed 3/6/2024   Responsible User: Neena Peterson RD        Goal: Get HbA1C Level in Goal         Task: Educate patient on diabetes education self-management topics Completed 3/6/2024   Responsible User: Neena Peterson RD        Problem: Diabetes Self-Management Education Needed to Optimize Self-Care Behaviors         Goal: Being Active - get regular physical activity, working  up to at least 150 minutes per week    This Visit's Progress: 100%        Goal: Monitoring - monitor glucose as directed    This Visit's Progress: 100%   Recent Progress: 0%        Goal: Taking Medication - patient is consistently taking medications as directed    This Visit's Progress: 100%   Recent Progress: 100%        Goal: Reducing Risks - know how to prevent and treat long-term diabetes complications         Task: Provide education on major complications of diabetes, prevention, early diagnostic measures and treatment of complications Completed 3/6/2024   Responsible User: Neena Peterson RD        Task: Provide education on recommended care for dental, eye and foot health Completed 3/6/2024   Responsible User: Neena Peterson RD        Task: Provide education on recommendations for heart health - lipid levels and goals, blood pressure and goals, and aspirin therapy, if indicated Completed 3/6/2024   Responsible User: Neena Peterson RD        Goal: Healthy Coping - use available resources to cope with the challenges of managing diabetes         Task: Provide education on the benefits of making appropriate lifestyle changes Completed 3/6/2024   Responsible User: Neena Peterson RD Joy Smetanka RD, LD, [facility]   Certified Diabetes Care and   [facility]     Time Spent: 30 minutes  Encounter Type: Individual    Any diabetes medication dose changes were made via the CDE Protocol per the patient's primary care provider. A copy of this encounter was shared with the provider.

## 2024-03-06 NOTE — Clinical Note
It doesn't look like she left urine for microalbumin (needed for diabetes).  Can we assist with getting her on lab only schedule to come back for urine test.   Marta Clnie CNP

## 2024-03-06 NOTE — NURSING NOTE
"Chief Complaint   Patient presents with    Diabetes     Initial /74 (BP Location: Right arm, Patient Position: Sitting, Cuff Size: Adult Large)   Pulse 66   Temp 97.9  F (36.6  C) (Oral)   Resp 20   Ht 1.619 m (5' 3.75\")   Wt 126.7 kg (279 lb 4.8 oz)   LMP  (LMP Unknown)   SpO2 95%   BMI 48.32 kg/m   Estimated body mass index is 48.32 kg/m  as calculated from the following:    Height as of this encounter: 1.619 m (5' 3.75\").    Weight as of this encounter: 126.7 kg (279 lb 4.8 oz).  BP completed using cuff size large right arm    Lisa Magill, CMA    "

## 2024-03-07 LAB
ANION GAP SERPL CALCULATED.3IONS-SCNC: 13 MMOL/L (ref 7–15)
BUN SERPL-MCNC: 11.7 MG/DL (ref 6–20)
CALCIUM SERPL-MCNC: 10.2 MG/DL (ref 8.6–10)
CHLORIDE SERPL-SCNC: 100 MMOL/L (ref 98–107)
CREAT SERPL-MCNC: 0.65 MG/DL (ref 0.51–0.95)
CREAT UR-MCNC: 189 MG/DL
DEPRECATED HCO3 PLAS-SCNC: 28 MMOL/L (ref 22–29)
EGFRCR SERPLBLD CKD-EPI 2021: >90 ML/MIN/1.73M2
GLUCOSE SERPL-MCNC: 148 MG/DL (ref 70–99)
MICROALBUMIN UR-MCNC: 12.9 MG/L
MICROALBUMIN/CREAT UR: 6.83 MG/G CR (ref 0–25)
POTASSIUM SERPL-SCNC: 3.8 MMOL/L (ref 3.4–5.3)
SODIUM SERPL-SCNC: 141 MMOL/L (ref 135–145)

## 2024-03-07 NOTE — PROGRESS NOTES
Diabetes Self-Management Education & Support    Presents for: Follow-up    Type of service:  Video Visit    If the video visit is dropped, the video visit invitation should be resent by: Text to cell phone: [phone]    Originating Location (pt. Location): Home  Distant Location (provider location): Offsite  Mode of Communication:  Video Conference via [employer]    Video Start Time:  2:00 PM  Video End Time (time video stopped): 2:34 PM    How would patient like to obtain AVS? MyChart    ASSESSMENT:  1 month follow-up for diabetes education and counseling.  Lyubov is checking her blood sugar twice per day.  The majority of her blood sugars are within target.  The focus of today's visit was preventing diabetes complications and insulin resistance.    Patient's most recent   Lab Results   Component Value Date    A1C 6.1 03/06/2024    A1C 7.0 10/23/23     is meeting goal of <7.0    Diabetes knowledge and skills assessment:   Patient is knowledgeable in diabetes management concepts related to: Healthy Eating, Being Active, Monitoring, Taking Medication, Problem Solving, Reducing Risks, and Healthy Coping    Continue education with the following diabetes management concepts: None    Based on learning assessment above, most appropriate setting for further diabetes education would be: Individual setting.      PLAN  Continue to check your blood sugar 1-2 times per day.  Key times to check blood sugar are before breakfast or 2 hours after the largest meal.  Continue Metformin Extended Release 500 mg once per day with the evening meal.  Continue to eat a low carbohydrate diet.  Continue current exercise routine.  Continue to drink a lot of water to stay hydrated and protect your kidneys.  Follow-up as needed.  Feel free to send me a [employer] message with any questions or concerns.    See Care Plan for co-developed, patient-state behavior change goals.  AVS provided for patient today.    Education Materials Provided:  "Attached to AVS  Preventing diabetes complications  Footcare guidelines  Sick day management  Insulin resistance    SUBJECTIVE/OBJECTIVE:  Presents for: Follow-up  Accompanied by: Self  Diabetes education in the past 24mo: Yes  Focus of Visit: Reducing Risks, Problem Solving  Diabetes type: Type 2  Date of diagnosis: 4/12/2023  Disease course: Improving  Diabetes management related comments/concerns: None  Transportation concerns: No  Difficulty affording diabetes medication?: No  Difficulty affording diabetes testing supplies?: No  Other concerns:: None  Cultural Influences/Ethnic Background:  Not  or     Diabetes Symptoms & Complications:  Diabetes Related Symptoms: Fatigue, Neuropathy, Polydipsia (increased thirst), Polyuria (increased urination)  Weight trend: Decreasing  Symptom course: Stable  Disease course: Improving  Complications assessed today?: Yes  Autonomic neuropathy: No  CVA: No  Heart disease: No  Nephropathy: No  Peripheral neuropathy: No  Peripheral Vascular Disease: No  Retinopathy: No    Patient Problem List and Family Medical History reviewed for relevant medical history, current medical status, and diabetes risk factors.    Vitals:  LMP  (LMP Unknown)   Estimated body mass index is 48.32 kg/m  as calculated from the following:    Height as of an earlier encounter on 3/6/24: 1.619 m (5' 3.75\").    Weight as of an earlier encounter on 3/6/24: 126.7 kg (279 lb 4.8 oz).   Last 3 BP:   BP Readings from Last 3 Encounters:   03/06/24 137/74   10/23/23 118/76   04/15/23 (!) 145/78       History   Smoking Status    Former    Packs/day: 0.50    Years: 5.00    Types: Cigarettes    Quit date: 4/1/2007   Smokeless Tobacco    Never       Labs:  Lab Results   Component Value Date    A1C 6.1 03/06/2024    A1C 5.1 04/24/2019     Lab Results   Component Value Date     10/23/2023     07/21/2022     02/08/2021     Lab Results   Component Value Date    LDL 95 10/23/2023    LDL " "117 01/24/2020     HDL Cholesterol   Date Value Ref Range Status   01/24/2020 41 (L) >49 mg/dL Final     Direct Measure HDL   Date Value Ref Range Status   10/23/2023 34 (L) >=50 mg/dL Final   ]  GFR Estimate   Date Value Ref Range Status   10/23/2023 >90 >60 mL/min/1.73m2 Final   02/08/2021 >90 >60 mL/min/[1.73_m2] Final     Comment:     Non  GFR Calc  Starting 12/18/2018, serum creatinine based estimated GFR (eGFR) will be   calculated using the Chronic Kidney Disease Epidemiology Collaboration   (CKD-EPI) equation.       GFR Estimate If Black   Date Value Ref Range Status   02/08/2021 >90 >60 mL/min/[1.73_m2] Final     Comment:      GFR Calc  Starting 12/18/2018, serum creatinine based estimated GFR (eGFR) will be   calculated using the Chronic Kidney Disease Epidemiology Collaboration   (CKD-EPI) equation.       Lab Results   Component Value Date    CR 0.58 10/23/2023    CR 0.68 02/08/2021     No results found for: \"MICROALBUMIN\"    Healthy Eating:  Healthy Eating Assessed Today: No  Meal planning/habits: Avoiding sweets, Calorie counting, Smaller portions  Beverages: Water  Has patient met with a dietitian in the past?: Yes    Being Active:  Being Active Assessed Today: Yes  Exercise:: Yes (You Tube exercise videos)  Days per week of moderate to strenuous exercise (like a brisk walk): 6  On average, minutes per day of exercise at this level: 10  How intense was your typical exercise? : Light (like stretching or slow walking)  Exercise Minutes per Week: 60    Monitoring:  Monitoring Assessed Today: Yes  Blood Glucose Meter: Accu-chek  Times checking blood sugar at home (number): 2  Times checking blood sugar at home (per): Day  Blood glucose trend: Decreasing    Taking Medications:  Diabetes Medication(s)       Biguanides       metFORMIN (GLUCOPHAGE XR) 500 MG 24 hr tablet Take 1 tablet (500 mg) by mouth daily (with dinner)            Taking Medication Assessed Today: Yes  Current " Treatments: Diet, Oral Medication (taken by mouth)  Problems taking diabetes medications regularly?: No  Diabetes medication side effects?: No    Problem Solving:  Problem Solving Assessed Today: Yes  Is the patient at risk for hypoglycemia?: No  Patient carries a carbohydrate source: Not Needed  Is the patient at risk for DKA?: No    Hypoglycemia Symptoms  Hypoglycemia: None    Hypoglycemia Complications  Hypoglycemia Complications: None    Reducing Risks:  Reducing Risks Assessed Today: Yes  Diabetes Risks: Age over 45 years, Hyperlipidemia, Sedentary Lifestyle  CAD Risks: Dyslipidemia, Hypertension, Obesity, Diabetes Mellitus, Sedentary lifestyle  Feet checked by healthcare provider in the last year?: Yes    Healthy Coping:  Healthy Coping Assessed Today: Yes  Emotional response to diabetes: Ready to learn  Stage of change: ACTION (Actively working towards change)  Patient Activation Measure Survey Score:      7/7/2011     3:00 PM   RAMON Score (Last Two)   RAMON Raw Score 42   Activation Score 66   RAMON Level 3     Care Plan and Education Provided:  Care Plan: Diabetes   Updates made by Neena Peterson RD since 3/6/2024 12:00 AM        Problem: HbA1C Not In Goal         Goal: Establish Regular Follow-Ups with PCP         Task: Discuss schedule for PCP visits with patient Completed 3/6/2024   Responsible User: Neena Peterson RD        Goal: Get HbA1C Level in Goal         Task: Educate patient on diabetes education self-management topics Completed 3/6/2024   Responsible User: Neena Peterson RD        Problem: Diabetes Self-Management Education Needed to Optimize Self-Care Behaviors         Goal: Being Active - get regular physical activity, working up to at least 150 minutes per week    This Visit's Progress: 100%        Goal: Monitoring - monitor glucose as directed    This Visit's Progress: 100%   Recent Progress: 0%        Goal: Taking Medication - patient is consistently taking medications as directed    This  Visit's Progress: 100%   Recent Progress: 100%        Goal: Reducing Risks - know how to prevent and treat long-term diabetes complications         Task: Provide education on major complications of diabetes, prevention, early diagnostic measures and treatment of complications Completed 3/6/2024   Responsible User: Neena Peterson RD        Task: Provide education on recommended care for dental, eye and foot health Completed 3/6/2024   Responsible User: Neena Peterson RD        Task: Provide education on recommendations for heart health - lipid levels and goals, blood pressure and goals, and aspirin therapy, if indicated Completed 3/6/2024   Responsible User: Neena Peterson RD        Goal: Healthy Coping - use available resources to cope with the challenges of managing diabetes         Task: Provide education on the benefits of making appropriate lifestyle changes Completed 3/6/2024   Responsible User: Neena Peterson RD Joy Smetanka RD, LD, [facility]   Certified Diabetes Care and   [facility]     Time Spent: 30 minutes  Encounter Type: Individual    Any diabetes medication dose changes were made via the CDE Protocol per the patient's primary care provider. A copy of this encounter was shared with the provider.

## 2024-03-07 NOTE — PATIENT INSTRUCTIONS
Continue to check your blood sugar 1-2 times per day.  Key times to check blood sugar are before breakfast or 2 hours after the largest meal.  Continue Metformin Extended Release 500 mg once per day with the evening meal.  Continue to eat a low carbohydrate diet.  Continue current exercise routine.  Continue to drink a lot of water to stay hydrated and protect your kidneys.  Follow-up as needed.  Feel free to send me a [employer] message with any questions or concerns.    Blood sugar goals:  Before meals   2 hours after meals: less 180  Bedtime:     A1c: less than 7.0% (estimated average blood sugar of 154 mg/dl)

## 2024-04-20 DIAGNOSIS — Q21.10 ASD (ATRIAL SEPTAL DEFECT): ICD-10-CM

## 2024-04-22 RX ORDER — FUROSEMIDE 40 MG
40 TABLET ORAL 2 TIMES DAILY
Qty: 180 TABLET | Refills: 3 | Status: SHIPPED | OUTPATIENT
Start: 2024-04-22

## 2024-05-01 NOTE — PROGRESS NOTES
"  Assessment & Plan     Major depressive disorder, recurrent episode, mild (H)  Chronic stable; continue with current regimen no changes at this time.     - citalopram (CELEXA) 20 MG tablet; Take 1 tablet (20 mg) by mouth daily    Screen for colon cancer  Routine screeening  - Colonoscopy Screening  Referral; Future       BMI:   Estimated body mass index is 51.38 kg/m  as calculated from the following:    Height as of this encounter: 1.626 m (5' 4.02\").    Weight as of this encounter: 135.9 kg (299 lb 8 oz).   Weight management plan: Discussed healthy diet and exercise guidelines    Return in about 6 months (around 9/14/2023) for medication recheck.    BARRINGTON Barnard CNP  [facility] JAMES Mcarthur is a 52 year old, presenting for the following health issues:  Recheck Medication      History of Present Illness       Reason for visit:  Medication renewal    She eats 2-3 servings of fruits and vegetables daily.She consumes 2 sweetened beverage(s) daily.She exercises with enough effort to increase her heart rate 9 or less minutes per day.  She exercises with enough effort to increase her heart rate 3 or less days per week.   She is taking medications regularly.    Today's PHQ-9         PHQ-9 Total Score: 6    PHQ-9 Q9 Thoughts of better off dead/self-harm past 2 weeks :   Not at all    How difficult have these problems made it for you to do your work, take care of things at home, or get along with other people: Not difficult at all       Medication Followup of Citalopram    Taking Medication as prescribed: yes    Side Effects:  None    Medication Helping Symptoms:  yes    Denies any concerns with medication states things have been going well. Would like to continue at current dosing.       Review of Systems   Constitutional, HEENT, cardiovascular, pulmonary, gi and gu systems are negative, except as otherwise noted.      Objective    BP (!) 148/90   Pulse 68   Temp 98.1  F " "(36.7  C) (Oral)   Resp 19   Ht 1.626 m (5' 4.02\")   Wt 135.9 kg (299 lb 8 oz)   LMP  (LMP Unknown)   SpO2 93%   BMI 51.38 kg/m    Body mass index is 51.38 kg/m .  Physical Exam   GENERAL: healthy, alert and no distress  RESP: lungs clear to auscultation - no rales, rhonchi or wheezes  CV: regular rate and rhythm, normal S1 S2, no S3 or S4, no murmur, click or rub, no peripheral edema and peripheral pulses strong  PSYCH: mentation appears normal, affect normal/bright              " [postmenopausal] : postmenopausal [N] : Patient does not use contraception [Y] : Patient is sexually active [No] : Patient does not have concerns regarding sex [Currently Active] : currently active [Men] : men [Vaginal] : vaginal [Mammogramdate] : 02/23 [TextBox_19] : BRIDGET- Normal [PapSmeardate] : 2023 [TextBox_31] : Negative [BoneDensityDate] : 2022 [TextBox_37] : BRIDGET [ColonoscopyDate] : 2023 [[facility]Full[facility]] : 2 [PGHxTotal] : 2 [[facility] Centeriving] : 3 [PGHxMultBirths] : 1 [FreeTextEntry1] : X2 C-SECTIONS HX OF TWINS [TextBox_28] : S/P ANTHONY in past for fibroids and bleeding [Hot Flashes] : hot flashes [Night Sweats] : night sweats

## 2024-06-03 ENCOUNTER — MYC MEDICAL ADVICE (OUTPATIENT)
Dept: FAMILY MEDICINE | Facility: CLINIC | Age: 54
End: 2024-06-03
Payer: COMMERCIAL

## 2024-06-03 DIAGNOSIS — R06.02 SHORTNESS OF BREATH: ICD-10-CM

## 2024-06-03 RX ORDER — FLUTICASONE PROPIONATE 50 MCG
1 SPRAY, SUSPENSION (ML) NASAL DAILY PRN
Qty: 3 ML | Refills: 3 | Status: SHIPPED | OUTPATIENT
Start: 2024-06-03

## 2024-06-03 NOTE — TELEPHONE ENCOUNTER
Routing to refill pool. Pt and cardiology requesting PCP take over prescribing.    Radha Pino RN on 6/3/2024 at 12:40 PM

## 2024-06-16 ENCOUNTER — HEALTH MAINTENANCE LETTER (OUTPATIENT)
Age: 54
End: 2024-06-16

## 2024-06-17 ENCOUNTER — TELEPHONE (OUTPATIENT)
Dept: FAMILY MEDICINE | Facility: CLINIC | Age: 54
End: 2024-06-17
Payer: COMMERCIAL

## 2024-06-17 DIAGNOSIS — G47.33 OSA (OBSTRUCTIVE SLEEP APNEA): Primary | ICD-10-CM

## 2024-06-17 NOTE — TELEPHONE ENCOUNTER
Talked to Marta about below.  She said she can't backdate the supplies or a cpap.  She is not sure what the pt needs.    Called the pt.      She does not need supplies or a cpap machine.  She said she was already seen at [facility] on May 1, 2024.      They got new insurance.  [facility] didn't cover the appt.  They are refusing to pay anything at all.  She wants a referral for the appt she has already had on May 1 at [facility].    Advised that I don't think this is something that we can do for an appt that has already occurred, but I will forward to Marta Cline and referrals.

## 2024-06-17 NOTE — TELEPHONE ENCOUNTER
Referral is entered on [facility] ([facility]) using date of 5-1-24.  Ref# [id number].    Pauline VARELA Referrals

## 2024-06-17 NOTE — TELEPHONE ENCOUNTER
I am not familiar with how to retrodate an order?  Does she need a machine or supplies??    Marta Cline CNP

## 2024-06-17 NOTE — TELEPHONE ENCOUNTER
Order/Referral Request    Who is requesting: Patient      Orders being requested: [employer]     Reason service is needed/diagnosis: CPAP prescription. Pt needs new referral every year with current insurance in order to get replacement parts  Referral needs to be predated to before 05/01/24    When are orders needed by: ASAP    Has this been discussed with Provider: No    Does patient have a preference on a Group/Provider/Facility? [facility]     Does patient have an appointment scheduled?: No    Where to send orders:  Place referral in [employer] for pt to print off     Could we send this information to you in [employer] or would you prefer to receive a phone call?:   Patient would prefer a phone call   Okay to leave a detailed message?: Yes at Home number on file [phone] (home)

## 2024-06-17 NOTE — TELEPHONE ENCOUNTER
I put in a referral to [facility].  Unaware about retro dating referral.  I suspect that clinic may be out of network for them or perhaps a high deductible plan and hadn't met deductible?     Marta Cline CNP

## 2024-08-26 ENCOUNTER — TELEPHONE (OUTPATIENT)
Dept: FAMILY MEDICINE | Facility: CLINIC | Age: 54
End: 2024-08-26

## 2024-08-26 NOTE — CONFIDENTIAL NOTE
Patient Quality Outreach    Patient is due for the following:   Diabetes -  Eye Exam    Next Steps:   No follow up needed at this time.    Type of outreach:    Sent MessageParty message.      Questions for provider review:    None           Anish Hartmann MA

## 2024-08-26 NOTE — LETTER
[facility]  [address] [address] [phone]       September 10, 2024    Lyubov Sanchez  [address] [facility] [address]    Dear Lyubov,    We care about your health and have reviewed your health plan and are making recommendations based on this review, to optimize your health.    You are in particular need of attention regarding:  -Diabetes    We are recommending that you:  -Diabetic Eye Exam is due.  If this was done within the last 12 months then please contact the clinic with the date and location so we can update your records.    In addition, here is a list of due or overdue Health Maintenance reminders.    Health Maintenance Due   Topic Date Due    Asthma Action Plan - yearly  Never done    Zoster (Shingles) Vaccine (1 of 2) Never done    Eye Exam  06/05/2024    A1C Lab  06/06/2024    Flu Vaccine (1) 09/01/2024    COVID-19 Vaccine (4 - 2023-24 season) 09/01/2024    Asthma Control Test  09/06/2024    Depression Assessment  09/06/2024       To address the above recommendations, we encourage you to contact us at [phone], via [employer] or by contacting Central Scheduling toll free at [phone] 24 hours a day. They will assist you with finding the most convenient time and location.    Thank you for trusting [facility] and we appreciate the opportunity to serve you.  We look forward to supporting your healthcare needs in the future.    Healthy Regards,    Your [facility] Team

## 2024-08-30 DIAGNOSIS — E11.9 TYPE 2 DIABETES MELLITUS WITHOUT COMPLICATION, WITHOUT LONG-TERM CURRENT USE OF INSULIN (H): ICD-10-CM

## 2024-08-30 RX ORDER — SIMVASTATIN 20 MG
20 TABLET ORAL AT BEDTIME
Qty: 90 TABLET | Refills: 0 | Status: SHIPPED | OUTPATIENT
Start: 2024-08-30

## 2024-09-09 ENCOUNTER — MYC MEDICAL ADVICE (OUTPATIENT)
Dept: FAMILY MEDICINE | Facility: CLINIC | Age: 54
End: 2024-09-09
Payer: COMMERCIAL

## 2024-09-09 NOTE — TELEPHONE ENCOUNTER
Sent printed copy of referral to patient via [employer].     Precious Robertson  Lead   [facility]

## 2024-09-10 NOTE — CONFIDENTIAL NOTE
Patient Quality Outreach    Patient is due for the following:   Diabetes -  Eye Exam    Next Steps:   No follow up needed at this time.    Type of outreach:    Sent letter.    Next Steps:  Reach out within 90 days via Letter.    Max number of attempts reached: No. Will try again in 90 days if patient still on fail list.    Questions for provider review:    None           Anish Hartmann MA

## 2024-09-23 ENCOUNTER — PATIENT OUTREACH (OUTPATIENT)
Dept: CARE COORDINATION | Facility: CLINIC | Age: 54
End: 2024-09-23
Payer: COMMERCIAL

## 2024-09-23 DIAGNOSIS — E11.9 TYPE 2 DIABETES MELLITUS WITHOUT COMPLICATION, WITHOUT LONG-TERM CURRENT USE OF INSULIN (H): ICD-10-CM

## 2024-09-23 DIAGNOSIS — F33.0 MAJOR DEPRESSIVE DISORDER, RECURRENT EPISODE, MILD (H): ICD-10-CM

## 2024-09-24 RX ORDER — METFORMIN HCL 500 MG
500 TABLET, EXTENDED RELEASE 24 HR ORAL
Qty: 30 TABLET | Refills: 1 | Status: SHIPPED | OUTPATIENT
Start: 2024-09-24

## 2024-09-24 RX ORDER — CITALOPRAM HYDROBROMIDE 20 MG/1
20 TABLET ORAL DAILY
Qty: 30 TABLET | Refills: 1 | Status: SHIPPED | OUTPATIENT
Start: 2024-09-24

## 2024-09-24 NOTE — TELEPHONE ENCOUNTER
LVM message requesting a call back for an appt. Two more attempts will be made.     Bernadette Wylie   [facility]

## 2024-10-14 SDOH — HEALTH STABILITY: PHYSICAL HEALTH: ON AVERAGE, HOW MANY MINUTES DO YOU ENGAGE IN EXERCISE AT THIS LEVEL?: 40 MIN

## 2024-10-14 SDOH — HEALTH STABILITY: PHYSICAL HEALTH: ON AVERAGE, HOW MANY DAYS PER WEEK DO YOU ENGAGE IN MODERATE TO STRENUOUS EXERCISE (LIKE A BRISK WALK)?: 3 DAYS

## 2024-10-14 ASSESSMENT — ASTHMA QUESTIONNAIRES
ACT_TOTALSCORE: 25
QUESTION_2 LAST FOUR WEEKS HOW OFTEN HAVE YOU HAD SHORTNESS OF BREATH: NOT AT ALL
QUESTION_1 LAST FOUR WEEKS HOW MUCH OF THE TIME DID YOUR ASTHMA KEEP YOU FROM GETTING AS MUCH DONE AT WORK, SCHOOL OR AT HOME: NONE OF THE TIME
QUESTION_3 LAST FOUR WEEKS HOW OFTEN DID YOUR ASTHMA SYMPTOMS (WHEEZING, COUGHING, SHORTNESS OF BREATH, CHEST TIGHTNESS OR PAIN) WAKE YOU UP AT NIGHT OR EARLIER THAN USUAL IN THE MORNING: NOT AT ALL
ACT_TOTALSCORE: 25
QUESTION_4 LAST FOUR WEEKS HOW OFTEN HAVE YOU USED YOUR RESCUE INHALER OR NEBULIZER MEDICATION (SUCH AS ALBUTEROL): NOT AT ALL
QUESTION_5 LAST FOUR WEEKS HOW WOULD YOU RATE YOUR ASTHMA CONTROL: COMPLETELY CONTROLLED

## 2024-10-14 ASSESSMENT — PATIENT HEALTH QUESTIONNAIRE - PHQ9
SUM OF ALL RESPONSES TO PHQ QUESTIONS 1-9: 4
SUM OF ALL RESPONSES TO PHQ QUESTIONS 1-9: 4
10. IF YOU CHECKED OFF ANY PROBLEMS, HOW DIFFICULT HAVE THESE PROBLEMS MADE IT FOR YOU TO DO YOUR WORK, TAKE CARE OF THINGS AT HOME, OR GET ALONG WITH OTHER PEOPLE: NOT DIFFICULT AT ALL

## 2024-10-14 ASSESSMENT — SOCIAL DETERMINANTS OF HEALTH (SDOH): HOW OFTEN DO YOU GET TOGETHER WITH FRIENDS OR RELATIVES?: MORE THAN THREE TIMES A WEEK

## 2024-10-18 DIAGNOSIS — F33.0 MAJOR DEPRESSIVE DISORDER, RECURRENT EPISODE, MILD (H): ICD-10-CM

## 2024-10-18 RX ORDER — CITALOPRAM HYDROBROMIDE 20 MG/1
20 TABLET ORAL DAILY
Qty: 90 TABLET | Refills: 1 | OUTPATIENT
Start: 2024-10-18

## 2024-10-18 NOTE — TELEPHONE ENCOUNTER
Pt should have enough to get to appt on 10/22.  Can discuss refills at appt.     Marta Cline CNP

## 2024-10-22 ENCOUNTER — OFFICE VISIT (OUTPATIENT)
Dept: FAMILY MEDICINE | Facility: CLINIC | Age: 54
End: 2024-10-22
Payer: COMMERCIAL

## 2024-10-22 VITALS
DIASTOLIC BLOOD PRESSURE: 75 MMHG | OXYGEN SATURATION: 95 % | TEMPERATURE: 97.6 F | HEIGHT: 64 IN | HEART RATE: 69 BPM | SYSTOLIC BLOOD PRESSURE: 109 MMHG | RESPIRATION RATE: 20 BRPM | BODY MASS INDEX: 48.79 KG/M2 | WEIGHT: 285.8 LBS

## 2024-10-22 DIAGNOSIS — N39.3 FEMALE STRESS INCONTINENCE: ICD-10-CM

## 2024-10-22 DIAGNOSIS — E78.5 HYPERLIPIDEMIA LDL GOAL <100: ICD-10-CM

## 2024-10-22 DIAGNOSIS — H91.91 CHANGE IN HEARING OF RIGHT EAR: ICD-10-CM

## 2024-10-22 DIAGNOSIS — I10 HTN, GOAL BELOW 140/90: ICD-10-CM

## 2024-10-22 DIAGNOSIS — Z12.31 ENCOUNTER FOR SCREENING MAMMOGRAM FOR BREAST CANCER: ICD-10-CM

## 2024-10-22 DIAGNOSIS — Z00.00 ROUTINE GENERAL MEDICAL EXAMINATION AT A HEALTH CARE FACILITY: Primary | ICD-10-CM

## 2024-10-22 DIAGNOSIS — E11.9 TYPE 2 DIABETES MELLITUS WITHOUT COMPLICATION, WITHOUT LONG-TERM CURRENT USE OF INSULIN (H): ICD-10-CM

## 2024-10-22 DIAGNOSIS — F33.0 MAJOR DEPRESSIVE DISORDER, RECURRENT EPISODE, MILD (H): ICD-10-CM

## 2024-10-22 DIAGNOSIS — Z12.83 SCREENING EXAM FOR SKIN CANCER: ICD-10-CM

## 2024-10-22 LAB
ALBUMIN SERPL BCG-MCNC: 4.6 G/DL (ref 3.5–5.2)
ALP SERPL-CCNC: 49 U/L (ref 40–150)
ALT SERPL W P-5'-P-CCNC: 35 U/L (ref 0–50)
ANION GAP SERPL CALCULATED.3IONS-SCNC: 11 MMOL/L (ref 7–15)
AST SERPL W P-5'-P-CCNC: 26 U/L (ref 0–45)
BILIRUB SERPL-MCNC: 1.1 MG/DL
BUN SERPL-MCNC: 14.5 MG/DL (ref 6–20)
CALCIUM SERPL-MCNC: 9.8 MG/DL (ref 8.8–10.4)
CHLORIDE SERPL-SCNC: 97 MMOL/L (ref 98–107)
CHOLEST SERPL-MCNC: 178 MG/DL
CREAT SERPL-MCNC: 0.67 MG/DL (ref 0.51–0.95)
EGFRCR SERPLBLD CKD-EPI 2021: >90 ML/MIN/1.73M2
EST. AVERAGE GLUCOSE BLD GHB EST-MCNC: 126 MG/DL
FASTING STATUS PATIENT QL REPORTED: YES
FASTING STATUS PATIENT QL REPORTED: YES
GLUCOSE SERPL-MCNC: 142 MG/DL (ref 70–99)
HBA1C MFR BLD: 6 % (ref 0–5.6)
HCO3 SERPL-SCNC: 29 MMOL/L (ref 22–29)
HDLC SERPL-MCNC: 40 MG/DL
LDLC SERPL CALC-MCNC: 88 MG/DL
NONHDLC SERPL-MCNC: 138 MG/DL
POTASSIUM SERPL-SCNC: 4.7 MMOL/L (ref 3.4–5.3)
PROT SERPL-MCNC: 7.3 G/DL (ref 6.4–8.3)
SODIUM SERPL-SCNC: 137 MMOL/L (ref 135–145)
TRIGL SERPL-MCNC: 248 MG/DL

## 2024-10-22 PROCEDURE — 36415 COLL VENOUS BLD VENIPUNCTURE: CPT | Performed by: NURSE PRACTITIONER

## 2024-10-22 PROCEDURE — 83036 HEMOGLOBIN GLYCOSYLATED A1C: CPT | Performed by: NURSE PRACTITIONER

## 2024-10-22 PROCEDURE — 80053 COMPREHEN METABOLIC PANEL: CPT | Performed by: NURSE PRACTITIONER

## 2024-10-22 PROCEDURE — 90471 IMMUNIZATION ADMIN: CPT | Performed by: NURSE PRACTITIONER

## 2024-10-22 PROCEDURE — 90673 RIV3 VACCINE NO PRESERV IM: CPT | Performed by: NURSE PRACTITIONER

## 2024-10-22 PROCEDURE — 80061 LIPID PANEL: CPT | Performed by: NURSE PRACTITIONER

## 2024-10-22 PROCEDURE — 99396 PREV VISIT EST AGE 40-64: CPT | Mod: 25 | Performed by: NURSE PRACTITIONER

## 2024-10-22 PROCEDURE — 99214 OFFICE O/P EST MOD 30 MIN: CPT | Mod: 25 | Performed by: NURSE PRACTITIONER

## 2024-10-22 RX ORDER — METFORMIN HYDROCHLORIDE 500 MG/1
500 TABLET, EXTENDED RELEASE ORAL
Qty: 90 TABLET | Refills: 1 | Status: SHIPPED | OUTPATIENT
Start: 2024-10-22

## 2024-10-22 RX ORDER — SIMVASTATIN 20 MG
20 TABLET ORAL AT BEDTIME
Qty: 90 TABLET | Refills: 3 | Status: SHIPPED | OUTPATIENT
Start: 2024-10-22

## 2024-10-22 RX ORDER — CITALOPRAM HYDROBROMIDE 20 MG/1
20 TABLET ORAL DAILY
Qty: 90 TABLET | Refills: 1 | Status: SHIPPED | OUTPATIENT
Start: 2024-10-22

## 2024-10-22 RX ORDER — SPIRONOLACTONE 25 MG/1
25 TABLET ORAL DAILY
Qty: 90 TABLET | Refills: 3 | Status: CANCELLED | OUTPATIENT
Start: 2024-10-22

## 2024-10-22 ASSESSMENT — PAIN SCALES - GENERAL: PAINLEVEL: NO PAIN (0)

## 2024-10-22 NOTE — PROGRESS NOTES
Preventive Care Visit  [facility] BARRINGTON Chavez CNP, Family Medicine  Oct 22, 2024      Assessment & Plan     Routine general medical examination at a health care facility  Reviewed age appropriate screenings and immunizations.  Agrees to flu vax today.     Type 2 diabetes mellitus without complication, without long-term current use of insulin (H)  Chronic, here today for follow-up.  Diabetes continues to be well controlled.  Continue on current medications.  Have eye exam faxed to us.  Follow-up in 6 mos.   - HEMOGLOBIN A1C; Future  - Lipid panel reflex to direct LDL Non-fasting; Future  - Comprehensive metabolic panel (BMP + Alb, Alk Phos, ALT, AST, Total. Bili, TP); Future  - simvastatin (ZOCOR) 20 MG tablet; Take 1 tablet (20 mg) by mouth at bedtime.  - metFORMIN (GLUCOPHAGE XR) 500 MG 24 hr tablet; Take 1 tablet (500 mg) by mouth daily (with dinner).  - HEMOGLOBIN A1C  - Lipid panel reflex to direct LDL Non-fasting  - Comprehensive metabolic panel (BMP + Alb, Alk Phos, ALT, AST, Total. Bili, TP)    HTN, goal below 140/90  Chronic, managed by cardiology.  BP controlled here today.   - Comprehensive metabolic panel (BMP + Alb, Alk Phos, ALT, AST, Total. Bili, TP); Future  - Comprehensive metabolic panel (BMP + Alb, Alk Phos, ALT, AST, Total. Bili, TP)    Major depressive disorder, recurrent episode, mild (H)  Chronic, here today for follow-up.  Overall stable.  Discussed sleep hygiene, cut out caffeine after noon. Can trial melatonin.    - citalopram (CELEXA) 20 MG tablet; Take 1 tablet (20 mg) by mouth daily.    Encounter for screening mammogram for breast cancer  - MA Screen Bilateral w/Ambrosio; Future    Female stress incontinence  Refer for pelvic floor therapy.  Consider meds if PT ineffective.   - Physical Therapy  Referral; Future    Hyperlipidemia LDL goal <100  Chronic, here for follow-up. Controlled, continue statin.   The 10-year ASCVD risk score (Wilmar DK, et  "al., 2019) is: 3.6%    Values used to calculate the score:      Age: 54 years      Sex: Female      Is Non- : No      Diabetic: Yes      Tobacco smoker: No      Systolic Blood Pressure: 107 mmHg      Is BP treated: Yes      HDL Cholesterol: 40 mg/dL      Total Cholesterol: 178 mg/dL    - Lipid panel reflex to direct LDL Non-fasting; Future  - Comprehensive metabolic panel (BMP + Alb, Alk Phos, ALT, AST, Total. Bili, TP); Future  - simvastatin (ZOCOR) 20 MG tablet; Take 1 tablet (20 mg) by mouth at bedtime.  - Lipid panel reflex to direct LDL Non-fasting  - Comprehensive metabolic panel (BMP + Alb, Alk Phos, ALT, AST, Total. Bili, TP)    Screening exam for skin cancer  Various brown macules, refer for skin screening  - Adult Dermatology  Referral; Future    Change in hearing of right ear  refer  - Adult ENT  Referral; Future          BMI  Estimated body mass index is 49.44 kg/m  as calculated from the following:    Height as of this encounter: 1.619 m (5' 3.75\").    Weight as of this encounter: 129.6 kg (285 lb 12.8 oz).   Weight management plan: Discussed healthy diet and exercise guidelines      Follow-up 6 mos    Subjective   Lyubov is a 54 year old, presenting for the following:  Physical    Feeling good.         10/22/2024     8:49 AM   Additional Questions   Roomed by Lisa Magill, CMA   Accompanied by self         10/22/2024     8:49 AM   Patient Reported Additional Medications   Patient reports taking the following new medications NONE        Health Care Directive  Patient does not have a Health Care Directive or Living Will: Patient states has Advance Directive and will bring in a copy to clinic.    HPI  Referrals-derm  Right hearing concerns  Sleeping issues--up 1-2x a night to use the bathroom.  Hx of hysterectomy; friends told her it could be menopause.  Has a hard time falling back to sleep after waking up. Never tried OTC meds for sleep.  Caffeine at 4pm.  "     Notices urinary leakage with cough,laugh, sneeze.  No pelvic pain.  Hysterectomy in ; notes symptoms started after.  She has 2 children.     Diabetes Follow-up    How often are you checking your blood sugar? A few times a month  What time of day are you checking your blood sugars (select all that apply)?  Before meals  Have you had any blood sugars above 200?  No  Have you had any blood sugars below 70?  No  What symptoms do you notice when your blood sugar is low?  None  What concerns do you have today about your diabetes? None   Do you have any of these symptoms? (Select all that apply)  Burning in feet      BP Readings from Last 2 Encounters:   10/22/24 109/75   24 137/74     Hemoglobin A1C (%)   Date Value   2024 6.1 (H)   10/23/2023 7.0 (H)   2019 5.1   2017 5.2     LDL Cholesterol Calculated (mg/dL)   Date Value   10/23/2023 95   2020 117 (H)   2017 68             Hyperlipidemia Follow-Up    Are you regularly taking any medication or supplement to lower your cholesterol?   Yes- zocor  Are you having muscle aches or other side effects that you think could be caused by your cholesterol lowering medication?  No    Depression   How are you doing with your depression since your last visit? No change  Are you having other symptoms that might be associated with depression? No  Have you had a significant life event?  No   Are you feeling anxious or having panic attacks?   No  Do you have any concerns with your use of alcohol or other drugs? No  Poor sleep can reduce mood.  Waking during the night, hard to fall back asleep.   Social History     Tobacco Use    Smoking status: Former     Current packs/day: 0.00     Average packs/day: 0.5 packs/day for 5.0 years (2.5 ttl pk-yrs)     Types: Cigarettes     Start date: 2002     Quit date: 2007     Years since quittin.5     Passive exposure: Never    Smokeless tobacco: Never   Vaping Use    Vaping status: Never Used    Substance Use Topics    Alcohol use: Yes     Comment: 2-3 per weekend    Drug use: No         10/23/2023     6:45 AM 3/6/2024    11:56 AM 10/14/2024     9:03 AM   PHQ   PHQ-9 Total Score 5 1 4    4   Q9: Thoughts of better off dead/self-harm past 2 weeks Not at all Not at all Not at all         4/29/2020     8:40 AM 4/27/2021     8:14 AM 1/7/2022    10:26 AM   PINKY-7 SCORE   Total Score 4 5 2         Suicide Assessment Five-step Evaluation and Treatment (SAFE-T)            10/14/2024   General Health   How would you rate your overall physical health? (!) FAIR   Feel stress (tense, anxious, or unable to sleep) Rather much            10/14/2024   Nutrition   Three or more servings of calcium each day? Yes   Diet: Regular (no restrictions)   How many servings of fruit and vegetables per day? (!) 2-3   How many sweetened beverages each day? (!) 2            10/14/2024   Exercise   Days per week of moderate/strenous exercise 3 days   Average minutes spent exercising at this level 40 min            10/14/2024   Social Factors   Frequency of gathering with friends or relatives More than three times a week   Worry food won't last until get money to buy more No   Food not last or not have enough money for food? No   Do you have housing? (Housing is defined as stable permanent housing and does not include staying ouside in a car, in a tent, in an abandoned building, in an overnight shelter, or couch-surfing.) Yes   Are you worried about losing your housing? No   Lack of transportation? No   Unable to get utilities (heat,electricity)? No            10/14/2024   Fall Risk   Fallen 2 or more times in the past year? No   Trouble with walking or balance? No             10/14/2024   Dental   Dentist two times every year? Yes            10/14/2024   TB Screening   Were you born outside of the [place]? No          Today's PHQ-9 Score:       10/14/2024     9:03 AM   PHQ-9 SCORE   PHQ-9 Total Score MyChart 4 (Minimal depression)   PHQ-9  Total Score 4    4         10/14/2024   Substance Use   Alcohol more than 3/day or more than 7/wk No   Do you use any other substances recreationally? No        Social History     Tobacco Use    Smoking status: Former     Current packs/day: 0.00     Average packs/day: 0.5 packs/day for 5.0 years (2.5 ttl pk-yrs)     Types: Cigarettes     Start date: 2002     Quit date: 2007     Years since quittin.5     Passive exposure: Never    Smokeless tobacco: Never   Vaping Use    Vaping status: Never Used   Substance Use Topics    Alcohol use: Yes     Comment: 2-3 per weekend    Drug use: No           2023   LAST FHS-7 RESULTS   1st degree relative breast or ovarian cancer Unknown   Any relative bilateral breast cancer Unknown   Any male have breast cancer Unknown   Any ONE woman have BOTH breast AND ovarian cancer Unknown   Any woman with breast cancer before 50yrs Unknown   2 or more relatives with breast AND/OR ovarian cancer Unknown   2 or more relatives with breast AND/OR bowel cancer Unknown           Mammogram Screening - Mammogram every 1-2 years updated in Health Maintenance based on mutual decision making      History of abnormal Pap smear: Yes, NELLY II - age 30- 64 PAP with HPV every 3 years recommended        Latest Ref Rng & Units 10/23/2023     7:37 AM 10/31/2019     4:43 PM 10/31/2019     4:37 PM   PAP / HPV   PAP  Negative for Intraepithelial Lesion or Malignancy (NILM)      PAP (Historical)    NIL    HPV 16 DNA Negative Negative  Negative     HPV 18 DNA Negative Negative  Negative     Other HR HPV Negative Negative  Negative       ASCVD Risk   The 10-year ASCVD risk score (Wilmar BARR, et al., 2019) is: 4.6%    Values used to calculate the score:      Age: 54 years      Sex: Female      Is Non- : No      Diabetic: Yes      Tobacco smoker: No      Systolic Blood Pressure: 109 mmHg      Is BP treated: Yes      HDL Cholesterol: 34 mg/dL      Total Cholesterol: 184  mg/dL           Reviewed and updated as needed this visit by Provider                    Past Medical History:   Diagnosis Date    Anxiety and depression     Pt reports is on Rx Celexa.    Aortic aneurysm (H)     Pt reports its small and is currently being monitored.    Arthritis     Atrial fibrillation with RVR (H) 09/15/2020    Depressive disorder     Diabetes mellitus, type 2 (H) 01/10/2024    DYSPLASIA OF CERVIX 03/06/2003    Dysplasia of cervix (uteri) 2003    Rx cryotherapy Nov 03, and 2005    Hypertension     NO cardiology    Incomplete right bundle branch block 11/10/2017    Migraine     KRISTYN (obstructive sleep apnea)     Ostium secundum type atrial septal defect 07/29/2020    Added automatically from request for surgery [id number]    Other chronic pain     Knee pain for 8 years    Patent foramen ovale 01/09/2020    Plantar fasciitis     bilat    Unspecified sleep apnea     CPAP will bring on the day of surgery.    Ventral hernia without obstruction or gangrene 04/15/2020    Ventral hernia without obstruction or gangrene 04/15/2020     Past Surgical History:   Procedure Laterality Date    ANESTHESIA CARDIOVERSION N/A 9/16/2020    Procedure: ANESTHESIA, FOR CONCEPCION/CARDIOVERSION;  Surgeon: GENERIC ANESTHESIA PROVIDER;  Location:  OR    ARTHROPLASTY KNEE Right 11/17/2017    Procedure: ARTHROPLASTY KNEE;  Right total knee arthroplasty using the Arthrex iBalance total knee system;  Surgeon: Hebert Lee MD;  Location: [facility] OR    ARTHROPLASTY KNEE Left 3/19/2018    Procedure: ARTHROPLASTY KNEE;  Left total knee arthroplasty using an Arthrex iBalance total knee system;  Surgeon: Hebert Lee MD;  Location: [facility] OR    ATRIAL SEPTAL DEFECT CLOSURE N/A 8/26/2020    Procedure: ASD Closure;  Surgeon: Freddie Frias MD;  Location:  HEART CARDIAC CATH LAB    CV RIGHT HEART CATH MEASUREMENTS RECORDED N/A 8/26/2020    Procedure: Right Heart Cath;  Surgeon: Freddie Frias MD;  Location:  HEART  CARDIAC CATH LAB    DAVINCI HERNIORRHAPHY VENTRAL N/A 2020    Procedure: ROBOTIC ASSISTED VENTRAL AND INCISIONAL HERNIA REPAIR WITH MESH;  Surgeon: Violetta Lazaro MD;  Location: [facility] OR    DAVINCI HYSTERECTOMY TOTAL, BILATERAL SALPINGO-OOPHORECTOMY, COMBINED Bilateral 2020    Procedure: DaVinci robotic-assisted total laparoscopic hysterectomy, bilateral salpingectomy;  Surgeon: Venancio Bartlett MD;  Location: [facility] OR - Path all benign    DAVINCI HYSTERECTOMY TOTAL, SALPINGECTOMY BILATERAL Bilateral 2020    Fibroid uterus, Menometrorrhagia - Robotic TLH, Bilateral salpingectomy - Path all benign    ENT SURGERY      HC COLP VAGINA W CERVIX IF PRES W BIOPSY      [place] for ASCUS    TONSILLECTOMY & ADENOIDECTOMY      Z  DELIVERY ONLY  ,    , Low Cervical     Labs reviewed in EPIC  BP Readings from Last 3 Encounters:   10/22/24 109/75   24 137/74   10/23/23 118/76    Wt Readings from Last 3 Encounters:   10/22/24 129.6 kg (285 lb 12.8 oz)   24 126.7 kg (279 lb 4.8 oz)   10/23/23 133.2 kg (293 lb 9.6 oz)                  Current Outpatient Medications   Medication Sig Dispense Refill    albuterol (PROAIR HFA/PROVENTIL HFA/VENTOLIN HFA) 108 (90 Base) MCG/ACT inhaler Inhale 2 puffs into the lungs every 6 hours as needed for shortness of breath / dyspnea or wheezing 6.7 g 0    aspirin 81 MG EC tablet Take 81 mg by mouth daily      carvedilol (COREG) 12.5 MG tablet Take 1 tablet (12.5 mg) by mouth 2 times daily (with meals) 180 tablet 3    cetirizine (ZYRTEC) 10 MG tablet Take 10 mg by mouth as needed for allergies      citalopram (CELEXA) 20 MG tablet TAKE 1 TABLET BY MOUTH EVERY DAY 30 tablet 1    fluticasone (FLONASE) 50 MCG/ACT nasal spray Spray 1 spray into both nostrils daily as needed for rhinitis or allergies 3 mL 3    furosemide (LASIX) 40 MG tablet Take 1 tablet (40 mg) by mouth 2 times daily 180 tablet 3    ibuprofen (ADVIL/MOTRIN) 200 MG tablet Take 2  "tablets (400 mg) by mouth every 6 hours as needed for pain 15 tablet 1    lisinopril (ZESTRIL) 40 MG tablet Take 1 tablet (40 mg) by mouth daily 90 tablet 3    metFORMIN (GLUCOPHAGE XR) 500 MG 24 hr tablet TAKE 1 TABLET BY MOUTH EVERY DAY WITH DINNER 30 tablet 1    simvastatin (ZOCOR) 20 MG tablet TAKE 1 TABLET BY MOUTH AT BEDTIME 90 tablet 0    spironolactone (ALDACTONE) 25 MG tablet Take 1 tablet (25 mg) by mouth daily 90 tablet 3    ACCU-CHEK GUIDE test strip Use to test blood sugar 2 times daily. (Patient not taking: Reported on 10/22/2024) 100 strip 4    acetaminophen (TYLENOL) 500 MG tablet Take 500-1,000 mg by mouth every 6 hours as needed for mild pain  (Patient not taking: Reported on 10/22/2024)      blood glucose monitoring (SOFTCLIX) lancets Use to test blood sugar 2 times daily. (Patient not taking: Reported on 10/22/2024) 100 each 4    Blood Glucose Monitoring Suppl (ACCU-CHEK GUIDE ME) w/Device KIT 1 each as needed (Use to check blood sugar twice per day) (Patient not taking: Reported on 10/22/2024) 1 kit 1    furosemide (LASIX) 40 MG tablet Take 1 tablet (40 mg) by mouth 2 times daily 180 tablet 3     Allergies   Allergen Reactions    Penicillins Hives     hives            Objective    Exam  /75 (BP Location: Right arm, Patient Position: Sitting, Cuff Size: Adult Large)   Pulse 69   Temp 97.6  F (36.4  C) (Oral)   Resp 20   Ht 1.619 m (5' 3.75\")   Wt 129.6 kg (285 lb 12.8 oz)   LMP  (LMP Unknown)   SpO2 95%   BMI 49.44 kg/m     Estimated body mass index is 49.44 kg/m  as calculated from the following:    Height as of this encounter: 1.619 m (5' 3.75\").    Weight as of this encounter: 129.6 kg (285 lb 12.8 oz).    Physical Exam  GENERAL: alert and no distress  EYES: Eyes grossly normal to inspection, PERRL and conjunctivae and sclerae normal  HENT: ear canals and TM's normal, nose and mouth without ulcers or lesions  NECK: no adenopathy, no asymmetry, masses, or scars  RESP: lungs clear " to auscultation - no rales, rhonchi or wheezes  BREAST: normal without masses, tenderness or nipple discharge and no palpable axillary masses or adenopathy  CV: regular rate and rhythm, normal S1 S2, no S3 or S4, no murmur, click or rub, no peripheral edema  ABDOMEN: soft, nontender, no hepatosplenomegaly, no masses and bowel sounds normal  MS: no gross musculoskeletal defects noted, no edema  SKIN: no suspicious lesions or rashes  NEURO: Normal strength and tone, mentation intact and speech normal  PSYCH: mentation appears normal, affect normal/bright        Signed Electronically by: BARRINGTON Brewer CNP    Answers submitted by the patient for this visit:  Patient Health Questionnaire (Submitted on 10/14/2024)  If you checked off any problems, how difficult have these problems made it for you to do your work, take care of things at home, or get along with other people?: Not difficult at all  PHQ9 TOTAL SCORE: 4

## 2024-10-22 NOTE — PATIENT INSTRUCTIONS
Patient Education   Preventive Care Advice   This is general advice given by our system to help you stay healthy. However, your care team may have specific advice just for you. Please talk to your care team about your preventive care needs.  Nutrition  Eat 5 or more servings of fruits and vegetables each day.  Try wheat bread, brown rice and whole grain pasta (instead of white bread, rice, and pasta).  Get enough calcium and vitamin D. Check the label on foods and aim for 100% of the RDA (recommended daily allowance).  Lifestyle  Exercise at least 150 minutes each week  (30 minutes a day, 5 days a week).  Do muscle strengthening activities 2 days a week. These help control your weight and prevent disease.  No smoking.  Wear sunscreen to prevent skin cancer.  Have a dental exam and cleaning every 6 months.  Yearly exams  See your health care team every year to talk about:  Any changes in your health.  Any medicines your care team has prescribed.  Preventive care, family planning, and ways to prevent chronic diseases.  Shots (vaccines)   HPV shots (up to age 26), if you've never had them before.  Hepatitis B shots (up to age 59), if you've never had them before.  COVID-19 shot: Get this shot when it's due.  Flu shot: Get a flu shot every year.  Tetanus shot: Get a tetanus shot every 10 years.  Pneumococcal, hepatitis A, and RSV shots: Ask your care team if you need these based on your risk.  Shingles shot (for age 50 and up)  General health tests  Diabetes screening:  Starting at age 35, Get screened for diabetes at least every 3 years.  If you are younger than age 35, ask your care team if you should be screened for diabetes.  Cholesterol test: At age 39, start having a cholesterol test every 5 years, or more often if advised.  Bone density scan (DEXA): At age 50, ask your care team if you should have this scan for osteoporosis (brittle bones).  Hepatitis C: Get tested at least once in your life.  STIs (sexually  transmitted infections)  Before age 24: Ask your care team if you should be screened for STIs.  After age 24: Get screened for STIs if you're at risk. You are at risk for STIs (including HIV) if:  You are sexually active with more than one person.  You don't use condoms every time.  You or a partner was diagnosed with a sexually transmitted infection.  If you are at risk for HIV, ask about PrEP medicine to prevent HIV.  Get tested for HIV at least once in your life, whether you are at risk for HIV or not.  Cancer screening tests  Cervical cancer screening: If you have a cervix, begin getting regular cervical cancer screening tests starting at age 21.  Breast cancer scan (mammogram): If you've ever had breasts, begin having regular mammograms starting at age 40. This is a scan to check for breast cancer.  Colon cancer screening: It is important to start screening for colon cancer at age 45.  Have a colonoscopy test every 10 years (or more often if you're at risk) Or, ask your provider about stool tests like a FIT test every year or Cologuard test every 3 years.  To learn more about your testing options, visit:   .  For help making a decision, visit:   https://bit.ly/xa49797.  Prostate cancer screening test: If you have a prostate, ask your care team if a prostate cancer screening test (PSA) at age 55 is right for you.  Lung cancer screening: If you are a current or former smoker ages 50 to 80, ask your care team if ongoing lung cancer screenings are right for you.  For informational purposes only. Not to replace the advice of your health care provider. Copyright   2023 [employer]. All rights reserved. Clinically reviewed by the [facility] Transitions Program. [employer] [id number] - REV 01/24.

## 2024-10-22 NOTE — NURSING NOTE
"Chief Complaint   Patient presents with    Physical     Initial /75 (BP Location: Right arm, Patient Position: Sitting, Cuff Size: Adult Large)   Pulse 69   Temp 97.6  F (36.4  C) (Oral)   Resp 20   Ht 1.619 m (5' 3.75\")   Wt 129.6 kg (285 lb 12.8 oz)   LMP  (LMP Unknown)   SpO2 95%   BMI 49.44 kg/m   Estimated body mass index is 49.44 kg/m  as calculated from the following:    Height as of this encounter: 1.619 m (5' 3.75\").    Weight as of this encounter: 129.6 kg (285 lb 12.8 oz).  BP completed using cuff size large right arm.     Lisa Magill, CMA    "

## 2024-10-27 ENCOUNTER — ANCILLARY PROCEDURE (OUTPATIENT)
Dept: GENERAL RADIOLOGY | Facility: CLINIC | Age: 54
End: 2024-10-27
Attending: PHYSICIAN ASSISTANT
Payer: COMMERCIAL

## 2024-10-27 ENCOUNTER — OFFICE VISIT (OUTPATIENT)
Dept: URGENT CARE | Facility: URGENT CARE | Age: 54
End: 2024-10-27
Payer: COMMERCIAL

## 2024-10-27 VITALS
OXYGEN SATURATION: 94 % | DIASTOLIC BLOOD PRESSURE: 67 MMHG | BODY MASS INDEX: 49.3 KG/M2 | WEIGHT: 285 LBS | HEART RATE: 68 BPM | SYSTOLIC BLOOD PRESSURE: 107 MMHG | TEMPERATURE: 98.5 F

## 2024-10-27 DIAGNOSIS — R05.1 ACUTE COUGH: ICD-10-CM

## 2024-10-27 DIAGNOSIS — J18.9 COMMUNITY ACQUIRED PNEUMONIA OF RIGHT LOWER LOBE OF LUNG: Primary | ICD-10-CM

## 2024-10-27 PROCEDURE — 71046 X-RAY EXAM CHEST 2 VIEWS: CPT | Mod: TC | Performed by: RADIOLOGY

## 2024-10-27 PROCEDURE — 99214 OFFICE O/P EST MOD 30 MIN: CPT | Performed by: PHYSICIAN ASSISTANT

## 2024-10-27 RX ORDER — ALBUTEROL SULFATE 90 UG/1
2-4 INHALANT RESPIRATORY (INHALATION) EVERY 4 HOURS PRN
Qty: 18 G | Refills: 0 | Status: SHIPPED | OUTPATIENT
Start: 2024-10-27

## 2024-10-27 RX ORDER — AZITHROMYCIN 250 MG/1
TABLET, FILM COATED ORAL
Qty: 6 TABLET | Refills: 0 | Status: SHIPPED | OUTPATIENT
Start: 2024-10-27

## 2024-10-27 RX ORDER — DOXYCYCLINE 100 MG/1
100 CAPSULE ORAL 2 TIMES DAILY
Qty: 20 CAPSULE | Refills: 0 | Status: SHIPPED | OUTPATIENT
Start: 2024-10-27 | End: 2024-11-06

## 2024-10-27 RX ORDER — BENZONATATE 200 MG/1
200 CAPSULE ORAL 3 TIMES DAILY PRN
Qty: 21 CAPSULE | Refills: 0 | Status: SHIPPED | OUTPATIENT
Start: 2024-10-27

## 2024-10-27 NOTE — PROGRESS NOTES
SUBJECTIVE:  Lyubov Sanchez is a 54 year old female cough 10 days  lower O2 today.  SOB wth cough  no chest pain.   No fevers.   No ear pain or sinus.  ST mild.   Eatin and drinking.  OTC med  aluterol few мария a days.      Past Medical History:   Diagnosis Date    Anxiety and depression     Pt reports is on Rx Celexa.    Aortic aneurysm (H)     Pt reports its small and is currently being monitored.    Arthritis     Atrial fibrillation with RVR (H) 09/15/2020    Depressive disorder     Diabetes mellitus, type 2 (H) 01/10/2024    DYSPLASIA OF CERVIX 03/06/2003    Dysplasia of cervix (uteri) 2003    Rx cryotherapy Nov 03, and 2005    Hypertension     NO cardiology    Incomplete right bundle branch block 11/10/2017    Migraine     KRISTYN (obstructive sleep apnea)     Ostium secundum type atrial septal defect 07/29/2020    Added automatically from request for surgery [id number]    Other chronic pain     Knee pain for 8 years    Patent foramen ovale 01/09/2020    Plantar fasciitis     bilat    Unspecified sleep apnea     CPAP will bring on the day of surgery.    Ventral hernia without obstruction or gangrene 04/15/2020    Ventral hernia without obstruction or gangrene 04/15/2020     Patient Active Problem List   Diagnosis    Dysplasia of cervix (uteri)    Hyperlipidemia LDL goal <100    HTN, goal below 140/90    Migraine without aura    Anxiety    KRISTYN (obstructive sleep apnea)    Incomplete right bundle branch block    S/P total knee arthroplasty    Major depressive disorder, recurrent episode, mild (H)    Aneurysm, ascending aorta (H)    Pulmonary hypertension, unspecified (H)    Bilateral leg edema    Shortness of breath    Chest heaviness    Personal history of atrial fibrillation    Thoracic aortic ectasia (H)    Hyperopia of both eyes with astigmatism and presbyopia    Morbid obesity (H)    Hypoxia    Bronchospasm    Community acquired pneumonia, unspecified laterality    Paroxysmal atrial fibrillation (H)    Diabetes  mellitus, type 2 (H)     Current Outpatient Medications   Medication Sig Dispense Refill    albuterol (PROAIR HFA/PROVENTIL HFA/VENTOLIN HFA) 108 (90 Base) MCG/ACT inhaler Inhale 2 puffs into the lungs every 6 hours as needed for shortness of breath / dyspnea or wheezing 6.7 g 0    carvedilol (COREG) 12.5 MG tablet Take 1 tablet (12.5 mg) by mouth 2 times daily (with meals) 180 tablet 3    cetirizine (ZYRTEC) 10 MG tablet Take 10 mg by mouth as needed for allergies      citalopram (CELEXA) 20 MG tablet Take 1 tablet (20 mg) by mouth daily. 90 tablet 1    fluticasone (FLONASE) 50 MCG/ACT nasal spray Spray 1 spray into both nostrils daily as needed for rhinitis or allergies 3 mL 3    furosemide (LASIX) 40 MG tablet Take 1 tablet (40 mg) by mouth 2 times daily 180 tablet 3    lisinopril (ZESTRIL) 40 MG tablet Take 1 tablet (40 mg) by mouth daily 90 tablet 3    metFORMIN (GLUCOPHAGE XR) 500 MG 24 hr tablet Take 1 tablet (500 mg) by mouth daily (with dinner). 90 tablet 1    simvastatin (ZOCOR) 20 MG tablet Take 1 tablet (20 mg) by mouth at bedtime. 90 tablet 3    spironolactone (ALDACTONE) 25 MG tablet Take 1 tablet (25 mg) by mouth daily 90 tablet 3    aspirin 81 MG EC tablet Take 81 mg by mouth daily       No current facility-administered medications for this visit.     Social History     Socioeconomic History    Marital status:      Spouse name: Saúl    Number of children: 2    Years of education: Not on file    Highest education level: Not on file   Occupational History    Occupation:  at home   Tobacco Use    Smoking status: Former     Current packs/day: 0.00     Average packs/day: 0.5 packs/day for 5.0 years (2.5 ttl pk-yrs)     Types: Cigarettes     Start date: 2002     Quit date: 2007     Years since quittin.5     Passive exposure: Never    Smokeless tobacco: Never   Vaping Use    Vaping status: Never Used   Substance and Sexual Activity    Alcohol use: Yes     Comment: 2-3 per  weekend    Drug use: No    Sexual activity: Yes     Partners: Male     Birth control/protection: Female Surgical, Male Surgical     Comment: Hysterectomy 2020/ vasectomy    Other Topics Concern     Service No    Blood Transfusions No    Caffeine Concern Yes     Comment: 20 oz pepsi    Occupational Exposure Not Asked    Hobby Hazards Not Asked    Sleep Concern Not Asked    Stress Concern Not Asked    Weight Concern Not Asked    Special Diet Yes     Comment: 2-3 dairy per day    Back Care Not Asked    Exercise No     Comment: active with kids, walking puppy    Bike Helmet Not Asked    Seat Belt Yes    Self-Exams No    Parent/sibling w/ CABG, MI or angioplasty before 65F 55M? No     Comment: No family history - Adopted   Social History Narrative    Not on file     Social Drivers of Health     Financial Resource Strain: Low Risk  (10/14/2024)    Financial Resource Strain     Within the past 12 months, have you or your family members you live with been unable to get utilities (heat, electricity) when it was really needed?: No   Food Insecurity: Low Risk  (10/14/2024)    Food Insecurity     Within the past 12 months, did you worry that your food would run out before you got money to buy more?: No     Within the past 12 months, did the food you bought just not last and you didn t have money to get more?: No   Transportation Needs: Low Risk  (10/14/2024)    Transportation Needs     Within the past 12 months, has lack of transportation kept you from medical appointments, getting your medicines, non-medical meetings or appointments, work, or from getting things that you need?: No   Physical Activity: Insufficiently Active (10/14/2024)    Exercise Vital Sign     Days of Exercise per Week: 3 days     Minutes of Exercise per Session: 40 min   Stress: Stress Concern Present (10/14/2024)    [facility] - Occupational Stress Questionnaire     Feeling of Stress : Rather much   Social  Connections: Unknown (10/14/2024)    Social Connection and Isolation Panel [NHANES]     Frequency of Communication with Friends and Family: Not on file     Frequency of Social Gatherings with Friends and Family: More than three times a week     Attends Denominational Services: Not on file     Active Member of Clubs or Organizations: Not on file     Attends Club or Organization Meetings: Not on file     Marital Status: Not on file   Interpersonal Safety: Low Risk  (10/23/2023)    Interpersonal Safety     Do you feel physically and emotionally safe where you currently live?: Yes     Within the past 12 months, have you been hit, slapped, kicked or otherwise physically hurt by someone?: No     Within the past 12 months, have you been humiliated or emotionally abused in other ways by your partner or ex-partner?: No   Housing Stability: Low Risk  (10/14/2024)    Housing Stability     Do you have housing? : Yes     Are you worried about losing your housing?: No     ROS  negative other than stated above    Exam:  {:[id number]}    ***   review    assessment/plan:  (J18.9) Community acquired pneumonia of right lower lobe of lung  (primary encounter diagnosis)  Comment:   Plan: azithromycin (ZITHROMAX) 250 MG tablet,         doxycycline hyclate (VIBRAMYCIN) 100 MG         capsule, albuterol (PROAIR HFA/PROVENTIL         HFA/VENTOLIN HFA) 108 (90 Base) MCG/ACT         inhaler, benzonatate (TESSALON) 200 MG capsule          Patient with 10-day history of cough that seems to be getting worse.  Her oxygen is 94 in the clinic.  Does not appear to be any distress.  She does have decreased lung sounds in the right lower lobe and x-ray concerning for pneumonia.  Will cover with Zithromax and doxycycline.  Side effects of medication were reviewed.  Will take with food.  Refill of albuterol was given.  No wheezing and prednisone not indicated at this time.  Tessalon Perles also as needed for cough.  May continue with over-the-counter med for symptomatic relief.  Red flag signs were discussed will follow-up primary if symptoms worsen or new symptoms develop.    (R05.1) Acute cough  Comment:   Plan: XR Chest 2 Views

## 2024-11-21 ENCOUNTER — NURSE TRIAGE (OUTPATIENT)
Dept: FAMILY MEDICINE | Facility: CLINIC | Age: 54
End: 2024-11-21

## 2024-11-21 ENCOUNTER — OFFICE VISIT (OUTPATIENT)
Dept: PEDIATRICS | Facility: CLINIC | Age: 54
End: 2024-11-21
Payer: COMMERCIAL

## 2024-11-21 VITALS
RESPIRATION RATE: 18 BRPM | HEART RATE: 90 BPM | TEMPERATURE: 97.6 F | SYSTOLIC BLOOD PRESSURE: 120 MMHG | DIASTOLIC BLOOD PRESSURE: 81 MMHG | WEIGHT: 287 LBS | OXYGEN SATURATION: 94 % | BODY MASS INDEX: 49.65 KG/M2

## 2024-11-21 DIAGNOSIS — N94.9 ADNEXAL CYST: ICD-10-CM

## 2024-11-21 DIAGNOSIS — R10.31 RLQ ABDOMINAL PAIN: Primary | ICD-10-CM

## 2024-11-21 LAB
ALBUMIN SERPL BCG-MCNC: 4.8 G/DL (ref 3.5–5.2)
ALBUMIN UR-MCNC: NEGATIVE MG/DL
ALP SERPL-CCNC: 52 U/L (ref 40–150)
ALT SERPL W P-5'-P-CCNC: 39 U/L (ref 0–50)
ANION GAP SERPL CALCULATED.3IONS-SCNC: 10 MMOL/L (ref 7–15)
APPEARANCE UR: CLEAR
AST SERPL W P-5'-P-CCNC: 26 U/L (ref 0–45)
BASOPHILS # BLD AUTO: 0 10E3/UL (ref 0–0.2)
BASOPHILS NFR BLD AUTO: 1 %
BILIRUB SERPL-MCNC: 1.1 MG/DL
BILIRUB UR QL STRIP: NEGATIVE
BUN SERPL-MCNC: 16.3 MG/DL (ref 6–20)
CALCIUM SERPL-MCNC: 10 MG/DL (ref 8.8–10.4)
CHLORIDE SERPL-SCNC: 97 MMOL/L (ref 98–107)
COLOR UR AUTO: ABNORMAL
CREAT SERPL-MCNC: 0.61 MG/DL (ref 0.51–0.95)
EGFRCR SERPLBLD CKD-EPI 2021: >90 ML/MIN/1.73M2
EOSINOPHIL # BLD AUTO: 0.2 10E3/UL (ref 0–0.7)
EOSINOPHIL NFR BLD AUTO: 3 %
ERYTHROCYTE [DISTWIDTH] IN BLOOD BY AUTOMATED COUNT: 12.1 % (ref 10–15)
GLUCOSE SERPL-MCNC: 187 MG/DL (ref 70–99)
GLUCOSE UR STRIP-MCNC: 70 MG/DL
HCO3 SERPL-SCNC: 29 MMOL/L (ref 22–29)
HCT VFR BLD AUTO: 40.8 % (ref 35–47)
HGB BLD-MCNC: 14 G/DL (ref 11.7–15.7)
HGB UR QL STRIP: NEGATIVE
IMM GRANULOCYTES # BLD: 0 10E3/UL
IMM GRANULOCYTES NFR BLD: 0 %
KETONES UR STRIP-MCNC: NEGATIVE MG/DL
LEUKOCYTE ESTERASE UR QL STRIP: NEGATIVE
LYMPHOCYTES # BLD AUTO: 2.6 10E3/UL (ref 0.8–5.3)
LYMPHOCYTES NFR BLD AUTO: 33 %
MCH RBC QN AUTO: 31.8 PG (ref 26.5–33)
MCHC RBC AUTO-ENTMCNC: 34.3 G/DL (ref 31.5–36.5)
MCV RBC AUTO: 93 FL (ref 78–100)
MONOCYTES # BLD AUTO: 0.4 10E3/UL (ref 0–1.3)
MONOCYTES NFR BLD AUTO: 5 %
NEUTROPHILS # BLD AUTO: 4.5 10E3/UL (ref 1.6–8.3)
NEUTROPHILS NFR BLD AUTO: 58 %
NITRATE UR QL: NEGATIVE
NRBC # BLD AUTO: 0 10E3/UL
NRBC BLD AUTO-RTO: 0 /100
PH UR STRIP: 6 [PH] (ref 5–7)
PLATELET # BLD AUTO: 135 10E3/UL (ref 150–450)
POTASSIUM SERPL-SCNC: 4.1 MMOL/L (ref 3.4–5.3)
PROT SERPL-MCNC: 7.3 G/DL (ref 6.4–8.3)
RBC # BLD AUTO: 4.4 10E6/UL (ref 3.8–5.2)
RBC URINE: <1 /HPF
SODIUM SERPL-SCNC: 136 MMOL/L (ref 135–145)
SP GR UR STRIP: 1.02 (ref 1–1.03)
SQUAMOUS EPITHELIAL: 1 /HPF
UROBILINOGEN UR STRIP-MCNC: NORMAL MG/DL
WBC # BLD AUTO: 7.7 10E3/UL (ref 4–11)
WBC URINE: <1 /HPF

## 2024-11-21 NOTE — PROGRESS NOTES
Acute and Diagnostic Services Clinic Visit    Assessment & Plan     Pleasant 54-year-old patient with right lower quad abdominal pain x 2 days.  Broad differential that includes appendicitis, diverticulitis, small bowel obstruction and pelvic pathology.    Labs are reviewed and aside from a high glucose are unremarkable.  CT is reviewed in the presence of the patient.  Hepatic steatosis noted.  Gallbladder sludge is also noted.  These are reviewed with the patient but are not related to her current symptom constellation.    Given the location and size of the ovarian cyst we did proceed with a transvaginal ultrasound.  This is reviewed as well.  The patient will be referred to gynecology.  Signs and symptoms of ovarian torsion and need for emergent evaluation as discussed.  Otherwise a prior referral to gynecology was placed.    RLQ abdominal pain    - Comprehensive metabolic panel  - CBC with platelets differential  - UA with Microscopic reflex to Culture  - CT Abdomen Pelvis w Contrast  - Comprehensive metabolic panel  - CBC with platelets differential  - UA with Microscopic reflex to Culture  - sodium chloride (PF) 0.9% PF flush 3 mL  - US Pelvic Complete with Transvaginal    Adnexal cyst    - US Pelvic Complete with Transvaginal  - Ob/Gyn  Referral      40 minutes were spent doing chart review, history and exam, documentation and further activities per the note.       No follow-ups on file.    Katy Mcarthur is a 54 year old, presenting for the following health issues:  Abdominal Pain (RLQ pain X 2 days)    Lyubov has been dealing with 2 days Right sided abdominal pain  Woke up yesterday with a dull ache.  Progressed as the day went on.  Associated with sweats and emesis x 3 yesterday.      Pain is describes as bad gas pain but doesn't improve with passing gas.  She also describes right cramping sensation that reminded her of pelvic cramps for when she is to have periods that occurred several days ago  "while at an art and craft fair.    Has had 3-4 loose (low volume) stools.  No blood or mucus.      Has taken motrin with no relief.      Has had hysterectomy, 2 c sections and umbilical hernia repair.      Had eggs and bread this AM.      No h/o similar.      11:57:  Labs and CT imaging reviewed with patient  Will obtain TV US    HPI     Abdominal/Flank Pain  Onset/Duration: X 2 days  Description:   Character: Dull ache currently, sharp and stabbing yesterday  Location: right lower quadrant  Radiation: None  Intensity: 3/10  Progression of Symptoms:  waxing and waning  Accompanying Signs & Symptoms:  Fever/chills: no   Gas/Bloating: no, notes no relief when passing gas   Nausea: YES  Vomitting: YES-X 3 episodes yesterday  Diarrhea: YES- \"a little bit\"  Constipation:no   Dysuria: no            Hematuria: no            Frequency: no            Incontinence of urine: YES- chronic with coughing   History:            Last bowel movement: yesterday-diarrhea   Trauma: no   Previous similar pain: no    Previous tests done: none           Previous Abdominal surgery: YES- hysterectomy,  X 2, umbilical hernia repair   Precipitating factors:   Does the pain change with:     Food: YES- worsening pain     Bowel Movement: no     Urination: no              Other factors: YES- Laying flat on back, pain was better but turning to side or standing pain increases, Pain was worse yesterday   Therapies tried and outcome:  Motrin, this didn't help    When food last eaten: 08:00 AM today, scrambled eggs, raisin bread            Objective    /81 (BP Location: Left arm, Patient Position: Chair, Cuff Size: Adult Large)   Pulse 90   Temp 97.6  F (36.4  C) (Oral)   Resp 18   Wt 130.2 kg (287 lb)   LMP  (LMP Unknown)   SpO2 94%   BMI 49.65 kg/m    Body mass index is 49.65 kg/m .  Physical Exam   GEN NAD  ENT MMM, no lesions  CV RRR  ABD obese, +BS.  RLQ TTP, no rebound or guarding.    PSYCH alert pleasant and cooperative.  "             Signed Electronically by: Tesfaye Castro MD

## 2024-11-21 NOTE — TELEPHONE ENCOUNTER
Called [facility]. Got pt appt at 10am.   Called pt and relayed message. Gave pt address and pt stated she will be there at 10am    RN Referral to Acute and Diagnostic Services    [phone] ([place] [address], [address], [place] [phone]           Presenting symptoms/reason for [facility] referral:  constant abdominal pain     Relevant Medical History:  None    Transition to [facility] clinic discussed with patient and patient is agreeable.    Patient has been advised that appointments at [facility] typically takes significantly longer than in clinic/urgent care (~ 2.5-3 hours or more):  YES  Patient has transportation and is available for ASAP same day appointment: Yes  Patient aware that [facility] will contact them directly YES, NO     Does patient does have claustrophobia No  Patient has contrast allergy No  Patient has PICC line or port in place No         [facility] clinic has accepted this patient.    Abiola Headley, DELIAN, RN     [facility]    11/21/2024 at 9:17 AM

## 2024-11-21 NOTE — TELEPHONE ENCOUNTER
"Dejan RN's   Patient is scheduled in [facility] today @ 1:10  Right lower quadrant pain - [facility] is appropriate   Please contact [facility] @ 9 AM to see if they are able to accept patient and then can cancel [facility] appt     Reason for Disposition   MILD TO MODERATE constant pain lasting > 2 hours    Additional Information   Negative: Passed out (e.g., fainted, lost consciousness, blacked out and was not responding)   Negative: Shock suspected (e.g., cold/pale/clammy skin, too weak to stand, low BP, rapid pulse)   Negative: Sounds like a life-threatening emergency to the triager   Negative: Followed an abdomen (stomach) injury   Negative: Chest pain   Negative: Abdominal pain and pregnant < 20 weeks   Negative: Abdominal pain and pregnant 20 or more weeks   Negative: Pain is mainly in upper abdomen (if needed ask: 'is it mainly above the belly button?')   Negative: Abdomen bloating or swelling are main symptoms   Negative: SEVERE abdominal pain (e.g., excruciating)   Negative: Vomiting red blood or black (coffee ground) material   Negative: Blood in bowel movements  (Exception: Blood on surface of BM with constipation.)   Negative: Black or tarry bowel movements  (Exception: Chronic-unchanged black-grey BMs AND is taking iron pills or Pepto-Bismol.)    Answer Assessment - Initial Assessment Questions  1. LOCATION: \"Where does it hurt?\"       Between belly button and lower right abdominal pain   2. RADIATION: \"Does the pain shoot anywhere else?\" (e.g., chest, back)      No   3. ONSET: \"When did the pain begin?\" (e.g., minutes, hours or days ago)       Yesterday 6:30 AM   4. SUDDEN: \"Gradual or sudden onset?\"      Gradual, worsened - yest afternoon was the worst and it got a bit better then last night worsened again   5. PATTERN \"Does the pain come and go, or is it constant?\"      Constant   6. SEVERITY: \"How bad is the pain?\"  (e.g., Scale 1-10; mild, moderate, or severe)      3 on pain while sitting when standing up to 7 on pain " "scale   7. RECURRENT SYMPTOM: \"Have you ever had this type of stomach pain before?\" If Yes, ask: \"When was the last time?\" and \"What happened that time?\"       No - it feels like gas pain, passing gas does not relieve the pain   8. CAUSE: \"What do you think is causing the stomach pain?\"      Uncertain   9. RELIEVING/AGGRAVATING FACTORS: \"What makes it better or worse?\" (e.g., antacids, bending or twisting motion, bowel movement)      Sitting still pain better, standing or laying on side worsens pain   10. OTHER SYMPTOMS: \"Do you have any other symptoms?\" (e.g., back pain, diarrhea, fever, urination pain, vomiting)        Slight diarrhea, emesis 2 x yesterday   11. PREGNANCY: \"Is there any chance you are pregnant?\" \"When was your last menstrual period?\"        No    Protocols used: Abdominal Pain - Female-A-OH    Caroline Snow, Registered Nurse  [facility]     "

## 2024-12-10 ENCOUNTER — PATIENT OUTREACH (OUTPATIENT)
Dept: CARE COORDINATION | Facility: CLINIC | Age: 54
End: 2024-12-10

## 2024-12-14 ENCOUNTER — ANCILLARY PROCEDURE (OUTPATIENT)
Dept: GENERAL RADIOLOGY | Facility: CLINIC | Age: 54
End: 2024-12-14
Attending: NURSE PRACTITIONER
Payer: COMMERCIAL

## 2024-12-14 ENCOUNTER — OFFICE VISIT (OUTPATIENT)
Dept: URGENT CARE | Facility: URGENT CARE | Age: 54
End: 2024-12-14
Payer: COMMERCIAL

## 2024-12-14 VITALS
DIASTOLIC BLOOD PRESSURE: 69 MMHG | HEART RATE: 80 BPM | OXYGEN SATURATION: 94 % | RESPIRATION RATE: 14 BRPM | SYSTOLIC BLOOD PRESSURE: 107 MMHG | TEMPERATURE: 98.1 F

## 2024-12-14 DIAGNOSIS — B37.9 ANTIBIOTIC-INDUCED YEAST INFECTION: ICD-10-CM

## 2024-12-14 DIAGNOSIS — T36.95XA ANTIBIOTIC-INDUCED YEAST INFECTION: ICD-10-CM

## 2024-12-14 DIAGNOSIS — J18.9 COMMUNITY ACQUIRED PNEUMONIA OF RIGHT LOWER LOBE OF LUNG: ICD-10-CM

## 2024-12-14 DIAGNOSIS — R05.2 SUBACUTE COUGH: Primary | ICD-10-CM

## 2024-12-14 LAB
BASOPHILS # BLD AUTO: 0 10E3/UL (ref 0–0.2)
BASOPHILS NFR BLD AUTO: 0 %
EOSINOPHIL # BLD AUTO: 0.2 10E3/UL (ref 0–0.7)
EOSINOPHIL NFR BLD AUTO: 3 %
ERYTHROCYTE [DISTWIDTH] IN BLOOD BY AUTOMATED COUNT: 12.3 % (ref 10–15)
HCT VFR BLD AUTO: 41.4 % (ref 35–47)
HGB BLD-MCNC: 13.8 G/DL (ref 11.7–15.7)
IMM GRANULOCYTES # BLD: 0 10E3/UL
IMM GRANULOCYTES NFR BLD: 0 %
LYMPHOCYTES # BLD AUTO: 2.3 10E3/UL (ref 0.8–5.3)
LYMPHOCYTES NFR BLD AUTO: 28 %
MCH RBC QN AUTO: 31.7 PG (ref 26.5–33)
MCHC RBC AUTO-ENTMCNC: 33.3 G/DL (ref 31.5–36.5)
MCV RBC AUTO: 95 FL (ref 78–100)
MONOCYTES # BLD AUTO: 0.4 10E3/UL (ref 0–1.3)
MONOCYTES NFR BLD AUTO: 4 %
NEUTROPHILS # BLD AUTO: 5.3 10E3/UL (ref 1.6–8.3)
NEUTROPHILS NFR BLD AUTO: 65 %
PLATELET # BLD AUTO: 148 10E3/UL (ref 150–450)
RBC # BLD AUTO: 4.36 10E6/UL (ref 3.8–5.2)
WBC # BLD AUTO: 8.3 10E3/UL (ref 4–11)

## 2024-12-14 PROCEDURE — 99214 OFFICE O/P EST MOD 30 MIN: CPT | Performed by: NURSE PRACTITIONER

## 2024-12-14 PROCEDURE — 36415 COLL VENOUS BLD VENIPUNCTURE: CPT | Performed by: NURSE PRACTITIONER

## 2024-12-14 PROCEDURE — 87798 DETECT AGENT NOS DNA AMP: CPT | Performed by: NURSE PRACTITIONER

## 2024-12-14 PROCEDURE — 71046 X-RAY EXAM CHEST 2 VIEWS: CPT | Mod: TC | Performed by: RADIOLOGY

## 2024-12-14 PROCEDURE — 85025 COMPLETE CBC W/AUTO DIFF WBC: CPT | Performed by: NURSE PRACTITIONER

## 2024-12-14 RX ORDER — FLUCONAZOLE 150 MG/1
150 TABLET ORAL ONCE
Qty: 1 TABLET | Refills: 0 | Status: SHIPPED | OUTPATIENT
Start: 2024-12-14 | End: 2024-12-14

## 2024-12-14 RX ORDER — BENZONATATE 200 MG/1
200 CAPSULE ORAL 3 TIMES DAILY PRN
Qty: 20 CAPSULE | Refills: 0 | Status: SHIPPED | OUTPATIENT
Start: 2024-12-14

## 2024-12-14 RX ORDER — LEVOFLOXACIN 500 MG/1
500 TABLET, FILM COATED ORAL DAILY
Qty: 7 TABLET | Refills: 0 | Status: SHIPPED | OUTPATIENT
Start: 2024-12-14 | End: 2024-12-21

## 2024-12-14 NOTE — PROGRESS NOTES
Assessment & Plan     Subacute cough  - CBC with platelets and differential  - B. pertussis/parapertussis PCR-NP  - XR Chest 2 Views  - CBC with platelets and differential  - benzonatate (TESSALON) 200 MG capsule  Dispense: 20 capsule; Refill: 0    Community acquired pneumonia of right lower lobe of lung  - levofloxacin (LEVAQUIN) 500 MG tablet  Dispense: 7 tablet; Refill: 0    Antibiotic-induced yeast infection  - fluconazole (DIFLUCAN) 150 MG tablet  Dispense: 1 tablet; Refill: 0     Patient Instructions     Results for orders placed or performed in visit on 12/14/24   CBC with platelets and differential     Status: Abnormal   Result Value Ref Range    WBC Count 8.3 4.0 - 11.0 10e3/uL    RBC Count 4.36 3.80 - 5.20 10e6/uL    Hemoglobin 13.8 11.7 - 15.7 g/dL    Hematocrit 41.4 35.0 - 47.0 %    MCV 95 78 - 100 fL    MCH 31.7 26.5 - 33.0 pg    MCHC 33.3 31.5 - 36.5 g/dL    RDW 12.3 10.0 - 15.0 %    Platelet Count 148 (L) 150 - 450 10e3/uL    % Neutrophils 65 %    % Lymphocytes 28 %    % Monocytes 4 %    % Eosinophils 3 %    % Basophils 0 %    % Immature Granulocytes 0 %    Absolute Neutrophils 5.3 1.6 - 8.3 10e3/uL    Absolute Lymphocytes 2.3 0.8 - 5.3 10e3/uL    Absolute Monocytes 0.4 0.0 - 1.3 10e3/uL    Absolute Eosinophils 0.2 0.0 - 0.7 10e3/uL    Absolute Basophils 0.0 0.0 - 0.2 10e3/uL    Absolute Immature Granulocytes 0.0 <=0.4 10e3/uL   CBC with platelets and differential     Status: Abnormal    Narrative    The following orders were created for panel order CBC with platelets and differential.  Procedure                               Abnormality         Status                     ---------                               -----------         ------                     CBC with platelets and d...[[id number]]  Abnormal            Final result                 Please view results for these tests on the individual orders.       CBC normal  CXR Xray interpreted as positive in the clinic for consolidation per this NP.   Pending radiologist review.    Levaquin x 7 days  Diflucan as she gets yeast infections.   Pertussis pending.    Push fluids  Lots of handwashing.   Ibuprofen as needed for fever or pain  Delsym or dayquil/nyquil for cough as needed     Rest as able.   Will call if any other labs positive.    F/u in the clinic if symptoms persist or worsen.          Return in about 1 week (around 12/21/2024) for with regular provider if symptoms persist.    BARRINGTON Barrios CNP  [facility]    Katy Mcarthur is a 54 year old female who presents to clinic today for the following health issues:  Chief Complaint   Patient presents with    Cough     Was dx with pneumonia before Halloween and she still hasn't gotten rid of her cough, feels run down, she has been taking mucinex and she gets coughing fits where she can't breathe     HPI    URI Adult    Onset of symptoms was 2 month(s) ago.  Course of illness is waxing and waning.    Severity moderately severe  Current and Associated symptoms: cough - productive, shortness of breath, and hoarse voice  Treatment measures tried include Tylenol/Ibuprofen, OTC Cough med, Fluids, and Rest.  Predisposing factors include recent illness cold 2 months ago and cough persists.  .      Review of Systems  Constitutional, HEENT, cardiovascular, pulmonary, GI, , musculoskeletal, neuro, skin, endocrine and psych systems are negative, except as otherwise noted.      Objective    /69   Pulse 80   Temp 98.1  F (36.7  C)   Resp 14   LMP  (LMP Unknown)   SpO2 94%   Physical Exam   GENERAL: alert and no distress  EYES: Eyes grossly normal to inspection, PERRL and conjunctivae and sclerae normal  HENT: ear canals and TM's normal, nose and mouth without ulcers or lesions  NECK: no adenopathy, no asymmetry, masses, or scars  RESP: no rales , no wheezes, rhonchi bibasilar, and decreased breath sounds throughout  CV: regular rate and rhythm, normal S1 S2, no S3 or  S4, no murmur, click or rub, no peripheral edema  ABDOMEN: soft, nontender, no hepatosplenomegaly, no masses and bowel sounds normal  MS: no gross musculoskeletal defects noted, no edema

## 2024-12-14 NOTE — PATIENT INSTRUCTIONS
Results for orders placed or performed in visit on 12/14/24   CBC with platelets and differential     Status: Abnormal   Result Value Ref Range    WBC Count 8.3 4.0 - 11.0 10e3/uL    RBC Count 4.36 3.80 - 5.20 10e6/uL    Hemoglobin 13.8 11.7 - 15.7 g/dL    Hematocrit 41.4 35.0 - 47.0 %    MCV 95 78 - 100 fL    MCH 31.7 26.5 - 33.0 pg    MCHC 33.3 31.5 - 36.5 g/dL    RDW 12.3 10.0 - 15.0 %    Platelet Count 148 (L) 150 - 450 10e3/uL    % Neutrophils 65 %    % Lymphocytes 28 %    % Monocytes 4 %    % Eosinophils 3 %    % Basophils 0 %    % Immature Granulocytes 0 %    Absolute Neutrophils 5.3 1.6 - 8.3 10e3/uL    Absolute Lymphocytes 2.3 0.8 - 5.3 10e3/uL    Absolute Monocytes 0.4 0.0 - 1.3 10e3/uL    Absolute Eosinophils 0.2 0.0 - 0.7 10e3/uL    Absolute Basophils 0.0 0.0 - 0.2 10e3/uL    Absolute Immature Granulocytes 0.0 <=0.4 10e3/uL   CBC with platelets and differential     Status: Abnormal    Narrative    The following orders were created for panel order CBC with platelets and differential.  Procedure                               Abnormality         Status                     ---------                               -----------         ------                     CBC with platelets and d...[[id number]]  Abnormal            Final result                 Please view results for these tests on the individual orders.       CBC normal  CXR Xray interpreted as positive in the clinic for consolidation per this NP.  Pending radiologist review.    Levaquin x 7 days  Diflucan as she gets yeast infections.   Pertussis pending.    Push fluids  Lots of handwashing.   Ibuprofen as needed for fever or pain  Delsym or dayquil/nyquil for cough as needed     Rest as able.   Will call if any other labs positive.    F/u in the clinic if symptoms persist or worsen.

## 2024-12-16 LAB
B PARAPERT DNA SPEC QL NAA+PROBE: NOT DETECTED
B PERT DNA SPEC QL NAA+PROBE: NOT DETECTED

## 2024-12-18 DIAGNOSIS — I10 HTN, GOAL BELOW 140/90: ICD-10-CM

## 2024-12-18 RX ORDER — CARVEDILOL 12.5 MG/1
12.5 TABLET ORAL 2 TIMES DAILY WITH MEALS
Qty: 180 TABLET | Refills: 0 | Status: SHIPPED | OUTPATIENT
Start: 2024-12-18

## 2025-01-13 DIAGNOSIS — Q21.10 ASD (ATRIAL SEPTAL DEFECT): ICD-10-CM

## 2025-01-13 RX ORDER — FUROSEMIDE 40 MG/1
40 TABLET ORAL 2 TIMES DAILY
Qty: 180 TABLET | Refills: 0 | Status: SHIPPED | OUTPATIENT
Start: 2025-01-13

## 2025-01-13 NOTE — TELEPHONE ENCOUNTER
Last office visit: 11/15/2023  Next office visit: Needs to be scheduled  Last filled: 11/15/2023    Shannon Cruz MA, RMA  1/13/2025 11:24 AM

## 2025-01-13 NOTE — TELEPHONE ENCOUNTER
[facility] Cardiology Refill Guideline reviewed.  Medication meets criteria for refill. Alena Holcomb RN Cardiology January 13, 2025, 11:40 AM

## 2025-02-02 ENCOUNTER — HEALTH MAINTENANCE LETTER (OUTPATIENT)
Age: 55
End: 2025-02-02

## 2025-03-03 ENCOUNTER — TELEPHONE (OUTPATIENT)
Dept: DERMATOLOGY | Facility: CLINIC | Age: 55
End: 2025-03-03
Payer: COMMERCIAL

## 2025-03-03 NOTE — TELEPHONE ENCOUNTER
[facility] Call Center    Phone Message    May a detailed message be left on voicemail: yes     Reason for Call: Symptoms or Concerns     If patient has red-flag symptoms, warm transfer to triage line    Current symptom or concern: concerning spot near eye- raised area darker than skin tag    Symptoms have been present for:  multiple years(s)    Has patient previously been seen for this? Yes    By : SEAN RALPH    Date: 10/22/2024    Are there any new or worsening symptoms? No-     Pt does have a skin check scheduled with Dr. Sebastian on 07/21    Action Taken: Other: TE to [facility]    Travel Screening: Not Applicable     Date of Service:

## 2025-03-29 DIAGNOSIS — F33.0 MAJOR DEPRESSIVE DISORDER, RECURRENT EPISODE, MILD: ICD-10-CM

## 2025-03-31 RX ORDER — CITALOPRAM HYDROBROMIDE 20 MG/1
20 TABLET ORAL DAILY
Qty: 90 TABLET | Refills: 0 | Status: SHIPPED | OUTPATIENT
Start: 2025-03-31

## 2025-04-07 ENCOUNTER — APPOINTMENT (OUTPATIENT)
Dept: ULTRASOUND IMAGING | Facility: CLINIC | Age: 55
End: 2025-04-07
Attending: EMERGENCY MEDICINE
Payer: COMMERCIAL

## 2025-04-07 ENCOUNTER — HOSPITAL ENCOUNTER (EMERGENCY)
Facility: CLINIC | Age: 55
Discharge: HOME | End: 2025-04-07
Attending: EMERGENCY MEDICINE | Admitting: EMERGENCY MEDICINE
Payer: COMMERCIAL

## 2025-04-07 ENCOUNTER — APPOINTMENT (OUTPATIENT)
Dept: CT IMAGING | Facility: CLINIC | Age: 55
End: 2025-04-07
Attending: EMERGENCY MEDICINE
Payer: COMMERCIAL

## 2025-04-07 VITALS
HEART RATE: 93 BPM | OXYGEN SATURATION: 93 % | HEIGHT: 64 IN | TEMPERATURE: 97 F | BODY MASS INDEX: 50.02 KG/M2 | DIASTOLIC BLOOD PRESSURE: 85 MMHG | WEIGHT: 293 LBS | RESPIRATION RATE: 18 BRPM | SYSTOLIC BLOOD PRESSURE: 149 MMHG

## 2025-04-07 DIAGNOSIS — R06.02 SOB (SHORTNESS OF BREATH): ICD-10-CM

## 2025-04-07 DIAGNOSIS — J81.0 ACUTE PULMONARY EDEMA (H): ICD-10-CM

## 2025-04-07 LAB
ALBUMIN SERPL BCG-MCNC: 4.4 G/DL (ref 3.5–5.2)
ALP SERPL-CCNC: 53 U/L (ref 40–150)
ALT SERPL W P-5'-P-CCNC: 33 U/L (ref 0–50)
ANION GAP SERPL CALCULATED.3IONS-SCNC: 12 MMOL/L (ref 7–15)
AST SERPL W P-5'-P-CCNC: 29 U/L (ref 0–45)
ATRIAL RATE - MUSE: 63 BPM
BASOPHILS # BLD AUTO: 0 10E3/UL (ref 0–0.2)
BASOPHILS NFR BLD AUTO: 0 %
BILIRUB SERPL-MCNC: 1.8 MG/DL
BUN SERPL-MCNC: 18.2 MG/DL (ref 6–20)
CALCIUM SERPL-MCNC: 9.1 MG/DL (ref 8.8–10.4)
CHLORIDE SERPL-SCNC: 100 MMOL/L (ref 98–107)
CREAT SERPL-MCNC: 0.65 MG/DL (ref 0.51–0.95)
D DIMER PPP FEU-MCNC: 0.55 UG/ML FEU (ref 0–0.5)
DIASTOLIC BLOOD PRESSURE - MUSE: NORMAL MMHG
EGFRCR SERPLBLD CKD-EPI 2021: >90 ML/MIN/1.73M2
EOSINOPHIL # BLD AUTO: 0.2 10E3/UL (ref 0–0.7)
EOSINOPHIL NFR BLD AUTO: 2 %
ERYTHROCYTE [DISTWIDTH] IN BLOOD BY AUTOMATED COUNT: 13.4 % (ref 10–15)
GLUCOSE SERPL-MCNC: 150 MG/DL (ref 70–99)
HCO3 SERPL-SCNC: 28 MMOL/L (ref 22–29)
HCT VFR BLD AUTO: 37.6 % (ref 35–47)
HGB BLD-MCNC: 12.7 G/DL (ref 11.7–15.7)
HOLD SPECIMEN: NORMAL
IMM GRANULOCYTES # BLD: 0.1 10E3/UL
IMM GRANULOCYTES NFR BLD: 2 %
INTERPRETATION ECG - MUSE: NORMAL
LYMPHOCYTES # BLD AUTO: 2.8 10E3/UL (ref 0.8–5.3)
LYMPHOCYTES NFR BLD AUTO: 35 %
MCH RBC QN AUTO: 31.1 PG (ref 26.5–33)
MCHC RBC AUTO-ENTMCNC: 33.8 G/DL (ref 31.5–36.5)
MCV RBC AUTO: 92 FL (ref 78–100)
MONOCYTES # BLD AUTO: 0.4 10E3/UL (ref 0–1.3)
MONOCYTES NFR BLD AUTO: 5 %
NEUTROPHILS # BLD AUTO: 4.4 10E3/UL (ref 1.6–8.3)
NEUTROPHILS NFR BLD AUTO: 55 %
NRBC # BLD AUTO: 0 10E3/UL
NRBC BLD AUTO-RTO: 0 /100
NT-PROBNP SERPL-MCNC: 410 PG/ML (ref 0–900)
P AXIS - MUSE: 20 DEGREES
PLATELET # BLD AUTO: 120 10E3/UL (ref 150–450)
POTASSIUM SERPL-SCNC: 4.2 MMOL/L (ref 3.4–5.3)
PR INTERVAL - MUSE: 172 MS
PROT SERPL-MCNC: 6.7 G/DL (ref 6.4–8.3)
QRS DURATION - MUSE: 96 MS
QT - MUSE: 490 MS
QTC - MUSE: 501 MS
R AXIS - MUSE: -80 DEGREES
RBC # BLD AUTO: 4.08 10E6/UL (ref 3.8–5.2)
SODIUM SERPL-SCNC: 140 MMOL/L (ref 135–145)
SYSTOLIC BLOOD PRESSURE - MUSE: NORMAL MMHG
T AXIS - MUSE: 87 DEGREES
TROPONIN T SERPL HS-MCNC: 7 NG/L
VENTRICULAR RATE- MUSE: 63 BPM
WBC # BLD AUTO: 8 10E3/UL (ref 4–11)

## 2025-04-07 PROCEDURE — 250N000009 HC RX 250: Performed by: EMERGENCY MEDICINE

## 2025-04-07 PROCEDURE — 71275 CT ANGIOGRAPHY CHEST: CPT

## 2025-04-07 PROCEDURE — 36415 COLL VENOUS BLD VENIPUNCTURE: CPT | Performed by: EMERGENCY MEDICINE

## 2025-04-07 PROCEDURE — 85379 FIBRIN DEGRADATION QUANT: CPT | Performed by: EMERGENCY MEDICINE

## 2025-04-07 PROCEDURE — 99285 EMERGENCY DEPT VISIT HI MDM: CPT | Mod: 25

## 2025-04-07 PROCEDURE — 80053 COMPREHEN METABOLIC PANEL: CPT | Performed by: EMERGENCY MEDICINE

## 2025-04-07 PROCEDURE — 93970 EXTREMITY STUDY: CPT

## 2025-04-07 PROCEDURE — 250N000011 HC RX IP 250 OP 636: Performed by: EMERGENCY MEDICINE

## 2025-04-07 PROCEDURE — 85025 COMPLETE CBC W/AUTO DIFF WBC: CPT | Performed by: EMERGENCY MEDICINE

## 2025-04-07 PROCEDURE — 83880 ASSAY OF NATRIURETIC PEPTIDE: CPT | Performed by: EMERGENCY MEDICINE

## 2025-04-07 PROCEDURE — 84484 ASSAY OF TROPONIN QUANT: CPT | Performed by: EMERGENCY MEDICINE

## 2025-04-07 PROCEDURE — 96374 THER/PROPH/DIAG INJ IV PUSH: CPT | Mod: 59

## 2025-04-07 RX ORDER — FUROSEMIDE 10 MG/ML
40 INJECTION INTRAMUSCULAR; INTRAVENOUS ONCE
Status: COMPLETED | OUTPATIENT
Start: 2025-04-07 | End: 2025-04-07

## 2025-04-07 RX ORDER — IOPAMIDOL 755 MG/ML
500 INJECTION, SOLUTION INTRAVASCULAR ONCE
Status: COMPLETED | OUTPATIENT
Start: 2025-04-07 | End: 2025-04-07

## 2025-04-07 RX ADMIN — FUROSEMIDE 40 MG: 10 INJECTION, SOLUTION INTRAVENOUS at 06:33

## 2025-04-07 RX ADMIN — IOPAMIDOL 85 ML: 755 INJECTION, SOLUTION INTRAVENOUS at 05:45

## 2025-04-07 RX ADMIN — SODIUM CHLORIDE 100 ML: 9 INJECTION, SOLUTION INTRAVENOUS at 05:45

## 2025-04-07 ASSESSMENT — ACTIVITIES OF DAILY LIVING (ADL)
ADLS_ACUITY_SCORE: 54

## 2025-04-07 ASSESSMENT — COLUMBIA-SUICIDE SEVERITY RATING SCALE - C-SSRS
2. HAVE YOU ACTUALLY HAD ANY THOUGHTS OF KILLING YOURSELF IN THE PAST MONTH?: NO
1. IN THE PAST MONTH, HAVE YOU WISHED YOU WERE DEAD OR WISHED YOU COULD GO TO SLEEP AND NOT WAKE UP?: NO
6. HAVE YOU EVER DONE ANYTHING, STARTED TO DO ANYTHING, OR PREPARED TO DO ANYTHING TO END YOUR LIFE?: NO

## 2025-04-07 NOTE — ED TRIAGE NOTES
Pt here with c/o SOB for about 1 week, pt states it got really bad over the weekend, increases with exertion. Uses cpap at night and even with this wakes up gasping. Slightly hypertensive, O2 in the mid 90's, but otherwise VSS. Pt states that with rest, the SOB gets better, but any sort of exertion makes it worse. States she has a hx of a-fib with RVR and her breathing feels similar to what it was then. No recent travel or sick contacts, denies any sort of pain.      Triage Assessment (Adult)       Row Name 04/07/25 0247          Triage Assessment    Airway WDL WDL        Respiratory WDL    Respiratory WDL X;rhythm/pattern;cough     Rhythm/Pattern, Respiratory shortness of breath        Skin Circulation/Temperature WDL    Skin Circulation/Temperature WDL WDL        Cardiac WDL    Cardiac WDL WDL        Peripheral/Neurovascular WDL    Peripheral Neurovascular WDL WDL        Cognitive/Neuro/Behavioral WDL    Cognitive/Neuro/Behavioral WDL WDL

## 2025-04-08 ENCOUNTER — PATIENT OUTREACH (OUTPATIENT)
Dept: CARE COORDINATION | Facility: CLINIC | Age: 55
End: 2025-04-08
Payer: COMMERCIAL

## 2025-04-09 ENCOUNTER — VIRTUAL VISIT (OUTPATIENT)
Dept: CARDIOLOGY | Facility: CLINIC | Age: 55
End: 2025-04-09
Attending: INTERNAL MEDICINE
Payer: COMMERCIAL

## 2025-04-09 DIAGNOSIS — I48.91 ATRIAL FIBRILLATION, UNSPECIFIED TYPE (H): ICD-10-CM

## 2025-04-09 DIAGNOSIS — E78.5 HYPERLIPIDEMIA LDL GOAL <100: ICD-10-CM

## 2025-04-09 DIAGNOSIS — Q21.10 ASD (ATRIAL SEPTAL DEFECT): ICD-10-CM

## 2025-04-09 DIAGNOSIS — I10 BENIGN ESSENTIAL HYPERTENSION: Primary | ICD-10-CM

## 2025-04-09 DIAGNOSIS — I87.2 VENOUS (PERIPHERAL) INSUFFICIENCY: ICD-10-CM

## 2025-04-09 RX ORDER — LISINOPRIL 40 MG/1
40 TABLET ORAL DAILY
Qty: 90 TABLET | Refills: 3 | Status: SHIPPED | OUTPATIENT
Start: 2025-04-09

## 2025-04-09 RX ORDER — SIMVASTATIN 20 MG
20 TABLET ORAL AT BEDTIME
Qty: 90 TABLET | Refills: 3 | Status: CANCELLED | OUTPATIENT
Start: 2025-04-09

## 2025-04-09 RX ORDER — FUROSEMIDE 40 MG/1
40 TABLET ORAL 2 TIMES DAILY
Qty: 180 TABLET | Refills: 3 | Status: SHIPPED | OUTPATIENT
Start: 2025-04-09

## 2025-04-09 RX ORDER — ROSUVASTATIN CALCIUM 40 MG/1
40 TABLET, COATED ORAL DAILY
Qty: 30 TABLET | Refills: 11 | Status: SHIPPED | OUTPATIENT
Start: 2025-04-09

## 2025-04-09 RX ORDER — CARVEDILOL 12.5 MG/1
12.5 TABLET ORAL 2 TIMES DAILY WITH MEALS
Qty: 180 TABLET | Refills: 3 | Status: CANCELLED | OUTPATIENT
Start: 2025-04-09

## 2025-04-09 RX ORDER — SPIRONOLACTONE 25 MG/1
25 TABLET ORAL DAILY
Qty: 90 TABLET | Refills: 3 | Status: SHIPPED | OUTPATIENT
Start: 2025-04-09

## 2025-04-09 NOTE — PROGRESS NOTES
Lyubov is a 54 year old who is being evaluated via a billable video visit.    How would you like to obtain your AVS? MyChart  If the video visit is dropped, the invitation should be resent by: Text to cell phone: [phone]  Will anyone else be joining your video visit? No    Video-Visit Details    Type of service:  Video Visit   4:22 PM   4:46 PM  Originating Location (pt. Location): Home    Distant Location (provider location):  On-site  Platform used for Video Visit: [facility]      Cardiology Clinic Progress Note  Lyubov Sanchez MRN# [id number]   YOB: 1970 Age: 54 year old      Primary Cardiologist:   Dr. Hodges for annual follow-up            History of Presenting Illness:        Patient was seen in the ED a couple days ago with increased lower extremity edema and shortness of breath.  She had elevated BNP and signs of heart failure on x-ray.  She was given IV Lasix and discharged home.  ED note reviewed today.    Most recent lipid profile, BMP, ALT reviewed today.  She has had elevated triglycerides most recently were 248.    BP?  120-130/70-80   Treating sleep apnea? Using CPAP   Wt?  Does not currently check weights at home but will start  Heart failure symptoms?  Still has orthopnea and lower extremity edema, but improved from the ER visit  We reviewed low-sodium diet and fluid intake  She feels that the carvedilol has interactions with over-the-counter medications that she is wanting to take so she would like to discontinue this.  She will monitor her home blood pressures to see if they remain controlled.  She has diabetes and states that she currently drinks more than 64 ounces of fluids a day due to thirst which she thinks is polydipsia from the diabetes.  We discussed using ice chips or sugar-free candy instead.    Patient reports no chest pain, PND, orthopnea, presyncope, syncope, heart racing, or palpitations.                    Assessment and Plan:     Plan  Patient Instructions    Medication Changes:  Stop simvastatin   Start rosuvastatin 40 mg daily for better TG control   Stop carvedilol  d/t interactions with OTC medications   Increase lasix to 80 mg in the AM and keep 40 mg in the afternoon for 3 days and then return to 40 mg twice a day     Recommendations:  Check blood pressure at least 1 hour after medications. Call the clinic if your blood pressure is consistently greater than 130/80.    Check daily weights and call the clinic if your weight has increased more than 2 lbs in one day or 5 lbs in one week; if you feel more short of breath or have worsening swelling in your legs or abdomen.  2000 mg sodium diet   4-8 8 ounce glasses of fluids per day, not more than 2000 mL (2 L) per day.      Follow-up:  My nurse will call tomorrow to review low salt diet   Cardiology follow up at [place]: Dr. Hodges in 6 months with fasting lab prior  Call 6 months prior to schedule.     Cardiology Scheduling~[phone]  Cardiology Clinic RN~[phone] (Alena RN, Cinthya RN; Linda RN)          Problem List as of 4/9/2025 Reviewed: 12/14/2024 11:38 AM by Viviana Gonzalez APRN CNP            Noted       Active Problems    1. Hyperlipidemia LDL goal <100 10/31/2010     Last Assessment & Plan 4/9/2025 Virtual Visit Written 4/9/2025  4:20 PM by Thu Singleton APRN CNP      LDL at goal  Continue statin          Relevant Medications     rosuvastatin (CRESTOR) 40 MG tablet     Other Relevant Orders     Follow-Up with Cardiology     Lipid panel reflex to direct LDL Fasting     ALT    2. Benign essential hypertension - Primary 10/21/2014     Last Assessment & Plan 4/9/2025 Virtual Visit Written 4/9/2025  4:19 PM by Thu Singleton APRN CNP      Controlled  Continue current medications          Relevant Medications     lisinopril (ZESTRIL) 40 MG tablet     spironolactone (ALDACTONE) 25 MG tablet     Other Relevant Orders     Follow-Up with Cardiology    3. ASD (atrial septal defect)  8/26/2020     Overview Signed 4/9/2025  4:20 PM by Thu Singleton APRN CNP      S/p repair with cardioform 30 mm [place] device 8/2020  No residual shunt on imaging 12/2020          Last Assessment & Plan 4/9/2025 Virtual Visit Written 4/9/2025  4:20 PM by Thu Singleton APRN CNP      Follow with echo          Relevant Medications     furosemide (LASIX) 40 MG tablet     lisinopril (ZESTRIL) 40 MG tablet     Other Relevant Orders     Follow-Up with Cardiology    4. Atrial fibrillation, unspecified type (H) 11/8/2023     Overview Signed 4/9/2025  4:19 PM by Thu Singleton APRN CNP      Postop A-fib s/p CONCEPCION cardioversion 9/2020 after ASD repair  No recurrent A-fib on monitoring          Last Assessment & Plan 4/9/2025 Virtual Visit Written 4/9/2025  4:19 PM by Thu Singleton APRN CNP      Continues on aspirin          Relevant Orders     Follow-Up with Cardiology    5. Venous (peripheral) insufficiency 4/9/2025     Overview Signed 4/9/2025  4:21 PM by Thu Singleton APRN CNP      Was seen by vein clinic and diagnosed with venous insufficiency          Relevant Orders     Follow-Up with Cardiology                 Thank you for allowing me to participate in this delightful patient's care.   This note was completed in part using Dragon voice recognition software. Although reviewed after completion, some word and grammatical errors may occur.    BARRINGTON Wall CNP

## 2025-04-09 NOTE — PATIENT INSTRUCTIONS
Medication Changes:  Stop simvastatin   Start rosuvastatin 40 mg daily   Stop carvedilol   Increase lasix to 80 mg in the AM and keep 40 mg in the afternoon for 3 days and then return to 40 mg twice a day     Recommendations:  Check blood pressure at least 1 hour after medications. Call the clinic if your blood pressure is consistently greater than 130/80.    Check daily weights and call the clinic if your weight has increased more than 2 lbs in one day or 5 lbs in one week; if you feel more short of breath or have worsening swelling in your legs or abdomen.  2000 mg sodium diet   4-8 8 ounce glasses of fluids per day, not more than 2000 mL (2 L) per day.      Follow-up:  My nurse will call tomorrow to review low salt diet   Cardiology follow up at [place]: Dr. Hodges in 6 months.   Call 6 months prior to schedule.     Cardiology Scheduling~[phone]  Cardiology Clinic RN~[phone] (Alena RN, Cinthya RN; Linda RN)

## 2025-04-09 NOTE — LETTER
4/9/2025    Marta Cline, APRN CNP  [address]  [facility] [id number]    RE: Lyubov Sanchez       Dear Colleague,     I had the pleasure of seeing Lyubov Sanchez in the [facility].  Lyubov is a 54 year old who is being evaluated via a billable video visit.    How would you like to obtain your AVS? MyChart  If the video visit is dropped, the invitation should be resent by: Text to cell phone: [phone]  Will anyone else be joining your video visit? No    Video-Visit Details    Type of service:  Video Visit   4:22 PM   4:46 PM  Originating Location (pt. Location): Home    Distant Location (provider location):  On-site  Platform used for Video Visit: [facility]      Cardiology Clinic Progress Note  Lyubov Sanchez MRN# [id number]   YOB: 1970 Age: 54 year old      Primary Cardiologist:   Dr. Hodges for annual follow-up            History of Presenting Illness:        Patient was seen in the ED a couple days ago with increased lower extremity edema and shortness of breath.  She had elevated BNP and signs of heart failure on x-ray.  She was given IV Lasix and discharged home.  ED note reviewed today.    Most recent lipid profile, BMP, ALT reviewed today.  She has had elevated triglycerides most recently were 248.    BP?  120-130/70-80   Treating sleep apnea? Using CPAP   Wt?  Does not currently check weights at home but will start  Heart failure symptoms?  Still has orthopnea and lower extremity edema, but improved from the ER visit  We reviewed low-sodium diet and fluid intake  She feels that the carvedilol has interactions with over-the-counter medications that she is wanting to take so she would like to discontinue this.  She will monitor her home blood pressures to see if they remain controlled.  She has diabetes and states that she currently drinks more than 64 ounces of fluids a day due to thirst which she thinks is polydipsia from the diabetes.  We discussed using ice chips  or sugar-free candy instead.    Patient reports no chest pain, PND, orthopnea, presyncope, syncope, heart racing, or palpitations.                    Assessment and Plan:     Plan  Patient Instructions   Medication Changes:  Stop simvastatin   Start rosuvastatin 40 mg daily for better TG control   Stop carvedilol  d/t interactions with OTC medications   Increase lasix to 80 mg in the AM and keep 40 mg in the afternoon for 3 days and then return to 40 mg twice a day     Recommendations:  Check blood pressure at least 1 hour after medications. Call the clinic if your blood pressure is consistently greater than 130/80.    Check daily weights and call the clinic if your weight has increased more than 2 lbs in one day or 5 lbs in one week; if you feel more short of breath or have worsening swelling in your legs or abdomen.  2000 mg sodium diet   4-8 8 ounce glasses of fluids per day, not more than 2000 mL (2 L) per day.      Follow-up:  My nurse will call tomorrow to review low salt diet   Cardiology follow up at [place]: Dr. Hodges in 6 months with fasting lab prior  Call 6 months prior to schedule.     Cardiology Scheduling~[phone]  Cardiology Clinic RN~[phone] (Alena RN, Cinthya RN; Linda RN)          Problem List as of 4/9/2025 Reviewed: 12/14/2024 11:38 AM by Viviana Gonzalez APRN CNP            Noted       Active Problems    1. Hyperlipidemia LDL goal <100 10/31/2010     Last Assessment & Plan 4/9/2025 Virtual Visit Written 4/9/2025  4:20 PM by Thu Singleton APRN CNP      LDL at goal  Continue statin          Relevant Medications     rosuvastatin (CRESTOR) 40 MG tablet     Other Relevant Orders     Follow-Up with Cardiology     Lipid panel reflex to direct LDL Fasting     ALT    2. Benign essential hypertension - Primary 10/21/2014     Last Assessment & Plan 4/9/2025 Virtual Visit Written 4/9/2025  4:19 PM by Thu Singleton APRN CNP      Controlled  Continue current  medications          Relevant Medications     lisinopril (ZESTRIL) 40 MG tablet     spironolactone (ALDACTONE) 25 MG tablet     Other Relevant Orders     Follow-Up with Cardiology    3. ASD (atrial septal defect) 8/26/2020     Overview Signed 4/9/2025  4:20 PM by Thu Singleton APRN CNP      S/p repair with cardioform 30 mm [place] device 8/2020  No residual shunt on imaging 12/2020          Last Assessment & Plan 4/9/2025 Virtual Visit Written 4/9/2025  4:20 PM by Thu Singleton APRN CNP      Follow with echo          Relevant Medications     furosemide (LASIX) 40 MG tablet     lisinopril (ZESTRIL) 40 MG tablet     Other Relevant Orders     Follow-Up with Cardiology    4. Atrial fibrillation, unspecified type (H) 11/8/2023     Overview Signed 4/9/2025  4:19 PM by Thu Singleton APRN CNP      Postop A-fib s/p CONCEPCION cardioversion 9/2020 after ASD repair  No recurrent A-fib on monitoring          Last Assessment & Plan 4/9/2025 Virtual Visit Written 4/9/2025  4:19 PM by Thu Singleton APRN CNP      Continues on aspirin          Relevant Orders     Follow-Up with Cardiology    5. Venous (peripheral) insufficiency 4/9/2025     Overview Signed 4/9/2025  4:21 PM by Thu Singleton APRN CNP      Was seen by vein clinic and diagnosed with venous insufficiency          Relevant Orders     Follow-Up with Cardiology                 Thank you for allowing me to participate in this delightful patient's care.   This note was completed in part using Dragon voice recognition software. Although reviewed after completion, some word and grammatical errors may occur.    BARRINGTON Wall CNP                Thank you for allowing me to participate in the care of your patient.      Sincerely,     BARRINGTON Wall CNP     [facility]  cc:   Corby Hodges MD  [address]  [address]  [place] [id number]

## 2025-05-03 ENCOUNTER — APPOINTMENT (OUTPATIENT)
Dept: ULTRASOUND IMAGING | Facility: CLINIC | Age: 55
End: 2025-05-03
Attending: EMERGENCY MEDICINE
Payer: COMMERCIAL

## 2025-05-03 ENCOUNTER — HOSPITAL ENCOUNTER (EMERGENCY)
Facility: CLINIC | Age: 55
Discharge: HOME | End: 2025-05-03
Attending: EMERGENCY MEDICINE | Admitting: EMERGENCY MEDICINE
Payer: COMMERCIAL

## 2025-05-03 ENCOUNTER — APPOINTMENT (OUTPATIENT)
Dept: CT IMAGING | Facility: CLINIC | Age: 55
End: 2025-05-03
Attending: EMERGENCY MEDICINE
Payer: COMMERCIAL

## 2025-05-03 VITALS
DIASTOLIC BLOOD PRESSURE: 90 MMHG | SYSTOLIC BLOOD PRESSURE: 144 MMHG | HEIGHT: 64 IN | BODY MASS INDEX: 49.16 KG/M2 | RESPIRATION RATE: 17 BRPM | TEMPERATURE: 97.4 F | WEIGHT: 287.92 LBS | OXYGEN SATURATION: 93 % | HEART RATE: 94 BPM

## 2025-05-03 DIAGNOSIS — K80.20 GALLSTONES: ICD-10-CM

## 2025-05-03 DIAGNOSIS — N83.201 RIGHT OVARIAN CYST: ICD-10-CM

## 2025-05-03 LAB
ALBUMIN SERPL BCG-MCNC: 5 G/DL (ref 3.5–5.2)
ALBUMIN UR-MCNC: 20 MG/DL
ALP SERPL-CCNC: 66 U/L (ref 40–150)
ALT SERPL W P-5'-P-CCNC: 36 U/L (ref 0–50)
ANION GAP SERPL CALCULATED.3IONS-SCNC: 13 MMOL/L (ref 7–15)
APPEARANCE UR: CLEAR
AST SERPL W P-5'-P-CCNC: 32 U/L (ref 0–45)
BACTERIA #/AREA URNS HPF: ABNORMAL /HPF
BASOPHILS # BLD AUTO: 0 10E3/UL (ref 0–0.2)
BASOPHILS NFR BLD AUTO: 0 %
BILIRUB SERPL-MCNC: 1.6 MG/DL
BILIRUB UR QL STRIP: NEGATIVE
BUN SERPL-MCNC: 14.2 MG/DL (ref 6–20)
CALCIUM SERPL-MCNC: 9.8 MG/DL (ref 8.8–10.4)
CHLORIDE SERPL-SCNC: 98 MMOL/L (ref 98–107)
COLOR UR AUTO: YELLOW
CREAT SERPL-MCNC: 0.55 MG/DL (ref 0.51–0.95)
EGFRCR SERPLBLD CKD-EPI 2021: >90 ML/MIN/1.73M2
EOSINOPHIL # BLD AUTO: 0 10E3/UL (ref 0–0.7)
EOSINOPHIL NFR BLD AUTO: 0 %
ERYTHROCYTE [DISTWIDTH] IN BLOOD BY AUTOMATED COUNT: 12.9 % (ref 10–15)
GLUCOSE SERPL-MCNC: 221 MG/DL (ref 70–99)
GLUCOSE UR STRIP-MCNC: >=1000 MG/DL
HCO3 SERPL-SCNC: 27 MMOL/L (ref 22–29)
HCT VFR BLD AUTO: 42.6 % (ref 35–47)
HGB BLD-MCNC: 14.9 G/DL (ref 11.7–15.7)
HGB UR QL STRIP: NEGATIVE
HOLD SPECIMEN: NORMAL
HOLD SPECIMEN: NORMAL
IMM GRANULOCYTES # BLD: 0.1 10E3/UL
IMM GRANULOCYTES NFR BLD: 1 %
KETONES UR STRIP-MCNC: 40 MG/DL
LEUKOCYTE ESTERASE UR QL STRIP: NEGATIVE
LYMPHOCYTES # BLD AUTO: 1 10E3/UL (ref 0.8–5.3)
LYMPHOCYTES NFR BLD AUTO: 9 %
MCH RBC QN AUTO: 31.4 PG (ref 26.5–33)
MCHC RBC AUTO-ENTMCNC: 35 G/DL (ref 31.5–36.5)
MCV RBC AUTO: 90 FL (ref 78–100)
MONOCYTES # BLD AUTO: 0.3 10E3/UL (ref 0–1.3)
MONOCYTES NFR BLD AUTO: 3 %
MUCOUS THREADS #/AREA URNS LPF: PRESENT /LPF
NEUTROPHILS # BLD AUTO: 9.3 10E3/UL (ref 1.6–8.3)
NEUTROPHILS NFR BLD AUTO: 86 %
NITRATE UR QL: NEGATIVE
NRBC # BLD AUTO: 0 10E3/UL
NRBC BLD AUTO-RTO: 0 /100
PH UR STRIP: 6 [PH] (ref 5–7)
PLATELET # BLD AUTO: 127 10E3/UL (ref 150–450)
POTASSIUM SERPL-SCNC: 4.3 MMOL/L (ref 3.4–5.3)
PROT SERPL-MCNC: 7.5 G/DL (ref 6.4–8.3)
RBC # BLD AUTO: 4.74 10E6/UL (ref 3.8–5.2)
RBC URINE: 2 /HPF
SODIUM SERPL-SCNC: 138 MMOL/L (ref 135–145)
SP GR UR STRIP: 1.02 (ref 1–1.03)
SQUAMOUS EPITHELIAL: 6 /HPF
UROBILINOGEN UR STRIP-MCNC: 2 MG/DL
WBC # BLD AUTO: 10.7 10E3/UL (ref 4–11)
WBC URINE: 2 /HPF

## 2025-05-03 PROCEDURE — 74176 CT ABD & PELVIS W/O CONTRAST: CPT

## 2025-05-03 PROCEDURE — 81001 URINALYSIS AUTO W/SCOPE: CPT | Performed by: EMERGENCY MEDICINE

## 2025-05-03 PROCEDURE — 96374 THER/PROPH/DIAG INJ IV PUSH: CPT

## 2025-05-03 PROCEDURE — 96361 HYDRATE IV INFUSION ADD-ON: CPT

## 2025-05-03 PROCEDURE — 96376 TX/PRO/DX INJ SAME DRUG ADON: CPT

## 2025-05-03 PROCEDURE — 80053 COMPREHEN METABOLIC PANEL: CPT | Performed by: EMERGENCY MEDICINE

## 2025-05-03 PROCEDURE — 85004 AUTOMATED DIFF WBC COUNT: CPT | Performed by: EMERGENCY MEDICINE

## 2025-05-03 PROCEDURE — 76856 US EXAM PELVIC COMPLETE: CPT

## 2025-05-03 PROCEDURE — 96375 TX/PRO/DX INJ NEW DRUG ADDON: CPT

## 2025-05-03 PROCEDURE — 99285 EMERGENCY DEPT VISIT HI MDM: CPT | Mod: 25

## 2025-05-03 PROCEDURE — 36415 COLL VENOUS BLD VENIPUNCTURE: CPT | Performed by: EMERGENCY MEDICINE

## 2025-05-03 PROCEDURE — 258N000003 HC RX IP 258 OP 636: Performed by: EMERGENCY MEDICINE

## 2025-05-03 PROCEDURE — 250N000011 HC RX IP 250 OP 636: Mod: JZ | Performed by: EMERGENCY MEDICINE

## 2025-05-03 RX ORDER — HYDROMORPHONE HYDROCHLORIDE 1 MG/ML
0.5 INJECTION, SOLUTION INTRAMUSCULAR; INTRAVENOUS; SUBCUTANEOUS EVERY 30 MIN PRN
Status: COMPLETED | OUTPATIENT
Start: 2025-05-03 | End: 2025-05-03

## 2025-05-03 RX ORDER — ONDANSETRON 4 MG/1
4 TABLET, ORALLY DISINTEGRATING ORAL EVERY 8 HOURS PRN
Qty: 10 TABLET | Refills: 0 | Status: SHIPPED | OUTPATIENT
Start: 2025-05-03 | End: 2025-05-06

## 2025-05-03 RX ORDER — HYDROCODONE BITARTRATE AND ACETAMINOPHEN 5; 325 MG/1; MG/1
1 TABLET ORAL EVERY 6 HOURS PRN
Qty: 10 TABLET | Refills: 0 | Status: SHIPPED | OUTPATIENT
Start: 2025-05-03 | End: 2025-05-06

## 2025-05-03 RX ORDER — ONDANSETRON 2 MG/ML
4 INJECTION INTRAMUSCULAR; INTRAVENOUS EVERY 30 MIN PRN
Status: DISCONTINUED | OUTPATIENT
Start: 2025-05-03 | End: 2025-05-03 | Stop reason: HOSPADM

## 2025-05-03 RX ORDER — OXYCODONE HYDROCHLORIDE 5 MG/1
5 TABLET ORAL ONCE
Status: DISCONTINUED | OUTPATIENT
Start: 2025-05-03 | End: 2025-05-03

## 2025-05-03 RX ADMIN — HYDROMORPHONE HYDROCHLORIDE 0.5 MG: 1 INJECTION, SOLUTION INTRAMUSCULAR; INTRAVENOUS; SUBCUTANEOUS at 15:47

## 2025-05-03 RX ADMIN — HYDROMORPHONE HYDROCHLORIDE 0.5 MG: 1 INJECTION, SOLUTION INTRAMUSCULAR; INTRAVENOUS; SUBCUTANEOUS at 18:11

## 2025-05-03 RX ADMIN — HYDROMORPHONE HYDROCHLORIDE 0.5 MG: 1 INJECTION, SOLUTION INTRAMUSCULAR; INTRAVENOUS; SUBCUTANEOUS at 16:30

## 2025-05-03 RX ADMIN — SODIUM CHLORIDE 1000 ML: 0.9 INJECTION, SOLUTION INTRAVENOUS at 15:44

## 2025-05-03 RX ADMIN — ONDANSETRON 4 MG: 2 INJECTION, SOLUTION INTRAMUSCULAR; INTRAVENOUS at 15:46

## 2025-05-03 ASSESSMENT — ACTIVITIES OF DAILY LIVING (ADL)
ADLS_ACUITY_SCORE: 54

## 2025-05-03 NOTE — ED PROVIDER NOTES
"  Emergency Department Note      History of Present Illness     Chief Complaint   Abdominal Pain      HPI   Lyubov Sanchez is a 54 year old female with a history of hyperlipidemia and diabetes mellitus type II who presents to the ED for evaluation of abdominal pain. Patient reports that at noon today she had a sudden onset of right sided abdominal pain. She took motrin right away but it did not help. She became nauseous with this pain, vomited several times, and became diaphoretic. She has had ovarian cysts in the past but the pain has never been this severe. She denies fever, hematemesis, back pain, dysuria, or hematuria. She has been having difficulty urinating since the onset of the pain.    Independent Historian   None    Review of External Notes   None    Past Medical History     Medical History and Problem List   Hyperlipidemia  Hypertension  Anxiety  Obstructive sleep apnea  Atrial Septal Defect  Morbid obesity  Diabetes mellitus type II   Atrial fibrillation     Medications   Aspirin 81 mg  Celexa  Lasix  Zestril  Glucophage   Aldactone     Surgical History   Cardioversion  Total knee arthroplasty bilateral  Atrial septal defect closure   section  Right heart catheter  Herniorrhaphy ventral  Total abdominal hysterectomy   Tonsillectomy and adenoidectomy     Physical Exam     Patient Vitals for the past 24 hrs:   BP Temp Temp src Pulse Resp SpO2 Height Weight   25 1906 (!) 144/90 -- -- -- -- 93 % -- --   25 1829 -- -- -- -- -- 93 % -- --   25 1821 -- -- -- -- -- 93 % -- --   25 1806 (!) 171/98 -- -- -- -- -- -- --   25 1710 -- -- -- -- -- 96 % -- --   25 1706 (!) 158/93 -- -- -- -- 96 % -- --   25 1640 139/81 -- -- -- -- 92 % -- --   25 1547 (!) 153/100 -- -- 94 -- 98 % -- --   25 1403 (!) 163/83 97.4  F (36.3  C) Temporal 104 17 97 % 1.626 m (5' 4\") 130.6 kg (287 lb 14.7 oz)     Physical Exam  Constitutional:       Appearance: She is " well-developed.   HENT:      Right Ear: External ear normal.      Left Ear: External ear normal.      Mouth/Throat:      Mouth: Mucous membranes are moist.      Pharynx: Oropharynx is clear. No oropharyngeal exudate.   Eyes:      General: No scleral icterus.     Conjunctiva/sclera: Conjunctivae normal.      Pupils: Pupils are equal, round, and reactive to light.   Cardiovascular:      Rate and Rhythm: Regular rhythm. Tachycardia present.      Heart sounds: Normal heart sounds. No murmur heard.     No friction rub. No gallop.   Pulmonary:      Effort: Pulmonary effort is normal. No respiratory distress.      Breath sounds: Normal breath sounds. No stridor. No wheezing, rhonchi or rales.   Abdominal:      General: Bowel sounds are normal. There is no distension.      Palpations: Abdomen is soft. There is no mass.      Tenderness: There is abdominal tenderness. There is no right CVA tenderness, left CVA tenderness, guarding or rebound.      Comments: R mid and RLQ abd TTP   Musculoskeletal:      Right lower leg: No edema.      Left lower leg: No edema.   Skin:     General: Skin is warm and dry.      Capillary Refill: Capillary refill takes less than 2 seconds.      Findings: No rash.   Neurological:      Mental Status: She is alert.           Diagnostics     Lab Results   Labs Ordered and Resulted from Time of ED Arrival to Time of ED Departure   ROUTINE UA WITH MICROSCOPIC REFLEX TO CULTURE - Abnormal       Result Value    Color Urine Yellow      Appearance Urine Clear      Glucose Urine >=1000 (*)     Bilirubin Urine Negative      Ketones Urine 40 (*)     Specific Gravity Urine 1.025      Blood Urine Negative      pH Urine 6.0      Protein Albumin Urine 20 (*)     Urobilinogen Urine 2.0 (*)     Nitrite Urine Negative      Leukocyte Esterase Urine Negative      Bacteria Urine Few (*)     Mucus Urine Present (*)     RBC Urine 2      WBC Urine 2      Squamous Epithelials Urine 6 (*)    COMPREHENSIVE METABOLIC PANEL -  Abnormal    Sodium 138      Potassium 4.3      Carbon Dioxide (CO2) 27      Anion Gap 13      Urea Nitrogen 14.2      Creatinine 0.55      GFR Estimate >90      Calcium 9.8      Chloride 98      Glucose 221 (*)     Alkaline Phosphatase 66      AST 32      ALT 36      Protein Total 7.5      Albumin 5.0      Bilirubin Total 1.6 (*)    CBC WITH PLATELETS AND DIFFERENTIAL - Abnormal    WBC Count 10.7      RBC Count 4.74      Hemoglobin 14.9      Hematocrit 42.6      MCV 90      MCH 31.4      MCHC 35.0      RDW 12.9      Platelet Count 127 (*)     % Neutrophils 86      % Lymphocytes 9      % Monocytes 3      % Eosinophils 0      % Basophils 0      % Immature Granulocytes 1      NRBCs per 100 WBC 0      Absolute Neutrophils 9.3 (*)     Absolute Lymphocytes 1.0      Absolute Monocytes 0.3      Absolute Eosinophils 0.0      Absolute Basophils 0.0      Absolute Immature Granulocytes 0.1      Absolute NRBCs 0.0         Imaging   US Pelvis Cmplt w Transvag & Doppler LmtPel Duplex Limited   Final Result   IMPRESSION:     1.  Limited exam.   2.  Cystic lesions in the right adnexa were not well assessed due to patient body habitus. However, venous waveforms were identified, without other evidence of torsion. Short-term follow-up recommended in 6-12 weeks (ultrasound versus MRI).      CT Abdomen Pelvis w/o Contrast   Final Result   IMPRESSION:       1.  Cholelithiasis and hepatic steatosis.   2.  Sigmoid diverticulosis but no diverticulitis.    3.  Two simple fluid attenuation cysts, the larger of which is 6.6 x 6.7 cm. Further characterization with pelvic ultrasound is suggested per guidelines below.      REFERENCE:   Management of Incidental Adnexal Findings on CT and MRI: A White Paper of the ACR Incidental Findings Committee. J Am Carlos Eduardo Radiol 2020; 17(2):248-254.      Postmenopausal or equal to or >50 years if status unknown:   >3 cm on CT: Ultrasound.                    Independent Interpretation   None    ED Course       Medications Administered   Medications   ondansetron (ZOFRAN) injection 4 mg (4 mg Intravenous $Given 5/3/25 1546)   sodium chloride 0.9% BOLUS 1,000 mL (0 mLs Intravenous Stopped 5/3/25 1744)   HYDROmorphone (PF) (DILAUDID) injection 0.5 mg (0.5 mg Intravenous $Given 5/3/25 1811)       Procedures   Procedures     Discussion of Management   None    ED Course   ED Course as of 05/03/25 1931   Sat May 03, 2025   1516 I obtained the history and performed the examination as described.    1709 I rechecked and updated the patient.   Discussed imaging results and further plan of care.  Patient was sleeping, in 3/10 pain.   1917 I rechecked and updated the patient.   Discussed discharge.       Additional Documentation  None    Medical Decision Making / Diagnosis     CMS Diagnoses: None    MIPS       None    MDM   Lyubov Sanchez is a 54 year old female who presents with above symptoms.  Given her location of pain, CT was obtained.  It did show ovarian cysts which require further imaging study with pelvic ultrasound.  Patient agreed to undergo imaging.  It does show this large right-sided ovarian cyst.  Her pain is controlled now.  She was found sleeping on my exam.  Repeat abdominal exam shows some mild tenderness of right lower quadrant but was not as significant as before.  At this point I do not believe she has torsion but certainly I did warn her about that.  She is referred to OB/GYN for follow-up in the next several days.  She was sent home with pain medication and nausea medicine as needed.  She is to push fluids and rest.  She is asked to return if symptoms worsen or she has any recurrent symptoms.  She was also made aware of the incidental finding of gallstones.  All questions were answered.  Patient comfortable discharge plan.    Disposition   The patient was discharged.     Diagnosis     ICD-10-CM    1. Right ovarian cyst  N83.201       2. Gallstones  K80.20            Discharge Medications   New Prescriptions     HYDROCODONE-ACETAMINOPHEN (NORCO) 5-325 MG TABLET    Take 1 tablet by mouth every 6 hours as needed for severe pain.    ONDANSETRON (ZOFRAN ODT) 4 MG ODT TAB    Take 1 tablet (4 mg) by mouth every 8 hours as needed for nausea or vomiting.         Scribe Disclosure:  I, Zenon Saenz, am serving as a scribe at 3:22 PM on 5/3/2025 to document services personally performed by Karina Bartlett MD based on my observations and the provider's statements to me.        Karina Bartlett MD  05/03/25 1489

## 2025-05-03 NOTE — ED TRIAGE NOTES
Patient reports right lower abdominal pain that began today.  Known ovarian cyst.  Reports some constipation since medication changes about 3 weeks ago.  Last BM yesterday.  Denies urinary changes.  Hx DM.  ABCs intact, A&Ox4.     Triage Assessment (Adult)       Row Name 05/03/25 1409          Triage Assessment    Airway WDL WDL        Respiratory WDL    Respiratory WDL WDL        Skin Circulation/Temperature WDL    Skin Circulation/Temperature WDL WDL        Cardiac WDL    Cardiac WDL WDL        Peripheral/Neurovascular WDL    Peripheral Neurovascular WDL WDL        Cognitive/Neuro/Behavioral WDL    Cognitive/Neuro/Behavioral WDL WDL

## 2025-05-04 ENCOUNTER — HOSPITAL ENCOUNTER (EMERGENCY)
Facility: CLINIC | Age: 55
Discharge: HOME | End: 2025-05-04
Attending: EMERGENCY MEDICINE | Admitting: EMERGENCY MEDICINE
Payer: COMMERCIAL

## 2025-05-04 VITALS
TEMPERATURE: 96.8 F | OXYGEN SATURATION: 97 % | BODY MASS INDEX: 49.26 KG/M2 | HEART RATE: 92 BPM | SYSTOLIC BLOOD PRESSURE: 152 MMHG | WEIGHT: 287 LBS | RESPIRATION RATE: 14 BRPM | DIASTOLIC BLOOD PRESSURE: 89 MMHG

## 2025-05-04 DIAGNOSIS — R11.2 NAUSEA AND VOMITING, UNSPECIFIED VOMITING TYPE: ICD-10-CM

## 2025-05-04 DIAGNOSIS — R10.31 RIGHT LOWER QUADRANT ABDOMINAL PAIN: ICD-10-CM

## 2025-05-04 DIAGNOSIS — D72.829 LEUKOCYTOSIS, UNSPECIFIED TYPE: ICD-10-CM

## 2025-05-04 LAB
ALBUMIN SERPL BCG-MCNC: 4.7 G/DL (ref 3.5–5.2)
ALP SERPL-CCNC: 62 U/L (ref 40–150)
ALT SERPL W P-5'-P-CCNC: 31 U/L (ref 0–50)
ANION GAP SERPL CALCULATED.3IONS-SCNC: 12 MMOL/L (ref 7–15)
AST SERPL W P-5'-P-CCNC: 33 U/L (ref 0–45)
BASOPHILS # BLD AUTO: 0 10E3/UL (ref 0–0.2)
BASOPHILS NFR BLD AUTO: 0 %
BILIRUB SERPL-MCNC: 1.7 MG/DL
BUN SERPL-MCNC: 14.5 MG/DL (ref 6–20)
CALCIUM SERPL-MCNC: 9.5 MG/DL (ref 8.8–10.4)
CHLORIDE SERPL-SCNC: 99 MMOL/L (ref 98–107)
CREAT SERPL-MCNC: 0.56 MG/DL (ref 0.51–0.95)
EGFRCR SERPLBLD CKD-EPI 2021: >90 ML/MIN/1.73M2
EOSINOPHIL # BLD AUTO: 0 10E3/UL (ref 0–0.7)
EOSINOPHIL NFR BLD AUTO: 0 %
ERYTHROCYTE [DISTWIDTH] IN BLOOD BY AUTOMATED COUNT: 13 % (ref 10–15)
GLUCOSE SERPL-MCNC: 239 MG/DL (ref 70–99)
HCO3 BLDV-SCNC: 28 MMOL/L (ref 21–28)
HCO3 SERPL-SCNC: 24 MMOL/L (ref 22–29)
HCT VFR BLD AUTO: 40.3 % (ref 35–47)
HGB BLD-MCNC: 14.3 G/DL (ref 11.7–15.7)
IMM GRANULOCYTES # BLD: 0.1 10E3/UL
IMM GRANULOCYTES NFR BLD: 1 %
LACTATE BLD-SCNC: 1.2 MMOL/L (ref 0.7–2)
LIPASE SERPL-CCNC: 30 U/L (ref 13–60)
LYMPHOCYTES # BLD AUTO: 1.1 10E3/UL (ref 0.8–5.3)
LYMPHOCYTES NFR BLD AUTO: 9 %
MCH RBC QN AUTO: 31.6 PG (ref 26.5–33)
MCHC RBC AUTO-ENTMCNC: 35.5 G/DL (ref 31.5–36.5)
MCV RBC AUTO: 89 FL (ref 78–100)
MONOCYTES # BLD AUTO: 0.3 10E3/UL (ref 0–1.3)
MONOCYTES NFR BLD AUTO: 2 %
NEUTROPHILS # BLD AUTO: 11 10E3/UL (ref 1.6–8.3)
NEUTROPHILS NFR BLD AUTO: 88 %
NRBC # BLD AUTO: 0 10E3/UL
NRBC BLD AUTO-RTO: 0 /100
PCO2 BLDV: 52 MM HG (ref 40–50)
PH BLDV: 7.34 [PH] (ref 7.32–7.43)
PLATELET # BLD AUTO: 128 10E3/UL (ref 150–450)
PO2 BLDV: 33 MM HG (ref 25–47)
POTASSIUM SERPL-SCNC: 4.9 MMOL/L (ref 3.4–5.3)
PROT SERPL-MCNC: 7.5 G/DL (ref 6.4–8.3)
RBC # BLD AUTO: 4.53 10E6/UL (ref 3.8–5.2)
SAO2 % BLDV: 58 % (ref 70–75)
SODIUM SERPL-SCNC: 135 MMOL/L (ref 135–145)
WBC # BLD AUTO: 12.5 10E3/UL (ref 4–11)

## 2025-05-04 PROCEDURE — 250N000013 HC RX MED GY IP 250 OP 250 PS 637: Performed by: EMERGENCY MEDICINE

## 2025-05-04 PROCEDURE — 258N000003 HC RX IP 258 OP 636: Performed by: EMERGENCY MEDICINE

## 2025-05-04 PROCEDURE — 85004 AUTOMATED DIFF WBC COUNT: CPT | Performed by: EMERGENCY MEDICINE

## 2025-05-04 PROCEDURE — 84155 ASSAY OF PROTEIN SERUM: CPT | Performed by: EMERGENCY MEDICINE

## 2025-05-04 PROCEDURE — 99284 EMERGENCY DEPT VISIT MOD MDM: CPT | Mod: 25

## 2025-05-04 PROCEDURE — 36415 COLL VENOUS BLD VENIPUNCTURE: CPT | Performed by: EMERGENCY MEDICINE

## 2025-05-04 PROCEDURE — 82803 BLOOD GASES ANY COMBINATION: CPT

## 2025-05-04 PROCEDURE — 250N000011 HC RX IP 250 OP 636: Mod: JZ | Performed by: EMERGENCY MEDICINE

## 2025-05-04 PROCEDURE — 96374 THER/PROPH/DIAG INJ IV PUSH: CPT

## 2025-05-04 PROCEDURE — 96375 TX/PRO/DX INJ NEW DRUG ADDON: CPT

## 2025-05-04 PROCEDURE — 83690 ASSAY OF LIPASE: CPT | Performed by: EMERGENCY MEDICINE

## 2025-05-04 PROCEDURE — 96361 HYDRATE IV INFUSION ADD-ON: CPT

## 2025-05-04 RX ORDER — PROCHLORPERAZINE MALEATE 10 MG
10 TABLET ORAL EVERY 6 HOURS PRN
Qty: 12 TABLET | Refills: 0 | Status: SHIPPED | OUTPATIENT
Start: 2025-05-04

## 2025-05-04 RX ORDER — PROCHLORPERAZINE MALEATE 10 MG
10 TABLET ORAL ONCE
Status: COMPLETED | OUTPATIENT
Start: 2025-05-04 | End: 2025-05-04

## 2025-05-04 RX ORDER — OXYCODONE HYDROCHLORIDE 5 MG/1
5 TABLET ORAL ONCE
Status: COMPLETED | OUTPATIENT
Start: 2025-05-04 | End: 2025-05-04

## 2025-05-04 RX ORDER — KETOROLAC TROMETHAMINE 15 MG/ML
15 INJECTION, SOLUTION INTRAMUSCULAR; INTRAVENOUS ONCE
Status: COMPLETED | OUTPATIENT
Start: 2025-05-04 | End: 2025-05-04

## 2025-05-04 RX ORDER — ASPIRIN 81 MG
100 TABLET, DELAYED RELEASE (ENTERIC COATED) ORAL DAILY
Qty: 10 TABLET | Refills: 0 | Status: SHIPPED | OUTPATIENT
Start: 2025-05-04

## 2025-05-04 RX ORDER — ONDANSETRON 4 MG/1
4 TABLET, ORALLY DISINTEGRATING ORAL ONCE
Status: COMPLETED | OUTPATIENT
Start: 2025-05-04 | End: 2025-05-04

## 2025-05-04 RX ORDER — ONDANSETRON 2 MG/ML
4 INJECTION INTRAMUSCULAR; INTRAVENOUS ONCE
Status: COMPLETED | OUTPATIENT
Start: 2025-05-04 | End: 2025-05-04

## 2025-05-04 RX ORDER — OXYCODONE HYDROCHLORIDE 5 MG/1
5 TABLET ORAL EVERY 6 HOURS PRN
Qty: 12 TABLET | Refills: 0 | Status: SHIPPED | OUTPATIENT
Start: 2025-05-04 | End: 2025-05-07

## 2025-05-04 RX ORDER — ACETAMINOPHEN 500 MG
1000 TABLET ORAL EVERY 6 HOURS PRN
Qty: 30 TABLET | Refills: 0 | Status: SHIPPED | OUTPATIENT
Start: 2025-05-04 | End: 2025-05-11

## 2025-05-04 RX ADMIN — ONDANSETRON 4 MG: 2 INJECTION, SOLUTION INTRAMUSCULAR; INTRAVENOUS at 01:22

## 2025-05-04 RX ADMIN — PROCHLORPERAZINE MALEATE 10 MG: 10 TABLET ORAL at 02:06

## 2025-05-04 RX ADMIN — OXYCODONE HYDROCHLORIDE 5 MG: 5 TABLET ORAL at 02:06

## 2025-05-04 RX ADMIN — SODIUM CHLORIDE 500 ML: 0.9 INJECTION, SOLUTION INTRAVENOUS at 02:06

## 2025-05-04 RX ADMIN — KETOROLAC TROMETHAMINE 15 MG: 15 INJECTION, SOLUTION INTRAMUSCULAR; INTRAVENOUS at 01:21

## 2025-05-04 ASSESSMENT — ACTIVITIES OF DAILY LIVING (ADL)
ADLS_ACUITY_SCORE: 54

## 2025-05-04 ASSESSMENT — COLUMBIA-SUICIDE SEVERITY RATING SCALE - C-SSRS
1. IN THE PAST MONTH, HAVE YOU WISHED YOU WERE DEAD OR WISHED YOU COULD GO TO SLEEP AND NOT WAKE UP?: NO
6. HAVE YOU EVER DONE ANYTHING, STARTED TO DO ANYTHING, OR PREPARED TO DO ANYTHING TO END YOUR LIFE?: NO
2. HAVE YOU ACTUALLY HAD ANY THOUGHTS OF KILLING YOURSELF IN THE PAST MONTH?: NO

## 2025-05-04 NOTE — DISCHARGE INSTRUCTIONS
Monitory for increasing abdominal pain, fever, persistent vomiting. Followup closely with gynecology. Do not take norco (last prescription given). Alternate tylenol/oxycodone/ibuprofen.

## 2025-05-04 NOTE — ED PROVIDER NOTES
Emergency Department Note      History of Present Illness     Chief Complaint   Abdominal Pain      HPI   Lyubov Sanchez is a 54 year old female with a history of atrial fibrillation, hypertension, aortic aneurysm, and DM2 amongst others presenting to the ED with abdominal pain and vomiting. Patient was seen in the ED yesterday for abdominal pain attributable to right ovarian cyst and gallstones. She states that since discharge she has suffered continued right lower quadrant abdominal pain and vomiting. She has experienced four bouts of non-bloody emesis and notes that the vomiting has prevented her from holding down pain medication. She further endorses constipation and decreased appetite. She denies any fever, chills, chest pain, shortness of breath, cough, diarrhea, vaginal bleeding/discharge or dysuria. She denies having recently consumed any suspect foods or exposure to sick contacts.     Independent Historian   None    Review of External Notes   I reviewed CT abdomen/pelvis as well as pelvic ultrasound completed on 5/3/2025.  Pelvic ultrasound was limited though cystic lesions in the right adnexa, no ovarian torsion identified.    Past Medical History     Medical History and Problem List   Anxiety  Aortic aneurysm  Arthritis  Chronic knee pain  Depressive disorder  Diabetes mellitus, type 2  Dysplasia of cervix   Hypertension  Hyperlipidemia  Incomplete right bundle branch block  Migraine  Obesity  Obstructive sleep apnea  Ostium secundum type atrial septal defect  Paroxysmal atrial fibrillation  Plantar fasciitis  Pulmonary hypertension  Atrial septal defect  Bilateral leg edema  Thoracic aortic ectasia   Hyperopia of both eyes  Bronchospasm  Venous peripheral insufficiency     Medications   Albuterol  Aspirin 81mg  Zyrtec  Celexa  Flonase  Lasix   Norco  Zestril  Glucophage  Zofran  Crestor  Aldactone     Surgical History   Past Surgical History:   Procedure Laterality Date    ANESTHESIA CARDIOVERSION N/A  2020    Procedure: ANESTHESIA, FOR CONCEPCION/CARDIOVERSION;  Surgeon: GENERIC ANESTHESIA PROVIDER;  Location: [facility]    ARTHROPLASTY KNEE Right 2017    Procedure: ARTHROPLASTY KNEE;  Right total knee arthroplasty using the Arthrex iBalance total knee system;  Surgeon: Hebert Lee MD;  Location: [facility] OR    ARTHROPLASTY KNEE Left 2018    Procedure: ARTHROPLASTY KNEE;  Left total knee arthroplasty using an Arthrex iBalance total knee system;  Surgeon: Hebert Lee MD;  Location: [facility] OR    ATRIAL SEPTAL DEFECT CLOSURE N/A 2020    Procedure: ASD Closure;  Surgeon: Freddie Frias MD;  Location:  [facility] CARDI[facility] CATH LAB     SECTION      CV RIGHT HEART CATH MEASUREMENTS RECORDED N/A 2020    Procedure: Right Heart Cath;  Surgeon: Freddie Frias MD;  Location:  [facility]ARD[facility] CATH LAB    DAVINCI HERNIORRHAPHY VENTRAL N/A 2020    Procedure: ROBOTIC ASSISTED VENTRAL AND INCISIONAL HERNIA REPAIR WITH MESH;  Surgeon: Violetta Lazaro MD;  Location: [facility] OR    DAVINCI HYSTERECTOMY TOTAL, BILATERAL SALPINGO-OOPHORECTOMY, COMBINED Bilateral 2020    Procedure: DaVinci robotic-assisted total laparoscopic hysterectomy, bilateral salpingectomy;  Surgeon: Venancio Bartlett MD;  Location: [facility] OR - Path all benign    DAVINCI HYSTERECTOMY TOTAL, SALPINGECTOMY BILATERAL Bilateral 2020    Fibroid uterus, Menometrorrhagia - Robotic TLH, Bilateral salpingectomy - Path all benign    HC COLP VAGINA W CERVIX IF PRES W BIOPSY      [place] for ASCUS    TONSILLECTOMY & ADENOIDECTOMY         Physical Exam     Patient Vitals for the past 24 hrs:   BP Temp Temp src Pulse Resp SpO2 Weight   25 0245 -- -- -- 82 -- 95 % --   25 0048 (!) 173/98 96.8  F (36  C) Temporal 102 18 100 % 130.2 kg (287 lb)     Physical Exam  Nursing note and vitals reviewed.  Constitutional: Well nourished.   Eyes: Conjunctiva normal.  Pupils are equal, round, and reactive to light.    ENT: Nose normal. Mucous membranes pink and moist.    Neck: Normal range of motion.  CVS: Sinus tachycardia.  Normal heart sounds.  Pulmonary: Lungs clear to auscultation bilaterally. No wheezes/rales/rhonchi.  GI: Obese. Abdomen soft. Nontender, nondistended. No rigidity or guarding.  No CVA tenderness  MSK: No calf tenderness or swelling.  Neuro: Alert. Follows simple commands.  Skin: Skin is warm and dry. No rash noted.   Psychiatric: Normal affect.       Diagnostics     Lab Results   Labs Ordered and Resulted from Time of ED Arrival to Time of ED Departure   COMPREHENSIVE METABOLIC PANEL - Abnormal       Result Value    Sodium 135      Potassium 4.9      Carbon Dioxide (CO2) 24      Anion Gap 12      Urea Nitrogen 14.5      Creatinine 0.56      GFR Estimate >90      Calcium 9.5      Chloride 99      Glucose 239 (*)     Alkaline Phosphatase 62      AST 33      ALT 31      Protein Total 7.5      Albumin 4.7      Bilirubin Total 1.7 (*)    CBC WITH PLATELETS AND DIFFERENTIAL - Abnormal    WBC Count 12.5 (*)     RBC Count 4.53      Hemoglobin 14.3      Hematocrit 40.3      MCV 89      MCH 31.6      MCHC 35.5      RDW 13.0      Platelet Count 128 (*)     % Neutrophils 88      % Lymphocytes 9      % Monocytes 2      % Eosinophils 0      % Basophils 0      % Immature Granulocytes 1      NRBCs per 100 WBC 0      Absolute Neutrophils 11.0 (*)     Absolute Lymphocytes 1.1      Absolute Monocytes 0.3      Absolute Eosinophils 0.0      Absolute Basophils 0.0      Absolute Immature Granulocytes 0.1      Absolute NRBCs 0.0     LIPASE - Normal    Lipase 30         Imaging   No orders to display     Independent Interpretation   None    ED Course      Medications Administered   Medications   ondansetron (ZOFRAN ODT) ODT tab 4 mg (4 mg Oral Not Given 5/4/25 0122)   ondansetron (ZOFRAN) injection 4 mg (4 mg Intravenous $Given 5/4/25 0122)   ketorolac (TORADOL) injection 15 mg (15 mg Intravenous $Given 5/4/25 0121)   oxyCODONE  (ROXICODONE) tablet 5 mg (5 mg Oral $Given 5/4/25 0206)   sodium chloride 0.9% BOLUS 500 mL (0 mLs Intravenous Stopped 5/4/25 0301)   prochlorperazine (COMPAZINE) tablet 10 mg (10 mg Oral $Given 5/4/25 0206)       Procedures   Procedures     Discussion of Management   None    ED Course   ED Course as of 05/04/25 0258   Sun May 04, 2025   0139 I obtained history and examined the patient as noted above.   0246 I rechecked the patient and explained findings.       Additional Documentation  None    Medical Decision Making / Diagnosis     CMS Diagnoses: None    MIPS       None    MDM   Lyubov Sanchez is a 54 year old female presenting with reported abdominal pain as well as vomiting.  Patient was recently discharged from the ER after evaluation for similar complaint and diagnosed with gallstones as well as right ovarian cyst.  She has been unable to tolerate ODT Zofran as well as Norco.  She is mildly tachycardic on arrival though overall nontoxic, well-hydrated, in no significant distress.  She had no significant abdominal tenderness on my exam, I do not feel repeat abdominal imaging is warranted at this point in time and patient feels comfortable deferring.  I doubt intra-abdominal catastrophe including but not limited to SBO, cholecystitis, appendicitis and more so doubt ovarian torsion.  Labs with mild leukocytosis, stronger suspicion this is more reactive given vomiting.  She is overall not septic appearing.  Patient was provided antiemetics as well as Toradol and oxycodone and was tolerating p.o. without difficulty on reevaluation.  I did recommend close PCP/gynecology follow-up in the outpatient setting as was previously discussed at last ED visit.  Given that patient seem to have better pain control with oxycodone, I recommended discontinuation of Norco and recommendation for oxycodone as well as Tylenol as needed. Colace provided to help mitigate development of constipation. I will provide Compazine on discharge  as well given patient seemed to tolerate this well.  Dietary recommendations discussed, return precautions given.  All questions addressed.    Disposition   The patient was discharged.     Diagnosis     ICD-10-CM    1. Nausea and vomiting, unspecified vomiting type  R11.2       2. Right lower quadrant abdominal pain  R10.31       3. Leukocytosis, unspecified type  D72.829            Discharge Medications   New Prescriptions    ACETAMINOPHEN (TYLENOL) 500 MG TABLET    Take 2 tablets (1,000 mg) by mouth every 6 hours as needed for pain or fever.    DOCUSATE SODIUM (COLACE) 100 MG TABLET    Take 1 tablet (100 mg) by mouth daily.    OXYCODONE (ROXICODONE) 5 MG TABLET    Take 1 tablet (5 mg) by mouth every 6 hours as needed for breakthrough pain.    PROCHLORPERAZINE (COMPAZINE) 10 MG TABLET    Take 1 tablet (10 mg) by mouth every 6 hours as needed for nausea or vomiting.         Scribe Disclosure:  MARTIN MARQUEZ, am serving as a scribe at 2:58 AM on 5/4/2025 to document services personally performed by Louann Morel DO based on my observations and the provider's statements to me.        Louann Morel DO  05/04/25 [id number]

## 2025-05-04 NOTE — ED TRIAGE NOTES
Severe ab pain cysts on ovaries seen earlier. Sent home with meds that are making her vomit. 10/10 pain. AO VSS

## 2025-05-04 NOTE — DISCHARGE INSTRUCTIONS
Follow up with OB for your ovarian cyst next week  Motrin 600mg every 6 hours for pain  Vicodin for pain. Zofran for nausea  Return for worsening symptoms

## 2025-05-06 ENCOUNTER — PATIENT OUTREACH (OUTPATIENT)
Dept: CARE COORDINATION | Facility: CLINIC | Age: 55
End: 2025-05-06
Payer: COMMERCIAL

## 2025-05-08 ENCOUNTER — TELEPHONE (OUTPATIENT)
Dept: FAMILY MEDICINE | Facility: CLINIC | Age: 55
End: 2025-05-08
Payer: COMMERCIAL

## 2025-05-08 NOTE — TELEPHONE ENCOUNTER
Reason for Call:  Appointment Request    Patient requesting this type of appt: Pre-op    Requested provider: Marta Cline    Reason patient unable to be scheduled: Needs to be scheduled by clinic    When does patient want to be seen/preferred time: 1-2 days    Comments: Pt need an appt for dos sometime next week; ovary; [facility]; Dr?     Could we send this information to you in CUPS or would you prefer to receive a phone call?:   Patient would like to be contacted via CUPS    Call taken on 5/8/2025 at 1:44 PM by Sherrie Cadena

## 2025-05-10 ENCOUNTER — HEALTH MAINTENANCE LETTER (OUTPATIENT)
Age: 55
End: 2025-05-10

## 2025-05-12 ENCOUNTER — TELEPHONE (OUTPATIENT)
Dept: CARDIOLOGY | Facility: CLINIC | Age: 55
End: 2025-05-12
Payer: COMMERCIAL

## 2025-05-12 NOTE — TELEPHONE ENCOUNTER
[facility] Call Center    Phone Message    May a detailed message be left on voicemail: yes     Reason for Call: Appointment Intake    Referring Provider Name: Himanshu Driver APRN CNP in [facility] FP/IM/PEDS   Diagnosis and/or Symptoms:   ASD (atrial septal defect) [Q21.10]  Hyperlipidemia LDL goal <100 [E78.5]  Pulmonary hypertension, unspecified (H) [I27.20]  Acute pulmonary edema (H) [J81.0]  Type 2 diabetes mellitus with other specified complication, without long-term cu...    DOS 5/20/25- pre-op clearance- priority referral for scheduling    Please call pt to schedule or send clearance, per pt.  Pt was recently seen, but wasn't sure if she'd still need to be seen before procedure.    Action Taken: Other: cardio    Travel Screening: Not Applicable     Date of Service:

## 2025-05-12 NOTE — TELEPHONE ENCOUNTER
"Routing call to Thu Singleton CNP-    Evonne noel nurse from [facility] in regards to a mutual pt.     Pt just saw Himanshu Driver CNP on 5/9/25 for pre op for Ovary/Cyst removal. Per pre op note- \"Right ovarian cyst  Comment: Would like patient to be cleared by cardiology prior to proceeding with procedure given under general anesthesia.  Patient recently went to ED for pulmonary edema and concerns for heart failure.  Has recently seen cardiology and is felt better since being off beta-blocker.  If okayed by cardiology from a primary care standpoint okay to proceed with procedure. The proposed surgical procedure is considered LOW risk. \"    You last saw pt 4/9/25- okay for cardiac clearance or do you recommend clinic visit?     Hx A fib, venous insufficiency. Per last clinic note -\"seen in the ED 5/4/25 with increased lower extremity edema and shortness of breath. She had elevated BNP and signs of heart failure on x-ray. She was given IV Lasix and discharged home. Still has orthopnea and lower extremity edema, but improved from the ER visit  We reviewed low-sodium diet and fluid intake  She feels that the carvedilol has interactions with over-the-counter medications that she is wanting to take so she would like to discontinue this.  She will monitor her home blood pressures to see if they remain controlled.\"    Cinthya Douglas RN       "

## 2025-05-12 NOTE — TELEPHONE ENCOUNTER
Duplicate- see earlier phone call from today 5/12/25 to Thu Singleton CNP.    Cinthya Douglas, RN

## 2025-05-15 ENCOUNTER — OFFICE VISIT (OUTPATIENT)
Dept: CARDIOLOGY | Facility: CLINIC | Age: 55
End: 2025-05-15
Payer: COMMERCIAL

## 2025-05-15 ENCOUNTER — TELEPHONE (OUTPATIENT)
Dept: CARDIOLOGY | Facility: CLINIC | Age: 55
End: 2025-05-15

## 2025-05-15 VITALS
HEIGHT: 64 IN | SYSTOLIC BLOOD PRESSURE: 103 MMHG | RESPIRATION RATE: 96 BRPM | DIASTOLIC BLOOD PRESSURE: 66 MMHG | WEIGHT: 282 LBS | HEART RATE: 100 BPM | BODY MASS INDEX: 48.14 KG/M2

## 2025-05-15 DIAGNOSIS — E11.69 TYPE 2 DIABETES MELLITUS WITH OTHER SPECIFIED COMPLICATION, WITHOUT LONG-TERM CURRENT USE OF INSULIN (H): ICD-10-CM

## 2025-05-15 DIAGNOSIS — Q21.10 ASD (ATRIAL SEPTAL DEFECT): ICD-10-CM

## 2025-05-15 DIAGNOSIS — I10 HTN, GOAL BELOW 140/90: ICD-10-CM

## 2025-05-15 DIAGNOSIS — I27.20 PULMONARY HYPERTENSION, UNSPECIFIED (H): ICD-10-CM

## 2025-05-15 DIAGNOSIS — Q21.10 ASD (ATRIAL SEPTAL DEFECT): Primary | ICD-10-CM

## 2025-05-15 DIAGNOSIS — I48.91 ATRIAL FIBRILLATION, UNSPECIFIED TYPE (H): ICD-10-CM

## 2025-05-15 DIAGNOSIS — E78.5 HYPERLIPIDEMIA LDL GOAL <100: ICD-10-CM

## 2025-05-15 DIAGNOSIS — J81.0 ACUTE PULMONARY EDEMA (H): ICD-10-CM

## 2025-05-15 DIAGNOSIS — I10 BENIGN ESSENTIAL HYPERTENSION: ICD-10-CM

## 2025-05-15 NOTE — Clinical Note
Doing very well since ED visit earlier this year! Edel, she's so thankful for the changes you made.  Will see Dr Hodges in 1 year

## 2025-05-15 NOTE — PATIENT INSTRUCTIONS
It was great to see you today! Your Cardiology Team includes myself and Dr. Hodges.     Recommendations from today:  Continue medications as you are  No cardiac contraindications to proposed surgeries   Call if you gain more than 3 pounds in one day or 5 pounds in one week  Follow up in 1 year with Dr Hodges.     For scheduling questions, our 24 hours scheduling line is available at [phone].    To reach our nursing team, please call [phone]. Our nursing team is available 8-4:30 Monday-Friday. If you have a medical emergency, please call [phone].

## 2025-05-15 NOTE — PROGRESS NOTES
HEART CARE FOLLOW UP    Primary Care: Marta Cline MD  Primary Cardiologist: Dr Hodges      Assessment/Recommendations     Pulmonary Hypertension, mild, likely a result of ASD/closure. She recently had issues with fluid retention in the setting of viral illness and OTC medication usage, resolved quickly with escalation of diuretics and discontinued carvedilol. Has side effects to BB in the past.  Continue furosemide 40 mg BID  Continue daily weights, notify us with negative trends  Low salt diet  KRISTYN compliance is essential   Atrial septal defect, status post repair in August 2020 with 30 mm Cardioform.   Hypertension, blood pressure well controlled on current regimen. Reports medication compliance. Home blood pressure measurements are always <110 systolic and <80 diastolic.   Continue lisinopril 40  Continue spironolactone 25 mg daily   Does have home BP cuff   Previously tried therapies: metoprolol/carvedilol- weight gain, fatigue, fluid retention   Dyslipidemia, Hypertriglyceridemia, Most recent  and LDL 88, . Patient is  maintained on therapy.  Continue rosuvastatin 40 mg daily   Fasting lipid panel in 4 weeks  We discussed the role diet, exercise and weight management can play in managing dyslipidemia.   Asymptomatic Ascending Aortic Aneurysm, 4.1 cm on TTE imaging. Stable since 2020. Aortic valve is trileaflet (CONCEPCION 9/2020)  Degenerative Root or ascending aortic aneurysm  3.5-4.4 cm, annual monitoring with CT or MR angiography, echocardiogram to follow valvular disease if needed  Aggressive BP control, goal <120 mmHg to limit aneurysm expansion, beta blockers are first line therapy  Preoperative evaluation-patient doing well without s/s of heart failure, fluid retention, angina. BP well controlled. There is no cardiac contraindication from the proposed surgery. She should continue cardiac medications in the perioperative period unless otherwise advised by her surgical team. Hold lasix  morning of surgery.        Return to care in 1 year with Dr Hodges.      History of Present Illness/Subjective    Lyubov Sanchez is a 54 year old female with past medical history significant for:  Hypertension  Hyperlipidemia  Atrial septal defect, status post repair with Carta form 30 mm cord device August 2020, no residual shunting on imaging in December 2020  Atrial fibrillation, noted postoperatively, status post cardioversion  Venous insufficiency  Mildly dilated proximal ascending aorta  KRISTYN on CPAP      The patient was last seen in in clinic April 2025.  At that visit the patient was recently seen in the ED with increased lower extremity edema and shortness of breath as well as elevated BNP and signs of heart failure in the setting of viral illness and cold medicine use at home.  She was treated with IV diuresis.  Upon follow-up with Thu she was doing better but still having orthopnea and lower extremity edema.  At that visit her diuretics were escalated for 3 days, she was educated on low-salt diet, and her carvedilol was discontinued as well.    Today she shares with me that since the medication changes she feels quite improved. Her dyspnea has improved, her peripheral edema has resolved. She has lost nearly 15 pounds, also has reduced her food intake. She feels with the improved dyspnea she is more active, she is more engagd with the kids at her  and active moving around campgrounds. With high exertion (stairs, carrying a children) she feels excellent. No chest pain or pressure. No SOB at rest. Peripheral edema resolved.  No dizziness, lightheadedness, palpitations or racing heart. Wearing CPAP.          Data Review Today:     TTE 11/2023:  Left ventricular systolic function is normal.  The visual ejection fraction is 60-65%.  Regional wall motion abnormalities cannot be excluded due to limited  visualization.  Interatrial septal device noted in place.  There is no color Doppler evidence of an  "atrial shunt.  Ascending aorta dilatation is present.(4.1cm)  The study was technically difficult. Compared to the prior study dated 9/22,  there have been no changes.    9/2022:  Left ventricular systolic function is normal.  The visual ejection fraction is 60-65%.  Mild-moderate left ventricuular hypertrophy.  Grade II or moderate diastolic dysfunction (average E/E' is 20).  The right ventricle is normal in structure, function and size.  History of PFO closure. No evidence of shunt by color Doppler.  No significant valve dysfunction.  The ascending aorta is Mildly dilated at 4.0 cm.  The inferior vena cava was normal in size with preserved respiratory  variability.      I have reviewed and updated the patient's past medical history, allergy list and medication list.          Physical Examination   Vitals: Ht 1.626 m (5' 4\")   LMP  (LMP Unknown)   BMI 48.92 kg/m      BMI= Body mass index is 48.92 kg/m .    Wt Readings from Last 3 Encounters:   05/09/25 129.3 kg (285 lb)   05/04/25 130.2 kg (287 lb)   05/03/25 130.6 kg (287 lb 14.7 oz)       General :   Alert and oriented, in no acute distress.    HEENT:  Normocephalic and atraumatic. .    Neck: No JVP, carotid bruit or obvious thyromegaly.   Lungs:   Respirations unlabored. Clear bilaterally with no rales, rhonchi, or wheezes.     Cardiovascular:   Rhythm is regular. S1 and S2 are normal. No significant murmur is present. Lower extremities demonstrate no significant edema.        Skin: Skin is warm, dry, and otherwise intact.   Neurologic: Gait not assessed. Mood and affect appropriate.           Medical History  Surgical History Family History Social History   Past Medical History:   Diagnosis Date    Anxiety     Pt reports is on Rx Celexa.    Aortic aneurysm     Pt reports its small and is currently being monitored.    Arthritis     Chronic knee pain     Depressive disorder     Diabetes mellitus, type 2     Dysplasia of cervix (uteri) 2003    Rx cryotherapy Nov " 03, and 2005    Hypertension     NO cardiology    Incomplete right bundle branch block 11/10/2017    Migraine     Obesity     Obstructive sleep apnea     CPAP will bring on the day of surgery.    Ostium secundum type atrial septal defect 2020    Added automatically from request for surgery [id number]    Paroxysmal atrial fibrillation     Plantar fasciitis     bilat    Past Surgical History:   Procedure Laterality Date    ANESTHESIA CARDIOVERSION N/A 2020    Procedure: ANESTHESIA, FOR CONCEPCION/CARDIOVERSION;  Surgeon: GENERIC ANESTHESIA PROVIDER;  Location:  OR    ARTHROPLASTY KNEE Right 2017    Procedure: ARTHROPLASTY KNEE;  Right total knee arthroplasty using the Arthrex iBalance total knee system;  Surgeon: Hebert Lee MD;  Location: [facility] OR    ARTHROPLASTY KNEE Left 2018    Procedure: ARTHROPLASTY KNEE;  Left total knee arthroplasty using an Arthrex iBalance total knee system;  Surgeon: Hebert Lee MD;  Location:  OR    ATRIAL SEPTAL DEFECT CLOSURE N/A 2020    Procedure: ASD Closure;  Surgeon: Freddie Frias MD;  Location:  HEART CARDIAC CATH LAB     SECTION      CV RIGHT HEART CATH MEASUREMENTS RECORDED N/A 2020    Procedure: Right Heart Cath;  Surgeon: Freddie Frias MD;  Location:  HEART CARDIAC CATH LAB    DAVINCI HERNIORRHAPHY VENTRAL N/A 2020    Procedure: ROBOTIC ASSISTED VENTRAL AND INCISIONAL HERNIA REPAIR WITH MESH;  Surgeon: Violetta Lazaro MD;  Location: [facility] OR    DAVINCI HYSTERECTOMY TOTAL, BILATERAL SALPINGO-OOPHORECTOMY, COMBINED Bilateral 2020    Procedure: DaVinci robotic-assisted total laparoscopic hysterectomy, bilateral salpingectomy;  Surgeon: Venancio Bartlett MD;  Location: [facility] OR - Path all benign    DAVINCI HYSTERECTOMY TOTAL, SALPINGECTOMY BILATERAL Bilateral 2020    Fibroid uterus, Menometrorrhagia - Robotic TLH, Bilateral salpingectomy - Path all benign    HC COLP VAGINA W CERVIX IF PRES W  BIOPSY  2005    [place] for ASCUS    TONSILLECTOMY & ADENOIDECTOMY      Family History   Adopted: Yes   Problem Relation Age of Onset    Unknown/Adopted Other         pt is adopted and has no access to health history    Unknown/Adopted Mother     Unknown/Adopted Father     Unknown/Adopted Maternal Grandmother     Unknown/Adopted Maternal Grandfather     Unknown/Adopted Paternal Grandmother     Unknown/Adopted Other     Social History     Socioeconomic History    Marital status:      Spouse name: Saúl    Number of children: 2    Years of education: Not on file    Highest education level: Not on file   Occupational History    Occupation:  at home   Tobacco Use    Smoking status: Former     Current packs/day: 0.00     Average packs/day: 0.5 packs/day for 5.0 years (2.5 ttl pk-yrs)     Types: Cigarettes     Start date: 2002     Quit date: 2007     Years since quittin.1     Passive exposure: Never    Smokeless tobacco: Never   Vaping Use    Vaping status: Never Used   Substance and Sexual Activity    Alcohol use: Yes     Comment: 2-3 per weekend    Drug use: No    Sexual activity: Yes     Partners: Male     Birth control/protection: Female Surgical, Male Surgical     Comment: Hysterectomy 2020/ vasectomy    Other Topics Concern     Service No    Blood Transfusions No    Caffeine Concern Yes     Comment: 20 oz pepsi    Occupational Exposure Not Asked    Hobby Hazards Not Asked    Sleep Concern Not Asked    Stress Concern Not Asked    Weight Concern Not Asked    Special Diet Yes     Comment: 2-3 dairy per day    Back Care Not Asked    Exercise No     Comment: active with kids, walking puppy    Bike Helmet Not Asked    Seat Belt Yes    Self-Exams No    Parent/sibling w/ CABG, MI or angioplasty before 65F 55M? No     Comment: No family history - Adopted   Social History Narrative    Not on file     Social Drivers of Health     Financial Resource Strain: Low Risk  (10/14/2024)     Financial Resource Strain     Within the past 12 months, have you or your family members you live with been unable to get utilities (heat, electricity) when it was really needed?: No   Food Insecurity: Low Risk  (10/14/2024)    Food Insecurity     Within the past 12 months, did you worry that your food would run out before you got money to buy more?: No     Within the past 12 months, did the food you bought just not last and you didn t have money to get more?: No   Transportation Needs: Low Risk  (10/14/2024)    Transportation Needs     Within the past 12 months, has lack of transportation kept you from medical appointments, getting your medicines, non-medical meetings or appointments, work, or from getting things that you need?: No   Physical Activity: Insufficiently Active (10/14/2024)    Exercise Vital Sign     Days of Exercise per Week: 3 days     Minutes of Exercise per Session: 40 min   Stress: Stress Concern Present (10/14/2024)    [place] [employer] - Occupational Stress Questionnaire     Feeling of Stress : Rather much   Social Connections: Unknown (10/14/2024)    Social Connection and Isolation Panel [NHANES]     Frequency of Communication with Friends and Family: Not on file     Frequency of Social Gatherings with Friends and Family: More than three times a week     Attends Jain Services: Not on file     Active Member of Clubs or Organizations: Not on file     Attends Club or Organization Meetings: Not on file     Marital Status: Not on file   Interpersonal Safety: Low Risk  (5/9/2025)    Interpersonal Safety     Do you feel physically and emotionally safe where you currently live?: Yes     Within the past 12 months, have you been hit, slapped, kicked or otherwise physically hurt by someone?: No     Within the past 12 months, have you been humiliated or emotionally abused in other ways by your partner or ex-partner?: No   Housing Stability: Low Risk  (10/14/2024)    Housing  Stability     Do you have housing? : Yes     Are you worried about losing your housing?: No          Medications  Allergies   Scheduled Meds:  Current Outpatient Medications   Medication Sig Dispense Refill    albuterol (PROAIR HFA/PROVENTIL HFA/VENTOLIN HFA) 108 (90 Base) MCG/ACT inhaler Inhale 2-4 puffs into the lungs every 4 hours as needed for shortness of breath, wheezing or cough. 18 g 0    aspirin 81 MG EC tablet Take 81 mg by mouth daily      cetirizine (ZYRTEC) 10 MG tablet Take 10 mg by mouth as needed for allergies      citalopram (CELEXA) 20 MG tablet TAKE 1 TABLET BY MOUTH EVERY DAY 90 tablet 0    docusate sodium (COLACE) 100 MG tablet Take 1 tablet (100 mg) by mouth daily. 10 tablet 0    fluticasone (FLONASE) 50 MCG/ACT nasal spray Spray 1 spray into both nostrils daily as needed for rhinitis or allergies 3 mL 3    furosemide (LASIX) 40 MG tablet Take 1 tablet (40 mg) by mouth 2 times daily. 180 tablet 3    lisinopril (ZESTRIL) 40 MG tablet Take 1 tablet (40 mg) by mouth daily. 90 tablet 3    metFORMIN (GLUCOPHAGE XR) 500 MG 24 hr tablet Take 1 tablet (500 mg) by mouth daily (with dinner). 90 tablet 1    prochlorperazine (COMPAZINE) 10 MG tablet Take 1 tablet (10 mg) by mouth every 6 hours as needed for nausea or vomiting. 12 tablet 0    rosuvastatin (CRESTOR) 40 MG tablet Take 40 mg by mouth daily.      spironolactone (ALDACTONE) 25 MG tablet Take 1 tablet (25 mg) by mouth daily. 90 tablet 3    Allergies   Allergen Reactions    Penicillins Hives     hives         Lab Results    Chemistry/lipid CBC Cardiac Enzymes/BNP/TSH/INR   Lab Results   Component Value Date    CHOL 178 10/22/2024    HDL 40 (L) 10/22/2024    TRIG 248 (H) 10/22/2024    BUN 14.5 05/04/2025     05/04/2025    CO2 24 05/04/2025    Lab Results   Component Value Date    WBC 12.5 (H) 05/04/2025    HGB 14.3 05/04/2025    HCT 40.3 05/04/2025    MCV 89 05/04/2025     (L) 05/04/2025    @RESUFAST(BMP,CBC,BNP,TSH,  INR)@        This  note has been dictated using voice recognition software. Any grammatical, typographical, or context distortions are unintentional and inherent to the software.    Maddie Charlton PA-C, RD  General Cardiology

## 2025-05-15 NOTE — TELEPHONE ENCOUNTER
New orders placed for follow-up with Dr. Hodges in 1  year (from May 2025) as patient was seen by Maddie nova.    Esau Agee RN on 5/15/2025 at 10:09 AM

## 2025-05-15 NOTE — LETTER
5/15/2025    Marta Cline, APRN CNP  [address] [place] [address]    RE: Lyubov Sanchez       Dear Colleague,     I had the pleasure of seeing Lyubov Sanchez in the [facility].          HEART CARE FOLLOW UP    Primary Care: Marta Cline MD  Primary Cardiologist: Dr Hodges      Assessment/Recommendations     Pulmonary Hypertension, mild, likely a result of ASD/closure. She recently had issues with fluid retention in the setting of viral illness and OTC medication usage, resolved quickly with escalation of diuretics and discontinued carvedilol. Has side effects to BB in the past.  Continue furosemide 40 mg BID  Continue daily weights, notify us with negative trends  Low salt diet  KRISTYN compliance is essential   Atrial septal defect, status post repair in August 2020 with 30 mm Cardioform.   Hypertension, blood pressure well controlled on current regimen. Reports medication compliance. Home blood pressure measurements are always <110 systolic and <80 diastolic.   Continue lisinopril 40  Continue spironolactone 25 mg daily   Does have home BP cuff   Previously tried therapies: metoprolol/carvedilol- weight gain, fatigue, fluid retention   Dyslipidemia, Hypertriglyceridemia, Most recent  and LDL 88, . Patient is  maintained on therapy.  Continue rosuvastatin 40 mg daily   Fasting lipid panel in 4 weeks  We discussed the role diet, exercise and weight management can play in managing dyslipidemia.   Asymptomatic Ascending Aortic Aneurysm, 4.1 cm on TTE imaging. Stable since 2020. Aortic valve is trileaflet (CONCEPCION 9/2020)  Degenerative Root or ascending aortic aneurysm  3.5-4.4 cm, annual monitoring with CT or MR angiography, echocardiogram to follow valvular disease if needed  Aggressive BP control, goal <120 mmHg to limit aneurysm expansion, beta blockers are first line therapy  Preoperative evaluation-patient doing well without s/s of heart failure, fluid retention, angina.  BP well controlled. There is no cardiac contraindication from the proposed surgery. She should continue cardiac medications in the perioperative period unless otherwise advised by her surgical team. Hold lasix morning of surgery.        Return to care in 1 year with Dr Hodges.      History of Present Illness/Subjective    Lyubov Sanchez is a 54 year old female with past medical history significant for:  Hypertension  Hyperlipidemia  Atrial septal defect, status post repair with Carta form 30 mm cord device August 2020, no residual shunting on imaging in December 2020  Atrial fibrillation, noted postoperatively, status post cardioversion  Venous insufficiency  Mildly dilated proximal ascending aorta  KRISTYN on CPAP      The patient was last seen in in clinic April 2025.  At that visit the patient was recently seen in the ED with increased lower extremity edema and shortness of breath as well as elevated BNP and signs of heart failure in the setting of viral illness and cold medicine use at home.  She was treated with IV diuresis.  Upon follow-up with Thu she was doing better but still having orthopnea and lower extremity edema.  At that visit her diuretics were escalated for 3 days, she was educated on low-salt diet, and her carvedilol was discontinued as well.    Today she shares with me that since the medication changes she feels quite improved. Her dyspnea has improved, her peripheral edema has resolved. She has lost nearly 15 pounds, also has reduced her food intake. She feels with the improved dyspnea she is more active, she is more engagd with the kids at her  and active moving around campgrounds. With high exertion (stairs, carrying a children) she feels excellent. No chest pain or pressure. No SOB at rest. Peripheral edema resolved.  No dizziness, lightheadedness, palpitations or racing heart. Wearing CPAP.          Data Review Today:     TTE 11/2023:  Left ventricular systolic function is  "normal.  The visual ejection fraction is 60-65%.  Regional wall motion abnormalities cannot be excluded due to limited  visualization.  Interatrial septal device noted in place.  There is no color Doppler evidence of an atrial shunt.  Ascending aorta dilatation is present.(4.1cm)  The study was technically difficult. Compared to the prior study dated 9/22,  there have been no changes.    9/2022:  Left ventricular systolic function is normal.  The visual ejection fraction is 60-65%.  Mild-moderate left ventricuular hypertrophy.  Grade II or moderate diastolic dysfunction (average E/E' is 20).  The right ventricle is normal in structure, function and size.  History of PFO closure. No evidence of shunt by color Doppler.  No significant valve dysfunction.  The ascending aorta is Mildly dilated at 4.0 cm.  The inferior vena cava was normal in size with preserved respiratory  variability.      I have reviewed and updated the patient's past medical history, allergy list and medication list.          Physical Examination   Vitals: Ht 1.626 m (5' 4\")   LMP  (LMP Unknown)   BMI 48.92 kg/m      BMI= Body mass index is 48.92 kg/m .    Wt Readings from Last 3 Encounters:   05/09/25 129.3 kg (285 lb)   05/04/25 130.2 kg (287 lb)   05/03/25 130.6 kg (287 lb 14.7 oz)       General :   Alert and oriented, in no acute distress.    HEENT:  Normocephalic and atraumatic. .    Neck: No JVP, carotid bruit or obvious thyromegaly.   Lungs:   Respirations unlabored. Clear bilaterally with no rales, rhonchi, or wheezes.     Cardiovascular:   Rhythm is regular. S1 and S2 are normal. No significant murmur is present. Lower extremities demonstrate no significant edema.        Skin: Skin is warm, dry, and otherwise intact.   Neurologic: Gait not assessed. Mood and affect appropriate.           Medical History  Surgical History Family History Social History   Past Medical History:   Diagnosis Date     Anxiety     Pt reports is on Rx Celexa.     " Aortic aneurysm     Pt reports its small and is currently being monitored.     Arthritis      Chronic knee pain      Depressive disorder      Diabetes mellitus, type 2      Dysplasia of cervix (uteri) 2003    Rx cryotherapy Nov , and      Hypertension     NO cardiology     Incomplete right bundle branch block 11/10/2017     Migraine      Obesity      Obstructive sleep apnea     CPAP will bring on the day of surgery.     Ostium secundum type atrial septal defect 2020    Added automatically from request for surgery [id number]     Paroxysmal atrial fibrillation      Plantar fasciitis     bilat    Past Surgical History:   Procedure Laterality Date     ANESTHESIA CARDIOVERSION N/A 2020    Procedure: ANESTHESIA, FOR CONCEPCION/CARDIOVERSION;  Surgeon: GENERIC ANESTHESIA PROVIDER;  Location: [facility] OR     ARTHROPLASTY KNEE Right 2017    Procedure: ARTHROPLASTY KNEE;  Right total knee arthroplasty using the Arthrex iBalance total knee system;  Surgeon: Hebert Lee MD;  Location: [facility] OR     ARTHROPLASTY KNEE Left 2018    Procedure: ARTHROPLASTY KNEE;  Left total knee arthroplasty using an Arthrex iBalance total knee system;  Surgeon: Hebert Lee MD;  Location: [facility] OR     ATRIAL SEPTAL DEFECT CLOSURE N/A 2020    Procedure: ASD Closure;  Surgeon: Freddie Frias MD;  Location:  HEART CARDIAC CATH LAB      SECTION       CV RIGHT HEART CATH MEASUREMENTS RECORDED N/A 2020    Procedure: Right Heart Cath;  Surgeon: Freddie Frias MD;  Location:  HEART CARDIAC CATH LAB     DAVINCI HERNIORRHAPHY VENTRAL N/A 2020    Procedure: ROBOTIC ASSISTED VENTRAL AND INCISIONAL HERNIA REPAIR WITH MESH;  Surgeon: Violetta Lazaro MD;  Location: [facility] OR     DAVINCI HYSTERECTOMY TOTAL, BILATERAL SALPINGO-OOPHORECTOMY, COMBINED Bilateral 2020    Procedure: DaVinci robotic-assisted total laparoscopic hysterectomy, bilateral salpingectomy;  Surgeon: Venancio Bartlett,  MD;  Location: [facility] OR - Path all benign     DAVINCI HYSTERECTOMY TOTAL, SALPINGECTOMY BILATERAL Bilateral 2020    Fibroid uterus, Menometrorrhagia - Robotic TLH, Bilateral salpingectomy - Path all benign     HC COLP VAGINA W CERVIX IF PRES W BIOPSY      [place] for ASCUS     TONSILLECTOMY & ADENOIDECTOMY      Family History   Adopted: Yes   Problem Relation Age of Onset     Unknown/Adopted Other         pt is adopted and has no access to health history     Unknown/Adopted Mother      Unknown/Adopted Father      Unknown/Adopted Maternal Grandmother      Unknown/Adopted Maternal Grandfather      Unknown/Adopted Paternal Grandmother      Unknown/Adopted Other     Social History     Socioeconomic History     Marital status:      Spouse name: Saúl     Number of children: 2     Years of education: Not on file     Highest education level: Not on file   Occupational History     Occupation:  at home   Tobacco Use     Smoking status: Former     Current packs/day: 0.00     Average packs/day: 0.5 packs/day for 5.0 years (2.5 ttl pk-yrs)     Types: Cigarettes     Start date: 2002     Quit date: 2007     Years since quittin.1     Passive exposure: Never     Smokeless tobacco: Never   Vaping Use     Vaping status: Never Used   Substance and Sexual Activity     Alcohol use: Yes     Comment: 2-3 per weekend     Drug use: No     Sexual activity: Yes     Partners: Male     Birth control/protection: Female Surgical, Male Surgical     Comment: Hysterectomy / vasectomy    Other Topics Concern      Service No     Blood Transfusions No     Caffeine Concern Yes     Comment: 20 oz pepsi     Occupational Exposure Not Asked     Hobby Hazards Not Asked     Sleep Concern Not Asked     Stress Concern Not Asked     Weight Concern Not Asked     Special Diet Yes     Comment: 2-3 dairy per day     Back Care Not Asked     Exercise No     Comment: active with kids, walking puppy     Bike Helmet Not  Asked     Seat Belt Yes     Self-Exams No     Parent/sibling w/ CABG, MI or angioplasty before 65F 55M? No     Comment: No family history - Adopted   Social History Narrative     Not on file     Social Drivers of Health     Financial Resource Strain: Low Risk  (10/14/2024)    Financial Resource Strain      Within the past 12 months, have you or your family members you live with been unable to get utilities (heat, electricity) when it was really needed?: No   Food Insecurity: Low Risk  (10/14/2024)    Food Insecurity      Within the past 12 months, did you worry that your food would run out before you got money to buy more?: No      Within the past 12 months, did the food you bought just not last and you didn t have money to get more?: No   Transportation Needs: Low Risk  (10/14/2024)    Transportation Needs      Within the past 12 months, has lack of transportation kept you from medical appointments, getting your medicines, non-medical meetings or appointments, work, or from getting things that you need?: No   Physical Activity: Insufficiently Active (10/14/2024)    Exercise Vital Sign      Days of Exercise per Week: 3 days      Minutes of Exercise per Session: 40 min   Stress: Stress Concern Present (10/14/2024)    [place] [employer] - Occupational Stress Questionnaire      Feeling of Stress : Rather much   Social Connections: Unknown (10/14/2024)    Social Connection and Isolation Panel [NHANES]      Frequency of Communication with Friends and Family: Not on file      Frequency of Social Gatherings with Friends and Family: More than three times a week      Attends Protestant Services: Not on file      Active Member of Clubs or Organizations: Not on file      Attends Club or Organization Meetings: Not on file      Marital Status: Not on file   Interpersonal Safety: Low Risk  (5/9/2025)    Interpersonal Safety      Do you feel physically and emotionally safe where you currently live?: Yes       Within the past 12 months, have you been hit, slapped, kicked or otherwise physically hurt by someone?: No      Within the past 12 months, have you been humiliated or emotionally abused in other ways by your partner or ex-partner?: No   Housing Stability: Low Risk  (10/14/2024)    Housing Stability      Do you have housing? : Yes      Are you worried about losing your housing?: No          Medications  Allergies   Scheduled Meds:  Current Outpatient Medications   Medication Sig Dispense Refill     albuterol (PROAIR HFA/PROVENTIL HFA/VENTOLIN HFA) 108 (90 Base) MCG/ACT inhaler Inhale 2-4 puffs into the lungs every 4 hours as needed for shortness of breath, wheezing or cough. 18 g 0     aspirin 81 MG EC tablet Take 81 mg by mouth daily       cetirizine (ZYRTEC) 10 MG tablet Take 10 mg by mouth as needed for allergies       citalopram (CELEXA) 20 MG tablet TAKE 1 TABLET BY MOUTH EVERY DAY 90 tablet 0     docusate sodium (COLACE) 100 MG tablet Take 1 tablet (100 mg) by mouth daily. 10 tablet 0     fluticasone (FLONASE) 50 MCG/ACT nasal spray Spray 1 spray into both nostrils daily as needed for rhinitis or allergies 3 mL 3     furosemide (LASIX) 40 MG tablet Take 1 tablet (40 mg) by mouth 2 times daily. 180 tablet 3     lisinopril (ZESTRIL) 40 MG tablet Take 1 tablet (40 mg) by mouth daily. 90 tablet 3     metFORMIN (GLUCOPHAGE XR) 500 MG 24 hr tablet Take 1 tablet (500 mg) by mouth daily (with dinner). 90 tablet 1     prochlorperazine (COMPAZINE) 10 MG tablet Take 1 tablet (10 mg) by mouth every 6 hours as needed for nausea or vomiting. 12 tablet 0     rosuvastatin (CRESTOR) 40 MG tablet Take 40 mg by mouth daily.       spironolactone (ALDACTONE) 25 MG tablet Take 1 tablet (25 mg) by mouth daily. 90 tablet 3    Allergies   Allergen Reactions     Penicillins Hives     hives         Lab Results    Chemistry/lipid CBC Cardiac Enzymes/BNP/TSH/INR   Lab Results   Component Value Date    CHOL 178 10/22/2024    HDL 40 (L)  10/22/2024    TRIG 248 (H) 10/22/2024    BUN 14.5 05/04/2025     05/04/2025    CO2 24 05/04/2025    Lab Results   Component Value Date    WBC 12.5 (H) 05/04/2025    HGB 14.3 05/04/2025    HCT 40.3 05/04/2025    MCV 89 05/04/2025     (L) 05/04/2025    @RESUFAST(BMP,CBC,BNP,TSH,  INR)@        This note has been dictated using voice recognition software. Any grammatical, typographical, or context distortions are unintentional and inherent to the software.    Maddie Charlton PA-C, RD  General Cardiology           Thank you for allowing me to participate in the care of your patient.      Sincerely,     Maddie Charlton PA-C     [facility]  cc:   BARRINGTON Devries CNP  [address] [phone]

## 2025-05-15 NOTE — Clinical Note
Dr Hodges saw this patient in Nov and wanted to see her back in 1 year. Since I saw her today, we can push his follow up to 1 year from now. How do we change his recall order?

## 2025-05-17 DIAGNOSIS — E11.9 TYPE 2 DIABETES MELLITUS WITHOUT COMPLICATION, WITHOUT LONG-TERM CURRENT USE OF INSULIN (H): ICD-10-CM

## 2025-05-19 RX ORDER — METFORMIN HYDROCHLORIDE 500 MG/1
500 TABLET, EXTENDED RELEASE ORAL
Qty: 90 TABLET | Refills: 0 | Status: SHIPPED | OUTPATIENT
Start: 2025-05-19

## 2025-05-19 NOTE — TELEPHONE ENCOUNTER
In-person appointment scheduled with extender on 6/16/25.    No further action needed.    Hannah Chacon

## 2025-05-20 ENCOUNTER — ANESTHESIA (OUTPATIENT)
Dept: SURGERY | Facility: CLINIC | Age: 55
End: 2025-05-20
Payer: COMMERCIAL

## 2025-05-20 ENCOUNTER — HOSPITAL ENCOUNTER (OUTPATIENT)
Facility: CLINIC | Age: 55
Discharge: HOME | End: 2025-05-20
Attending: OBSTETRICS & GYNECOLOGY | Admitting: OBSTETRICS & GYNECOLOGY
Payer: COMMERCIAL

## 2025-05-20 ENCOUNTER — ANESTHESIA EVENT (OUTPATIENT)
Dept: SURGERY | Facility: CLINIC | Age: 55
End: 2025-05-20
Payer: COMMERCIAL

## 2025-05-20 VITALS
RESPIRATION RATE: 16 BRPM | WEIGHT: 280.2 LBS | OXYGEN SATURATION: 97 % | HEART RATE: 118 BPM | BODY MASS INDEX: 48.1 KG/M2 | SYSTOLIC BLOOD PRESSURE: 125 MMHG | TEMPERATURE: 96.9 F | DIASTOLIC BLOOD PRESSURE: 88 MMHG

## 2025-05-20 DIAGNOSIS — Z98.890 S/P LAPAROSCOPY WITH LYSIS OF ADHESIONS: Primary | ICD-10-CM

## 2025-05-20 LAB
ABO + RH BLD: NORMAL
BLD GP AB SCN SERPL QL: NEGATIVE
GLUCOSE BLDC GLUCOMTR-MCNC: 171 MG/DL (ref 70–99)
GLUCOSE BLDC GLUCOMTR-MCNC: 186 MG/DL (ref 70–99)
HGB BLD-MCNC: 13.2 G/DL (ref 11.7–15.7)
HOLD SPECIMEN: NORMAL
MCV RBC AUTO: 88 FL (ref 78–100)
SPECIMEN EXP DATE BLD: NORMAL

## 2025-05-20 PROCEDURE — 999N000141 HC STATISTIC PRE-PROCEDURE NURSING ASSESSMENT: Performed by: OBSTETRICS & GYNECOLOGY

## 2025-05-20 PROCEDURE — 88305 TISSUE EXAM BY PATHOLOGIST: CPT | Mod: TC | Performed by: OBSTETRICS & GYNECOLOGY

## 2025-05-20 PROCEDURE — 250N000011 HC RX IP 250 OP 636: Mod: JW | Performed by: OBSTETRICS & GYNECOLOGY

## 2025-05-20 PROCEDURE — 250N000013 HC RX MED GY IP 250 OP 250 PS 637: Performed by: NURSE ANESTHETIST, CERTIFIED REGISTERED

## 2025-05-20 PROCEDURE — 86901 BLOOD TYPING SEROLOGIC RH(D): CPT | Performed by: PHYSICIAN ASSISTANT

## 2025-05-20 PROCEDURE — 250N000009 HC RX 250: Performed by: OBSTETRICS & GYNECOLOGY

## 2025-05-20 PROCEDURE — 250N000011 HC RX IP 250 OP 636: Performed by: PHYSICIAN ASSISTANT

## 2025-05-20 PROCEDURE — 370N000017 HC ANESTHESIA TECHNICAL FEE, PER MIN: Performed by: OBSTETRICS & GYNECOLOGY

## 2025-05-20 PROCEDURE — 272N000001 HC OR GENERAL SUPPLY STERILE: Performed by: OBSTETRICS & GYNECOLOGY

## 2025-05-20 PROCEDURE — 258N000001 HC RX 258: Performed by: OBSTETRICS & GYNECOLOGY

## 2025-05-20 PROCEDURE — 258N000003 HC RX IP 258 OP 636: Performed by: ANESTHESIOLOGY

## 2025-05-20 PROCEDURE — 88305 TISSUE EXAM BY PATHOLOGIST: CPT | Mod: 26 | Performed by: PATHOLOGY

## 2025-05-20 PROCEDURE — 250N000025 HC SEVOFLURANE, PER MIN: Performed by: OBSTETRICS & GYNECOLOGY

## 2025-05-20 PROCEDURE — 360N000077 HC SURGERY LEVEL 4, PER MIN: Performed by: OBSTETRICS & GYNECOLOGY

## 2025-05-20 PROCEDURE — 36415 COLL VENOUS BLD VENIPUNCTURE: CPT | Performed by: PHYSICIAN ASSISTANT

## 2025-05-20 PROCEDURE — 250N000011 HC RX IP 250 OP 636: Performed by: NURSE ANESTHETIST, CERTIFIED REGISTERED

## 2025-05-20 PROCEDURE — 85018 HEMOGLOBIN: CPT | Performed by: PHYSICIAN ASSISTANT

## 2025-05-20 PROCEDURE — 250N000011 HC RX IP 250 OP 636: Mod: JZ | Performed by: NURSE ANESTHETIST, CERTIFIED REGISTERED

## 2025-05-20 PROCEDURE — 710N000012 HC RECOVERY PHASE 2, PER MINUTE: Performed by: OBSTETRICS & GYNECOLOGY

## 2025-05-20 PROCEDURE — 250N000009 HC RX 250: Performed by: NURSE ANESTHETIST, CERTIFIED REGISTERED

## 2025-05-20 PROCEDURE — 82962 GLUCOSE BLOOD TEST: CPT

## 2025-05-20 PROCEDURE — 86900 BLOOD TYPING SEROLOGIC ABO: CPT | Performed by: PHYSICIAN ASSISTANT

## 2025-05-20 PROCEDURE — 250N000013 HC RX MED GY IP 250 OP 250 PS 637: Performed by: PHYSICIAN ASSISTANT

## 2025-05-20 PROCEDURE — 710N000009 HC RECOVERY PHASE 1, LEVEL 1, PER MIN: Performed by: OBSTETRICS & GYNECOLOGY

## 2025-05-20 RX ORDER — CEFAZOLIN SODIUM/WATER 3 G/30 ML
3 SYRINGE (ML) INTRAVENOUS SEE ADMIN INSTRUCTIONS
Status: DISCONTINUED | OUTPATIENT
Start: 2025-05-20 | End: 2025-05-20 | Stop reason: HOSPADM

## 2025-05-20 RX ORDER — ONDANSETRON 2 MG/ML
4 INJECTION INTRAMUSCULAR; INTRAVENOUS EVERY 30 MIN PRN
Status: DISCONTINUED | OUTPATIENT
Start: 2025-05-20 | End: 2025-05-20 | Stop reason: HOSPADM

## 2025-05-20 RX ORDER — HYDROMORPHONE HCL IN WATER/PF 6 MG/30 ML
0.2 PATIENT CONTROLLED ANALGESIA SYRINGE INTRAVENOUS EVERY 5 MIN PRN
Status: DISCONTINUED | OUTPATIENT
Start: 2025-05-20 | End: 2025-05-20 | Stop reason: HOSPADM

## 2025-05-20 RX ORDER — DEXAMETHASONE SODIUM PHOSPHATE 4 MG/ML
INJECTION, SOLUTION INTRA-ARTICULAR; INTRALESIONAL; INTRAMUSCULAR; INTRAVENOUS; SOFT TISSUE PRN
Status: DISCONTINUED | OUTPATIENT
Start: 2025-05-20 | End: 2025-05-20

## 2025-05-20 RX ORDER — LIDOCAINE 40 MG/G
CREAM TOPICAL
Status: DISCONTINUED | OUTPATIENT
Start: 2025-05-20 | End: 2025-05-20 | Stop reason: HOSPADM

## 2025-05-20 RX ORDER — ONDANSETRON 4 MG/1
4 TABLET, ORALLY DISINTEGRATING ORAL EVERY 30 MIN PRN
Status: DISCONTINUED | OUTPATIENT
Start: 2025-05-20 | End: 2025-05-20 | Stop reason: HOSPADM

## 2025-05-20 RX ORDER — SODIUM CHLORIDE, SODIUM LACTATE, POTASSIUM CHLORIDE, CALCIUM CHLORIDE 600; 310; 30; 20 MG/100ML; MG/100ML; MG/100ML; MG/100ML
INJECTION, SOLUTION INTRAVENOUS CONTINUOUS
Status: DISCONTINUED | OUTPATIENT
Start: 2025-05-20 | End: 2025-05-20 | Stop reason: HOSPADM

## 2025-05-20 RX ORDER — BUPIVACAINE HYDROCHLORIDE 5 MG/ML
INJECTION, SOLUTION EPIDURAL; INTRACAUDAL; PERINEURAL PRN
Status: DISCONTINUED | OUTPATIENT
Start: 2025-05-20 | End: 2025-05-20 | Stop reason: HOSPADM

## 2025-05-20 RX ORDER — KETOROLAC TROMETHAMINE 15 MG/ML
15 INJECTION, SOLUTION INTRAMUSCULAR; INTRAVENOUS
Status: DISCONTINUED | OUTPATIENT
Start: 2025-05-20 | End: 2025-05-20 | Stop reason: HOSPADM

## 2025-05-20 RX ORDER — FENTANYL CITRATE 50 UG/ML
50 INJECTION, SOLUTION INTRAMUSCULAR; INTRAVENOUS EVERY 5 MIN PRN
Status: DISCONTINUED | OUTPATIENT
Start: 2025-05-20 | End: 2025-05-20 | Stop reason: HOSPADM

## 2025-05-20 RX ORDER — NALOXONE HYDROCHLORIDE 0.4 MG/ML
0.1 INJECTION, SOLUTION INTRAMUSCULAR; INTRAVENOUS; SUBCUTANEOUS
Status: DISCONTINUED | OUTPATIENT
Start: 2025-05-20 | End: 2025-05-20 | Stop reason: HOSPADM

## 2025-05-20 RX ORDER — ACETAMINOPHEN 325 MG/1
975 TABLET ORAL ONCE
Status: DISCONTINUED | OUTPATIENT
Start: 2025-05-20 | End: 2025-05-20 | Stop reason: HOSPADM

## 2025-05-20 RX ORDER — IBUPROFEN 800 MG/1
800 TABLET, FILM COATED ORAL ONCE
Status: DISCONTINUED | OUTPATIENT
Start: 2025-05-20 | End: 2025-05-20 | Stop reason: HOSPADM

## 2025-05-20 RX ORDER — FENTANYL CITRATE 50 UG/ML
25 INJECTION, SOLUTION INTRAMUSCULAR; INTRAVENOUS EVERY 5 MIN PRN
Status: DISCONTINUED | OUTPATIENT
Start: 2025-05-20 | End: 2025-05-20 | Stop reason: HOSPADM

## 2025-05-20 RX ORDER — DEXAMETHASONE SODIUM PHOSPHATE 4 MG/ML
4 INJECTION, SOLUTION INTRA-ARTICULAR; INTRALESIONAL; INTRAMUSCULAR; INTRAVENOUS; SOFT TISSUE
Status: DISCONTINUED | OUTPATIENT
Start: 2025-05-20 | End: 2025-05-20 | Stop reason: HOSPADM

## 2025-05-20 RX ORDER — GLYCOPYRROLATE 0.2 MG/ML
INJECTION, SOLUTION INTRAMUSCULAR; INTRAVENOUS PRN
Status: DISCONTINUED | OUTPATIENT
Start: 2025-05-20 | End: 2025-05-20

## 2025-05-20 RX ORDER — OXYCODONE HYDROCHLORIDE 5 MG/1
5-10 TABLET ORAL EVERY 4 HOURS PRN
Qty: 10 TABLET | Refills: 0 | Status: SHIPPED | OUTPATIENT
Start: 2025-05-20

## 2025-05-20 RX ORDER — FENTANYL CITRATE 50 UG/ML
INJECTION, SOLUTION INTRAMUSCULAR; INTRAVENOUS PRN
Status: DISCONTINUED | OUTPATIENT
Start: 2025-05-20 | End: 2025-05-20

## 2025-05-20 RX ORDER — ONDANSETRON 2 MG/ML
INJECTION INTRAMUSCULAR; INTRAVENOUS PRN
Status: DISCONTINUED | OUTPATIENT
Start: 2025-05-20 | End: 2025-05-20

## 2025-05-20 RX ORDER — LIDOCAINE HYDROCHLORIDE 20 MG/ML
INJECTION, SOLUTION INFILTRATION; PERINEURAL PRN
Status: DISCONTINUED | OUTPATIENT
Start: 2025-05-20 | End: 2025-05-20

## 2025-05-20 RX ORDER — HYDRALAZINE HYDROCHLORIDE 20 MG/ML
2.5-5 INJECTION INTRAMUSCULAR; INTRAVENOUS EVERY 10 MIN PRN
Status: DISCONTINUED | OUTPATIENT
Start: 2025-05-20 | End: 2025-05-20 | Stop reason: HOSPADM

## 2025-05-20 RX ORDER — LABETALOL HYDROCHLORIDE 5 MG/ML
10 INJECTION, SOLUTION INTRAVENOUS
Status: DISCONTINUED | OUTPATIENT
Start: 2025-05-20 | End: 2025-05-20 | Stop reason: HOSPADM

## 2025-05-20 RX ORDER — OXYCODONE HYDROCHLORIDE 5 MG/1
5 TABLET ORAL
Status: DISCONTINUED | OUTPATIENT
Start: 2025-05-20 | End: 2025-05-20 | Stop reason: HOSPADM

## 2025-05-20 RX ORDER — HYDROMORPHONE HCL IN WATER/PF 6 MG/30 ML
0.4 PATIENT CONTROLLED ANALGESIA SYRINGE INTRAVENOUS EVERY 5 MIN PRN
Status: DISCONTINUED | OUTPATIENT
Start: 2025-05-20 | End: 2025-05-20 | Stop reason: HOSPADM

## 2025-05-20 RX ORDER — CEFAZOLIN SODIUM/WATER 3 G/30 ML
3 SYRINGE (ML) INTRAVENOUS
Status: COMPLETED | OUTPATIENT
Start: 2025-05-20 | End: 2025-05-20

## 2025-05-20 RX ORDER — MINERAL OIL
OIL (ML) MISCELLANEOUS PRN
Status: DISCONTINUED | OUTPATIENT
Start: 2025-05-20 | End: 2025-05-20 | Stop reason: HOSPADM

## 2025-05-20 RX ORDER — EPHEDRINE SULFATE 50 MG/ML
INJECTION, SOLUTION INTRAMUSCULAR; INTRAVENOUS; SUBCUTANEOUS PRN
Status: DISCONTINUED | OUTPATIENT
Start: 2025-05-20 | End: 2025-05-20

## 2025-05-20 RX ORDER — PROPOFOL 10 MG/ML
INJECTION, EMULSION INTRAVENOUS CONTINUOUS PRN
Status: DISCONTINUED | OUTPATIENT
Start: 2025-05-20 | End: 2025-05-20

## 2025-05-20 RX ORDER — ACETAMINOPHEN 325 MG/1
975 TABLET ORAL ONCE
Status: COMPLETED | OUTPATIENT
Start: 2025-05-20 | End: 2025-05-20

## 2025-05-20 RX ORDER — ALBUTEROL SULFATE 90 UG/1
INHALANT RESPIRATORY (INHALATION) PRN
Status: DISCONTINUED | OUTPATIENT
Start: 2025-05-20 | End: 2025-05-20

## 2025-05-20 RX ADMIN — LIDOCAINE HYDROCHLORIDE 50 MG: 20 INJECTION, SOLUTION INFILTRATION; PERINEURAL at 10:12

## 2025-05-20 RX ADMIN — FENTANYL CITRATE 50 MCG: 50 INJECTION INTRAMUSCULAR; INTRAVENOUS at 11:15

## 2025-05-20 RX ADMIN — ACETAMINOPHEN 975 MG: 325 TABLET, FILM COATED ORAL at 08:25

## 2025-05-20 RX ADMIN — ROCURONIUM BROMIDE 5 MG: 50 INJECTION, SOLUTION INTRAVENOUS at 10:40

## 2025-05-20 RX ADMIN — SODIUM CHLORIDE, SODIUM LACTATE, POTASSIUM CHLORIDE, AND CALCIUM CHLORIDE: .6; .31; .03; .02 INJECTION, SOLUTION INTRAVENOUS at 08:34

## 2025-05-20 RX ADMIN — PROPOFOL 200 MG: 10 INJECTION, EMULSION INTRAVENOUS at 10:12

## 2025-05-20 RX ADMIN — ROCURONIUM BROMIDE 15 MG: 50 INJECTION, SOLUTION INTRAVENOUS at 10:34

## 2025-05-20 RX ADMIN — PROPOFOL 35 MCG/KG/MIN: 10 INJECTION, EMULSION INTRAVENOUS at 10:59

## 2025-05-20 RX ADMIN — FENTANYL CITRATE 100 MCG: 50 INJECTION INTRAMUSCULAR; INTRAVENOUS at 10:12

## 2025-05-20 RX ADMIN — ONDANSETRON 4 MG: 2 INJECTION INTRAMUSCULAR; INTRAVENOUS at 12:00

## 2025-05-20 RX ADMIN — ROCURONIUM BROMIDE 5 MG: 50 INJECTION, SOLUTION INTRAVENOUS at 10:44

## 2025-05-20 RX ADMIN — GLYCOPYRROLATE 0.1 MG: 0.2 INJECTION, SOLUTION INTRAMUSCULAR; INTRAVENOUS at 10:45

## 2025-05-20 RX ADMIN — SUGAMMADEX 200 MG: 100 INJECTION, SOLUTION INTRAVENOUS at 11:56

## 2025-05-20 RX ADMIN — EPHEDRINE SULFATE 5 MG: 5 INJECTION INTRAVENOUS at 10:28

## 2025-05-20 RX ADMIN — EPHEDRINE SULFATE 5 MG: 5 INJECTION INTRAVENOUS at 10:27

## 2025-05-20 RX ADMIN — ROCURONIUM BROMIDE 50 MG: 50 INJECTION, SOLUTION INTRAVENOUS at 10:13

## 2025-05-20 RX ADMIN — SODIUM CHLORIDE, SODIUM LACTATE, POTASSIUM CHLORIDE, AND CALCIUM CHLORIDE: .6; .31; .03; .02 INJECTION, SOLUTION INTRAVENOUS at 10:35

## 2025-05-20 RX ADMIN — PROPOFOL 35 MCG/KG/MIN: 10 INJECTION, EMULSION INTRAVENOUS at 10:23

## 2025-05-20 RX ADMIN — DEXAMETHASONE SODIUM PHOSPHATE 4 MG: 4 INJECTION, SOLUTION INTRA-ARTICULAR; INTRALESIONAL; INTRAMUSCULAR; INTRAVENOUS; SOFT TISSUE at 10:32

## 2025-05-20 RX ADMIN — ROCURONIUM BROMIDE 10 MG: 50 INJECTION, SOLUTION INTRAVENOUS at 11:16

## 2025-05-20 RX ADMIN — ROCURONIUM BROMIDE 10 MG: 50 INJECTION, SOLUTION INTRAVENOUS at 10:58

## 2025-05-20 RX ADMIN — FENTANYL CITRATE 100 MCG: 50 INJECTION INTRAMUSCULAR; INTRAVENOUS at 10:24

## 2025-05-20 RX ADMIN — ROCURONIUM BROMIDE 15 MG: 50 INJECTION, SOLUTION INTRAVENOUS at 11:08

## 2025-05-20 RX ADMIN — Medication 3 G: at 10:02

## 2025-05-20 RX ADMIN — FENTANYL CITRATE 50 MCG: 50 INJECTION INTRAMUSCULAR; INTRAVENOUS at 11:24

## 2025-05-20 RX ADMIN — ALBUTEROL SULFATE 6 PUFF: 108 INHALANT RESPIRATORY (INHALATION) at 10:49

## 2025-05-20 RX ADMIN — MIDAZOLAM 2 MG: 1 INJECTION INTRAMUSCULAR; INTRAVENOUS at 10:06

## 2025-05-20 ASSESSMENT — ACTIVITIES OF DAILY LIVING (ADL)
ADLS_ACUITY_SCORE: 45
ADLS_ACUITY_SCORE: 46
ADLS_ACUITY_SCORE: 45

## 2025-05-20 NOTE — OP NOTE
Total Laparoscopic Hysterectomy Operative Note  Surgeons and Role:     * Verónica Bello MD - Primary     * Randy Bajwa MD - Assisting    Assistant: BRINA Ortez  Preoperative Diagnosis: 54 year old  with large ovarian mass  Postoperative Diagnosis: Post-Op Diagnosis Codes:     * Cyst of ovary, unspecified laterality [N83.209]     * Pelvic pain in female [R10.2]   Torsion of right ovary  Name of Operation: Single site laparoscopy, adhesiolysis, bilateral oophorectomy  Anesthesia: General, endotracheal    Operative Findings:  Laparoscopic: Adhesions of omentum above umbilicus, frozen pelvis with dense adhesions of bowel to vaginal cuff and bilateral ovaries. The right ovary is markedly enlarged with hemorrhagic cysts and is densely adherent to the pelvic sidewall, anterior peritoneum, vaginal cuff, colon and left ovary. Once released from adhesions, the right ovary was noted to be torsed at least 3 times around the right IP ligament. The left ovary was adherent to the right ovary and left pelvic sidewall but appeared normal.    Description of Procedure: After obtaining the appropriate operative consents, the patient was taken to the operating room, where SCDs were placed for VTE prophylaxis. General anesthesia was obtained without difficulty and found to be adequate. The patient was positioned in the dorsal lithotomy position in stirrups and examined under anesthesia with the above operative findings. Patient was then prepped and draped in the normal sterile fashion. A sponge stick was placed in the vagina. A mitchell catheter was inserted.The patient received 3g of IV Ancef prior to incision. Gloves were changed and attention was then turned to the patient's abdomen. A 3 cm skin incision through the umbilicus was performed with an 11 blade scalpel. This was dissected to the underlying fascia. The fascia was incised sharply with Perez scissors. The mesh from prior hernia repair was palpated and  incised. The peritoneal cavity was then identified bluntly. The Tri-Port was then placed into the peritoneal cavity under direct visualization. There were omental adhesions palpable above the umbilicus that were not in the way of the triport. The top was placed and the abdomen was insufflated with CO2 gas to a pressure of 15 mmHg. The patient was then placed in Trendelenburg position, but the amount of Trendelenburg was limited by the patient's body habitus.     Immediately evident, the pelvis was frozen with dense adhesions. There was a hemorrhagic cystic  mass visible in the right adnexa. The ovary contained 2 hemorrhagic appearing cysts, one of them 8 cm and the over 6 cm in diameter. Over 30 minutes, with careful blunt dissection, the colon was dissected off the right ovary. The right ovary was gently teased away from adhesions to the pelvic sidewall, anterior peritoneum and vaginal cuff. At this time, Dr. Bajwa was called into the room for assistance due to dense adhesions to colon. He then spent 30 minutes dissecting out the right ovary from its adhesions. Once the ovary was freed from its adhesions, it was evident that the ovary was torsed 3 times around the infundibulopelvic ligament. The IP ligament was then cauterized and transected with the Thunderbeat device.    At this time, the colon was dissected off the vaginal cuff and the left ovary. The left ovary appeared normal in size. It was dissected off its adhesions to the pelvic sidewall. The left infundibulopelvic ligament was cauterized and transected with the Thunderbeat.    The right ovary was placed in a 15mm Endocatch bag and pulled through the port, where it was morcellated within the contained bag. The left ovary was pulled through the port. Surgicel powder was applied to the pelvic peritoneum. The port was removed and the gas was released.    The fascial incision was closed with 0-vicryl. The skin incision wasclosed with 4-0 Monocryl sub-dermal  stitches. Steri-strips and Band-Aids were applied. Patient was taken to recovery room in stable condition.     This patient's Body mass index is 48.1 kg/m .. Her morbid obesity significantly added to the complexity of the surgery. This increased our time spent on preoperative planning, and extended the surgery time. This significantly increased the work of this surgery.    Violetta Diego's assistance was necessary for driving the laparoscope, retracting tissue and general assistance in this complex case.  Dr. Bajwa's assistance was required due to the complex nature of the dense adhesions.  90 minutes were spent on adhesiolysis.    Complications: None  Estimated Blood Loss: 15 mL from 5/20/2025 10:07 AM to 5/20/2025 12:09 PM  Sponge/Instrument/Needle Counts: The sponge, lap and needle counts were correct x2.  Specimens Removed:   ID Type Source Tests Collected by Time Destination   1 : Pelvic Washings Washings Abdomen NON-GYNECOLOGIC CYTOLOGY Verónica Bello MD 5/20/2025 10:44 AM    2 : Right ovary Tissue Ovary, Right SURGICAL PATHOLOGY EXAM Verónica Bello MD 5/20/2025 11:47 AM    3 : left ovary Tissue Ovary, Left SURGICAL PATHOLOGY EXAM Verónica Bello MD 5/20/2025 11:48 AM        Verónica Bello MD

## 2025-05-20 NOTE — ANESTHESIA PROCEDURE NOTES
Airway       Patient location during procedure: OR       Procedure Start/Stop Times: 5/20/2025 10:16 AM  Staff -        CRNA: Fritz Diego APRN CRNA       Performed By: CRNA  Consent for Airway        Urgency: elective  Indications and Patient Condition       Indications for airway management: luis-procedural       Induction type:intravenous       Mask difficulty assessment: 1 - vent by mask    Final Airway Details       Final airway type: endotracheal airway       Successful airway: ETT - single and Oral  Endotracheal Airway Details        ETT size (mm): 7.0       Cuffed: yes       Cuff volume (mL): 6       Successful intubation technique: video laryngoscopy       VL Blade Size: Glidescope 3       Grade View of Cords: 1       Adjucts: stylet       Position: Right       Measured from: gums/teeth       Secured at (cm): 22       Bite block used: Soft    Post intubation assessment        Placement verified by: capnometry, equal breath sounds and chest rise        Number of attempts at approach: 1       Number of other approaches attempted: 0       Secured with: tape       Ease of procedure: easy       Dentition: Intact and Unchanged    Medication(s) Administered   Medication Administration Time: 5/20/2025 10:16 AM

## 2025-05-20 NOTE — DISCHARGE INSTRUCTIONS
Maximum acetaminophen (Tylenol) dose from all sources should not exceed 4 grams (4000 mg) per day. You last had 975mg at 8:25 am.    DR. ABEIL VANG    CLINIC PHONE NUMBER:  [phone].    [facility]

## 2025-05-20 NOTE — OR NURSING
Pt was having a difficult time keeping sats greater than 90, pt slept for 35 min on 2l/nc with lights off and upon waking walked in dept, used IS with improved effort and O2 sat 94 or greater on room air.  Pt states feeling good and wanting to go home.  Pt encouraged to wear her cpap when sleeping at home.  Pt home with spouse at this time

## 2025-05-20 NOTE — ANESTHESIA POSTPROCEDURE EVALUATION
Patient: Lyubov Sanchez    Procedure: Procedure(s):  Laparoscopic Single-site bilateral oophorectomy       Anesthesia Type:  General    Note:  Disposition: Inpatient   Postop Pain Control: Uneventful            Sign Out: Well controlled pain   PONV: No   Neuro/Psych: Uneventful            Sign Out: Acceptable/Baseline neuro status   Airway/Respiratory: Uneventful            Sign Out: Acceptable/Baseline resp. status   CV/Hemodynamics: Uneventful            Sign Out: Acceptable CV status; No obvious hypovolemia; No obvious fluid overload   Other NRE: NONE   DID A NON-ROUTINE EVENT OCCUR? No           Last vitals:  Vitals Value Taken Time   /80 05/20/25 13:00   Temp 97.88  F (36.6  C) 05/20/25 13:07   Pulse 98 05/20/25 13:06   Resp 16 05/20/25 13:06   SpO2 94 % 05/20/25 13:07   Vitals shown include unfiled device data.    Electronically Signed By: Antoine Thomas MD  May 20, 2025  2:52 PM

## 2025-05-20 NOTE — ANESTHESIA CARE TRANSFER NOTE
Patient: Lyubov Sanchez    Procedure: Procedure(s):  Laparoscopic Single-site bilateral oophorectomy       Diagnosis: Cyst of ovary, unspecified laterality [N83.209]  Pelvic pain in female [R10.2]  Diagnosis Additional Information: No value filed.    Anesthesia Type:   General     Note:    Oropharynx: oropharynx clear of all foreign objects and spontaneously breathing  Level of Consciousness: drowsy  Oxygen Supplementation: face mask  Level of Supplemental Oxygen (L/min / FiO2): 6  Independent Airway: airway patency satisfactory and stable  Dentition: dentition unchanged  Vital Signs Stable: post-procedure vital signs reviewed and stable  Report to RN Given: handoff report given  Patient transferred to: PACU    Handoff Report: Identifed the Patient, Identified the Reponsible Provider, Reviewed the pertinent medical history, Discussed the surgical course, Reviewed Intra-OP anesthesia mangement and issues during anesthesia, Set expectations for post-procedure period and Allowed opportunity for questions and acknowledgement of understanding  Vitals:  Vitals Value Taken Time   BP     Temp 98.06  F (36.7  C) 05/20/25 12:13   Pulse 101 05/20/25 12:13   Resp 19 05/20/25 12:13   SpO2 96 % 05/20/25 12:13   Vitals shown include unfiled device data.    Electronically Signed By: BARRINGTON [place] CRNA  May 20, 2025  12:14 PM

## 2025-05-20 NOTE — ANESTHESIA PREPROCEDURE EVALUATION
Anesthesia Pre-Procedure Evaluation    Patient: Lyubov Sanchez   MRN: [id number] : 1970          Procedure : Procedure(s):  Laparoscopic ovarian cystectomy,possible unilateral oophorectomy         Past Medical History:   Diagnosis Date    Anxiety     Pt reports is on Rx Celexa.    Aortic aneurysm     Pt reports its small and is currently being monitored.    Arthritis     Chronic knee pain     Depressive disorder     Diabetes mellitus, type 2     Dysplasia of cervix (uteri)     Rx cryotherapy , and     Hypertension     NO cardiology    Incomplete right bundle branch block 11/10/2017    Migraine     Obesity     Obstructive sleep apnea     CPAP will bring on the day of surgery.    Ostium secundum type atrial septal defect 2020    Added automatically from request for surgery [id number]    Paroxysmal atrial fibrillation     Plantar fasciitis     bilat      Past Surgical History:   Procedure Laterality Date    ANESTHESIA CARDIOVERSION N/A 2020    Procedure: ANESTHESIA, FOR CONCEPCION/CARDIOVERSION;  Surgeon: GENERIC ANESTHESIA PROVIDER;  Location:  OR    ARTHROPLASTY KNEE Right 2017    Procedure: ARTHROPLASTY KNEE;  Right total knee arthroplasty using the Arthrex iBalance total knee system;  Surgeon: Hebert Lee MD;  Location: [facility] OR    ARTHROPLASTY KNEE Left 2018    Procedure: ARTHROPLASTY KNEE;  Left total knee arthroplasty using an Arthrex iBalance total knee system;  Surgeon: Hebert Lee MD;  Location: [facility] OR    ATRIAL SEPTAL DEFECT CLOSURE N/A 2020    Procedure: ASD Closure;  Surgeon: Freddie Frias MD;  Location:  [facility]     SECTION      CV RIGHT HEART CATH MEASUREMENTS RECORDED N/A 2020    Procedure: Right Heart Cath;  Surgeon: Freddie Frias MD;  Location:  [facility]    DAVINCI HERNIORRHAPHY VENTRAL N/A 2020    Procedure: ROBOTIC ASSISTED VENTRAL AND INCISIONAL HERNIA REPAIR WITH MESH;   Surgeon: Violetta Lazaro MD;  Location: [facility] OR    DAVINCI HYSTERECTOMY TOTAL, BILATERAL SALPINGO-OOPHORECTOMY, COMBINED Bilateral 2020    Procedure: DaVinci robotic-assisted total laparoscopic hysterectomy, bilateral salpingectomy;  Surgeon: Venancio Bartlett MD;  Location: [facility] OR - Path all benign    DAVINCI HYSTERECTOMY TOTAL, SALPINGECTOMY BILATERAL Bilateral 2020    Fibroid uterus, Menometrorrhagia - Robotic TLH, Bilateral salpingectomy - Path all benign    HC COLP VAGINA W CERVIX IF PRES W BIOPSY      [place] for ASCUS    TONSILLECTOMY & ADENOIDECTOMY        Allergies   Allergen Reactions    Penicillins Hives      Social History     Tobacco Use    Smoking status: Former     Current packs/day: 0.00     Average packs/day: 0.5 packs/day for 5.0 years (2.5 ttl pk-yrs)     Types: Cigarettes     Start date: 2002     Quit date: 2007     Years since quittin.1     Passive exposure: Never    Smokeless tobacco: Never   Substance Use Topics    Alcohol use: Yes     Comment: 2-3 per weekend      Wt Readings from Last 1 Encounters:   25 127.1 kg (280 lb 3.2 oz)        Anesthesia Evaluation        History of anesthetic complications       ROS/MED HX  ENT/Pulmonary:     (+) sleep apnea,                                       Neurologic:       Cardiovascular: Comment: Interpretation Summary     Left ventricular systolic function is normal.  The visual ejection fraction is 60-65%.  Regional wall motion abnormalities cannot be excluded due to limited  visualization.  Interatrial septal device noted in place.  There is no color Doppler evidence of an atrial shunt.  Ascending aorta dilatation is present.(4.1cm)  The study was technically difficult. Compared to the prior study dated ,  there have been no changes.      (+)  hypertension- -   -  - -                                      METS/Exercise Tolerance:     Hematologic: Comments: Lab Test        25      09/16/20     09/15/20     09/15/20     08/26/20                       0811          0128          1540          0305          0534          1941          1240          0908          WBC           --          12.5*        10.7         8.0            < >         --          9.3          6.9           HGB          13.2         14.3         14.9         12.7           < >         --          15.1         14.5          MCV          88           89           90           92             < >         --          93           91            PLT           --          128*         127*         120*           < >         --          218          195           INR           --           --           --           --           --          1.05         1.02         1.02           < > = values in this interval not displayed.                  Lab Test        05/20/25 05/04/25 05/03/25 04/07/25                       0737          0128          1540          0305          NA            --          135          138          140           POTASSIUM     --          4.9          4.3          4.2           CHLORIDE      --          99           98           100           CO2           --          24           27           28            BUN           --          14.5         14.2         18.2          CR            --          0.56         0.55         0.65          ANIONGAP      --          12           13           12            CARROL           --          9.5          9.8          9.1           GLC          171*         239*         221*         150*                Musculoskeletal:       GI/Hepatic:       Renal/Genitourinary:       Endo:     (+) type I DM,              Obesity,       Psychiatric/Substance Use:       Infectious Disease:       Malignancy:       Other:      (+)  , H/O Chronic Pain,           Physical Exam  Airway  Mallampati: II  TM distance: >3 FB  Mouth opening: >= 4 cm    Cardiovascular - normal exam Comments:  Interpretation Summary     Left ventricular systolic function is normal.  The visual ejection fraction is 60-65%.  Regional wall motion abnormalities cannot be excluded due to limited  visualization.  Interatrial septal device noted in place.  There is no color Doppler evidence of an atrial shunt.  Ascending aorta dilatation is present.(4.1cm)  The study was technically difficult. Compared to the prior study dated 9/22,  there have been no changes.    Dental   (+) Completely normal teeth      Pulmonary - normal exam      Neurological - normal exam  She appears awake, alert and oriented x3.    Other Findings       OUTSIDE LABS:  CBC:   Lab Results   Component Value Date    WBC 12.5 (H) 05/04/2025    WBC 10.7 05/03/2025    HGB 13.2 05/20/2025    HGB 14.3 05/04/2025    HCT 40.3 05/04/2025    HCT 42.6 05/03/2025     (L) 05/04/2025     (L) 05/03/2025     BMP:   Lab Results   Component Value Date     05/04/2025     05/03/2025    POTASSIUM 4.9 05/04/2025    POTASSIUM 4.3 05/03/2025    CHLORIDE 99 05/04/2025    CHLORIDE 98 05/03/2025    CO2 24 05/04/2025    CO2 27 05/03/2025    BUN 14.5 05/04/2025    BUN 14.2 05/03/2025    CR 0.56 05/04/2025    CR 0.55 05/03/2025     (H) 05/20/2025     (H) 05/04/2025     COAGS:   Lab Results   Component Value Date    PTT 23 09/15/2020    INR 1.05 09/15/2020     POC:   Lab Results   Component Value Date    HCG Negative 01/31/2020    HCGS Negative 09/26/2020     HEPATIC:   Lab Results   Component Value Date    ALBUMIN 4.7 05/04/2025    PROTTOTAL 7.5 05/04/2025    ALT 31 05/04/2025    AST 33 05/04/2025    ALKPHOS 62 05/04/2025    BILITOTAL 1.7 (H) 05/04/2025     OTHER:   Lab Results   Component Value Date    LACT 1.2 05/04/2025    A1C 6.0 (H) 10/22/2024    CARROL 9.5 05/04/2025    MAG 2.0 09/16/2020    LIPASE 30 05/04/2025    TSH 0.88 08/19/2021    SED 6 10/06/2014       Anesthesia Plan    ASA Status:  2       Anesthesia Type: General.  Induction:  "intravenous.  Maintenance: Balanced.   Techniques and Equipment:       - Monitoring Plan: standard ASA monitoring     Consents    Anesthesia Plan(s) and associated risks, benefits, and realistic alternatives discussed. Questions answered and patient/representative(s) expressed understanding.     - Discussed: anesthesiologist     - Discussed with:  Patient        - Pt is DNR/DNI Status: no DNR     Blood Consent:      - Discussed with: patient.     Postoperative Care    Pain management: plan for postoperative opioid use.     Comments:                   Antoine Thomas MD    I have reviewed the pertinent notes and labs in the chart from the past 30 days and (re)examined the patient.  Any updates or changes from those notes are reflected in this note.    Clinically Significant Risk Factors Present on Admission                 # Drug Induced Platelet Defect: home medication list includes an antiplatelet medication   # Hypertension: Noted on problem list           # Morbid Obesity: Estimated body mass index is 48.1 kg/m  as calculated from the following:    Height as of 5/15/25: 1.626 m (5' 4\").    Weight as of this encounter: 127.1 kg (280 lb 3.2 oz).                    "

## 2025-05-22 LAB
PATH REPORT.COMMENTS IMP SPEC: NORMAL
PATH REPORT.FINAL DX SPEC: NORMAL
PATH REPORT.GROSS SPEC: NORMAL
PATH REPORT.MICROSCOPIC SPEC OTHER STN: NORMAL
PATH REPORT.RELEVANT HX SPEC: NORMAL

## 2025-06-11 LAB
PATH REPORT.ADDENDUM SPEC: NORMAL
PATH REPORT.COMMENTS IMP SPEC: NORMAL
PATH REPORT.FINAL DX SPEC: NORMAL
PATH REPORT.GROSS SPEC: NORMAL
PATH REPORT.MICROSCOPIC SPEC OTHER STN: NORMAL
PATH REPORT.RELEVANT HX SPEC: NORMAL
PHOTO IMAGE: NORMAL

## 2025-06-26 ENCOUNTER — RESULTS FOLLOW-UP (OUTPATIENT)
Dept: FAMILY MEDICINE | Facility: CLINIC | Age: 55
End: 2025-06-26

## 2025-06-26 ENCOUNTER — OFFICE VISIT (OUTPATIENT)
Dept: FAMILY MEDICINE | Facility: CLINIC | Age: 55
End: 2025-06-26
Payer: COMMERCIAL

## 2025-06-26 VITALS
WEIGHT: 284.5 LBS | TEMPERATURE: 98.3 F | BODY MASS INDEX: 48.57 KG/M2 | RESPIRATION RATE: 18 BRPM | HEIGHT: 64 IN | SYSTOLIC BLOOD PRESSURE: 117 MMHG | OXYGEN SATURATION: 93 % | DIASTOLIC BLOOD PRESSURE: 79 MMHG | HEART RATE: 86 BPM

## 2025-06-26 DIAGNOSIS — E66.01 MORBID OBESITY (H): ICD-10-CM

## 2025-06-26 DIAGNOSIS — E11.42 TYPE 2 DIABETES MELLITUS WITH DIABETIC POLYNEUROPATHY, WITHOUT LONG-TERM CURRENT USE OF INSULIN (H): Primary | ICD-10-CM

## 2025-06-26 LAB
CREAT UR-MCNC: 42.1 MG/DL
EST. AVERAGE GLUCOSE BLD GHB EST-MCNC: 157 MG/DL
HBA1C MFR BLD: 7.1 % (ref 0–5.6)
HOLD SPECIMEN: NORMAL
MICROALBUMIN UR-MCNC: <12 MG/L
MICROALBUMIN/CREAT UR: NORMAL MG/G{CREAT}
TSH SERPL DL<=0.005 MIU/L-ACNC: 0.73 UIU/ML (ref 0.3–4.2)
VIT B12 SERPL-MCNC: 511 PG/ML (ref 232–1245)

## 2025-06-26 ASSESSMENT — ENCOUNTER SYMPTOMS
FEVER: 0
HEADACHES: 0
NEUROLOGICAL NEGATIVE: 1
CARDIOVASCULAR NEGATIVE: 1
DIZZINESS: 0
ARTHRALGIAS: 0
RESPIRATORY NEGATIVE: 1
CHILLS: 0
GASTROINTESTINAL NEGATIVE: 1

## 2025-06-26 ASSESSMENT — PAIN SCALES - GENERAL: PAINLEVEL_OUTOF10: NO PAIN (0)

## 2025-06-26 NOTE — PROGRESS NOTES
"  Assessment & Plan     Type 2 diabetes mellitus with diabetic polyneuropathy, without long-term current use of insulin (H)  Morbid obesity (H)  Recent A1C has worsened from 6% to 7.1%. Lifestyle not optimal. Counseled on this in detail. We discussed medication adjustment including increasing metformin vs adding GLP-1 to help with weight. She is interested in adding GLP-1 to diabetes regimen. Rx for Mounjaro. Safe use and side effects reviewed. If approved and tolerating well, she will message for dose adjustment with next refill. Follow up for eye exam as scheduled. Added on additional labs for neuropathy as not previously done. Likely related to diabetes. Follow up with PCP in 3 months (declined to schedule today) .    - Hemoglobin A1c; Future  - Albumin Random Urine Quantitative with Creat Ratio; Future  - Vitamin B12; Future  - TSH with free T4 reflex; Future  - tirzepatide (MOUNJARO) 2.5 MG/0.5ML SOAJ auto-injector pen; Inject 0.5 mLs (2.5 mg) subcutaneously once a week.  - FOOT EXAM        BMI  Estimated body mass index is 48.83 kg/m  as calculated from the following:    Height as of this encounter: 1.626 m (5' 4\").    Weight as of this encounter: 129 kg (284 lb 8 oz).   Weight management plan: Discussed healthy diet and exercise guidelines      Follow-up    Follow-up Visit   Expected date:  Sep 26, 2025 (Approximate)      Follow Up Appointment Details:     Follow-up with whom?: PCP    Follow-Up for what?: Chronic Disease f/u    Chronic Disease f/u:  Diabetes  Weight Management       Weight Management New or Return: Return    How?: In Person or Virtual    Is this an as-needed follow-up?: No                 Subjective   Lyubov is a 54 year old, presenting for the following health issues:  Diabetes        6/26/2025    10:11 AM   Additional Questions   Roomed by Darya WIN     History of Present Illness       Diabetes:   She presents for follow up of diabetes.  She is checking home blood glucose a few times a month. " "  She checks blood glucose before meals.  Blood glucose is never over 200 and never under 70. She is aware of hypoglycemia symptoms including shakiness.    She has no concerns regarding her diabetes at this time.  She is having burning in feet and excessive thirst.  The patient has not had a diabetic eye exam in the last 12 months.          She eats 2-3 servings of fruits and vegetables daily.She consumes 3 sweetened beverage(s) daily.She exercises with enough effort to increase her heart rate 10 to 19 minutes per day.  She exercises with enough effort to increase her heart rate 3 or less days per week. She is missing 2 dose(s) of medications per week.  She is not taking prescribed medications regularly due to remembering to take.      Here today for chronic conditions follow up. Has type 2 DM, HTN, and HLD.  Is also followed by cardiology for pulmonary HTN thought to be result of ASD closure. Had recent visit 5/15 with recommendation for 1 year follow up.   Takes metformin 500mg daily for DM.   On rosuvastatin 40mg.   Also on spironolactone 25mg, lisinopril 40mg, and furosemide 40mg BID.  Drinking 3 Pepsis per day. Previously quit for 4 years.   Has had burning sensation to feet since her diagnosis in 2023. Not painful enough to want medication.   Eye exam scheduled this summer.      Review of Systems   Constitutional:  Negative for chills and fever.   HENT: Negative.     Eyes:  Negative for visual disturbance.   Respiratory: Negative.     Cardiovascular: Negative.    Gastrointestinal: Negative.    Musculoskeletal:  Negative for arthralgias.   Skin: Negative.    Neurological: Negative.  Negative for dizziness and headaches.       Objective    /79 (BP Location: Right arm, Patient Position: Sitting, Cuff Size: Adult Large)   Pulse 86   Temp 98.3  F (36.8  C) (Oral)   Resp 18   Ht 1.626 m (5' 4\")   Wt 129 kg (284 lb 8 oz)   LMP  (LMP Unknown)   SpO2 93%   BMI 48.83 kg/m    Body mass index is 48.83 " kg/m .  Physical Exam  Vitals and nursing note reviewed.   Constitutional:       Appearance: Normal appearance.   HENT:      Head: Normocephalic.      Nose: Nose normal.      Mouth/Throat:      Mouth: Mucous membranes are moist.      Pharynx: Oropharynx is clear.   Eyes:      Conjunctiva/sclera: Conjunctivae normal.   Cardiovascular:      Rate and Rhythm: Normal rate and regular rhythm.      Pulses:           Dorsalis pedis pulses are 2+ on the right side and 2+ on the left side.        Posterior tibial pulses are 2+ on the right side and 2+ on the left side.      Heart sounds: Normal heart sounds.   Pulmonary:      Effort: Pulmonary effort is normal.      Breath sounds: Normal breath sounds.   Musculoskeletal:         General: Normal range of motion.      Right foot: Normal range of motion. No deformity, bunion, Charcot foot or foot drop.      Left foot: Normal range of motion. No deformity, bunion, Charcot foot or foot drop.   Feet:      Right foot:      Protective Sensation: 10 sites tested.  10 sites sensed.      Skin integrity: Skin integrity normal.      Toenail Condition: Right toenails are normal.      Left foot:      Protective Sensation: 10 sites tested.  10 sites sensed.      Skin integrity: Skin integrity normal.      Toenail Condition: Left toenails are normal.   Skin:     General: Skin is warm and dry.   Neurological:      General: No focal deficit present.      Mental Status: She is alert and oriented to person, place, and time.   Psychiatric:         Mood and Affect: Mood normal.         Behavior: Behavior normal.            Hemoglobin A1C   Date Value Ref Range Status   06/26/2025 7.1 (H) 0.0 - 5.6 % Final     Comment:     Normal <5.7%   Prediabetes 5.7-6.4%    Diabetes 6.5% or higher     Note: Adopted from ADA consensus guidelines.   10/22/2024 6.0 (H) 0.0 - 5.6 % Final     Comment:     Normal <5.7%   Prediabetes 5.7-6.4%    Diabetes 6.5% or higher     Note: Adopted from ADA consensus guidelines.    03/06/2024 6.1 (H) 0.0 - 5.6 % Final     Comment:     Normal <5.7%   Prediabetes 5.7-6.4%    Diabetes 6.5% or higher     Note: Adopted from ADA consensus guidelines.   04/24/2019 5.1 0 - 5.6 % Final     Comment:     Normal <5.7% Prediabetes 5.7-6.4%  Diabetes 6.5% or higher - adopted from ADA   consensus guidelines.     07/05/2017 5.2 4.3 - 6.0 % Final   07/22/2016 5.8 4.3 - 6.0 % Final       Signed Electronically by: BARRINGTON Pardo CNP

## 2025-07-12 ENCOUNTER — HEALTH MAINTENANCE LETTER (OUTPATIENT)
Age: 55
End: 2025-07-12

## 2025-08-19 ENCOUNTER — MYC MEDICAL ADVICE (OUTPATIENT)
Dept: FAMILY MEDICINE | Facility: CLINIC | Age: 55
End: 2025-08-19
Payer: COMMERCIAL

## 2025-08-19 DIAGNOSIS — E11.42 TYPE 2 DIABETES MELLITUS WITH DIABETIC POLYNEUROPATHY, WITHOUT LONG-TERM CURRENT USE OF INSULIN (H): ICD-10-CM

## 2025-08-19 DIAGNOSIS — E66.01 MORBID OBESITY (H): Primary | ICD-10-CM

## 2025-08-20 DIAGNOSIS — E11.9 TYPE 2 DIABETES MELLITUS WITHOUT COMPLICATION, WITHOUT LONG-TERM CURRENT USE OF INSULIN (H): ICD-10-CM

## 2025-08-20 RX ORDER — METFORMIN HYDROCHLORIDE 500 MG/1
500 TABLET, EXTENDED RELEASE ORAL
Qty: 90 TABLET | Refills: 0 | Status: SHIPPED | OUTPATIENT
Start: 2025-08-20

## 2025-08-27 ENCOUNTER — PATIENT OUTREACH (OUTPATIENT)
Dept: CARE COORDINATION | Facility: CLINIC | Age: 55
End: 2025-08-27
Payer: COMMERCIAL

## (undated) DEVICE — SUTURE MONOCRYL+ 2-0 CT-1 36" UNDYED MCP945H

## (undated) DEVICE — ESU CORD MONOPOLAR 10'  E0510

## (undated) DEVICE — INTRODUCER SHEATH 12FRX12CM FAST-CATH 406128

## (undated) DEVICE — GLOVE PROTEXIS BLUE W/NEU-THERA 7.0  2D73EB70

## (undated) DEVICE — HOOD FLYTE W/PEELAWAY 408-800-100

## (undated) DEVICE — DAVINCI HOT SHEARS TIP COVER  400180

## (undated) DEVICE — BONE CEMENT MIXEVAC III HI VAC KIT  0206-015-000

## (undated) DEVICE — GLOVE PROTEXIS POWDER FREE 7.5 ORTHOPEDIC 2D73ET75

## (undated) DEVICE — INTRO SHEATH 6FRX10CM PINNACLE RSS602

## (undated) DEVICE — SOL WATER IRRIG 1000ML BOTTLE 2F7114

## (undated) DEVICE — ESU PENCIL W/HOLSTER E2350H

## (undated) DEVICE — KIT PATIENT POSITIONING PIGAZZI LATEX FREE 40580

## (undated) DEVICE — Device

## (undated) DEVICE — LIGHT HANDLE X2

## (undated) DEVICE — SOL NACL 0.9% IRRIG 3000ML BAG 2B7477

## (undated) DEVICE — TUBING IRRIG TUR Y TYPE 96" LF 6543-01

## (undated) DEVICE — ESU GROUND PAD ADULT W/CORD E7507

## (undated) DEVICE — GLOVE PROTEXIS BLUE W/NEU-THERA 6.0  2D73EB60

## (undated) DEVICE — SURGICEL POWDER ABSORBABLE HEMOSTAT 3GM 3013SP

## (undated) DEVICE — ESU HOLDER LAP INST DISP PURPLE LONG 330MM H-PRO-330

## (undated) DEVICE — DRAPE STERI U 1015

## (undated) DEVICE — GLOVE PROTEXIS W/NEU-THERA 6.5  2D73TE65

## (undated) DEVICE — BLADE SAW SAGITTAL STRK 25X75X0.89MM SYS 6 6125-089-075

## (undated) DEVICE — PACK MINOR CUSTOM RIDGES SBA32RMRMA

## (undated) DEVICE — LINEN FULL SHEET 5511

## (undated) DEVICE — BLADE CLIPPER SGL USE 9680

## (undated) DEVICE — SU MONOCRYL+ 4-0 18IN PS2 UND MCP496G

## (undated) DEVICE — DILATOR VASC W/INTRO GW 8FRX19CM .038"

## (undated) DEVICE — SUCTION MANIFOLD NEPTUNE 2 SYS 4 PORT 0702-020-000

## (undated) DEVICE — SUTURE VICRYL+ 0 CT-1 18" DYED VIO VCP740D

## (undated) DEVICE — LINEN POUCH DBL 5427

## (undated) DEVICE — PROTECTOR ARM ONE-STEP TRENDELENBURG 40418

## (undated) DEVICE — LINEN TOWEL PACK X5 5464

## (undated) DEVICE — SUCTION IRR STRYKERFLOW II W/TIP 250-070-520

## (undated) DEVICE — CAST PADDING 6" STERILE 9046S

## (undated) DEVICE — BLADE KNIFE SURG 10 371110

## (undated) DEVICE — TUBING CONMED AIRSEAL SMOKE EVAC INSUFFLATION ASM-EVAC

## (undated) DEVICE — ESU HANDPIECE THUNDERBEAT FRONT ACT 5MMX35CM TB-0535FCS

## (undated) DEVICE — SU VICRYL+ 1 MO-4 18" DYED VCP702D

## (undated) DEVICE — LINEN DRAPE 54X72" 5467

## (undated) DEVICE — BAG CLEAR TRASH 1.3M 39X33" P4040C

## (undated) DEVICE — LINEN ORTHO ACL PACK 5447

## (undated) DEVICE — CATH BALLOON AMPLATZER SIZING 7FR 27MMX4.5X70CM 9-SB-024

## (undated) DEVICE — ENDO POUCH UNIVERSAL RETRIEVAL SYSTEM INZII 12/15MM CD004

## (undated) DEVICE — GLOVE PROTEXIS POWDER FREE SMT 8.0  2D72PT80X

## (undated) DEVICE — ENDO MORCELLATOR OLYMPUS PNEUMOLINER WA90500US

## (undated) DEVICE — GLOVE PROTEXIS BLUE W/NEU-THERA 8.0  2D73EB80

## (undated) DEVICE — MANIFOLD KIT ANGIO AUTOMATED 014613

## (undated) DEVICE — DRSG STERI STRIP 1/2X4" R1547

## (undated) DEVICE — CATH ANGIO 2 SH THNWL 6FR

## (undated) DEVICE — DAVINCI SI DRAPE ACCESSORY KIT 3-ARM 420290

## (undated) DEVICE — PACK DAVINCI GYN SMA15GDFS1

## (undated) DEVICE — DAVINCI OBTURATOR 8MM BLADELESS 420023

## (undated) DEVICE — SU VICRYL 3-0 SH 27" J316H

## (undated) DEVICE — SOLUTION IRRIGATION 0.9% NACL 1000ML BOTTLE R5200-01

## (undated) DEVICE — GLOVE PROTEXIS POWDER FREE 7.0 ORTHOPEDIC 2D73ET70

## (undated) DEVICE — GLOVE PROTEXIS POWDER FREE SMT 7.0  2D72PT70X

## (undated) DEVICE — ENDO TROCAR CONMED AIRSEAL BLADELESS 05X120MM IAS5-120LP

## (undated) DEVICE — PACK TOTAL KNEE BOXED LATEX FREE PO15TKFCT

## (undated) DEVICE — TOURNIQUET CUFF 34" REPRO BROWN 60-7070-106

## (undated) DEVICE — DRAPE LAP W/ARMBOARD 29410

## (undated) DEVICE — SU VICRYL 0 UR-6 27" J603H

## (undated) DEVICE — CLEANSER WOUND IRRISEPT 0.05% CHG IRRISEPT-403

## (undated) DEVICE — SET HANDPIECE INTERPULSE W/COAXIAL FAN SPRAY TIP 0210118000

## (undated) DEVICE — NDL INSUFFLATION 13GA 120MM C2201

## (undated) DEVICE — PREP DURAPREP 26ML APL 8630

## (undated) DEVICE — BNDG ABDOMINAL BINDER 9X62-84" 79-89210

## (undated) DEVICE — DAVINCI S CANNULA SEAL 8.5-13MM 420206

## (undated) DEVICE — ESU CORD BIPOLAR GREEN 10-4000

## (undated) DEVICE — DRAIN HEMOVAC RESERVOIR KIT 10FR 1/8" MED 00-2550-002-10

## (undated) DEVICE — GLOVE PROTEXIS BLUE W/NEU-THERA 6.5  2D73EB65

## (undated) DEVICE — CATH TRAY FOLEY SURESTEP 16FR DRAIN BAG STATOCK A899916

## (undated) DEVICE — LUBRICANT INST ELECTROLUBE EL101

## (undated) DEVICE — SU VICRYL 4-0 PS-2 18" UND J496H

## (undated) DEVICE — SU VICRYL 3-0 SH 27" UND J416H

## (undated) DEVICE — PAD CHUX UNDERPAD 30X36" P3036C

## (undated) DEVICE — PREP CHLORAPREP 26ML TINTED ORANGE  260815

## (undated) DEVICE — TOTE ANGIO CORP PC15AT SAN32CC83O

## (undated) DEVICE — APPLICATOR ENDOSCOPIC 5 SURGICEL POWDER 3123SPEA

## (undated) DEVICE — MORCELLATOR LAPAROSCOPIC LINA XCISE MOR-1515-6

## (undated) DEVICE — PREP LOTION REMOVER 0.5OZ 8610

## (undated) DEVICE — SYSTEM CLEARIFY VISUALIZATION 21-345

## (undated) DEVICE — SU WND CLOSURE VLOC 180 ABS 3-0 12" V-20 VLOCL0614

## (undated) DEVICE — GLOVE PROTEXIS POWDER FREE SMT 6.5  2D72PT65X

## (undated) DEVICE — WIRE GUIDE AMPLATZER SUPER STIFF .035"X260CM J TIP 9-GW-002

## (undated) DEVICE — UNDERPAD 36X30 PREMIERPRO MAX ABS NS LF 676111

## (undated) DEVICE — INTRO SHEATH 4FRX10CM PINNACLE RSS402

## (undated) DEVICE — DRAPE IOBAN INCISE 13X13" 6640EZ

## (undated) DEVICE — PREP CHLORAPREP 26ML TINTED HI-LITE ORANGE 930815

## (undated) DEVICE — SPONGE LAP 18X18" X8435

## (undated) DEVICE — DRSG AQUACEL AG 3.5X9.75" HYDROFIBER 412011

## (undated) DEVICE — SOLUTION IV IRRIGATION 0.9% NACL 3L R8206

## (undated) DEVICE — CATH DIAG 4FR ANG PIG 538453S

## (undated) DEVICE — MEDITRACE MULTIFUNTION ADULT RADIOTRANSPARENT ELECTRODE FOR ZOLL

## (undated) DEVICE — LINEN HALF SHEET 5512

## (undated) DEVICE — SU VICRYL 2-0 CT-2 27" J333H

## (undated) DEVICE — CATH TRAY FOLEY COUDE SURESTEP 16FR W/DRN BAG LATEX A304416A

## (undated) DEVICE — RETR ELEV / UTERINE MANIPULATOR V-CARE LG CUP 60-6085-202A

## (undated) DEVICE — ENDO TROCAR FIRST ENTRY KII FIOS Z-THRD 05X100MM CTF03

## (undated) DEVICE — SU WND CLOSURE VLOC 180 ABS 2-0 9" GS-22 VLOCL2145

## (undated) DEVICE — BLADE SAW SAGITTAL STRK 19.5X90X1.27MM 2108-109-000S11

## (undated) DEVICE — TROCAR TRIPORT PLUS OLYMPUS SINGLE ACCESS WA58010T

## (undated) DEVICE — SU VICRYL+ 0 27 UR6 VLT VCP603H

## (undated) DEVICE — SYSTEM PANNUS RETENTION 4 PAD 2 STRAP CZ-PRS-04

## (undated) DEVICE — SOLUTION IV 0.9% NACL 1000ML E8000

## (undated) DEVICE — CATH ANGIO INFINITI 3DRC 4FRX100CM 538476

## (undated) DEVICE — KIT HAND CONTROL ANGIOTOUCH ACIST 65CM AT-P65

## (undated) DEVICE — NDL PERC ENTRY THINWALL 18GA 7.0" G00166

## (undated) DEVICE — ESU ELEC BLADE 2.75" COATED/INSULATED E1455

## (undated) DEVICE — SYR 10ML LL W/O NDL 302995

## (undated) DEVICE — PACK GYN LAPAROSCOPY RIDGES

## (undated) DEVICE — GUIDEWIRE VASC 0.035INX150CM INQWIRE J TIP IQ35F150J3F/A

## (undated) DEVICE — SU STRATAFIX PDS PLUS 0 CT-2 18" SXPP1A407

## (undated) DEVICE — BARRIER INTERCEED 3X4" 4350

## (undated) DEVICE — SOL NACL 0.9% INJ 1000ML BAG 2B1324X

## (undated) RX ORDER — FENTANYL CITRATE 50 UG/ML
INJECTION, SOLUTION INTRAMUSCULAR; INTRAVENOUS
Status: DISPENSED
Start: 2018-03-19

## (undated) RX ORDER — PROPOFOL 10 MG/ML
INJECTION, EMULSION INTRAVENOUS
Status: DISPENSED
Start: 2020-01-31

## (undated) RX ORDER — FENTANYL CITRATE 50 UG/ML
INJECTION, SOLUTION INTRAMUSCULAR; INTRAVENOUS
Status: DISPENSED
Start: 2025-05-20

## (undated) RX ORDER — ONDANSETRON 2 MG/ML
INJECTION INTRAMUSCULAR; INTRAVENOUS
Status: DISPENSED
Start: 2020-01-31

## (undated) RX ORDER — PROPOFOL 10 MG/ML
INJECTION, EMULSION INTRAVENOUS
Status: DISPENSED
Start: 2020-12-29

## (undated) RX ORDER — HYDROMORPHONE HYDROCHLORIDE 1 MG/ML
INJECTION, SOLUTION INTRAMUSCULAR; INTRAVENOUS; SUBCUTANEOUS
Status: DISPENSED
Start: 2020-01-31

## (undated) RX ORDER — HEPARIN SODIUM 1000 [USP'U]/ML
INJECTION, SOLUTION INTRAVENOUS; SUBCUTANEOUS
Status: DISPENSED
Start: 2020-08-26

## (undated) RX ORDER — FENTANYL CITRATE 50 UG/ML
INJECTION, SOLUTION INTRAMUSCULAR; INTRAVENOUS
Status: DISPENSED
Start: 2020-12-29

## (undated) RX ORDER — ONDANSETRON 2 MG/ML
INJECTION INTRAMUSCULAR; INTRAVENOUS
Status: DISPENSED
Start: 2025-05-20

## (undated) RX ORDER — CEFAZOLIN SODIUM/WATER 3 G/30 ML
SYRINGE (ML) INTRAVENOUS
Status: DISPENSED
Start: 2025-05-20

## (undated) RX ORDER — DEXAMETHASONE SODIUM PHOSPHATE 4 MG/ML
INJECTION, SOLUTION INTRA-ARTICULAR; INTRALESIONAL; INTRAMUSCULAR; INTRAVENOUS; SOFT TISSUE
Status: DISPENSED
Start: 2025-05-20

## (undated) RX ORDER — PHENYLEPHRINE HCL IN 0.9% NACL 1 MG/10 ML
SYRINGE (ML) INTRAVENOUS
Status: DISPENSED
Start: 2017-11-17

## (undated) RX ORDER — ACETAMINOPHEN 325 MG/1
TABLET ORAL
Status: DISPENSED
Start: 2025-05-20

## (undated) RX ORDER — METHYLPREDNISOLONE ACETATE 80 MG/ML
INJECTION, SUSPENSION INTRA-ARTICULAR; INTRALESIONAL; INTRAMUSCULAR; SOFT TISSUE
Status: DISPENSED
Start: 2018-03-19

## (undated) RX ORDER — PANTOPRAZOLE SODIUM 40 MG/1
TABLET, DELAYED RELEASE ORAL
Status: DISPENSED
Start: 2017-11-17

## (undated) RX ORDER — FENTANYL CITRATE-0.9 % NACL/PF 10 MCG/ML
PLASTIC BAG, INJECTION (ML) INTRAVENOUS
Status: DISPENSED
Start: 2020-12-29

## (undated) RX ORDER — ALBUTEROL SULFATE 90 UG/1
INHALANT RESPIRATORY (INHALATION)
Status: DISPENSED
Start: 2025-05-20

## (undated) RX ORDER — BUPIVACAINE HYDROCHLORIDE 5 MG/ML
INJECTION, SOLUTION EPIDURAL; INTRACAUDAL; PERINEURAL
Status: DISPENSED
Start: 2025-05-20

## (undated) RX ORDER — GLYCOPYRROLATE 0.2 MG/ML
INJECTION, SOLUTION INTRAMUSCULAR; INTRAVENOUS
Status: DISPENSED
Start: 2025-05-20

## (undated) RX ORDER — HYDROMORPHONE HYDROCHLORIDE 1 MG/ML
INJECTION, SOLUTION INTRAMUSCULAR; INTRAVENOUS; SUBCUTANEOUS
Status: DISPENSED
Start: 2018-03-19

## (undated) RX ORDER — CLINDAMYCIN PHOSPHATE 900 MG/50ML
INJECTION, SOLUTION INTRAVENOUS
Status: DISPENSED
Start: 2020-08-26

## (undated) RX ORDER — FENTANYL CITRATE 50 UG/ML
INJECTION, SOLUTION INTRAMUSCULAR; INTRAVENOUS
Status: DISPENSED
Start: 2020-09-16

## (undated) RX ORDER — CEFAZOLIN SODIUM 1 G/50ML
SOLUTION INTRAVENOUS
Status: DISPENSED
Start: 2017-11-17

## (undated) RX ORDER — FENTANYL CITRATE 50 UG/ML
INJECTION, SOLUTION INTRAMUSCULAR; INTRAVENOUS
Status: DISPENSED
Start: 2017-11-17

## (undated) RX ORDER — BUPIVACAINE HYDROCHLORIDE 5 MG/ML
INJECTION, SOLUTION EPIDURAL; INTRACAUDAL
Status: DISPENSED
Start: 2020-12-29

## (undated) RX ORDER — LIDOCAINE HYDROCHLORIDE 40 MG/ML
SOLUTION TOPICAL
Status: DISPENSED
Start: 2020-09-16

## (undated) RX ORDER — CEFAZOLIN SODIUM 2 G/100ML
INJECTION, SOLUTION INTRAVENOUS
Status: DISPENSED
Start: 2017-11-17

## (undated) RX ORDER — KETOROLAC TROMETHAMINE 30 MG/ML
INJECTION, SOLUTION INTRAMUSCULAR; INTRAVENOUS
Status: DISPENSED
Start: 2020-01-31

## (undated) RX ORDER — FLUMAZENIL 0.1 MG/ML
INJECTION, SOLUTION INTRAVENOUS
Status: DISPENSED
Start: 2020-09-16

## (undated) RX ORDER — GLYCOPYRROLATE 0.2 MG/ML
INJECTION INTRAMUSCULAR; INTRAVENOUS
Status: DISPENSED
Start: 2020-12-29

## (undated) RX ORDER — GLYCOPYRROLATE 0.2 MG/ML
INJECTION, SOLUTION INTRAMUSCULAR; INTRAVENOUS
Status: DISPENSED
Start: 2020-09-16

## (undated) RX ORDER — ACETAMINOPHEN 325 MG/1
TABLET ORAL
Status: DISPENSED
Start: 2020-01-31

## (undated) RX ORDER — FENTANYL CITRATE 50 UG/ML
INJECTION, SOLUTION INTRAMUSCULAR; INTRAVENOUS
Status: DISPENSED
Start: 2020-01-31

## (undated) RX ORDER — KETAMINE HCL IN 0.9 % NACL 50 MG/5 ML
SYRINGE (ML) INTRAVENOUS
Status: DISPENSED
Start: 2020-01-31

## (undated) RX ORDER — ONDANSETRON 2 MG/ML
INJECTION INTRAMUSCULAR; INTRAVENOUS
Status: DISPENSED
Start: 2020-12-29

## (undated) RX ORDER — SCOLOPAMINE TRANSDERMAL SYSTEM 1 MG/1
PATCH, EXTENDED RELEASE TRANSDERMAL
Status: DISPENSED
Start: 2020-01-31

## (undated) RX ORDER — ACETAMINOPHEN 10 MG/ML
INJECTION, SOLUTION INTRAVENOUS
Status: DISPENSED
Start: 2018-03-19

## (undated) RX ORDER — DEXAMETHASONE SODIUM PHOSPHATE 4 MG/ML
INJECTION, SOLUTION INTRA-ARTICULAR; INTRALESIONAL; INTRAMUSCULAR; INTRAVENOUS; SOFT TISSUE
Status: DISPENSED
Start: 2020-01-31

## (undated) RX ORDER — PROPOFOL 10 MG/ML
INJECTION, EMULSION INTRAVENOUS
Status: DISPENSED
Start: 2025-05-20

## (undated) RX ORDER — GLYCOPYRROLATE 0.2 MG/ML
INJECTION INTRAMUSCULAR; INTRAVENOUS
Status: DISPENSED
Start: 2020-01-31

## (undated) RX ORDER — DEXAMETHASONE SODIUM PHOSPHATE 4 MG/ML
INJECTION, SOLUTION INTRA-ARTICULAR; INTRALESIONAL; INTRAMUSCULAR; INTRAVENOUS; SOFT TISSUE
Status: DISPENSED
Start: 2017-11-17

## (undated) RX ORDER — LIDOCAINE HYDROCHLORIDE 10 MG/ML
INJECTION, SOLUTION EPIDURAL; INFILTRATION; INTRACAUDAL; PERINEURAL
Status: DISPENSED
Start: 2017-11-17

## (undated) RX ORDER — CEFAZOLIN SODIUM 1 G/3ML
INJECTION, POWDER, FOR SOLUTION INTRAMUSCULAR; INTRAVENOUS
Status: DISPENSED
Start: 2018-03-19

## (undated) RX ORDER — NALOXONE HYDROCHLORIDE 0.4 MG/ML
INJECTION, SOLUTION INTRAMUSCULAR; INTRAVENOUS; SUBCUTANEOUS
Status: DISPENSED
Start: 2020-09-16

## (undated) RX ORDER — OXYCODONE HYDROCHLORIDE 5 MG/1
TABLET ORAL
Status: DISPENSED
Start: 2020-12-29

## (undated) RX ORDER — CEFAZOLIN SODIUM 1 G/3ML
INJECTION, POWDER, FOR SOLUTION INTRAMUSCULAR; INTRAVENOUS
Status: DISPENSED
Start: 2020-01-31

## (undated) RX ORDER — CEFAZOLIN SODIUM 2 G/100ML
INJECTION, SOLUTION INTRAVENOUS
Status: DISPENSED
Start: 2018-03-19

## (undated) RX ORDER — OXYCODONE HYDROCHLORIDE 5 MG/1
TABLET ORAL
Status: DISPENSED
Start: 2020-01-31

## (undated) RX ORDER — EPHEDRINE SULFATE 50 MG/ML
INJECTION, SOLUTION INTRAMUSCULAR; INTRAVENOUS; SUBCUTANEOUS
Status: DISPENSED
Start: 2020-01-31

## (undated) RX ORDER — NEOSTIGMINE METHYLSULFATE 1 MG/ML
VIAL (ML) INJECTION
Status: DISPENSED
Start: 2020-12-29

## (undated) RX ORDER — BUPIVACAINE HYDROCHLORIDE 5 MG/ML
INJECTION, SOLUTION EPIDURAL; INTRACAUDAL
Status: DISPENSED
Start: 2020-01-31

## (undated) RX ORDER — CEFAZOLIN SODIUM IN 0.9 % NACL 3 G/100 ML
INTRAVENOUS SOLUTION, PIGGYBACK (ML) INTRAVENOUS
Status: DISPENSED
Start: 2020-12-29

## (undated) RX ORDER — ONDANSETRON 2 MG/ML
INJECTION INTRAMUSCULAR; INTRAVENOUS
Status: DISPENSED
Start: 2017-11-17

## (undated) RX ORDER — LIDOCAINE HYDROCHLORIDE 10 MG/ML
INJECTION, SOLUTION EPIDURAL; INFILTRATION; INTRACAUDAL; PERINEURAL
Status: DISPENSED
Start: 2018-03-19

## (undated) RX ORDER — PHENAZOPYRIDINE HYDROCHLORIDE 100 MG/1
TABLET, FILM COATED ORAL
Status: DISPENSED
Start: 2020-01-31

## (undated) RX ORDER — PROPOFOL 10 MG/ML
INJECTION, EMULSION INTRAVENOUS
Status: DISPENSED
Start: 2017-11-17

## (undated) RX ORDER — LIDOCAINE HYDROCHLORIDE 10 MG/ML
INJECTION, SOLUTION EPIDURAL; INFILTRATION; INTRACAUDAL; PERINEURAL
Status: DISPENSED
Start: 2020-08-26

## (undated) RX ORDER — CEFAZOLIN SODIUM 2 G/100ML
INJECTION, SOLUTION INTRAVENOUS
Status: DISPENSED
Start: 2020-01-31

## (undated) RX ORDER — FENTANYL CITRATE 50 UG/ML
INJECTION, SOLUTION INTRAMUSCULAR; INTRAVENOUS
Status: DISPENSED
Start: 2020-08-26

## (undated) RX ORDER — GLYCOPYRROLATE 0.2 MG/ML
INJECTION INTRAMUSCULAR; INTRAVENOUS
Status: DISPENSED
Start: 2018-03-19

## (undated) RX ORDER — GLYCOPYRROLATE 0.2 MG/ML
INJECTION INTRAMUSCULAR; INTRAVENOUS
Status: DISPENSED
Start: 2017-11-17

## (undated) RX ORDER — CELECOXIB 200 MG/1
CAPSULE ORAL
Status: DISPENSED
Start: 2018-03-19

## (undated) RX ORDER — LIDOCAINE HYDROCHLORIDE 10 MG/ML
INJECTION, SOLUTION EPIDURAL; INFILTRATION; INTRACAUDAL; PERINEURAL
Status: DISPENSED
Start: 2020-01-31

## (undated) RX ORDER — NEOSTIGMINE METHYLSULFATE 1 MG/ML
VIAL (ML) INJECTION
Status: DISPENSED
Start: 2018-03-19

## (undated) RX ORDER — PROPOFOL 10 MG/ML
INJECTION, EMULSION INTRAVENOUS
Status: DISPENSED
Start: 2018-03-19

## (undated) RX ORDER — MINERAL OIL
OIL (ML) MISCELLANEOUS
Status: DISPENSED
Start: 2025-05-20

## (undated) RX ORDER — HEPARIN SODIUM 200 [USP'U]/100ML
INJECTION, SOLUTION INTRAVENOUS
Status: DISPENSED
Start: 2020-08-26

## (undated) RX ORDER — PANTOPRAZOLE SODIUM 40 MG/1
TABLET, DELAYED RELEASE ORAL
Status: DISPENSED
Start: 2018-03-19

## (undated) RX ORDER — LIDOCAINE HYDROCHLORIDE 10 MG/ML
INJECTION, SOLUTION EPIDURAL; INFILTRATION; INTRACAUDAL; PERINEURAL
Status: DISPENSED
Start: 2025-05-20

## (undated) RX ORDER — EPHEDRINE SULFATE 50 MG/ML
INJECTION, SOLUTION INTRAMUSCULAR; INTRAVENOUS; SUBCUTANEOUS
Status: DISPENSED
Start: 2025-05-20

## (undated) RX ORDER — CELECOXIB 200 MG/1
CAPSULE ORAL
Status: DISPENSED
Start: 2017-11-17